# Patient Record
Sex: MALE | Race: BLACK OR AFRICAN AMERICAN | Employment: UNEMPLOYED | URBAN - METROPOLITAN AREA
[De-identification: names, ages, dates, MRNs, and addresses within clinical notes are randomized per-mention and may not be internally consistent; named-entity substitution may affect disease eponyms.]

---

## 2019-09-13 ENCOUNTER — HOSPITAL ENCOUNTER (OUTPATIENT)
Age: 59
Setting detail: SPECIMEN
Discharge: HOME | End: 2019-09-13
Payer: COMMERCIAL

## 2019-09-13 ENCOUNTER — OFFICE VISIT (OUTPATIENT)
Dept: INTERNAL MEDICINE CLINIC | Age: 59
End: 2019-09-13
Payer: COMMERCIAL

## 2019-09-13 VITALS
SYSTOLIC BLOOD PRESSURE: 120 MMHG | HEART RATE: 89 BPM | RESPIRATION RATE: 24 BRPM | HEIGHT: 70 IN | OXYGEN SATURATION: 99 % | DIASTOLIC BLOOD PRESSURE: 70 MMHG

## 2019-09-13 DIAGNOSIS — F41.9 ANXIETY AND DEPRESSION: ICD-10-CM

## 2019-09-13 DIAGNOSIS — I10 ESSENTIAL HYPERTENSION: ICD-10-CM

## 2019-09-13 DIAGNOSIS — G81.91 RIGHT HEMIPLEGIA (HCC): ICD-10-CM

## 2019-09-13 DIAGNOSIS — Z76.89 ENCOUNTER TO ESTABLISH CARE WITH NEW DOCTOR: Primary | ICD-10-CM

## 2019-09-13 DIAGNOSIS — E78.2 MIXED HYPERLIPIDEMIA: ICD-10-CM

## 2019-09-13 DIAGNOSIS — F32.A ANXIETY AND DEPRESSION: ICD-10-CM

## 2019-09-13 DIAGNOSIS — I63.00 CEREBROVASCULAR ACCIDENT (CVA) DUE TO THROMBOSIS OF PRECEREBRAL ARTERY (HCC): ICD-10-CM

## 2019-09-13 PROBLEM — I63.9 CVA (CEREBRAL VASCULAR ACCIDENT) (HCC): Status: ACTIVE | Noted: 2019-09-13

## 2019-09-13 PROCEDURE — 99204 OFFICE O/P NEW MOD 45 MIN: CPT | Performed by: FAMILY MEDICINE

## 2019-09-13 RX ORDER — LISINOPRIL 10 MG/1
TABLET ORAL
COMMUNITY
Start: 2019-09-06 | End: 2019-10-07 | Stop reason: SDUPTHER

## 2019-09-13 RX ORDER — FLUOXETINE HYDROCHLORIDE 40 MG/1
CAPSULE ORAL
COMMUNITY
Start: 2019-09-06 | End: 2019-10-07 | Stop reason: SDUPTHER

## 2019-09-13 RX ORDER — INSULIN DETEMIR 100 [IU]/ML
16 INJECTION, SOLUTION SUBCUTANEOUS NIGHTLY
Qty: 5 PEN | Status: SHIPPED | COMMUNITY
Start: 2019-09-13 | End: 2019-12-05 | Stop reason: SDUPTHER

## 2019-09-13 RX ORDER — HYDRALAZINE HYDROCHLORIDE 25 MG/1
TABLET, FILM COATED ORAL
COMMUNITY
Start: 2019-09-06 | End: 2019-10-07 | Stop reason: SDUPTHER

## 2019-09-13 RX ORDER — ATORVASTATIN CALCIUM 40 MG/1
TABLET, FILM COATED ORAL
COMMUNITY
Start: 2019-09-06 | End: 2019-09-13 | Stop reason: DRUGHIGH

## 2019-09-13 RX ORDER — POTASSIUM CHLORIDE 1500 MG/1
TABLET, FILM COATED, EXTENDED RELEASE ORAL
COMMUNITY
Start: 2019-09-06 | End: 2019-10-07 | Stop reason: SDUPTHER

## 2019-09-13 RX ORDER — INSULIN LISPRO 100 [IU]/ML
INJECTION, SOLUTION INTRAVENOUS; SUBCUTANEOUS
COMMUNITY
Start: 2019-09-07 | End: 2020-04-30 | Stop reason: SDUPTHER

## 2019-09-13 RX ORDER — PEN NEEDLE, DIABETIC 31 G X1/4"
NEEDLE, DISPOSABLE MISCELLANEOUS
COMMUNITY
Start: 2019-09-07 | End: 2019-12-05 | Stop reason: SDUPTHER

## 2019-09-13 RX ORDER — INSULIN DETEMIR 100 [IU]/ML
INJECTION, SOLUTION SUBCUTANEOUS
COMMUNITY
Start: 2019-09-06 | End: 2019-09-13 | Stop reason: DRUGHIGH

## 2019-09-13 RX ORDER — AMLODIPINE BESYLATE 10 MG/1
TABLET ORAL
COMMUNITY
Start: 2019-09-06 | End: 2019-10-07 | Stop reason: SDUPTHER

## 2019-09-13 RX ORDER — ATORVASTATIN CALCIUM 40 MG/1
80 TABLET, FILM COATED ORAL
Qty: 30 TABLET | Status: SHIPPED | COMMUNITY
Start: 2019-09-13 | End: 2019-09-23 | Stop reason: SDUPTHER

## 2019-09-13 RX ORDER — CLOPIDOGREL BISULFATE 75 MG/1
TABLET ORAL
COMMUNITY
Start: 2019-09-06 | End: 2019-10-07 | Stop reason: SDUPTHER

## 2019-09-13 ASSESSMENT — ENCOUNTER SYMPTOMS
RESPIRATORY NEGATIVE: 1
BACK PAIN: 1
EYES NEGATIVE: 1
ALLERGIC/IMMUNOLOGIC NEGATIVE: 1
GASTROINTESTINAL NEGATIVE: 1

## 2019-09-13 ASSESSMENT — PATIENT HEALTH QUESTIONNAIRE - PHQ9
SUM OF ALL RESPONSES TO PHQ QUESTIONS 1-9: 0
SUM OF ALL RESPONSES TO PHQ9 QUESTIONS 1 & 2: 0
SUM OF ALL RESPONSES TO PHQ QUESTIONS 1-9: 0
2. FEELING DOWN, DEPRESSED OR HOPELESS: 0
1. LITTLE INTEREST OR PLEASURE IN DOING THINGS: 0

## 2019-09-13 NOTE — PROGRESS NOTES
on diabetic foot care  He is advised to update his annual dilated eye exam  Anxiety and depression on SSRI that he is tolerating well. He denies suicidality  He denies tobacco, excessive alcohol or illicit drug use  I have ordered A1c on the way out  Available labs reviewed, discussed with patient, questions answered. Med list reviewed advised to continue with adjustment as recommended earlier  He is encouraged to call for any concern  This note is created with a voice recognition program and while intend to generate a document that accurately reflects the content of the visit, no guarantee can be provided that every mistake has been identified and corrected by editing.           Constantino Beckham MD

## 2019-09-14 LAB
ESTIMATED AVERAGE GLUCOSE: 206 MG/DL
HBA1C MFR BLD: 8.8 % (ref 4–6)

## 2019-09-16 ENCOUNTER — TELEPHONE (OUTPATIENT)
Dept: INTERNAL MEDICINE CLINIC | Age: 59
End: 2019-09-16

## 2019-09-23 RX ORDER — ATORVASTATIN CALCIUM 80 MG/1
80 TABLET, FILM COATED ORAL DAILY
Qty: 90 TABLET | Refills: 0 | Status: SHIPPED | OUTPATIENT
Start: 2019-09-23 | End: 2019-12-23

## 2019-10-07 RX ORDER — POTASSIUM CHLORIDE 1500 MG/1
20 TABLET, FILM COATED, EXTENDED RELEASE ORAL DAILY
Qty: 30 TABLET | Refills: 1 | Status: SHIPPED | OUTPATIENT
Start: 2019-10-07 | End: 2019-10-15 | Stop reason: SDUPTHER

## 2019-10-07 RX ORDER — AMLODIPINE BESYLATE 10 MG/1
10 TABLET ORAL DAILY
Qty: 30 TABLET | Refills: 1 | Status: SHIPPED | OUTPATIENT
Start: 2019-10-07 | End: 2019-12-05 | Stop reason: SDUPTHER

## 2019-10-07 RX ORDER — CLOPIDOGREL BISULFATE 75 MG/1
75 TABLET ORAL DAILY
Qty: 30 TABLET | Refills: 1 | Status: SHIPPED | OUTPATIENT
Start: 2019-10-07 | End: 2019-12-05 | Stop reason: ALTCHOICE

## 2019-10-07 RX ORDER — GLUCOSAMINE HCL/CHONDROITIN SU 500-400 MG
CAPSULE ORAL
Qty: 200 STRIP | Refills: 3 | Status: SHIPPED | OUTPATIENT
Start: 2019-10-07 | End: 2020-02-24 | Stop reason: SDUPTHER

## 2019-10-07 RX ORDER — LISINOPRIL 10 MG/1
10 TABLET ORAL DAILY
Qty: 30 TABLET | Refills: 1 | Status: SHIPPED | OUTPATIENT
Start: 2019-10-07 | End: 2019-12-05 | Stop reason: SDUPTHER

## 2019-10-07 RX ORDER — HYDRALAZINE HYDROCHLORIDE 25 MG/1
25 TABLET, FILM COATED ORAL DAILY
Qty: 30 TABLET | Refills: 1 | Status: SHIPPED | OUTPATIENT
Start: 2019-10-07 | End: 2019-12-05 | Stop reason: SDUPTHER

## 2019-10-07 RX ORDER — FLUOXETINE HYDROCHLORIDE 40 MG/1
40 CAPSULE ORAL DAILY
Qty: 30 CAPSULE | Refills: 1 | Status: SHIPPED | OUTPATIENT
Start: 2019-10-07 | End: 2019-12-05 | Stop reason: SDUPTHER

## 2019-10-07 RX ORDER — LANCETS 30 GAUGE
1 EACH MISCELLANEOUS 2 TIMES DAILY
Qty: 200 EACH | Refills: 3 | Status: SHIPPED | OUTPATIENT
Start: 2019-10-07 | End: 2020-02-24 | Stop reason: SDUPTHER

## 2019-10-10 ENCOUNTER — TELEPHONE (OUTPATIENT)
Dept: ENDOCRINOLOGY | Age: 59
End: 2019-10-10

## 2019-10-15 ENCOUNTER — OFFICE VISIT (OUTPATIENT)
Dept: INTERNAL MEDICINE CLINIC | Age: 59
End: 2019-10-15
Payer: COMMERCIAL

## 2019-10-15 VITALS
OXYGEN SATURATION: 99 % | WEIGHT: 162.2 LBS | SYSTOLIC BLOOD PRESSURE: 124 MMHG | RESPIRATION RATE: 24 BRPM | HEIGHT: 70 IN | HEART RATE: 87 BPM | BODY MASS INDEX: 23.22 KG/M2 | DIASTOLIC BLOOD PRESSURE: 70 MMHG

## 2019-10-15 DIAGNOSIS — Z76.89 ENCOUNTER TO ESTABLISH CARE WITH NEW DOCTOR: ICD-10-CM

## 2019-10-15 DIAGNOSIS — I10 ESSENTIAL HYPERTENSION: ICD-10-CM

## 2019-10-15 DIAGNOSIS — F32.A ANXIETY AND DEPRESSION: ICD-10-CM

## 2019-10-15 DIAGNOSIS — Z12.11 COLON CANCER SCREENING: ICD-10-CM

## 2019-10-15 DIAGNOSIS — Z28.21 PNEUMOCOCCAL VACCINATION DECLINED: ICD-10-CM

## 2019-10-15 DIAGNOSIS — I63.00 CEREBROVASCULAR ACCIDENT (CVA) DUE TO THROMBOSIS OF PRECEREBRAL ARTERY (HCC): ICD-10-CM

## 2019-10-15 DIAGNOSIS — F41.9 ANXIETY AND DEPRESSION: ICD-10-CM

## 2019-10-15 DIAGNOSIS — Z28.21 TETANUS, DIPHTHERIA, AND ACELLULAR PERTUSSIS (TDAP) VACCINATION DECLINED: ICD-10-CM

## 2019-10-15 DIAGNOSIS — E78.2 MIXED HYPERLIPIDEMIA: ICD-10-CM

## 2019-10-15 DIAGNOSIS — Z28.21 INFLUENZA VACCINATION DECLINED: ICD-10-CM

## 2019-10-15 DIAGNOSIS — G81.91 RIGHT HEMIPLEGIA (HCC): ICD-10-CM

## 2019-10-15 PROCEDURE — 99214 OFFICE O/P EST MOD 30 MIN: CPT | Performed by: FAMILY MEDICINE

## 2019-10-15 RX ORDER — POTASSIUM CHLORIDE 1500 MG/1
20 TABLET, FILM COATED, EXTENDED RELEASE ORAL ONCE
Qty: 90 TABLET | Refills: 2 | Status: SHIPPED | OUTPATIENT
Start: 2019-10-15 | End: 2019-12-05 | Stop reason: SDUPTHER

## 2019-10-15 ASSESSMENT — ENCOUNTER SYMPTOMS
ALLERGIC/IMMUNOLOGIC NEGATIVE: 1
GASTROINTESTINAL NEGATIVE: 1
BACK PAIN: 1
RESPIRATORY NEGATIVE: 1
EYES NEGATIVE: 1

## 2019-11-04 ENCOUNTER — TELEPHONE (OUTPATIENT)
Dept: INTERNAL MEDICINE CLINIC | Age: 59
End: 2019-11-04

## 2019-12-05 ENCOUNTER — OFFICE VISIT (OUTPATIENT)
Dept: NEUROLOGY | Age: 59
End: 2019-12-05
Payer: COMMERCIAL

## 2019-12-05 VITALS
HEIGHT: 71 IN | WEIGHT: 160.6 LBS | DIASTOLIC BLOOD PRESSURE: 76 MMHG | HEART RATE: 98 BPM | SYSTOLIC BLOOD PRESSURE: 118 MMHG | BODY MASS INDEX: 22.48 KG/M2

## 2019-12-05 DIAGNOSIS — G81.91 RIGHT HEMIPLEGIA (HCC): ICD-10-CM

## 2019-12-05 DIAGNOSIS — Z86.73 HISTORY OF RECENT STROKE: Primary | ICD-10-CM

## 2019-12-05 DIAGNOSIS — I10 HYPERTENSION, UNSPECIFIED TYPE: ICD-10-CM

## 2019-12-05 PROCEDURE — 99204 OFFICE O/P NEW MOD 45 MIN: CPT | Performed by: PSYCHIATRY & NEUROLOGY

## 2019-12-05 RX ORDER — HYDRALAZINE HYDROCHLORIDE 25 MG/1
25 TABLET, FILM COATED ORAL DAILY
Qty: 30 TABLET | Refills: 1 | Status: SHIPPED | OUTPATIENT
Start: 2019-12-05 | End: 2020-02-04

## 2019-12-05 RX ORDER — AMLODIPINE BESYLATE 10 MG/1
10 TABLET ORAL DAILY
Qty: 30 TABLET | Refills: 1 | Status: SHIPPED | OUTPATIENT
Start: 2019-12-05 | End: 2020-02-04

## 2019-12-05 RX ORDER — LISINOPRIL 10 MG/1
10 TABLET ORAL DAILY
Qty: 30 TABLET | Refills: 1 | Status: SHIPPED | OUTPATIENT
Start: 2019-12-05 | End: 2020-02-04

## 2019-12-05 RX ORDER — INSULIN DETEMIR 100 [IU]/ML
16 INJECTION, SOLUTION SUBCUTANEOUS NIGHTLY
Qty: 5 PEN | Refills: 3 | Status: SHIPPED | OUTPATIENT
Start: 2019-12-05 | End: 2020-02-24 | Stop reason: SDUPTHER

## 2019-12-05 RX ORDER — POTASSIUM CHLORIDE 1500 MG/1
20 TABLET, FILM COATED, EXTENDED RELEASE ORAL ONCE
Qty: 90 TABLET | Refills: 2 | Status: SHIPPED | OUTPATIENT
Start: 2019-12-05 | End: 2020-11-30

## 2019-12-05 RX ORDER — FLUOXETINE HYDROCHLORIDE 40 MG/1
40 CAPSULE ORAL DAILY
Qty: 30 CAPSULE | Refills: 1 | Status: SHIPPED | OUTPATIENT
Start: 2019-12-05 | End: 2020-02-04

## 2019-12-05 RX ORDER — PEN NEEDLE, DIABETIC 31 G X1/4"
1 NEEDLE, DISPOSABLE MISCELLANEOUS DAILY
Qty: 100 EACH | Refills: 3 | Status: SHIPPED | OUTPATIENT
Start: 2019-12-05 | End: 2020-02-24 | Stop reason: SDUPTHER

## 2019-12-10 RX ORDER — CLOPIDOGREL BISULFATE 75 MG/1
TABLET ORAL
Qty: 90 TABLET | Refills: 1 | Status: SHIPPED | OUTPATIENT
Start: 2019-12-10 | End: 2020-06-16 | Stop reason: ALTCHOICE

## 2019-12-23 RX ORDER — ATORVASTATIN CALCIUM 80 MG/1
TABLET, FILM COATED ORAL
Qty: 90 TABLET | Refills: 0 | Status: SHIPPED | OUTPATIENT
Start: 2019-12-23 | End: 2020-03-10

## 2020-01-15 ENCOUNTER — HOSPITAL ENCOUNTER (OUTPATIENT)
Dept: PHYSICAL THERAPY | Facility: CLINIC | Age: 60
Setting detail: THERAPIES SERIES
Discharge: HOME | End: 2020-01-15
Payer: MEDICAID

## 2020-01-15 PROCEDURE — 97162 PT EVAL MOD COMPLEX 30 MIN: CPT

## 2020-01-15 NOTE — CONSULTS
having stairs     Sleep: [] OK    [x] Disturbed  Hand Dominance  [x] Right  [] Left    Home environment:  -  Lives with parents in 1-story house  -  1 HYUN with  No railings; no stairs inside  - Tub-shower with shower chair; denies difficulty getting in/out of tub      Prior home: 2-story home, 3 HYUN, no railings; 4 steps from there to get into kitchen. Bedroom/bathroom on second floor, 15 steps to get to second floor, railing on left. Tub shower, no equipment.       Durable Medical Equipment:  Current DME available: SPC, hemiwalker, shower chair      PMHx: [] Unremarkable [] Diabetes [] HTN  [] Pacemaker   [] MI/Heart Problems [] Cancer [] Arthritis [] Other:              [x] Refer to full medical chart  In EPIC     Recent Tests:     Medications: [x] Refer to full medical record [] None [] Other:  Allergies:      [x] Refer to full medical record [] None [] Other:    Employment:  [] Normal Duty  [] Light Duty  [] Disability  [] Other  Job Description:    Pain:  [] Yes  [x] No Location: N/A Pain Rating: (0-10 scale) 0/10  Pain altered Tx:  [x] No  [] Yes  Action:      Objective:    ROM  °A/P STRENGTH POSTURE No deficit Deficit Not Tested Comments    Left Right Left Right Kyphosis [x] [] []    Shoulder Flex  120*  2+ Scoliosis [x] [] []    Abd  90*  2+ Forward Head [] [x] []    Elbow Flex  WNL  2+ Rounded Shldrs [] [x] []    Ext  WNL  2+ Slumped Sitting [] [x] []    Wrist Flex  20   Skin Integrity [x] [] []    Ext  30          Hand      NEUROLOGICAL       Hip Flex    4+ Reflexes [] [] [x]    Ext    4+ Sensation [x] [] []    Abd    4+ Bladder/Bowel [x] [] []    Knee Flex    5- Coordination [] [] []    Ext    4+ Tone [] [x] [] Increased tone in L biceps, wrist and finger flexors   Ankle DF    4+ Clonus [x] [] []    PF    4 Tremors [x] [] []      FUNCTION INDEP MIN ASSIST MOD ASSIST MAX ASSIST NOT TESTED   Bed Mobility        -rolling [x] [] [] [] []   -sit to supine [x] [] [] [] []   -supine to sit [x] [] [] [] [] Transfers        -sit to stand [x] [] [] [] []   -sliding board [] [] [] [] []   -pivot [] [] [] [] []   Balance        -sitting static [x] [] [] [] []   -dynamic [x] [] [] [] []   -standing static [] [x] [] [] []   -dynamic [] [] [x] [] []   Ambulation [x] [] [] [] []   Distance/Device: 48' using [employer] Innov[employer]walker in L hand   Analysis: Slow gait speed, hyperextension of R knee in stance phase, limited heel strike and pushoff on RLE     W/C Mobility [] [] [] [] []   Groom/Dress [] [] [] [] []     Special Testing: [x] Mccurdy Test:   [] Tinetti Test:      [] 10M Walk test: Will complete in upcoming sessions. [] Other:      Comments:    Assessment: Joyce Pelayo is a 65yo male who presents today with impairments consistent with left-sided CVA, demonstrating more RUE involvement than RLE. Currently with significant functional impairments including any involvement of RUE for all ADLs, gait deficits, and R-hemibody weakness. Pt will benefit from skilled physical therapy to address R shoulder mobility/weakness, improve efficiency of gait and proper mechanics, and improve functional mobility to reduce dependence on AD, reduce balance impairment, as well as progress to prior level of function as able. Problems:    [] ? Pain:     [x] ? ROM:    [x] ? Strength:    [x] ? Function:   [x] ? Balance  [x] ? Coordination  [x] Postural Deviations  [x] Gait Deviations  [] Tone  [] Other:      STG: (to be met in 10 treatments)  1. ? ROM: Improve R shoulder PROM to at least 150deg flex, 120 abd to improve mobility for reaching overhead. 2. ? Strength:   a. At least 3/5 global strength in RUE to improve lifting grasping  b. 5-/5 R hip ext/abd strength for improved squatting/stair climbing ability  3. ? Balance: Able to bend down to ground to  object with only 1UE assist with no balance impairment. 4. Function:  a. Improve gait speed to at least .6m/s to progress safety with home/community ambulation.   b. Mai Beverage to begin to integrate RUE into UE dressing with minor difficulty  5. Independent with Home Exercise Programs  6. Demonstrate Knowledge of fall prevention    LTG: (to be met in 24 treatments)  1. ROM:   a. Improve R shoulder PROM to at least 170deg flex/abd to normalize joint mobility in R shoulder post CVA  b. Improve R shoulder AROM to at least 90 deg flex/abd to progress ability to reach  2. Balance:  a. Pt will be able to complete 360 turn without AD with no balance impairment. b. Pt will be able to complete dynamic reaching/stooping tasks while standing in narrow MED  3. Function:    a. Improve gait speed to at least .8m/s to improve safety with community ambulation. b. Able to climb 4 stairs with unilateral UE assist and minimal balance impairment. 4. Strength:   a. At least 3+/5 global strength in RUE to improve lifting/grasping  b. At least 5/5 R hip ext/abd strength for improved stability with prolonged walking and stair climbing. Patient goals: [place] my right arm again like normal\"    Rehab Potential:  [] Good  [x] Fair  [] Poor   Suggested Professional Referral:  [x] No  [] Yes:  Barriers to Goal Achievement[de-identified]  [] No  [x] Yes: 5 months post stroke, uncertain of window of opportunity for maximal improvement  Domestic Concerns:  [x] No  [] Yes:    Pt. Education:  [x] Plans/Goals, Risks/Benefits discussed  [] Home exercise program  Method of Education: [x] Verbal  [] Demo  [] Written  Comprehension of Education:  [x] Verbalizes understanding. [] Demonstrates understanding. [] Needs Review. [] Demonstrates/verbalizes understanding of HEP/Ed previously given.     Treatment Plan:  [x] Therapeutic Exercise   30767  [] Iontophoresis: 4 mg/mL Dexamethasone Sodium Phosphate  mAmin  14779   [x] Therapeutic Activity  98279 [] Vasopneumatic cold with compression  23305    [x] Gait Training   58010 [] Ultrasound   70317   [x] Neuromuscular Re-education  38103 [] Electrical Stimulation Unattended  98766   [x] Manual Therapy  13400 [x] Electrical Stimulation Attended  [id number]   [x] Instruction in HEP  [] Dry Needling   [] Aquatic Therapy   [id number] [] Cold/hotpack    [] Massage   [id number]      [] Lumbar/Cervical Traction  [id number]     []  Medication allergies reviewed for use of    Dexamethasone Sodium Phosphate 4mg/ml     with iontophoresis treatments. Pt is not allergic. Frequency: 2-3 x/weeks for 24 visits    Todays Treatment:  Modalities:   Exercises:  Exercise Reps/ Time Weight/ Level Comments                                 Other: Held exercises this date for thorough evaluation of all impairments d/t CVA    Specific Instructions for next treatment:  Nu-step warm up for arms/legs - strap hand to device  RUE: R shoulder PROM, table slides, wall slides; cane AAROM if able to grasp  Standing balance: complete COATS, heel taps to stair, step ups  Gait train: use hemiwalker or least restrictive AD and focus on increasing speed, improve heel strike and swing phase on RLE    Evaluation Complexity:  History (Personal factors, comorbidities) [] 0 [x] 1-2 [] 3+   Exam (limitations, restrictions) [] 1-2 [] 3 [x] 4+   Clinical presentation (progression) [] Stable [x] Evolving  [] Unstable   Decision Making [] Low [x] Moderate [] High    [] Low Complexity [] Moderate Complexity [] High Complexity       Treatment Charges: Mins Units   [x] Evaluation       []  Low       [x]  Moderate       []  High 55 1   []  Modalities     []  Ther Exercise     []  Manual Therapy     []  Ther Activities     []  Aquatics     []  Vasocompression     []  Other       TOTAL TREATMENT TIME: 55 min total time, 0 timed treatment minutes    Time in: 11:02 am      Time out: 12:00 pm    Electronically signed by: Lucrecia Jung PT        Physician Signature:________________________________Date:__________________  By signing above or cosigning this note, I have reviewed this plan of care and certify a need for medically necessary rehabilitation services. *PLEASE SIGN ABOVE AND FAX BACK ALL PAGES*

## 2020-01-16 ENCOUNTER — OFFICE VISIT (OUTPATIENT)
Dept: INTERNAL MEDICINE CLINIC | Age: 60
End: 2020-01-16
Payer: MEDICAID

## 2020-01-16 VITALS
HEIGHT: 71 IN | TEMPERATURE: 97 F | DIASTOLIC BLOOD PRESSURE: 80 MMHG | WEIGHT: 164.2 LBS | HEART RATE: 93 BPM | BODY MASS INDEX: 22.99 KG/M2 | OXYGEN SATURATION: 99 % | SYSTOLIC BLOOD PRESSURE: 130 MMHG

## 2020-01-16 PROBLEM — Z76.89 ENCOUNTER TO ESTABLISH CARE WITH NEW DOCTOR: Status: RESOLVED | Noted: 2019-09-13 | Resolved: 2020-01-16

## 2020-01-16 PROCEDURE — G8420 CALC BMI NORM PARAMETERS: HCPCS | Performed by: FAMILY MEDICINE

## 2020-01-16 PROCEDURE — 1036F TOBACCO NON-USER: CPT | Performed by: FAMILY MEDICINE

## 2020-01-16 PROCEDURE — 99214 OFFICE O/P EST MOD 30 MIN: CPT | Performed by: FAMILY MEDICINE

## 2020-01-16 PROCEDURE — G8484 FLU IMMUNIZE NO ADMIN: HCPCS | Performed by: FAMILY MEDICINE

## 2020-01-16 PROCEDURE — 3046F HEMOGLOBIN A1C LEVEL >9.0%: CPT | Performed by: FAMILY MEDICINE

## 2020-01-16 PROCEDURE — 2022F DILAT RTA XM EVC RTNOPTHY: CPT | Performed by: FAMILY MEDICINE

## 2020-01-16 PROCEDURE — 3017F COLORECTAL CA SCREEN DOC REV: CPT | Performed by: FAMILY MEDICINE

## 2020-01-16 PROCEDURE — G8427 DOCREV CUR MEDS BY ELIG CLIN: HCPCS | Performed by: FAMILY MEDICINE

## 2020-01-16 ASSESSMENT — ENCOUNTER SYMPTOMS
EYES NEGATIVE: 1
RESPIRATORY NEGATIVE: 1
ALLERGIC/IMMUNOLOGIC NEGATIVE: 1
GASTROINTESTINAL NEGATIVE: 1

## 2020-01-16 NOTE — PROGRESS NOTES
Subjective:      Patient ID: Yohana Gatica is a 61 y.o. male. Diabetes   He presents for his follow-up diabetic visit. He has type 1 diabetes mellitus. His disease course has been stable. There are no hypoglycemic associated symptoms. Associated symptoms include weakness. There are no hypoglycemic complications. Symptoms are stable. Diabetic complications include a CVA. Risk factors for coronary artery disease include diabetes mellitus, dyslipidemia, male sex and sedentary lifestyle. Current diabetic treatment includes insulin injections. He is compliant with treatment all of the time. His weight is stable. He is following a generally healthy diet. Meal planning includes ADA exchanges, avoidance of concentrated sweets, calorie counting and carbohydrate counting. He has had a previous visit with a dietitian. He participates in exercise every other day. Home blood sugar record trend: He did not bring Accu-Chek log. An ACE inhibitor/angiotensin II receptor blocker is being taken. Review of Systems   Constitutional: Negative. HENT: Negative. Eyes: Negative. Respiratory: Negative. Cardiovascular: Negative. Gastrointestinal: Negative. Endocrine: Negative. Musculoskeletal: Negative. Skin: Negative. Allergic/Immunologic: Negative. Neurological: Positive for weakness. Hematological: Negative. Psychiatric/Behavioral: Positive for dysphoric mood. Past family and social history unremarkable. Diagnosis Orders   1. Diabetes mellitus, insulin dependent (IDDM), controlled (HCC)  Microalbumin, Ur    Potassium    CBC    Comprehensive Metabolic Panel    Hemoglobin A1C    Lipid Panel    Microalbumin, Ur    Urinalysis with Microscopic    TSH without Reflex    Psa screening   2. Cerebrovascular accident (CVA) due to thrombosis of precerebral artery ([address]     3. Tetanus, diphtheria, and acellular pertussis (Tdap) vaccination declined     4.  Right hemiplegia (HCC)  Microalbumin, Ur Potassium    CBC    Comprehensive Metabolic Panel    Hemoglobin A1C    Lipid Panel    Microalbumin, Ur    Urinalysis with Microscopic    TSH without Reflex    Psa screening   5. Pneumococcal vaccination declined     6. Mixed hyperlipidemia  Microalbumin, Ur    Potassium    CBC    Comprehensive Metabolic Panel    Hemoglobin A1C    Lipid Panel    Microalbumin, Ur    Urinalysis with Microscopic    TSH without Reflex    Psa screening   7. Influenza vaccination declined     8. Essential hypertension     9. Screening for hyperlipidemia  Lipid, Fasting   10. Anxiety and depression           Objective:   Physical Exam  Vitals signs and nursing note reviewed. Constitutional:       Appearance: He is well-developed. HENT:      Head: Normocephalic and atraumatic. Right Ear: External ear normal.      Left Ear: External ear normal.      Nose: Nose normal.   Eyes:      Conjunctiva/sclera: Conjunctivae normal.      Pupils: Pupils are equal, round, and reactive to light. Neck:      Musculoskeletal: Normal range of motion and neck supple. Cardiovascular:      Rate and Rhythm: Normal rate and regular rhythm. Heart sounds: Normal heart sounds. Pulmonary:      Effort: Pulmonary effort is normal.      Breath sounds: Normal breath sounds. Abdominal:      General: Bowel sounds are normal.      Palpations: Abdomen is soft. Musculoskeletal: Normal range of motion. Comments: Musculoskeletal pain. Status post CVA   Skin:     General: Skin is warm and dry. Neurological:      Mental Status: He is alert and oriented to person, place, and time. Deep Tendon Reflexes: Reflexes are normal and symmetric. Comments: CVA with residual right hemiplegia. He is established with neurology with ongoing rehab   Psychiatric:      Comments: Anxiety/depression on SSRI. He denies suicidality         Assessment:       Diagnosis Orders   1.  Diabetes mellitus, insulin dependent (IDDM), controlled ([facility] [address]  Microalbumin, Ur Potassium    CBC    Comprehensive Metabolic Panel    Hemoglobin A1C    Lipid Panel    Microalbumin, Ur    Urinalysis with Microscopic    TSH without Reflex    Psa screening   2. Cerebrovascular accident (CVA) due to thrombosis of precerebral artery ([address]     3. Tetanus, diphtheria, and acellular pertussis (Tdap) vaccination declined     4. Right hemiplegia (HCC)  Microalbumin, Ur    Potassium    CBC    Comprehensive Metabolic Panel    Hemoglobin A1C    Lipid Panel    Microalbumin, Ur    Urinalysis with Microscopic    TSH without Reflex    Psa screening   5. Pneumococcal vaccination declined     6. Mixed hyperlipidemia  Microalbumin, Ur    Potassium    CBC    Comprehensive Metabolic Panel    Hemoglobin A1C    Lipid Panel    Microalbumin, Ur    Urinalysis with Microscopic    TSH without Reflex    Psa screening   7. Influenza vaccination declined     8. Essential hypertension     9. Screening for hyperlipidemia  Lipid, Fasting   10. Anxiety and depression             Plan:      15-year-old right-hand-dominant -American male is presented, came along with his spouse for routine follow-up. He denies any distress, afebrile hemodynamically stable  Insulin-dependent diabetes mellitus with A1c of 8.8. He is on Levemir and sliding scale NovoLog. He did not bring his Accu-Chek log. Emphasized importance of bringing in Accu-Chek log in order to adjust his insulin. We will recheck his labs on the way out. Adhere to ADA 1800 diet, daily moderate exercise as tolerated  Hypertension well-controlled with random blood pressure is equal less than 130/80. Consume less than 2 g of salt a day  Hyperlipidemia on statin that he is tolerating well  Monofilament testing is unremarkable. He is counseled on diabetic foot care  Recent history of CVA with residual right hemiplegia. He ambulates with a walker.   He was recently seen by his neurologist with ongoing rehab program.  Fall precaution is advised  Anxiety and depression on Prozac with positive response. He denies tobacco, excessive alcohol or illicit drug use  Once again he declined influenza/pneumococcal vaccine, Tdap  Med list reviewed advised to continue with compliance  Further recommendations to follow labs  Observe and call for any concern  This note is created with a voice recognition program and while intend to generate a document that accurately reflects the content of the visit, no guarantee can be provided that every mistake has been identified and corrected by editing.           Valeri Britt MD

## 2020-01-22 ENCOUNTER — HOSPITAL ENCOUNTER (OUTPATIENT)
Dept: PHYSICAL THERAPY | Facility: CLINIC | Age: 60
Setting detail: THERAPIES SERIES
Discharge: HOME | End: 2020-01-22
Payer: MEDICAID

## 2020-01-22 PROCEDURE — 97110 THERAPEUTIC EXERCISES: CPT

## 2020-01-22 NOTE — FLOWSHEET NOTE
strengthening   Sit to stands w/ LLE elevated on step 10x 2\" under LLE Cues to maximize weightshift to RLE during descent   Other: Billed for some skilled minutes for Nu-step in adjusting settings to appropriate ROM for shoulder, wrapping R hand to secure  - billed with therex    Specific Instructions for next treatment: TUG test, tricep pull downs, bicep curls on tband; COATS. Consider e-stim to R wrist extensors while pushing table      Treatment Charges: Mins Units   []  Modalities     [x]  Ther Exercise 57 4   []  Manual Therapy     []  Ther Activities     []  Aquatics     []  Vasocompression     []  Other     Total Treatment time 57 4       Assessment: [x] Progressing toward goals. Shoulder flexors/abd fatigued after strengthening program this date - assist required for all exercises to maintain  and neutral wrist position. Significant difficulty achieving tricep extension, d/t weakness and bicep tone - will continue to address tricep weakness in upcoming visits. Able to bias RLE strengthening though different positions in sit<>stands with good tolerance, and good \"-\" balance noted during these tasks. [] No change. [x] Other: Pt states he has brace to maintain neutral wrist - will bring it in next visit. STG: (to be met in 10 treatments)  1. ? ROM: Improve R shoulder PROM to at least 150deg flex, 120 abd to improve mobility for reaching overhead. 2. ? Strength:   a. At least 3/5 global strength in RUE to improve lifting grasping  b. 5-/5 R hip ext/abd strength for improved squatting/stair climbing ability  3. ? Balance: Able to bend down to ground to  object with only 1UE assist with no balance impairment. 4. Function:  a. Improve gait speed to at least .6m/s to progress safety with home/community ambulation. b. Ernesta Brittle to begin to integrate RUE into UE dressing with minor difficulty  5. Independent with Home Exercise Programs  6.  Demonstrate Knowledge of fall prevention     LTG: (to be met in 24 treatments)  1. ROM:   a. Improve R shoulder PROM to at least 170deg flex/abd to normalize joint mobility in R shoulder post CVA  b. Improve R shoulder AROM to at least 90 deg flex/abd to progress ability to reach  2. Balance:  a. Pt will be able to complete 360 turn without AD with no balance impairment. b. Pt will be able to complete dynamic reaching/stooping tasks while standing in narrow MED  3. Function:    a. Improve gait speed to at least .8m/s to improve safety with community ambulation. b. Able to climb 4 stairs with unilateral UE assist and minimal balance impairment. 4. Strength:   a. At least 3+/5 global strength in RUE to improve lifting/grasping  b. At least 5/5 R hip ext/abd strength for improved stability with prolonged walking and stair climbing.         Patient goals: [place] my right arm again like normal\"    Pt. Education:  [x] Yes  [] No  [x] Reviewed Prior HEP/Ed  Method of Education: [x] Verbal  [x] Demo  [x] Written  Comprehension of Education:  [x] Verbalizes understanding. [x] Demonstrates understanding. [] Needs review. [] Demonstrates/verbalizes HEP/Ed previously given. Current HEP: supine bicep curl 2#, chest press with cane, sit>stands with LLE forward     Plan: [x] Continue per plan of care.    [] Other:      Time In:12:03 pm            Time Out: 1:05 pm    Electronically signed by:  Manolo Hanks, PT

## 2020-01-24 ENCOUNTER — HOSPITAL ENCOUNTER (OUTPATIENT)
Dept: PHYSICAL THERAPY | Facility: CLINIC | Age: 60
Setting detail: THERAPIES SERIES
Discharge: HOME | End: 2020-01-24
Payer: MEDICAID

## 2020-01-24 PROCEDURE — 97110 THERAPEUTIC EXERCISES: CPT

## 2020-01-24 NOTE — FLOWSHEET NOTE
[] [address]  [address]  [phone]  F: [phone] [x] [address] [facility] [address]   [address]  P: [phone]  F: [phone] [] [address]  [address] [facility]  P: [phone]  F: [phone] [] [address]  [facility]   [address]   [place]: [phone]  F: [phone]      Physical Therapy Daily Treatment Note    Date:  2020  Patient Name:  Nancy Cosme    :  1960  MRN: [id number]  Physician: Dr. Hawthorne Galen: Manoj Darby (24 vs)  Medical Diagnosis: History of recent stroke  Rehab Codes: M25.611, M25.621, M25.641, R53.1, R29.3, R26.9, R27.9  Next 's appt. : 3/10/20  Visit# / total visits: 3/24  Cancels/No Shows: 0    Subjective:    Pain:  [] Yes  [x] No Location:  N/A Pain Rating: (0-10 scale) \"stiffness\"/10  Pain altered Tx:  [x] No  [] Yes  Action:  Comments: Pt continues to report no pain and just stiffness in R shoulder.      Objective:  Modalities:   Precautions:  Exercises:  Exercise Reps/ Time Weight/ Level Comments   Nu-step 8 min L3 NOT Scifit  - R arm ACE-wrapped to handle  - R arm at place 12  - seat at 9         Supine      R shoulder PROM 20x ea  Flex, abd (add ER/IR next visit)   Wand chest press 10x 1# CGA-David therapist assist at R [address] shld flexion 10x 1# \" \"   Tricep ext 10x AROM Arm in ~30deg flexion, held by therapist   Bicep curl 10x 2# Therapist holding wrist at neutral during curl   Sidelying abd  RUE Therapist assist throughout; ~90% assist         Seated      Table slides - fwd 2x10  Assist to grasp towel and extend elbow; complete on orange slider               Standing      Squats 2x10  Increase reps next session  - LLE slightly forward to bias RLE strengthening   Sit to stands w/

## 2020-01-29 ENCOUNTER — HOSPITAL ENCOUNTER (OUTPATIENT)
Dept: PHYSICAL THERAPY | Facility: CLINIC | Age: 60
Setting detail: THERAPIES SERIES
Discharge: HOME | End: 2020-01-29
Payer: MEDICAID

## 2020-01-29 PROCEDURE — 97110 THERAPEUTIC EXERCISES: CPT

## 2020-01-29 NOTE — FLOWSHEET NOTE
[] [address]  [address]  [phone]  F: [phone] [x] [address]  [facility] 36   [address]  P: [phone]  F: [phone] [] [place]  [address]  P: [phone]  F: [phone] [] [address]  [facility]   [address]   [place]: [phone]  F: [phone]      Physical Therapy Daily Treatment Note    Date:  2020  Patient Name:  Wale Lovelace    :  1960  MRN: [id number]  Physician: Dr. Miguel Ángel Ellis: Brisa Purdy (24 vs)  Medical Diagnosis: History of recent stroke  Rehab Codes: M25.611, M25.621, M25.641, R53.1, R29.3, R26.9, R27.9  Next 's appt. : 3/10/20  Visit# / total visits:   Cancels/No Shows: 0    Subjective:    Pain:  [] Yes  [x] No Location:  N/A Pain Rating: (0-10 scale) \"stiffness\"/10  Pain altered Tx:  [x] No  [] Yes  Action:  Comments: Pt reports he got 2# weights for home; attempted 3# but too much.     Objective:  TU.04 s  10 MWT: 29.52s      Modalities:   Precautions:  Exercises: Bolded completed on 2020:  Exercise Reps/ Time Weight/ Level Comments   Nu-step 8 min L4 NOT Scifit  - R arm ACE-wrapped to handle  - R arm at place 12  - seat at 9         Pre-gait      Forward step w/ no hyperext 20x Weight in RLE Mod TC/VCs to avoid hyperextension but maximize quad activation   Lateral weight shift 2x15  \" \"         Total gym   ADD NEXT         Supine      R shoulder PROM 20x ea  Flex, abd (add ER/IR next visit)   Wand chest press 10x 1# CGA-David therapist assist at [address] shld flexion 10x 1# \" \"   Tricep ext 10x AROM Arm in ~30deg flexion, held by therapist   Bicep curl 10x 2# Therapist holding wrist at neutral during curl   Sidelying abd  RUE Therapist assist throughout; ~90% assist         Seated      Table slides - fwd 2x10  Assist to grasp towel and extend elbow; complete on orange slider   Thumb opposition 10x ea RUE To second and third digit    Wrist ext/flex stretch 10x3\" ea           Standing      UE weightbearing on table x  Attempted but too much pain in R wrist d/t lack of ROM   Squats 2x10  Increase reps next session  - LLE slightly forward to bias RLE strengthening   Sit to stands w/ LLE elevated on step 10x 2\" under LLE Cues to maximize weightshift to RLE during descent               Tband      Single-arm row 15x  peach Min A to maintain appropriate elbow flex   Tricep pull down 15x peach Cues to keep shoulders upright               Other: Time spent in skilled assessement of gait speed and TUG assessment prior to beginning session - billed with therex    Specific Instructions for next treatment: Wrist flex/ext stretching prior to weightbearing; finger flexor stretching and consider stim for assistance with wrist/finger ext. Treatment Charges: Mins Units   []  Modalities     [x]  Ther Exercise 55 4   []  Manual Therapy     []  Ther Activities     []  Aquatics     []  Vasocompression     []  Other     Total Treatment time 55 4       Assessment: [x] Progressing toward goals. Initiated gait training with focus on avoiding hyperextension at R knee, requires min-mod tactile cues initially; spent most time on this during session. Pt able to maintain slightly bent knee with increased incidence of hyperextension with worsening fatigue. Unable to carryover to gait as of yet, will continue to progress this within sessions. Very minimal thumb/finger extension but some finger flexion - able to complete beginnings of thumb opposition with assist to keep 2nd-3rd digits extended. [] No change. [x] Other: Using wrist brace throughout treatment to maintain neutral position except when attempt weightbearing on UEs. .    STG: (to be met in 10 treatments)  1. ? ROM: Improve R shoulder PROM to at least 150deg flex, 120 abd to improve mobility for reaching overhead. 2. ? Strength:   a. At least 3/5 global strength in RUE to improve lifting grasping  b. 5-/5 R hip ext/abd strength for improved squatting/stair climbing ability  3. ? Balance: Able to bend down to ground to  object with only 1UE assist with no balance impairment. 4. Function:  a. Improve gait speed to at least .6m/s to progress safety with home/community ambulation. b. Alli Amy to begin to integrate RUE into UE dressing with minor difficulty  5. Independent with Home Exercise Programs  6. Demonstrate Knowledge of fall prevention     LTG: (to be met in 24 treatments)  1. ROM:   a. Improve R shoulder PROM to at least 170deg flex/abd to normalize joint mobility in R shoulder post CVA  b. Improve R shoulder AROM to at least 90 deg flex/abd to progress ability to reach  2. Balance:  a. Pt will be able to complete 360 turn without AD with no balance impairment. b. Pt will be able to complete dynamic reaching/stooping tasks while standing in narrow MED  3. Function:    a. Improve gait speed to at least .8m/s to improve safety with community ambulation. b. Able to climb 4 stairs with unilateral UE assist and minimal balance impairment. 4. Strength:   a. At least 3+/5 global strength in RUE to improve lifting/grasping  b. At least 5/5 R hip ext/abd strength for improved stability with prolonged walking and stair climbing.         Patient goals: Ghanshyam Orellana my right arm again like normal\"    Pt. Education:  [x] Yes  [] No  [x] Reviewed Prior HEP/Ed  Method of Education: [x] Verbal  [x] Demo  [x] Written  Comprehension of Education:  [x] Verbalizes understanding. [x] Demonstrates understanding. [] Needs review. [] Demonstrates/verbalizes HEP/Ed previously given. Current HEP: supine bicep curl 2#, chest press with cane, sit>stands with LLE forward     Plan: [x] Continue per plan of care.    [] Other:      Time In: 12:00 pm

## 2020-01-31 ENCOUNTER — TELEPHONE (OUTPATIENT)
Dept: INTERNAL MEDICINE CLINIC | Age: 60
End: 2020-01-31

## 2020-01-31 ENCOUNTER — HOSPITAL ENCOUNTER (OUTPATIENT)
Dept: PHYSICAL THERAPY | Facility: CLINIC | Age: 60
Setting detail: THERAPIES SERIES
Discharge: HOME | End: 2020-01-31
Payer: MEDICAID

## 2020-01-31 PROCEDURE — 97110 THERAPEUTIC EXERCISES: CPT

## 2020-01-31 NOTE — FLOWSHEET NOTE
Continue per plan of care.    [] Other:      Time In: 11:00 am            Time Out: 12:06 pm    Electronically signed by:  Eveline Mayfield PT

## 2020-02-03 NOTE — TELEPHONE ENCOUNTER
----- Message from John Soto MD sent at 2/3/2020 10:45 AM EST -----  Regarding: DME order for Leonardo Ingram or Nonda Everette,     Can you or Nonda Pick put an DME order? Anders smith knee cage brace, then I will approve. I could not find appropriate DME that allows me to write \"Swedish Knee Cage brace\" thanks. Dr. King De Los Santos      ----- Message -----  From: Carleen Dupree PT  Sent: 1/31/2020   5:00 PM EST  To: John Soto MD    Hi Dr. King De Los Santos,    I'm the physical therapist working with Vanessa Oglesby post left CVA - he has significant hyperextension in his right knee with gait. We're working on quad strengthening to control this, but in the meantime during rehab, I think he'd benefit from a Swedish Knee Cage brace to prevent hyperextension throughout gait. Let me know what you think, and if you're able to put in a DME order for this that Bessie Melendez can use to see if insurance will assist in covering the cost, that would be most appreciated!!    Thank you so much, please don't hesitate with any questions or concerns.     Brooklyn Sheppard, PT, DPT

## 2020-02-04 NOTE — TELEPHONE ENCOUNTER
Last visit: 1/16/2020  Last Med refill: 12/5/19  Does patient have enough medication for 72 hours: NA    Next Visit Date:  Future Appointments   Date Time Provider [place] [place]   2/5/2020 12:00 PM Enrique Pulling, PT [facility]   2/7/2020 11:00 AM Enrique Pulling, PT [facility]   2/10/2020 11:00 AM Enrique Pulling, PT [facility]   2/14/2020  3:00 PM Enrique Pulling, PT [facility]   2/19/2020 12:00 PM Enrique Pulling, PT [facility]   2/21/2020  1:00 PM Enrique Pulling, PT [facility]   2/26/2020 12:00 PM Enrique Pulling, PT [facility]   2/28/2020  1:00 PM Enrique Pulling, PT [facility]   3/10/2020  9:00 AM Yuriy Lainez MD [id number]   4/16/2020 11:15 AM Migdalia Soto MD [facility] [address] Maintenance   Topic Date Due    Potassium monitoring  1960    Creatinine monitoring  1960    Lipid screen  05/22/1970    Diabetic microalbuminuria test  05/22/1978    Hepatitis B vaccine (1 of 3 - Risk 3-dose series) 05/22/1979    Colon cancer screen colonoscopy  05/22/2010    Flu vaccine (1) 03/13/2020 (Originally 9/1/2019)    Shingles Vaccine (1 of 2) 09/13/2020 (Originally 5/22/2010)    HIV screen  09/13/2020 (Originally 5/22/1975)    Hepatitis C screen  09/24/2020 (Originally 1960)    Pneumococcal 0-64 years Vaccine (1 of 1 - PPSV23) 01/14/2021 (Originally 5/22/1966)    Diabetic foot exam  01/16/2021 (Originally 5/22/1970)    DTaP/Tdap/Td vaccine (1 - Tdap) 01/16/2021 (Originally 5/22/1971)    Diabetic retinal exam  01/25/2021 (Originally 5/22/1970)    A1C test (Diabetic or Prediabetic)  09/13/2020       Hemoglobin A1C (%)   Date Value   09/13/2019 8.8 (H)             ( goal A1C is < 7)   No results found for: LABMICR  No results found for: LDLCHOLESTEROL, LDLCALC    (goal LDL is <100)   No results found for: AST, ALT, BUN  BP Readings from Last 3 Encounters:   01/16/20 130/80   12/05/19 118/76   10/15/19 124/70          (goal 120/80)    All Future Testing planned in [facility]  Lab Frequency Next Occurrence   Cologuard (For External Results Only) Once 10/15/2019   OT eval and treat Once 12/12/2019   Microalbumin, Ur Once 02/15/2020   Lipid, Fasting Once 02/15/2020   Potassium Once 01/16/2020   CBC Once 01/16/2020   Comprehensive Metabolic Panel Once 76/83/7697   Hemoglobin A1C Once 01/16/2020   Lipid Panel Once 01/16/2020   Microalbumin, Ur Once 01/16/2020   Urinalysis with Microscopic Once 01/16/2020   TSH without Reflex Once 01/16/2020   Psa screening Once 01/16/2020               Patient Active Problem List:     Diabetes mellitus, insulin dependent (IDDM), controlled (HCC)     Essential hypertension     Mixed hyperlipidemia     Right hemiplegia (HCC)     CVA (cerebral vascular accident) ([facility] [address]     Anxiety and depression     Influenza vaccination declined     Pneumococcal vaccination declined     Tetanus, diphtheria, and acellular pertussis (Tdap) vaccination declined

## 2020-02-05 ENCOUNTER — HOSPITAL ENCOUNTER (OUTPATIENT)
Dept: PHYSICAL THERAPY | Facility: CLINIC | Age: 60
Setting detail: THERAPIES SERIES
Discharge: HOME | End: 2020-02-05
Payer: MEDICARE

## 2020-02-05 PROCEDURE — 97110 THERAPEUTIC EXERCISES: CPT

## 2020-02-05 PROCEDURE — 97116 GAIT TRAINING THERAPY: CPT

## 2020-02-05 RX ORDER — AMLODIPINE BESYLATE 10 MG/1
TABLET ORAL
Qty: 90 TABLET | Refills: 1 | Status: SHIPPED | OUTPATIENT
Start: 2020-02-05 | End: 2020-07-28

## 2020-02-05 RX ORDER — LISINOPRIL 10 MG/1
TABLET ORAL
Qty: 90 TABLET | Refills: 1 | Status: SHIPPED | OUTPATIENT
Start: 2020-02-05 | End: 2020-07-28

## 2020-02-05 RX ORDER — FLUOXETINE HYDROCHLORIDE 40 MG/1
CAPSULE ORAL
Qty: 90 CAPSULE | Refills: 1 | Status: SHIPPED | OUTPATIENT
Start: 2020-02-05 | End: 2020-06-16

## 2020-02-05 RX ORDER — HYDRALAZINE HYDROCHLORIDE 25 MG/1
TABLET, FILM COATED ORAL
Qty: 90 TABLET | Refills: 1 | Status: SHIPPED | OUTPATIENT
Start: 2020-02-05 | End: 2020-07-28

## 2020-02-05 NOTE — FLOWSHEET NOTE
PT.    STG: (to be met in 10 treatments)  1. ? ROM: Improve R shoulder PROM to at least 150deg flex, 120 abd to improve mobility for reaching overhead. 2. ? Strength:   a. At least 3/5 global strength in RUE to improve lifting grasping  b. 5-/5 R hip ext/abd strength for improved squatting/stair climbing ability  3. ? Balance: Able to bend down to ground to  object with only 1UE assist with no balance impairment. 4. Function:  a. Improve gait speed to at least .6m/s to progress safety with home/community ambulation. b. Padmaja Betters to begin to integrate RUE into UE dressing with minor difficulty  5. Independent with Home Exercise Programs  6. Demonstrate Knowledge of fall prevention     LTG: (to be met in 24 treatments)  1. ROM:   a. Improve R shoulder PROM to at least 170deg flex/abd to normalize joint mobility in R shoulder post CVA  b. Improve R shoulder AROM to at least 90 deg flex/abd to progress ability to reach  2. Balance:  a. Pt will be able to complete 360 turn without AD with no balance impairment. b. Pt will be able to complete dynamic reaching/stooping tasks while standing in narrow MED  3. Function:    a. Improve gait speed to at least .8m/s to improve safety with community ambulation. b. Able to climb 4 stairs with unilateral UE assist and minimal balance impairment. 4. Strength:   a. At least 3+/5 global strength in RUE to improve lifting/grasping  b. At least 5/5 R hip ext/abd strength for improved stability with prolonged walking and stair climbing.         Patient goals: Kamilah Spencer my right arm again like normal\"    Pt. Education:  [x] Yes  [] No  [x] Reviewed Prior HEP/Ed  Method of Education: [x] Verbal  [x] Demo  [x] Written  Comprehension of Education:  [x] Verbalizes understanding. [x] Demonstrates understanding. [] Needs review. [] Demonstrates/verbalizes HEP/Ed previously given.     Current HEP: supine bicep curl 2#, chest press with cane, sit>stands with LLE forward     Plan: [x] Continue

## 2020-02-05 NOTE — TELEPHONE ENCOUNTER
Sister called back. States pt is interested in seeing ortho. Referral placed, they will call pt to schedule. Pt was advised to call office if he has not heard from specialist within 7-10 days.

## 2020-02-07 ENCOUNTER — HOSPITAL ENCOUNTER (OUTPATIENT)
Dept: PHYSICAL THERAPY | Facility: CLINIC | Age: 60
Setting detail: THERAPIES SERIES
Discharge: HOME | End: 2020-02-07
Payer: MEDICARE

## 2020-02-10 ENCOUNTER — HOSPITAL ENCOUNTER (OUTPATIENT)
Dept: PHYSICAL THERAPY | Facility: CLINIC | Age: 60
Setting detail: THERAPIES SERIES
Discharge: HOME | End: 2020-02-10
Payer: MEDICARE

## 2020-02-10 PROCEDURE — 97116 GAIT TRAINING THERAPY: CPT

## 2020-02-10 NOTE — FLOWSHEET NOTE
[] [address] [facility]  [address]  [phone]  F: [phone] [x] [address]  [facility] 36   [address]  P: [phone]  F: [phone] [] [address]  [address] [facility]  P: [phone]  F: [phone] [] [address]  [facility]   [address]   [place]: [phone]  F: [phone]      Physical Therapy Daily Treatment Note    Date:  2/10/2020  Patient Name:  Vanessa Oglesby    :  1960  MRN: [id number]  Physician: Dr. Indy Pizano: Kee Sadler (24 vs)   Medical Diagnosis: History of recent stroke  Rehab Codes: M25.611, M25.621, M25.641, R53.1, R29.3, R26.9, R27.9  Next 's appt. : 3/10/20  Visit# / total visits:   Cancels/No Shows: 0    Subjective:    Pain:  [] Yes  [x] No Location:  N/A Pain Rating: (0-10 scale) \"stiffness\"/10  Pain altered Tx:  [x] No  [] Yes  Action:  Comments:  Pt states he is getting his knee brace today after session.     Objective:   On :  TU.04 s  10 MWT: 29.52s      Modalities:   Precautions:  Exercises: Bolded completed on 2/10/2020:  Exercise Reps/ Time Weight/ Level Comments   Nu-step 8 min L4 NOT Scifit  - R arm ACE-wrapped to handle  - seat at 9         Pre-gait      Lateral weight shift x20  Mod TC/VCs to avoid hyperextension but maximize quad activation   Anterior weight shift 3x20    \" \"   Forward heel strike/back toes 15x  \" \"   Forward step w/ no hyperext 20x  \" \"   Forward step w/ weight shift 2x15  \" \"   Forward weight shift to SLS 10x3\"  \" \"   SLS holds 10x3\"  BUE assist; min tactile cues to prevent hyperextension   Standing ham curl 10x                 Supine      R shoulder PROM 20x ea  Flex, abd (add ER/IR next visit)   RUE chest press 2x10  CGA-David therapist assist at [address] Sidely[place] ER 2x15     Wand shld flexion 10x 1# \" \"   Sidelying abd   Therapist assist throughout; ~90% assist         Seated      Seated foot tap to step 2x15 RLE 4\" step   Hip abd 2x15 purple    Table slides - fwd 2x10  Assist to grasp towel and extend elbow; complete on orange slider   Thumb opposition 10x ea  To second and third digit    Finger flexor stretch 3x30\" ea     Pronation/supination 2x10 AROM Clasping therapist hand for simultaneous finger flexor stretch   Bicep curl with pick 2x10  Key-pinch  on pick while completing elbow flexion to simulate guitar needs               Standing      UE weightbearing on table x  Attempted but too much pain in R wrist d/t lack of ROM   Squats 2x10  Increase reps next session  - LLE slightly forward to bias RLE strengthening   Sit to stands w/ LLE elevated on step 10x 2\" under LLE Cues to maximize weightshift to RLE during descent               Other: Some exercises removed from log as pt is completing independently at home  - slow progression through exercises d/t therapist assist needed for most reps, and for therapeutic rest break d/t fatigue    Specific Instructions for next treatment:       Treatment Charges: Mins Units   []  Modalities     [x]  Ther Exercise 6 --   []  Manual Therapy     []  Ther Activities     []  Aquatics     []  Vasocompression     [x]  Other: Gait 48 4   Total Treatment time 54 4       Assessment: [x] Progressing toward goals. Still requires initial mod tactile cues to prevent hyperextension vs buckling in R knee with weightshifts; again improves with repetition and tactile cuing vs verbal cuing, begins to achieve full weightshift onto RLE without cuing by end sets. Step forward/backward with RLE improves with min VCs to  foot and bend knee so as to avoid dragging limb. Fatigue still evident in R quad with this, however able to push through and still focus on neuromuscular control with single-limb stance holds at end of session. understanding. [] Needs review. [] Demonstrates/verbalizes HEP/Ed previously given. Current HEP: supine bicep curl 2#, chest press with cane, sit>stands with LLE forwar; seated hip abd w/ purple band, lateral weight shifts onto RLE    Plan: [x] Continue per plan of care.    [] Other:       Time In: 11:00 am           Time Out: 12:02 pm    Electronically signed by:  Enrique Tenorio PT

## 2020-02-14 ENCOUNTER — HOSPITAL ENCOUNTER (OUTPATIENT)
Dept: PHYSICAL THERAPY | Facility: CLINIC | Age: 60
Setting detail: THERAPIES SERIES
Discharge: HOME | End: 2020-02-14
Payer: MEDICARE

## 2020-02-14 PROCEDURE — 97116 GAIT TRAINING THERAPY: CPT

## 2020-02-14 PROCEDURE — 97110 THERAPEUTIC EXERCISES: CPT

## 2020-02-14 NOTE — FLOWSHEET NOTE
2x10  CGA-David therapist assist at [address] shld flexion 10x 1# \" \"   Sidelying abd   Therapist assist throughout; ~90% assist         Seated      Seated foot tap to step 2x15 RLE 4\" step   Hip abd 2x15 purple    Table slides - fwd 2x10  Assist to grasp towel and extend elbow; complete on orange slider   Thumb opposition 10x ea  To second and third digit    Finger flexor stretch 3x30\" ea     Pronation/supination 2x10 AROM Clasping therapist hand for simultaneous finger flexor stretch   Bicep curl with pick 2x10  Key-pinch  on pick while completing elbow flexion to simulate guitar needs               Standing      UE weightbearing on table x  Attempted but too much pain in R wrist d/t lack of ROM   Squats 2x10  Increase reps next session  - LLE slightly forward to bias RLE strengthening   Min squats w/ LLE elevated on step 2x15 2\" under LLE Cues to maximize weightshift to RLE during descent   Midline standing balance 2x30\"  Knee brace donned   Standing & throwing 2 min  Maintaining midline balance; knee brace donned   Other:   - slow progression through exercises d/t therapist assist needed for most reps, and for therapeutic rest break d/t fatigue    Specific Instructions for next treatment:       Treatment Charges: Mins Units   []  Modalities     [x]  Ther Exercise 16 1   []  Manual Therapy     []  Ther Activities     []  Aquatics     []  Vasocompression     [x]  Other: Gait 42 3   Total Treatment time 58 4       Assessment: [x] Progressing toward goals. Still with consistent knee hyperextension in stance; this is improved with donning of swedish knee cage brace - tends to still have all weight shifted toward right and leaning on hemicane while ambulating. Continued to work on weightshifts onto RLE and controlling knee  Position as primary focus of treatment; needs mod tactile cues intially but able to fade cuing.  Tolerance to prolonged standing consecutively still limited but improving. [] No change. [x] Other: Transitioning to OT/PT at [facility]. V's starting next week. STG: (to be met in 10 treatments)  1. ? ROM: Improve R shoulder PROM to at least 150deg flex, 120 abd to improve mobility for reaching overhead. 2. ? Strength:   a. At least 3/5 global strength in RUE to improve lifting grasping  b. 5-/5 R hip ext/abd strength for improved squatting/stair climbing ability  3. ? Balance: Able to bend down to ground to  object with only 1UE assist with no balance impairment. 4. Function:  a. Improve gait speed to at least .6m/s to progress safety with home/community ambulation. b. Jinger Doing to begin to integrate RUE into UE dressing with minor difficulty  5. Independent with Home Exercise Programs  6. Demonstrate Knowledge of fall prevention     LTG: (to be met in 24 treatments)  1. ROM:   a. Improve R shoulder PROM to at least 170deg flex/abd to normalize joint mobility in R shoulder post CVA  b. Improve R shoulder AROM to at least 90 deg flex/abd to progress ability to reach  2. Balance:  a. Pt will be able to complete 360 turn without AD with no balance impairment. b. Pt will be able to complete dynamic reaching/stooping tasks while standing in narrow MED  3. Function:    a. Improve gait speed to at least .8m/s to improve safety with community ambulation. b. Able to climb 4 stairs with unilateral UE assist and minimal balance impairment. 4. Strength:   a. At least 3+/5 global strength in RUE to improve lifting/grasping  b. At least 5/5 R hip ext/abd strength for improved stability with prolonged walking and stair climbing.         Patient goals: Mortimer Stain my right arm again like normal\"    Pt. Education:  [x] Yes  [] No  [x] Reviewed Prior HEP/Ed  Method of Education: [x] Verbal  [x] Demo  [x] Written  Comprehension of Education:  [x] Verbalizes understanding. [x] Demonstrates understanding. [] Needs review. [] Demonstrates/verbalizes HEP/Ed previously given.     Current HEP:

## 2020-02-18 ENCOUNTER — HOSPITAL ENCOUNTER (OUTPATIENT)
Dept: OCCUPATIONAL THERAPY | Age: 60
Setting detail: THERAPIES SERIES
Discharge: HOME | End: 2020-02-18
Payer: MEDICARE

## 2020-02-18 ENCOUNTER — HOSPITAL ENCOUNTER (OUTPATIENT)
Dept: PHYSICAL THERAPY | Age: 60
Setting detail: THERAPIES SERIES
Discharge: HOME | End: 2020-02-18
Payer: MEDICARE

## 2020-02-18 PROCEDURE — 97110 THERAPEUTIC EXERCISES: CPT

## 2020-02-18 PROCEDURE — 97112 NEUROMUSCULAR REEDUCATION: CPT

## 2020-02-18 PROCEDURE — 97166 OT EVAL MOD COMPLEX 45 MIN: CPT

## 2020-02-18 PROCEDURE — 97116 GAIT TRAINING THERAPY: CPT

## 2020-02-18 NOTE — FLOWSHEET NOTE
[] [address]  [address]  [phone]  F: [phone] [x] [address] [facility] [address]   [address]  P: [phone]  F: [phone] [] [address]  [address] [facility]  P: [phone]  F: [phone] [] [address]  [facility]   [place]: [phone]  F: [phone]      Physical Therapy Daily Treatment Note    Date:  2020  Patient Name:  Kyler Garnett    :  1960  MRN: [id number]  Physician: Dr. Kanika Nelson: Nenita Wilcox (24 vs)   Medical Diagnosis: History of recent stroke  Rehab Codes: M25.611, M25.621, M25.641, R53.1, R29.3, R26.9, R27.9  Next 's appt. : 3/10/20  Visit# / total visits:   Cancels/No Shows: 0    Subjective:    Pain:  [] Yes  [x] No Location:  N/A Pain Rating: (0-10 scale) \"stiffness\"/10  Pain altered Tx:  [x] No  [] Yes  Action:  Comments:  Pt arrives after evaluation with OT - has now transferred all care to [address] [place] [address]. V's location to receive consecutive PT/OT treatments.     Objective:   On :  TU.04 s  10 MWT: 29.52s      Modalities:   Precautions:  Exercises: Bolded completed on 2020:  Exercise Reps/ Time Weight/ Level Comments   Nu-step 8 min L4 NOT Scifit  - R arm ACE-wrapped to handle  - seat at 9         Pre-gait      Lateral weight shift 3x20  Mod TC/VCs to avoid hyperextension but maximize quad activation   Anterior weight shift 3x20    \" \"   Forward step w/ weight shift 2x15  \" \"   Forward weight shift to SLS 10x3\"  \" \"   Standing ham curl 15x  Therapist assist to prevent hip flexion during this exercise         Seated      Seated foot tap to step 2x15 RLE 4\" step   Hip abd 2x20 purple    Table slides - fwd 2x10  Assist to grasp towel and extend elbow; complete on orange prolonged walking and stair climbing.         Patient goals: \"use my right arm again like normal\"    Pt. Education:  [x] Yes  [] No  [x] Reviewed Prior HEP/Ed  Method of Education: [x] Verbal  [x] Demo  [x] Written  Comprehension of Education:  [x] Verbalizes understanding. [x] Demonstrates understanding. [] Needs review. [] Demonstrates/verbalizes HEP/Ed previously given. Current HEP: supine bicep curl 2#, chest press with cane, sit>stands with LLE forwar; seated hip abd w/ purple band, lateral weight shifts onto RLE    Plan: [x] Continue per plan of care.    [] Other:       Time In: 2:08 pm           Time Out: 3:03 pm    Electronically signed by:  Enrique Espinal PT

## 2020-02-18 NOTE — CONSULTS
[x] [address] [facility] floor       [address], [place]         Phone: [phone]       Fax: [phone] [] 6123 [facility] at [facility] [address] [place] [address] , [address] [place] [address]  Phone: [phone]  Fax: [phone]       Occupational Therapy Hand & Upper Extremity  Initial Evaluation      Date: 2020      Patient: Yohana Gatica  : 1960  MRN: [id number]    Physician: Howie Cedeño MD  Insurance: [place] [employer]  Medical Diagnosis: history of recent stroke Z86.73   Rehab Codes: stiffness in shoulder M25.61,, lack of coordination R27.8,, fine motor skills loss R29.818,, muscle weakness generalized M62.81,, lack of coordination unspecified R27.0, or lack of coordination other R27.8,  Onset Date: 2019    Next Dr. Jennifer Peguero: Spring 2020, unable to state     Past Medical History:   Diabetes and HTN      Comorbidities:   [] Obesity [] Dialysis  [] Other:   [] Asthma/COPD [] Dementia [] Other:   [] Stroke [] Sleep apnea [] Other:   [] Vascular disease [] Rheumatic disease [] Other:     Medications:   Refer to Medical chart in Epic    Allergies: Other dairy products       Mechanism of Injury: CVA Surgery Date: NA    Precautions:  Fall Risk            Involved Extremity:        Right  Dominant: Right    Home Environment:  .bbbackground: Pt lives in [facility] [address] [facility]  . 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      ADL/IADL Previous level of function Current level of function Who currently assists the patient with task   Bathing  [x] Independent  [] Assist [x] Independent  [] Assist With use of AE   Dress/grooming [x] Independent  [] Assist [x] Independent  [] Assist With adapted techniques    Transfer/mobility [x] Independent  [] Assist [x] Independent  [] Assist Uses walker in public, cane use within home    Feeding [x] Independent  [] Assist [x] Independent  [] Assist    Toileting [x] Independent  [] Assist [x] Independent  [] Assist    Driving [x] Independent  [] Assist [] Independent  [x] Assist Friends/family    Housekeeping [x] Independent  [] Assist [] Independent  [x] Assist Friends/family    Grocery shop/meal prep [x] Independent  [] Assist [] Independent  [x] Assist Friends/family      Gait Prior level of function Current level of function    [x] Independent  [] Assist [] Independent  [x] Assist   Device: [x] Independent [] Independent    [] Straight Cane [] Quad cane [x] Straight Cane [] Quad cane    [] Standard walker [] Rolling walker   [] 4 wheeled walker [] Standard walker [] Rolling walker   [] 4 wheeled walker  Other: uday-walker     [] Wheelchair [x] Wheelchair       Work Status: Off due to injury/Condition  Orthosis:    NA    Subjective:  Chief Complaint:  Pain: Intensity:   0/10 - stiffness in R shoulder      Pain Type: stiffness     Pain Altered Tx: no  Action Taken:none      Objective:  Tests/Measurements: Upper Extremity Functional Index  Current Functional Level:  21/80 functionally impaired as measured with the Upper Extremity Functional Index Survey. 0-80 scale, with 80 = no Deficits  (The UEFI model does not provide any specific cut off points that could classify the upper limb disability degree, however, a minimal detectable change of 9 points is provided.   This means that for improvement or deterioration to be considered, between two subsequent evaluations, the scores must differ by at least 9 points.)    STRENGTH      RIGHT LEFT    8 87   Lateral pinch 5 9   2 point pinch 3 11   3 jaw pinch 0 15     The affected extremity is 99% weaker than the unaffected extremity. (affected score/unaffected score, take the total and subtract from 100)      COORDINATION  - Fine Motor      Right in seconds Percentile Left in seconds Percentile   9 Hole Peg Test Unable to complete  29.43            Sensibility: Normal  Edema: Min    - L hand  Color: Normal    Skin: Intact    Problems: ROM, Strength, Function, Coordination and Falls: History or Risk of     Assessment:  Patient would benefit from skilled occupational therapy services in order to increase function, strength, ROM, and coordination with RUE to participate in daily tasks in a safe manner. Short Term Goals: (  9    Treatments)  1. Decrease Pain: to 1/10 with PROM of RUE  2. Increase AROM (degrees)  a. Pt will demo active movement with all digits   b. Demo R shoulder active flexion to 90 degrees for independence with reaching tasks  3. Increase strength (pounds)  a. R  to 11 pounds for independence with all functional tasks  b. R lateral pinch to 7 pounds for return to playing guitar  c. R 2 point pinch to 5 pounds for return to playing guitar  d. R 3 point pinch to 3 pounds for return to playing guitar   4. Increase function:UE Functional Index Score to 30 to promote increased function  5. Demo knowledge of fall prevention in 3 session  6. Patient to be independent with home exercise program as demonstrated by performance with correct form without cues. Long Term Goals: (  18  Treatments)  1. Pt will demo full, active digit extension with all digits of R hand to participate in functional tasks  2. Pt will demo increased function AEB a score of 30 or more on the UEFI  3. Pt will improved fine motor coordination skills AEB being able to complete 9 hole peg test with R hand  4. Pt will demo decreased flexor tone to allow for participation in functional activities  5.  Pt will report completing ADL tasks with 50% use of RUE     Patient Goals: play [employer] Sheet:  Exercise Reps/Time Weight/Level Comments   Digit ROM 10  Issued for HEP                                     Evaluation Complexity:  History (Personal factors, comorbidities) []  0 [x]  1-2 []  3+   Exam (limitations, restrictions) []  1-2 [x]  3 []  4+   Decision Making []  Low [x]  Moderate []  High   ? []  Low Complexity [x]  Moderate Complexity []  High Complexity     Total Treatment Time:  25 minutes     Time In: 1320    Time Out: 1400       Electronically signed by SANDI Rodriguez on 2/18/2020 at 1:25 PM        Physician Signature: _________________________ Date: _______________  By signing above or cosigning this note, I have reviewed this plan of care and certify a need for medically necessary rehabilitation services.      *PLEASE SIGN ABOVE AND FAX BACK ALL PAGES*

## 2020-02-19 ENCOUNTER — APPOINTMENT (OUTPATIENT)
Dept: PHYSICAL THERAPY | Facility: CLINIC | Age: 60
End: 2020-02-19
Payer: MEDICARE

## 2020-02-20 ENCOUNTER — HOSPITAL ENCOUNTER (OUTPATIENT)
Dept: PHYSICAL THERAPY | Age: 60
Setting detail: THERAPIES SERIES
Discharge: HOME | End: 2020-02-20
Payer: MEDICARE

## 2020-02-20 ENCOUNTER — HOSPITAL ENCOUNTER (OUTPATIENT)
Dept: OCCUPATIONAL THERAPY | Age: 60
Setting detail: THERAPIES SERIES
Discharge: HOME | End: 2020-02-20
Payer: MEDICARE

## 2020-02-20 PROCEDURE — 97116 GAIT TRAINING THERAPY: CPT

## 2020-02-20 PROCEDURE — 97140 MANUAL THERAPY 1/> REGIONS: CPT

## 2020-02-20 PROCEDURE — 97110 THERAPEUTIC EXERCISES: CPT

## 2020-02-20 PROCEDURE — 97032 APPL MODALITY 1+ESTIM EA 15: CPT

## 2020-02-20 NOTE — FLOWSHEET NOTE
[x] [address] [facility] floor       [address], [place]         Phone: [phone]       Fax: [phone] [] 6135 [facility] at [address] [facility] , [address] [facility]  Phone: [phone]  Fax: [phone]     Occupational Therapy Daily Treatment Note    Date:  2020  Patient Name:  Yohana Gatica    :  1960  MRN: [id number]  Patient: Yohana Gatica                      : 1960                      MRN: [id number]                         Physician: Howie Cedeño MD  Insurance: [place] [employer]  Medical Diagnosis: history of recent stroke Z86.73   Rehab Codes: stiffness in shoulder M25.61,, lack of coordination R27.8,, fine motor skills loss R29.818,, muscle weakness generalized M62.81,, lack of coordination unspecified R27.0, or lack of coordination other R27.8,  Onset Date: 2019                          Next Dr. Jennifer Peguero: Spring 2020, unable to state     Visit# / total visits:  Cancels/No Shows: 0/0      Subjective:    Pain:  Yes Location: R hand   Pain Rating: (0-10 scale) 3/10  Pain altered Tx:  No  Action:  Comments:    Objective:  Modalities:   Exercises:    EXERCISE    REPS/     TIME  WEIGHT/    LEVEL COMMENTS   Move sponges from R to L 21  Completed this date   Smart Mirror 10 mins  Completed this date   Flexbar  Wrist pronation  Wrist supination  Wrist ulnar dev  Wrist radial dev  Shoulder abduction 10  compelted this date [place]                             Other: Pt presents with moderate tone in R shoulder, R elbow, R wrist extension, and R digits. Discussed with pt potential of botox injections or use of oral baclofen and to discuss with Neuro doctor to assist with tone. Pt stretched this date with fair response and mod pain. Pt demo compensatory strategies with trunk for UE motion. Pt educated on Graded Motor Imagery and advised to purchase an inexpensive mirror to practice and retrain the brain at home.

## 2020-02-20 NOTE — FLOWSHEET NOTE
slides - fwd 2x10  Assist to grasp towel and extend elbow; complete on orange slider   Thumb opposition 10x ea  To second and third digit    Finger flexor stretch 3x30\" ea     Pronation/supination 2x10 AROM Clasping therapist hand for simultaneous finger flexor stretch   Bicep curl with pick 2x10  Key-pinch  on pick while completing elbow flexion to simulate guitar needs               Standing      UE weightbearing on table x  Attempted but too much pain in R wrist d/t lack of ROM   Squats 2x10  Increase reps next session  - LLE slightly forward to bias RLE strengthening   Min squats w/ LLE elevated on step 2x15 2\" under LLE Cues to maximize weightshift to RLE during descent   Midline standing balance 2x30\"  Knee brace donned   Standing & throwing 2 min  Maintaining midline balance; knee brace donned         Gait train 200 ft x1  100 ft x1  - Mod assist to prevent hyperextension/excessive buckling at R knee throughout stance phase  - Cues to fully weightshift onto RLE         Other:   - slow progression through exercises d/t therapist assist needed for most reps, and for therapeutic rest break d/t fatigue    Specific Instructions for next treatment: ankle DF ROM stretching, foot tap to stair for hip flexors, resisted PF - give more detailed HEP for RLE exercises at next session       Treatment Charges: Mins Units   [x]  Modalities: manual e-stim 45 1   []  Ther Exercise     []  Manual Therapy     []  Ther Activities     []  Aquatics     []  Vasocompression     [x]  Other: Gait 57 4   []  Other: Neuro     Total Treatment time 57 5   Neuro e-stim donned throughout pre-gait train; doffed for full gait-cycle gait train    Assessment: [x] Progressing toward goals. Initiated neuromuscular e-stim (NMES) to pt's right quad for all pre-gait exercises and step ups to facilitate improved quad contraction. Does improve with this, less hyperextension noted throughout all exercises and better neuromuscular control.  Still

## 2020-02-21 ENCOUNTER — APPOINTMENT (OUTPATIENT)
Dept: PHYSICAL THERAPY | Facility: CLINIC | Age: 60
End: 2020-02-21
Payer: MEDICARE

## 2020-02-24 RX ORDER — PEN NEEDLE, DIABETIC 31 G X1/4"
1 NEEDLE, DISPOSABLE MISCELLANEOUS DAILY
Qty: 100 EACH | Refills: 3 | Status: SHIPPED | OUTPATIENT
Start: 2020-02-24

## 2020-02-24 RX ORDER — LANCETS 30 GAUGE
1 EACH MISCELLANEOUS 2 TIMES DAILY
Qty: 200 EACH | Refills: 3 | Status: SHIPPED | OUTPATIENT
Start: 2020-02-24 | End: 2020-07-02

## 2020-02-24 RX ORDER — INSULIN LISPRO 100 [IU]/ML
INJECTION, SOLUTION INTRAVENOUS; SUBCUTANEOUS
OUTPATIENT
Start: 2020-02-24

## 2020-02-24 RX ORDER — GLUCOSAMINE HCL/CHONDROITIN SU 500-400 MG
CAPSULE ORAL
Qty: 200 STRIP | Refills: 3 | Status: SHIPPED | OUTPATIENT
Start: 2020-02-24 | End: 2020-05-05 | Stop reason: SDUPTHER

## 2020-02-24 RX ORDER — INSULIN DETEMIR 100 [IU]/ML
16 INJECTION, SOLUTION SUBCUTANEOUS NIGHTLY
Qty: 5 PEN | Refills: 3 | Status: SHIPPED | OUTPATIENT
Start: 2020-02-24 | End: 2020-12-10

## 2020-02-25 ENCOUNTER — HOSPITAL ENCOUNTER (OUTPATIENT)
Dept: OCCUPATIONAL THERAPY | Age: 60
Setting detail: THERAPIES SERIES
Discharge: HOME | End: 2020-02-25
Payer: MEDICARE

## 2020-02-25 ENCOUNTER — HOSPITAL ENCOUNTER (OUTPATIENT)
Dept: PHYSICAL THERAPY | Age: 60
Setting detail: THERAPIES SERIES
Discharge: HOME | End: 2020-02-25
Payer: MEDICARE

## 2020-02-25 PROCEDURE — 97032 APPL MODALITY 1+ESTIM EA 15: CPT

## 2020-02-25 PROCEDURE — 97140 MANUAL THERAPY 1/> REGIONS: CPT

## 2020-02-25 PROCEDURE — 97110 THERAPEUTIC EXERCISES: CPT

## 2020-02-25 PROCEDURE — 97116 GAIT TRAINING THERAPY: CPT

## 2020-02-25 NOTE — FLOWSHEET NOTE
[] [address]  [address]  [phone]  F: [phone] [x] [address]  [facility]   [address]  P: [phone]  F: [phone] [] [place]  [address] [facility]  P: [phone]  F: [phone] [] [address]  [facility]   [address]   [place]: [phone]  F: [phone]      Physical Therapy Daily Treatment Note    Date:  2020  Patient Name:  Abram Vasquez    :  1960  MRN: [id number]  Physician: Dr. Eugenio Candelaria: Sarahi Beyer (24 vs)   Medical Diagnosis: History of recent stroke  Rehab Codes: M25.611, M25.621, M25.641, R53.1, R29.3, R26.9, R27.9  Next 's appt. : 3/10/20  Visit# / total visits:   Cancels/No Shows: 0    Subjective:    Pain:  [] Yes  [x] No Location:  N/A Pain Rating: (0-10 scale) \"stiffness\"/10  Pain altered Tx:  [x] No  [] Yes  Action:  Comments:  Pt reports things are starting to come along.     Objective:   On :  TU.04 s  10 MWT: 29.52s       Modalities: Neuromuscular e-stim to R quad - donned throughout pre-gait activities  Precautions:  Exercises: Bolded completed on 2020:  Exercise Reps/ Time Weight/ Level Comments   Nu-step 8 min L6 - R arm ACE-wrapped to handle  - seat at 9         Pre-gait   All with NMES   Lateral weight shift 3x20  Mod TC/VCs to avoid hyperextension but maximize quad activation   Anterior weight shift 2x20    \" \"   Forward step w/ weight shift 2x20  \" \"   Forward weight shift to SLS 20x5\"  \" \"   Standing ham curl 15x  Therapist assist to prevent hip flexion during this exercise         Seated      Seated foot tap to step 2x15 RLE 4\" step   Hip abd 2x20 purple    Table slides - fwd 2x10  Assist to grasp towel and extend elbow; complete on orange slider 4 stairs with unilateral UE assist and minimal balance impairment. 4. Strength:   a. At least 5/5 R hip ext/abd strength for improved stability with prolonged walking and stair climbing.   b. At least 5-/5 R dorsiflexors and plantarflexors for improved gait mechanics        Patient goals: Rosario Luis my right arm again like normal\"    Pt. Education:  [x] Yes  [] No  [x] Reviewed Prior HEP/Ed  Method of Education: [x] Verbal  [x] Demo  [x] Written  Comprehension of Education:  [x] Verbalizes understanding. [x] Demonstrates understanding. [] Needs review. [] Demonstrates/verbalizes HEP/Ed previously given. Current HEP: supine bicep curl 2#, chest press with cane, sit>stands with LLE forward; seated hip abd w/ purple band, lateral weight shifts onto RLE    Plan: [x] Continue per plan of care.    [] Other:       Time In: 2:04 pm           Time Out: 3:05 pm    Electronically signed by:  Manolo Hanks, PT

## 2020-02-25 NOTE — FLOWSHEET NOTE
[x] [address] [facility] floor       [address], [place]         Phone: [phone]       Fax: [phone] [] 6135 [facility] at [address] [facility] , [address] [facility]  Phone: [phone]  Fax: [phone]     Occupational Therapy Daily Treatment Note    Date:  2020  Patient Name:  Kyler Garnett    :  1960  MRN: [id number]  Patient: Kyler Devoid                      : 1960                      MRN: [id number]                         Physician: Keyshawn Bland MD  Insurance: [place] [employer]  Medical Diagnosis: history of recent stroke Z86.73   Rehab Codes: stiffness in shoulder M25.61,, lack of coordination R27.8,, fine motor skills loss R29.818,, muscle weakness generalized M62.81,, lack of coordination unspecified R27.0, or lack of coordination other R27.8,  Onset Date: 2019                          Next Dr. Rivera Eliseo: Spring 2020, unable to state     Visit# / total visits: 3/ 18 Cancels/No Shows: 0/0      Subjective:    Pain:  Yes Location: R hand   Pain Rating: (0-10 scale) 3/10  Pain altered Tx:  No  Action:  Comments:    Objective:  Modalities:   Exercises:    EXERCISE    REPS/     TIME  WEIGHT/    LEVEL COMMENTS   Move sponges from R to L 21  Completed this date   Smart Mirror 10 mins  Completed this date   Flexbar  Wrist pronation  Wrist supination  Wrist ulnar dev  Wrist radial dev  Shoulder abduction 10  compelted this date [facility]   Mirror with UE flexion exercises 15 mns  completed                       Other: Pt presents with moderate tone in R shoulder, R elbow, R wrist extension, and R digits. Pt stretched this date with fair response and mod pain. Pt demo compensatory strategies with trunk for UE motion, used mirror to show pt how to avoid these compensatory techniques.    Graded motor imagery is composed of three steps: laterality training, imagined hand movements, and mirror visual feedback (MVF) therapy. The ability to differentiate between right and left depends on an intact body schema, or how the brain interprets the bodys shape. When the body schema is believed to be accurate, patients are asked to imagine pain-free movement. Around 25 percent of the neurons in the brain are called mirror neurons, and are activated when watching someone else move or think of performing an action. The [place] is that there is such a high degree of overlap in the brain regions involved in imagined movement and actual movement, that imagined movement can lead to pain-free actual movement. The final step is called mirror visual feedback therapy. MVF requires a mirror therapy device, like the SMART-Mirro. A mirror therapy device allows the patient to place their injured hand inside the device and out of view, while the unaffected hand is placed outside the device, in front of the mirror. The patient is then instructed to look in the mirror at the mirrored, unaffected hand and watch the mirrored hand move. This creates the illusion that the injured hand is moving without any pain. When done properly, graded motor imagery and MVF are thought to provide such strong positive sensory feedback, that the brain is convinced that not all movement needs to be painful, and is proven to decrease pain and disability. Specific Instructions for next treatment: continue with current plan       Treatment Charges: Mins Units Time In/Out   []  Modalities        [x]  Ther Exercise 40 3    [x]  Manual Therapy 15 1    []  Ther Activities      []        []        []        Total Treatment time 55 min           Assessment: Progressing Towards Goals    Short Term Goals: (  9    Treatments)  1. Decrease Pain: to 1/10 with PROM of RUE  2. Increase AROM (degrees)  a. Pt will demo active movement with all digits   b. Demo R shoulder active flexion to 90 degrees for independence with reaching tasks  3.  Increase strength (pounds)  a. R  to 11 pounds for independence with all functional tasks  b. R lateral pinch to 7 pounds for return to playing guitar  c. R 2 point pinch to 5 pounds for return to playing guitar  d. R 3 point pinch to 3 pounds for return to playing guitar   4. Increase function:UE Functional Index Score to 30 to promote increased function  5. Demo knowledge of fall prevention in 3 session  6. Patient to be independent with home exercise program as demonstrated by performance with correct form without cues.     Long Term Goals: (  18  Treatments)  1. Pt will demo full, active digit extension with all digits of R hand to participate in functional tasks  2. Pt will demo increased function AEB a score of 30 or more on the UEFI  3. Pt will improved fine motor coordination skills AEB being able to complete 9 hole peg test with R hand  4. Pt will demo decreased flexor tone to allow for participation in functional activities  5. Pt will report completing ADL tasks       Pt.  Education:  Yes and Reviewed Prior HEP/Ed  Method of Education: Verbal, Written and Demo  Comprehension of Education: Needs Review      Plan:  Continue with current plan       Time In/Out: 5661-7251       Electronically signed by:  Nithin Huerta OTR/JANNET

## 2020-02-26 ENCOUNTER — TELEPHONE (OUTPATIENT)
Dept: NEUROLOGY | Age: 60
End: 2020-02-26

## 2020-02-26 ENCOUNTER — APPOINTMENT (OUTPATIENT)
Dept: PHYSICAL THERAPY | Facility: CLINIC | Age: 60
End: 2020-02-26
Payer: MEDICARE

## 2020-02-27 ENCOUNTER — HOSPITAL ENCOUNTER (OUTPATIENT)
Dept: PHYSICAL THERAPY | Age: 60
Setting detail: THERAPIES SERIES
Discharge: HOME | End: 2020-02-27
Payer: MEDICARE

## 2020-02-27 ENCOUNTER — HOSPITAL ENCOUNTER (OUTPATIENT)
Dept: OCCUPATIONAL THERAPY | Age: 60
Setting detail: THERAPIES SERIES
Discharge: HOME | End: 2020-02-27
Payer: MEDICARE

## 2020-02-27 PROCEDURE — 97110 THERAPEUTIC EXERCISES: CPT

## 2020-02-27 PROCEDURE — 97116 GAIT TRAINING THERAPY: CPT

## 2020-02-27 PROCEDURE — 97032 APPL MODALITY 1+ESTIM EA 15: CPT

## 2020-02-27 PROCEDURE — 97140 MANUAL THERAPY 1/> REGIONS: CPT

## 2020-02-27 NOTE — FLOWSHEET NOTE
least 5-/5 R dorsiflexors and plantarflexors for improved gait mechanics        Patient goals: Phill Bone my right arm again like normal\"    Pt. Education:  [x] Yes  [] No  [x] Reviewed Prior HEP/Ed  Method of Education: [x] Verbal  [x] Demo  [x] Written  Comprehension of Education:  [x] Verbalizes understanding. [x] Demonstrates understanding. [] Needs review. [] Demonstrates/verbalizes HEP/Ed previously given. Current HEP: supine bicep curl 2#, chest press with cane, sit>stands with LLE forward; seated hip abd w/ purple band, lateral weight shifts onto RLE    Plan: [x] Continue per plan of care.    [] Other:       Time In: 2:02 pm           Time Out: 3:00 pm    Electronically signed by:  Christine Pyle PTA

## 2020-02-27 NOTE — FLOWSHEET NOTE
strategies with trunk while completing seated UE tasks. Unable to complete ulnar deviation with flexbar due to fatigue. Occasional [place] needed for other flexbar exercises, pt was able to complete some flexbar exercises independently with fair control. Graded motor imagery is composed of three steps: laterality training, imagined hand movements, and mirror visual feedback (MVF) therapy. The ability to differentiate between right and left depends on an intact body schema, or how the brain interprets the bodys shape. When the body schema is believed to be accurate, patients are asked to imagine pain-free movement. Around 25 percent of the neurons in the brain are called mirror neurons, and are activated when watching someone else move or think of performing an action. The [place] is that there is such a high degree of overlap in the brain regions involved in imagined movement and actual movement, that imagined movement can lead to pain-free actual movement. The final step is called mirror visual feedback therapy. MVF requires a mirror therapy device, like the SMART[employer] Lounge[employer]. A mirror therapy device allows the patient to place their injured hand inside the device and out of view, while the unaffected hand is placed outside the device, in front of the mirror. The patient is then instructed to look in the mirror at the mirrored, unaffected hand and watch the mirrored hand move. This creates the illusion that the injured hand is moving without any pain. When done properly, graded motor imagery and MVF are thought to provide such strong positive sensory feedback, that the brain is convinced that not all movement needs to be painful, and is proven to decrease pain and disability.     Specific Instructions for next treatment: continue with current plan       Treatment Charges: Mins Units Time In/Out   []  Modalities        [x]  Ther Exercise 45 3    [x]  Manual Therapy 15 1    []  Ther Activities      []        []

## 2020-02-28 ENCOUNTER — APPOINTMENT (OUTPATIENT)
Dept: PHYSICAL THERAPY | Facility: CLINIC | Age: 60
End: 2020-02-28
Payer: MEDICARE

## 2020-03-03 ENCOUNTER — HOSPITAL ENCOUNTER (OUTPATIENT)
Dept: PHYSICAL THERAPY | Age: 60
Setting detail: THERAPIES SERIES
Discharge: HOME | End: 2020-03-03
Payer: MEDICARE

## 2020-03-03 ENCOUNTER — HOSPITAL ENCOUNTER (OUTPATIENT)
Dept: OCCUPATIONAL THERAPY | Age: 60
Setting detail: THERAPIES SERIES
Discharge: HOME | End: 2020-03-03
Payer: MEDICARE

## 2020-03-03 PROCEDURE — 97110 THERAPEUTIC EXERCISES: CPT

## 2020-03-03 PROCEDURE — 97032 APPL MODALITY 1+ESTIM EA 15: CPT

## 2020-03-03 PROCEDURE — 97116 GAIT TRAINING THERAPY: CPT

## 2020-03-03 PROCEDURE — 97140 MANUAL THERAPY 1/> REGIONS: CPT

## 2020-03-03 RX ORDER — INSULIN LISPRO 100 [IU]/ML
INJECTION, SOLUTION INTRAVENOUS; SUBCUTANEOUS
Status: CANCELLED | OUTPATIENT
Start: 2020-03-03

## 2020-03-03 NOTE — FLOWSHEET NOTE
extend elbow; complete on orange slider   Thumb opposition 10x ea  To second and third digit    Finger flexor stretch 3x30\" ea     Pronation/supination 2x10 AROM Clasping therapist hand for simultaneous finger flexor stretch   Bicep curl with pick 2x10  Key-pinch  on pick while completing elbow flexion to simulate guitar needs               Standing      UE weightbearing on table x  Attempted but too much pain in R wrist d/t lack of ROM   Squats 2x10  Increase reps next session  - LLE slightly forward to bias RLE strengthening   Min squats w/ LLE elevated on step 2x15 2\" under LLE Cues to maximize weightshift to RLE during descent   Midline standing balance 2x30\"  Knee brace donned   Standing & throwing 2 min  Maintaining midline balance; knee brace donned   Step ups 15x 2\" Mod assist to prevent          Gait train 200 ft x1  100 ft x1  - Mod assist to prevent hyperextension/excessive buckling at R knee throughout stance phase  - Cues to fully weightshift onto RLE         Other:   - Slower progression through exercises d/t therapist assist needed for best quad activation/knee position for weightshift reps, and for therapeutic rest break in between sets d/t fatigue    Specific Instructions for next treatment: ankle DF ROM stretching, foot tap to stair for hip flexors, resisted PF - give more detailed HEP for RLE exercises at next session       Treatment Charges: Mins Units   [x]  Modalities: manual e-stim 30 1   [x]  Ther Exercise 9 1   []  Manual Therapy     []  Ther Activities     []  Aquatics     []  Vasocompression     [x]  Other: Gait 40 2   []  Other: Neuro     Total Treatment time 49 mins 4   Neuro e-stim donned throughout pre-gait train; doffed for full gait-cycle gait train    Assessment: [x] Progressing toward goals. Still requires neuro e-stim to increase quad activation for all pre-gat, but is doing better with achieving proper knee position more quickly with less therapist cuing.  Struggles with R knee stance control during left swing, resulting in very quick left stance time. Focused on increasing weightshift and exaggerated left swing time to improve quad control. Pt to work on this for HEP. Still gets very fatigued very quickly, requiring rest breaks after nearly each set of quad strengthening exercise. [] No change. [x] Other: KRISTOPHER KELLY JR[facility] order found in [facility] from Dr. Bibiana Cortes after session - will provide pt with order at next session. STG: (to be met in 10 treatments) - Assessed 2/20/20  1. ? ROM: Improve R shoulder PROM to at least 150deg flex, 120 abd to improve mobility for reaching overhead. - DISCONTINUE, UE being treated by OT at this time  2. ? Strength:   a. At least 3/5 global strength in RUE to improve lifting grasping - DISCONTINUE, UE being treated by OT at this time  b. 5-/5 R hip ext/abd strength for improved squatting/stair climbing ability - NOT MET  3. ? Balance: Able to bend down to ground to  object with only 1UE assist with no balance impairment. - Making progress, still SBA  4. Function:   a. Improve gait speed to at least .6m/s to progress safety with home/community ambulation. - Making progress, still focusing more on gait quality vs speed d/t excessive knee hyperextension  b. Able to begin to integrate RUE into UE dressing with minor difficulty - Making progress, discontinue this goal d/t OT treating RUE at this time  5. Independent with Home Exercise Programs - MET  6. Demonstrate Knowledge of fall prevention - MET     LTG: (to be met in 24 treatments): Changed from original to reflect OT addressing RUE needs:  1. ROM:   a. Improve R hip flexor AROM to at least 100deg in standing for improved ease of stair climbing. 2. Balance:  a. Pt will be able to complete 360 turn without AD with no balance impairment. b. Pt will be able to complete dynamic reaching/stooping tasks while standing in narrow MED  3. Function:    a.  Improve gait speed to at least .8m/s to improve safety with community ambulation. b. Able to climb 4 stairs with unilateral UE assist and minimal balance impairment. 4. Strength:   a. At least 5/5 R hip ext/abd strength for improved stability with prolonged walking and stair climbing.   b. At least 5-/5 R dorsiflexors and plantarflexors for improved gait mechanics        Patient goals: Chemo Gala my right arm again like normal\"    Pt. Education:  [x] Yes  [] No  [x] Reviewed Prior HEP/Ed  Method of Education: [x] Verbal  [x] Demo  [x] Written  Comprehension of Education:  [x] Verbalizes understanding. [x] Demonstrates understanding. [] Needs review. [] Demonstrates/verbalizes HEP/Ed previously given. Current HEP: supine bicep curl 2#, chest press with cane, sit>stands with LLE forward; seated hip abd w/ purple band, lateral weight shifts onto RLE    Plan: [x] Continue per plan of care.    [] Other:       Time In: 2:05 pm           Time Out: 3:00 pm    Electronically signed by:  Peace Jorgensen PT

## 2020-03-05 ENCOUNTER — HOSPITAL ENCOUNTER (OUTPATIENT)
Dept: PHYSICAL THERAPY | Age: 60
Setting detail: THERAPIES SERIES
Discharge: HOME | End: 2020-03-05
Payer: MEDICARE

## 2020-03-05 ENCOUNTER — HOSPITAL ENCOUNTER (OUTPATIENT)
Dept: OCCUPATIONAL THERAPY | Age: 60
Setting detail: THERAPIES SERIES
Discharge: HOME | End: 2020-03-05
Payer: MEDICARE

## 2020-03-05 PROCEDURE — 97110 THERAPEUTIC EXERCISES: CPT

## 2020-03-05 PROCEDURE — 97140 MANUAL THERAPY 1/> REGIONS: CPT

## 2020-03-05 NOTE — FLOWSHEET NOTE
3x20\"  Long sit   Active DF 15x3\"     Resisted PF 2x15 lime difficult               Supine      SLR 2x10  Minimal height, cues for dec quad lag   SAQ 2x15     Bridges 2x20     Bridge w/ march x  Attempted, too difficult for R glutes         Standing      UE weightbearing on table x  Attempted but too much pain in R wrist d/t lack of ROM   Squats 2x15  Increase reps next session  - LLE slightly forward to bias RLE strengthening   Hamstring curl 2x10     Min squats w/ LLE elevated on step 2x15 2\" under LLE Cues to maximize weightshift to RLE during descent   Midline standing balance 2x30\"  Knee brace donned   Standing & throwing 2 min  Maintaining midline balance; knee brace donned   Step ups 15x 2\" Mod assist to prevent          Gait train 200 ft x1  100 ft x1  - Mod assist to prevent hyperextension/excessive buckling at R knee throughout stance phase  - Cues to fully weightshift onto RLE         Other:   - Slower progression through exercises d/t therapist assist needed for best quad activation/knee position for weightshift reps, and for therapeutic rest break in between sets d/t fatigue    Specific Instructions for next treatment: standing hip 3-way, progressive tolerance to more and more standing therex with gait/stsair training as well      Treatment Charges: Mins Units   []  Modalities: manual e-stim     [x]  Ther Exercise 53 4   []  Manual Therapy     []  Ther Activities     []  Aquatics     []  Vasocompression     [x]  Other: Gait     []  Other: Neuro     Total Treatment time 53 mins 4       Assessment: [x] Progressing toward goals. Focused on completing strengthening exercises to update HEP this date - still remains very weak throughout all RLE musculature but provided appropriate resistance/reps to create good challenge for all exercises, mixture of CKC standing/seated isolated OKC exercises. Pt still significantly fatigued with all exercises and requires intermittent rest breaks, rigoberto.  When completing

## 2020-03-05 NOTE — FLOWSHEET NOTE
increased awareness to trunk compensation. Pt demo better success with trunk control while completing all exercises. Attempted to complete weight-bearing while standing at table side but unable to tolerate due to wrist pain. Min assist needed to hook finger onto rings to complete rainbow arc. Pt demo minimal movement with digits while completing smart mirror task. Graded motor imagery is composed of three steps: laterality training, imagined hand movements, and mirror visual feedback (MVF) therapy. The ability to differentiate between right and left depends on an intact body schema, or how the brain interprets the bodys shape. When the body schema is believed to be accurate, patients are asked to imagine pain-free movement. Around 25 percent of the neurons in the brain are called mirror neurons, and are activated when watching someone else move or think of performing an action. The Gaylia Eliazar is that there is such a high degree of overlap in the brain regions involved in imagined movement and actual movement, that imagined movement can lead to pain-free actual movement. The final step is called mirror visual feedback therapy. MVF requires a mirror therapy device, like the SMART-Mirro. A mirror therapy device allows the patient to place their injured hand inside the device and out of view, while the unaffected hand is placed outside the device, in front of the mirror. The patient is then instructed to look in the mirror at the mirrored, unaffected hand and watch the mirrored hand move. This creates the illusion that the injured hand is moving without any pain. When done properly, graded motor imagery and MVF are thought to provide such strong positive sensory feedback, that the brain is convinced that not all movement needs to be painful, and is proven to decrease pain and disability.     Specific Instructions for next treatment: continue with current plan       Treatment Charges: Mins Units Time In/Out []  Modalities        [x]  Ther Exercise 44 3    [x]  Manual Therapy 10 1    []  Ther Activities      []        []        []        Total Treatment time 54 min           Assessment: Progressing Towards Goals    Short Term Goals: (  9    Treatments)  1. Decrease Pain: to 1/10 with PROM of RUE  2. Increase AROM (degrees)  a. Pt will demo active movement with all digits   b. Demo R shoulder active flexion to 90 degrees for independence with reaching tasks  3. Increase strength (pounds)  a. R  to 11 pounds for independence with all functional tasks  b. R lateral pinch to 7 pounds for return to playing guitar  c. R 2 point pinch to 5 pounds for return to playing guitar  d. R 3 point pinch to 3 pounds for return to playing guitar   4. Increase function:UE Functional Index Score to 30 to promote increased function  5. Demo knowledge of fall prevention in 3 session  6. Patient to be independent with home exercise program as demonstrated by performance with correct form without cues.     Long Term Goals: (  18  Treatments)  1. Pt will demo full, active digit extension with all digits of R hand to participate in functional tasks  2. Pt will demo increased function AEB a score of 30 or more on the UEFI  3. Pt will improved fine motor coordination skills AEB being able to complete 9 hole peg test with R hand  4. Pt will demo decreased flexor tone to allow for participation in functional activities  5. Pt will report completing ADL tasks       Pt.  Education:  Yes and Reviewed Prior HEP/Ed  Method of Education: Verbal, Written and Demo  Comprehension of Education: Needs Review      Plan:  Continue with current plan       Time In/Out: 2944-1992       Electronically signed by:  MODESTO Link/JANNET

## 2020-03-09 ENCOUNTER — HOSPITAL ENCOUNTER (OUTPATIENT)
Age: 60
Setting detail: SPECIMEN
Discharge: HOME | End: 2020-03-09
Payer: MEDICARE

## 2020-03-09 LAB
ALBUMIN SERPL-MCNC: 3.9 G/DL (ref 3.5–5.2)
ALBUMIN/GLOBULIN RATIO: 1.1 (ref 1–2.5)
ALP BLD-CCNC: 70 U/L (ref 40–129)
ALT SERPL-CCNC: 39 U/L (ref 5–41)
ANION GAP SERPL CALCULATED.3IONS-SCNC: 14 MMOL/L (ref 9–17)
AST SERPL-CCNC: 25 U/L
BILIRUB SERPL-MCNC: 0.35 MG/DL (ref 0.3–1.2)
BUN BLDV-MCNC: 18 MG/DL (ref 6–20)
BUN/CREAT BLD: NORMAL (ref 9–20)
CALCIUM SERPL-MCNC: 9.6 MG/DL (ref 8.6–10.4)
CHLORIDE BLD-SCNC: 106 MMOL/L (ref 98–107)
CHOLESTEROL/HDL RATIO: 2.7
CHOLESTEROL: 88 MG/DL
CO2: 22 MMOL/L (ref 20–31)
CREAT SERPL-MCNC: 0.93 MG/DL (ref 0.7–1.2)
ESTIMATED AVERAGE GLUCOSE: 148 MG/DL
GFR AFRICAN AMERICAN: >60 ML/MIN
GFR NON-AFRICAN AMERICAN: >60 ML/MIN
GFR SERPL CREATININE-BSD FRML MDRD: NORMAL ML/MIN/{1.73_M2}
GFR SERPL CREATININE-BSD FRML MDRD: NORMAL ML/MIN/{1.73_M2}
GLUCOSE BLD-MCNC: 95 MG/DL (ref 70–99)
HBA1C MFR BLD: 6.8 % (ref 4–6)
HCT VFR BLD CALC: 40.1 % (ref 40.7–50.3)
HDLC SERPL-MCNC: 33 MG/DL
HEMOGLOBIN: 13.4 G/DL (ref 13–17)
LDL CHOLESTEROL: 42 MG/DL (ref 0–130)
MCH RBC QN AUTO: 30.8 PG (ref 25.2–33.5)
MCHC RBC AUTO-ENTMCNC: 33.4 G/DL (ref 28.4–34.8)
MCV RBC AUTO: 92.2 FL (ref 82.6–102.9)
NRBC AUTOMATED: 0.2 PER 100 WBC
PDW BLD-RTO: 13.2 % (ref 11.8–14.4)
PLATELET # BLD: 211 K/UL (ref 138–453)
PMV BLD AUTO: 11.7 FL (ref 8.1–13.5)
POTASSIUM SERPL-SCNC: 4.6 MMOL/L (ref 3.7–5.3)
PROSTATE SPECIFIC ANTIGEN: 1.03 UG/L
RBC # BLD: 4.35 M/UL (ref 4.21–5.77)
SODIUM BLD-SCNC: 142 MMOL/L (ref 135–144)
TOTAL PROTEIN: 7.6 G/DL (ref 6.4–8.3)
TRIGL SERPL-MCNC: 67 MG/DL
TSH SERPL DL<=0.05 MIU/L-ACNC: 1.89 MIU/L (ref 0.3–5)
VLDLC SERPL CALC-MCNC: ABNORMAL MG/DL (ref 1–30)
WBC # BLD: 17.7 K/UL (ref 3.5–11.3)

## 2020-03-10 ENCOUNTER — HOSPITAL ENCOUNTER (OUTPATIENT)
Age: 60
Setting detail: SPECIMEN
Discharge: HOME | End: 2020-03-10
Payer: MEDICARE

## 2020-03-10 ENCOUNTER — HOSPITAL ENCOUNTER (OUTPATIENT)
Dept: OCCUPATIONAL THERAPY | Age: 60
Setting detail: THERAPIES SERIES
Discharge: HOME | End: 2020-03-10
Payer: MEDICARE

## 2020-03-10 ENCOUNTER — HOSPITAL ENCOUNTER (OUTPATIENT)
Dept: PHYSICAL THERAPY | Age: 60
Setting detail: THERAPIES SERIES
Discharge: HOME | End: 2020-03-10
Payer: MEDICARE

## 2020-03-10 ENCOUNTER — OFFICE VISIT (OUTPATIENT)
Dept: NEUROLOGY | Age: 60
End: 2020-03-10
Payer: MEDICARE

## 2020-03-10 VITALS
BODY MASS INDEX: 22.9 KG/M2 | DIASTOLIC BLOOD PRESSURE: 76 MMHG | HEART RATE: 96 BPM | WEIGHT: 160 LBS | HEIGHT: 70 IN | SYSTOLIC BLOOD PRESSURE: 126 MMHG

## 2020-03-10 LAB
-: NORMAL
AMORPHOUS: NORMAL
BACTERIA: NORMAL
BILIRUBIN URINE: NEGATIVE
CASTS UA: NORMAL /LPF (ref 0–8)
COLOR: YELLOW
COMMENT UA: ABNORMAL
CREATININE URINE: 108.3 MG/DL (ref 39–259)
CRYSTALS, UA: NORMAL /HPF
EPITHELIAL CELLS UA: NORMAL /HPF (ref 0–5)
GLUCOSE URINE: NEGATIVE
KETONES, URINE: NEGATIVE
LEUKOCYTE ESTERASE, URINE: NEGATIVE
MICROALBUMIN/CREAT 24H UR: 133 MG/L
MICROALBUMIN/CREAT UR-RTO: 123 MCG/MG CREAT
MUCUS: NORMAL
NITRITE, URINE: NEGATIVE
OTHER OBSERVATIONS UA: NORMAL
PH UA: 5 (ref 5–8)
PROTEIN UA: ABNORMAL
RBC UA: NORMAL /HPF (ref 0–4)
RENAL EPITHELIAL, UA: NORMAL /HPF
SPECIFIC GRAVITY UA: 1.02 (ref 1–1.03)
TRICHOMONAS: NORMAL
TURBIDITY: CLEAR
URINE HGB: NEGATIVE
UROBILINOGEN, URINE: NORMAL
WBC UA: NORMAL /HPF (ref 0–5)
YEAST: NORMAL

## 2020-03-10 PROCEDURE — G8484 FLU IMMUNIZE NO ADMIN: HCPCS | Performed by: PSYCHIATRY & NEUROLOGY

## 2020-03-10 PROCEDURE — 3017F COLORECTAL CA SCREEN DOC REV: CPT | Performed by: PSYCHIATRY & NEUROLOGY

## 2020-03-10 PROCEDURE — 97110 THERAPEUTIC EXERCISES: CPT

## 2020-03-10 PROCEDURE — 1036F TOBACCO NON-USER: CPT | Performed by: PSYCHIATRY & NEUROLOGY

## 2020-03-10 PROCEDURE — G8420 CALC BMI NORM PARAMETERS: HCPCS | Performed by: PSYCHIATRY & NEUROLOGY

## 2020-03-10 PROCEDURE — 99214 OFFICE O/P EST MOD 30 MIN: CPT | Performed by: PSYCHIATRY & NEUROLOGY

## 2020-03-10 PROCEDURE — 97112 NEUROMUSCULAR REEDUCATION: CPT

## 2020-03-10 PROCEDURE — G8427 DOCREV CUR MEDS BY ELIG CLIN: HCPCS | Performed by: PSYCHIATRY & NEUROLOGY

## 2020-03-10 RX ORDER — ASPIRIN 81 MG/1
81 TABLET, CHEWABLE ORAL DAILY
COMMUNITY

## 2020-03-10 RX ORDER — ATORVASTATIN CALCIUM 20 MG/1
20 TABLET, FILM COATED ORAL DAILY
Qty: 30 TABLET | Refills: 3 | Status: SHIPPED | OUTPATIENT
Start: 2020-03-10 | End: 2020-06-16 | Stop reason: DRUGHIGH

## 2020-03-10 NOTE — FLOWSHEET NOTE
Seated foot tap to step 2x15 RLE 4\" step   LAQ 2x10 2#    Hip abd 2x20 black    Gastroc stretch w/ belt 3x20\"  Long sit   Active DF 15x3\"     Resisted PF 2x15 lime difficult               Leg press 2x15 20#          [id number]      SLR 2x10  Minimal height, cues for dec quad lag   SAQ 2x15     Bridges 2x20     Bridge w/ march x  Attempted, too difficult for R glutes   DKTC crunch 20x           Standing      UE weightbearing on table x  Attempted but too much pain in R wrist d/t lack of ROM   Squats 2x15  Increase reps next session  - LLE slightly forward to bias RLE strengthening   Hamstring curl 2x10     Min squats w/ LLE elevated on step 2x15 2\" under LLE Cues to maximize weightshift to RLE during descent   Midline standing balance 2x30\"  Knee brace donned   Standing & throwing 2 min  Maintaining midline balance; knee brace donned   Step u\        \0s 15x 2\" Mod assist to prevent          Gait train 200 ft x1  100 ft x1  - Mod assist to prevent hyperextension/excessive buckling at R knee throughout stance phase  - Cues to fully weightshift onto RLE         Other:   - Slower progression through exercises d/t therapist assist needed for best quad activation/knee position for weightshift reps, and for therapeutic rest break in between sets d/t fatigue    Specific Instructions for next treatment: standing hip 3-way, progressive tolerance to more and more standing therex with gait/stsair training as well      Treatment Charges: Mins Units   []  Modalities: manual e-stim     [x]  Ther Exercise 55 4   []  Manual Therapy     []  Ther Activities     []  Aquatics     []  Vasocompression     [x]  Other: Gait     []  Other: Neuro     Total Treatment time 55 mins 4       Assessment: [x] Progressing toward goals. Continued to focus on RLE strengthening at quad/glutes/hamstrings with therex this date, d/t improving carryover of gait mechanics to begin session - improved knee control in R terminal stance.  Initiated leg press for

## 2020-03-10 NOTE — PROGRESS NOTES
[facility]            [facility], [address]; [facility], [address]    [address], [address], [place], [address] [facility]            Dept: [phone]          Dept Fax: [phone]             MD Carlos Do MD Ahmed B. Cherrie Overcast, MD Cherisse Pleasure, MD Maryland Braun, MD Coye Favor, CNP               NEUROLOGY FOLLOW UP NOTE                                          PATIENT NAME: Bandar Barrera   PATIENT MRN: [id number]  FOLLOW UP TODAY: 3/10/2020     Dear Dr. Hayden Springer MD,     I had the pleasure of seeing your patient Bandar Barrera, who comes for follow up. CHIEF COMPLAINT: Follow-up and Cerebrovascular Accident     INITIAL & INTERVAL HISTORY:     Bandar Barrera is a 61year old RH AAM, last seen on 12/5/2019, pt returns for follow up regarding his stroke. Pt came with wife to clinic today. Since last visit, he has been doing well, no new strokes. He has been going through PT, OT, he has benefited from that, feels weakness on right side improved, denies spasm. BP has been well controlled, good sleep/mood/bowel movement and urination. No depression. Currently on ASA 81mg, just had blood work yesterday, LDL was 42; A1c 6.8. WBC 17.7; TSH normal.     Initial clinic visit on 12/5/2020     Stroke   [facility]  Left capsular ischemic infarct in setting of uncontrolled HTN, DM (A1c 11.6) (he had not taken medications for 5 years). He presented with new onset acute right sided weakness (UE>LE), his NIHSS was 6 in the hospital    He was discharged with 81mg ASA and plavix 75mg, also on lipitor 40mg daily, insulin to rehab (4 weeks in rehab).    Sister recalls pt did not felt right, he called EMS, his right arm weak but still can move, he was brought to ED around 7:30PM, in ED his NIHSS was  Hemoglobin 03/09/2020 13.4  13.0 - 17.0 g/dL Final    Hematocrit 03/09/2020 40.1* 40.7 - 50.3 % Final    MCV 03/09/2020 92.2  82.6 - 102.9 fL Final    MCH 03/09/2020 30.8  25.2 - 33.5 pg Final    MCHC 03/09/2020 33.4  28.4 - 34.8 g/dL Final    RDW 03/09/2020 13.2  11.8 - 14.4 % Final    Platelets 74/11/0458 211  138 - 453 k/uL Final    MPV 03/09/2020 11.7  8.1 - 13.5 fL Final    NRBC Automated 03/09/2020 0.2* 0.0 per 100 WBC Final    except those listed in the interval history. Diagnosis Date    Cerebral artery occlusion with cerebral infarction ([facility]     Diabetes mellitus ([facility]     High cholesterol     Hypertension         ALLERGIES:   Allergies   Allergen Reactions    Dairycare [Lactase-Lactobacillus]        MEDICATIONS:   Current Outpatient Medications   Medication Sig Dispense Refill    aspirin 81 MG chewable tablet Take 81 mg by mouth daily      LEVEMIR FLEXTOUCH 100 UNIT/ML injection pen Inject 16 Units into the skin nightly 5 pen 3    Insulin Pen Needle (PEN NEEDLES) 31G X 6 MM MISC Inject 1 each into the skin daily 100 each 3    blood glucose monitor strips Test 2 times a day & as needed for symptoms of irregular blood glucose. 200 strip 3    Lancets MISC 1 each by Does not apply route 2 times daily 200 each 3    lisinopril (PRINIVIL;ZESTRIL) 10 MG tablet TAKE ONE TABLET BY MOUTH DAILY 90 tablet 1    amLODIPine (NORVASC) 10 MG tablet TAKE ONE TABLET BY MOUTH DAILY 90 tablet 1    FLUoxetine (PROZAC) 40 MG capsule TAKE ONE CAPSULE BY MOUTH DAILY 90 capsule 1    hydrALAZINE (APRESOLINE) 25 MG tablet TAKE ONE TABLET BY MOUTH DAILY 90 tablet 1    Elastic Bandages & Supports (KNEE BRACE) [facility] Knee Cage Brace.  1 each 0    atorvastatin (LIPITOR) 80 MG tablet TAKE ONE TABLET BY MOUTH DAILY 90 tablet 0    clopidogrel (PLAVIX) 75 MG tablet TAKE ONE TABLET BY MOUTH DAILY 90 tablet 1    potassium chloride (KLOR-CON M) 20 MEQ TBCR extended release tablet Take 1 tablet by mouth

## 2020-03-10 NOTE — FLOWSHEET NOTE
[x] [address] [facility] floor       [address], [place]         Phone: [phone]       Fax: [phone] [] [address] [facility] at [address] [facility] , [address]  Phone: [phone]  Fax: [phone]     Occupational Therapy Daily Treatment Note    Date:  3/10/2020  Patient Name:  Samia Torre    :  1960  MRN: [id number]  Patient: Samia Torre                      : 1960                      MRN: [id number]                         Physician: Lexy Carlos MD  Insurance: [employer]  Medical Diagnosis: history of recent stroke Z86.73   Rehab Codes: stiffness in shoulder M25.61,, lack of coordination R27.8,, fine motor skills loss R29.818,, muscle weakness generalized M62.81,, lack of coordination unspecified R27.0, or lack of coordination other R27.8,  Onset Date: 2019                          Next Dr. [place]: 3-    Visit# / total visits:  Cancels/No Shows: 0/0      Subjective:    Pain:  Yes Location: R hand   Pain Rating: (0-10 scale) 3/10  Pain altered Tx:  No  Action:   Comments:    Objective:  Modalities:   Exercises:     EXERCISE    REPS/     TIME  WEIGHT/    LEVEL COMMENTS   Move sponges from R to L 25  Not Completed this date   Smart Mirror 10 mins  Not Completed this date   Flexbar  Wrist pronation  Wrist supination  Wrist ulnar dev  Wrist radial dev  Shoulder abduction 10  Not Completed this date, partially [place]   Mirror with UE flexion exercises 15 mns  Not Completed   Shoulder AROM 15 reps each   Completed - Protraction/retraction, elevation/depression    [place] 1 series   Completed    Shoulder flexion, chest press while supine on mat table  15 reps each  Added & completed    Weight-bearing on table side 15 reps x2 sets  Added & completed - held position for 5-10 seconds at a time     Other: Pt presents with moderate tone in R shoulder, R elbow, R wrist extension, and R digits. Pt stretched this date with fair response and mod pain. Pt demo better success with trunk control while completing all exercises. Completed weight-bearing standing at mat table this date, minimal support needed at R elbow to maintain a strong position. Pt with min-mod pain at wrist with weight-bearing. Task modified to be completed with closed fist, which eliminated wrist pain. Min assist needed to hook finger onto rings to complete rainbow arc. Min assist needed for support while completing supine exercises. Graded motor imagery is composed of three steps: laterality training, imagined hand movements, and mirror visual feedback (MVF) therapy. The ability to differentiate between right and left depends on an intact body schema, or how the brain interprets the bodys shape. When the body schema is believed to be accurate, patients are asked to imagine pain-free movement. Around 25 percent of the neurons in the brain are called mirror neurons, and are activated when watching someone else move or think of performing an action. The Gaylia Eliazar is that there is such a high degree of overlap in the brain regions involved in imagined movement and actual movement, that imagined movement can lead to pain-free actual movement. The final step is called mirror visual feedback therapy. MVF requires a mirror therapy device, like the SMART-Phreesiao. A mirror therapy device allows the patient to place their injured hand inside the device and out of view, while the unaffected hand is placed outside the device, in front of the mirror. The patient is then instructed to look in the mirror at the mirrored, unaffected hand and watch the mirrored hand move. This creates the illusion that the injured hand is moving without any pain.  When done properly, graded motor imagery and MVF are thought to provide such strong positive sensory feedback, that the brain is convinced that not all movement needs to be painful, and is proven to

## 2020-03-12 ENCOUNTER — HOSPITAL ENCOUNTER (OUTPATIENT)
Dept: OCCUPATIONAL THERAPY | Age: 60
Setting detail: THERAPIES SERIES
Discharge: HOME | End: 2020-03-12
Payer: MEDICARE

## 2020-03-12 ENCOUNTER — HOSPITAL ENCOUNTER (OUTPATIENT)
Dept: PHYSICAL THERAPY | Age: 60
Setting detail: THERAPIES SERIES
Discharge: HOME | End: 2020-03-12
Payer: MEDICARE

## 2020-03-12 PROCEDURE — 97110 THERAPEUTIC EXERCISES: CPT

## 2020-03-12 NOTE — FLOWSHEET NOTE
[x] [address] [facility] floor       [address], [place]         Phone: [phone]       Fax: [phone] [] [address] [facility] at [address] [facility] , [address]  Phone: [phone]  Fax: [phone]     Occupational Therapy Daily Treatment Note    Date:  3/12/2020  Patient Name:  Karyna Mcgregor    :  1960  MRN: [id number]  Patient: Karyna Mcgregor                      : 1960                      MRN: [id number]                         Physician: Dimitry Kaplan MD  Insurance: [employer]  Medical Diagnosis: history of recent stroke Z86.73   Rehab Codes: stiffness in shoulder M25.61,, lack of coordination R27.8,, fine motor skills loss R29.818,, muscle weakness generalized M62.81,, lack of coordination unspecified R27.0, or lack of coordination other R27.8,  Onset Date: 2019                          Next Dr. Larry Frederick: 3-    Visit# / total visits:  Cancels/No Shows: 0/0      Subjective:    Pain:  Yes Location: R hand   Pain Rating: (0-10 scale) 3/10  Pain altered Tx:  No  Action:   Comments:    Objective:  Modalities:   Exercises:     EXERCISE    REPS/     TIME  WEIGHT/    LEVEL COMMENTS   Move sponges from R to L 25  Completed this date   Smart Mirror 10 mins  Completed this date   Flexbar  Wrist pronation  Wrist supination  Wrist ulnar dev  Wrist radial dev  Shoulder abduction 10  Completed this date, partially [place]   Mirror with UE flexion exercises 15 mns  Not Completed   Shoulder AROM 15 reps each   Completed - Protraction/retraction, elevation/depression    [place] 1 series   Not Completed    Shoulder flexion, chest press while supine on mat table  15 reps each  Not completed    Weight-bearing on table side 15 reps x2 sets  Not completed - held position for 5-10 seconds at a time   Rolling pin 20 reps   Completed     Other: Pt presents with moderate tone in R shoulder, R elbow, R wrist current plan       Treatment Charges: Mins Units Time In/Out   []  Modalities        [x]  Ther Exercise 55 4    []  Manual Therapy      []  Ther Activities      []  Neuro re-ed      []        []        Total Treatment time 55 min           Assessment: Progressing Towards Goals    Short Term Goals: (  9    Treatments)  1. Decrease Pain: to 1/10 with PROM of RUE  2. Increase AROM (degrees)  a. Pt will demo active movement with all digits   b. Demo R shoulder active flexion to 90 degrees for independence with reaching tasks  3. Increase strength (pounds)  a. R  to 11 pounds for independence with all functional tasks  b. R lateral pinch to 7 pounds for return to playing guitar  c. R 2 point pinch to 5 pounds for return to playing guitar  d. R 3 point pinch to 3 pounds for return to playing guitar   4. Increase function:UE Functional Index Score to 30 to promote increased function  5. Demo knowledge of fall prevention in 3 session  6. Patient to be independent with home exercise program as demonstrated by performance with correct form without cues.     Long Term Goals: (  18  Treatments)  1. Pt will demo full, active digit extension with all digits of R hand to participate in functional tasks  2. Pt will demo increased function AEB a score of 30 or more on the UEFI  3. Pt will improved fine motor coordination skills AEB being able to complete 9 hole peg test with R hand  4. Pt will demo decreased flexor tone to allow for participation in functional activities  5. Pt will report completing ADL tasks       Pt.  Education:  Yes and Reviewed Prior HEP/Ed  Method of Education: Verbal, Written and Demo  Comprehension of Education: Needs Review      Plan:  Continue with current plan       Time In/Out: 3506-7294       Electronically signed by:  Melissa Santamaria OTR/JANNET

## 2020-03-12 NOTE — FLOWSHEET NOTE
[] [address]  [address]  [phone]  F: [phone] [x] [address]  [facility]   [address]  P: [phone]  F: [phone] [] [place]  [address] [facility]  P: [phone]  F: [phone] [] [address]  [facility]   [address]   [place]: [phone]  F: [phone]      Physical Therapy Daily Treatment Note    Date:  3/12/2020  Patient Name:  Diego Sam    :  1960  MRN: [id number]  Physician: Dr. Amanda Christensen: Lissett Griffiths (24 vs)   Medical Diagnosis: History of recent stroke  Rehab Codes: M25.611, M25.621, M25.641, R53.1, R29.3, R26.9, R27.9  Next 's appt. : 3/10/20  Visit# / total visits:   Cancels/No Shows: 0    Subjective:    Pain:  [] Yes  [x] No Location:  N/A Pain Rating: (0-10 scale) 0/10  Pain altered Tx:  [x] No  [] Yes  Action:  Comments: Patient arrives using SBQC this date - slow gait but good knee control noted.     Objective:   On :  TU.04 s  10 MWT: 29.52s       Modalities: HELD to observe for pt ability without NMES: Neuromuscular e-stim to R quad - donned throughout pre-gait activities  Precautions:  Exercises: Bolded completed on 3/12/2020:  Exercise Reps/ Time Weight/ Level Comments   Nu-step 6 min L8 - R arm ACE-wrapped to handle  - seat at 11         Pre-gait      Lateral weight shift 2x20  Mod TC/VCs to avoid hyperextension but maximize quad activation   Anterior weight shift 2x20    \" \"   Forward step w/ weight shift 2x20  \" \"   Lateral weight shift to SLS 2x10, 3\"  \" \"   Exaggerated left swing phase 20x5\"                 Seated      Seated foot tap to step 2x15 RLE 4\" step   LAQ 2x10 2#    Hip abd 2x20 black    Gastroc stretch w/ belt 3x20\"  Long sit   Active DF 15x3\" is using SBQC for ambulation, though trunk lean is absent now. Pt encouraged throughout session to maintain slightly bent knee to prevent hyperextension throughout all standing strengthening exercises. Requires mod assist to prevent this hyperextension throughout all exercises, especially with step up/downs. Still very poor eccentric control of L quad. Attempting to focus on quad strength throughout session but fatigues rapidly, other exercises performed for strengthening (ex. Hip ext/abd) to mitigate this soreness. Pt requests to finish early d/t excessive fatigue in RLE - to perform supine HEP at home. [] No change. [x] Other: Will continue to benefit from skilled PT to address RLE quad/hamstring/glute weakness and balance impairments to promote improved gait mechanics, increase functional mobility, and progress to prior level of function. STG: (to be met in 10 treatments) - Assessed 2/20/20  1. ? ROM: Improve R shoulder PROM to at least 150deg flex, 120 abd to improve mobility for reaching overhead. - DISCONTINUE, UE being treated by OT at this time  2. ? Strength:   a. At least 3/5 global strength in RUE to improve lifting grasping - DISCONTINUE, UE being treated by OT at this time  b. 5-/5 R hip ext/abd strength for improved squatting/stair climbing ability - NOT MET  3. ? Balance: Able to bend down to ground to  object with only 1UE assist with no balance impairment. - Making progress, still SBA  4. Function:   a. Improve gait speed to at least .6m/s to progress safety with home/community ambulation. - Making progress, still focusing more on gait quality vs speed d/t excessive knee hyperextension  b. Able to begin to integrate RUE into UE dressing with minor difficulty - Making progress, discontinue this goal d/t OT treating RUE at this time  5. Independent with Home Exercise Programs - MET  6.  Demonstrate Knowledge of fall prevention - MET     LTG: (to be met in 24 treatments): Changed from

## 2020-03-17 ENCOUNTER — HOSPITAL ENCOUNTER (OUTPATIENT)
Dept: OCCUPATIONAL THERAPY | Age: 60
Setting detail: THERAPIES SERIES
Discharge: HOME | End: 2020-03-17
Payer: MEDICARE

## 2020-03-17 ENCOUNTER — HOSPITAL ENCOUNTER (OUTPATIENT)
Dept: PHYSICAL THERAPY | Age: 60
Setting detail: THERAPIES SERIES
Discharge: HOME | End: 2020-03-17
Payer: MEDICARE

## 2020-03-17 PROCEDURE — 97116 GAIT TRAINING THERAPY: CPT

## 2020-03-17 PROCEDURE — 97110 THERAPEUTIC EXERCISES: CPT

## 2020-03-17 NOTE — FLOWSHEET NOTE
digits. Pt stretched this date with fair response and mod pain. Pt demo better success with trunk control while completing all exercises. Rolling pin task added to promote symmetrical BUE movement, demo fair completion. Continues to demo minimal active movement of digits. Graded motor imagery is composed of three steps: laterality training, imagined hand movements, and mirror visual feedback (MVF) therapy. The ability to differentiate between right and left depends on an intact body schema, or how the brain interprets the bodys shape. When the body schema is believed to be accurate, patients are asked to imagine pain-free movement. Around 25 percent of the neurons in the brain are called mirror neurons, and are activated when watching someone else move or think of performing an action. The [employer]vet[employer] Counter is that there is such a high degree of overlap in the brain regions involved in imagined movement and actual movement, that imagined movement can lead to pain-free actual movement. The final step is called mirror visual feedback therapy. MVF requires a mirror therapy device, like the SMART-Mirro. A mirror therapy device allows the patient to place their injured hand inside the device and out of view, while the unaffected hand is placed outside the device, in front of the mirror. The patient is then instructed to look in the mirror at the mirrored, unaffected hand and watch the mirrored hand move. This creates the illusion that the injured hand is moving without any pain. When done properly, graded motor imagery and MVF are thought to provide such strong positive sensory feedback, that the brain is convinced that not all movement needs to be painful, and is proven to decrease pain and disability.     Specific Instructions for next treatment: continue with current plan       Treatment Charges: Mins Units Time In/Out   []  Modalities        [x]  Ther Exercise 55 4    []  Manual Therapy      []  Ther Activities []  Neuro re-ed      []        []        Total Treatment time 55 min           Assessment: Progressing Towards Goals    Short Term Goals: (  9    Treatments)  1. Decrease Pain: to 1/10 with PROM of RUE  2. Increase AROM (degrees)  a. Pt will demo active movement with all digits   b. Demo R shoulder active flexion to 90 degrees for independence with reaching tasks  3. Increase strength (pounds)  a. R  to 11 pounds for independence with all functional tasks  b. R lateral pinch to 7 pounds for return to playing guitar  c. R 2 point pinch to 5 pounds for return to playing guitar  d. R 3 point pinch to 3 pounds for return to playing guitar   4. Increase function:UE Functional Index Score to 30 to promote increased function  5. Demo knowledge of fall prevention in 3 session  6. Patient to be independent with home exercise program as demonstrated by performance with correct form without cues.     Long Term Goals: (  18  Treatments)  1. Pt will demo full, active digit extension with all digits of R hand to participate in functional tasks  2. Pt will demo increased function AEB a score of 30 or more on the UEFI  3. Pt will improved fine motor coordination skills AEB being able to complete 9 hole peg test with R hand  4. Pt will demo decreased flexor tone to allow for participation in functional activities  5. Pt will report completing ADL tasks       Pt.  Education:  Yes and Reviewed Prior HEP/Ed  Method of Education: Verbal, Written and Demo  Comprehension of Education: Needs Review      Plan:  Continue with current plan       Time In/Out: 8759-6362       Electronically signed by:  MODESTO Olivera/JANNET

## 2020-03-17 NOTE — FLOWSHEET NOTE
Gastroc stretch w/ belt 3x20\"  Long sit   Active DF 15x3\"     Resisted PF 2x15 lime difficult               Leg press 3x10 30# Double leg               Supine      SLR 2x10  Minimal height, cues for dec quad lag   SAQ 2x15     Bridges 2x20     Bridge w/ march x  Attempted, too difficult for R glutes   DKTC crunch 20x           Standing      UE weightbearing on table x  Attempted but too much pain in R wrist d/t lack of ROM   Squats 2x15  Increase reps next session  - LLE slightly forward to bias RLE strengthening   Hamstring curl 2x15 ea     Hip ext/abd 15x ea RLE AROM   Heel raises 2x15  Minimal height   Mini squats w/ LLE elevated on step 2x15 2\" under LLE Cues to maximize weightshift to RLE during descent     Midline standing balance 2x30\"     Standing & throwing 2 min  Maintaining midline balance; knee brace donned   Step up 2x15 2\" Mod assist to prevent knee hyperextension   Heel tap to box 10x 2\" ADD NEXT         Gait train 200 ft x1  100 ft x1  - Mod assist to prevent hyperextension/excessive buckling at R knee throughout stance phase  - Cues to fully weightshift onto RLE   Lateral stepping   ADD NEXT   Hurdles  1 in ADD NEXT   Other:   - Slower progression through exercises d/t therapist assist needed for best quad activation/knee position for weightshift reps, and for therapeutic rest break in between sets d/t fatigue    Specific Instructions for next treatment: standing hip 3-way, progressive tolerance to more and more standing therex with gait/stair training as well       Treatment Charges: Mins Units   []  Modalities: manual e-stim     [x]  Ther Exercise 20 2   []  Manual Therapy     []  Ther Activities     []  Aquatics     []  Vasocompression     [x]  Other: Gait 35 2   []  Other: Neuro     Total Treatment time  55 4       Assessment: [x] Progressing toward goals. More fatigued today requiring longer therapeutic rest to continue with exercises, unable to progress therex this date d/t this.  Less quad

## 2020-03-19 ENCOUNTER — HOSPITAL ENCOUNTER (OUTPATIENT)
Dept: OCCUPATIONAL THERAPY | Age: 60
Setting detail: THERAPIES SERIES
Discharge: HOME | End: 2020-03-19
Payer: MEDICARE

## 2020-03-19 ENCOUNTER — HOSPITAL ENCOUNTER (OUTPATIENT)
Dept: PHYSICAL THERAPY | Age: 60
Setting detail: THERAPIES SERIES
Discharge: HOME | End: 2020-03-19
Payer: MEDICARE

## 2020-03-19 NOTE — FLOWSHEET NOTE
[x] [facility]) [facility] &  Therapy  [address]    [phone]  F: [phone]   [] [address] [facility]   [address]  P: [phone]  F: [phone]  [] [place]  [address] [facility]  P: [phone]  F: [phone]  [] [address]  [facility]   [address]   [place]: [phone]  F: [phone]   [] [address]  [place]: [phone]   F: [phone]     Physical Therapy Cancel/No Show note    Date: 3/19/2020  Patient: Matt Flowers  : 1960  MRN: [id number]    Cancels/No Shows to date: 1 cx / 0 ns    For today's appointment patient:    [x]  Cancelled    [] Rescheduled appointment    [] No-show     Reason given by patient:    [x]  Patient ill    []  Conflicting appointment    [] No transportation      [] Conflict with work    [] No reason given    [] Weather related    [x] Other:      Comments:  Doesn't feel well, confirmed next appt.       [x] Next appointment was confirmed    Electronically signed by: Alexia Lockwood PT

## 2020-03-24 ENCOUNTER — APPOINTMENT (OUTPATIENT)
Dept: OCCUPATIONAL THERAPY | Age: 60
End: 2020-03-24
Payer: MEDICARE

## 2020-03-24 ENCOUNTER — HOSPITAL ENCOUNTER (OUTPATIENT)
Dept: PHYSICAL THERAPY | Age: 60
Setting detail: THERAPIES SERIES
Discharge: HOME | End: 2020-03-24
Payer: MEDICARE

## 2020-03-26 ENCOUNTER — APPOINTMENT (OUTPATIENT)
Dept: PHYSICAL THERAPY | Age: 60
End: 2020-03-26
Payer: MEDICARE

## 2020-03-26 ENCOUNTER — APPOINTMENT (OUTPATIENT)
Dept: OCCUPATIONAL THERAPY | Age: 60
End: 2020-03-26
Payer: MEDICARE

## 2020-04-30 RX ORDER — INSULIN LISPRO 100 [IU]/ML
5-16 INJECTION, SOLUTION INTRAVENOUS; SUBCUTANEOUS
Qty: 3 PEN | Refills: 1 | Status: SHIPPED | OUTPATIENT
Start: 2020-04-30 | End: 2020-07-02 | Stop reason: SDUPTHER

## 2020-04-30 NOTE — TELEPHONE ENCOUNTER
Listed as historic med 9/7/19  Take TID according to sliding scale. Last seen 1/16/2020    Next Visit Date:  Future Appointments   Date Time Provider Bk [place]   6/16/2020 10:20 AM Tamir Guajardo MD Neuro Spec [address] [employer]   7/1/2020  9:00 AM Juan Sauceda MD [facility] [address] Maintenance   Topic Date Due    Hepatitis B vaccine (1 of 3 - Risk 3-dose series) 05/22/1979    Colon cancer screen colonoscopy  05/22/2010    Shingles Vaccine (1 of 2) 09/13/2020 (Originally 5/22/2010)    HIV screen  09/13/2020 (Originally 5/22/1975)    Hepatitis C screen  09/24/2020 (Originally 1960)    Pneumococcal 0-64 years Vaccine (1 of 1 - PPSV23) 01/14/2021 (Originally 5/22/1966)    Diabetic foot exam  01/16/2021 (Originally 5/22/1970)    DTaP/Tdap/Td vaccine (1 - Tdap) 01/16/2021 (Originally 5/22/1979)    Diabetic retinal exam  01/25/2021 (Originally 5/22/1970)    Flu vaccine (Season Ended) 09/01/2020    A1C test (Diabetic or Prediabetic)  03/09/2021    Lipid screen  03/09/2021    Potassium monitoring  03/09/2021    Creatinine monitoring  03/09/2021    Diabetic microalbuminuria test  03/10/2021    Hepatitis A vaccine  Aged Out    Hib vaccine  Aged Out    Meningococcal (ACWY) vaccine  Aged Out       Hemoglobin A1C (%)   Date Value   03/09/2020 6.8 (H)   09/13/2019 8.8 (H)             ( goal A1C is < 7)   Microalb/Crt.  Ratio (mcg/mg creat)   Date Value   03/10/2020 123 (H)     LDL Cholesterol (mg/dL)   Date Value   03/09/2020 42       (goal LDL is <100)   AST (U/L)   Date Value   03/09/2020 25     ALT (U/L)   Date Value   03/09/2020 39     BUN (mg/dL)   Date Value   03/09/2020 18     BP Readings from Last 3 Encounters:   03/10/20 126/76   01/16/20 130/80   12/05/19 118/76          (goal 120/80)    All Future Testing planned in [facility]  Lab Frequency Next Occurrence   Cologuard (For External Results Only) Once 10/15/2019   OT eval and treat Once 12/12/2019               Patient Active Problem

## 2020-05-05 ENCOUNTER — TELEPHONE (OUTPATIENT)
Dept: FAMILY MEDICINE CLINIC | Age: 60
End: 2020-05-05

## 2020-05-05 RX ORDER — GLUCOSAMINE HCL/CHONDROITIN SU 500-400 MG
CAPSULE ORAL
Qty: 200 STRIP | Refills: 3 | Status: SHIPPED | OUTPATIENT
Start: 2020-05-05 | End: 2021-04-05 | Stop reason: SDUPTHER

## 2020-05-05 NOTE — TELEPHONE ENCOUNTER
Received fax stating freestyle not covered    Called [facility]- One touch is preferred    LMVM of pt new order sent to Mikey Aburto

## 2020-05-27 ENCOUNTER — HOSPITAL ENCOUNTER (OUTPATIENT)
Dept: PHYSICAL THERAPY | Age: 60
Setting detail: THERAPIES SERIES
Discharge: HOME | End: 2020-05-27
Payer: MEDICARE

## 2020-05-27 PROCEDURE — 97110 THERAPEUTIC EXERCISES: CPT

## 2020-05-27 PROCEDURE — 97116 GAIT TRAINING THERAPY: CPT

## 2020-05-27 NOTE — FLOWSHEET NOTE
[] [address]  [address]  [phone]  F: [phone] [x] [address]  [facility] 36   [address]  P: [phone]  F: [phone] [] [address]  [address] [facility]  P: [phone]  F: [phone] [] [address]  [facility]   [place]: [phone]  F: [phone]      Physical Therapy Daily Treatment Note    Date:  2020  Patient Name:  Dora Bustamante    :  1960  MRN: [id number]  Physician: Dr. Dahlia Frances: Ana (24 vs)   Medical Diagnosis: History of recent stroke  Rehab Codes: M25.611, M25.621, M25.641, R53.1, R29.3, R26.9, R27.9  Next 's appt. : 3/10/20  Visit# / total visits:   Cancels/No Shows: 0    Subjective:    Pain:  [] Yes  [x] No Location:  N/A Pain Rating: (0-10 scale) 0/10  Pain altered Tx:  [x] No  [] Yes  Action:  Comments: Patient arrives after ~2 mo break due to COVID-19 clinic closure - states he's been working on home exercises and ambulating, however feels that his knee control is worse over this break from therapy. Notes he feels it's not snapping back but his knee control is worse.     Objective:   On :  TU.04 s  10 MWT: 29.52s     Reassessment on :  - RLE strength: 4/5 plantarflexors, 4+/5 knee extensors in available range (still lacking TKE when completing LAQ), 5-/5 hamstrings, 4/5 hip ext/abd  - Gait: hyperextension at R knee throughout stance phase, lack of push off, still very slow myles with 4-point gait pattern using SBQC in LUE, slight left trunk lean/excessive weightshift left throughout gait        Modalities:   Precautions:  Exercises: Bolded completed on 2020:  Exercise Reps/ Time Weight/ Level Comments   Nu-step 6 min L8 - R arm ACE-wrapped

## 2020-06-03 ENCOUNTER — HOSPITAL ENCOUNTER (OUTPATIENT)
Dept: OCCUPATIONAL THERAPY | Age: 60
Setting detail: THERAPIES SERIES
Discharge: HOME | End: 2020-06-03
Payer: MEDICARE

## 2020-06-03 ENCOUNTER — HOSPITAL ENCOUNTER (OUTPATIENT)
Dept: PHYSICAL THERAPY | Age: 60
Setting detail: THERAPIES SERIES
Discharge: HOME | End: 2020-06-03
Payer: MEDICARE

## 2020-06-03 NOTE — FLOWSHEET NOTE
[x] [facility]) [facility] [place] &  Therapy  [address]    [phone]  F: [phone]   [] [address] [facility] 36   [address]  P: [phone]  F: [phone]  [] [place]  [address] [place] [address]  P: [phone]  F: [phone]  [] [address]  [facility]   [address]   [place]: [phone]  F: [phone]   [] [address]  [place]: [phone]   F: [phone]     Physical Therapy Cancel/No Show note    Date: 6/3/2020  Patient: Kem Sanford  : 1960  MRN: [id number]    Cancels/No Shows to date: 2 cx / 0 ns    For today's appointment patient:    [x]  Cancelled    [] Rescheduled appointment    [] No-show     Reason given by patient:    [x]  Patient ill    []  Conflicting appointment    [] No transportation      [] Conflict with work    [] No reason given    [] Weather related    [x] Other:      Comments:        [x] Next appointment was confirmed    Electronically signed by: Yudi Gann PT

## 2020-06-05 ENCOUNTER — HOSPITAL ENCOUNTER (OUTPATIENT)
Dept: PHYSICAL THERAPY | Age: 60
Setting detail: THERAPIES SERIES
Discharge: HOME | End: 2020-06-05
Payer: MEDICARE

## 2020-06-05 ENCOUNTER — HOSPITAL ENCOUNTER (OUTPATIENT)
Dept: OCCUPATIONAL THERAPY | Age: 60
Setting detail: THERAPIES SERIES
Discharge: HOME | End: 2020-06-05
Payer: MEDICARE

## 2020-06-05 PROCEDURE — 97110 THERAPEUTIC EXERCISES: CPT

## 2020-06-05 NOTE — FLOWSHEET NOTE
Instructions for next treatment: continue with current plan       Treatment Charges: Mins Units Time In/Out   []  Modalities        [x]  Ther Exercise 55 4    []  Manual Therapy      []  Ther Activities      []  Neuro re-ed      []        []        Total Treatment time 55 min           Assessment: Progressing Towards Goals    Short Term Goals: (  9    Treatments)  1. Decrease Pain: to 1/10 with PROM of RUE  2. Increase AROM (degrees)  a. Pt will demo active movement with all digits   b. Demo R shoulder active flexion to 90 degrees for independence with reaching tasks  3. Increase strength (pounds)  a. R  to 11 pounds for independence with all functional tasks  b. R lateral pinch to 7 pounds for return to playing guitar  c. R 2 point pinch to 5 pounds for return to playing guitar  d. R 3 point pinch to 3 pounds for return to playing guitar   4. Increase function:UE Functional Index Score to 30 to promote increased function  5. Demo knowledge of fall prevention in 3 session  6. Patient to be independent with home exercise program as demonstrated by performance with correct form without cues.     Long Term Goals: (  18  Treatments)  1. Pt will demo full, active digit extension with all digits of R hand to participate in functional tasks  2. Pt will demo increased function AEB a score of 30 or more on the UEFI  3. Pt will improved fine motor coordination skills AEB being able to complete 9 hole peg test with R hand  4. Pt will demo decreased flexor tone to allow for participation in functional activities  5. Pt will report completing ADL tasks       Pt.  Education:  Yes and Reviewed Prior HEP/Ed  Method of Education: Verbal, Written and Demo  Comprehension of Education: Needs Review      Plan:  Continue with current plan       Time In/Out: 5572-7872       Electronically signed by:  MODESTO Espinosa/LR/L

## 2020-06-05 NOTE — FLOWSHEET NOTE
this exercise   Anterior weight shift 2x20    \" \"   Forward step w/ weight shift 2x20  \" \"   Lateral weight shift to SLS 2x10, 3\"  \" \"   Exaggerated left swing phase 20x5\"                 Seated      Seated foot tap to step 2x15 RLE 4\" step   LAQ 2x10  Held weight; assistance to achieve TKE   Hip abd 2x20 black    Gastroc stretch w/ belt 3x20\"  Long sit   Active DF 15x3\"     Resisted PF 2x10 ea purple gastroc and soleus variations               Leg press 2x15 30# Double leg               Supine      Resisted quad set 2x10, 3\"     SLR 2x10  Minimal height, cues for dec quad lag   SAQ 2x15     Bridges 2x20     Bridge w/ march x  Attempted, too difficult for R glutes   DKTC crunch 20x           Standing      UE weightbearing on table x  Attempted but too much pain in R wrist d/t lack of ROM   Squats 2x15  Increase reps next session  - LLE slightly forward to bias RLE strengthening   Hamstring curl 2x15 ea     Hip ext/abd 15x ea RLE AROM   Heel raises 2x15  Minimal height   Mini squats w/ LLE elevated on step 2x15 2\" under LLE Cues to maximize weightshift to RLE during descent     Midline standing balance 2x30\"     Standing & throwing 2 min  Maintaining midline balance; knee brace donned   Step up 2x15 2\" Mod assist to prevent knee hyperextension   Heel tap to box 10x 2\" ADD NEXT         Gait train 100 ft x2  - Mod assist to prevent hyperextension/excessive buckling at R knee throughout stance phase  - Cues to fully weightshift onto RLE   Lateral stepping      Hurdles  1 in    Other: Very slow progression through exercises d/t overexertion resulting in some nausea, \"spots in eyes\", etc. From still recovering from flu - therapeutic rest provided for appropriate recovery to baseline after moments of exertion, billed with therex    Specific Instructions for next treatment: standing hip 3-way, progressive tolerance to more and more standing therex with gait/stair training as well       Treatment Charges: Mins Units   []

## 2020-06-10 ENCOUNTER — HOSPITAL ENCOUNTER (OUTPATIENT)
Dept: OCCUPATIONAL THERAPY | Age: 60
Setting detail: THERAPIES SERIES
Discharge: HOME | End: 2020-06-10
Payer: MEDICARE

## 2020-06-10 ENCOUNTER — HOSPITAL ENCOUNTER (OUTPATIENT)
Dept: PHYSICAL THERAPY | Age: 60
Setting detail: THERAPIES SERIES
Discharge: HOME | End: 2020-06-10
Payer: MEDICARE

## 2020-06-10 PROCEDURE — 97110 THERAPEUTIC EXERCISES: CPT

## 2020-06-10 NOTE — FLOWSHEET NOTE
extension, and R digits. Pt stretched this date with fair response and mod pain. Pt demo better success with trunk control while completing all exercises. Pt reports has gotten weak due to COVID 19 pandemic. Continues to demo minimal active movement of digits. Graded motor imagery is composed of three steps: laterality training, imagined hand movements, and mirror visual feedback (MVF) therapy. The ability to differentiate between right and left depends on an intact body schema, or how the brain interprets the bodys shape. When the body schema is believed to be accurate, patients are asked to imagine pain-free movement. Around 25 percent of the neurons in the brain are called mirror neurons, and are activated when watching someone else move or think of performing an action. The Horace Duval is that there is such a high degree of overlap in the brain regions involved in imagined movement and actual movement, that imagined movement can lead to pain-free actual movement. The final step is called mirror visual feedback therapy. MVF requires a mirror therapy device, like the [employer]. A mirror therapy device allows the patient to place their injured hand inside the device and out of view, while the unaffected hand is placed outside the device, in front of the mirror. The patient is then instructed to look in the mirror at the mirrored, unaffected hand and watch the mirrored hand move. This creates the illusion that the injured hand is moving without any pain. When done properly, graded motor imagery and MVF are thought to provide such strong positive sensory feedback, that the brain is convinced that not all movement needs to be painful, and is proven to decrease pain and disability.     Specific Instructions for next treatment: continue with current plan       Treatment Charges: Mins Units Time In/Out   []  Modalities        [x]  Ther Exercise 55 4    []  Manual Therapy      []  Ther Activities      []  Neuro

## 2020-06-12 ENCOUNTER — HOSPITAL ENCOUNTER (OUTPATIENT)
Dept: OCCUPATIONAL THERAPY | Age: 60
Setting detail: THERAPIES SERIES
Discharge: HOME | End: 2020-06-12
Payer: MEDICARE

## 2020-06-12 ENCOUNTER — HOSPITAL ENCOUNTER (OUTPATIENT)
Dept: PHYSICAL THERAPY | Age: 60
Setting detail: THERAPIES SERIES
Discharge: HOME | End: 2020-06-12
Payer: MEDICARE

## 2020-06-12 PROCEDURE — 97110 THERAPEUTIC EXERCISES: CPT

## 2020-06-12 NOTE — FLOWSHEET NOTE
[x] [address] [facility] floor       [address], [place]         Phone: [phone]       Fax: [phone] [] [address] [facility] at [address] [facility] , [address] [facility]  Phone: [phone]  Fax: [phone]     Occupational Therapy Daily Treatment Note    Date:  2020  Patient Name:  Sebastian Moctezuma    :  1960  MRN: [id number]  Patient: Sebastian Moctezuma                      : 1960                      MRN: [id number]                         Physician: Boone Plunkett MD  Insurance: [employer]  Medical Diagnosis: history of recent stroke Z86.73   Rehab Codes: stiffness in shoulder M25.61,, lack of coordination R27.8,, fine motor skills loss R29.818,, muscle weakness generalized M62.81,, lack of coordination unspecified R27.0, or lack of coordination other R27.8,  Onset Date: 2019                          Next Dr. Effie Newton: 3-    Visit# / total visits:  Cancels/No Shows: 0/0      Subjective:    Pain:  Yes Location: R hand   Pain Rating: (0-10 scale) 0/10  Pain altered Tx:  No  Action:   Comments:    Objective:  Modalities:   Exercises:     EXERCISE    REPS/     TIME  WEIGHT/    LEVEL COMMENTS   Move sponges from R to L 25  Completed this date   Smart Mirror 10 mins  Completed this date   Flexbar  Wrist pronation  Wrist supination  Wrist ulnar dev  Wrist radial dev  Shoulder abduction 10  Completed this date, partially [place]   Mirror with UE flexion exercises 15 mns  Not Completed   Shoulder AROM 15 reps each   Completed - Protraction/retraction, elevation/depression    [place] Arc 1 series   Completed    Shoulder flexion, chest press while supine on mat table  15 reps each  Not completed    Weight-bearing on table side 15 reps x2 sets  Not completed - held position for 5-10 seconds at a time   Rolling pin 20 reps   Not Completed     Other: Pt presents with decreased tone in R shoulder, R elbow, R wrist extension, and R digits. Pt stretched this date with fair response and mod pain. Pt demo better success with trunk control while completing all exercises. Pt reports has gotten weak due to COVID 19 pandemic. Continues to demo minimal active movement of digits. Pt tolerated shoulder stretch to 110 degrees flexion and minimal pain noted. Pt advised to use cane while supine on floor to promote B UE flexion exercises. Graded motor imagery is composed of three steps: laterality training, imagined hand movements, and mirror visual feedback (MVF) therapy. The ability to differentiate between right and left depends on an intact body schema, or how the brain interprets the bodys shape. When the body schema is believed to be accurate, patients are asked to imagine pain-free movement. Around 25 percent of the neurons in the brain are called mirror neurons, and are activated when watching someone else move or think of performing an action. The [place] is that there is such a high degree of overlap in the brain regions involved in imagined movement and actual movement, that imagined movement can lead to pain-free actual movement. The final step is called mirror visual feedback therapy. MVF requires a mirror therapy device, like the [employer]. A mirror therapy device allows the patient to place their injured hand inside the device and out of view, while the unaffected hand is placed outside the device, in front of the mirror. The patient is then instructed to look in the mirror at the mirrored, unaffected hand and watch the mirrored hand move. This creates the illusion that the injured hand is moving without any pain. When done properly, graded motor imagery and MVF are thought to provide such strong positive sensory feedback, that the brain is convinced that not all movement needs to be painful, and is proven to decrease pain and disability.     Specific Instructions for next treatment: continue with current plan       Treatment Charges: Mins Units Time In/Out   []  Modalities        [x]  Ther Exercise 55 4    []  Manual Therapy      []  Ther Activities      []  Neuro re-ed      []        []        Total Treatment time 55 min           Assessment: Progressing Towards Goals    Short Term Goals: (  9    Treatments)  1. Decrease Pain: to 1/10 with PROM of RUE  2. Increase AROM (degrees)  a. Pt will demo active movement with all digits   b. Demo R shoulder active flexion to 90 degrees for independence with reaching tasks  3. Increase strength (pounds)  a. R  to 11 pounds for independence with all functional tasks  b. R lateral pinch to 7 pounds for return to playing guitar  c. R 2 point pinch to 5 pounds for return to playing guitar  d. R 3 point pinch to 3 pounds for return to playing guitar   4. Increase function:UE Functional Index Score to 30 to promote increased function  5. Demo knowledge of fall prevention in 3 session  6. Patient to be independent with home exercise program as demonstrated by performance with correct form without cues.     Long Term Goals: (  18  Treatments)  1. Pt will demo full, active digit extension with all digits of R hand to participate in functional tasks  2. Pt will demo increased function AEB a score of 30 or more on the UEFI  3. Pt will improved fine motor coordination skills AEB being able to complete 9 hole peg test with R hand  4. Pt will demo decreased flexor tone to allow for participation in functional activities  5. Pt will report completing ADL tasks       Pt.  Education:  Yes and Reviewed Prior HEP/Ed  Method of Education: Verbal, Written and Demo  Comprehension of Education: Needs Review      Plan:  Continue with current plan       Time In/Out: 2869-3251       Electronically signed by:  MODESTO Chase/LR/L

## 2020-06-12 NOTE — FLOWSHEET NOTE
shift 2x20    \" \"   Forward step w/ weight shift 2x20  \" \"   Lateral weight shift to SLS 2x10, 3\"  \" \"   Exaggerated left swing phase 20x5\"                 Seated      Heel/toe raise 20x  Unable to achieve heel raise on RLE   Seated foot tap to step 2x15 RLE 4\" step   LAQ 2x10  Held weight; assistance to achieve TKE   Hip abd 2x20 black    Gastroc stretch w/ belt 3x20\"  Long sit   Active DF 15x3\"     Resisted PF 2x10 ea purple gastroc and soleus variations               Leg press - DL 2x10 40# Double leg   Leg press - SL 15x 30# Single leg         Supine      Resisted quad set 2x10, 3\"     SLR 2x10  Minimal height, cues for dec quad lag   SAQ 2x15     Bridges 2x20     Bridge w/ march x  Attempted, too difficult for R glutes   DKTC crunch 20x           Standing      UE weightbearing on table x  Attempted but too much pain in R wrist d/t lack of ROM   Sit to stands 10x  Increase reps next session  - LLE slightly forward to bias RLE strengthening   Hamstring curl 2x15 ea     Hip ext/abd 2x15 ea RLE AROM   Step lateral/back 2x15 ea LLE RLE CKC, mod assist to block at knee   Heel raises 2x15  Minimal height   Mini squats w/ LLE elevated on step 2x15 2\" under LLE Cues to maximize weightshift to RLE during descent     Midline standing balance 2x30\"     Standing & throwing 2 min  Maintaining midline balance; knee brace donned   Step up 2x15 4\" Mod assist to prevent knee hyperextension   Step down 2x15 2\"    Heel tap to tall box  8\" ADD NEXT, unilateral completion         Gait train 100 ft x2  - Mod assist to prevent hyperextension/excessive buckling at R knee throughout stance phase  - Cues to fully weightshift onto RLE   Lateral stepping      Hurdles  1 in    Other: Still with slow progression through exercises d/t rapid fatigue; rest break after each set of step up/downs    Specific Instructions for next treatment: standing hip 3-way, progressive tolerance to more and more standing therex with gait/stair training as well Treatment Charges: Mins Units   []  Modalities: manual e-stim     [x]  Ther Exercise 53 4   []  Manual Therapy     []  Ther Activities     []  Aquatics     []  Vasocompression     [x]  Other: Gait     []  Other: Neuro     Total Treatment time  53 4       Assessment: [x] Progressing toward goals. Progressed stepping with LLE to focus on RLE knee strength and control in standing - requires mod-max assist to block for hyperextension/buckling, but does slowly improve ability to weightshift and control knee with repetition. Still very fatiguing, frequent extended sitting breaks needed. [] No change. [x] Other: Orthotist requires MD script for AFO -will request this and hopefully will be able to have filled at pt's next visit to neurologist next Tuesday. STG: (to be met in 10 treatments) - Assessed 2/20/20  1. ? ROM: Improve R shoulder PROM to at least 150deg flex, 120 abd to improve mobility for reaching overhead. - DISCONTINUE, UE being treated by OT at this time  2. ? Strength:   a. At least 3/5 global strength in RUE to improve lifting grasping - DISCONTINUE, UE being treated by OT at this time  b. 5-/5 R hip ext/abd strength for improved squatting/stair climbing ability - NOT MET  3. ? Balance: Able to bend down to ground to  object with only 1UE assist with no balance impairment. - Making progress, still SBA  4. Function:   a. Improve gait speed to at least .6m/s to progress safety with home/community ambulation. - Making progress, still focusing more on gait quality vs speed d/t excessive knee hyperextension  b. Able to begin to integrate RUE into UE dressing with minor difficulty - Making progress, discontinue this goal d/t OT treating RUE at this time  5. Independent with Home Exercise Programs - MET  6. Demonstrate Knowledge of fall prevention - MET     LTG: (to be met in 24 treatments):   1. ROM:   a.  Improve R hip flexor AROM to at least 100deg in standing for improved ease of stair

## 2020-06-16 ENCOUNTER — OFFICE VISIT (OUTPATIENT)
Dept: NEUROLOGY | Age: 60
End: 2020-06-16
Payer: MEDICARE

## 2020-06-16 VITALS
BODY MASS INDEX: 24.48 KG/M2 | RESPIRATION RATE: 20 BRPM | SYSTOLIC BLOOD PRESSURE: 134 MMHG | WEIGHT: 171 LBS | HEART RATE: 93 BPM | TEMPERATURE: 98.4 F | DIASTOLIC BLOOD PRESSURE: 81 MMHG | HEIGHT: 70 IN

## 2020-06-16 PROCEDURE — G8420 CALC BMI NORM PARAMETERS: HCPCS | Performed by: PSYCHIATRY & NEUROLOGY

## 2020-06-16 PROCEDURE — 1036F TOBACCO NON-USER: CPT | Performed by: PSYCHIATRY & NEUROLOGY

## 2020-06-16 PROCEDURE — 99214 OFFICE O/P EST MOD 30 MIN: CPT | Performed by: PSYCHIATRY & NEUROLOGY

## 2020-06-16 PROCEDURE — G8427 DOCREV CUR MEDS BY ELIG CLIN: HCPCS | Performed by: PSYCHIATRY & NEUROLOGY

## 2020-06-16 PROCEDURE — 3017F COLORECTAL CA SCREEN DOC REV: CPT | Performed by: PSYCHIATRY & NEUROLOGY

## 2020-06-16 RX ORDER — ATORVASTATIN CALCIUM 10 MG/1
20 TABLET, FILM COATED ORAL DAILY
Qty: 30 TABLET | Refills: 5 | Status: SHIPPED | OUTPATIENT
Start: 2020-06-16 | End: 2020-07-29 | Stop reason: SDUPTHER

## 2020-06-16 RX ORDER — FLUOXETINE HYDROCHLORIDE 20 MG/1
20 CAPSULE ORAL DAILY
Qty: 30 CAPSULE | Refills: 5 | Status: ON HOLD
Start: 2020-06-16 | End: 2020-09-10 | Stop reason: HOSPADM

## 2020-06-16 NOTE — PROGRESS NOTES
FLEXTOUCH 100 UNIT/ML injection pen Inject 16 Units into the skin nightly 5 pen 3    Insulin Pen Needle (PEN NEEDLES) 31G X 6 MM MISC Inject 1 each into the skin daily 100 each 3    Lancets MISC 1 each by Does not apply route 2 times daily 200 each 3    lisinopril (PRINIVIL;ZESTRIL) 10 MG tablet TAKE ONE TABLET BY MOUTH DAILY 90 tablet 1    amLODIPine (NORVASC) 10 MG tablet TAKE ONE TABLET BY MOUTH DAILY 90 tablet 1    FLUoxetine (PROZAC) 40 MG capsule TAKE ONE CAPSULE BY MOUTH DAILY 90 capsule 1    hydrALAZINE (APRESOLINE) 25 MG tablet TAKE ONE TABLET BY MOUTH DAILY 90 tablet 1    Elastic Bandages & Supports (KNEE BRACE) [facility] Knee Cage Brace. 1 each 0    clopidogrel (PLAVIX) 75 MG tablet TAKE ONE TABLET BY MOUTH DAILY 90 tablet 1    potassium chloride (KLOR-CON M) 20 MEQ TBCR extended release tablet Take 1 tablet by mouth once for 1 dose 90 tablet 2     No current facility-administered medications for this visit.         LABS & TESTS:      Lab Results   Component Value Date    WBC 17.7 (H) 03/09/2020    HGB 13.4 03/09/2020    HCT 40.1 (L) 03/09/2020    MCV 92.2 03/09/2020     03/09/2020       REVIEW OF SYSTEMS:      CONSTITUTIONAL Weight change: present, Appetite change: absent, Fatigue: absent    HEENT Ears: normal, Visual disturbance: absent    RESPIRATORY Shortness of breath: absent, Cough: absent    CARDIOVASCULAR Chest pain: absent, Leg swelling :absent    GI Constipation: absent, Diarrhea: absent, Swallowing change: absent     Urinary frequency: absent, Urinary urgency: absent, Urinary incontinence: absent    MUSCULOSKELETAL Neck pain: absent, Back pain: absent, Stiffness: absent, Muscle pain: absent, Joint pain: absent Restless legs: absent    DERMATOLOGIC Hair loss: absent, Skin changes: absent    NEUROLOGIC Memory loss: absent, Confusion: absent, Seizures: absent Trouble walking or imbalance: present, Dizziness: absent, Weakness: absent, Numbness: absent Tremor: absent, Spasm: decrease lipitor to 10mg daily from current 20mg daily  - decrease prozac to 20mg daily, may take off in 6 months  - continue ASA 81mg daily  - fall precaution       RTC in 6 months      Gurjit Penny MD, MS

## 2020-06-17 ENCOUNTER — HOSPITAL ENCOUNTER (OUTPATIENT)
Dept: PHYSICAL THERAPY | Age: 60
Setting detail: THERAPIES SERIES
Discharge: HOME | End: 2020-06-17
Payer: MEDICARE

## 2020-06-17 ENCOUNTER — HOSPITAL ENCOUNTER (OUTPATIENT)
Dept: OCCUPATIONAL THERAPY | Age: 60
Setting detail: THERAPIES SERIES
Discharge: HOME | End: 2020-06-17
Payer: MEDICARE

## 2020-06-17 PROCEDURE — 97110 THERAPEUTIC EXERCISES: CPT

## 2020-06-19 ENCOUNTER — HOSPITAL ENCOUNTER (OUTPATIENT)
Dept: PHYSICAL THERAPY | Age: 60
Setting detail: THERAPIES SERIES
Discharge: HOME | End: 2020-06-19
Payer: MEDICARE

## 2020-06-19 PROCEDURE — 97110 THERAPEUTIC EXERCISES: CPT

## 2020-06-19 NOTE — FLOWSHEET NOTE
shift 2x20    \" \"   Forward step w/ weight shift 2x20  \" \"   Lateral weight shift to SLS 2x10, 3\"  \" \"   Exaggerated left swing phase 20x5\"                 Seated      Heel/toe raise 20x  Unable to achieve heel raise on RLE   Seated foot tap to step 2x15 RLE 4\" step   LAQ 2x10  Held weight; assistance to achieve TKE   Hip abd 2x20 black    Gastroc stretch w/ belt 3x20\"  Long sit   Active DF 15x3\"     Resisted PF 2x10 ea purple gastroc and soleus variations               Leg press - DL 2x20 40# Double leg   Leg press - SL 15x 30# Single leg         Supine      Resisted quad set 2x10, 3\"     SLR 2x10  Minimal height, cues for dec quad lag   SAQ 2x15     Bridges 2x20     Bridge w/ march x  Attempted, too difficult for R glutes   DKTC crunch 20x           Standing      UE weightbearing on table x  Attempted but too much pain in R wrist d/t lack of ROM   Sit to stands 2x15  Increase reps next session  - LLE slightly forward to bias RLE strengthening   Mini squat w/ weight forward 10x 3#    Deadlift 2x15 3#    Hamstring curl 2x15 ea     Hip ext/abd 2x15 ea  Bilat; max blocking at knee on RLE with RLE CKC   Forward lunge 15x ea     Step lateral/back 2x15 ea LLE RLE CKC, mod assist to block at knee   Heel raises 2x15  Minimal height   Mini squats w/ LLE elevated on step 2x15 2\" under LLE Cues to maximize weightshift to RLE during descent     Midline standing balance 2x30\"     Standing & throwing 2 min  Maintaining midline balance; knee brace donned   Step up 2x15 4\" Mod assist to prevent knee hyperextension   Step down 2x15 2\"    Heel tap to tall box  8\" ADD NEXT, unilateral completion         Gait train 100 ft x2  - Cues for 3 point gait with cane; fair to poor carryover, will continue to address   Lateral stepping      Hurdles  1 in    Other: Still with slow progression through exercises d/t rapid fatigue; rest break after each set of standing therex    Specific Instructions for next treatment: standing hip 3-way,

## 2020-06-24 ENCOUNTER — HOSPITAL ENCOUNTER (OUTPATIENT)
Dept: PHYSICAL THERAPY | Age: 60
Setting detail: THERAPIES SERIES
Discharge: HOME | End: 2020-06-24
Payer: MEDICARE

## 2020-06-24 PROCEDURE — 97110 THERAPEUTIC EXERCISES: CPT

## 2020-06-24 NOTE — PROGRESS NOTES
[] [facility]) - [facility] &  Therapy  [address]  [phone]  F: [phone] [] [address] [facility]   [address]  P: [phone]  F: [phone] [] [place]  [address] [facility]  P: [phone]  F: [phone] [] [address]  [facility]   [address]   [place]: [phone]  F: [phone]      Physical Therapy Progress Note    Date: 2020      Patient: Brian Basurto  : 1960  MRN: [id number]    Physician: Dr. Pan Johnston: Omero Reyna (24 vs)   Medical Diagnosis: History of recent stroke  Rehab Codes: M25.611, M25.621, M25.641, R53.1, R29.3, R26.9, R27.9  Next 's appt. : 3/10/20  Visit# / total visits:                   Cancels/No Shows: 0     Date range of services: 1/15/20 to 20      Subjective:  Pain:  []? Yes  [x]? No   Location:  N/A Pain Rating: (0-10 scale) 0/10  Pain altered Tx:  [x]? No  []? Yes  Action:  Comments: Patient reports he used to have to go down stairs sideways, but now he's able to go down forward using RLE first, then left. Was just casted for AFO yesterday at orthotist appt - will obtain in 2-3 wks and get fitted.     Objective:  On :  TU.04 s  (>12s indicates fall risk)  10 MWT: 29.52s (.34 m/s gait speed) - 1.0m/s is safe community ambulator     On :  TU.14s  10 MWT: 26.5s (.38 m/s gait speed)     Reassessment on :  - RLE strength: 4/5 plantarflexors, 4+/5 knee extensors in available range (still lacking TKE when completing LAQ), 5-/5 hamstrings, 4/5 hip ext/abd  - Gait: hyperextension at R knee throughout stance phase, lack of push off, still very slow myles with 4-point gait pattern using SBQC in LUE, slight left trunk lean/excessive weightshift left throughout muscles  [id number]       Patient Status:     [] Continue per initial plan of care. [x] Additional visits necessary. [] Other:     Requested Frequency/Duration: 2 times per week for 20 treatments. Electronically signed by Lambert Stuart PT on 6/24/2020 at 4:18 PM      If you have any questions or concerns, please don't hesitate to call. Thank you for your referral.    Physician Signature:________________________________Date:__________________  By signing above or cosigning this note, I have reviewed this plan of care and certify a need for medically necessary rehabilitation services.      *PLEASE SIGN ABOVE AND FAX BACK ALL PAGES*

## 2020-06-26 ENCOUNTER — HOSPITAL ENCOUNTER (OUTPATIENT)
Dept: PHYSICAL THERAPY | Age: 60
Setting detail: THERAPIES SERIES
Discharge: HOME | End: 2020-06-26
Payer: MEDICARE

## 2020-06-26 PROCEDURE — 97110 THERAPEUTIC EXERCISES: CPT

## 2020-06-26 NOTE — FLOWSHEET NOTE
prevent knee hyperextension   Step down 2x15 2\"    Heel tap to tall box  8\" ADD NEXT, unilateral completion         Gait train 50 ft  - Cues for 3 point gait with cane; fair to poor carryover, will continue to address   Lateral stepping      Hurdles  1 in    Other: Still with slow progression through exercises d/t rapid fatigue; rest break after each set of standing therex    Specific Instructions for next treatment: standing hip 3-way, progressive tolerance to more and more standing therex with gait/stair training as well       Treatment Charges: Mins Units   []  Modalities: manual e-stim     [x]  Ther Exercise 52 4   []  Manual Therapy     []  Ther Activities     []  Aquatics     []  Vasocompression     [x]  Other: Gait 3 --   []  Other: Neuro     Total Treatment time  55 4       Assessment: [x] Progressing toward goals. Progressing in quad strength/control with leg press as evidenced by increasing sets/reps at higher weights - up to 50# bilateral leg press. [] No change. [x] Other: Still requires max assist for knee control whenever in single limb stance on RLE to prevent buckling, and min-mod assist for full weightshift onto RLE during standing tasks. Weakness still evident in hamstrings as limited ability to complete active knee flexion when stepping back from fwd lunge with RLE - will continue to benefit from skilled PT to address impaired RLE neuromuscular knee control and strength to improve gait and progress to prior level of function. STG: (to be met in 10 treatments) - Assessed 2/20/20  1. ? ROM: Improve R shoulder PROM to at least 150deg flex, 120 abd to improve mobility for reaching overhead. - DISCONTINUE, UE being treated by OT at this time  2. ? Strength:   a.  At least 3/5 global strength in RUE to improve lifting grasping - DISCONTINUE, UE being treated by OT at this time  b. 5-/5 R hip ext/abd strength for improved squatting/stair climbing ability - NOT MET  3. ? Balance: Able to bend down to ground to  object with only 1UE assist with no balance impairment. - Making progress, still SBA  4. Function:   a. Improve gait speed to at least .6m/s to progress safety with home/community ambulation. - Making progress, still focusing more on gait quality vs speed d/t excessive knee hyperextension  b. Able to begin to integrate RUE into UE dressing with minor difficulty - Making progress, discontinue this goal d/t OT treating RUE at this time  5. Independent with Home Exercise Programs - MET  6. Demonstrate Knowledge of fall prevention - MET     LTG: (to be met in 24 treatments): - Assessed on 6/24/20:  1. ROM:   a. Improve R hip flexor AROM to at least 100deg in standing for improved ease of stair climbing. - Making progress, ~80deg  2. Balance:  a. Pt will be able to complete 360 turn without AD with no balance impairment. - Making progress, no AD and goes slowly and hyperextends R knee; CGA required  b. Pt will be able to complete dynamic reaching/stooping tasks while standing in narrow MED - MET  3. Function:    a. Improve gait speed to at least .8m/s to improve safety with community ambulation. - NOT MET, .38m/s on 6/24  b. Able to climb 4 stairs with unilateral UE assist and minimal balance impairment. - NOT MET  4. Strength:   a. At least 5/5 R hip ext/abd strength for improved stability with prolonged walking and stair climbing. - Making progress, 5-/5 ea in sitting  b. At least 5-/5 R dorsiflexors and plantarflexors for improved gait mechanics - MET for DF, still weak PFs (4-/5)          Patient goals: \"use my right arm again like normal\" - Making progress with OT, now able to extend fingers occasionally    Pt. Education:  [x] Yes  [] No  [x] Reviewed Prior HEP/Ed  Method of Education: [x] Verbal  [x] Demo  [x] Written  Comprehension of Education:  [x] Verbalizes understanding. [x] Demonstrates understanding. [] Needs review.    [] Demonstrates/verbalizes HEP/Ed previously given.    Current HEP: seated laq's and hip abd (black), mini squats with LLE forward for RLE bias, gastroc stretch, active DF, resisted PF (lime), SAQs, bridges, standing ham curl    Plan: [x] Continue per plan of care.    [] Other:       Time In: 3:01 pm           Time Out: 4:03 pm    Electronically signed by:  Mckinley Hartman PT

## 2020-07-01 ENCOUNTER — OFFICE VISIT (OUTPATIENT)
Dept: INTERNAL MEDICINE CLINIC | Age: 60
End: 2020-07-01
Payer: MEDICARE

## 2020-07-01 ENCOUNTER — HOSPITAL ENCOUNTER (OUTPATIENT)
Dept: OCCUPATIONAL THERAPY | Age: 60
Setting detail: THERAPIES SERIES
Discharge: HOME | End: 2020-07-01
Payer: MEDICARE

## 2020-07-01 ENCOUNTER — HOSPITAL ENCOUNTER (OUTPATIENT)
Dept: PHYSICAL THERAPY | Age: 60
Setting detail: THERAPIES SERIES
Discharge: HOME | End: 2020-07-01
Payer: MEDICARE

## 2020-07-01 VITALS
RESPIRATION RATE: 20 BRPM | TEMPERATURE: 97.9 F | HEIGHT: 70 IN | HEART RATE: 77 BPM | WEIGHT: 173.2 LBS | DIASTOLIC BLOOD PRESSURE: 80 MMHG | BODY MASS INDEX: 24.79 KG/M2 | SYSTOLIC BLOOD PRESSURE: 122 MMHG | OXYGEN SATURATION: 99 %

## 2020-07-01 PROCEDURE — 2022F DILAT RTA XM EVC RTNOPTHY: CPT | Performed by: FAMILY MEDICINE

## 2020-07-01 PROCEDURE — 97110 THERAPEUTIC EXERCISES: CPT

## 2020-07-01 PROCEDURE — G8427 DOCREV CUR MEDS BY ELIG CLIN: HCPCS | Performed by: FAMILY MEDICINE

## 2020-07-01 PROCEDURE — 99214 OFFICE O/P EST MOD 30 MIN: CPT | Performed by: FAMILY MEDICINE

## 2020-07-01 PROCEDURE — 1036F TOBACCO NON-USER: CPT | Performed by: FAMILY MEDICINE

## 2020-07-01 PROCEDURE — 3017F COLORECTAL CA SCREEN DOC REV: CPT | Performed by: FAMILY MEDICINE

## 2020-07-01 PROCEDURE — 3044F HG A1C LEVEL LT 7.0%: CPT | Performed by: FAMILY MEDICINE

## 2020-07-01 PROCEDURE — G8420 CALC BMI NORM PARAMETERS: HCPCS | Performed by: FAMILY MEDICINE

## 2020-07-01 RX ORDER — ATORVASTATIN CALCIUM 20 MG/1
TABLET, FILM COATED ORAL
COMMUNITY
Start: 2020-06-30 | End: 2020-07-01 | Stop reason: ALTCHOICE

## 2020-07-01 ASSESSMENT — PATIENT HEALTH QUESTIONNAIRE - PHQ9
SUM OF ALL RESPONSES TO PHQ QUESTIONS 1-9: 0
1. LITTLE INTEREST OR PLEASURE IN DOING THINGS: 0
2. FEELING DOWN, DEPRESSED OR HOPELESS: 0
SUM OF ALL RESPONSES TO PHQ QUESTIONS 1-9: 0
SUM OF ALL RESPONSES TO PHQ9 QUESTIONS 1 & 2: 0

## 2020-07-01 ASSESSMENT — ENCOUNTER SYMPTOMS
RESPIRATORY NEGATIVE: 1
ALLERGIC/IMMUNOLOGIC NEGATIVE: 1
EYES NEGATIVE: 1
GASTROINTESTINAL NEGATIVE: 1

## 2020-07-01 NOTE — TELEPHONE ENCOUNTER
Last visit: 1/16/2020  Last Med refill: 6/16/2020  Does patient have enough medication for 72 hours: NA    Next Visit Date:  Future Appointments   Date Time Provider Bk Sindhu   7/1/2020  2:00 PM Deidre Bauerschmidt, OTR/L [facility]   7/1/2020  3:00 PM Sihra Binning, PT [facility]   7/8/2020  2:00 PM Deidre Bauerschmidt, OTR/L [facility]   7/8/2020  3:00 PM Shira Binning, PT [facility]   7/10/2020  2:00 PM Deidre Bauerschmidt, OTR/L [facility]   7/10/2020  3:00 PM Shira Binning, PT [facility]   7/15/2020  3:00 PM Shira Binning, PT [facility]   7/17/2020  3:00 PM Shira Binning, PT [facility]   7/22/2020  3:00 PM Shira Binning, PT [facility]   7/24/2020  3:00 PM Shira Binning, PT [facility]   11/2/2020  9:00 AM Rachel Bedoya MD [facility]   12/17/2020 10:20 AM Lucina Morris MD Neuro [facility] [address] Maintenance   Topic Date Due    Colon cancer screen colonoscopy  05/22/2010    Shingles Vaccine (1 of 2) 09/13/2020 (Originally 5/22/2010)    HIV screen  09/13/2020 (Originally 5/22/1975)    Hepatitis C screen  09/24/2020 (Originally 1960)    Pneumococcal 0-64 years Vaccine (1 of 1 - PPSV23) 01/14/2021 (Originally 5/22/1966)    Diabetic foot exam  01/16/2021 (Originally 5/22/1970)    DTaP/Tdap/Td vaccine (1 - Tdap) 01/16/2021 (Originally 5/22/1979)    Diabetic retinal exam  01/25/2021 (Originally 5/22/1970)    Flu vaccine (1) 09/01/2020    A1C test (Diabetic or Prediabetic)  03/09/2021    Lipid screen  03/09/2021    Potassium monitoring  03/09/2021    Creatinine monitoring  03/09/2021    Diabetic microalbuminuria test  03/10/2021    Hepatitis A vaccine  Aged Out    Hib vaccine  Aged Out    Meningococcal (ACWY) vaccine  Aged Out       Hemoglobin A1C (%)   Date Value   03/09/2020 6.8 (H)   09/13/2019 8.8 (H)             ( goal A1C is < 7)   Microalb/Crt. Ratio (mcg/mg creat)   Date Value   03/10/2020 123 (H)     LDL Cholesterol (mg/dL)   Date Value   03/09/2020 42       (goal LDL is <100)   AST (U/L)   Date Value   03/09/2020 25     ALT (U/L)   Date Value   03/09/2020 39     BUN (mg/dL)   Date Value   03/09/2020 18     BP Readings from Last 3 Encounters:   07/01/20 122/80   06/16/20 134/81   03/10/20 126/76          (goal 120/80)    All Future Testing planned in [facility]  Lab Frequency Next Occurrence   Cologuard (For External Results Only) Once 10/15/2019   OT eval and treat Once 12/12/2019   PT orthosis to lower extremity Once 07/31/2020   Cologuard (For External Results Only) Once 07/01/2020               Patient Active Problem List:     Diabetes mellitus, insulin dependent (IDDM), controlled (HCC)     Essential hypertension     Mixed hyperlipidemia     Right hemiplegia (HCC)     CVA (cerebral vascular accident) ([facility] [address]     Anxiety and depression     Influenza vaccination declined     Pneumococcal vaccination declined     Tetanus, diphtheria, and acellular pertussis (Tdap) vaccination declined

## 2020-07-01 NOTE — PROGRESS NOTES
Subjective:      Patient ID: Tiffany Dueñas is a 61 y.o. male. Diabetes   He presents for his follow-up diabetic visit. He has type 1 diabetes mellitus. His disease course has been improving. There are no hypoglycemic associated symptoms. There are no diabetic associated symptoms. There are no hypoglycemic complications. Symptoms are stable. Diabetic complications include a CVA. Risk factors for coronary artery disease include diabetes mellitus, dyslipidemia, male sex and sedentary lifestyle. Current diabetic treatment includes insulin injections and diet. He is compliant with treatment all of the time. His weight is stable. He is following a generally healthy diet. Meal planning includes ADA exchanges, avoidance of concentrated sweets, calorie counting and carbohydrate counting. He has had a previous visit with a dietitian. He participates in exercise three times a week. His home blood glucose trend is decreasing steadily. An ACE inhibitor/angiotensin II receptor blocker is being taken. Review of Systems   Constitutional: Negative. HENT: Negative. Eyes: Negative. Respiratory: Negative. Cardiovascular: Negative. Gastrointestinal: Negative. Endocrine: Negative. Musculoskeletal: Positive for arthralgias. Skin: Negative. Allergic/Immunologic: Negative. Neurological: Negative. Hematological: Negative. Psychiatric/Behavioral: Positive for dysphoric mood. Past family and social history unremarkable. Diagnosis Orders   1. Diabetes mellitus, insulin dependent (IDDM), controlled ([address]     2. Essential hypertension     3. Mixed hyperlipidemia     4. Right hemiplegia ([address]     5. Cerebrovascular accident (CVA) due to thrombosis of precerebral artery ([address]     6. Anxiety and depression     7. Influenza vaccination declined     8. Pneumococcal vaccination declined     9. Tetanus, diphtheria, and acellular pertussis (Tdap) vaccination declined     10.  Colon cancer screening  Cologuard (For External Results Only)         Objective:   Physical Exam  Vitals signs and nursing note reviewed. Constitutional:       Appearance: He is well-developed. HENT:      Head: Normocephalic and atraumatic. Right Ear: External ear normal.      Left Ear: External ear normal.      Nose: Nose normal.   Eyes:      Conjunctiva/sclera: Conjunctivae normal.      Pupils: Pupils are equal, round, and reactive to light. Neck:      Musculoskeletal: Normal range of motion and neck supple. Cardiovascular:      Rate and Rhythm: Normal rate and regular rhythm. Heart sounds: Normal heart sounds. Comments: Hypertension, hyperlipidemia  Pulmonary:      Effort: Pulmonary effort is normal.      Breath sounds: Normal breath sounds. Abdominal:      General: Bowel sounds are normal.      Palpations: Abdomen is soft. Musculoskeletal: Normal range of motion. Skin:     General: Skin is warm and dry. Neurological:      Mental Status: He is alert and oriented to person, place, and time. Deep Tendon Reflexes: Reflexes are normal and symmetric. Comments: CVA with residual left hemiplegia   Psychiatric:      Comments: Anxiety/depression on fluoxetine with positive response         Assessment:       Diagnosis Orders   1. Diabetes mellitus, insulin dependent (IDDM), controlled ([address]     2. Essential hypertension     3. Mixed hyperlipidemia     4. Right hemiplegia ([address]     5. Cerebrovascular accident (CVA) due to thrombosis of precerebral artery ([address]     6. Anxiety and depression     7. Influenza vaccination declined     8. Pneumococcal vaccination declined     9. Tetanus, diphtheria, and acellular pertussis (Tdap) vaccination declined     10. Colon cancer screening  Cologuard (For External Results Only)           Plan:      70-year-old right-hand-dominant  male is brought in by caregiver for routine follow-up. He is afebrile hemodynamically stable  History of CVA with residual left hemiplegia.   He ambulates with a walker. He was recently seen by his neurologist who decreased her statin and SSRI  Insulin-dependent diabetes mellitus. A1c improved from 8.8-6.8. He denies hypoglycemia. Accu-Chek log shows significant improvement without any episode of hypoglycemia. He is advised to continue current regimen, adhere to ADA 1800 diet, daily moderate exercise and lifestyle change  Hypertension well-controlled with random blood pressure equal less than 130/80. He is tolerating ACE inhibitor well. Consume less than 2 g of salt a day  Hyperlipidemia on statin that he is tolerating well  History of CVA with left hemiplegia. Risk factor stratification is advised  Once again, he declined influenza/pneumococcal vaccine and Tdap. He is advised to reconsider  Anxiety and depression on SSRI. Fluoxetine has been decreased from 40 mg to 20 mg p.o. daily by his neurologist  He denies tobacco, excessive alcohol or illicit drug use  Med list and available labs reviewed, discussed with patient, questions answered  He is advised to update his annual dilated eye exam  He is counseled on diabetic foot care  He is encouraged to call for any concern  This note is created with a voice recognition program and while intend to generate a document that accurately reflects the content of the visit, no guarantee can be provided that every mistake has been identified and corrected by editing.           Bella Lopez MD

## 2020-07-01 NOTE — FLOWSHEET NOTE
[x] [address] [facility] floor       [address], [place]         Phone: [phone]       Fax: [phone] [] [address] [facility] at [address] [facility] , [address]  Phone: [phone]  Fax: [phone]     Occupational Therapy Daily Treatment Note    Date:  2020  Patient Name:  Tiffany Dueñas    :  1960  MRN: [id number]  Patient: Tiffany Dueñas                      : 1960                      MRN: [id number]                         Physician: Brody Ramirez MD  Insurance: [place] [employer]  Medical Diagnosis: history of recent stroke Z86.73   Rehab Codes: stiffness in shoulder M25.61,, lack of coordination R27.8,, fine motor skills loss R29.818,, muscle weakness generalized M62.81,, lack of coordination unspecified R27.0, or lack of coordination other R27.8,  Onset Date: 2019                          Next Dr. Ailin Garcia: 3-    Visit# / total visits:  Cancels/No Shows: 0/0      Subjective:    Pain:  Yes Location: R hand   Pain Rating: (0-10 scale) 0/10  Pain altered Tx:  No  Action:   Comments:    Objective:  Modalities:   Exercises:     EXERCISE    REPS/     TIME  WEIGHT/    LEVEL COMMENTS   Move sponges from R to L 25  Completed    Smart Mirror 10 mins  Not Completed this date   Flexbar  Wrist pronation  Wrist supination  Wrist ulnar dev  Wrist radial dev  Shoulder abduction 10  Not Completed this date, partially [place]   Mirror with UE flexion exercises 15 mns  Not Completed   Shoulder AROM 15 reps each   Completed - Protraction/retraction, elevation/depression    [place] Arc 1 series   Completed    Shoulder flexion, chest press while supine on mat table  15 reps each  Not completed    Weight-bearing on table side 15 reps x2 sets  Not completed - held position for 5-10 seconds at a time   Rolling pin 20 reps   Not Completed   Dystrophile (forearm/elbow) 10  Completed    Table alphabet with washrag Completed      Other: Pt reports has gotten weak due to COVID 19 pandemic. Continues to demo minimal active movement of digits. Pt tolerated shoulder stretch to 140 degrees flexion and minimal pain noted. Moderate trunk compensation while completing table alphabet. Graded motor imagery is composed of three steps: laterality training, imagined hand movements, and mirror visual feedback (MVF) therapy. The ability to differentiate between right and left depends on an intact body schema, or how the brain interprets the bodys shape. When the body schema is believed to be accurate, patients are asked to imagine pain-free movement. Around 25 percent of the neurons in the brain are called mirror neurons, and are activated when watching someone else move or think of performing an action. The [place] is that there is such a high degree of overlap in the brain regions involved in imagined movement and actual movement, that imagined movement can lead to pain-free actual movement. The final step is called mirror visual feedback therapy. MVF requires a mirror therapy device, like the [employer]. A mirror therapy device allows the patient to place their injured hand inside the device and out of view, while the unaffected hand is placed outside the device, in front of the mirror. The patient is then instructed to look in the mirror at the mirrored, unaffected hand and watch the mirrored hand move. This creates the illusion that the injured hand is moving without any pain. When done properly, graded motor imagery and MVF are thought to provide such strong positive sensory feedback, that the brain is convinced that not all movement needs to be painful, and is proven to decrease pain and disability.     Specific Instructions for next treatment: continue with current plan       Treatment Charges: Mins Units Time In/Out   []  Modalities        [x]  Ther Exercise 55 4    []  Manual Therapy      []  Ther Activities      []  Neuro re-ed      []        []        Total Treatment time 55 min           Assessment: Progressing Towards Goals    Short Term Goals: (  9    Treatments)  1. Decrease Pain: to 1/10 with PROM of RUE  2. Increase AROM (degrees)  a. Pt will demo active movement with all digits   b. Demo R shoulder active flexion to 90 degrees for independence with reaching tasks  3. Increase strength (pounds)  a. R  to 11 pounds for independence with all functional tasks  b. R lateral pinch to 7 pounds for return to playing guitar  c. R 2 point pinch to 5 pounds for return to playing guitar  d. R 3 point pinch to 3 pounds for return to playing guitar   4. Increase function:UE Functional Index Score to 30 to promote increased function  5. Demo knowledge of fall prevention in 3 session  6. Patient to be independent with home exercise program as demonstrated by performance with correct form without cues.     Long Term Goals: (  18  Treatments)  1. Pt will demo full, active digit extension with all digits of R hand to participate in functional tasks  2. Pt will demo increased function AEB a score of 30 or more on the UEFI  3. Pt will improved fine motor coordination skills AEB being able to complete 9 hole peg test with R hand  4. Pt will demo decreased flexor tone to allow for participation in functional activities  5. Pt will report completing ADL tasks       Pt.  Education:  Yes and Reviewed Prior HEP/Ed  Method of Education: Verbal, Written and Demo  Comprehension of Education: Needs Review      Plan:  Continue with current plan       Time In/Out: 2484-8604       Electronically signed by:  MODESTO Chang/JANNET

## 2020-07-01 NOTE — FLOWSHEET NOTE
[x] [facility]) [facility] &  Therapy  [address]  [phone]  F: [phone] [] [address]  [facility]   [address]  P: [phone]  F: [phone] [] [place]  [address] [facility]  P: [phone]  F: [phone] [] [address]  [place]   [address]   [place]: [phone]  F: [phone]      Physical Therapy Daily Treatment Note    Date:  2020  Patient Name:  Katja Bernal    :  1960  MRN: [id number]  Physician: Dr. Maddie Quinteros: Veronica Mujica (24 vs)   Medical Diagnosis: History of recent stroke  Rehab Codes: M25.611, M25.621, M25.641, R53.1, R29.3, R26.9, R27.9  Next 's appt. : 3/10/20  Visit# / total visits:   Cancels/No Shows: 0    Subjective:     Pain:  [] Yes  [x] No Location:  N/A Pain Rating: (0-10 scale) 0/10  Pain altered Tx:  [x] No  [] Yes  Action:  Comments: Patient reports he used to have to go down stairs sideways, but now he's able to go down forward using RLE first, then left.      Objective:   On :  TU.04 s  10 MWT: 29.52s     On :  TU.14s  10 MWT: 26.5S    Reassessment on :  - RLE strength: 4/5 plantarflexors, 4+/5 knee extensors in available range (still lacking TKE when completing LAQ), 5-/5 hamstrings, 4/5 hip ext/abd  - Gait: hyperextension at R knee throughout stance phase, lack of push off, still very slow myles with 4-point gait pattern using SBQC in LUE, slight left trunk lean/excessive weightshift left throughout gait    Assessment on :  - RLE strength: 4/5 plantarflexors, 4+/5 KE in available range, 4+/5 hip ext/abd, 5-/5 hamstrings  - Gait: Still with intermittent hyperextension when first ambulating, but able to correct; moderate buckling of RLE in stance phase, progressing to 3-point gait pattern and more upright trunk        Modalities:   Precautions:  Exercises: Bolded completed on 7/1/2020:  Exercise Reps/ Time Weight/ Level Comments   Nu-step 6 min L8 - R hand/wrist secured to Nu-Step  - seat at 11         Pre-gait      Lateral weight shift 2x20  Mod TC/VCs to avoid hyperextension but maximize quad activation  - most time spent in this exercise   Anterior weight shift 2x20    \" \"   Forward step w/ weight shift 2x20  \" \"   Lateral weight shift to SLS 2x10, 3\"  \" \"   Exaggerated left swing phase 20x5\"                 Seated      Bilateral ER 3x20 lime RUE secured by chair arm   Anterior pelvic tilt 20x     Heel/toe raise 20x  Unable to achieve heel raise on RLE   Seated foot tap to step 2x15 RLE 4\" step   LAQ 2x10  Held weight; assistance to achieve TKE   Hip abd 2x20 black    Gastroc stretch w/ belt 3x20\"  Long sit   Active DF 15x3\"     Resisted PF 2x12 blueberry gastroc                Leg press - DL 2x10 50# Double leg   Leg press - SL 10x  30# Single leg         Supine      Resisted quad set 2x10, 3\"     SLR 2x10  Minimal height, cues for dec quad lag   SAQ 2x15     Bridges 2x20     Bridge w/ march x  Attempted, too difficult for R glutes   DKTC crunch 20x           Standing      UE weightbearing on table x  Attempted but too much pain in R wrist d/t lack of ROM   Sit to stands 2x15  Increase reps next session  - LLE slightly forward to bias RLE strengthening   Mini squat w/ weight forward 2x15 4#    Deadlift 2x15 4#    Hamstring curl 2x15 ea     Hip ext/abd 2x15 ea  Bilat; max blocking at knee on RLE with RLE CKC   Forward/lateral lunge 15x ea     Step lateral/back 2x15 ea LLE RLE CKC, mod assist to block at knee   Heel raises 2x15  Minimal height   Mini squats w/ LLE elevated on step 2x15 2\" under LLE Cues to maximize weightshift to RLE during descent     Midline standing balance 2x30\"     Standing & throwing 2 min  Maintaining midline balance; knee brace donned least 150deg flex, 120 abd to improve mobility for reaching overhead. - DISCONTINUE, UE being treated by OT at this time  2. ? Strength:   a. At least 3/5 global strength in RUE to improve lifting grasping - DISCONTINUE, UE being treated by OT at this time  b. 5-/5 R hip ext/abd strength for improved squatting/stair climbing ability - NOT MET  3. ? Balance: Able to bend down to ground to  object with only 1UE assist with no balance impairment. - Making progress, still SBA  4. Function:   a. Improve gait speed to at least .6m/s to progress safety with home/community ambulation. - Making progress, still focusing more on gait quality vs speed d/t excessive knee hyperextension  b. Able to begin to integrate RUE into UE dressing with minor difficulty - Making progress, discontinue this goal d/t OT treating RUE at this time  5. Independent with Home Exercise Programs - MET  6. Demonstrate Knowledge of fall prevention - MET     LTG: (to be met in 24 treatments): - Assessed on 6/24/20:  1. ROM:   a. Improve R hip flexor AROM to at least 100deg in standing for improved ease of stair climbing. - Making progress, ~80deg  2. Balance:  a. Pt will be able to complete 360 turn without AD with no balance impairment. - Making progress, no AD and goes slowly and hyperextends R knee; CGA required  b. Pt will be able to complete dynamic reaching/stooping tasks while standing in narrow MED - MET  3. Function:    a. Improve gait speed to at least .8m/s to improve safety with community ambulation. - NOT MET, .38m/s on 6/24  b. Able to climb 4 stairs with unilateral UE assist and minimal balance impairment. - NOT MET  4. Strength:   a. At least 5/5 R hip ext/abd strength for improved stability with prolonged walking and stair climbing. - Making progress, 5-/5 ea in sitting  b.  At least 5-/5 R dorsiflexors and plantarflexors for improved gait mechanics - MET for DF, still weak PFs (4-/5)          Patient goals: Joshua Minnie my right arm again like normal\" - Making progress with OT, now able to extend fingers occasionally    Pt. Education:  [x] Yes  [] No  [x] Reviewed Prior HEP/Ed  Method of Education: [x] Verbal  [x] Demo  [x] Written  Comprehension of Education:  [x] Verbalizes understanding. [x] Demonstrates understanding. [] Needs review. [] Demonstrates/verbalizes HEP/Ed previously given. Current HEP: seated laq's and hip abd (black), mini squats with LLE forward for RLE bias, gastroc stretch, active DF, resisted PF (lime), SAQs, bridges, standing ham curl    Plan: [x] Continue per plan of care.      [] Other:       Time In: 3:03 pm           Time Out: 4:04 pm    Electronically signed by:  Karyna Garcia PT

## 2020-07-02 RX ORDER — LANCETS 30 GAUGE
1 EACH MISCELLANEOUS 2 TIMES DAILY
Qty: 100 EACH | Refills: 3 | Status: SHIPPED | OUTPATIENT
Start: 2020-07-02 | End: 2020-11-02

## 2020-07-02 RX ORDER — INSULIN LISPRO 100 [IU]/ML
INJECTION, SOLUTION INTRAVENOUS; SUBCUTANEOUS
Qty: 3 PEN | Refills: 1 | Status: SHIPPED | OUTPATIENT
Start: 2020-07-02 | End: 2022-04-01 | Stop reason: SDUPTHER

## 2020-07-02 RX ORDER — FLUOXETINE HYDROCHLORIDE 40 MG/1
CAPSULE ORAL
Qty: 30 CAPSULE | Refills: 0 | Status: SHIPPED | OUTPATIENT
Start: 2020-07-02 | End: 2020-07-28

## 2020-07-08 ENCOUNTER — HOSPITAL ENCOUNTER (OUTPATIENT)
Dept: PHYSICAL THERAPY | Age: 60
Setting detail: THERAPIES SERIES
Discharge: HOME | End: 2020-07-08
Payer: MEDICARE

## 2020-07-08 ENCOUNTER — HOSPITAL ENCOUNTER (OUTPATIENT)
Dept: OCCUPATIONAL THERAPY | Age: 60
Setting detail: THERAPIES SERIES
Discharge: HOME | End: 2020-07-08
Payer: MEDICARE

## 2020-07-08 PROCEDURE — 97110 THERAPEUTIC EXERCISES: CPT

## 2020-07-08 NOTE — FLOWSHEET NOTE
[x] [address] [facility] floor       [address], [place]         Phone: [phone]       Fax: [phone] [] [address] [facility] at [address] [facility] , [address] [place] [address]  Phone: [phone]  Fax: [phone]     Occupational Therapy Daily Treatment Note    Date:  2020  Patient Name:  Ines Leroy    :  1960  MRN: [id number]  Patient: Ines Leroy                      : 1960                      MRN: [id number]                         Physician: Priscila Kim MD  Insurance: [place] [employer]  Medical Diagnosis: history of recent stroke Z86.73   Rehab Codes: stiffness in shoulder M25.61,, lack of coordination R27.8,, fine motor skills loss R29.818,, muscle weakness generalized M62.81,, lack of coordination unspecified R27.0, or lack of coordination other R27.8,  Onset Date: 2019                          Next Dr. Castellanos Grand: 3-    Visit# / total visits: 15/ 18 Cancels/No Shows: 0/0      Subjective:    Pain:  Yes Location: R hand   Pain Rating: (0-10 scale) 0/10  Pain altered Tx:  No  Action:   Comments:    Objective:  Modalities:   Exercises:     EXERCISE    REPS/     TIME  WEIGHT/    LEVEL COMMENTS   Move sponges from R to L 25  Not Completed    Smart Mirror 10 mins  Not Completed this date   Flexbar  Wrist pronation  Wrist supination  Wrist ulnar dev  Wrist radial dev  Shoulder abduction 10  Not Completed this date, partially [place]   Mirror with UE flexion exercises 15 mns  Not Completed   Shoulder AROM 15 reps each   Completed - Protraction/retraction, elevation/depression    [place] Arc 2 series   Completed    Shoulder flexion, chest press while supine on mat table  15 reps each  Not completed    Weight-bearing on table side 15 reps x2 sets  Not completed - held position for 5-10 seconds at a time   Rolling pin 20 reps   Not Completed   Dystrophile (forearm/elbow) 10  Not Completed    Table alphabet with washrag Manual Therapy      []  Ther Activities      []  Neuro re-ed      []        []        Total Treatment time 55 min           Assessment: Progressing Towards Goals    Short Term Goals: (  9    Treatments)  1. Decrease Pain: to 1/10 with PROM of RUE  2. Increase AROM (degrees)  a. Pt will demo active movement with all digits   b. Demo R shoulder active flexion to 90 degrees for independence with reaching tasks  3. Increase strength (pounds)  a. R  to 11 pounds for independence with all functional tasks  b. R lateral pinch to 7 pounds for return to playing guitar  c. R 2 point pinch to 5 pounds for return to playing guitar  d. R 3 point pinch to 3 pounds for return to playing guitar   4. Increase function:UE Functional Index Score to 30 to promote increased function  5. Demo knowledge of fall prevention in 3 session  6. Patient to be independent with home exercise program as demonstrated by performance with correct form without cues.     Long Term Goals: (  18  Treatments)  1. Pt will demo full, active digit extension with all digits of R hand to participate in functional tasks  2. Pt will demo increased function AEB a score of 30 or more on the UEFI  3. Pt will improved fine motor coordination skills AEB being able to complete 9 hole peg test with R hand  4. Pt will demo decreased flexor tone to allow for participation in functional activities  5. Pt will report completing ADL tasks       Pt.  Education:  Yes and Reviewed Prior HEP/Ed  Method of Education: Verbal, Written and Demo  Comprehension of Education: Needs Review      Plan:  Continue with current plan       Time In/Out: 9551-5914       Electronically signed by:  MODESTO Quach/JANNET

## 2020-07-08 NOTE — FLOWSHEET NOTE
[x] [facility]) [facility] &  Therapy  [address]  [phone]  F: [phone] [] [address]  [facility]   [address]  P: [phone]  F: [phone] [] [place]  [address] [facility]  P: [phone]  F: [phone] [] [address]  [facility]   [address]   [place]: [phone]  F: [phone]      Physical Therapy Daily Treatment Note    Date:  2020  Patient Name:  Kizzy Solorzano    :  1960  MRN: [id number]  Physician: Dr. Marielos Hastings: Aj Peterson (24 vs)   Medical Diagnosis: History of recent stroke  Rehab Codes: M25.611, M25.621, M25.641, R53.1, R29.3, R26.9, R27.9  Next 's appt. : 3/10/20  Visit# / total visits: 26/44  Cancels/No Shows: 0    Subjective:     Pain:  [] Yes  [x] No Location:  N/A Pain Rating: (0-10 scale) 0/10  Pain altered Tx:  [x] No  [] Yes  Action:  Comments: Patient reports nothing new/different this date as far as symptoms; has not obtained AFO yet. Is going to call.      Modalities:   Precautions:  Exercises: Bolded completed on 2020:  Exercise Reps/ Time Weight/ Level Comments   Nu-step 6 min L8 - R hand/wrist secured to Nu-Step  - seat at 11         Pre-gait      Lateral weight shift 2x20  Mod TC/VCs to avoid hyperextension but maximize quad activation  - most time spent in this exercise   Anterior weight shift 2x20    \" \"   Forward step w/ weight shift 2x20  \" \"   Lateral weight shift to SLS 2x10, 3\"  \" \"   Exaggerated left swing phase 20x5\"                 Seated      Bilateral ER 3x20 lime RUE secured by chair arm   Anterior pelvic tilt 20x     Heel/toe raise 20x  Unable to achieve heel raise on RLE   Seated foot tap to step 2x15 RLE 4\" step   LAQ 2x10  Held weight; assistance to achieve TKE   Hip abd 2x20 black    Gastroc stretch w/ belt 3x20\"  Long sit   Active DF 15x3\"     Resisted PF 2x12 blueberry gastroc                Leg press - DL 10x 60# Double leg   Leg press - SL 10x  30# Single leg         Supine      Resisted quad set 2x10, 3\"     SLR 2x10  Minimal height, cues for dec quad lag   SAQ 2x15     Bridges 2x20     Bridge w/ march x  Attempted, too difficult for R glutes   DKTC crunch 20x           Standing      Single limb strength day      Sit to stands 2x15  - LLE slightly forward to bias RLE   Hip ext/abd 2x15 ea RLE OKC    Hamstring curl 2x15  \" \"    Madonna Gouge onto RLE  2x15 \" \" TKE in stance is focus   Forward/lateral lunge      Forward/lateral step back      Step ups 2x15 4\"    Step downs 2x15 2\"    Heel tap to box            Functional Lift Day      Mini squat w/ weight forward 2x15 4#    Deadlift 2x15 4#    Heel raises 2x15  Minimal height                     Gait train 50 ft  - Cues for 3 point gait with cane; fair to poor carryover, will continue to address   Lateral stepping      Hurdles  1 in    Other:  Slower progressions d/t longer therapeutic rest breaks, ~2 min (trying to implement seated exercise in between as pt is able)    Specific Instructions for next treatment: standing hip 3-way, progressive tolerance to more and more standing therex with gait/stair training as well       Treatment Charges: Mins Units   []  Modalities: manual e-stim     [x]  Ther Exercise 50 3   []  Manual Therapy     []  Ther Activities     []  Aquatics     []  Vasocompression     [x]  Other: Gait     []  Other: Neuro     Total Treatment time  50 3       Assessment: [x] Progressing toward goals. Improving hamstring strength noted this date evident in more pure knee flexion only vs hip flexion for standing hamstring curl, and able to carry this through for nearly entire set.  Worked on more terminal knee ext strength in standing, pt does best with tactile cue of therapist's knee behind pt's knee to \"push your knee back into me\". Encouraged     [] No change. [x] Other: Unable to stand from typical chair height without unilat UE assist, even with feet evenly placed. Still with limited standing endurance to ~5 min max consecutively, and poor knee control in standing requiring therapist assist frequently with transfers to prevent knee hyperextension. Cues to increase RLE weightshifting in standing, as he still leans to left. .    STG: (to be met in 10 treatments) - Assessed 2/20/20  1. ? ROM: Improve R shoulder PROM to at least 150deg flex, 120 abd to improve mobility for reaching overhead. - DISCONTINUE, UE being treated by OT at this time  2. ? Strength:   a. At least 3/5 global strength in RUE to improve lifting grasping - DISCONTINUE, UE being treated by OT at this time  b. 5-/5 R hip ext/abd strength for improved squatting/stair climbing ability - NOT MET  3. ? Balance: Able to bend down to ground to  object with only 1UE assist with no balance impairment. - Making progress, still SBA  4. Function:   a. Improve gait speed to at least .6m/s to progress safety with home/community ambulation. - Making progress, still focusing more on gait quality vs speed d/t excessive knee hyperextension  b. Able to begin to integrate RUE into UE dressing with minor difficulty - Making progress, discontinue this goal d/t OT treating RUE at this time  5. Independent with Home Exercise Programs - MET  6. Demonstrate Knowledge of fall prevention - MET     LTG: (to be met in 24 treatments): - Assessed on 6/24/20:  1. ROM:   a. Improve R hip flexor AROM to at least 100deg in standing for improved ease of stair climbing. - Making progress, ~80deg  2. Balance:  a. Pt will be able to complete 360 turn without AD with no balance impairment. - Making progress, no AD and goes slowly and hyperextends R knee; CGA required  b. Pt will be able to complete dynamic reaching/stooping tasks while standing in narrow MED - MET  3. Function:    a.  Improve gait speed to at least .8m/s to improve safety with community ambulation. - NOT MET, .38m/s on 6/24  b. Able to climb 4 stairs with unilateral UE assist and minimal balance impairment. - NOT MET  4. Strength:   a. At least 5/5 R hip ext/abd strength for improved stability with prolonged walking and stair climbing. - Making progress, 5-/5 ea in sitting  b. At least 5-/5 R dorsiflexors and plantarflexors for improved gait mechanics - MET for DF, still weak PFs (4-/5)          Patient goals: \"use my right arm again like normal\" - Making progress with OT, now able to extend fingers occasionally    Pt. Education:  [x] Yes  [] No  [x] Reviewed Prior HEP/Ed  Method of Education: [x] Verbal  [x] Demo  [x] Written  Comprehension of Education:  [x] Verbalizes understanding. [x] Demonstrates understanding. [] Needs review. [] Demonstrates/verbalizes HEP/Ed previously given. Current HEP: seated laq's and hip abd (black), mini squats with LLE forward for RLE bias, gastroc stretch, active DF, resisted PF (lime), SAQs, bridges, standing ham curl    Plan: [x] Continue per plan of care.      [] Other:       Time In: 3:03 pm           Time Out: 4:00 pm    Electronically signed by:  China Chavez, PT

## 2020-07-10 ENCOUNTER — HOSPITAL ENCOUNTER (OUTPATIENT)
Dept: OCCUPATIONAL THERAPY | Age: 60
Setting detail: THERAPIES SERIES
Discharge: HOME | End: 2020-07-10
Payer: MEDICARE

## 2020-07-10 ENCOUNTER — HOSPITAL ENCOUNTER (OUTPATIENT)
Dept: PHYSICAL THERAPY | Age: 60
Setting detail: THERAPIES SERIES
Discharge: HOME | End: 2020-07-10
Payer: MEDICARE

## 2020-07-10 PROCEDURE — 97110 THERAPEUTIC EXERCISES: CPT

## 2020-07-10 NOTE — FLOWSHEET NOTE
[x] [facility]) [facility] &  Therapy  [address]  [phone]  F: [phone] [] [address]  [facility]   [address]  P: [phone]  F: [phone] [] [address]  [address] [facility]  P: [phone]  F: [phone] [] [address]  [facility]   [address]   [place]: [phone]  F: [phone]      Physical Therapy Daily Treatment Note    Date:  7/10/2020  Patient Name:  Marquise Atkins    :  1960  MRN: [id number]  Physician: Dr. Chela Hilliard: Latasha Huerta (24 vs)   Medical Diagnosis: History of recent stroke  Rehab Codes: M25.611, M25.621, M25.641, R53.1, R29.3, R26.9, R27.9  Next 's appt. : 3/10/20  Visit# / total visits:   Cancels/No Shows: 0    Subjective:     Pain:  [] Yes  [x] No Location:  N/A Pain Rating: (0-10 scale) 0/10  Pain altered Tx:  [x] No  [] Yes  Action:  Comments: Patient has an appt scheduled for AFO fit and train on Monday - will bring Wednesday.     Modalities:   Precautions:  Exercises: Bolded completed on 7/10/2020:  Exercise Reps/ Time Weight/ Level Comments   Nu-step 6 min L8 Seat at 11         Pre-gait      Lateral weight shift 2x20  Mod TC/VCs to avoid hyperextension but maximize quad activation  - most time spent in this exercise   Anterior weight shift 2x20    \" \"   Forward step w/ weight shift 2x20  \" \"   Lateral weight shift to SLS 2x10, 3\"  \" \"   Exaggerated left swing phase 20x5\"                 Seated      Bilateral ER 3x20 lime RUE secured by chair arm   Anterior pelvic tilt 20x     Heel/toe raise 20x  Unable to achieve heel raise on RLE   Seated foot tap to step 2x15 RLE 4\" step   LAQ 2x10  Held weight; assistance to achieve TKE   Hip abd 2x20 black    Gastroc stretch w/ belt 3x20\"  Long sit   Active DF

## 2020-07-10 NOTE — FLOWSHEET NOTE
[x] [address] [facility] floor       [address], [place]         Phone: [phone]       Fax: [phone] [] [address] [facility] at [address] [facility] , [address] [place] [address]  Phone: [phone]  Fax: [phone]     Occupational Therapy Daily Treatment Note    Date:  7/10/2020  Patient Name:  Migel Mitchell    :  1960  MRN: [id number]  Patient: Migel Mitchell                      : 1960                      MRN: [id number]                         Physician: Horace Ramsey MD  Insurance: [employer]  Medical Diagnosis: history of recent stroke Z86.73   Rehab Codes: stiffness in shoulder M25.61,, lack of coordination R27.8,, fine motor skills loss R29.818,, muscle weakness generalized M62.81,, lack of coordination unspecified R27.0, or lack of coordination other R27.8,  Onset Date: 2019                          Next Dr. Aydee Crawley: 3-    Visit# / total visits:  Cancels/No Shows: 0/0      Subjective:    Pain:  Yes Location: R hand   Pain Rating: (0-10 scale) 0/10  Pain altered Tx:  No  Action:   Comments:    Objective:  Modalities:   Exercises:     EXERCISE    REPS/     TIME  WEIGHT/    LEVEL COMMENTS   Move sponges from R to L 25  Not Completed    Smart Mirror 10 mins  Not Completed this date   Flexbar  Wrist pronation  Wrist supination  Wrist ulnar dev  Wrist radial dev  Shoulder abduction 10  Not Completed this date, partially [place]   Mirror with UE flexion exercises 15 mns  Not Completed   Shoulder AROM 15 reps each   Completed - Protraction/retraction, elevation/depression    [place] Arc 2 series   Completed    Shoulder flexion, chest press while supine on mat table  15 reps each  Not completed    Weight-bearing on table side 15 reps x2 sets  Not completed - held position for 5-10 seconds at a time   Rolling pin 20 reps   Not Completed   Dystrophile (forearm/elbow) 10  Not Completed    Table alphabet with washrag Charges: Mins Units Time In/Out   []  Modalities        [x]  Ther Exercise 55 4    []  Manual Therapy      []  Ther Activities      []  Neuro re-ed      []        []        Total Treatment time 55 min           Assessment: Progressing Towards Goals    Short Term Goals: (  9    Treatments)  1. Decrease Pain: to 1/10 with PROM of RUE  2. Increase AROM (degrees)  a. Pt will demo active movement with all digits   b. Demo R shoulder active flexion to 90 degrees for independence with reaching tasks  3. Increase strength (pounds)  a. R  to 11 pounds for independence with all functional tasks  b. R lateral pinch to 7 pounds for return to playing guitar  c. R 2 point pinch to 5 pounds for return to playing guitar  d. R 3 point pinch to 3 pounds for return to playing guitar   4. Increase function:UE Functional Index Score to 30 to promote increased function  5. Demo knowledge of fall prevention in 3 session  6. Patient to be independent with home exercise program as demonstrated by performance with correct form without cues.     Long Term Goals: (  18  Treatments)  1. Pt will demo full, active digit extension with all digits of R hand to participate in functional tasks  2. Pt will demo increased function AEB a score of 30 or more on the UEFI  3. Pt will improved fine motor coordination skills AEB being able to complete 9 hole peg test with R hand  4. Pt will demo decreased flexor tone to allow for participation in functional activities  5. Pt will report completing ADL tasks       Pt.  Education:  Yes and Reviewed Prior HEP/Ed  Method of Education: Verbal, Written and Demo  Comprehension of Education: Needs Review      Plan:  Continue with current plan       Time In/Out: 8428-5287       Electronically signed by:  Chucho Felder OTR/JANNET

## 2020-07-13 ENCOUNTER — APPOINTMENT (OUTPATIENT)
Dept: OCCUPATIONAL THERAPY | Age: 60
End: 2020-07-13
Payer: MEDICARE

## 2020-07-15 ENCOUNTER — HOSPITAL ENCOUNTER (OUTPATIENT)
Dept: OCCUPATIONAL THERAPY | Age: 60
Setting detail: THERAPIES SERIES
Discharge: HOME | End: 2020-07-15
Payer: MEDICARE

## 2020-07-15 ENCOUNTER — HOSPITAL ENCOUNTER (OUTPATIENT)
Dept: PHYSICAL THERAPY | Age: 60
Setting detail: THERAPIES SERIES
Discharge: HOME | End: 2020-07-15
Payer: MEDICARE

## 2020-07-15 PROCEDURE — 97110 THERAPEUTIC EXERCISES: CPT

## 2020-07-15 PROCEDURE — 97116 GAIT TRAINING THERAPY: CPT

## 2020-07-15 NOTE — FLOWSHEET NOTE
[x] [address] [facility] floor       [address] [facility]         Phone: [phone]       Fax: [phone] [] [address] [facility] at [address] [facility] , [address]  Phone: [phone]  Fax: [phone]     Occupational Therapy Daily Treatment Note    Date:  7/15/2020  Patient Name:  Micheline Morris    :  1960  MRN: [id number]  Patient: Micheline Morris                      : 1960                      MRN: [id number]                         Physician: Michelle Regalado MD  Insurance: [employer]  Medical Diagnosis: history of recent stroke Z86.73   Rehab Codes: stiffness in shoulder M25.61,, lack of coordination R27.8,, fine motor skills loss R29.818,, muscle weakness generalized M62.81,, lack of coordination unspecified R27.0, or lack of coordination other R27.8,  Onset Date: 2019                          Next Dr. Dmitri Archer: 3-    Visit# / total visits: /18 Cancels/No Shows: 0/0      Subjective:    Pain:  Yes Location: R hand   Pain Rating: (0-10 scale) 0/10  Pain altered Tx:  No  Action:   Comments:    Objective:  Modalities:   Exercises:     EXERCISE    REPS/     TIME  WEIGHT/    LEVEL COMMENTS   Move sponges from R to L 25  Not Completed    Smart Mirror 10 mins  Not Completed this date   Flexbar  Wrist pronation  Wrist supination  Wrist ulnar dev  Wrist radial dev  Shoulder abduction 10  compelted   Mirror with UE flexion exercises 15 mns  Not Completed   Shoulder AROM 15 reps each   Completed - Protraction/retraction, elevation/depression    [place] Arc 2 series   Completed    Shoulder flexion, chest press while supine on mat table  15 reps each  Not completed    Weight-bearing on table side 15 reps x2 sets   completed - held position for 5-10 seconds at a time   Rolling pin 20 reps   Not Completed   Dystrophile (forearm/elbow) 10  Completed    Table alphabet with washrag   Not Completed    [place]one stacking 10 Not compelted   Wash table with foam 2 mins  completed   theraband  Flexion  Extension'  Internal rotation  External rotation  Elbow flexion  Elbow extension 10  Added this date with success     Other: Continues to demo minimal active movement of digits. Pt tolerated shoulder stretch to 140 degrees flexion and minimal pain noted. Moderate trunk compensation while completing rainbow arch and with new exercise of theraband (see above). Graded motor imagery is composed of three steps: laterality training, imagined hand movements, and mirror visual feedback (MVF) therapy. The ability to differentiate between right and left depends on an intact body schema, or how the brain interprets the bodys shape. When the body schema is believed to be accurate, patients are asked to imagine pain-free movement. Around 25 percent of the neurons in the brain are called mirror neurons, and are activated when watching someone else move or think of performing an action. The Cindy Jump is that there is such a high degree of overlap in the brain regions involved in imagined movement and actual movement, that imagined movement can lead to pain-free actual movement. The final step is called mirror visual feedback therapy. MVF requires a mirror therapy device, like the SMART-Mirro. A mirror therapy device allows the patient to place their injured hand inside the device and out of view, while the unaffected hand is placed outside the device, in front of the mirror. The patient is then instructed to look in the mirror at the mirrored, unaffected hand and watch the mirrored hand move. This creates the illusion that the injured hand is moving without any pain. When done properly, graded motor imagery and MVF are thought to provide such strong positive sensory feedback, that the brain is convinced that not all movement needs to be painful, and is proven to decrease pain and disability.     Specific Instructions for next treatment: continue with current plan       Treatment Charges: Mins Units Time In/Out   []  Modalities        [x]  Ther Exercise 55 4    []  Manual Therapy      []  Ther Activities      []  Neuro re-ed      []        []        Total Treatment time 55 min           Assessment: Progressing Towards Goals    Short Term Goals: (  9    Treatments)  1. Decrease Pain: to 1/10 with PROM of RUE  2. Increase AROM (degrees)  a. Pt will demo active movement with all digits   b. Demo R shoulder active flexion to 90 degrees for independence with reaching tasks  3. Increase strength (pounds)  a. R  to 11 pounds for independence with all functional tasks  b. R lateral pinch to 7 pounds for return to playing guitar  c. R 2 point pinch to 5 pounds for return to playing guitar  d. R 3 point pinch to 3 pounds for return to playing guitar   4. Increase function:UE Functional Index Score to 30 to promote increased function  5. Demo knowledge of fall prevention in 3 session  6. Patient to be independent with home exercise program as demonstrated by performance with correct form without cues.     Long Term Goals: (  18  Treatments)  1. Pt will demo full, active digit extension with all digits of R hand to participate in functional tasks  2. Pt will demo increased function AEB a score of 30 or more on the UEFI  3. Pt will improved fine motor coordination skills AEB being able to complete 9 hole peg test with R hand  4. Pt will demo decreased flexor tone to allow for participation in functional activities  5. Pt will report completing ADL tasks       Pt.  Education:  Yes and Reviewed Prior HEP/Ed  Method of Education: Verbal, Written and Demo  Comprehension of Education: Needs Review      Plan:  Continue with current plan       Time In/Out: 8546-9733       Electronically signed by:  Juve Greer OTR/JANNET

## 2020-07-15 NOTE — FLOWSHEET NOTE
[x] [facility]) [facility] &  Therapy  [address]  [phone]  F: [phone] [] [address]  [facility]   [address]  P: [phone]  F: [phone] [] [place]  [address] [facility]  P: [phone]  F: [phone] [] [address]  [facility]   [address]   [place]: [phone]  F: [phone]      Physical Therapy Daily Treatment Note    Date:  7/15/2020  Patient Name:  Rosario Bentley    :  1960  MRN: [id number]  Physician: Dr. Maharaj Favorite: Paulette Saucedo (24 vs)   Medical Diagnosis: History of recent stroke  Rehab Codes: M25.611, M25.621, M25.641, R53.1, R29.3, R26.9, R27.9  Next 's appt. : 3/10/20  Visit# / total visits: 28/44  Cancels/No Shows: 0    Subjective:     Pain:  [] Yes  [x] No Location:  N/A Pain Rating: (0-10 scale) 0/10  Pain altered Tx:  [x] No  [] Yes  Action:  Comments: Patient arrives with AFO donned - very pleased with progress thus far, not hyperextending knee back at all d/t brace, so is able to slightly increase speed. Stability in knee is significantly improved, still with some buckling.       Modalities:   Precautions: Needs assist at R hand to get grasp on/off grab bars during standing therex  Exercises: Bolded completed on 7/15/2020:  Exercise Reps/ Time Weight/ Level Comments   Nu-step 6 min L8 Seat at 11         Gait train 150 ft x1  400 ft x1  ~ 400 ft: From Nu-step to outer hallway, down to far cabinet and back to grab bars in therapy gym   Lateral stepping   Can add in upcoming sessions               Seated   Intermittent after standing sets to take advantage of seated rest   Bilateral ER 3x20 lime RUE secured by chair arm     Seated foot tap to step 2x15 RLE 4\" step   LAQ 2x10  Held weight; assistance to achieve met in 10 treatments) - Assessed 2/20/20  1. ? ROM: Improve R shoulder PROM to at least 150deg flex, 120 abd to improve mobility for reaching overhead. - DISCONTINUE, UE being treated by OT at this time  2. ? Strength:   a. At least 3/5 global strength in RUE to improve lifting grasping - DISCONTINUE, UE being treated by OT at this time  b. 5-/5 R hip ext/abd strength for improved squatting/stair climbing ability - NOT MET  3. ? Balance: Able to bend down to ground to  object with only 1UE assist with no balance impairment. - Making progress, still SBA  4. Function:   a. Improve gait speed to at least .6m/s to progress safety with home/community ambulation. - Making progress, still focusing more on gait quality vs speed d/t excessive knee hyperextension  b. Able to begin to integrate RUE into UE dressing with minor difficulty - Making progress, discontinue this goal d/t OT treating RUE at this time  5. Independent with Home Exercise Programs - MET  6. Demonstrate Knowledge of fall prevention - MET     LTG: (to be met in 24 treatments): - Assessed on 6/24/20:  1. ROM:   a. Improve R hip flexor AROM to at least 100deg in standing for improved ease of stair climbing. - Making progress, ~80deg  2. Balance:  a. Pt will be able to complete 360 turn without AD with no balance impairment. - Making progress, no AD and goes slowly and hyperextends R knee; CGA required  b. Pt will be able to complete dynamic reaching/stooping tasks while standing in narrow MED - MET  3. Function:    a. Improve gait speed to at least .8m/s to improve safety with community ambulation. - NOT MET, .38m/s on 6/24  b. Able to climb 4 stairs with unilateral UE assist and minimal balance impairment. - NOT MET  4. Strength:   a. At least 5/5 R hip ext/abd strength for improved stability with prolonged walking and stair climbing. - Making progress, 5-/5 ea in sitting  b.  At least 5-/5 R dorsiflexors and plantarflexors for improved gait mechanics - MET for DF, still weak PFs (4-/5)          Patient goals: \"use my right arm again like normal\" - Making progress with OT, now able to extend fingers occasionally    Pt. Education:  [x] Yes  [] No  [x] Reviewed Prior HEP/Ed  Method of Education: [x] Verbal  [x] Demo  [x] Written  Comprehension of Education:  [x] Verbalizes understanding. [x] Demonstrates understanding. [] Needs review. [] Demonstrates/verbalizes HEP/Ed previously given. Current HEP: seated laq's and hip abd (black), mini squats with LLE forward for RLE bias, gastroc stretch, active DF, resisted PF (lime), SAQs, bridges, standing ham curl    Plan: [x] Continue per plan of care.      [] Other:       Time In: 3:00 pm           Time Out: 4:02 pm    Electronically signed by:  Jennifer Gaucher, PT

## 2020-07-17 ENCOUNTER — HOSPITAL ENCOUNTER (OUTPATIENT)
Dept: OCCUPATIONAL THERAPY | Age: 60
Setting detail: THERAPIES SERIES
Discharge: HOME | End: 2020-07-17
Payer: MEDICARE

## 2020-07-17 ENCOUNTER — HOSPITAL ENCOUNTER (OUTPATIENT)
Dept: PHYSICAL THERAPY | Age: 60
Setting detail: THERAPIES SERIES
Discharge: HOME | End: 2020-07-17
Payer: MEDICARE

## 2020-07-17 PROCEDURE — 97110 THERAPEUTIC EXERCISES: CPT

## 2020-07-17 PROCEDURE — 97140 MANUAL THERAPY 1/> REGIONS: CPT

## 2020-07-17 NOTE — FLOWSHEET NOTE
[x] [address] [facility] floor       [address], [place]         Phone: [phone]       Fax: [phone] [] [address] [facility] at [address] [facility] , [address] [place] [address]  Phone: [phone]  Fax: [phone]     Occupational Therapy Daily Treatment Note    Date:  2020  Patient Name:  Rosmery Perez    :  1960  MRN: [id number]  Patient: Rosmery Perez                      : 1960                      MRN: [id number]                         Physician: Ron Randall MD  Insurance: [place] [employer]  Medical Diagnosis: history of recent stroke Z86.73   Rehab Codes: stiffness in shoulder M25.61,, lack of coordination R27.8,, fine motor skills loss R29.818,, muscle weakness generalized M62.81,, lack of coordination unspecified R27.0, or lack of coordination other R27.8,  Onset Date: 2019                          Next Dr. Kasi Saucedo: 3-    Visit# / total visits:  Cancels/No Shows: 0/0      Subjective:    Pain:  Yes Location: R hand   Pain Rating: (0-10 scale) 0/10  Pain altered Tx:  No  Action:   Comments:    Objective:  Modalities:   Exercises:     EXERCISE    REPS/     TIME  WEIGHT/    LEVEL COMMENTS   Move sponges from R to L 25  Not Completed    Smart Mirror 10 mins  Not Completed this date   Flexbar  Wrist pronation  Wrist supination  Wrist ulnar dev  Wrist radial dev  Shoulder abduction 10  Not Completed this date, partially [place]   Mirror with UE flexion exercises 15 mns  Not Completed   Shoulder AROM 15 reps each   Completed - Protraction/retraction, elevation/depression    [place] Arc 2 series   Completed    Shoulder flexion, chest press while supine on mat table  15 reps each  Not completed    Weight-bearing on table side 15 reps x2 sets  Not completed - held position for 5-10 seconds at a time   Rolling pin 20 reps   Not Completed   Dystrophile (forearm/elbow) 10  Not Completed    Table alphabet with washrag Not Completed    Cone stacking 10  Not compelted   Wash table with foam 2 mins  completed     Other: Pt reports has gotten weak due to COVID 19 pandemic. Continues to demo minimal active movement of digits. Pt tolerated shoulder stretch to 140 degrees flexion and minimal pain noted. Moderate trunk compensation while completing rainbow arch and washing table with foam for shoulder, elbow and wrist ROM. Pt has recently demonstrated significant gains in R UE movement and could continue to benefit from additional OP OT visits. If in agreement for 12 additional visits please cosign this note. Specific Instructions for next treatment: continue with current plan       Treatment Charges: Mins Units Time In/Out   []  Modalities        [x]  Ther Exercise 55 4    []  Manual Therapy      []  Ther Activities      []  Neuro re-ed      []        []        Total Treatment time 55 min           Assessment: Progressing Towards Goals    Short Term Goals: (  9    Treatments)  1. Decrease Pain: to 1/10 with PROM of RUE  2. Increase AROM (degrees)  a. Pt will demo active movement with all digits   b. Demo R shoulder active flexion to 90 degrees for independence with reaching tasks  3. Increase strength (pounds)  a. R  to 11 pounds for independence with all functional tasks  b. R lateral pinch to 7 pounds for return to playing guitar  c. R 2 point pinch to 5 pounds for return to playing guitar  d. R 3 point pinch to 3 pounds for return to playing guitar   4. Increase function:UE Functional Index Score to 30 to promote increased function  5. Demo knowledge of fall prevention in 3 session  6. Patient to be independent with home exercise program as demonstrated by performance with correct form without cues.     Long Term Goals: (  18  Treatments)  1. Pt will demo full, active digit extension with all digits of R hand to participate in functional tasks  2. Pt will demo increased function AEB a score of 30 or more on the UEFI  3.  Pt will improved fine motor coordination skills AEB being able to complete 9 hole peg test with R hand  4. Pt will demo decreased flexor tone to allow for participation in functional activities  5. Pt will report completing ADL tasks       Pt.  Education:  Yes and Reviewed Prior HEP/Ed  Method of Education: Verbal, Written and Demo  Comprehension of Education: Needs Review      Plan:  Continue with current plan       Time In/Out: [id number]       Electronically signed by:  MODESTO Salas/JANNET

## 2020-07-17 NOTE — FLOWSHEET NOTE
[x] [facility]) [facility] &  Therapy  [address]  [phone]  F: [phone] [] [address]  [facility]   [address]  P: [phone]  F: [phone] [] [place]  [address] [facility]  P: [phone]  F: [phone] [] [address]  [facility]   [address]   [place]: [phone]  F: [phone]      Physical Therapy Daily Treatment Note    Date:  2020  Patient Name:  Edna Contreras    :  1960  MRN: [id number]  Physician: Dr. [facility]: Min Escalante (24 vs)   Medical Diagnosis: History of recent stroke  Rehab Codes: M25.611, M25.621, M25.641, R53.1, R29.3, R26.9, R27.9  Next 's appt. : 3/10/20  Visit# / total visits:   Cancels/No Shows: 0    Subjective:     Pain:  [] Yes  [x] No Location:  N/A Pain Rating: (0-10 scale) 0/10  Pain altered Tx:  [x] No  [] Yes  Action:  Comments:  Patient without pain this date. States he is having some indigestion - drank some [employer] while starting PT today. Is happy with is brace- no redness noted. Does not wear shoes or brace in his house- just when he leaves his house. States he thinks he ate something wrong.     Modalities:   Precautions: Needs assist at R hand to get grasp on/off grab bars during standing therex  Exercises: Bolded completed on 2020:  Exercise Reps/ Time Weight/ Level Comments   Nu-step SCIFIT on 20 6 min L8 Seat at 11         Gait train 150 ft x1  400 ft x1  ~ 400 ft: From Nu-step to outer hallway, down to far cabinet and back to grab bars in therapy gym   Lateral stepping   Can add in upcoming sessions               Seated   Intermittent after standing sets to take advantage of seated rest   Bilateral ER 3x20 lime RUE secured by chair arm     Seated foot tap to step 2x15 RLE 4\" step   LAQ 2x10  Held weight; assistance to achieve TKE               Standing      Single limb strength day   WEDNESDAYS   Sit to stands 2x15  - LLE slightly forward to bias RLE   Hip ext/abd 15x ea Bilat    Hamstring curl 15x ea Bilat    Anterior weightshift onto RLE 2x15  TKE in stance is the focus   Step ups 2x15 4\"    Step downs 2x15 2\"    Toe tap to box                  Functional/Lift Day   FRIDAYS   Sit to stands 2x15     Mini squat w/ weight forward 1x15 4#    Stiff leg deadlift 2x15 4#    Sumo deadlift  10x  Holding dinadisc like pizza, tapping it all the way down to a cone sitting on top of 4\" step   Heel raises 2x15  Minimal height   Step ups 2x10  6\" RLE in stance         Leg press - DL 2x10 60#    Leg press - SL 2x10 30#    Other:  Blood pressure 149/79 midway through exercises on 7/17/20. Completed exercises that have reps bolded this date. Specific Instructions for next treatment: standing hip 3-way, progressive tolerance to more and more standing therex with gait/stair training as well       Treatment Charges: Mins Units   []  Modalities: manual e-stim     [x]  Ther Exercise 35 2   []  Manual Therapy     []  Ther Activities     []  Aquatics     []  Vasocompression     []  Other: Gait     []  Other: Neuro     Total Treatment time 35        Assessment: [] Progressing toward goals. [x] No change. 7/17/20- Patient with stomach issues this date. Patient felt it was indigestion. Multiple rest breaks where patient was rubbing his stomach/upper stomach area or his forehead. Patient completed exercises as documented above. Blood pressure taken midway through exercises. During gait, patient has occasional instances that his right leg extends backwards into hyperextension, but stated it is less frequent now than previously (without the brace).      [x] Other: Continue to focus on quad strengthening in standing, working on functional lifting next session, but add in anterior weightshift into RLE with terminal knee extension focus so as to improve R knee buckling in mid/terminal stance, slowing down gait. STG: (to be met in 10 treatments) - Assessed 2/20/20  1. ? ROM: Improve R shoulder PROM to at least 150deg flex, 120 abd to improve mobility for reaching overhead. - DISCONTINUE, UE being treated by OT at this time  2. ? Strength:   a. At least 3/5 global strength in RUE to improve lifting grasping - DISCONTINUE, UE being treated by OT at this time  b. 5-/5 R hip ext/abd strength for improved squatting/stair climbing ability - NOT MET  3. ? Balance: Able to bend down to ground to  object with only 1UE assist with no balance impairment. - Making progress, still SBA  4. Function:   a. Improve gait speed to at least .6m/s to progress safety with home/community ambulation. - Making progress, still focusing more on gait quality vs speed d/t excessive knee hyperextension  b. Able to begin to integrate RUE into UE dressing with minor difficulty - Making progress, discontinue this goal d/t OT treating RUE at this time  5. Independent with Home Exercise Programs - MET  6. Demonstrate Knowledge of fall prevention - MET     LTG: (to be met in 24 treatments): - Assessed on 6/24/20:  1. ROM:   a. Improve R hip flexor AROM to at least 100deg in standing for improved ease of stair climbing. - Making progress, ~80deg  2. Balance:  a. Pt will be able to complete 360 turn without AD with no balance impairment. - Making progress, no AD and goes slowly and hyperextends R knee; CGA required  b. Pt will be able to complete dynamic reaching/stooping tasks while standing in narrow MED - MET  3. Function:    a. Improve gait speed to at least .8m/s to improve safety with community ambulation. - NOT MET, .38m/s on 6/24  b. Able to climb 4 stairs with unilateral UE assist and minimal balance impairment. - NOT MET  4. Strength:   a.  At least 5/5 R hip ext/abd strength for improved stability with prolonged

## 2020-07-20 ENCOUNTER — APPOINTMENT (OUTPATIENT)
Dept: OCCUPATIONAL THERAPY | Age: 60
End: 2020-07-20
Payer: MEDICARE

## 2020-07-22 ENCOUNTER — HOSPITAL ENCOUNTER (OUTPATIENT)
Dept: PHYSICAL THERAPY | Age: 60
Setting detail: THERAPIES SERIES
Discharge: HOME | End: 2020-07-22
Payer: MEDICARE

## 2020-07-22 ENCOUNTER — HOSPITAL ENCOUNTER (OUTPATIENT)
Dept: OCCUPATIONAL THERAPY | Age: 60
Setting detail: THERAPIES SERIES
Discharge: HOME | End: 2020-07-22
Payer: MEDICARE

## 2020-07-22 PROCEDURE — 97110 THERAPEUTIC EXERCISES: CPT

## 2020-07-22 NOTE — FLOWSHEET NOTE
[x] [address] [facility] floor       [address], [place]         Phone: [phone]       Fax: [phone] [] [address] [facility] at [address] [facility] , [address]  Phone: [phone]  Fax: [phone]     Occupational Therapy Daily Treatment Note    Date:  2020  Patient Name:  Tiffany Dueñas    :  1960  MRN: [id number]  Patient: Tiffany Dueñas                      : 1960                      MRN: [id number]                         Physician: Brody Ramirez MD  Insurance: [place] [employer]  Medical Diagnosis: history of recent stroke Z86.73   Rehab Codes: stiffness in shoulder M25.61,, lack of coordination R27.8,, fine motor skills loss R29.818,, muscle weakness generalized M62.81,, lack of coordination unspecified R27.0, or lack of coordination other R27.8,  Onset Date: 2019                          Next Dr. Ailin Garcia: 3-    Visit# / total visits:  Cancels/No Shows: 0/0      Subjective:    Pain:  Yes Location: R hand   Pain Rating: (0-10 scale) 0/10  Pain altered Tx:  No  Action:   Comments:    Objective:  Modalities:   Exercises:     EXERCISE    REPS/     TIME  WEIGHT/    LEVEL COMMENTS   Move sponges from R to L 25  Not Completed    Smart Mirror 10 mins  Not Completed this date   Flexbar  Wrist pronation  Wrist supination  Wrist ulnar dev  Wrist radial dev  Shoulder abduction 10  Not Completed this date, partially [place]   Mirror with UE flexion exercises 15 mns  Not Completed   Shoulder AROM 15 reps each   Not Completed - Protraction/retraction, elevation/depression    [place] Arc 2 series   Not Completed    Shoulder flexion, chest press while supine on mat table  15 reps each  Not completed    Weight-bearing on table side 15 reps x2 sets  completed   Rolling pin 20 reps   Not Completed   Dystrophile (forearm/elbow) 10 x2 with arm and 10x2 with wrist  Completed    Table alphabet with washrag   Not Completed    Cone stacking 10  Not compelted   Wash table with foam 2 mins  completed     Other: Pt reports has gotten weak due to COVID 19 pandemic. Continues to demo minimal active movement of digits. Pt tolerated shoulder stretch to 140 degrees flexion and minimal pain noted. Moderate trunk compensation while completing theraband exercises. Specific Instructions for next treatment: continue with current plan       Treatment Charges: Mins Units Time In/Out   []  Modalities        [x]  Ther Exercise 55 4 [id number]   []  Manual Therapy      []  Ther Activities      []  Neuro re-ed      []        []        Total Treatment time 55 min           Assessment: Progressing Towards Goals    Short Term Goals: (  9    Treatments)  1. Decrease Pain: to 1/10 with PROM of RUE  2. Increase AROM (degrees)  a. Pt will demo active movement with all digits   b. Demo R shoulder active flexion to 90 degrees for independence with reaching tasks  3. Increase strength (pounds)  a. R  to 11 pounds for independence with all functional tasks  b. R lateral pinch to 7 pounds for return to playing guitar  c. R 2 point pinch to 5 pounds for return to playing guitar  d. R 3 point pinch to 3 pounds for return to playing guitar   4. Increase function:UE Functional Index Score to 30 to promote increased function  5. Demo knowledge of fall prevention in 3 session  6. Patient to be independent with home exercise program as demonstrated by performance with correct form without cues.     Long Term Goals: (  18  Treatments)  1. Pt will demo full, active digit extension with all digits of R hand to participate in functional tasks  2. Pt will demo increased function AEB a score of 30 or more on the UEFI  3. Pt will improved fine motor coordination skills AEB being able to complete 9 hole peg test with R hand  4. Pt will demo decreased flexor tone to allow for participation in functional activities  5. Pt will report completing ADL tasks       Pt. Education:  Yes and Reviewed Prior HEP/Ed  Method of Education: Verbal, Written and Demo  Comprehension of Education: Needs Review      Plan:  Continue with current plan       Time In/Out: 5609-5921       Electronically signed by:  MODESTO Waite/JANNET

## 2020-07-22 NOTE — FLOWSHEET NOTE
at this time  2. ? Strength:   a. At least 3/5 global strength in RUE to improve lifting grasping - DISCONTINUE, UE being treated by OT at this time  b. 5-/5 R hip ext/abd strength for improved squatting/stair climbing ability - NOT MET  3. ? Balance: Able to bend down to ground to  object with only 1UE assist with no balance impairment. - Making progress, still SBA  4. Function:   a. Improve gait speed to at least .6m/s to progress safety with home/community ambulation. - Making progress, still focusing more on gait quality vs speed d/t excessive knee hyperextension  b. Able to begin to integrate RUE into UE dressing with minor difficulty - Making progress, discontinue this goal d/t OT treating RUE at this time  5. Independent with Home Exercise Programs - MET  6. Demonstrate Knowledge of fall prevention - MET     LTG: (to be met in 24 treatments): - Assessed on 6/24/20:  1. ROM:   a. Improve R hip flexor AROM to at least 100deg in standing for improved ease of stair climbing. - Making progress, ~80deg  2. Balance:  a. Pt will be able to complete 360 turn without AD with no balance impairment. - Making progress, no AD and goes slowly and hyperextends R knee; CGA required  b. Pt will be able to complete dynamic reaching/stooping tasks while standing in narrow MED - MET  3. Function:    a. Improve gait speed to at least .8m/s to improve safety with community ambulation. - NOT MET, .38m/s on 6/24  b. Able to climb 4 stairs with unilateral UE assist and minimal balance impairment. - NOT MET  4. Strength:   a. At least 5/5 R hip ext/abd strength for improved stability with prolonged walking and stair climbing. - Making progress, 5-/5 ea in sitting  b. At least 5-/5 R dorsiflexors and plantarflexors for improved gait mechanics - MET for DF, still weak PFs (4-/5)          Patient goals: \"use my right arm again like normal\" - Making progress with OT, now able to extend fingers occasionally    Pt.  Education:  [x] Yes [] No  [] Reviewed Prior HEP/Ed  Method of Education: [x] Verbal  [x] Demo  [] Written  Comprehension of Education:  [x] Verbalizes understanding. [x] Demonstrates understanding. [] Needs review. [] Demonstrates/verbalizes HEP/Ed previously given. Current HEP: seated laq's and hip abd (black), mini squats with LLE forward for RLE bias, gastroc stretch, active DF, resisted PF (lime), SAQs, bridges, standing ham curl    Plan: [x] Continue per plan of care.      [] Other:       Time In: 03:01 pm        Time Out: 03:52 pm    Electronically signed by:  Severino Tirado PTA

## 2020-07-24 ENCOUNTER — HOSPITAL ENCOUNTER (OUTPATIENT)
Dept: PHYSICAL THERAPY | Age: 60
Setting detail: THERAPIES SERIES
Discharge: HOME | End: 2020-07-24
Payer: MEDICARE

## 2020-07-24 ENCOUNTER — HOSPITAL ENCOUNTER (OUTPATIENT)
Dept: OCCUPATIONAL THERAPY | Age: 60
Setting detail: THERAPIES SERIES
Discharge: HOME | End: 2020-07-24
Payer: MEDICARE

## 2020-07-24 PROCEDURE — 97110 THERAPEUTIC EXERCISES: CPT

## 2020-07-24 NOTE — FLOWSHEET NOTE
Completed    Cone stacking 10  Not compelted   Wash table with foam 2 mins  completed     Other: Educated pt on importance of positioning due to limited extension with R wrist. Pt reports will be diligent on stretching. .Continues to demo minimal active movement of digits. Pt tolerated shoulder stretch to 140 degrees flexion and minimal pain noted. Specific Instructions for next treatment: continue with current plan       Treatment Charges: Mins Units Time In/Out   []  Modalities        [x]  Ther Exercise 55 4 [id number]   []  Manual Therapy      []  Ther Activities      []  Neuro re-ed      []        []        Total Treatment time 55 min           Assessment: Progressing Towards Goals    Short Term Goals: (  9    Treatments)  1. Decrease Pain: to 1/10 with PROM of RUE  2. Increase AROM (degrees)  a. Pt will demo active movement with all digits   b. Demo R shoulder active flexion to 90 degrees for independence with reaching tasks  3. Increase strength (pounds)  a. R  to 11 pounds for independence with all functional tasks  b. R lateral pinch to 7 pounds for return to playing guitar  c. R 2 point pinch to 5 pounds for return to playing guitar  d. R 3 point pinch to 3 pounds for return to playing guitar   4. Increase function:UE Functional Index Score to 30 to promote increased function  5. Demo knowledge of fall prevention in 3 session  6. Patient to be independent with home exercise program as demonstrated by performance with correct form without cues.     Long Term Goals: (  18  Treatments)  1. Pt will demo full, active digit extension with all digits of R hand to participate in functional tasks  2. Pt will demo increased function AEB a score of 30 or more on the UEFI  3. Pt will improved fine motor coordination skills AEB being able to complete 9 hole peg test with R hand  4. Pt will demo decreased flexor tone to allow for participation in functional activities  5.  Pt will report completing ADL tasks Pt. Education:  Yes and Reviewed Prior HEP/Ed  Method of Education: Verbal, Written and Demo  Comprehension of Education: Needs Review      Plan:  Continue with current plan       Time In/Out: 4395-2691       Electronically signed by:  MODESTO Smith/JANNET

## 2020-07-24 NOTE — FLOWSHEET NOTE
[x] [facility]) [facility]  [facility]rapy  [address]  [phone]  F: [phone] [] [address]  [facility] [place] [facility]   [address]  P: [phone]  F: [phone] [] [place]  [address] [facility]  P: [phone]  F: [phone] [] [address]  [facility]   [address]   [place]: [phone]  F: [phone]      Physical Therapy Daily Treatment Note    Date:  2020  Patient Name:  Rosario Bentley    :  1960  MRN: [id number]  Physician: Dr. Maharaj Favorite: [facility] (24 vs)   Medical Diagnosis: History of recent stroke  Rehab Codes: M25.611, M25.621, M25.641, R53.1, R29.3, R26.9, R27.9  Next 's appt. : 3/10/20  Visit# / total visits:   Cancels/No Shows: 0    Subjective:     Pain:  [] Yes  [x] No Location:  N/A Pain Rating: (0-10 scale) 0/10  Pain altered Tx:  [x] No  [] Yes  Action:  Comments:  Pt cont to arrive with 0 pain.      Modalities:   Precautions: Needs assist at R hand to get grasp on/off grab bars during standing therex  Exercises: Bolded completed on 2020:  Exercise Reps/ Time Weight/ Level Comments   Nu-step SCIFIT on 20 6 min L8 Seat at 11         Gait train 150 ft x1    Amb around clinic   Lateral stepping 15 ft    15 ft  Added 20; Leading with RLE   then leading w/ LLE               Seated   Intermittent after standing sets to take advantage of seated rest   Bilateral ER 3x20 lime RUE secured by chair arm     Seated foot tap to step 2x15 RLE 4\" step   LAQ 2x10  Held weight; assistance to achieve TKE               Standing      Single limb strength day   WEDNESDAYS   Sit to stands 2x15  - LLE slightly forward to bias RLE   Hip ext/abd 15x ea Bilat    Hamstring curl 15x ea Bilat    Anterior weightshift onto RLE 2x15  TKE in stance is the focus   Step ups 2x15 4\"    Step downs 2x15 2\"    Toe tap to box 15x 4\" RLE only; Added 7/22/20               Functional/Lift Day   FRIDAYS   Sit to stands 2x15     Mini squat w/ weight forward 1x15 4#    Stiff leg deadlift 2x15 4# 1 set completed 7/24/20   Sumo deadlift  10x  Holding dinadisc like pizza, tapping it all the way down to a cone sitting on top of 4\" step   Heel raises 2x15  Minimal height   Step ups 2x10  6\" RLE in stance   3 way hip  10x  RLE only; Added 7/24/20   Leg press - DL x20 60#    Leg press - SL 2x10 30# HELD- time restaint    Completed exercises that have reps bolded this date. Specific Instructions for next treatment: standing hip 3-way, progressive tolerance to more and more standing therex with gait/stair training as well       Treatment Charges: Mins Units   []  Modalities: manual e-stim     [x]  Ther Exercise 45 3   []  Manual Therapy     []  Ther Activities     []  Aquatics     []  Vasocompression     [x]  Other: Gait 6 -   []  Other: Neuro     Total Treatment time 51 3       Assessment: [x] Progressing toward goals. Initiated session on nu step and cont with standing exercises with focus on functional lifting to increase BLE strength. Pt amb with cane around clinic, cues to emphasize heel strike. Added lateral stepping this date. Cues to flex LLE with side stepping to avoid dragging LLE with side stepping, pt with good carryover. Pt able to grasp hand rail with R hand with increased ease. Multiple cues throughout session to maintain an upright posture with therapeutic exercises. Frequent rest breaks required d/t fatigue. Added 3 way hip to further increase LE strength and WB tolerance. Pt with good stephen to progressions this date. No change.       [x] Other: Continue to focus on quad strengthening in standing, working on functional lifting next session, but add in anterior weightshift into RLE with terminal knee extension focus so as to improve R knee buckling in mid/terminal stance, slowing down gait. STG: (to be met in 10 treatments) - Assessed 2/20/20  1. ? ROM: Improve R shoulder PROM to at least 150deg flex, 120 abd to improve mobility for reaching overhead. - DISCONTINUE, UE being treated by OT at this time  2. ? Strength:   a. At least 3/5 global strength in RUE to improve lifting grasping - DISCONTINUE, UE being treated by OT at this time  b. 5-/5 R hip ext/abd strength for improved squatting/stair climbing ability - NOT MET  3. ? Balance: Able to bend down to ground to  object with only 1UE assist with no balance impairment. - Making progress, still SBA  4. Function:   a. Improve gait speed to at least .6m/s to progress safety with home/community ambulation. - Making progress, still focusing more on gait quality vs speed d/t excessive knee hyperextension  b. Able to begin to integrate RUE into UE dressing with minor difficulty - Making progress, discontinue this goal d/t OT treating RUE at this time  5. Independent with Home Exercise Programs - MET  6. Demonstrate Knowledge of fall prevention - MET     LTG: (to be met in 24 treatments): - Assessed on 6/24/20:  1. ROM:   a. Improve R hip flexor AROM to at least 100deg in standing for improved ease of stair climbing. - Making progress, ~80deg  2. Balance:  a. Pt will be able to complete 360 turn without AD with no balance impairment. - Making progress, no AD and goes slowly and hyperextends R knee; CGA required  b. Pt will be able to complete dynamic reaching/stooping tasks while standing in narrow MED - MET  3. Function:    a. Improve gait speed to at least .8m/s to improve safety with community ambulation. - NOT MET, .38m/s on 6/24  b. Able to climb 4 stairs with unilateral UE assist and minimal balance impairment. - NOT MET  4. Strength:   a. At least 5/5 R hip ext/abd strength for improved stability with prolonged walking and stair climbing. - Making progress, 5-/5 ea in sitting  b.  At least 5-/5 R dorsiflexors and plantarflexors for improved gait mechanics - MET for DF, still weak PFs (4-/5)          Patient goals: \"use my right arm again like normal\" - Making progress with OT, now able to extend fingers occasionally    Pt. Education:  [x] Yes  [] No  [] Reviewed Prior HEP/Ed  Method of Education: [x] Verbal  [x] Demo  [] Written  Comprehension of Education:  [x] Verbalizes understanding. [x] Demonstrates understanding. [] Needs review. [] Demonstrates/verbalizes HEP/Ed previously given. Current HEP: seated laq's and hip abd (black), mini squats with LLE forward for RLE bias, gastroc stretch, active DF, resisted PF (lime), SAQs, bridges, standing ham curl    Plan: [x] Continue per plan of care.      [] Other:       Time In: 03:00 pm        Time Out:  04:00 pm    Electronically signed by:  Irena Parisi PTA

## 2020-07-28 RX ORDER — HYDRALAZINE HYDROCHLORIDE 25 MG/1
TABLET, FILM COATED ORAL
Qty: 90 TABLET | Refills: 1 | Status: SHIPPED | OUTPATIENT
Start: 2020-07-28 | End: 2021-01-18

## 2020-07-28 RX ORDER — AMLODIPINE BESYLATE 10 MG/1
TABLET ORAL
Qty: 90 TABLET | Refills: 1 | Status: SHIPPED | OUTPATIENT
Start: 2020-07-28 | End: 2021-01-18

## 2020-07-28 RX ORDER — FLUOXETINE HYDROCHLORIDE 40 MG/1
CAPSULE ORAL
Qty: 90 CAPSULE | Refills: 1 | Status: SHIPPED | OUTPATIENT
Start: 2020-07-28 | End: 2020-12-17 | Stop reason: ALTCHOICE

## 2020-07-28 RX ORDER — LISINOPRIL 10 MG/1
TABLET ORAL
Qty: 90 TABLET | Refills: 1 | Status: ON HOLD
Start: 2020-07-28 | End: 2020-09-10 | Stop reason: HOSPADM

## 2020-07-28 NOTE — TELEPHONE ENCOUNTER
Last visit: 7/1/2020  Last Med refill: 7/2/2020  Does patient have enough medication for 72 hours: Yes    Next Visit Date:  Future Appointments   Date Time Provider Bk [place]   7/29/2020  2:00 PM Darius Flores 8   7/29/2020  3:00 PM Junella Levo, PT [facility]   7/31/2020  2:00 PM Deidre Bauerschmidt, OTR/L [facility]   7/31/2020  3:00 PM Junella Levo, PT [facility]   11/2/2020  9:00 AM Destinee Saldivar MD [facility]   12/17/2020 10:20 AM Meche Ray MD [facility] [address] Maintenance   Topic Date Due    Colon cancer screen colonoscopy  05/22/2010    Shingles Vaccine (1 of 2) 09/13/2020 (Originally 5/22/2010)    HIV screen  09/13/2020 (Originally 5/22/1975)    Hepatitis C screen  09/24/2020 (Originally 1960)    Pneumococcal 0-64 years Vaccine (1 of 1 - PPSV23) 01/14/2021 (Originally 5/22/1966)    Diabetic foot exam  01/16/2021 (Originally 5/22/1970)    DTaP/Tdap/Td vaccine (1 - Tdap) 01/16/2021 (Originally 5/22/1979)    Diabetic retinal exam  01/25/2021 (Originally 5/22/1970)    Flu vaccine (1) 09/01/2020    A1C test (Diabetic or Prediabetic)  03/09/2021    Lipid screen  03/09/2021    Potassium monitoring  03/09/2021    Creatinine monitoring  03/09/2021    Diabetic microalbuminuria test  03/10/2021    Hepatitis A vaccine  Aged Out    Hib vaccine  Aged Out    Meningococcal (ACWY) vaccine  Aged Out       Hemoglobin A1C (%)   Date Value   03/09/2020 6.8 (H)   09/13/2019 8.8 (H)             ( goal A1C is < 7)   Microalb/Crt.  Ratio (mcg/mg creat)   Date Value   03/10/2020 123 (H)     LDL Cholesterol (mg/dL)   Date Value   03/09/2020 42       (goal LDL is <100)   AST (U/L)   Date Value   03/09/2020 25     ALT (U/L)   Date Value   03/09/2020 39     BUN (mg/dL)   Date Value   03/09/2020 18     BP Readings from Last 3 Encounters:   07/01/20 122/80   06/16/20 134/81   03/10/20 126/76          (goal 120/80)    All Future Testing planned in [facility]  Lab Frequency Next Occurrence   Cologuard (For External Results Only) Once 10/15/2019   OT eval and treat Once 12/12/2019   PT orthosis to lower extremity Once 07/31/2020   Cologuard (For External Results Only) Once 07/01/2020               Patient Active Problem List:     Diabetes mellitus, insulin dependent (IDDM), controlled (HCC)     Essential hypertension     Mixed hyperlipidemia     Right hemiplegia (HCC)     CVA (cerebral vascular accident) ([facility] [address]     Anxiety and depression     Influenza vaccination declined     Pneumococcal vaccination declined     Tetanus, diphtheria, and acellular pertussis (Tdap) vaccination declined

## 2020-07-28 NOTE — TELEPHONE ENCOUNTER
Last filled 2/5/2020 #90 with 1 RF  Last seen 7/1/2020    Next Visit Date:  Future Appointments   Date Time Provider Bk [place]   7/29/2020  2:00 PM Darius Rodgers 8   7/29/2020  3:00 PM Fili Dupree, PT [facility]   7/31/2020  2:00 PM Deidre Bauerschmidt, OTR/L [facility]   7/31/2020  3:00 PM Fili Dupree, PT [facility]   11/2/2020  9:00 AM Ksenia Agrawal MD [facility]   12/17/2020 10:20 AM Frank Flowers MD Neuro Spec [address] Maintenance   Topic Date Due    Colon cancer screen colonoscopy  05/22/2010    Shingles Vaccine (1 of 2) 09/13/2020 (Originally 5/22/2010)    HIV screen  09/13/2020 (Originally 5/22/1975)    Hepatitis C screen  09/24/2020 (Originally 1960)    Pneumococcal 0-64 years Vaccine (1 of 1 - PPSV23) 01/14/2021 (Originally 5/22/1966)    Diabetic foot exam  01/16/2021 (Originally 5/22/1970)    DTaP/Tdap/Td vaccine (1 - Tdap) 01/16/2021 (Originally 5/22/1979)    Diabetic retinal exam  01/25/2021 (Originally 5/22/1970)    Flu vaccine (1) 09/01/2020    A1C test (Diabetic or Prediabetic)  03/09/2021    Lipid screen  03/09/2021    Potassium monitoring  03/09/2021    Creatinine monitoring  03/09/2021    Diabetic microalbuminuria test  03/10/2021    Hepatitis A vaccine  Aged Out    Hib vaccine  Aged Out    Meningococcal (ACWY) vaccine  Aged Out       Hemoglobin A1C (%)   Date Value   03/09/2020 6.8 (H)   09/13/2019 8.8 (H)             ( goal A1C is < 7)   Microalb/Crt.  Ratio (mcg/mg creat)   Date Value   03/10/2020 123 (H)     LDL Cholesterol (mg/dL)   Date Value   03/09/2020 42       (goal LDL is <100)   AST (U/L)   Date Value   03/09/2020 25     ALT (U/L)   Date Value   03/09/2020 39     BUN (mg/dL)   Date Value   03/09/2020 18     BP Readings from Last 3 Encounters:   07/01/20 122/80   06/16/20 134/81   03/10/20 126/76          (goal 120/80)    All Future Testing planned in [facility]  Lab Frequency

## 2020-07-29 ENCOUNTER — HOSPITAL ENCOUNTER (OUTPATIENT)
Dept: PHYSICAL THERAPY | Age: 60
Setting detail: THERAPIES SERIES
Discharge: HOME | End: 2020-07-29
Payer: MEDICARE

## 2020-07-29 ENCOUNTER — HOSPITAL ENCOUNTER (OUTPATIENT)
Dept: OCCUPATIONAL THERAPY | Age: 60
Setting detail: THERAPIES SERIES
Discharge: HOME | End: 2020-07-29
Payer: MEDICARE

## 2020-07-29 PROCEDURE — 97116 GAIT TRAINING THERAPY: CPT

## 2020-07-29 PROCEDURE — 97110 THERAPEUTIC EXERCISES: CPT

## 2020-07-29 RX ORDER — ATORVASTATIN CALCIUM 10 MG/1
10 TABLET, FILM COATED ORAL DAILY
Qty: 30 TABLET | Refills: 5 | Status: SHIPPED | OUTPATIENT
Start: 2020-07-29 | End: 2021-03-19

## 2020-07-29 NOTE — FLOWSHEET NOTE
[x] [address] [facility] floor       [address] [place], [place]         Phone: [phone]       Fax: [phone] [] [address] [facility] at [address] [facility] , [address] [place] [address]  Phone: [phone]  Fax: [phone]     Occupational Therapy Daily Treatment Note    Date:  2020  Patient Name:  Rodriguez Carlson    :  1960  MRN: [id number]  Patient: Rodriguez Carlson                      : 1960                      MRN: [id number]                         Physician: Wil Andre MD  Insurance: [employer]  Medical Diagnosis: history of recent stroke Z86.73   Rehab Codes: stiffness in shoulder M25.61,, lack of coordination R27.8,, fine motor skills loss R29.818,, muscle weakness generalized M62.81,, lack of coordination unspecified R27.0, or lack of coordination other R27.8,  Onset Date: 2019                          Next Dr. Shine : 3-    Visit# / total visits:  Cancels/No Shows: 0/0      Subjective:    Pain:  Yes Location: R hand   Pain Rating: (0-10 scale) 0/10  Pain altered Tx:  No  Action:   Comments:    Objective:  Modalities:   Exercises:     EXERCISE    REPS/     TIME  WEIGHT/    LEVEL COMMENTS   Move sponges from R to L 25  Not Completed    Smart Mirror 10 mins  Not Completed this date   Flexbar  Wrist pronation  Wrist supination  Wrist ulnar dev  Wrist radial dev  Shoulder abduction 10  Not Completed this date, partially Crow   Mirror with UE flexion exercises 15 mns  Not Completed   Shoulder AROM 15 reps each   Not Completed - Protraction/retraction, elevation/depression    Hinkley Arc 2 series   Not Completed    Shoulder flexion, chest press, circles while supine on mat table  15 reps each 1lb bar Completed    Weight-bearing on table side 15 reps x2 sets  completed   Rolling pin 20 reps   Not Completed   Dystrophile (forearm/elbow) 10 x2 with arm and 10x2 with wrist  Completed    Table alphabet with washrag Completed    Cone stacking 10  Completed   Wash table with foam 2 mins  Not completed     Other: Educated pt on importance of positioning due to limited extension with R wrist.  Continues to demo minimal active movement of digits. Pt tolerated shoulder stretch to ~160 degrees flexion and minimal pain noted. [place] needed when completing dystrophile to keep hand in place. Good trunk control while completing seated exercises. Specific Instructions for next treatment: continue with current plan       Treatment Charges: Mins Units Time In/Out   []  Modalities        [x]  Ther Exercise 55 4 [id number]   []  Manual Therapy      []  Ther Activities      []  Neuro re-ed      []        []        Total Treatment time 55 min           Assessment: Progressing Towards Goals    Short Term Goals: (  9    Treatments)  1. Decrease Pain: to 1/10 with PROM of RUE  2. Increase AROM (degrees)  a. Pt will demo active movement with all digits   b. Demo R shoulder active flexion to 90 degrees for independence with reaching tasks  3. Increase strength (pounds)  a. R  to 11 pounds for independence with all functional tasks  b. R lateral pinch to 7 pounds for return to playing guitar  c. R 2 point pinch to 5 pounds for return to playing guitar  d. R 3 point pinch to 3 pounds for return to playing guitar   4. Increase function:UE Functional Index Score to 30 to promote increased function  5. Demo knowledge of fall prevention in 3 session  6. Patient to be independent with home exercise program as demonstrated by performance with correct form without cues.     Long Term Goals: (  18  Treatments)  1. Pt will demo full, active digit extension with all digits of R hand to participate in functional tasks  2. Pt will demo increased function AEB a score of 30 or more on the UEFI  3. Pt will improved fine motor coordination skills AEB being able to complete 9 hole peg test with R hand  4.  Pt will demo decreased flexor tone to allow for

## 2020-07-29 NOTE — FLOWSHEET NOTE
Bilat    Hamstring curl 2x15 ea Bilat    Anterior weightshift onto RLE 2x15  TKE in stance is the focus   Standing holds with equal weightshift, no UE assist 2x1'  In between therex for standing rest break practice   Step ups 2x15 6\"    Step downs 2x15 2\"    Step overs/back  4\"    Toe tap to box 15x 4\"                Functional/Lift Day   FRIDAYS   Sit to stands 2x15     Mini squat w/ weight forward 1x15 4#    Stiff leg deadlift 2x15 4# 1 set completed 7/24/20   Sumo deadlift  10x  Holding dinadisc like pizza, tapping it all the way down to a cone sitting on top of 4\" step   Heel raises 2x15  Minimal height   Step ups 2x10  6\" RLE in stance   3 way hip  10x  RLE only; Added 7/24/20                     Leg press - DL x20 60#    Leg press - SL 2x10 30# HELD- time restaint       Specific Instructions for next treatment: standing hip 3-way, progressive tolerance to more and more standing therex with gait/stair training as well       Treatment Charges: Mins Units   []  Modalities: manual e-stim     [x]  Ther Exercise 46 3   []  Manual Therapy     []  Ther Activities     []  Aquatics     []  Vasocompression     [x]  Other: Gait 8 1   []  Other: Neuro     Total Treatment time 54 4       Assessment: [x] Progressing toward goals. Focused on taking concurrent step of RLE when moving cane; after mod VCs and min tactile via tapping quad of RLE when desiring step. Pt able to improve this with reptition; buckling and lack of neutral rotation sometimes still slows swing phase down, but overall much improved. Will look to improving carryover in next sessions. Tolerance to standing continues to improve - standing rest breaks taken with focus on maintaining equal weight distribution - does require mod tactile cues to complete correctly.      [x] Other: Continue to focus on quad strengthening in standing, working on functional lifting next session, but add in anterior weightshift into RLE with terminal knee extension focus so as to sitting  b. At least 5-/5 R dorsiflexors and plantarflexors for improved gait mechanics - MET for DF, still weak PFs (4-/5)          Patient goals: \"use my right arm again like normal\" - Making progress with OT, now able to extend fingers occasionally    Pt. Education:  [x] Yes  [] No  [] Reviewed Prior HEP/Ed  Method of Education: [x] Verbal  [x] Demo  [] Written  Comprehension of Education:  [x] Verbalizes understanding. [x] Demonstrates understanding. [] Needs review. [] Demonstrates/verbalizes HEP/Ed previously given. Current HEP: seated laq's and hip abd (black), mini squats with LLE forward for RLE bias, gastroc stretch, active DF, resisted PF (lime), SAQs, bridges, standing ham curl    Plan: [x] Continue per plan of care.      [] Other:       Time In: 03:00 pm        Time Out:  04:02 pm    Electronically signed by:  Dona Chacko, PT

## 2020-07-31 ENCOUNTER — HOSPITAL ENCOUNTER (OUTPATIENT)
Dept: OCCUPATIONAL THERAPY | Age: 60
Setting detail: THERAPIES SERIES
Discharge: HOME | End: 2020-07-31
Payer: MEDICARE

## 2020-07-31 ENCOUNTER — HOSPITAL ENCOUNTER (OUTPATIENT)
Dept: PHYSICAL THERAPY | Age: 60
Setting detail: THERAPIES SERIES
Discharge: HOME | End: 2020-07-31
Payer: MEDICARE

## 2020-07-31 PROCEDURE — 97110 THERAPEUTIC EXERCISES: CPT

## 2020-07-31 NOTE — FLOWSHEET NOTE
[x] [facility]) [facility]  [address]  [phone]  F: [phone] [] [address]  [facility]   [address]  P: [phone]  F: [phone] [] [address]  [address] [facility]  P: [phone]  F: [phone] [] [address]  [facility]   [address]   [place]: [phone]  F: [phone]      Physical Therapy Daily Treatment Note    Date:  2020  Patient Name:  Abelardo Sanders    :  1960  MRN: [id number]  Physician: Dr. Karen Richards: Orma Dial (24 vs)   Medical Diagnosis: History of recent stroke  Rehab Codes: M25.611, M25.621, M25.641, R53.1, R29.3, R26.9, R27.9  Next 's appt. : 3/10/20  Visit# / total visits: 33/44  Cancels/No Shows: 0    Subjective:     Pain:  [] Yes  [x] No Location:  N/A Pain Rating: (0-10 scale) 0/10  Pain altered Tx:  [x] No  [] Yes  Action:  Comments:  Pt reports he's doing well with his walking; might try to go to [employer] for the first time since his stroke last August.     Modalities:   Precautions:   Exercises: Bolded completed on 2020:  Exercise Reps/ Time Weight/ Level Comments   Nu-step  6 min L5 Seat at 11         Gait train 400 ft x1    Amb around clinic, focus on 3 point gait vs 4 point   Lateral stepping 15 ft    15 ft  Added 20; Leading with RLE   then leading w/ LLE               Seated   Intermittent after standing sets to take advantage of seated rest   Bilateral ER 3x20 lime RUE secured by chair arm     Seated twist crunch 2x10 8# Assist to secure hold with R hand for set up   Seated foot tap to step 2x15 RLE 4\" step   LAQ 2x10  Held weight; assistance to achieve TKE               Standing      Single limb strength day   WEDNESDAYS   Sit to stands 2x15  - LLE slightly forward to bias RLE   Hip ext/abd 2x15 ea Bilat    Hamstring curl 2x15 ea Bilat    Anterior weightshift onto RLE 2x15  TKE in stance is the focus   Standing holds with equal weightshift, no UE assist 2x1'  In between therex for standing rest break practice   Step ups 2x15 6\"    Step downs 2x15 2\"    Step overs/back 10x 4\"    Toe tap to box 15x 4\"                Functional/Lift Day   FRIDAYS   Sit to stands 2x15     Mini squat w/ weight forward 2x15 8#    Stiff leg deadlift 1x15 4# 1 set completed 7/24/20   Sumo deadlift  2x10  Holding dinadisc like pizza, tapping it all the way down to a cone sitting on top of 4\" step   Heel raises 2x15  Minimal height   Step ups 15x 6\" RLE in stance                     Leg press - DL x10 70#    Leg press - SL 2x10 30# HELD- time restaint       Specific Instructions for next treatment: standing hip 3-way, progressive tolerance to more and more standing therex with gait/stair training as well       Treatment Charges: Mins Units   []  Modalities: manual e-stim     [x]  Ther Exercise 48 4   []  Manual Therapy     []  Ther Activities     []  Aquatics     []  Vasocompression     [x]  Other: Gait 5 --   []  Other: Neuro     Total Treatment time 53 4       Assessment: [x] Progressing toward goals. Best gait speed to date; less fatigue noted with same distance of walk as well. Good sequencing of cane with RLE for stepping without any cuing necessary from therapist this date, a significant improvement. Pt making good strides with functional endurance to exercise as well as good tolerance to increased weights with lifting  (both squat and deadlift) this date. Quad control still lacking with step ups, especially eccentrically. [x] Other: Continue to focus on quad strengthening in standing, working on functional lifting next session, but add in anterior weightshift into RLE with terminal knee extension focus so as to improve R knee buckling in mid/terminal stance, slowing down gait.     STG: (to be met in 10 treatments) - Assessed 2/20/20  1. ? ROM: Improve R shoulder PROM to at least 150deg flex, 120 abd to improve mobility for reaching overhead. - DISCONTINUE, UE being treated by OT at this time  2. ? Strength:   a. At least 3/5 global strength in RUE to improve lifting grasping - DISCONTINUE, UE being treated by OT at this time  b. 5-/5 R hip ext/abd strength for improved squatting/stair climbing ability - NOT MET  3. ? Balance: Able to bend down to ground to  object with only 1UE assist with no balance impairment. - Making progress, still SBA  4. Function:   a. Improve gait speed to at least .6m/s to progress safety with home/community ambulation. - Making progress, still focusing more on gait quality vs speed d/t excessive knee hyperextension  b. Able to begin to integrate RUE into UE dressing with minor difficulty - Making progress, discontinue this goal d/t OT treating RUE at this time  5. Independent with Home Exercise Programs - MET  6. Demonstrate Knowledge of fall prevention - MET     LTG: (to be met in 24 treatments): - Assessed on 6/24/20:  1. ROM:   a. Improve R hip flexor AROM to at least 100deg in standing for improved ease of stair climbing. - Making progress, ~80deg  2. Balance:  a. Pt will be able to complete 360 turn without AD with no balance impairment. - Making progress, no AD and goes slowly and hyperextends R knee; CGA required  b. Pt will be able to complete dynamic reaching/stooping tasks while standing in narrow MED - MET  3. Function:    a. Improve gait speed to at least .8m/s to improve safety with community ambulation. - NOT MET, .38m/s on 6/24  b. Able to climb 4 stairs with unilateral UE assist and minimal balance impairment. - NOT MET  4. Strength:   a. At least 5/5 R hip ext/abd strength for improved stability with prolonged walking and stair climbing. - Making progress, 5-/5 ea in sitting  b.  At least 5-/5 R dorsiflexors and plantarflexors for improved gait mechanics - MET for DF, still weak PFs (4-/5)          Patient goals: \"use my right arm again like normal\" - Making progress with OT, now able to extend fingers occasionally    Pt. Education:  [x] Yes  [] No  [] Reviewed Prior HEP/Ed  Method of Education: [x] Verbal  [x] Demo  [] Written  Comprehension of Education:  [x] Verbalizes understanding. [x] Demonstrates understanding. [] Needs review. [] Demonstrates/verbalizes HEP/Ed previously given. Current HEP: seated laq's and hip abd (black), mini squats with LLE forward for RLE bias, gastroc stretch, active DF, resisted PF (lime), SAQs, bridges, standing ham curl    Plan: [x] Continue per plan of care.      [] Other:       Time In: 03:00 pm        Time Out:  04:00 pm    Electronically signed by:  Lon Robison, PT

## 2020-07-31 NOTE — FLOWSHEET NOTE
NOT Completed    Cone stacking 10  NOT Completed   Wash table with foam 2 mins  Not completed     Other: Educated pt on importance of positioning due to limited extension with R wrist.  Continues to demo minimal active movement of digits. Pt tolerated shoulder stretch to ~160 degrees flexion and minimal pain noted. [place] needed when completing dystrophile to keep hand in place. Good trunk control while completing seated exercises. Specific Instructions for next treatment: continue with current plan       Treatment Charges: Mins Units Time In/Out   []  Modalities        [x]  Ther Exercise 45 3 [id number]   []  Manual Therapy      []  Ther Activities      []  Neuro re-ed      []        []        Total Treatment time 45 min           Assessment: Progressing Towards Goals    Short Term Goals: (  9    Treatments)  1. Decrease Pain: to 1/10 with PROM of RUE  2. Increase AROM (degrees)  a. Pt will demo active movement with all digits   b. Demo R shoulder active flexion to 90 degrees for independence with reaching tasks  3. Increase strength (pounds)  a. R  to 11 pounds for independence with all functional tasks  b. R lateral pinch to 7 pounds for return to playing guitar  c. R 2 point pinch to 5 pounds for return to playing guitar  d. R 3 point pinch to 3 pounds for return to playing guitar   4. Increase function:UE Functional Index Score to 30 to promote increased function  5. Demo knowledge of fall prevention in 3 session  6. Patient to be independent with home exercise program as demonstrated by performance with correct form without cues.     Long Term Goals: (  18  Treatments)  1. Pt will demo full, active digit extension with all digits of R hand to participate in functional tasks  2. Pt will demo increased function AEB a score of 30 or more on the UEFI  3. Pt will improved fine motor coordination skills AEB being able to complete 9 hole peg test with R hand  4.  Pt will demo decreased flexor tone to allow for participation in functional activities  5. Pt will report completing ADL tasks       Pt.  Education:  Yes and Reviewed Prior HEP/Ed  Method of Education: Verbal, Written and Demo  Comprehension of Education: Needs Review      Plan:  Continue with current plan       Time In/Out: 1621-2344       Electronically signed by:  MODESTO Cerda/LR/L

## 2020-08-05 ENCOUNTER — APPOINTMENT (OUTPATIENT)
Dept: PHYSICAL THERAPY | Age: 60
End: 2020-08-05
Payer: MEDICARE

## 2020-08-12 ENCOUNTER — APPOINTMENT (OUTPATIENT)
Dept: PHYSICAL THERAPY | Age: 60
End: 2020-08-12
Payer: MEDICARE

## 2020-08-14 ENCOUNTER — APPOINTMENT (OUTPATIENT)
Dept: PHYSICAL THERAPY | Age: 60
End: 2020-08-14
Payer: MEDICARE

## 2020-08-19 ENCOUNTER — HOSPITAL ENCOUNTER (OUTPATIENT)
Dept: PHYSICAL THERAPY | Age: 60
Setting detail: THERAPIES SERIES
Discharge: HOME | End: 2020-08-19
Payer: MEDICARE

## 2020-08-19 PROCEDURE — 97110 THERAPEUTIC EXERCISES: CPT

## 2020-08-19 NOTE — FLOWSHEET NOTE
[x] [facility]) [facility]  [facility]  [address]  [phone]  F: [phone] [] [address]  [place] [address]   [address]  P: [phone]  F: [phone] [] [place]  [address] [facility] [address]  P: [phone]  F: [phone] [] [address]  [facility]   [address]   [place]: [phone]  F: [phone]      Physical Therapy Daily Treatment Note    Date:  2020  Patient Name:  Zafar Fitzgerald    :  1960  MRN: [id number]  Physician: Dr. Mich Pop: [facility] (24 vs)   Medical Diagnosis: History of recent stroke  Rehab Codes: M25.611, M25.621, M25.641, R53.1, R29.3, R26.9, R27.9  Next 's appt. : 3/10/20  Visit# / total visits: 57/14 (only 8 additional vs)  Cancels/No Shows: 0    Subjective:     Pain:  [] Yes  [x] No Location:  N/A Pain Rating: (0-10 scale) 0/10  Pain altered Tx:  [x] No  [] Yes  Action:  Comments:  Pt was approved for 12 more visits (4 already used) - 8 more visits remaining including today.     Modalities:   Precautions:   Exercises: Bolded completed on 2020:  Exercise Reps/ Time Weight/ Level Comments   Nu-step  6 min L5 Seat at 11         Gait train 40 ft x1    Focus on 3 point gait vs 4 point   Lateral stepping 15 ft    15 ft  Added 20; Leading with RLE   then leading w/ LLE               Seated   Intermittent after standing sets to take advantage of seated rest   Bilateral ER 3x20 lime RUE secured by chair arm     Seated twist crunch 2x10 8# Assist to secure hold with R hand for set up   Seated foot tap to step 2x15 RLE 4\" step   LAQ 2x10  Held weight; assistance to achieve TKE               Standing      Single limb strength day   WEDNESDAYS   Sit to stands 2x15  - LLE slightly forward to bias RLE   Hip ext/abd 2x15 ea Bilat    Hamstring curl 2x15 ea Bilat    Anterior weightshift onto RLE 2x15  TKE in stance is the focus   SLS on RLE 2x10, 3\"     Standing holds with equal weightshift, no UE assist 2x1'  In between therex for standing rest break practice   Step ups 15x 8\"    Step downs 2x15 2\"    Step overs/back 10x 2\"    Toe tap to box 15x 4\"                Functional/Lift Day   FRIDAYS   Sit to stands 2x15     Mini squat w/ weight forward 2x15 8#    Stiff leg deadlift 1x15 4# 1 set completed 7/24/20   Sumo deadlift  2x10  Holding dinadisc like pizza, tapping it all the way down to a cone sitting on top of 4\" step   Heel raises 2x15  Minimal height   Step ups 15x 6\" RLE in stance                     Leg press - DL 20x 70#    Leg press - SL 2x10 30# HELD- time restaint       Specific Instructions for next treatment: standing hip 3-way, progressive tolerance to more and more standing therex with gait/stair training as well       Treatment Charges: Mins Units   []  Modalities: manual e-stim     [x]  Ther Exercise 52 4   []  Manual Therapy     []  Ther Activities     []  Aquatics     []  Vasocompression     [x]  Other: Gait 2 --   []  Other: Neuro     Total Treatment time 54 4       Assessment: [x] Progressing toward goals. Arrives with good gait speed and cane sequencing. Quad control in terminal stance with weightshifting and SLS holds require mod tactile/external cuing by therapist to achieve best quad contraction and increase knee ext AROM in CKC. Improving ability to take longer LLE step through with step over exercise on 2\" step, indicating improving RLE quad eccentric control; min tactile cues to achieve full weightshift onto RLE by end of reps d/t fatigue. [x] Other: Continue to focus on quad strengthening in standing, working on functional lifting next session, but add in anterior weightshift into RLE with terminal knee extension focus so as to improve R knee buckling in mid/terminal stance, slowing down gait.     STG: (to be met in 10 treatments) - Assessed 2/20/20  1. ? ROM: Improve R shoulder PROM to at least 150deg flex, 120 abd to improve mobility for reaching overhead. - DISCONTINUE, UE being treated by OT at this time  2. ? Strength:   a. At least 3/5 global strength in RUE to improve lifting grasping - DISCONTINUE, UE being treated by OT at this time  b. 5-/5 R hip ext/abd strength for improved squatting/stair climbing ability - NOT MET  3. ? Balance: Able to bend down to ground to  object with only 1UE assist with no balance impairment. - Making progress, still SBA  4. Function:   a. Improve gait speed to at least .6m/s to progress safety with home/community ambulation. - Making progress, still focusing more on gait quality vs speed d/t excessive knee hyperextension  b. Able to begin to integrate RUE into UE dressing with minor difficulty - Making progress, discontinue this goal d/t OT treating RUE at this time  5. Independent with Home Exercise Programs - MET  6. Demonstrate Knowledge of fall prevention - MET     LTG: (to be met in 24 treatments): - Assessed on 6/24/20:  1. ROM:   a. Improve R hip flexor AROM to at least 100deg in standing for improved ease of stair climbing. - Making progress, ~80deg  2. Balance:  a. Pt will be able to complete 360 turn without AD with no balance impairment. - Making progress, no AD and goes slowly and hyperextends R knee; CGA required  b. Pt will be able to complete dynamic reaching/stooping tasks while standing in narrow MED - MET  3. Function:    a. Improve gait speed to at least .8m/s to improve safety with community ambulation. - NOT MET, .38m/s on 6/24  b. Able to climb 4 stairs with unilateral UE assist and minimal balance impairment. - NOT MET  4. Strength:   a. At least 5/5 R hip ext/abd strength for improved stability with prolonged walking and stair climbing. - Making progress, 5-/5 ea in sitting  b.  At least 5-/5 R dorsiflexors and plantarflexors for improved gait mechanics - MET for DF, still weak PFs (4-/5)          Patient goals: \"use my right arm again like normal\" - Making progress with OT, now able to extend fingers occasionally    Pt. Education:  [x] Yes  [] No  [] Reviewed Prior HEP/Ed  Method of Education: [x] Verbal  [x] Demo  [] Written  Comprehension of Education:  [x] Verbalizes understanding. [x] Demonstrates understanding. [] Needs review. [] Demonstrates/verbalizes HEP/Ed previously given. Current HEP: seated laq's and hip abd (black), mini squats with LLE forward for RLE bias, gastroc stretch, active DF, resisted PF (lime), SAQs, bridges, standing ham curl    Plan: [x] Continue per plan of care.      [] Other:       Time In: 3:05 pm        Time Out:  04:05 pm    Electronically signed by:  Christine Gee, PT

## 2020-08-21 ENCOUNTER — HOSPITAL ENCOUNTER (OUTPATIENT)
Dept: PHYSICAL THERAPY | Age: 60
Setting detail: THERAPIES SERIES
Discharge: HOME | End: 2020-08-21
Payer: MEDICARE

## 2020-08-21 PROCEDURE — 97110 THERAPEUTIC EXERCISES: CPT

## 2020-08-21 NOTE — FLOWSHEET NOTE
[x] [facility] &  Therapy  [address]  [phone]  F: [phone] [] [address]  [facility]   [address]  P: [phone]  F: [phone] [] [place]  [address] [facility]  P: [phone]  F: [phone] [] [address]  [facility]   [address]   [place]: [phone]  F: [phone]      Physical Therapy Daily Treatment Note    Date:  2020  Patient Name:  Katja Bernal    :  1960  MRN: [id number]  Physician: Dr. Maddie Quinteros: Veronica Mujica (24 vs)   Medical Diagnosis: History of recent stroke  Rehab Codes: M25.611, M25.621, M25.641, R53.1, R29.3, R26.9, R27.9  Next 's appt. : 3/10/20  Visit# / total visits:  (only 8 additional vs)  Cancels/No Shows: 0    Subjective:     Pain:  [] Yes  [x] No Location:  N/A Pain Rating: (0-10 scale) 0/10  Pain altered Tx:  [x] No  [] Yes  Action:  Comments:      Modalities:   Precautions:   Exercises: Bolded completed on 2020:  Exercise Reps/ Time Weight/ Level Comments   Nu-step  6 min L5 Seat at 11         Gait train 250'x1    Focus on 3 point gait vs 4 point, terminal knee ext   Lateral stepping 15 ft    15 ft  Added 20; Leading with RLE   then leading w/ LLE               Seated   Intermittent after standing sets to take advantage of seated rest   Bilateral ER 3x20 lime RUE secured by chair arm     Seated twist crunch 2x10 8# Assist to secure hold with R hand for set up   Seated foot tap to step 2x15 RLE 4\" step   LAQ 2x10  Held weight; assistance to achieve TKE               Standing      Single limb strength day      Sit to stands 2x15  - LLE slightly forward to bias RLE   Hip ext/abd 2x15 ea Bilat    Hamstring curl 2x15 ea Bilat    Anterior weightshift onto RLE 2x15  TKE in stance is the focus   SLS on RLE 2x10, 3\"     Standing holds with equal weightshift, no UE assist 2x1'  In between therex for standing rest break practice   Step ups 15x 8\"    Step downs 2x15 2\"    Step overs/back 10x 2\"    Toe tap to box 15x 4\"                Functional/Lift Day   FRIDAYS   Sit to stands 2x15     Mini squat w/ weight forward 2x15 8#    Stiff leg deadlift 2x15 8#    Sumo deadlift  10x  Holding dinadisc like pizza, tapping it all the way down to a cone sitting on top of 4\" step   Heel raises 2x15  Minimal height   Step ups 2x15 8\" RLE in stance                     Leg press - DL 30x 70# Progress to 80#, 10 reps next visit   Leg press - SL 2x10 30# HELD- time restaint   Comments: Slow progression through exercises d/t fatigue with lifting this date    Specific Instructions for next treatment: standing hip 3-way, progressive tolerance to more and more standing therex with gait/stair training as well       Treatment Charges: Mins Units   []  Modalities: manual e-stim     [x]  Ther Exercise 49 4   []  Manual Therapy     []  Ther Activities     []  Aquatics     []  Vasocompression     [x]  Other: Gait 6 --   []  Other: Neuro     Total Treatment time 55 4       Assessment: [x] Progressing toward goals. Quad strength progressing but still very limited at end range; difficulty with stair descent/eccentric control, but this is further complicated by R AFO donned making DF very limited. Went over external rotation positioning to allow for slightly improved mechanics, however still with instability. Left low back limiting deep squatting tolerance today - does need min tactile cuing to promote full weightshift into RLE and make weight symmetrical vs. excessive in LLE.      [x] Other: Continue to focus on quad strengthening in standing, working on functional lifting next session, but add in anterior weightshift into RLE with terminal knee extension focus so as to improve R knee buckling in

## 2020-08-26 ENCOUNTER — HOSPITAL ENCOUNTER (OUTPATIENT)
Dept: OCCUPATIONAL THERAPY | Age: 60
Setting detail: THERAPIES SERIES
Discharge: HOME | End: 2020-08-26
Payer: MEDICARE

## 2020-08-26 ENCOUNTER — HOSPITAL ENCOUNTER (OUTPATIENT)
Dept: PHYSICAL THERAPY | Age: 60
Setting detail: THERAPIES SERIES
Discharge: HOME | End: 2020-08-26
Payer: MEDICARE

## 2020-08-26 PROCEDURE — 97110 THERAPEUTIC EXERCISES: CPT

## 2020-08-26 NOTE — FLOWSHEET NOTE
[x] [address] [facility] floor       [address], [place]         Phone: [phone]       Fax: [phone] [] [address] [facility] at [address] [facility] , [address]  Phone: [phone]  Fax: [phone]     Occupational Therapy Daily Treatment Note    Date:  2020  Patient Name:  Josy Huffman YOB: 1960  MRN: [id number]  Patient: Josy Huffman YOB: 1960                      MRN: [id number]                         Physician: Noah Flowers MD  Insurance: [employer]  Medical Diagnosis: history of recent stroke Z86.73   Rehab Codes: stiffness in shoulder M25.61,, lack of coordination R27.8,, fine motor skills loss R29.818,, muscle weakness generalized M62.81,, lack of coordination unspecified R27.0, or lack of coordination other R27.8,  Onset Date: 2019                          Next Dr. Chetna Rivera: 3-    Visit# / total visits:  Cancels/No Shows: 0/0      Subjective:    Pain:  Yes Location: R hand   Pain Rating: (0-10 scale) 0/10  Pain altered Tx:  No  Action:   Comments:    Objective:  Modalities:   Exercises:     EXERCISE    REPS/     TIME  WEIGHT/    LEVEL COMMENTS   Move sponges from R to L 25  Not Completed    Smart Mirror 10 mins  Not Completed this date   Flexbar  Wrist pronation  Wrist supination  Wrist ulnar dev  Wrist radial dev  Shoulder abduction 10   Completed this date, partially Hopi   Mirror with UE flexion exercises 15 reps  Completed   Shoulder AROM 15 reps each   Not Completed - Protraction/retraction, elevation/depression    [place] Arc 2 series   Completed    Shoulder flexion, chest press, circles while supine on mat table  15 reps each 1lb bar Not Completed    Weight-bearing on table side 15 reps x2 sets  completed   Rolling pin 20 reps   Not Completed   Dystrophile (forearm/elbow) 10 x2 with arm and 10x2 with wrist  Completed    Table alphabet with washrag   NOT Completed    Cone stacking 10  NOT Completed   Wash table with foam 2 mins  Not completed     Other: Educated pt on importance of positioning due to limited extension with R wrist.  Continues to demo minimal active movement of digits. Pt tolerated shoulder stretch to ~160 degrees flexion and minimal pain noted. [place] needed when completing dystrophile to keep hand in place. Good trunk control while completing seated exercises. Pt able to internally rotate R shoulder to put hands behind back with no pain noted. Specific Instructions for next treatment: continue with current plan       Treatment Charges: Mins Units Time In/Out   []  Modalities        [x]  Ther Exercise 55 4 [id number]   []  Manual Therapy      []  Ther Activities      []  Neuro re-ed      []        []        Total Treatment time 55 min           Assessment: Progressing Towards Goals    Short Term Goals: (  9    Treatments)  1. Decrease Pain: to 1/10 with PROM of RUE  2. Increase AROM (degrees)  a. Pt will demo active movement with all digits   b. Demo R shoulder active flexion to 90 degrees for independence with reaching tasks  3. Increase strength (pounds)  a. R  to 11 pounds for independence with all functional tasks  b. R lateral pinch to 7 pounds for return to playing guitar  c. R 2 point pinch to 5 pounds for return to playing guitar  d. R 3 point pinch to 3 pounds for return to playing guitar   4. Increase function:UE Functional Index Score to 30 to promote increased function  5. Demo knowledge of fall prevention in 3 session  6. Patient to be independent with home exercise program as demonstrated by performance with correct form without cues.     Long Term Goals: (  18  Treatments)  1. Pt will demo full, active digit extension with all digits of R hand to participate in functional tasks  2. Pt will demo increased function AEB a score of 30 or more on the UEFI  3.  Pt will improved fine motor coordination skills AEB being able to complete 9 hole peg test with R hand  4. Pt will demo decreased flexor tone to allow for participation in functional activities  5. Pt will report completing ADL tasks       Pt.  Education:  Yes and Reviewed Prior HEP/Ed  Method of Education: Verbal, Written and Demo  Comprehension of Education: Needs Review      Plan:  Continue with current plan       Time In/Out: 5018-7665       Electronically signed by:  Lanny Nissen, OTR/JANNET

## 2020-08-26 NOTE — FLOWSHEET NOTE
[x] [facility]) [facility]  [address]  [phone]  F: [phone] [] [address]  [facility]   [address]  P: [phone]  F: [phone] [] [place]  [address]  P: [phone]  F: [phone] [] [address]  [facility]   [address]   [place]: [phone]  F: [phone]      Physical Therapy Daily Treatment Note    Date:  2020  Patient Name:  Migel Mitchell    :  1960  MRN: [id number]  Physician: Dr. Douglas Copelander: Marlen Hassan (24 vs)   Medical Diagnosis: History of recent stroke  Rehab Codes: M25.611, M25.621, M25.641, R53.1, R29.3, R26.9, R27.9  Next 's appt. : 3/10/20  Visit# / total visits: 36/41 (only 8 additional vs)  Cancels/No Shows: 0    Subjective:     Pain:  [] Yes  [x] No Location:  N/A Pain Rating: (0-10 scale) 0/10  Pain altered Tx:  [x] No  [] Yes  Action:  Comments:  Pt arrives noting stiffness in RUE. Pt mentions he is tired today.      Modalities:   Precautions:   Exercises: Bolded completed on 2020:  Exercise Reps/ Time Weight/ Level Comments   Nu-step  6 min L5 Seat at 11         Gait train 250'x1    Focus on 3 point gait vs 4 point, terminal knee ext   Lateral stepping 15 ft    15 ft  Added 20; Leading with RLE   then leading w/ LLE               Seated   Intermittent after standing sets to take advantage of seated rest   Bilateral ER 3x20 lime RUE secured by chair arm     Seated twist crunch 2x10 8# Assist to secure hold with R hand for set up   Seated foot tap to step 2x15 RLE 4\" step   LAQ 2x10  Held weight; assistance to achieve TKE               Standing      Single limb strength day   WEDNESDAYS   Sit to stands 2x15  - LLE slightly forward to bias RLE   3 way hip 2x15 ea Bilat Addition of hip flex 8/25   Hamstring curl 2x15 ea Bilat    Anterior weightshift onto RLE 2x15  TKE in stance is the focus   SLS on RLE 2x10, 3\"     Standing holds with equal weightshift, no UE assist 2x1'  In between therex for standing rest break practice   Step ups 15x 8\"    Step downs 2x15 4\" Increased ht 8/26   Step overs/back 10x 2\"    Toe tap to box 15x 4\"                Functional/Lift Day   FRIDAYS   Sit to stands 2x15     Mini squat w/ weight forward 2x15 8#    Stiff leg deadlift 2x15 8#    Sumo deadlift  10x  Holding dinadisc like pizza, tapping it all the way down to a cone sitting on top of 4\" step   Heel raises 2x15  Minimal height   Step ups 2x15 8\" RLE in stance                     Leg press - DL 30x 70# Progress to 80#, 10 reps next visit   Leg press - SL 2x10 30# HELD- time restaint   Other:    Specific Instructions for next treatment: standing hip 3-way, progressive tolerance to more and more standing therex with gait/stair training as well       Treatment Charges: Mins Units   []  Modalities: manual e-stim     [x]  Ther Exercise 45 3   []  Manual Therapy     []  Ther Activities     []  Aquatics     []  Vasocompression     [x]  Other: Gait 5 --   []  Other: Neuro     Total Treatment time 50 3       Assessment: [x] Progressing toward goals. Initiated session on nu step for warm up followed by gait training. Cues with gait to increase TKE in midstance, pt with fair carryover. R AFO cont to limit motion at ankle complex. Pt denies completing 3 way hip with LLE d/t RLE weakness, pt hesitant to attempt this date. Addition of standing hip flexion to complete 3 way hip with RLE, pt able to complete with slightly flexed knee. Increased ht to 4\" step for step downs to further challenge pt, able to complete with mild difficulty. Verbal and tactile cueing to ensure TKE with wt shifting and SLS exercises. Frequent seated rest breaks d/t fatigue this date.      [x] Other: Continue to focus on quad strengthening in standing, working on functional lifting next session, but add in anterior weightshift into RLE with terminal knee extension focus so as to improve R knee buckling in mid/terminal stance, slowing down gait. STG: (to be met in 10 treatments) - Assessed 2/20/20  1. ? ROM: Improve R shoulder PROM to at least 150deg flex, 120 abd to improve mobility for reaching overhead. - DISCONTINUE, UE being treated by OT at this time  2. ? Strength:   a. At least 3/5 global strength in RUE to improve lifting grasping - DISCONTINUE, UE being treated by OT at this time  b. 5-/5 R hip ext/abd strength for improved squatting/stair climbing ability - NOT MET  3. ? Balance: Able to bend down to ground to  object with only 1UE assist with no balance impairment. - Making progress, still SBA  4. Function:   a. Improve gait speed to at least .6m/s to progress safety with home/community ambulation. - Making progress, still focusing more on gait quality vs speed d/t excessive knee hyperextension  b. Able to begin to integrate RUE into UE dressing with minor difficulty - Making progress, discontinue this goal d/t OT treating RUE at this time  5. Independent with Home Exercise Programs - MET  6. Demonstrate Knowledge of fall prevention - MET     LTG: (to be met in 24 treatments): - Assessed on 6/24/20:  1. ROM:   a. Improve R hip flexor AROM to at least 100deg in standing for improved ease of stair climbing. - Making progress, ~80deg  2. Balance:  a. Pt will be able to complete 360 turn without AD with no balance impairment. - Making progress, no AD and goes slowly and hyperextends R knee; CGA required  b. Pt will be able to complete dynamic reaching/stooping tasks while standing in narrow MED - MET  3. Function:    a. Improve gait speed to at least .8m/s to improve safety with community ambulation. - NOT MET, .38m/s on 6/24  b. Able to climb 4 stairs with unilateral UE assist and minimal balance impairment. - NOT MET  4.  Strength:

## 2020-08-28 ENCOUNTER — HOSPITAL ENCOUNTER (OUTPATIENT)
Dept: PHYSICAL THERAPY | Age: 60
Setting detail: THERAPIES SERIES
Discharge: HOME | End: 2020-08-28
Payer: MEDICARE

## 2020-08-28 ENCOUNTER — HOSPITAL ENCOUNTER (OUTPATIENT)
Dept: OCCUPATIONAL THERAPY | Age: 60
Setting detail: THERAPIES SERIES
Discharge: HOME | End: 2020-08-28
Payer: MEDICARE

## 2020-08-28 PROCEDURE — 97110 THERAPEUTIC EXERCISES: CPT

## 2020-08-28 NOTE — FLOWSHEET NOTE
[x] [facility] [address] [facility]  [address]    [phone]  F: [phone]   [] [address]  [facility] 36   [address]  P: [phone]  F: [phone]  [] [place]  [address] [facility]  P: [phone]  F: [phone]  [] [address]  [facility]   [address]   [place]: [phone]  F: [phone]   [] [place] [address] [facility] [address]  [place]: [phone]   F: [phone]     Physical Therapy Cancel/No Show note    Date: 2020  Patient: Whit Dailey  : 1960  MRN: [id number]    Cancels/No Shows to date:     For today's appointment patient:    [x]  Cancelled    [] Rescheduled appointment    [] No-show     Reason given by patient:    []  Patient ill    []  Conflicting appointment    [] No transportation      [] Conflict with work    [] No reason given    [] Weather related    [] COVID-19    [x] Other:      Comments:  Pt mentions having a horrible day and feels 'off' today. Pt is frustrated and chose to go home following OT today.        [x] Next appointment was confirmed    Electronically signed by: Severino Tirado PTA

## 2020-08-28 NOTE — FLOWSHEET NOTE
[x] [address] [facility] floor       [address], [place]         Phone: [phone]       Fax: [phone] [] [address] [facility] [address] [facility] , [address] [facility]  Phone: [phone]  Fax: [phone]     Occupational Therapy Daily Treatment Note    Date:  2020  Patient Name:  Liliana Galvez    :  1960  MRN: [id number]  Patient: Liliana Galvez                      : 1960                      MRN: [id number]                         Physician: Charli Jarrett MD  Insurance: [employer]  Medical Diagnosis: history of recent stroke Z86.73   Rehab Codes: stiffness in shoulder M25.61,, lack of coordination R27.8,, fine motor skills loss R29.818,, muscle weakness generalized M62.81,, lack of coordination unspecified R27.0, or lack of coordination other R27.8,  Onset Date: 2019                          Next Dr. Brooke Gonsalves: 3-    Visit# / total visits:  Cancels/No Shows: 0/0      Subjective:    Pain:  Yes Location: R hand   Pain Rating: (0-10 scale) 0/10  Pain altered Tx:  No  Action:   Comments:    Objective:  Modalities:   Exercises:     EXERCISE    REPS/     TIME  WEIGHT/    LEVEL COMMENTS   Move sponges from R to L 25  Not Completed    Smart Mirror 10 mins  Not Completed this date   Flexbar  Wrist pronation  Wrist supination  Wrist ulnar dev  Wrist radial dev  Shoulder abduction 10   not Completed this date, partially [place]   Mirror with UE flexion exercises 15 reps  Not Completed   Shoulder AROM 15 reps each   Not Completed - Protraction/retraction, elevation/depression    Mica Arc 2 series   Completed    Shoulder flexion, chest press, circles while supine on mat table  15 reps each 1lb bar Not Completed    Weight-bearing on table side 15 reps x2 sets  completed   Rolling pin 20 reps   Not Completed   Dystrophile (forearm/elbow) 10 x2 with arm and 10x2 with wrist  Completed    Table alphabet with washrag   NOT Completed    Cone stacking 10  NOT Completed   Wash table with foam 2 mins  Not completed     Other: Educated pt on importance of positioning due to limited extension with R wrist.  Continues to demo minimal active movement of digits. Pt tolerated shoulder stretch to ~160 degrees flexion and minimal pain noted. [place] needed when completing dystrophile to keep hand in place. Good trunk control while completing seated exercises. Pt able to internally rotate R shoulder to put hands behind back with no pain noted. Pt reports having a really rough bad day and choosing to go home for PT this date. Specific Instructions for next treatment: continue with current plan       Treatment Charges: Mins Units Time In/Out   []  Modalities        [x]  Ther Exercise 55 4 [id number]   []  Manual Therapy      []  Ther Activities      []  Neuro re-ed      []        []        Total Treatment time 55 min           Assessment: Progressing Towards Goals    Short Term Goals: (  9    Treatments)  1. Decrease Pain: to 1/10 with PROM of RUE  2. Increase AROM (degrees)  a. Pt will demo active movement with all digits   b. Demo R shoulder active flexion to 90 degrees for independence with reaching tasks  3. Increase strength (pounds)  a. R  to 11 pounds for independence with all functional tasks  b. R lateral pinch to 7 pounds for return to playing guitar  c. R 2 point pinch to 5 pounds for return to playing guitar  d. R 3 point pinch to 3 pounds for return to playing guitar   4. Increase function:UE Functional Index Score to 30 to promote increased function  5. Demo knowledge of fall prevention in 3 session  6. Patient to be independent with home exercise program as demonstrated by performance with correct form without cues.     Long Term Goals: (  18  Treatments)  1. Pt will demo full, active digit extension with all digits of R hand to participate in functional tasks  2.  Pt will demo increased function AEB a score of 30 or

## 2020-09-02 ENCOUNTER — HOSPITAL ENCOUNTER (OUTPATIENT)
Dept: OCCUPATIONAL THERAPY | Age: 60
Setting detail: THERAPIES SERIES
Discharge: HOME | End: 2020-09-02
Payer: MEDICARE

## 2020-09-02 ENCOUNTER — HOSPITAL ENCOUNTER (OUTPATIENT)
Dept: PHYSICAL THERAPY | Age: 60
Setting detail: THERAPIES SERIES
Discharge: HOME | End: 2020-09-02
Payer: MEDICARE

## 2020-09-02 NOTE — SIGNIFICANT EVENT
[x] [address]  [address]    [phone]  F: [phone]   [] [address]  [facility] 36   [address]  P: [phone]  F: [phone]  [] [place]  [address] [facility]  P: [phone]  F: [phone]  [] [address]  [facility]   [address]   [place]: [phone]  F: [phone]   [] [address]  [place]: [phone]   F: [phone]     Occupational Therapy Cancel/No Show note    Date: 2020  Patient: Mumtaz Underwood  : 1960  MRN: [id number]    Cancels/No Shows to date: 3 cx /0 ns    For today's appointment patient:    [x]  Cancelled    [] Rescheduled appointment    [] No-show     Reason given by patient:    []  Patient ill    []  Conflicting appointment    [] No transportation      [] Conflict with work    [x] No reason given    [] Weather related    [] COVID-19    [] Other:      Comments:      [x] Next appointment was confirmed    Electronically signed by: MODESTO Hernandez/JANNET

## 2020-09-05 ENCOUNTER — APPOINTMENT (OUTPATIENT)
Dept: GENERAL RADIOLOGY | Age: 60
End: 2020-09-05
Payer: MEDICARE

## 2020-09-05 ENCOUNTER — HOSPITAL ENCOUNTER (EMERGENCY)
Age: 60
Discharge: ANOTHER HOSPITAL | End: 2020-09-06
Attending: EMERGENCY MEDICINE
Payer: MEDICARE

## 2020-09-05 LAB
ALBUMIN SERPL-MCNC: 3.7 G/DL (ref 3.5–5.2)
ALBUMIN/GLOBULIN RATIO: ABNORMAL (ref 1–2.5)
ALP BLD-CCNC: 59 U/L (ref 40–129)
ALT SERPL-CCNC: 27 U/L (ref 5–41)
ANION GAP SERPL CALCULATED.3IONS-SCNC: 11 MMOL/L (ref 9–17)
AST SERPL-CCNC: 45 U/L
BILIRUB SERPL-MCNC: 0.39 MG/DL (ref 0.3–1.2)
BILIRUBIN DIRECT: 0.1 MG/DL
BILIRUBIN, INDIRECT: 0.29 MG/DL (ref 0–1)
BUN BLDV-MCNC: 24 MG/DL (ref 8–23)
BUN/CREAT BLD: 15 (ref 9–20)
CALCIUM SERPL-MCNC: 8.8 MG/DL (ref 8.6–10.4)
CHLORIDE BLD-SCNC: 101 MMOL/L (ref 98–107)
CO2: 26 MMOL/L (ref 20–31)
CREAT SERPL-MCNC: 1.57 MG/DL (ref 0.7–1.2)
DIRECT EXAM: NORMAL
GFR AFRICAN AMERICAN: 55 ML/MIN
GFR NON-AFRICAN AMERICAN: 45 ML/MIN
GFR SERPL CREATININE-BSD FRML MDRD: ABNORMAL ML/MIN/{1.73_M2}
GFR SERPL CREATININE-BSD FRML MDRD: ABNORMAL ML/MIN/{1.73_M2}
GLOBULIN: ABNORMAL G/DL (ref 1.5–3.8)
GLUCOSE BLD-MCNC: 122 MG/DL (ref 70–99)
INR BLD: 1
LACTATE DEHYDROGENASE: 387 U/L (ref 135–225)
LACTIC ACID, SEPSIS WHOLE BLOOD: NORMAL MMOL/L (ref 0.5–1.9)
LACTIC ACID, SEPSIS: 1.4 MMOL/L (ref 0.5–1.9)
Lab: NORMAL
POTASSIUM SERPL-SCNC: 4.2 MMOL/L (ref 3.7–5.3)
PROTHROMBIN TIME: 12.8 SEC (ref 11.5–14.2)
SARS-COV-2, PCR: ABNORMAL
SARS-COV-2, RAPID: DETECTED
SARS-COV-2: ABNORMAL
SODIUM BLD-SCNC: 138 MMOL/L (ref 135–144)
SOURCE: ABNORMAL
SPECIMEN DESCRIPTION: NORMAL
TOTAL PROTEIN: 7 G/DL (ref 6.4–8.3)

## 2020-09-05 PROCEDURE — 80076 HEPATIC FUNCTION PANEL: CPT

## 2020-09-05 PROCEDURE — 87040 BLOOD CULTURE FOR BACTERIA: CPT

## 2020-09-05 PROCEDURE — 6360000002 HC RX W HCPCS: Performed by: EMERGENCY MEDICINE

## 2020-09-05 PROCEDURE — 6370000000 HC RX 637 (ALT 250 FOR IP): Performed by: EMERGENCY MEDICINE

## 2020-09-05 PROCEDURE — 87804 INFLUENZA ASSAY W/OPTIC: CPT

## 2020-09-05 PROCEDURE — 99285 EMERGENCY DEPT VISIT HI MDM: CPT

## 2020-09-05 PROCEDURE — 96375 TX/PRO/DX INJ NEW DRUG ADDON: CPT

## 2020-09-05 PROCEDURE — 83605 ASSAY OF LACTIC ACID: CPT

## 2020-09-05 PROCEDURE — 80048 BASIC METABOLIC PNL TOTAL CA: CPT

## 2020-09-05 PROCEDURE — 2580000003 HC RX 258: Performed by: EMERGENCY MEDICINE

## 2020-09-05 PROCEDURE — 86140 C-REACTIVE PROTEIN: CPT

## 2020-09-05 PROCEDURE — 87086 URINE CULTURE/COLONY COUNT: CPT

## 2020-09-05 PROCEDURE — 85025 COMPLETE CBC W/AUTO DIFF WBC: CPT

## 2020-09-05 PROCEDURE — U0002 COVID-19 LAB TEST NON-CDC: HCPCS

## 2020-09-05 PROCEDURE — 82728 ASSAY OF FERRITIN: CPT

## 2020-09-05 PROCEDURE — 83615 LACTATE (LD) (LDH) ENZYME: CPT

## 2020-09-05 PROCEDURE — 85610 PROTHROMBIN TIME: CPT

## 2020-09-05 PROCEDURE — 71045 X-RAY EXAM CHEST 1 VIEW: CPT

## 2020-09-05 RX ORDER — ONDANSETRON 2 MG/ML
4 INJECTION INTRAMUSCULAR; INTRAVENOUS ONCE
Status: COMPLETED | OUTPATIENT
Start: 2020-09-05 | End: 2020-09-05

## 2020-09-05 RX ORDER — METHYLPREDNISOLONE SODIUM SUCCINATE 125 MG/2ML
125 INJECTION, POWDER, LYOPHILIZED, FOR SOLUTION INTRAMUSCULAR; INTRAVENOUS ONCE
Status: COMPLETED | OUTPATIENT
Start: 2020-09-05 | End: 2020-09-05

## 2020-09-05 RX ORDER — 0.9 % SODIUM CHLORIDE 0.9 %
30 INTRAVENOUS SOLUTION INTRAVENOUS ONCE
Status: COMPLETED | OUTPATIENT
Start: 2020-09-05 | End: 2020-09-06

## 2020-09-05 RX ORDER — ACETAMINOPHEN 500 MG
1000 TABLET ORAL ONCE
Status: COMPLETED | OUTPATIENT
Start: 2020-09-05 | End: 2020-09-05

## 2020-09-05 RX ADMIN — METHYLPREDNISOLONE SODIUM SUCCINATE 125 MG: 125 INJECTION, POWDER, FOR SOLUTION INTRAMUSCULAR; INTRAVENOUS at 22:41

## 2020-09-05 RX ADMIN — ACETAMINOPHEN 1000 MG: 500 TABLET ORAL at 22:42

## 2020-09-05 RX ADMIN — ONDANSETRON 4 MG: 2 INJECTION INTRAMUSCULAR; INTRAVENOUS at 22:41

## 2020-09-05 RX ADMIN — SODIUM CHLORIDE 2355 ML: 9 INJECTION, SOLUTION INTRAVENOUS at 22:41

## 2020-09-05 ASSESSMENT — PAIN SCALES - GENERAL: PAINLEVEL_OUTOF10: 0

## 2020-09-06 ENCOUNTER — HOSPITAL ENCOUNTER (INPATIENT)
Age: 60
LOS: 4 days | Discharge: HOME | DRG: 720 | End: 2020-09-10
Attending: INTERNAL MEDICINE | Admitting: INTERNAL MEDICINE
Payer: MEDICARE

## 2020-09-06 VITALS
HEIGHT: 71 IN | WEIGHT: 173 LBS | OXYGEN SATURATION: 91 % | SYSTOLIC BLOOD PRESSURE: 124 MMHG | BODY MASS INDEX: 24.22 KG/M2 | RESPIRATION RATE: 18 BRPM | DIASTOLIC BLOOD PRESSURE: 80 MMHG | TEMPERATURE: 99.6 F | HEART RATE: 87 BPM

## 2020-09-06 PROBLEM — J12.82 PNEUMONIA DUE TO COVID-19 VIRUS: Status: ACTIVE | Noted: 2020-09-06

## 2020-09-06 PROBLEM — U07.1 PNEUMONIA DUE TO COVID-19 VIRUS: Status: ACTIVE | Noted: 2020-09-06

## 2020-09-06 LAB
ABO/RH: NORMAL
ABSOLUTE EOS #: 0 K/UL (ref 0–0.4)
ABSOLUTE EOS #: 0.18 K/UL (ref 0–0.4)
ABSOLUTE IMMATURE GRANULOCYTE: 0 K/UL (ref 0–0.3)
ABSOLUTE IMMATURE GRANULOCYTE: 0 K/UL (ref 0–0.3)
ABSOLUTE LYMPH #: 2.17 K/UL (ref 1–4.8)
ABSOLUTE LYMPH #: 9.7 K/UL (ref 1–4.8)
ABSOLUTE MONO #: 0.19 K/UL (ref 0.1–0.8)
ABSOLUTE MONO #: 1.09 K/UL (ref 0.2–0.8)
ALBUMIN SERPL-MCNC: 3.4 G/DL (ref 3.5–5.2)
ALBUMIN/GLOBULIN RATIO: 1 (ref 1–2.5)
ALP BLD-CCNC: 57 U/L (ref 40–129)
ALT SERPL-CCNC: 29 U/L (ref 5–41)
ANION GAP SERPL CALCULATED.3IONS-SCNC: 13 MMOL/L (ref 9–17)
ANTIBODY SCREEN: NEGATIVE
ARM BAND NUMBER: NORMAL
AST SERPL-CCNC: 42 U/L
ATYPICAL LYMPHOCYTE ABSOLUTE COUNT: 5.43 K/UL
ATYPICAL LYMPHOCYTES: 30 %
BASOPHILS # BLD: 0 %
BASOPHILS # BLD: 0 % (ref 0–2)
BASOPHILS ABSOLUTE: 0 K/UL (ref 0–0.2)
BASOPHILS ABSOLUTE: 0 K/UL (ref 0–0.2)
BILIRUB SERPL-MCNC: 0.26 MG/DL (ref 0.3–1.2)
BLOOD BANK SPECIMEN: NORMAL
BUN BLDV-MCNC: 22 MG/DL (ref 8–23)
BUN/CREAT BLD: ABNORMAL (ref 9–20)
C-REACTIVE PROTEIN: 48.3 MG/L (ref 0–5)
CALCIUM SERPL-MCNC: 8.5 MG/DL (ref 8.6–10.4)
CHLORIDE BLD-SCNC: 103 MMOL/L (ref 98–107)
CO2: 22 MMOL/L (ref 20–31)
CREAT SERPL-MCNC: 1.14 MG/DL (ref 0.7–1.2)
D-DIMER QUANTITATIVE: 0.74 MG/L FEU
DIFFERENTIAL TYPE: ABNORMAL
DIFFERENTIAL TYPE: ABNORMAL
EOSINOPHILS RELATIVE PERCENT: 0 % (ref 1–4)
EOSINOPHILS RELATIVE PERCENT: 1 % (ref 1–4)
EXPIRATION DATE: NORMAL
FERRITIN: 1929 UG/L (ref 30–400)
FIBRINOGEN: 556 MG/DL (ref 140–420)
GFR AFRICAN AMERICAN: >60 ML/MIN
GFR NON-AFRICAN AMERICAN: >60 ML/MIN
GFR SERPL CREATININE-BSD FRML MDRD: ABNORMAL ML/MIN/{1.73_M2}
GFR SERPL CREATININE-BSD FRML MDRD: ABNORMAL ML/MIN/{1.73_M2}
GLUCOSE BLD-MCNC: 200 MG/DL (ref 75–110)
GLUCOSE BLD-MCNC: 227 MG/DL (ref 75–110)
GLUCOSE BLD-MCNC: 230 MG/DL (ref 75–110)
GLUCOSE BLD-MCNC: 258 MG/DL (ref 75–110)
GLUCOSE BLD-MCNC: 262 MG/DL (ref 75–110)
GLUCOSE BLD-MCNC: 289 MG/DL (ref 70–99)
HCT VFR BLD CALC: 34.9 % (ref 40.7–50.3)
HCT VFR BLD CALC: 36 % (ref 40.7–50.3)
HEMOGLOBIN: 11.9 G/DL (ref 13–17)
HEMOGLOBIN: 12.1 G/DL (ref 13–17)
IMMATURE GRANULOCYTES: 0 %
IMMATURE GRANULOCYTES: 0 %
INR BLD: 1
LACTIC ACID, SEPSIS WHOLE BLOOD: NORMAL MMOL/L (ref 0.5–1.9)
LACTIC ACID, SEPSIS: 0.6 MMOL/L (ref 0.5–1.9)
LYMPHOCYTES # BLD: 12 % (ref 24–44)
LYMPHOCYTES # BLD: 50 % (ref 24–44)
MCH RBC QN AUTO: 29.8 PG (ref 25.2–33.5)
MCH RBC QN AUTO: 30.7 PG (ref 25.2–33.5)
MCHC RBC AUTO-ENTMCNC: 33.1 G/DL (ref 28.4–34.8)
MCHC RBC AUTO-ENTMCNC: 34.7 G/DL (ref 28.4–34.8)
MCV RBC AUTO: 88.6 FL (ref 82.6–102.9)
MCV RBC AUTO: 90 FL (ref 82.6–102.9)
MONOCYTES # BLD: 1 % (ref 1–7)
MONOCYTES # BLD: 6 % (ref 1–7)
MORPHOLOGY: ABNORMAL
MORPHOLOGY: NORMAL
NRBC AUTOMATED: 0 PER 100 WBC
NRBC AUTOMATED: 0 PER 100 WBC
PARTIAL THROMBOPLASTIN TIME: 28.8 SEC (ref 20.5–30.5)
PDW BLD-RTO: 12.5 % (ref 11.8–14.4)
PDW BLD-RTO: 12.5 % (ref 11.8–14.4)
PLATELET # BLD: 187 K/UL (ref 138–453)
PLATELET # BLD: 205 K/UL (ref 138–453)
PLATELET ESTIMATE: ABNORMAL
PLATELET ESTIMATE: ABNORMAL
PMV BLD AUTO: 10.3 FL (ref 8.1–13.5)
PMV BLD AUTO: 9.9 FL (ref 8.1–13.5)
POTASSIUM SERPL-SCNC: 4.5 MMOL/L (ref 3.7–5.3)
PROTHROMBIN TIME: 10.1 SEC (ref 9–12)
RBC # BLD: 3.94 M/UL (ref 4.21–5.77)
RBC # BLD: 4 M/UL (ref 4.21–5.77)
RBC # BLD: ABNORMAL 10*6/UL
RBC # BLD: ABNORMAL 10*6/UL
SEG NEUTROPHILS: 49 % (ref 36–66)
SEG NEUTROPHILS: 51 % (ref 36–66)
SEGMENTED NEUTROPHILS ABSOLUTE COUNT: 9.23 K/UL (ref 1.8–7.7)
SEGMENTED NEUTROPHILS ABSOLUTE COUNT: 9.51 K/UL (ref 1.8–7.7)
SODIUM BLD-SCNC: 138 MMOL/L (ref 135–144)
TOTAL PROTEIN: 6.8 G/DL (ref 6.4–8.3)
WBC # BLD: 18.1 K/UL (ref 3.5–11.3)
WBC # BLD: 19.4 K/UL (ref 3.5–11.3)
WBC # BLD: ABNORMAL 10*3/UL
WBC # BLD: ABNORMAL 10*3/UL

## 2020-09-06 PROCEDURE — 99223 1ST HOSP IP/OBS HIGH 75: CPT | Performed by: INTERNAL MEDICINE

## 2020-09-06 PROCEDURE — 87070 CULTURE OTHR SPECIMN AEROBIC: CPT

## 2020-09-06 PROCEDURE — 80053 COMPREHEN METABOLIC PANEL: CPT

## 2020-09-06 PROCEDURE — 2580000003 HC RX 258: Performed by: EMERGENCY MEDICINE

## 2020-09-06 PROCEDURE — 2580000003 HC RX 258: Performed by: NURSE PRACTITIONER

## 2020-09-06 PROCEDURE — 85384 FIBRINOGEN ACTIVITY: CPT

## 2020-09-06 PROCEDURE — 96367 TX/PROPH/DG ADDL SEQ IV INF: CPT

## 2020-09-06 PROCEDURE — 94660 CPAP INITIATION&MGMT: CPT

## 2020-09-06 PROCEDURE — 82947 ASSAY GLUCOSE BLOOD QUANT: CPT

## 2020-09-06 PROCEDURE — 6360000002 HC RX W HCPCS: Performed by: EMERGENCY MEDICINE

## 2020-09-06 PROCEDURE — 6370000000 HC RX 637 (ALT 250 FOR IP): Performed by: NURSE PRACTITIONER

## 2020-09-06 PROCEDURE — 36415 COLL VENOUS BLD VENIPUNCTURE: CPT

## 2020-09-06 PROCEDURE — 85730 THROMBOPLASTIN TIME PARTIAL: CPT

## 2020-09-06 PROCEDURE — 2580000003 HC RX 258: Performed by: INTERNAL MEDICINE

## 2020-09-06 PROCEDURE — 83605 ASSAY OF LACTIC ACID: CPT

## 2020-09-06 PROCEDURE — 86850 RBC ANTIBODY SCREEN: CPT

## 2020-09-06 PROCEDURE — 6370000000 HC RX 637 (ALT 250 FOR IP): Performed by: INTERNAL MEDICINE

## 2020-09-06 PROCEDURE — 6360000002 HC RX W HCPCS: Performed by: NURSE PRACTITIONER

## 2020-09-06 PROCEDURE — 2500000003 HC RX 250 WO HCPCS: Performed by: INTERNAL MEDICINE

## 2020-09-06 PROCEDURE — 86900 BLOOD TYPING SEROLOGIC ABO: CPT

## 2020-09-06 PROCEDURE — 85379 FIBRIN DEGRADATION QUANT: CPT

## 2020-09-06 PROCEDURE — 87205 SMEAR GRAM STAIN: CPT

## 2020-09-06 PROCEDURE — 99254 IP/OBS CNSLTJ NEW/EST MOD 60: CPT | Performed by: INTERNAL MEDICINE

## 2020-09-06 PROCEDURE — 85025 COMPLETE CBC W/AUTO DIFF WBC: CPT

## 2020-09-06 PROCEDURE — 85610 PROTHROMBIN TIME: CPT

## 2020-09-06 PROCEDURE — 2060000000 HC ICU INTERMEDIATE R&B

## 2020-09-06 PROCEDURE — 2700000000 HC OXYGEN THERAPY PER DAY

## 2020-09-06 PROCEDURE — 86901 BLOOD TYPING SEROLOGIC RH(D): CPT

## 2020-09-06 PROCEDURE — 96365 THER/PROPH/DIAG IV INF INIT: CPT

## 2020-09-06 RX ORDER — AMLODIPINE BESYLATE 10 MG/1
10 TABLET ORAL DAILY
Status: DISCONTINUED | OUTPATIENT
Start: 2020-09-06 | End: 2020-09-10 | Stop reason: HOSPADM

## 2020-09-06 RX ORDER — FLUOXETINE HYDROCHLORIDE 20 MG/1
20 CAPSULE ORAL DAILY
Status: DISCONTINUED | OUTPATIENT
Start: 2020-09-06 | End: 2020-09-06

## 2020-09-06 RX ORDER — HYDRALAZINE HYDROCHLORIDE 25 MG/1
25 TABLET, FILM COATED ORAL DAILY
Status: DISCONTINUED | OUTPATIENT
Start: 2020-09-06 | End: 2020-09-10 | Stop reason: HOSPADM

## 2020-09-06 RX ORDER — NICOTINE POLACRILEX 4 MG
15 LOZENGE BUCCAL PRN
Status: DISCONTINUED | OUTPATIENT
Start: 2020-09-06 | End: 2020-09-10 | Stop reason: HOSPADM

## 2020-09-06 RX ORDER — PROMETHAZINE HYDROCHLORIDE 25 MG/1
12.5 TABLET ORAL EVERY 6 HOURS PRN
Status: DISCONTINUED | OUTPATIENT
Start: 2020-09-06 | End: 2020-09-10 | Stop reason: HOSPADM

## 2020-09-06 RX ORDER — ACETAMINOPHEN 650 MG/1
650 SUPPOSITORY RECTAL EVERY 6 HOURS PRN
Status: DISCONTINUED | OUTPATIENT
Start: 2020-09-06 | End: 2020-09-10 | Stop reason: HOSPADM

## 2020-09-06 RX ORDER — SODIUM CHLORIDE 0.9 % (FLUSH) 0.9 %
10 SYRINGE (ML) INJECTION PRN
Status: DISCONTINUED | OUTPATIENT
Start: 2020-09-06 | End: 2020-09-10 | Stop reason: HOSPADM

## 2020-09-06 RX ORDER — SODIUM CHLORIDE 0.9 % (FLUSH) 0.9 %
10 SYRINGE (ML) INJECTION EVERY 12 HOURS SCHEDULED
Status: DISCONTINUED | OUTPATIENT
Start: 2020-09-06 | End: 2020-09-10 | Stop reason: HOSPADM

## 2020-09-06 RX ORDER — NICOTINE 21 MG/24HR
1 PATCH, TRANSDERMAL 24 HOURS TRANSDERMAL DAILY PRN
Status: DISCONTINUED | OUTPATIENT
Start: 2020-09-06 | End: 2020-09-10 | Stop reason: HOSPADM

## 2020-09-06 RX ORDER — INSULIN GLARGINE 100 [IU]/ML
16 INJECTION, SOLUTION SUBCUTANEOUS NIGHTLY
Status: DISCONTINUED | OUTPATIENT
Start: 2020-09-06 | End: 2020-09-10

## 2020-09-06 RX ORDER — DEXTROSE MONOHYDRATE 50 MG/ML
100 INJECTION, SOLUTION INTRAVENOUS PRN
Status: DISCONTINUED | OUTPATIENT
Start: 2020-09-06 | End: 2020-09-10 | Stop reason: HOSPADM

## 2020-09-06 RX ORDER — 0.9 % SODIUM CHLORIDE 0.9 %
30 INTRAVENOUS SOLUTION INTRAVENOUS PRN
Status: DISCONTINUED | OUTPATIENT
Start: 2020-09-06 | End: 2020-09-10 | Stop reason: HOSPADM

## 2020-09-06 RX ORDER — DEXTROSE MONOHYDRATE 25 G/50ML
12.5 INJECTION, SOLUTION INTRAVENOUS PRN
Status: DISCONTINUED | OUTPATIENT
Start: 2020-09-06 | End: 2020-09-10 | Stop reason: HOSPADM

## 2020-09-06 RX ORDER — ACETAMINOPHEN 325 MG/1
650 TABLET ORAL EVERY 6 HOURS PRN
Status: DISCONTINUED | OUTPATIENT
Start: 2020-09-06 | End: 2020-09-09

## 2020-09-06 RX ORDER — ACETAMINOPHEN 650 MG/1
650 SUPPOSITORY RECTAL EVERY 6 HOURS PRN
Status: DISCONTINUED | OUTPATIENT
Start: 2020-09-06 | End: 2020-09-09

## 2020-09-06 RX ORDER — FLUOXETINE HYDROCHLORIDE 20 MG/1
40 CAPSULE ORAL DAILY
Status: DISCONTINUED | OUTPATIENT
Start: 2020-09-06 | End: 2020-09-10 | Stop reason: HOSPADM

## 2020-09-06 RX ORDER — LISINOPRIL 10 MG/1
10 TABLET ORAL DAILY
Status: DISCONTINUED | OUTPATIENT
Start: 2020-09-06 | End: 2020-09-09

## 2020-09-06 RX ORDER — ACETAMINOPHEN 325 MG/1
650 TABLET ORAL EVERY 6 HOURS PRN
Status: DISCONTINUED | OUTPATIENT
Start: 2020-09-06 | End: 2020-09-10 | Stop reason: HOSPADM

## 2020-09-06 RX ORDER — ATORVASTATIN CALCIUM 10 MG/1
10 TABLET, FILM COATED ORAL DAILY
Status: DISCONTINUED | OUTPATIENT
Start: 2020-09-06 | End: 2020-09-10 | Stop reason: HOSPADM

## 2020-09-06 RX ORDER — ONDANSETRON 2 MG/ML
4 INJECTION INTRAMUSCULAR; INTRAVENOUS EVERY 6 HOURS PRN
Status: DISCONTINUED | OUTPATIENT
Start: 2020-09-06 | End: 2020-09-10 | Stop reason: HOSPADM

## 2020-09-06 RX ORDER — ASPIRIN 81 MG/1
81 TABLET, CHEWABLE ORAL DAILY
Status: DISCONTINUED | OUTPATIENT
Start: 2020-09-06 | End: 2020-09-10 | Stop reason: HOSPADM

## 2020-09-06 RX ADMIN — CEFTRIAXONE SODIUM 1 G: 1 INJECTION, POWDER, FOR SOLUTION INTRAMUSCULAR; INTRAVENOUS at 00:42

## 2020-09-06 RX ADMIN — SODIUM CHLORIDE, PRESERVATIVE FREE 10 ML: 5 INJECTION INTRAVENOUS at 21:44

## 2020-09-06 RX ADMIN — INSULIN LISPRO 3 UNITS: 100 INJECTION, SOLUTION INTRAVENOUS; SUBCUTANEOUS at 17:28

## 2020-09-06 RX ADMIN — INSULIN LISPRO 2 UNITS: 100 INJECTION, SOLUTION INTRAVENOUS; SUBCUTANEOUS at 08:31

## 2020-09-06 RX ADMIN — AMLODIPINE BESYLATE 10 MG: 10 TABLET ORAL at 12:36

## 2020-09-06 RX ADMIN — FLUOXETINE HYDROCHLORIDE 40 MG: 20 CAPSULE ORAL at 12:35

## 2020-09-06 RX ADMIN — INSULIN LISPRO 1 UNITS: 100 INJECTION, SOLUTION INTRAVENOUS; SUBCUTANEOUS at 21:00

## 2020-09-06 RX ADMIN — ENOXAPARIN SODIUM 30 MG: 30 INJECTION SUBCUTANEOUS at 21:44

## 2020-09-06 RX ADMIN — ATORVASTATIN CALCIUM 10 MG: 10 TABLET, FILM COATED ORAL at 12:36

## 2020-09-06 RX ADMIN — ASPIRIN 81 MG: 81 TABLET, CHEWABLE ORAL at 12:36

## 2020-09-06 RX ADMIN — INSULIN LISPRO 3 UNITS: 100 INJECTION, SOLUTION INTRAVENOUS; SUBCUTANEOUS at 12:46

## 2020-09-06 RX ADMIN — AZITHROMYCIN MONOHYDRATE 500 MG: 500 INJECTION, POWDER, LYOPHILIZED, FOR SOLUTION INTRAVENOUS at 01:16

## 2020-09-06 RX ADMIN — SODIUM CHLORIDE, PRESERVATIVE FREE 10 ML: 5 INJECTION INTRAVENOUS at 08:26

## 2020-09-06 RX ADMIN — Medication 10 ML: at 12:37

## 2020-09-06 RX ADMIN — INSULIN GLARGINE 16 UNITS: 100 INJECTION, SOLUTION SUBCUTANEOUS at 21:00

## 2020-09-06 RX ADMIN — HYDRALAZINE HYDROCHLORIDE 25 MG: 25 TABLET ORAL at 12:36

## 2020-09-06 RX ADMIN — ENOXAPARIN SODIUM 30 MG: 30 INJECTION SUBCUTANEOUS at 08:25

## 2020-09-06 RX ADMIN — REMDESIVIR 200 MG: 100 INJECTION, POWDER, LYOPHILIZED, FOR SOLUTION INTRAVENOUS at 12:37

## 2020-09-06 ASSESSMENT — PAIN SCALES - GENERAL: PAINLEVEL_OUTOF10: 0

## 2020-09-06 NOTE — PLAN OF CARE
Problem: Airway Clearance - Ineffective  Goal: Achieve or maintain patent airway  Outcome: Ongoing     Problem: Gas Exchange - Impaired  Goal: Absence of hypoxia  Outcome: Ongoing  Goal: Promote optimal lung function  Outcome: Ongoing     Problem: Breathing Pattern - Ineffective  Goal: Ability to achieve and maintain a regular respiratory rate  Outcome: Ongoing     Problem:  Body Temperature -  Risk of, Imbalanced  Goal: Ability to maintain a body temperature within defined limits  Outcome: Ongoing  Goal: Will regain or maintain usual level of consciousness  Outcome: Ongoing  Goal: Complications related to the disease process, condition or treatment will be avoided or minimized  Outcome: Ongoing     Problem: Isolation Precautions - Risk of Spread of Infection  Goal: Prevent transmission of infection  Outcome: Ongoing     Problem: Nutrition Deficits  Goal: Optimize nutrtional status  Outcome: Ongoing     Problem: Risk for Fluid Volume Deficit  Goal: Maintain normal heart rhythm  Outcome: Ongoing  Goal: Maintain absence of muscle cramping  Outcome: Ongoing  Goal: Maintain normal serum potassium, sodium, calcium, phosphorus, and pH  Outcome: Ongoing     Problem: Loneliness or Risk for Loneliness  Goal: Demonstrate positive use of time alone when socialization is not possible  Outcome: Ongoing     Problem: Fatigue  Goal: Verbalize increase energy and improved vitality  Outcome: Ongoing     Problem: Patient Education: Go to Patient Education Activity  Goal: Patient/Family Education  Outcome: Ongoing     Problem: Skin Integrity:  Goal: Will show no infection signs and symptoms  Description: Will show no infection signs and symptoms  Outcome: Ongoing  Goal: Absence of new skin breakdown  Description: Absence of new skin breakdown  Outcome: Ongoing

## 2020-09-06 NOTE — ED PROVIDER NOTES
[address] [place] [address] ED  eMERGENCY dEPARTMENT eNCOUnter      Pt Name: Belle Escobedo  MRN: [id number]  [place] 1960  Date of evaluation: 9/5/2020  Provider: Mariela Holland MD    CHIEF COMPLAINT       Chief Complaint   Patient presents with    Cough     exposed to covid by sister - pt reports cough progressively getting worse    Shortness of Breath     90-91 % on RA in triage. HISTORY OF PRESENT ILLNESS  (Location/Symptom, Timing/Onset, Context/Setting, Quality, Duration, Modifying Factors, Severity.)   Belle Escobedo is a 61 y.o. male who presents to the emergency department for evaluation of fever and cough. Patient is diabetic, has had recent stroke with right-sided deficit. He resides in a home with his 2 parents and 2 siblings and the children of 1 of the siblings. Within this family setting 1 sister is positive for and symptomatic with COVID. Patient is now here with his second sister who are both symptomatic with fever cough progressive weakness. Marquez [place] also has diarrhea symptoms. He has persistent cough and very thick secretions. He is noted to be hypoxic on arrival      Nursing Notes were reviewed. ALLERGIES     D[employer] [lactase-lactobacillus]    CURRENT MEDICATIONS       Previous Medications    AMLODIPINE (NORVASC) 10 MG TABLET    TAKE ONE TABLET BY MOUTH DAILY    ASPIRIN 81 MG CHEWABLE TABLET    Take 81 mg by mouth daily    ATORVASTATIN (LIPITOR) 10 MG TABLET    Take 1 tablet by mouth daily    BLOOD GLUCOSE MONITOR KIT AND SUPPLIES    Test 3  times a day & as needed for symptoms of irregular blood glucose. ONE TOUCH METER    BLOOD GLUCOSE MONITOR STRIPS    Test 3 times a day & as needed for symptoms of irregular blood glucose. ELASTIC BANDAGES & SUPPORTS (KNEE BRACE) [facility] Knee Cage Brace.     FLUOXETINE (PROZAC) 20 MG CAPSULE    Take 1 capsule by mouth daily    FLUOXETINE (PROZAC) 40 MG CAPSULE    TAKE ONE CAPSULE BY MOUTH DAILY    HYDRALAZINE (APRESOLINE) 25 MG TABLET TAKE ONE TABLET BY MOUTH DAILY    INSULIN LISPRO, 1 UNIT DIAL, (HUMALOG KWIKPEN) 100 UNIT/ML SOPN    According to sliding scale. Max dose per day 48    INSULIN PEN NEEDLE (PEN NEEDLES) 31G X 6 MM MISC    Inject 1 each into the skin daily    INSULIN PEN NEEDLE 31G X 8 MM MISC    1 each by Does not apply route daily    LANCETS MISC    1 each by Does not apply route 2 times daily    LEVEMIR FLEXTOUCH 100 UNIT/ML INJECTION PEN    Inject 16 Units into the skin nightly    LISINOPRIL (PRINIVIL;ZESTRIL) 10 MG TABLET    TAKE ONE TABLET BY MOUTH DAILY    POTASSIUM CHLORIDE (KLOR-CON M) 20 MEQ TBCR EXTENDED RELEASE TABLET    Take 1 tablet by mouth once for 1 dose       PAST MEDICAL HISTORY         Diagnosis Date    Cerebral artery occlusion with cerebral infarction ([address]     Diabetes mellitus ([address]     High cholesterol     Hypertension        SURGICAL HISTORY           Procedure Laterality Date    KNEE SURGERY  1983         FAMILY HISTORY           Problem Relation Age of Onset    Hypertension Mother     Hypertension Father     Hypertension Sister     Hypertension Maternal Grandmother     Cancer Maternal Grandmother     Hypertension Maternal Grandfather     Migraines Paternal Grandmother     Hypertension Paternal Grandfather      Family Status   Relation Name Status    Mother  (Not Specified)    Father  (Not Specified)    Sister  (Not Specified)    MGM  (Not Specified)    MGF  (Not Specified)    PGM  (Not Specified)    PGF  (Not Specified)        SOCIAL HISTORY      reports that he has never smoked. He has never used smokeless tobacco. He reports previous alcohol use. He reports that he does not use drugs. REVIEW OF SYSTEMS    (2-9 systems for level 4, 10 or more for level 5)     Review of Systems   Unable to perform ROS: Acuity of condition       Except as noted above the remainder of the review of systems was reviewed and negative.      PHYSICAL EXAM    (up to 7 for level 4, 8 or more for level 5) Vitals:    09/05/20 2208   BP: (!) 153/80   Pulse: 102   Resp: 18   Temp: 101.1 °F (38.4 °C)   TempSrc: Oral   SpO2: 91%   Weight: 173 lb (78.5 kg)   Height: 5' 11\" (1.803 m)       Physical exam reflects an ill-appearing male. Due to his CVA he does not speak he does however respond appropriately to questions. He is febrile. Sats 89 to 90% on room air. He is hypoxic. He has persistent cough with persistent secretions he spitting up into a basin because of the drainage. Oral pharyngeal exam is without lesion. Heart regular rate and rhythm normal S1-S2 no murmurs rubs gallops. Lungs display diffuse rales. He is very diminished in the periphery. Abdomen is soft throughout no focal pain. He has chronic sequela from a stroke affecting the right arm right leg. Otherwise extremities without acute abnormality. Visualized integument without rash or lesion. He has no acute neurovascular deficits      DIAGNOSTIC RESULTS       RADIOLOGY:   Non-plain film images such as CT, Ultrasound and MRI are read by the radiologist. Plain radiographic images are visualized and preliminarily interpreted by the emergency physician with the below findings:        Interpretation per the Radiologist below, if available at the time of this note:    XR CHEST PORTABLE   Final Result   Subtle ground-glass opacities primarily lung bases, likely atelectasis   although infectious process is possible in the proper clinical setting.                LABS:  Labs Reviewed   BASIC METABOLIC PANEL - Abnormal; Notable for the following components:       Result Value    Glucose 122 (*)     BUN 24 (*)     CREATININE 1.57 (*)     GFR Non- 45 (*)     GFR  55 (*)     All other components within normal limits   CBC WITH AUTO DIFFERENTIAL - Abnormal; Notable for the following components:    WBC 18.1 (*)     RBC 4.00 (*)     Hemoglobin 11.9 (*)     Hematocrit 36.0 (*)     All other components within normal limits LACTATE DEHYDROGENASE - Abnormal; Notable for the following components:     (*)     All other components within normal limits   HEPATIC FUNCTION PANEL - Abnormal; Notable for the following components:    AST 45 (*)     All other components within normal limits   COVID-19 - Abnormal; Notable for the following components:    SARS-CoV-2, Rapid DETECTED (*)     All other components within normal limits   RAPID INFLUENZA A/B ANTIGENS   CULTURE, BLOOD 1   CULTURE, BLOOD 1   CULTURE, URINE   PROTIME-INR   LACTATE, SEPSIS   C-REACTIVE PROTEIN   FERRITIN   LACTATE, SEPSIS   URINALYSIS       All other labs were within normal range or not returned as of this dictation. EMERGENCY DEPARTMENT COURSE and DIFFERENTIAL DIAGNOSIS/MDM:   Vitals:    Vitals:    09/05/20 2208   BP: (!) 153/80   Pulse: 102   Resp: 18   Temp: 101.1 °F (38.4 °C)   TempSrc: Oral   SpO2: 91%   Weight: 173 lb (78.5 kg)   Height: 5' 11\" (1.803 m)     Patient is evaluated. He will require admission. Per protocol testing of borderline patients that require admission is recommended COVID is requested. Additional laboratory studies as well as chest x-ray requested he is treated with IV fluids steroids and symptomatic medications. Patient is found to be COVID positive. He has evidence of pneumonia on chest x-ray. Blood cultures had been obtained pending reevaluation he is covered with Rocephin and azithromycin he did receive IV steroids. He is transferred to the [facility] unit for further care    CONSULTS:  [address] [place] [address] Rd:  None    FINAL IMPRESSION      1. Acute respiratory disease due to COVID-19 virus    2. History of cerebrovascular accident (CVA) with residual deficit          DISPOSITION/PLAN   DISPOSITION Decision To Transfer 09/05/2020 11:46:33 PM      PATIENT REFERRED TO:   No follow-up provider specified.     DISCHARGE MEDICATIONS:     New Prescriptions    No medications on file           (Please note that

## 2020-09-06 NOTE — CONSULTS
[facility] - Initial Consult Note COVID 19 Patient  Today's Date and Time: 9/6/2020, 10:54 AM    Impression :   · COVID 19 Suspect  · COVID 19 Confirmed Infection  · COVID-19 tests:  · 9/5/2020 positive  · High inflammatory markers  Recommendations:   · Monitor off antibiotics  · Clinical Research will approach patient to explore if he qualifies for any of the COVID 19 treatment protocols. · We will start Remdesivir  · Will discuss convalescent plasma. Patient would benefit from this treatment      Medical Decision Making/Summary/Discussion:9/6/2020     · Patient admitted with suspected COVID 19 infection  · Covid test confirmed positive. Infection Control Recommendations   · Universal Precautions  · Airborne isolation  · Droplet Isolation    Antimicrobial Stewardship Recommendations     · Discontinuation of therapy  Coordination of Outpatient Care:   · Estimated Length of IV antimicrobials:TBD  · Patient will need Midline Catheter Insertion: TBD  · Patient will need PICC line Insertion: No  · Patient will need: Home IV , [place],  SNF,  LTAC:TBD  · Patient will need outpatient wound care:No    Chief complaint/reason for consultation:   · Concern for COVID infection      History of Present Illness:   Chai Calvillo is a 61y.o.-year-old  male who was initially admitted on 9/6/2020. Patient seen at the request of Dr. Go Ferguson:    Patient presented through ER with complaints of fever, progressive weakness and cough. He was concerned about been told with positive because 1 of his family members, who reside in his home  tested positive. The patient in the emergency room had a positive test on 9/5/2020. He was also found to be hypoxic. His chest x-ray on 9/5/2020 showed subtle basilar changes suggestive of atelectasis or atypical pneumonia.     He gives a pre-existing history of diabetes mellitus type 2, prior stroke with residual right-sided deficit. Patient admitted because of concerns with COVID 19.    CURRENT EVALUATION : 9/6/2020    Afebrile to low-grade fever  VS stable    Patient exhibiting respiratory distress. Yes  Respiratory secretions: No    Patient receiving supplemental oxygen. 3 L nasal oxygen  02 sat 93%  RR 20    QTc:           NEWS Score: 0-4 Low risk group; 5-6: Medium risk group; 7 or above: High risk group  Parameters 3 2 1 0 1 2 3   Age    < 65   ? 65   RR ? 8  9-11 12-20  21-24 ? 25   O2 Sats ? 91 92-93 94-95 ? 96      Suppl O2  Yes  No      SBP ? 90  101-110 111-219   ? 220   HR ? 40  41-50 51-90  111-130 ? 131   Consciousness    Alert   Drowsiness, lethargy, or confusion   Temperature ? 35.0 C (95.0 F)  35.1-36.0 C 95.1-96.9 F 36.1-38.0 C 97.0-100.4 F 38.1-39.0 C 100.5-102.3 F ? 39.1 C ? 102.4 F      NEWS Score:   9/6/2020: 5 moderate risk    Overall Daily Picture:      Worsening    Presence of secondary bacterial Infection:    No   Additional antibiotics: No    Labs, X rays reviewed: 9/6/2020    BUN: 24  Cr: 1.57    WBC: 18.1  Hb: 11.9  Plat: 187    Absolute Neutrophils: 9.23  Absolute Lymphocytes: 2.17  Neutrophil/Lymphocyte Ratio: 3.4 low risk    CRP: 48.3  Ferritin: 1929  LDH: 387    Pro Calcitonin:      Cultures:  Urine:  ·   Blood:  · 9/5/2020 x 2 no growth  Sputum :  ·   Wound:     Rapid influenza 9/5/2020 negative    CXR:   · 9/5/2020 subtle groundglass opacities at the lung bases  CAT:      Discussed with patient, RN, CC, IM. I have personally reviewed the past medical history, past surgical history, medications, social history, and family history, and I have updated the database accordingly.   Past Medical History:     Past Medical History:   Diagnosis Date    [facility]erebral artery occlusion with cerebral infarction ([facility] [address]     Diabetes mellitus ([facility] [address] [facility]     High cholesterol     Hypertension        Past Surgical  History:     Past Surgical History:   Procedure Laterality Date   9 [facility] Medications:      enoxaparin  30 mg Subcutaneous BID    sodium chloride flush  10 mL Intravenous 2 times per day    [START ON 9/7/2020] azithromycin  500 mg Intravenous Q24H    And    [START ON 9/7/2020] cefTRIAXone (ROCEPHIN) IV  1 g Intravenous Q24H    insulin lispro  0-6 Units Subcutaneous TID WC    insulin lispro  0-3 Units Subcutaneous Nightly    amLODIPine  10 mg Oral Daily    aspirin  81 mg Oral Daily    atorvastatin  10 mg Oral Daily    FLUoxetine  40 mg Oral Daily    [Held by provider] lisinopril  10 mg Oral Daily    hydrALAZINE  25 mg Oral Daily    insulin glargine  16 Units Subcutaneous Nightly       Social History:     Social History     Socioeconomic History    Marital status: Single     Spouse name: Not on file    Number of children: Not on file    Years of education: Not on file    Highest education level: Not on file   Occupational History    Not on file   Social Needs    Financial resource strain: Not on file    Food insecurity     Worry: Not on file     Inability: Not on file   [employer] needs     Medical: Not on file     Non-medical: Not on file   Tobacco Use    Smoking status: Never Smoker    Smokeless tobacco: Never Used   Substance and Sexual Activity    Alcohol use: Not Currently    Drug use: Never    Sexual activity: Not on file   Lifestyle    Physical activity     Days per week: Not on file     Minutes per session: Not on file    Stress: Not on file   Relationships    Social connections     Talks on phone: Not on file     Gets together: Not on file     Attends Religion service: Not on file     Active member of club or organization: Not on file     Attends meetings of clubs or organizations: Not on file     Relationship status: Not on file    Intimate partner violence     Fear of current or ex partner: Not on file     Emotionally abused: Not on file     Physically abused: Not on file     Forced sexual activity: Not on file   Other Topics Concern    Not on file   Social History Narrative    Not on file       Family History:     Family History   Problem Relation Age of Onset    Hypertension Mother     Hypertension Father     Hypertension Sister     Hypertension Maternal Grandmother     Cancer Maternal Grandmother     Hypertension Maternal Grandfather     Migraines Paternal Grandmother     Hypertension Paternal Grandfather         Allergies:   Dairycare [lactase-lactobacillus]     Review of Systems:     Unable to provide. Sedated on ventilator. 9/6/2020      Constitutional: No fevers or chills. No systemic complaints  Head: No headaches  Eyes: No double vision or blurry vision. No conjunctival inflammation. ENT: No sore throat or runny nose. . No hearing loss, tinnitus or vertigo. Cardiovascular: No chest pain or palpitations. No shortness of breath. No SLATER  Lung: No shortness of breath or cough. No sputum production  Abdomen: No nausea, vomiting, diarrhea, or abdominal pain. Edel Messing No cramps. Genitourinary: No increased urinary frequency, or dysuria. No hematuria. No suprapubic or CVA pain  Musculoskeletal: No muscle aches or pains. No joint effusions, swelling or deformities  Hematologic: No bleeding or bruising. Neurologic: No headache, weakness, numbness, or tingling. Integument: No rash, no ulcers. Psychiatric: No depression. Endocrine: No polyuria, no polydipsia, no polyphagia. Physical Examination :     Patient Vitals for the past 8 hrs:   BP Temp Temp src Pulse Resp SpO2   09/06/20 0822 (!) 142/86 98 °F (36.7 °C) Oral 81 20 --   09/06/20 0604 -- -- -- -- 16 --   09/06/20 0430 (!) 146/79 98.5 °F (36.9 °C) Axillary 80 21 93 %     General Appearance: Awake, alert, and in no apparent distress  Head:  Normocephalic, no trauma  Eyes: Pupils equal, round, reactive to light and accommodation; extraocular movements intact; sclera anicteric; conjunctivae pink. No embolic phenomena. ENT: Oropharynx clear, without erythema, exudate, or thrush.  No tenderness of sinuses. Mouth/throat: mucosa pink and moist. No lesions. Dentition in good repair. Neck:Supple, without lymphadenopathy. Thyroid normal, No bruits. Pulmonary/Chest: Clear to auscultation, without wheezes, rales, or rhonchi. No dullness to percussion. Cardiovascular: Regular rate and rhythm without murmurs, rubs, or gallops. Abdomen: Soft, non tender. Bowel sounds normal. No organomegaly  All four Extremities: No cyanosis, clubbing, edema, or effusions. Neurologic: No gross sensory or motor deficits. Skin: Warm and dry with good turgor. No signs of peripheral arterial or venous insufficiency. No ulcerations. No open wounds. Medical Decision Making -Laboratory:   I have independently reviewed/ordered the following labs:    CBC with Differential:   Recent Labs     09/05/20  2240   WBC 18.1*   HGB 11.9*   HCT 36.0*      LYMPHOPCT 12*   MONOPCT 6     BMP:   Recent Labs     09/05/20  2240      K 4.2      CO2 26   BUN 24*   CREATININE 1.57*     Hepatic Function Panel:   Recent Labs     09/05/20  2240   PROT 7.0   LABALBU 3.7   BILIDIR 0.10   IBILI 0.29   BILITOT 0.39   ALKPHOS 59   ALT 27   AST 45*     No results for input(s): RPR in the last 72 hours. No results for input(s): HIV in the last 72 hours. No results for input(s): BC in the last 72 hours.   Lab Results   Component Value Date    MUCUS NOT REPORTED 03/10/2020    RBC 4.00 09/05/2020    TRICHOMONAS NOT REPORTED 03/10/2020    WBC 18.1 09/05/2020    YEAST NOT REPORTED 03/10/2020    TURBIDITY CLEAR 03/10/2020     Lab Results   Component Value Date    CREATININE 1.57 09/05/2020    GLUCOSE 122 09/05/2020       Medical Decision Making-Imaging:     EXAMINATION:    ONE XRAY VIEW OF THE CHEST         9/5/2020 10:56 pm         COMPARISON:    None.         HISTORY:    ORDERING SYSTEM PROVIDED HISTORY: SOB    TECHNOLOGIST PROVIDED HISTORY:    SOB    Reason for Exam: cough    Acuity: Acute    Type of Exam: Initial         FINDINGS: Single portable frontal view of the chest is submitted for review.  The    cardiac silhouette is normal in size.  Bibasilar dependent atelectasis.  No    definite focal airspace consolidation, sizeable pleural effusion, or    pneumothorax. Fitz [place] is midline.  Visualized osseous structures and soft    tissues are grossly intact.              Impression    Subtle ground-glass opacities primarily lung bases, likely atelectasis    although infectious process is possible in the proper clinical setting. Medical Decision [id number]:       Medical Decision Making-Other:     Note:  · Labs, medications, radiologic studies were reviewed with personal review of films  · Large amounts of data were reviewed  · Discussed with nursing Staff, Discharge planner  · Infection Control and Prevention measures reviewed  · All prior entries were reviewed  · Administer medications as ordered  · Prognosis: Guarded  · Discharge planning reviewed  · Follow up as outpatient. Thank you for allowing us to participate in the care of this patient. Please call with questions.     Kirsty Gibson MD  Pager: [phone] - Office: [phone]

## 2020-09-06 NOTE — PROGRESS NOTES
NONINVASIVE VENTILATION    PROVIDE OPTIMAL VENTILATION/ACCEPTABLE SPO2   IMPLEMENT NONINVASIVE VENTILATION PROTOCOL   MAINTAIN ACCEPTABLE SPO2   ASSESS SKIN INTEGRITY/BREAKDOWN SCORE   PATIENT EDUCATION AS NEEDED   BIPAP AS NEEDED        Patient placed on BIPAP d/t frequent desats into low 70's SpO2. Patient tolerating NIV at this time. Patient also stated that someone once told him he had ZAHRAA but does not own a home CPAP machine.  Dr. Nyla Houston aware

## 2020-09-06 NOTE — H&P
[facility]  Office: [address], DO, Geovanny Nova, DO, Monica Garrison, DO, Cheryle Reese, DO, Emanuel Maravilla MD, Paola Barker MD, Kim Nino MD, Jacek Alejo MD, Gayle Solano MD, Kimberly Cosme MD, Doc Bustos MD, Gerardo Gunter MD, Erin Bolaños MD, Jabier Waters DO, Heriberto Navarrete MD, Rik Conde MD, Cristiano Bonilla, DO, Beverley Mar MD,  Nara Storm, DO, Den Reddy MD, Daria Piedra MD, Jama Jacobs, [facility], [facility], CNP, Xavier Bright, CNP, Jayson Blizzard, CNS, Simeon Darnell, [facility], Toy Vigil, CNP, Mary Candelario, CNP, Nanette Miller, CNP, Denisse Finch, CNP, Erick Beatty PA-C, Alfonso Farrell, [facility], Petros Maloney, CNP, John Fallon, CNP, Veda Collins, CNP, Jannette Ko, CNP, Marc Benavides, [facility]    HISTORY AND PHYSICAL EXAMINATION            Date:   9/6/2020  Patient name:  Bo Mnoroe  Date of admission:  9/6/2020  4:17 AM  MRN:   [id number]  Account:  [de-identified]  YOB: 1960  PCP:    Rachel Bedoya MD  Room:   [address] [place] [address]  Code Status:    Full Code    Chief Complaint:     No chief complaint on file. Cough     History Obtained From:     patient    History of Present Illness: This is a 59-year-old male with a history of CVA, diabetes, hyperlipidemia and hypertension who came in for evaluation of fever and cough. Patient initially presented to [facility]'s emergency department where he was diagnosed with COVID-19. Patient recently had a stroke with right-sided deficit. He lives in home with his parents and 2 siblings and children of 1 of the siblings. His sister had tested positive and was symptomatic for COVID-19. Patient reports that his other sister and himself have been experiencing symptoms as well. Patient complains of persistent cough. Initially he was noted to be hypoxic with oxygen saturation of 90 to 91% on room air.   Patient was transferred to [facility] for other treatment. This morning patient reports that he is feeling well and denies any complaints except for persistent cough. Patient denies any headache, dizziness, acute changes with vision or hearing, nausea, vomiting, chest pain, abdominal pain, trouble with his urination. He does report having diarrheal episodes. Past Medical History:     Past Medical History:   Diagnosis Date    Cerebral artery occlusion with cerebral infarction ([facility]     Diabetes mellitus ([facility]     High cholesterol     Hypertension         Past Surgical History:     Past Surgical History:   Procedure Laterality Date    KNEE SURGERY  1983        Medications Prior to Admission:     Prior to Admission medications    Medication Sig Start Date End Date Taking? Authorizing Provider   atorvastatin (LIPITOR) 10 MG tablet Take 1 tablet by mouth daily 7/29/20   Aileen Phan MD   lisinopril (PRINIVIL;ZESTRIL) 10 MG tablet TAKE ONE TABLET BY MOUTH DAILY 7/28/20   Vena Area, MD   hydrALAZINE (APRESOLINE) 25 MG tablet TAKE ONE TABLET BY MOUTH DAILY 7/28/20   Vena Area, MD   amLODIPine (NORVASC) 10 MG tablet TAKE ONE TABLET BY MOUTH DAILY 7/28/20   Vena Area, MD   FLUoxetine (PROZAC) 40 MG capsule TAKE ONE CAPSULE BY MOUTH DAILY 7/28/20   Jacqueline Luna, MD   insulin lispro, 1 Unit Dial, (HUMALOG KWIKPEN) 100 UNIT/ML SOPN According to sliding scale. Max dose per day 48 7/2/20   Jacqueline Luna MD   Insulin Pen Needle 31G X 8 MM MISC 1 each by Does not apply route daily 7/2/20   Jacqueline Luna MD   Lancets MISC 1 each by Does not apply route 2 times daily 7/2/20   Jacqueline Luna MD   FLUoxetine (PROZAC) 20 MG capsule Take 1 capsule by mouth daily 6/16/20   Aileen Phan MD   blood glucose monitor strips Test 3 times a day & as needed for symptoms of irregular blood glucose.  5/5/20   Clifton Haider MD   blood glucose monitor kit and supplies Test 3  times a day & as needed for symptoms of irregular blood glucose. ONE TOUCH METER 5/5/20   Jonathon May MD   aspirin 81 MG chewable tablet Take 81 mg by mouth daily    Historical Provider, MD LOWE FLEXTOUCH 100 UNIT/ML injection pen Inject 16 Units into the skin nightly 2/24/20   Jonathon May MD   Insulin Pen Needle (PEN NEEDLES) 31G X 6 MM MISC Inject 1 each into the skin daily 2/24/20   Jonathon May MD   Elastic Bandages & Supports (KNEE BRACE) MIS Malay Knee Cage Brace. 2/3/20   Meche Ray MD   potassium chloride (KLOR-CON M) 20 MEQ TBCR extended release tablet Take 1 tablet by mouth once for 1 dose 12/5/19 3/10/20  Destinee Saldivar MD        Allergies:     Dairycare [lactase-lactobacillus]    Social History:     Tobacco:    reports that he has never smoked. He has never used smokeless tobacco.  Alcohol:      reports previous alcohol use. Drug Use:  reports no history of drug use. Family History:     Family History   Problem Relation Age of Onset    Hypertension Mother     Hypertension Father     Hypertension Sister     Hypertension Maternal Grandmother     Cancer Maternal Grandmother     Hypertension Maternal Grandfather     Migraines Paternal Grandmother     Hypertension Paternal Grandfather        Review of Systems:     Positive and Negative as described in HPI.     CONSTITUTIONAL:  negative for fevers, chills, sweats, fatigue, weight loss  HEENT:  negative for vision, hearing changes, runny nose, throat pain  RESPIRATORY:  negative for shortness of breath, cough, congestion, wheezing  CARDIOVASCULAR:  negative for chest pain, palpitations  GASTROINTESTINAL:  negative for nausea, vomiting, diarrhea, constipation, change in bowel habits, abdominal pain   GENITOURINARY:  negative for difficulty of urination, burning with urination, frequency   INTEGUMENT:  negative for rash, skin lesions, easy bruising   HEMATOLOGIC/LYMPHATIC:  negative for swelling/edema   ALLERGIC/IMMUNOLOGIC:  negative for urticaria , itching  ENDOCRINE:  negative increase in drinking, increase in urination, hot or cold intolerance  MUSCULOSKELETAL:  negative joint pains, muscle aches, swelling of joints  NEUROLOGICAL:  negative for headaches, dizziness, lightheadedness, numbness, pain, tingling extremities  BEHAVIOR/PSYCH:  negative for depression, anxiety    Physical Exam:   BP (!) 142/86   Pulse 81   Temp 98 °F (36.7 °C) (Oral)   Resp 20   SpO2 93%   Temp (24hrs), Av.3 °F (37.4 °C), Min:98 °F (36.7 °C), Max:101.1 °F (38.4 °C)    Recent Labs     20  0528 20  0822   POCGLU 200* 230*     No intake or output data in the 24 hours ending 20 1016    General Appearance: alert, well appearing, and in no acute distress  Mental status: oriented to person, place, and time  Head: normocephalic, atraumatic  Eye: no icterus, redness, pupils equal and reactive, extraocular eye movements intact, conjunctiva clear  Ear: normal external ear, no discharge, hearing intact  Nose: no drainage noted  Mouth: mucous membranes moist  Neck: supple, no carotid bruits, thyroid not palpable  Lungs: Coarse lung sounds noted bilaterally  Cardiovascular: normal rate, regular rhythm, no murmur, gallop, rub  Abdomen: Soft, nontender, nondistended, normal bowel sounds, no hepatomegaly or splenomegaly  Neurologic: There are no new focal motor or sensory deficits, normal muscle tone and bulk, no abnormal sensation, normal speech, cranial nerves II through XII grossly intact  Skin: No gross lesions, rashes, bruising or bleeding on exposed skin area  Extremities: peripheral pulses palpable, no pedal edema or calf pain with palpation  Psych: normal affect    Investigations:      Laboratory Testing:  Recent Results (from the past 24 hour(s))   Basic Metabolic Panel    Collection Time: 20 10:40 PM   Result Value Ref Range    Glucose 122 (H) 70 - 99 mg/dL    BUN 24 (H) 8 - 23 mg/dL    CREATININE 1.57 (H) 0.70 - 1.20 mg/dL    Bun/Cre Ratio 15 9 - 20 Calcium 8.8 8.6 - 10.4 mg/dL    Sodium 138 135 - 144 mmol/L    Potassium 4.2 3.7 - 5.3 mmol/L    Chloride 101 98 - 107 mmol/L    CO2 26 20 - 31 mmol/L    Anion Gap 11 9 - 17 mmol/L    GFR Non-African American 45 (L) >60 mL/min    GFR  55 (L) >60 mL/min    GFR Comment          GFR Staging NOT REPORTED    CBC Auto Differential    Collection Time: 09/05/20 10:40 PM   Result Value Ref Range    WBC 18.1 (H) 3.5 - 11.3 k/uL    RBC 4.00 (L) 4.21 - 5.77 m/uL    Hemoglobin 11.9 (L) 13.0 - 17.0 g/dL    Hematocrit 36.0 (L) 40.7 - 50.3 %    MCV 90.0 82.6 - 102.9 fL    MCH 29.8 25.2 - 33.5 pg    MCHC 33.1 28.4 - 34.8 g/dL    RDW 12.5 11.8 - 14.4 %    Platelets 659 299 - 678 k/uL    MPV 9.9 8.1 - 13.5 fL    NRBC Automated 0.0 0.0 per 100 WBC    Differential Type NOT REPORTED     WBC Morphology NOT REPORTED     RBC Morphology NOT REPORTED     Platelet Estimate NOT REPORTED     Seg Neutrophils 51 36 - 66 %    Lymphocytes 12 (L) 24 - 44 %    Atypical Lymphocytes 30 %    Monocytes 6 1 - 7 %    Eosinophils % 1 1 - 4 %    Basophils 0 %    Immature Granulocytes 0 0 %    Segs Absolute 9.23 (H) 1.8 - 7.7 k/uL    Absolute Lymph # 2.17 1.0 - 4.8 k/uL    Atypical Lymphocytes Absolute 5.43 k/uL    Absolute Mono # 1.09 (H) 0.2 - 0.8 k/uL    Absolute Eos # 0.18 0.0 - 0.4 k/uL    Basophils Absolute 0.00 0.0 - 0.2 k/uL    Absolute Immature Granulocyte 0.00 0.00 - 0.30 k/uL    Morphology INCREASED BANDS PRESENT    LACTATE DEHYDROGENASE    Collection Time: 09/05/20 10:40 PM   Result Value Ref Range     (H) 135 - 225 U/L   FERRITIN    Collection Time: 09/05/20 10:40 PM   Result Value Ref Range    Ferritin 1,929 (H) 30 - 400 ug/L   Protime-INR    Collection Time: 09/05/20 10:40 PM   Result Value Ref Range    Protime 12.8 11.5 - 14.2 sec    INR 1.0    Hepatic Function Panel    Collection Time: 09/05/20 10:40 PM   Result Value Ref Range    Alb 3.7 3.5 - 5.2 g/dL    Alkaline Phosphatase 59 40 - 129 U/L    ALT 27 5 - 41 U/L    AST 45 (H) <40 U/L    Total Bilirubin 0.39 0.3 - 1.2 mg/dL    Bilirubin, Direct 0.10 <0.31 mg/dL    Bilirubin, Indirect 0.29 0.00 - 1.00 mg/dL    Total Protein 7.0 6.4 - 8.3 g/dL    Globulin NOT REPORTED 1.5 - 3.8 g/dL    Albumin/Globulin Ratio NOT REPORTED 1.0 - 2.5   Lactate, Sepsis    Collection Time: 09/05/20 10:40 PM   Result Value Ref Range    Lactic Acid, Sepsis 1.4 0.5 - 1.9 mmol/L    Lactic Acid, Sepsis, Whole Blood NOT REPORTED 0.5 - 1.9 mmol/L   Flu A/B Ag Detection    Collection Time: 09/05/20 11:06 PM    Specimen: Nasopharyngeal Swab   Result Value Ref Range    Specimen Description . NASOPHARYNGEAL SWAB     Special Requests NOT REPORTED     Direct Exam       NEGATIVE for Influenza A + B antigens. PCR testing to confirm this result is available upon request.  Specimen will be saved in the laboratory for 7 days. Please call [phone] if PCR testing is indicated. COVID-19    Collection Time: 09/05/20 11:08 PM    Specimen: Other   Result Value Ref Range    SARS-CoV-2          SARS-CoV-2, Rapid DETECTED (A) Not Detected    Source . NASOPHARYNGEAL SWAB     SARS-CoV-2, PCR         Lactate, Sepsis    Collection Time: 09/06/20 12:40 AM   Result Value Ref Range    Lactic Acid, Sepsis 0.6 0.5 - 1.9 mmol/L    Lactic Acid, Sepsis, Whole Blood NOT REPORTED 0.5 - 1.9 mmol/L   POC Glucose Fingerstick    Collection Time: 09/06/20  5:28 AM   Result Value Ref Range    POC Glucose 200 (H) 75 - 110 mg/dL   POC Glucose Fingerstick    Collection Time: 09/06/20  8:22 AM   Result Value Ref Range    POC Glucose 230 (H) 75 - 110 mg/dL       Imaging/Diagnostics:  Xr Chest Portable    Result Date: 9/5/2020  Subtle ground-glass opacities primarily lung bases, likely atelectasis although infectious process is possible in the proper clinical setting.        Assessment :      Hospital Problems           Last Modified POA    Pneumonia due to COVID-19 virus 9/6/2020 Yes          Plan:     Patient status inpatient in the Progressive Unit/Step down    COVID-19 pneumonia  -Continue Rocephin and azithromycin  -ID consulted  -Oxygen as needed. Currently patient saturating 98% on room air    Hypertension  -Continue home amlodipine, hydralazine.  -We will hold lisinopril in the setting of elevated creatinine    Hyperlipidemia  -Continue atorvastatin    Depression  -Continue Prozac    Diabetes  -We will resume 16 units of Levemir along with sliding scale insulin. Acute kidney injury  -Creatinine noted to be elevated from his baseline. Will encourage oral intake    Consultations:   IP CONSULT TO INFECTIOUS DISEASES     Patient is admitted as inpatient status because of co-morbidities listed above, severity of signs and symptoms as outlined, requirement for current medical therapies and most importantly because of direct risk to patient if care not provided in a hospital setting. Expected length of stay > 48 hours.     Desiree Alfaro MD  9/6/2020  10:16 AM    Copy sent to Dr. Quynh Arteaga MD

## 2020-09-06 NOTE — PLAN OF CARE
Problem: Falls - Risk of:  Goal: Will remain free from falls  Description: Will remain free from falls  Outcome: Met This Shift  Goal: Absence of physical injury  Description: Absence of physical injury  Outcome: Met This Shift   Patient remained free of falls during shift. 2/4 side rails up. Bed in lowest position. Bed breaks locked. Call light, bed side table, and urinal within reach. Patient calls out appropriately. Continue to monitor.

## 2020-09-06 NOTE — CARE COORDINATION
Case Management Initial Discharge Plan  shereen Ashton called patient to verify information   [facility]    Information verified: address, contacts, phone number, , insurance Yes    Emergency Contact/Next of Kin name & number: sister ganesh     PCP: Jacqueline Luna MD  Date of last visit: 2 months    Insurance Provider: [employer]     Discharge Planning    Living Arrangements:    lives with his parents, sister    Home has 1 stories  1 stairs to climb to get into front door    Patient able to perform ADL's:Independent    Current Services (outpatient & in home) none  DME equipment: cane  Pharmacy: [facility]    Receiving oral anticoagulation therapy? No          Potential Assistance Needed:   o2      Prior SNF/Rehab Placement and Facility: yes [facility]wo rehab last yr       Evaluation: no    Expected Discharge date:   9/10    Patient expects to be discharged to:   home  Follow Up Appointment: Best Day/ Time:      Transportation provider: family   Transportation arrangements needed for discharge: No    Readmission Risk              Risk of Unplanned Readmission:        12             Does patient have a readmission risk score greater than 14?: No  If yes, follow-up appointment must be made within 7 days of discharge.      Goals of Care: return to adl without shortness of breath       Discharge Plan: return home with family           Electronically signed by Rona Craig RN on 20 at 5:57 PM EDT

## 2020-09-07 PROBLEM — U07.1 COVID-19: Status: ACTIVE | Noted: 2020-09-07

## 2020-09-07 LAB
ABSOLUTE EOS #: 0 K/UL (ref 0–0.4)
ABSOLUTE IMMATURE GRANULOCYTE: 0 K/UL (ref 0–0.3)
ABSOLUTE LYMPH #: 12.08 K/UL (ref 1–4.8)
ABSOLUTE MONO #: 0.64 K/UL (ref 0.1–0.8)
ALBUMIN SERPL-MCNC: 2.9 G/DL (ref 3.5–5.2)
ALBUMIN/GLOBULIN RATIO: 0.9 (ref 1–2.5)
ALP BLD-CCNC: 51 U/L (ref 40–129)
ALT SERPL-CCNC: 26 U/L (ref 5–41)
ANION GAP SERPL CALCULATED.3IONS-SCNC: 11 MMOL/L (ref 9–17)
AST SERPL-CCNC: 41 U/L
BASOPHILS # BLD: 0 % (ref 0–2)
BASOPHILS ABSOLUTE: 0 K/UL (ref 0–0.2)
BILIRUB SERPL-MCNC: 0.32 MG/DL (ref 0.3–1.2)
BUN BLDV-MCNC: 23 MG/DL (ref 8–23)
BUN/CREAT BLD: ABNORMAL (ref 9–20)
CALCIUM SERPL-MCNC: 8.4 MG/DL (ref 8.6–10.4)
CHLORIDE BLD-SCNC: 106 MMOL/L (ref 98–107)
CO2: 20 MMOL/L (ref 20–31)
CREAT SERPL-MCNC: 0.97 MG/DL (ref 0.7–1.2)
DIFFERENTIAL TYPE: ABNORMAL
DIRECT EXAM: NORMAL
EOSINOPHILS RELATIVE PERCENT: 0 % (ref 1–4)
GFR AFRICAN AMERICAN: >60 ML/MIN
GFR NON-AFRICAN AMERICAN: >60 ML/MIN
GFR SERPL CREATININE-BSD FRML MDRD: ABNORMAL ML/MIN/{1.73_M2}
GFR SERPL CREATININE-BSD FRML MDRD: ABNORMAL ML/MIN/{1.73_M2}
GLUCOSE BLD-MCNC: 123 MG/DL (ref 75–110)
GLUCOSE BLD-MCNC: 127 MG/DL (ref 75–110)
GLUCOSE BLD-MCNC: 146 MG/DL (ref 70–99)
GLUCOSE BLD-MCNC: 197 MG/DL (ref 75–110)
GLUCOSE BLD-MCNC: 240 MG/DL (ref 75–110)
HCT VFR BLD CALC: 35.6 % (ref 40.7–50.3)
HEMOGLOBIN: 11.7 G/DL (ref 13–17)
IMMATURE GRANULOCYTES: 0 %
LYMPHOCYTES # BLD: 57 % (ref 24–44)
Lab: NORMAL
MCH RBC QN AUTO: 30.4 PG (ref 25.2–33.5)
MCHC RBC AUTO-ENTMCNC: 32.9 G/DL (ref 28.4–34.8)
MCV RBC AUTO: 92.5 FL (ref 82.6–102.9)
MONOCYTES # BLD: 3 % (ref 1–7)
MORPHOLOGY: ABNORMAL
NRBC AUTOMATED: 0.1 PER 100 WBC
PDW BLD-RTO: 12.9 % (ref 11.8–14.4)
PLATELET # BLD: 301 K/UL (ref 138–453)
PLATELET ESTIMATE: ABNORMAL
PMV BLD AUTO: 10.5 FL (ref 8.1–13.5)
POTASSIUM SERPL-SCNC: 3.8 MMOL/L (ref 3.7–5.3)
RBC # BLD: 3.85 M/UL (ref 4.21–5.77)
RBC # BLD: ABNORMAL 10*6/UL
SEG NEUTROPHILS: 40 % (ref 36–66)
SEGMENTED NEUTROPHILS ABSOLUTE COUNT: 8.48 K/UL (ref 1.8–7.7)
SODIUM BLD-SCNC: 137 MMOL/L (ref 135–144)
SPECIMEN DESCRIPTION: NORMAL
TOTAL PROTEIN: 6.1 G/DL (ref 6.4–8.3)
TROPONIN INTERP: NORMAL
TROPONIN T: NORMAL NG/ML
TROPONIN, HIGH SENSITIVITY: 20 NG/L (ref 0–22)
WBC # BLD: 21.2 K/UL (ref 3.5–11.3)
WBC # BLD: ABNORMAL 10*3/UL

## 2020-09-07 PROCEDURE — 93005 ELECTROCARDIOGRAM TRACING: CPT | Performed by: INTERNAL MEDICINE

## 2020-09-07 PROCEDURE — 6370000000 HC RX 637 (ALT 250 FOR IP): Performed by: NURSE PRACTITIONER

## 2020-09-07 PROCEDURE — 6360000002 HC RX W HCPCS: Performed by: INTERNAL MEDICINE

## 2020-09-07 PROCEDURE — 2580000003 HC RX 258: Performed by: NURSE PRACTITIONER

## 2020-09-07 PROCEDURE — 6370000000 HC RX 637 (ALT 250 FOR IP): Performed by: INTERNAL MEDICINE

## 2020-09-07 PROCEDURE — 80053 COMPREHEN METABOLIC PANEL: CPT

## 2020-09-07 PROCEDURE — 85025 COMPLETE CBC W/AUTO DIFF WBC: CPT

## 2020-09-07 PROCEDURE — 97535 SELF CARE MNGMENT TRAINING: CPT

## 2020-09-07 PROCEDURE — 97166 OT EVAL MOD COMPLEX 45 MIN: CPT

## 2020-09-07 PROCEDURE — 36415 COLL VENOUS BLD VENIPUNCTURE: CPT

## 2020-09-07 PROCEDURE — 97162 PT EVAL MOD COMPLEX 30 MIN: CPT

## 2020-09-07 PROCEDURE — 2060000000 HC ICU INTERMEDIATE R&B

## 2020-09-07 PROCEDURE — 6360000002 HC RX W HCPCS: Performed by: NURSE PRACTITIONER

## 2020-09-07 PROCEDURE — 97530 THERAPEUTIC ACTIVITIES: CPT

## 2020-09-07 PROCEDURE — 2580000003 HC RX 258: Performed by: INTERNAL MEDICINE

## 2020-09-07 PROCEDURE — 84484 ASSAY OF TROPONIN QUANT: CPT

## 2020-09-07 PROCEDURE — 2500000003 HC RX 250 WO HCPCS: Performed by: INTERNAL MEDICINE

## 2020-09-07 PROCEDURE — 99232 SBSQ HOSP IP/OBS MODERATE 35: CPT | Performed by: INTERNAL MEDICINE

## 2020-09-07 PROCEDURE — 82947 ASSAY GLUCOSE BLOOD QUANT: CPT

## 2020-09-07 PROCEDURE — 99233 SBSQ HOSP IP/OBS HIGH 50: CPT | Performed by: INTERNAL MEDICINE

## 2020-09-07 RX ORDER — GUAIFENESIN 100 MG/5ML
200 SOLUTION ORAL EVERY 4 HOURS PRN
Status: DISCONTINUED | OUTPATIENT
Start: 2020-09-07 | End: 2020-09-10 | Stop reason: HOSPADM

## 2020-09-07 RX ORDER — UREA 10 %
3 LOTION (ML) TOPICAL ONCE
Status: DISCONTINUED | OUTPATIENT
Start: 2020-09-07 | End: 2020-09-10 | Stop reason: HOSPADM

## 2020-09-07 RX ORDER — DEXAMETHASONE SODIUM PHOSPHATE 10 MG/ML
6 INJECTION INTRAMUSCULAR; INTRAVENOUS EVERY 6 HOURS
Status: DISCONTINUED | OUTPATIENT
Start: 2020-09-07 | End: 2020-09-08

## 2020-09-07 RX ADMIN — DEXAMETHASONE SODIUM PHOSPHATE 6 MG: 10 INJECTION, SOLUTION INTRAMUSCULAR; INTRAVENOUS at 17:36

## 2020-09-07 RX ADMIN — ONDANSETRON 4 MG: 2 INJECTION INTRAMUSCULAR; INTRAVENOUS at 13:05

## 2020-09-07 RX ADMIN — FLUOXETINE HYDROCHLORIDE 40 MG: 20 CAPSULE ORAL at 09:41

## 2020-09-07 RX ADMIN — HYDRALAZINE HYDROCHLORIDE 25 MG: 25 TABLET ORAL at 09:41

## 2020-09-07 RX ADMIN — DEXAMETHASONE SODIUM PHOSPHATE 6 MG: 10 INJECTION, SOLUTION INTRAMUSCULAR; INTRAVENOUS at 22:31

## 2020-09-07 RX ADMIN — ENOXAPARIN SODIUM 30 MG: 30 INJECTION SUBCUTANEOUS at 09:40

## 2020-09-07 RX ADMIN — ATORVASTATIN CALCIUM 10 MG: 10 TABLET, FILM COATED ORAL at 09:41

## 2020-09-07 RX ADMIN — REMDESIVIR 100 MG: 100 INJECTION, POWDER, LYOPHILIZED, FOR SOLUTION INTRAVENOUS at 12:49

## 2020-09-07 RX ADMIN — GUAIFENESIN 200 MG: 100 SOLUTION ORAL at 22:26

## 2020-09-07 RX ADMIN — GUAIFENESIN 200 MG: 100 SOLUTION ORAL at 06:26

## 2020-09-07 RX ADMIN — INSULIN LISPRO 1 UNITS: 100 INJECTION, SOLUTION INTRAVENOUS; SUBCUTANEOUS at 22:50

## 2020-09-07 RX ADMIN — INSULIN GLARGINE 16 UNITS: 100 INJECTION, SOLUTION SUBCUTANEOUS at 22:26

## 2020-09-07 RX ADMIN — ASPIRIN 81 MG: 81 TABLET, CHEWABLE ORAL at 09:41

## 2020-09-07 RX ADMIN — SODIUM CHLORIDE, PRESERVATIVE FREE 10 ML: 5 INJECTION INTRAVENOUS at 22:31

## 2020-09-07 RX ADMIN — SODIUM CHLORIDE, PRESERVATIVE FREE 10 ML: 5 INJECTION INTRAVENOUS at 09:10

## 2020-09-07 RX ADMIN — DEXAMETHASONE SODIUM PHOSPHATE 6 MG: 10 INJECTION, SOLUTION INTRAMUSCULAR; INTRAVENOUS at 12:50

## 2020-09-07 RX ADMIN — AMLODIPINE BESYLATE 10 MG: 10 TABLET ORAL at 09:41

## 2020-09-07 ASSESSMENT — PAIN SCALES - GENERAL: PAINLEVEL_OUTOF10: 0

## 2020-09-07 NOTE — PLAN OF CARE
Problem: Airway Clearance - Ineffective  Goal: Achieve or maintain patent airway  9/7/2020 0943 by Jessica Ordaz RN  Outcome: Ongoing  9/7/2020 0334 by Blu Escobar RN  Outcome: Ongoing     Problem: Gas Exchange - Impaired  Goal: Absence of hypoxia  9/7/2020 0943 by Jessica Ordaz RN  Outcome: Ongoing  9/7/2020 0334 by Blu Escobar RN  Outcome: Ongoing  Goal: Promote optimal lung function  9/7/2020 0943 by Jessica Ordaz RN  Outcome: Ongoing  9/7/2020 0334 by Blu Escobar RN  Outcome: Ongoing     Problem: Breathing Pattern - Ineffective  Goal: Ability to achieve and maintain a regular respiratory rate  9/7/2020 0943 by Jessica Ordaz RN  Outcome: Ongoing  9/7/2020 0334 by Blu Escobar RN  Outcome: Ongoing     Problem:  Body Temperature -  Risk of, Imbalanced  Goal: Ability to maintain a body temperature within defined limits  9/7/2020 0943 by Jessica Ordaz RN  Outcome: Ongoing  9/7/2020 0334 by Blu Escobar RN  Outcome: Ongoing  Goal: Will regain or maintain usual level of consciousness  9/7/2020 0943 by Jessica Ordaz RN  Outcome: Ongoing  9/7/2020 0334 by Blu Escobar RN  Outcome: Ongoing  Goal: Complications related to the disease process, condition or treatment will be avoided or minimized  9/7/2020 0943 by Jessica Ordaz RN  Outcome: Ongoing  9/7/2020 0334 by Blu Escobar RN  Outcome: Ongoing     Problem: Isolation Precautions - Risk of Spread of Infection  Goal: Prevent transmission of infection  9/7/2020 0943 by Jessica Ordaz RN  Outcome: Ongoing  9/7/2020 0334 by Blu Escobar RN  Outcome: Ongoing     Problem: Nutrition Deficits  Goal: Optimize nutrtional status  9/7/2020 0943 by Jessica Ordaz RN  Outcome: Ongoing  9/7/2020 0334 by Blu Escobar RN  Outcome: Ongoing     Problem: Risk for Fluid Volume Deficit  Goal: Maintain normal heart rhythm  9/7/2020 0943 by Jessica Ordaz RN  Outcome: Ongoing  9/7/2020 0334 by Blu Escobar RN  Outcome: Ongoing  Goal: Maintain

## 2020-09-07 NOTE — PLAN OF CARE
Problem: Falls - Risk of:  Goal: Will remain free from falls  Description: Will remain free from falls  9/7/2020 1751 by Domo Newton RN  Outcome: Met This Shift  9/7/2020 0943 by Domo Newton RN  Outcome: Ongoing  Goal: Absence of physical injury  Description: Absence of physical injury  9/7/2020 1751 by Domo Newton RN  Outcome: Met This Shift  9/7/2020 0943 by Domo Newton RN  Outcome: Ongoing       Pt remained free of falls this shift. Bed remains in lowest position, with 2/4 side rails up and all wheels locked. Non skid socks on. Bedside table and call light within reach. Pt calls out appropriately. Will continue to monitor.     Electronically signed by Domo Newton RN on 9/7/2020 at 5:52 PM

## 2020-09-07 NOTE — PROGRESS NOTES
[facility]  Office: [address], DO, Tayla Shinambar, DO, Sara Archibald, DO, Maribell Oliveros Blood, DO, Evelia Vargas MD, Arielle Rachel MD, Anastasiia Thomas MD, Ana Carrero MD, Govind Krause MD, Marta Uribe MD, Cassie Jacinto MD, Ashley Cabezas MD, Erin Shukla MD, Cristobal Perez, DO, Hina June MD, Mark Luo MD, Montana Guzmán, DO, Severa Campi, MD,  Unknown Heron, DO, Sandip Gilliland MD, Jake Nye MD, Madeline Curling, [facility], [facility], CNP, Daniele Castro, CNP, Lily Rudolph, CNS, Darryl Pérez, CNP, Sonny Jaramillo, CNP, Adam Murray, CNP, Yusef Humphrey, CNP, Pedro Luis Jones, CNP, Santiago Feliciano PA-C, Ba Wynn, [facility], Jackelyn Fuentes, CNP, Kash Velasquez, CNP, Maria De Jesus Azul, CNP, Nixon Palomo, CNP, Luis Ramirez, [facility]   [address] [facility]    Progress Note    9/7/2020    11:25 AM    Name:   Kizzy Solorzano  MRN:     [id number]     Rodney Rm:      [de-identified]   Room:   3027/3027-02   Day:  1  Admit Date:  9/6/2020  4:17 AM    PCP:   Samia Guerrero MD  Code Status:  Full Code    Subjective:     C/C: No chief complaint on file. Cough  Interval History Status: improved. Patient was seen and evaluated visits morning. He reports that he is feeling better right now. Patient denies any new complaints at this time. Brief History: This is a 70-year-old male with a history of CVA, diabetes, hyperlipidemia and hypertension who came in for evaluation of fever and cough. Patient initially presented to [facility]'s emergency department where he was diagnosed with COVID-19. Patient recently had a stroke with right-sided deficit. He lives in home with his parents and 2 siblings and children of 1 of the siblings. His sister had tested positive and was symptomatic for COVID-19. Patient reports that his other sister and himself have been experiencing symptoms as well. Patient complains of persistent cough. Initially he was noted to be hypoxic with oxygen saturation of 90 to 91% on room air. Patient was transferred to [facility] for other treatment. Review of Systems:     Constitutional:  negative for chills, fevers, sweats  Respiratory:  negative for cough, dyspnea on exertion, shortness of breath, wheezing  Cardiovascular:  negative for chest pain, chest pressure/discomfort, lower extremity edema, palpitations  Gastrointestinal:  negative for abdominal pain, constipation, diarrhea, nausea, vomiting  Neurological:  negative for dizziness, headache    Medications: Allergies: Allergies   Allergen Reactions    Dairycare [Lactase-Lactobacillus]        Current Meds:   Scheduled Meds:    melatonin  3 mg Oral Once    dexamethasone  6 mg Intravenous Q6H    enoxaparin  30 mg Subcutaneous BID    sodium chloride flush  10 mL Intravenous 2 times per day    insulin lispro  0-6 Units Subcutaneous TID WC    insulin lispro  0-3 Units Subcutaneous Nightly    amLODIPine  10 mg Oral Daily    aspirin  81 mg Oral Daily    atorvastatin  10 mg Oral Daily    FLUoxetine  40 mg Oral Daily    [Held by provider] lisinopril  10 mg Oral Daily    hydrALAZINE  25 mg Oral Daily    insulin glargine  16 Units Subcutaneous Nightly    remdesivir IVPB  100 mg Intravenous Q24H     Continuous Infusions:    dextrose       PRN Meds: guaiFENesin, acetaminophen **OR** acetaminophen, acetaminophen **OR** acetaminophen, magnesium hydroxide, nicotine, promethazine **OR** ondansetron, sodium chloride flush, dextrose, dextrose, glucagon (rDNA), glucose, sodium chloride    Data:     Past Medical History:   has a past medical history of Cerebral artery occlusion with cerebral infarction ([facility] [address] Diabetes mellitus ([facility] [address] High cholesterol, and Hypertension. Social History:   reports that he has never smoked. He has never used smokeless tobacco. He reports previous alcohol use. He reports that he does not use drugs.      Family History: Family History   Problem Relation Age of Onset    Hypertension Mother     Hypertension Father     Hypertension Sister     Hypertension Maternal Grandmother     Cancer Maternal Grandmother     Hypertension Maternal Grandfather     Migraines Paternal Grandmother     Hypertension Paternal Grandfather        Vitals:  /79   Pulse 79   Temp 98.8 °F (37.1 °C) (Oral)   Resp 20   Wt 171 lb 8.3 oz (77.8 kg)   SpO2 95%   BMI 23.92 kg/m²   Temp (24hrs), Av.4 °F (36.9 °C), Min:97.8 °F (36.6 °C), Max:98.8 °F (37.1 °C)    Recent Labs     20  0938   POCGLU 262* 258* 227* 127*       I/O (24Hr):     Intake/Output Summary (Last 24 hours) at 2020 1125  Last data filed at 2020 0800  Gross per 24 hour   Intake 840 ml   Output --   Net 840 ml       Labs:  Hematology:  Recent Labs     20   WBC 18.1* 19.4*   RBC 4.00* 3.94*   HGB 11.9* 12.1*   HCT 36.0* 34.9*   MCV 90.0 88.6   MCH 29.8 30.7   MCHC 33.1 34.7   RDW 12.5 12.5    205   MPV 9.9 10.3   CRP 48.3*  --    INR 1.0 1.0   DDIMER  --  0.74     Chemistry:  Recent Labs     20  1452    138   K 4.2 4.5    103   CO2 26 22   GLUCOSE 122* 289*   BUN 24* 22   CREATININE 1.57* 1.14   ANIONGAP 11 13   LABGLOM 45* >60   GFRAA 55* >60   CALCIUM 8.8 8.5*     Recent Labs     20  0528 20  0822 20  1225 20  1452 20  1727 20  0938   PROT 7.0  --   --   --  6.8  --   --   --    LABALBU 3.7  --   --   --  3.4*  --   --   --    AST 45*  --   --   --  42*  --   --   --    ALT 27  --   --   --  29  --   --   --    *  --   --   --   --   --   --   --    ALKPHOS 59  --   --   --  57  --   --   --    BILITOT 0.39  --   --   --  0.26*  --   --   --    BILIDIR 0.10  --   --   --   --   --   --   --    POCGLU  --  200* 230* 262*  --  258* 227* 127*     ABG:No results found for: POCPH, PHART, PH, POCPCO2, GVZ6CRB, PCO2, POCPO2, PO2ART, PO2, POCHCO3, IUV2CFO, HCO3, NBEA, PBEA, BEART, BE, THGBART, THB, ADC9SLC, OMHZ0EDA, K7FULBRB, O2SAT, FIO2  Lab Results   Component Value Date/Time    SPECIAL NOT REPORTED 09/06/2020 07:56 AM    SPECIAL NOT REPORTED 09/06/2020 07:56 AM     Lab Results   Component Value Date/Time    CULTURE PENDING 09/06/2020 07:56 AM       Radiology:  Xr Chest Portable    Result Date: 9/5/2020  Subtle ground-glass opacities primarily lung bases, likely atelectasis although infectious process is possible in the proper clinical setting. Physical Examination:        General appearance:  alert, cooperative and no distress  Mental Status:  oriented to person, place and time and normal affect  Lungs:  clear to auscultation bilaterally, normal effort  Heart:  regular rate and rhythm, no murmur  Abdomen:  soft, nontender, nondistended, normal bowel sounds, no masses, hepatomegaly, splenomegaly  Extremities:  no edema, redness, tenderness in the calves  Skin:  no gross lesions, rashes, induration    Assessment:        Hospital Problems           Last Modified POA    Pneumonia due to COVID-19 virus 9/6/2020 Yes    COVID-19 9/7/2020 Yes          Plan:        COVID-19 pneumonia  -ID consulted  -Remdisivir and Decadron started     Hypertension  -Continue home amlodipine, hydralazine.  -We will hold lisinopril in the setting of elevated creatinine     Hyperlipidemia  -Continue atorvastatin     Depression  -Continue Prozac     Diabetes  -We will resume 16 units of Levemir along with sliding scale insulin.     Acute kidney injury  -Creatinine noted to be elevated from his baseline.   Will encourage oral intake    Kwame Bennett MD  9/7/2020  11:25 AM

## 2020-09-07 NOTE — PLAN OF CARE
Problem: Airway Clearance - Ineffective  Goal: Achieve or maintain patent airway  Outcome: Ongoing     Problem: Gas Exchange - Impaired  Goal: Absence of hypoxia  Outcome: Ongoing  Goal: Promote optimal lung function  Outcome: Ongoing     Problem: Breathing Pattern - Ineffective  Goal: Ability to achieve and maintain a regular respiratory rate  Outcome: Ongoing     Problem:  Body Temperature -  Risk of, Imbalanced  Goal: Ability to maintain a body temperature within defined limits  Outcome: Ongoing  Goal: Will regain or maintain usual level of consciousness  Outcome: Ongoing  Goal: Complications related to the disease process, condition or treatment will be avoided or minimized  Outcome: Ongoing     Problem: Isolation Precautions - Risk of Spread of Infection  Goal: Prevent transmission of infection  Outcome: Ongoing     Problem: Nutrition Deficits  Goal: Optimize nutrtional status  Outcome: Ongoing     Problem: Risk for Fluid Volume Deficit  Goal: Maintain normal heart rhythm  Outcome: Ongoing  Goal: Maintain absence of muscle cramping  Outcome: Ongoing  Goal: Maintain normal serum potassium, sodium, calcium, phosphorus, and pH  Outcome: Ongoing     Problem: Loneliness or Risk for Loneliness  Goal: Demonstrate positive use of time alone when socialization is not possible  Outcome: Ongoing     Problem: Fatigue  Goal: Verbalize increase energy and improved vitality  Outcome: Ongoing     Problem: Patient Education: Go to Patient Education Activity  Goal: Patient/Family Education  Outcome: Ongoing     Problem: Skin Integrity:  Goal: Will show no infection signs and symptoms  Description: Will show no infection signs and symptoms  Outcome: Ongoing  Goal: Absence of new skin breakdown  Description: Absence of new skin breakdown  Outcome: Ongoing     Problem: Falls - Risk of:  Goal: Will remain free from falls  Description: Will remain free from falls  9/7/2020 0334 by Genesis Campbell RN  Outcome: Ongoing  9/6/2020 1844 by

## 2020-09-07 NOTE — PLAN OF CARE
PATIENT REFUSES TO WEAR BIPAP     [x] Risks and benefits explained to patient   [x] Patient refuses to wear Bipap stating oh renzok no  [x] Patient verbalizes understanding of information presented.       Patient has multiple desats throughout night

## 2020-09-07 NOTE — PROGRESS NOTES
Occupational Therapy   Occupational Therapy Initial Assessment  Date: 2020   Patient Name: Whit Dailey  MRN: [id number]     : 1960    Date of Service: 2020    Discharge Recommendations:  Patient would benefit from continued therapy after discharge       Assessment   Performance deficits / Impairments: Decreased functional mobility ; Decreased ADL status; Decreased ROM; Decreased strength;Decreased endurance;Decreased balance;Decreased high-level IADLs;Decreased coordination  Assessment: Pt agreeable to OT eval this date with some encouragement. Pt completed bed mobility with SBA. Pt demo RUE contracture from previous CVA. Pt states has been receiving outpatient OT to address ROM and strengthening. Pt completed functional transfers with CGA and functional mobility with quad cane and Min A d/t unsteadiness and R side deficits. Pt demo mild endurance deficits, requesting to return to bed after short functional mobility. Pt to benefit from continued therapy to address deficits in ADL function and functional mobility. Prognosis: Good  Decision Making: Medium Complexity  Patient Education: Pt educated on OT role, OT POC, safety awareness, AAROM, and importance of continued therapy. Pt demo good understanding  REQUIRES OT FOLLOW UP: Yes  Activity Tolerance  Activity Tolerance: Patient Tolerated treatment well  Safety Devices  Safety Devices in place: Yes  Type of devices: Nurse notified; Left in bed;Gait belt;Call light within reach  Restraints  Initially in place: No           Patient Diagnosis(es): There were no encounter diagnoses. has a past medical history of Cerebral artery occlusion with cerebral infarction ([facility] [address] Diabetes mellitus ([facility] [address] High cholesterol, and Hypertension. has a past surgical history that includes knee surgery ().            Restrictions  Restrictions/Precautions  Restrictions/Precautions: Up as Tolerated, Fall Risk, Isolation(COVID + / Droplet +)  Required Braces or Orthoses?: No  Position Activity Restriction  Other position/activity restrictions: hx of CVA with R side deficits    Subjective   General  Patient assessed for rehabilitation services?: Yes  Family / Caregiver Present: No  General Comment  Comments: RN ok'd pt for OT/PT anne this date. Pt agreeable to session and cooperative throughout  Patient Currently in Pain: Denies  Vital Signs  Patient Currently in Pain: Denies  Oxygen Therapy  O2 Device: Nasal cannula  O2 Flow Rate (L/min): 2 L/min     Social/Functional History  Social/Functional History  Lives With: Family  Type of Home: House  Home Layout: One level  Home Access: Stairs to enter without rails  Entrance Stairs - Number of Steps: 1  Bathroom Shower/Tub: Tub/Shower unit  Bathroom Equipment: Shower chair, Grab bars in shower  Bathroom Accessibility: Accessible  Home Equipment: Quad cane(also has hemiwalker, now just uses quad cane)  ADL Assistance: Independent  Homemaking Assistance: Needs assistance(family does)  Homemaking Responsibilities: No(family does)  Ambulation Assistance: Independent(quad cane)  Transfer Assistance: Independent  Active : No  Patient's  Info: sister  Occupation: On disability  Type of occupation: psych rehab supervision  Leisure & Hobbies: guitarist       Objective   Vision: Impaired  Vision Exceptions: Wears glasses for reading  Hearing: Within functional limits         Balance  Sitting Balance: Supervision  Standing Balance: Contact guard assistance  Functional Mobility  Functional - Mobility Device: Cane(quad cane)  Activity: Other  Assist Level: Minimal assistance  Functional Mobility Comments: Pt completed functional mobility within hospital room with quad cane and Min A d/t unsteadiness.  Pt demo min difficulty progressing RLE with some ataxia noted     ADL  Feeding: Independent;Setup  Grooming: Independent;Setup  UE Bathing: Minimal assistance;Setup  LE Bathing: Minimal assistance;Setup  UE Dressing: Minimal assistance;Setup  LE Dressing: Minimal assistance;Setup  Toileting: Minimal assistance;Setup  Tone RUE  RUE Tone: Not tested  Tone LUE  LUE Tone: Normotonic  Coordination  Movements Are Fluid And Coordinated: No  Coordination and Movement description: Right UE  Quality of Movement Other  Comment: RUE contracted from past CVA     Bed mobility  Supine to Sit: Stand by assistance  Sit to Supine: Stand by assistance  Scooting: Stand by assistance  Transfers  Sit to stand: Contact guard assistance  Stand to sit: Contact guard assistance    Cognition  Overall Cognitive Status: WFL       Sensation  Overall Sensation Status: WFL(pt denies numbness/tingling)        LUE AROM (degrees)  LUE AROM : WFL  Left Hand AROM (degrees)  Left Hand AROM: WFL  RUE AROM (degrees)  RUE General AROM: AAROM to full ROM  Right Hand PROM (degrees)  Right Hand PROM: Exceptions  Right Hand AROM (degrees)  Right Hand AROM: Exceptions  Right Hand General AROM: pt hand contracted in fist; able to perform AAROM almost to full extension of all digits  LUE Strength  Gross LUE Strength: WFL  L Hand General: 4+/5  RUE Strength  RUE Strength Comment: NT                   Plan   Plan  Times per week: 3-4 x/wk  Current Treatment Recommendations: Strengthening, ROM, Balance Training, Functional Mobility Training, Endurance Training, Safety Education & Training, Patient/Caregiver Education & Training, Equipment Evaluation, Education, & procurement, Self-Care / ADL    AM-PAC Score        -[facility] Inpatient Daily Activity Raw Score: 20 (09/07/20 1446)  AM-PAC Inpatient ADL T-Scale Score : 42.03 (09/07/20 1446)  ADL Inpatient CMS 0-100% Score: 38.32 (09/07/20 1446)  ADL Inpatient CMS G-Code Modifier : CJ (09/07/20 1446)    Goals  Short term goals  Time Frame for Short term goals: By discharge, pt will:  Short term goal 1: Demo Mod I with use of LRD for functional transfers and functional mobility  Short term goal 2: Demo Mod I with setup for UB ADLs and grooming tasks  Short term goal 3: Demo Mod I with setup and AE PRN for LB ADLs and toileting tasks  Short term goal 4: Demo 10+ minutes dynamic standing task to increase safety and independence with ADLs/IADLs  Short term goal 5: Demo AAROM to RUE to increase functional use for participation in ADLs/IADLs       Therapy Time   Individual Concurrent Group Co-treatment   Time In 1031         Time Out 1053         Minutes 22         Timed Code Treatment Minutes: 9 Minutes       Will Husbands, OTR/L

## 2020-09-07 NOTE — PROGRESS NOTES
Physical Therapy    Facility/Department: [facility]  Initial Assessment    NAME: Katja Bernal  : 1960  MRN: [id number]    Date of Service: 2020    Discharge Recommendations:    Further therapy recommended at discharge. Continued OP PT       Assessment   Body structures, Functions, Activity limitations: Decreased functional mobility ; Decreased strength;Decreased balance;Decreased endurance  Assessment: Pt grossly CGA for mobility, amb 12' SBQC CGA. Pt would benefit from continued acute PT. Prognosis: Good  Decision Making: Medium Complexity  PT Education: Plan of Care;PT Role;General Safety  REQUIRES PT FOLLOW UP: Yes  Activity Tolerance  Activity Tolerance: Patient Tolerated treatment well       Patient Diagnosis(es): There were no encounter diagnoses. has a past medical history of Cerebral artery occlusion with cerebral infarction ([facility] [address] Diabetes mellitus ([facility] [address] High cholesterol, and Hypertension. has a past surgical history that includes knee surgery (). Restrictions  Restrictions/Precautions  Restrictions/Precautions: Up as Tolerated, Fall Risk, Isolation(COVID + / Droplet +)  Required Braces or Orthoses?: No  Position Activity Restriction  Other position/activity restrictions: hx of CVA with R side deficits  Vision/Hearing  Vision: Impaired  Vision Exceptions: Wears glasses for reading  Hearing: Within functional limits     Subjective  General  Chart Reviewed: Yes  Patient assessed for rehabilitation services?: Yes  Response To Previous Treatment: Not applicable  Family / Caregiver Present: No  Follows Commands: Within Functional Limits  General Comment  Comments: OT co-eval  Subjective  Subjective: RN and pt agreeable to PT.  Pt alert in bed upon arrival.  Pain Screening  Patient Currently in Pain: Denies  Vital Signs  Patient Currently in Pain: Denies  Pre Treatment Pain Screening  Intervention List: Patient able to continue with treatment    Orientation  Orientation  Overall Orientation Status: Within Functional Limits  Social/Functional History  Social/Functional History  Lives With: Family  Type of Home: House  Home Layout: One level  Home Access: Stairs to enter without rails  Entrance Stairs - Number of Steps: 1  Bathroom Shower/Tub: Tub/Shower unit  Bathroom Equipment: Shower chair, Grab bars in shower  Bathroom Accessibility: Accessible  Home Equipment: Quad cane(also has hemiwalker, now just uses quad cane)  ADL Assistance: Independent  Homemaking Assistance: Needs assistance(family does)  Homemaking Responsibilities: No(family does)  Ambulation Assistance: Independent(quad cane)  Transfer Assistance: Independent  Active : No  Patient's  Info: sister  Occupation: On disability  Type of occupation: psych rehab supervision  Leisure & Hobbies: guitarist  Cognition        Objective          AROM RLE (degrees)  RLE AROM: WFL  AROM LLE (degrees)  LLE AROM : WFL  AROM RUE (degrees)  RUE AROM : WFL  AROM LUE (degrees)  LUE AROM : WFL  Strength RLE  Comment: unable to slow knee flexion when placed in full LAQ  Strength LLE  Strength LLE: WFL  Strength RUE  Comment: see OT, limited antigravity  Strength LUE  Strength LUE: WFL     Sensation  Overall Sensation Status: WFL(Pt denies any numbness or tingling)  Bed mobility  Supine to Sit: Stand by assistance  Sit to Supine: Stand by assistance  Scooting: Stand by assistance  Transfers  Sit to Stand: Contact guard assistance  Stand to sit: Contact guard assistance  Ambulation  Ambulation?: Yes  Ambulation 1  Surface: level tile  Device: Small Osvaldo Angora  Assistance: Contact guard assistance  Quality of Gait: small steps, largely R leading gait with R hip extension to control knee extension in stance. Distance: 15'  Comments: Pt reprots AFO RLe baseline.      Balance  Sitting - Static: Good  Sitting - Dynamic: Good;-  Standing - Static: Fair;+  Standing - Dynamic: Fair  Comments: SBQC used while assessing standing balance        Plan Plan  Times per week: 3-5x/wk  Current Treatment Recommendations: Strengthening, Transfer Training, Functional Mobility Training, Gait Training, Endurance Training, Balance Training, Stair training, Home Exercise Program, Safety Education & Training, Patient/Caregiver Education & Training, Equipment Evaluation, Education, & procurement  Safety Devices  Type of devices: Call light within reach, Nurse notified, Gait belt, Patient at risk for falls, Left in bed, All fall risk precautions in place  Restraints  Initially in place: No    AM-PAC Score  AM-PAC Inpatient Mobility Raw Score : 20 (09/07/20 1529)  AM-PAC Inpatient T-Scale Score : 47.67 (09/07/20 1529)  Mobility Inpatient CMS 0-100% Score: 35.83 (09/07/20 1529)  Mobility Inpatient CMS G-Code Modifier : Armin Wren (09/07/20 1529)          Goals  Short term goals  Time Frame for Short term goals: 14 visits  Short term goal 1: Pt will bem Caterina bed mobility  Short term goal 2: Pt will be Alayna transfers  Short term goal 3: Pt will be Alayna amb 100' SBQC  Short term goal 4: Pt will navigate 2 steps Alayna       Therapy Time   Individual Concurrent Group Co-treatment   Time In 1033         Time Out 1053         Minutes 20         Timed Code Treatment Minutes: 8 Minutes       Mark Nolen PT

## 2020-09-08 LAB
ABSOLUTE EOS #: 0 K/UL (ref 0–0.4)
ABSOLUTE IMMATURE GRANULOCYTE: 0.21 K/UL (ref 0–0.3)
ABSOLUTE LYMPH #: 10.7 K/UL (ref 1–4.8)
ABSOLUTE MONO #: 0.43 K/UL (ref 0.1–0.8)
ALBUMIN SERPL-MCNC: 3.1 G/DL (ref 3.5–5.2)
ALBUMIN/GLOBULIN RATIO: 1 (ref 1–2.5)
ALP BLD-CCNC: 53 U/L (ref 40–129)
ALT SERPL-CCNC: 28 U/L (ref 5–41)
ANION GAP SERPL CALCULATED.3IONS-SCNC: 11 MMOL/L (ref 9–17)
AST SERPL-CCNC: 37 U/L
BASOPHILS # BLD: 0 % (ref 0–2)
BASOPHILS ABSOLUTE: 0 K/UL (ref 0–0.2)
BILIRUB SERPL-MCNC: 0.34 MG/DL (ref 0.3–1.2)
BUN BLDV-MCNC: 27 MG/DL (ref 8–23)
BUN/CREAT BLD: ABNORMAL (ref 9–20)
CALCIUM SERPL-MCNC: 8.3 MG/DL (ref 8.6–10.4)
CHLORIDE BLD-SCNC: 106 MMOL/L (ref 98–107)
CO2: 22 MMOL/L (ref 20–31)
CREAT SERPL-MCNC: 0.98 MG/DL (ref 0.7–1.2)
CULTURE: ABNORMAL
DIFFERENTIAL TYPE: ABNORMAL
DIRECT EXAM: ABNORMAL
EOSINOPHILS RELATIVE PERCENT: 0 % (ref 1–4)
GFR AFRICAN AMERICAN: >60 ML/MIN
GFR NON-AFRICAN AMERICAN: >60 ML/MIN
GFR SERPL CREATININE-BSD FRML MDRD: ABNORMAL ML/MIN/{1.73_M2}
GFR SERPL CREATININE-BSD FRML MDRD: ABNORMAL ML/MIN/{1.73_M2}
GLUCOSE BLD-MCNC: 164 MG/DL (ref 75–110)
GLUCOSE BLD-MCNC: 191 MG/DL (ref 75–110)
GLUCOSE BLD-MCNC: 207 MG/DL (ref 70–99)
GLUCOSE BLD-MCNC: 211 MG/DL (ref 75–110)
GLUCOSE BLD-MCNC: 260 MG/DL (ref 75–110)
HCT VFR BLD CALC: 36 % (ref 40.7–50.3)
HEMOGLOBIN: 11.8 G/DL (ref 13–17)
IMMATURE GRANULOCYTES: 1 %
LYMPHOCYTES # BLD: 50 % (ref 24–44)
Lab: ABNORMAL
MCH RBC QN AUTO: 29.2 PG (ref 25.2–33.5)
MCHC RBC AUTO-ENTMCNC: 32.8 G/DL (ref 28.4–34.8)
MCV RBC AUTO: 89.1 FL (ref 82.6–102.9)
MONOCYTES # BLD: 2 % (ref 1–7)
MORPHOLOGY: ABNORMAL
NRBC AUTOMATED: 0 PER 100 WBC
PDW BLD-RTO: 12.7 % (ref 11.8–14.4)
PLATELET # BLD: 265 K/UL (ref 138–453)
PLATELET ESTIMATE: ABNORMAL
PMV BLD AUTO: 10.2 FL (ref 8.1–13.5)
POTASSIUM SERPL-SCNC: 4.3 MMOL/L (ref 3.7–5.3)
RBC # BLD: 4.04 M/UL (ref 4.21–5.77)
RBC # BLD: ABNORMAL 10*6/UL
SEG NEUTROPHILS: 47 % (ref 36–66)
SEGMENTED NEUTROPHILS ABSOLUTE COUNT: 10.06 K/UL (ref 1.8–7.7)
SODIUM BLD-SCNC: 139 MMOL/L (ref 135–144)
SPECIMEN DESCRIPTION: ABNORMAL
TOTAL PROTEIN: 6.3 G/DL (ref 6.4–8.3)
WBC # BLD: 21.4 K/UL (ref 3.5–11.3)
WBC # BLD: ABNORMAL 10*3/UL

## 2020-09-08 PROCEDURE — 2580000003 HC RX 258: Performed by: INTERNAL MEDICINE

## 2020-09-08 PROCEDURE — 2500000003 HC RX 250 WO HCPCS: Performed by: INTERNAL MEDICINE

## 2020-09-08 PROCEDURE — 99233 SBSQ HOSP IP/OBS HIGH 50: CPT | Performed by: INTERNAL MEDICINE

## 2020-09-08 PROCEDURE — 6370000000 HC RX 637 (ALT 250 FOR IP): Performed by: NURSE PRACTITIONER

## 2020-09-08 PROCEDURE — 6370000000 HC RX 637 (ALT 250 FOR IP): Performed by: INTERNAL MEDICINE

## 2020-09-08 PROCEDURE — 85025 COMPLETE CBC W/AUTO DIFF WBC: CPT

## 2020-09-08 PROCEDURE — 82947 ASSAY GLUCOSE BLOOD QUANT: CPT

## 2020-09-08 PROCEDURE — 36415 COLL VENOUS BLD VENIPUNCTURE: CPT

## 2020-09-08 PROCEDURE — 2580000003 HC RX 258: Performed by: NURSE PRACTITIONER

## 2020-09-08 PROCEDURE — 6360000002 HC RX W HCPCS: Performed by: NURSE PRACTITIONER

## 2020-09-08 PROCEDURE — 99232 SBSQ HOSP IP/OBS MODERATE 35: CPT | Performed by: INTERNAL MEDICINE

## 2020-09-08 PROCEDURE — 6360000002 HC RX W HCPCS: Performed by: INTERNAL MEDICINE

## 2020-09-08 PROCEDURE — 80053 COMPREHEN METABOLIC PANEL: CPT

## 2020-09-08 PROCEDURE — 2060000000 HC ICU INTERMEDIATE R&B

## 2020-09-08 RX ORDER — DEXAMETHASONE SODIUM PHOSPHATE 10 MG/ML
6 INJECTION INTRAMUSCULAR; INTRAVENOUS
Status: DISCONTINUED | OUTPATIENT
Start: 2020-09-09 | End: 2020-09-10 | Stop reason: HOSPADM

## 2020-09-08 RX ORDER — DEXAMETHASONE SODIUM PHOSPHATE 10 MG/ML
6 INJECTION INTRAMUSCULAR; INTRAVENOUS
Status: DISCONTINUED | OUTPATIENT
Start: 2020-09-09 | End: 2020-09-08

## 2020-09-08 RX ADMIN — FLUOXETINE HYDROCHLORIDE 40 MG: 20 CAPSULE ORAL at 08:46

## 2020-09-08 RX ADMIN — INSULIN LISPRO 1 UNITS: 100 INJECTION, SOLUTION INTRAVENOUS; SUBCUTANEOUS at 21:00

## 2020-09-08 RX ADMIN — SODIUM CHLORIDE, PRESERVATIVE FREE 10 ML: 5 INJECTION INTRAVENOUS at 21:11

## 2020-09-08 RX ADMIN — GUAIFENESIN 200 MG: 100 SOLUTION ORAL at 09:30

## 2020-09-08 RX ADMIN — INSULIN LISPRO 2 UNITS: 100 INJECTION, SOLUTION INTRAVENOUS; SUBCUTANEOUS at 08:00

## 2020-09-08 RX ADMIN — AMLODIPINE BESYLATE 10 MG: 10 TABLET ORAL at 08:46

## 2020-09-08 RX ADMIN — HYDRALAZINE HYDROCHLORIDE 25 MG: 25 TABLET ORAL at 08:49

## 2020-09-08 RX ADMIN — ASPIRIN 81 MG: 81 TABLET, CHEWABLE ORAL at 08:46

## 2020-09-08 RX ADMIN — SODIUM CHLORIDE, PRESERVATIVE FREE 10 ML: 5 INJECTION INTRAVENOUS at 08:31

## 2020-09-08 RX ADMIN — REMDESIVIR 100 MG: 100 INJECTION, POWDER, LYOPHILIZED, FOR SOLUTION INTRAVENOUS at 12:45

## 2020-09-08 RX ADMIN — ONDANSETRON 4 MG: 2 INJECTION INTRAMUSCULAR; INTRAVENOUS at 12:45

## 2020-09-08 RX ADMIN — ATORVASTATIN CALCIUM 10 MG: 10 TABLET, FILM COATED ORAL at 08:46

## 2020-09-08 RX ADMIN — INSULIN LISPRO 3 UNITS: 100 INJECTION, SOLUTION INTRAVENOUS; SUBCUTANEOUS at 17:00

## 2020-09-08 RX ADMIN — GUAIFENESIN 200 MG: 100 SOLUTION ORAL at 05:18

## 2020-09-08 RX ADMIN — INSULIN LISPRO 1 UNITS: 100 INJECTION, SOLUTION INTRAVENOUS; SUBCUTANEOUS at 12:00

## 2020-09-08 RX ADMIN — DEXAMETHASONE SODIUM PHOSPHATE 6 MG: 10 INJECTION, SOLUTION INTRAMUSCULAR; INTRAVENOUS at 05:18

## 2020-09-08 RX ADMIN — GUAIFENESIN 200 MG: 100 SOLUTION ORAL at 21:10

## 2020-09-08 RX ADMIN — INSULIN GLARGINE 16 UNITS: 100 INJECTION, SOLUTION SUBCUTANEOUS at 21:00

## 2020-09-08 ASSESSMENT — PAIN SCALES - GENERAL: PAINLEVEL_OUTOF10: 0

## 2020-09-08 NOTE — PLAN OF CARE
Problem: Falls - Risk of:  Goal: Will remain free from falls  Description: Will remain free from falls  9/8/2020 1811 by Marla Monzon RN  Outcome: Met This Shift  9/8/2020 0946 by Marla Monzon RN  Outcome: Ongoing  9/8/2020 0550 by Bernard Russell RN  Outcome: Ongoing  Goal: Absence of physical injury  Description: Absence of physical injury  9/8/2020 1811 by Marla Monzon RN  Outcome: Met This Shift  9/8/2020 0946 by Marla Monzon RN  Outcome: Ongoing  9/8/2020 0550 by Bernard Russell RN  Outcome: Ongoing       Pt remained free of falls this shift. Bed remains in lowest position, with 2/4 side rails up and all wheels locked. Non skid socks on. Bedside table and call light within reach. Pt calls out appropriately. Will continue to monitor.     Electronically signed by Marla Monzon RN on 9/8/2020 at 6:12 PM

## 2020-09-08 NOTE — PLAN OF CARE
Problem: Airway Clearance - Ineffective  Goal: Achieve or maintain patent airway  Outcome: Ongoing     Problem: Gas Exchange - Impaired  Goal: Absence of hypoxia  Outcome: Ongoing  Goal: Promote optimal lung function  Outcome: Ongoing     Problem: Breathing Pattern - Ineffective  Goal: Ability to achieve and maintain a regular respiratory rate  Outcome: Ongoing     Problem:  Body Temperature -  Risk of, Imbalanced  Goal: Ability to maintain a body temperature within defined limits  Outcome: Ongoing  Goal: Will regain or maintain usual level of consciousness  Outcome: Ongoing  Goal: Complications related to the disease process, condition or treatment will be avoided or minimized  Outcome: Ongoing     Problem: Isolation Precautions - Risk of Spread of Infection  Goal: Prevent transmission of infection  Outcome: Ongoing     Problem: Nutrition Deficits  Goal: Optimize nutrtional status  Outcome: Ongoing     Problem: Risk for Fluid Volume Deficit  Goal: Maintain normal heart rhythm  Outcome: Ongoing  Goal: Maintain absence of muscle cramping  Outcome: Ongoing  Goal: Maintain normal serum potassium, sodium, calcium, phosphorus, and pH  Outcome: Ongoing     Problem: Loneliness or Risk for Loneliness  Goal: Demonstrate positive use of time alone when socialization is not possible  Outcome: Ongoing     Problem: Fatigue  Goal: Verbalize increase energy and improved vitality  Outcome: Ongoing     Problem: Patient Education: Go to Patient Education Activity  Goal: Patient/Family Education  Outcome: Ongoing     Problem: Skin Integrity:  Goal: Will show no infection signs and symptoms  Description: Will show no infection signs and symptoms  Outcome: Ongoing  Goal: Absence of new skin breakdown  Description: Absence of new skin breakdown  Outcome: Ongoing     Problem: Falls - Risk of:  Goal: Will remain free from falls  Description: Will remain free from falls  9/8/2020 0550 by Gene Santiago RN  Outcome: Ongoing  9/7/2020 1751 by Brooke Pierson RN  Outcome: Met This Shift  Goal: Absence of physical injury  Description: Absence of physical injury  9/8/2020 0550 by Noa Baca RN  Outcome: Ongoing  9/7/2020 1751 by Brooke Pierson RN  Outcome: Met This Shift

## 2020-09-08 NOTE — PROGRESS NOTES
[facility] -Progress Note  COVID 19 Patient  Today's Date and Time: 9/8/2020, 11:32 AM    Impression :   · COVID 19 Suspect  · COVID 19 Confirmed Infection  · COVID-19 tests:  · 9/5/2020 positive  · High inflammatory markers  Recommendations:   · Monitor off antibiotics  · Clinical Research will approach patient to explore if he qualifies for any of the COVID 19 treatment protocols. · Started Remdesivir 9/6/2020  · Patient would benefit from convalescent plasma. Plasma has been requested 9/6/2020  · Decadron 6 mg IV every 24 hours started 9/7/2020      Medical Decision Making/Summary/Discussion:9/8/2020     · Patient admitted with suspected COVID 19 infection  · Covid test confirmed positive. · Patient has high inflammatory markers  · He has been restarted on Remdesivir  · Decadron initiated 9/7/2020  Infection Control Recommendations   · Universal Precautions  · Airborne isolation  · Droplet Isolation    Antimicrobial Stewardship Recommendations     · Discontinuation of therapy  Coordination of Outpatient Care:   · Estimated Length of IV antimicrobials:TBD  · Patient will need Midline Catheter Insertion: TBD  · Patient will need PICC line Insertion: No  · Patient will need: Home IV , [place],  SNF,  LTAC:TBD  · Patient will need outpatient wound care:No    Chief complaint/reason for consultation:   · Concern for COVID infection      History of Present Illness:   Linda Cardenas is a 61y.o.-year-old  male who was initially admitted on 9/6/2020. Patient seen at the request of Dr. Ajay Horowitz:    Patient presented through ER with complaints of fever, progressive weakness and cough. He was concerned about been told with positive because 1 of his family members, who reside in his home  tested positive. The patient in the emergency room had a positive test on 9/5/2020. He was also found to be hypoxic.     His chest x-ray on 9/5/2020 showed subtle basilar changes suggestive of atelectasis or atypical pneumonia. He gives a pre-existing history of diabetes mellitus type 2, prior stroke with residual right-sided deficit. Patient admitted because of concerns with COVID 19.    CURRENT EVALUATION : 9/8/2020    Patient evaluated and examined in the ICU. Afebrile  VS stable    Patient exhibiting respiratory distress. Yes  Respiratory secretions: No    Patient receiving supplemental oxygen. 2 -->3 L nasal oxygen  02 sat 93-->95%  RR 20      NEWS Score: 0-4 Low risk group; 5-6: Medium risk group; 7 or above: High risk group  Parameters 3 2 1 0 1 2 3   Age    < 65   ? 65   RR ? 8  9-11 12-20  21-24 ? 25   O2 Sats ? 91 92-93 94-95 ? 96      Suppl O2  Yes  No      SBP ? 90  101-110 111-219   ? 220   HR ? 40  41-50 51-90  111-130 ? 131   Consciousness    Alert   Drowsiness, lethargy, or confusion   Temperature ? 35.0 C (95.0 F)  35.1-36.0 C 95.1-96.9 F 36.1-38.0 C 97.0-100.4 F 38.1-39.0 C 100.5-102.3 F ? 39.1 C ? 102.4 F      NEWS Score:   9/6/2020: 5 moderate risk    Overall Daily Picture:      Stable   He has been started on remdesivir   On Decadron because of high inflammatory markers   Plasma has been requested 9/6/2020. Awaiting availability    Presence of secondary bacterial Infection:    No   Additional antibiotics: No    Labs, X rays reviewed: 9/8/2020    BUN: 24-->22-->27  Cr: 1.57-->1.14-->0.98    WBC: 18.1-->19.4-->21.4  (on decadron)  Hb: 11.9-->12.1-->11.8  Plat: 187-->205-->265    Absolute Neutrophils: 9.23  Absolute Lymphocytes: 2.17  Neutrophil/Lymphocyte Ratio: 3.4 low risk    CRP: 48.3  Ferritin: 1929  LDH: 387    Pro Calcitonin:      Cultures:  Urine:  ·   Blood:  · 9/5/2020 x 2 no growth  Sputum :  ·   Wound:     Rapid influenza 9/5/2020 negative    CXR:   · 9/5/2020 subtle groundglass opacities at the lung bases  CAT:      Discussed with patient, RN, CC, IM.      9/5/2020:     I have personally reviewed the past medical history, past surgical history, medications, social history, and family history, and I have updated the database accordingly.   Past Medical History:     Past Medical History:   Diagnosis Date    [facility]     [facility] mellitus [facility]     High cholesterol     Hypertension        Past Surgical  History:     Past Surgical History:   Procedure Laterality Date    KNEE SURGERY  1983       Medications:      melatonin  3 mg Oral Once    dexamethasone  6 mg Intravenous Q6H    enoxaparin  30 mg Subcutaneous BID    sodium chloride flush  10 mL Intravenous 2 times per day    insulin lispro  0-6 Units Subcutaneous TID WC    insulin lispro  0-3 Units Subcutaneous Nightly    amLODIPine  10 mg Oral Daily    aspirin  81 mg Oral Daily    atorvastatin  10 mg Oral Daily    FLUoxetine  40 mg Oral Daily    [Held by provider] lisinopril  10 mg Oral Daily    hydrALAZINE  25 mg Oral Daily    insulin glargine  16 Units Subcutaneous Nightly    remdesivir IVPB  100 mg Intravenous Q24H       Social History:     Social History     Socioeconomic History    Marital status: Single     Spouse name: Not on file    Number of children: Not on file    Years of education: Not on file    Highest education level: Not on file   Occupational History    Not on file   Social Needs    Financial resource strain: Not on file    Food insecurity     Worry: Not on file     Inability: Not on file   [employer] needs     Medical: Not on file     Non-medical: Not on file   Tobacco Use    Smoking status: Never Smoker    Smokeless tobacco: Never Used   Substance and Sexual Activity    Alcohol use: Not Currently    Drug use: Never    Sexual activity: Not on file   Lifestyle    Physical activity     Days per week: Not on file     Minutes per session: Not on file    Stress: Not on file   Relationships    Social connections     Talks on phone: Not on file     Gets together: Not on file Attends Restorationism service: Not on file     Active member of club or organization: Not on file     Attends meetings of clubs or organizations: Not on file     Relationship status: Not on file    Intimate partner violence     Fear of current or ex partner: Not on file     Emotionally abused: Not on file     Physically abused: Not on file     Forced sexual activity: Not on file   Other Topics Concern    Not on file   Social History Narrative    Not on file       Family History:     Family History   Problem Relation Age of Onset    Hypertension Mother     Hypertension Father     Hypertension Sister     Hypertension Maternal Grandmother     Cancer Maternal Grandmother     Hypertension Maternal Grandfather     Migraines Paternal Grandmother     Hypertension Paternal Grandfather         Allergies:   Dairycare [lactase-lactobacillus]     Review of Systems:     Unable to provide. Sedated on ventilator. 9/8/2020      Constitutional: No fevers or chills. No systemic complaints  Head: No headaches  Eyes: No double vision or blurry vision. No conjunctival inflammation. ENT: No sore throat or runny nose. . No hearing loss, tinnitus or vertigo. Cardiovascular: No chest pain or palpitations. No shortness of breath. No SLATER  Lung: No shortness of breath or cough. No sputum production  Abdomen: No nausea, vomiting, diarrhea, or abdominal pain. Tonye Cogan No cramps. Genitourinary: No increased urinary frequency, or dysuria. No hematuria. No suprapubic or CVA pain  Musculoskeletal: No muscle aches or pains. No joint effusions, swelling or deformities  Hematologic: No bleeding or bruising. Neurologic: No headache, weakness, numbness, or tingling. Integument: No rash, no ulcers. Psychiatric: No depression. Endocrine: No polyuria, no polydipsia, no polyphagia.     Physical Examination :     Patient Vitals for the past 8 hrs:   BP Temp Temp src Pulse Resp SpO2   09/08/20 0845 132/78 98.1 °F (36.7 °C) Oral 87 21 95 %     General Appearance: Awake, alert, and in no apparent distress  Head:  Normocephalic, no trauma  Eyes: Pupils equal, round, reactive to light and accommodation; extraocular movements intact; sclera anicteric; conjunctivae pink. No embolic phenomena. ENT: Oropharynx clear, without erythema, exudate, or thrush. No tenderness of sinuses. Mouth/throat: mucosa pink and moist. No lesions. Dentition in good repair. Neck:Supple, without lymphadenopathy. Thyroid normal, No bruits. Pulmonary/Chest: Clear to auscultation, without wheezes, rales, or rhonchi. No dullness to percussion. Cardiovascular: Regular rate and rhythm without murmurs, rubs, or gallops. Abdomen: Soft, non tender. Bowel sounds normal. No organomegaly  All four Extremities: No cyanosis, clubbing, edema, or effusions. Neurologic: No gross sensory or motor deficits. Skin: Warm and dry with good turgor. No signs of peripheral arterial or venous insufficiency. No ulcerations. No open wounds. Medical Decision Making -Laboratory:   I have independently reviewed/ordered the following labs:    CBC with Differential:   Recent Labs     09/07/20  1118 09/08/20  0427   WBC 21.2* 21.4*   HGB 11.7* 11.8*   HCT 35.6* 36.0*    265   LYMPHOPCT 57* 50*   MONOPCT 3 2     BMP:   Recent Labs     09/07/20  1118 09/08/20  0427    139   K 3.8 4.3    106   CO2 20 22   BUN 23 27*   CREATININE 0.97 0.98     Hepatic Function Panel:   Recent Labs     09/05/20  2240  09/07/20 1118 09/08/20  0427   PROT 7.0   < > 6.1* 6.3*   LABALBU 3.7   < > 2.9* 3.1*   BILIDIR 0.10  --   --   --    IBILI 0.29  --   --   --    BILITOT 0.39   < > 0.32 0.34   ALKPHOS 59   < > 51 53   ALT 27   < > 26 28   AST 45*   < > 41* 37    < > = values in this interval not displayed. No results for input(s): RPR in the last 72 hours. No results for input(s): HIV in the last 72 hours. No results for input(s): BC in the last 72 hours.   Lab Results   Component Value Date    MUCUS NOT REPORTED

## 2020-09-08 NOTE — PROGRESS NOTES
[facility]  Office: [address], DO, Laz Ayala, DO, Brigido Ellis, DO, Arabella Javed Blood, DO, Ovidio Xie MD, Mal Mccoy MD, Richie Fink MD, Austin Cade MD, Yan Henao MD, Ricky Vee MD, Norma Farley MD, Barney Rojas MD, Erin La MD, Eliane Lund, DO, Sree Braga MD, Yazmin Lopez MD, Pablo Hyde DO, Glendell Primrose, MD,  Martha Lake DO, Bre Lopez MD, An Stinson MD, Manravi Bustos [facility], [facility], CNP, Naldo Vargas, CNP, Wilver Graham, CNS, Erwin Schaeffer, CNP, Sammy Sierra, CNP, Qian Howell, CNP, Robin Lockwood, CNP, Lindy Hall, CNP, Armani Lopez PA-C, Gloria French, [facility], Martínez Collazo, CNP, Shirley Sun, CNP, Luis Eduardo Kingston, CNP, Brian Boswell, CNP, Hector Valderrama, [facility]   [address] [facility]    Progress Note    9/8/2020    6:20 PM    Name:   Josy Huffman  MRN:     [id number]     Acct:      [de-identified]   Room:   [address] [place] [address] Day:  2  Admit Date:  9/6/2020  4:17 AM    PCP:   Ksenia Agrawal MD  Code Status:  Full Code    Subjective:     C/C: No chief complaint on file. Cough  Interval History Status: improved. No complaints today, patient states he is feeling better, requiring higher levels of oxygen flow from nasal cannula. Tolerating diet currently    Brief History: This is a 59-year-old male with a history of CVA, diabetes, hyperlipidemia and hypertension who came in for evaluation of fever and cough. Patient initially presented to [facility]'s emergency department where he was diagnosed with COVID-19. Patient recently had a stroke with right-sided deficit. He lives in home with his parents and 2 siblings and children of 1 of the siblings. His sister had tested positive and was symptomatic for COVID-19. Patient reports that his other sister and himself have been experiencing symptoms as well. Patient complains of persistent cough. Initially he was noted to be hypoxic with oxygen saturation of 90 to 91% on room air. Patient was transferred to [facility] for other treatment. Review of Systems:     Constitutional:  negative for chills, fevers, sweats  Respiratory:  negative for cough, dyspnea on exertion, shortness of breath, wheezing  Cardiovascular:  negative for chest pain, chest pressure/discomfort, lower extremity edema, palpitations  Gastrointestinal:  negative for abdominal pain, constipation, diarrhea, nausea, vomiting  Neurological:  negative for dizziness, headache    Medications: Allergies: Allergies   Allergen Reactions    Dairycare [Lactase-Lactobacillus]        Current Meds:   Scheduled Meds:    [START ON 9/9/2020] dexamethasone  6 mg Intravenous Daily    melatonin  3 mg Oral Once    enoxaparin  30 mg Subcutaneous BID    sodium chloride flush  10 mL Intravenous 2 times per day    insulin lispro  0-6 Units Subcutaneous TID WC    insulin lispro  0-3 Units Subcutaneous Nightly    amLODIPine  10 mg Oral Daily    aspirin  81 mg Oral Daily    atorvastatin  10 mg Oral Daily    FLUoxetine  40 mg Oral Daily    [Held by provider] lisinopril  10 mg Oral Daily    hydrALAZINE  25 mg Oral Daily    insulin glargine  16 Units Subcutaneous Nightly    remdesivir IVPB  100 mg Intravenous Q24H     Continuous Infusions:    dextrose       PRN Meds: guaiFENesin, acetaminophen **OR** acetaminophen, acetaminophen **OR** acetaminophen, magnesium hydroxide, nicotine, promethazine **OR** ondansetron, sodium chloride flush, dextrose, dextrose, glucagon (rDNA), glucose, sodium chloride    Data:     Past Medical History:   has a past medical history of Cerebral artery occlusion with cerebral infarction ([facility] Diabetes mellitus ([facility] High cholesterol, and Hypertension. Social History:   reports that he has never smoked. He has never used smokeless tobacco. He reports previous alcohol use. He reports that he does not use drugs. --  2.9*  --   --   --  3.1*  --   --   --    AST 45*  --  42*  --  41*  --   --   --  37  --   --   --    ALT 27  --  29  --  26  --   --   --  28  --   --   --    *  --   --   --   --   --   --   --   --   --   --   --    ALKPHOS 59  --  57  --  51  --   --   --  53  --   --   --    BILITOT 0.39  --  0.26*  --  0.32  --   --   --  0.34  --   --   --    BILIDIR 0.10  --   --   --   --   --   --   --   --   --   --   --    POCGLU  --    < >  --    < >  --  123* 197* 240*  --  211* 191* 260*    < > = values in this interval not displayed. ABG:No results found for: POCPH, PHART, PH, POCPCO2, JYY7SMU, PCO2, POCPO2, PO2ART, PO2, POCHCO3, NAU8VUK, HCO3, NBEA, PBEA, BEART, BE, THGBART, THB, TJG9KDR, DMZY3SQF, F4FLGQKQ, O2SAT, FIO2  Lab Results   Component Value Date/Time    SPECIAL NOT REPORTED 09/06/2020 07:56 AM    SPECIAL NOT REPORTED 09/06/2020 07:56 AM     Lab Results   Component Value Date/Time    CULTURE NORMAL RESPIRATORY LENA MODERATE GROWTH 09/06/2020 07:56 AM       [facility]    Result Date: 9/5/2020  Subtle ground-glass opacities primarily lung bases, likely atelectasis although infectious process is possible in the proper clinical setting.        Physical Examination:        General appearance:  alert, cooperative and no distress  Mental Status:  oriented to person, place and time and normal affect  Lungs:  clear to auscultation bilaterally, normal effort  Heart:  regular rate and rhythm, no murmur  Abdomen:  soft, nontender, nondistended, normal bowel sounds, no masses, hepatomegaly, splenomegaly  Extremities:  no edema, redness, tenderness in the calves  Skin:  no gross lesions, rashes, induration    Assessment:        Hospital Problems           Last Modified POA    Pneumonia due to COVID-19 virus 9/6/2020 Yes    COVID-19 9/7/2020 Yes          Plan:        COVID-19 pneumonia  -ID consulted  -Remdisivir and Decadron started on 9/6/2020, Decadron started on 9/7/2020.  -Continue

## 2020-09-08 NOTE — PLAN OF CARE
PATIENT REFUSES TO WEAR BIPAP     [x] Risks and benefits explained to patient   [x] Patient refuses to wear Bipap   [x] Patient verbalizes understanding of information presented.

## 2020-09-08 NOTE — PROGRESS NOTES
Physician Progress Note      Ovi Rodriguez  CSN #:                  [id number]  :                       1960  ADMIT DATE:       2020 4:17 AM  DISCH DATE:  Linda Gudino  PROVIDER #:        Yvetta Erps MEJIA          QUERY TEXT:    Patient admitted with pneumonia due to [address] 23. Noted to have elevated wbc,   also had T101, CRP 48.3, and  at [facility] prior to transfer. Pt   received Tylenol, IV Zithromax, and IV Rocephin prior to transfer. Afebrile on   arrival to [facility] w/ HR 80s-90s, RR 16-21. If possible, please document   in progress notes and discharge summary if you are evaluating and/or treating   any of the following: The medical record reflects the following:  Risk Factors: COVID pneumonia  Clinical Indicators: [facility]   2230 T101, HR 100s, WBC 18.1, CRP 48.3, LD   387, Lactic 1.4  Arrival [facility]  0430  T36.9, HR 80s-90s   WBC 19.4  Treatment: IV Remdesivir, IV Decadron    Thank you, Shawn Young RN, CDS.  Please call with any questions [phone]   M-F 6a-2:30p  Options provided:  -- Sepsis due to COVID-19 present on arrival  -- COVID-19 without sepsis  -- Other - I will add my own diagnosis  -- Disagree - Not applicable / Not valid  -- Disagree - Clinically unable to determine / Unknown  -- Refer to Clinical Documentation Reviewer    PROVIDER RESPONSE TEXT:    This patient has sepsis due to COVID-19 present on arrival.    Query created by: Elissa Franklin on 2020 12:59 PM      Electronically signed by:  Erik Patrick 2020 1:04 PM

## 2020-09-08 NOTE — SIGNIFICANT EVENT
[x] [address]  [address]    [phone]  F: [phone]   [] [address] [facility] [address]   [address]  P: [phone]  F: [phone]  [] [place]  [address] [facility]  P: [phone]  F: [phone]  [] [address]  [facility]   [address]   [place]: [phone]  F: [phone]   [] [place] [address] [facility] [address]  [place]: [phone]   F: [phone]     Occupational Therapy Cancel/No Show note    Date: 2020  Patient: Rohini Nicholson  : 1960  MRN: [id number]    Cancels/No Shows to date: 4 cx /1 ns    For today's appointment patient:    []  Cancelled    [] Rescheduled appointment    [] No-show     Reason given by patient:    []  Patient ill    []  Conflicting appointment    [] No transportation      [] Conflict with work    [] No reason given    [] Weather related    [x] COVID-19 in hospital currently  [] Other:      Comments:      [] Next appointment was confirmed    Electronically signed by: MODESTO Ledesma/JANNET

## 2020-09-08 NOTE — FLOWSHEET NOTE
[x] [facility]) [facility]  [facility]  [address]    [phone]  F: [phone]   [] [address] [facility] [address]   [address]  P: [phone]  F: [phone]  [] [place]  [address] [facility]  P: [phone]  F: [phone]  [] [address]  [facility]   [address]   [place]: [phone]  F: [phone]   [] [address]  [place]: [phone]   F: [phone]     Physical Therapy Cancel/No Show note    Date: 2020  Patient: Edna Contreras  : 1960  MRN: [id number]    Cancels/No Shows to date:     For today's appointment patient:    []  Cancelled    [] Rescheduled appointment    [] No-show     Reason given by patient:    []  Patient ill    []  Conflicting appointment    [] No transportation      [] Conflict with work    [] No reason given    [] Weather related    [x] COVID-19    [] Other:      Comments:  Not counted against patient; currently admitted with COVID. Will remove patient from schedule for all future appts.       [] Next appointment was confirmed    Electronically signed by: Jadyn Malone PT

## 2020-09-08 NOTE — CARE COORDINATION
TRANSITIONAL CARE PLANNING/ 2 Rehab Alex Day: 2    Reason for Admission: COVID-19 [U07.1]     Treatment Plan of Care: full code, o2 3.5l         Readmission Risk            Risk of Unplanned Readmission:     17            Patient goals/Treatment Preferences/Transitional Plan: return home with family, pt note reviewed recommending further therapy at dc. Called patient current with op therapy but will not be able to go back till 10 days after diagnosis.  Offered snf/hc declined

## 2020-09-08 NOTE — PROGRESS NOTES
Occupational Therapy Not Seen Note    DATE: 2020  Name: Rosario Bentley  : 1960  MRN: [id number]    Patient not available for Occupational Therapy due to: Attempted this afternoon but pt having stomach pain, RN aware.     Next Scheduled Treatment: 2020    Electronically signed by BRIANNA Saunders on 2020 at 3:25 PM

## 2020-09-09 ENCOUNTER — HOSPITAL ENCOUNTER (OUTPATIENT)
Dept: PHYSICAL THERAPY | Age: 60
Setting detail: THERAPIES SERIES
Discharge: HOME | End: 2020-09-09
Payer: MEDICARE

## 2020-09-09 ENCOUNTER — HOSPITAL ENCOUNTER (OUTPATIENT)
Dept: OCCUPATIONAL THERAPY | Age: 60
Setting detail: THERAPIES SERIES
Discharge: HOME | End: 2020-09-09
Payer: MEDICARE

## 2020-09-09 PROBLEM — R53.1 RIGHT SIDED WEAKNESS: Status: ACTIVE | Noted: 2020-09-09

## 2020-09-09 LAB
ABSOLUTE EOS #: 0 K/UL (ref 0–0.4)
ABSOLUTE IMMATURE GRANULOCYTE: 0 K/UL (ref 0–0.3)
ABSOLUTE LYMPH #: 13 K/UL (ref 1–4.8)
ABSOLUTE MONO #: 1.04 K/UL (ref 0.1–0.8)
ALBUMIN SERPL-MCNC: 3.1 G/DL (ref 3.5–5.2)
ALBUMIN/GLOBULIN RATIO: 1.1 (ref 1–2.5)
ALP BLD-CCNC: 50 U/L (ref 40–129)
ALT SERPL-CCNC: 29 U/L (ref 5–41)
ANION GAP SERPL CALCULATED.3IONS-SCNC: 11 MMOL/L (ref 9–17)
AST SERPL-CCNC: 31 U/L
BASOPHILS # BLD: 0 % (ref 0–2)
BASOPHILS ABSOLUTE: 0 K/UL (ref 0–0.2)
BILIRUB SERPL-MCNC: 0.39 MG/DL (ref 0.3–1.2)
BUN BLDV-MCNC: 29 MG/DL (ref 8–23)
BUN/CREAT BLD: ABNORMAL (ref 9–20)
CALCIUM SERPL-MCNC: 8.4 MG/DL (ref 8.6–10.4)
CHLORIDE BLD-SCNC: 107 MMOL/L (ref 98–107)
CO2: 24 MMOL/L (ref 20–31)
CREAT SERPL-MCNC: 0.88 MG/DL (ref 0.7–1.2)
DIFFERENTIAL TYPE: ABNORMAL
EKG ATRIAL RATE: 91 BPM
EKG P AXIS: 96 DEGREES
EKG P-R INTERVAL: 144 MS
EKG Q-T INTERVAL: 368 MS
EKG QRS DURATION: 62 MS
EKG QTC CALCULATION (BAZETT): 452 MS
EKG R AXIS: 11 DEGREES
EKG T AXIS: 48 DEGREES
EKG VENTRICULAR RATE: 91 BPM
EOSINOPHILS RELATIVE PERCENT: 0 % (ref 1–4)
GFR AFRICAN AMERICAN: >60 ML/MIN
GFR NON-AFRICAN AMERICAN: >60 ML/MIN
GFR SERPL CREATININE-BSD FRML MDRD: ABNORMAL ML/MIN/{1.73_M2}
GFR SERPL CREATININE-BSD FRML MDRD: ABNORMAL ML/MIN/{1.73_M2}
GLUCOSE BLD-MCNC: 141 MG/DL (ref 75–110)
GLUCOSE BLD-MCNC: 169 MG/DL (ref 70–99)
GLUCOSE BLD-MCNC: 184 MG/DL (ref 75–110)
GLUCOSE BLD-MCNC: 240 MG/DL (ref 75–110)
GLUCOSE BLD-MCNC: 242 MG/DL (ref 75–110)
HCT VFR BLD CALC: 34.4 % (ref 40.7–50.3)
HEMOGLOBIN: 11 G/DL (ref 13–17)
IMMATURE GRANULOCYTES: 0 %
LYMPHOCYTES # BLD: 50 % (ref 24–44)
MCH RBC QN AUTO: 29.3 PG (ref 25.2–33.5)
MCHC RBC AUTO-ENTMCNC: 32 G/DL (ref 28.4–34.8)
MCV RBC AUTO: 91.7 FL (ref 82.6–102.9)
MONOCYTES # BLD: 4 % (ref 1–7)
MORPHOLOGY: NORMAL
NRBC AUTOMATED: 0 PER 100 WBC
PDW BLD-RTO: 12.8 % (ref 11.8–14.4)
PLATELET # BLD: 254 K/UL (ref 138–453)
PLATELET ESTIMATE: ABNORMAL
PMV BLD AUTO: 10.3 FL (ref 8.1–13.5)
POTASSIUM SERPL-SCNC: 4.3 MMOL/L (ref 3.7–5.3)
RBC # BLD: 3.75 M/UL (ref 4.21–5.77)
RBC # BLD: ABNORMAL 10*6/UL
SEG NEUTROPHILS: 46 % (ref 36–66)
SEGMENTED NEUTROPHILS ABSOLUTE COUNT: 11.96 K/UL (ref 1.8–7.7)
SODIUM BLD-SCNC: 142 MMOL/L (ref 135–144)
TOTAL PROTEIN: 5.8 G/DL (ref 6.4–8.3)
WBC # BLD: 26 K/UL (ref 3.5–11.3)
WBC # BLD: ABNORMAL 10*3/UL

## 2020-09-09 PROCEDURE — 2500000003 HC RX 250 WO HCPCS: Performed by: INTERNAL MEDICINE

## 2020-09-09 PROCEDURE — 99233 SBSQ HOSP IP/OBS HIGH 50: CPT | Performed by: INTERNAL MEDICINE

## 2020-09-09 PROCEDURE — 82947 ASSAY GLUCOSE BLOOD QUANT: CPT

## 2020-09-09 PROCEDURE — 99232 SBSQ HOSP IP/OBS MODERATE 35: CPT | Performed by: NURSE PRACTITIONER

## 2020-09-09 PROCEDURE — 80053 COMPREHEN METABOLIC PANEL: CPT

## 2020-09-09 PROCEDURE — 6370000000 HC RX 637 (ALT 250 FOR IP): Performed by: INTERNAL MEDICINE

## 2020-09-09 PROCEDURE — 2060000000 HC ICU INTERMEDIATE R&B

## 2020-09-09 PROCEDURE — 36415 COLL VENOUS BLD VENIPUNCTURE: CPT

## 2020-09-09 PROCEDURE — 85025 COMPLETE CBC W/AUTO DIFF WBC: CPT

## 2020-09-09 PROCEDURE — 2580000003 HC RX 258: Performed by: INTERNAL MEDICINE

## 2020-09-09 PROCEDURE — 2580000003 HC RX 258: Performed by: NURSE PRACTITIONER

## 2020-09-09 PROCEDURE — 6360000002 HC RX W HCPCS: Performed by: INTERNAL MEDICINE

## 2020-09-09 PROCEDURE — 6370000000 HC RX 637 (ALT 250 FOR IP): Performed by: NURSE PRACTITIONER

## 2020-09-09 PROCEDURE — 93010 ELECTROCARDIOGRAM REPORT: CPT | Performed by: INTERNAL MEDICINE

## 2020-09-09 RX ORDER — FAMOTIDINE 20 MG/1
20 TABLET, FILM COATED ORAL DAILY
Status: DISCONTINUED | OUTPATIENT
Start: 2020-09-09 | End: 2020-09-10 | Stop reason: HOSPADM

## 2020-09-09 RX ADMIN — SODIUM CHLORIDE, PRESERVATIVE FREE 10 ML: 5 INJECTION INTRAVENOUS at 20:25

## 2020-09-09 RX ADMIN — DEXAMETHASONE SODIUM PHOSPHATE 6 MG: 10 INJECTION, SOLUTION INTRAMUSCULAR; INTRAVENOUS at 09:04

## 2020-09-09 RX ADMIN — INSULIN LISPRO 1 UNITS: 100 INJECTION, SOLUTION INTRAVENOUS; SUBCUTANEOUS at 08:57

## 2020-09-09 RX ADMIN — SODIUM CHLORIDE, PRESERVATIVE FREE 10 ML: 5 INJECTION INTRAVENOUS at 09:05

## 2020-09-09 RX ADMIN — INSULIN GLARGINE 16 UNITS: 100 INJECTION, SOLUTION SUBCUTANEOUS at 20:45

## 2020-09-09 RX ADMIN — INSULIN LISPRO 1 UNITS: 100 INJECTION, SOLUTION INTRAVENOUS; SUBCUTANEOUS at 12:47

## 2020-09-09 RX ADMIN — HYDRALAZINE HYDROCHLORIDE 25 MG: 25 TABLET ORAL at 09:04

## 2020-09-09 RX ADMIN — FLUOXETINE HYDROCHLORIDE 40 MG: 20 CAPSULE ORAL at 09:03

## 2020-09-09 RX ADMIN — AMLODIPINE BESYLATE 10 MG: 10 TABLET ORAL at 09:03

## 2020-09-09 RX ADMIN — INSULIN LISPRO 2 UNITS: 100 INJECTION, SOLUTION INTRAVENOUS; SUBCUTANEOUS at 17:03

## 2020-09-09 RX ADMIN — GUAIFENESIN 200 MG: 100 SOLUTION ORAL at 09:03

## 2020-09-09 RX ADMIN — INSULIN LISPRO 1 UNITS: 100 INJECTION, SOLUTION INTRAVENOUS; SUBCUTANEOUS at 20:45

## 2020-09-09 RX ADMIN — GUAIFENESIN 200 MG: 100 SOLUTION ORAL at 20:25

## 2020-09-09 RX ADMIN — FAMOTIDINE 20 MG: 20 TABLET, FILM COATED ORAL at 12:36

## 2020-09-09 RX ADMIN — REMDESIVIR 100 MG: 100 INJECTION, POWDER, LYOPHILIZED, FOR SOLUTION INTRAVENOUS at 12:35

## 2020-09-09 RX ADMIN — ATORVASTATIN CALCIUM 10 MG: 10 TABLET, FILM COATED ORAL at 09:03

## 2020-09-09 RX ADMIN — ASPIRIN 81 MG: 81 TABLET, CHEWABLE ORAL at 09:03

## 2020-09-09 RX ADMIN — LISINOPRIL 10 MG: 10 TABLET ORAL at 09:04

## 2020-09-09 ASSESSMENT — ENCOUNTER SYMPTOMS
SHORTNESS OF BREATH: 1
CONSTIPATION: 0
ABDOMINAL DISTENTION: 0
NAUSEA: 0
VOMITING: 0
COUGH: 0
SINUS PRESSURE: 0
DIARRHEA: 0
SINUS PAIN: 0
SORE THROAT: 0
ABDOMINAL PAIN: 0

## 2020-09-09 NOTE — PROGRESS NOTES
[facility]  Office: [address], DO, Zakia Fajardo, DO, Angie Wetzel, DO, Cristinamayelin Reese, DO, Miguel Bush MD, Shelley Ibarra MD, Shashank Belle MD, Rochelle Spence MD, Jimbo Rodriguez MD, Romi Valencia MD, Alcides Red MD, Thomas Guerrero MD, Erin Zhang MD, Kasie Augustin DO, Jaja Cardenas MD, Ryann Willams MD, Sebastian Nunez DO, James Lopez MD,  Brent Bermudez DO, Conner Boogie MD, Steffanie Huerta MD, Em Johnston, [facility], [facility], CNP, Cleveland Sanchez, CNP, Maverick Hurley, CNS, Tip Herzog, CNP, Romeo Goodwin, CNP, Matthew Cohen, CNP, Naila Hager, CNP, Robert Noland, CNP, Maged Torre PA-C, Aileen Rocha, [facility], Harriet Sloan, CNP, Eliza Batista, CNP, Katina Diaz, CNP, Eric Cat, CNP, Eleuterio Epstein, [facility]   [address] [facility]    Progress Note    9/9/2020    9:09 AM    Name:   Linda Cardenas  MRN:     [id number]     [place]:      [de-identified]   Room:   [address] [place] [address] Day:  3  Admit Date:  9/6/2020  4:17 AM    PCP:   Ling Ruelas MD  Code Status:  Full Code    Subjective:     C/C: shortness of breath with cough  Interval History Status: improved. Patient seen and evaluated in room sitting in bed in no acute distress. Vital signs are stable. No acute events overnight. He is tolerating low flow O2 well. Right-sided weakness from previous CVA persists. No other patient or nursing concerns    Brief History:     Per records:     This is a 80-year-old male with a history of CVA, diabetes, hyperlipidemia and hypertension who came in for evaluation of fever and cough.  Patient initially presented to [facility]'s emergency department where he was diagnosed with COVID-19. Zafar Service recently had a stroke with right-sided deficit.  He lives in home with his parents and 2 siblings and children of 1 of the siblings.  His sister had tested positive and was symptomatic for COVID-19.  Patient reports that promethazine **OR** ondansetron, sodium chloride flush, dextrose, dextrose, glucagon (rDNA), glucose, sodium chloride    Data:     Past Medical History:   has a past medical history of Cerebral artery occlusion with cerebral infarction ([facility] Diabetes mellitus ([facility] High cholesterol, and Hypertension. Social History:   reports that he has never smoked. He has never used smokeless tobacco. He reports previous alcohol use. He reports that he does not use drugs. Family History:   Family History   Problem Relation Age of Onset    Hypertension Mother     Hypertension Father     Hypertension Sister     Hypertension Maternal Grandmother     Cancer Maternal Grandmother     Hypertension Maternal Grandfather     Migraines Paternal Grandmother     Hypertension Paternal Grandfather        Vitals:  /77   Pulse 86   Temp 98.7 °F (37.1 °C) (Oral)   Resp 15   Wt 164 lb 10.9 oz (74.7 kg)   SpO2 98%   BMI 22.97 kg/m²   Temp (24hrs), Av.7 °F (37.1 °C), Min:98.7 °F (37.1 °C), Max:98.7 °F (37.1 °C)    Recent Labs     20  1246 20  1733 20  2115 20  0850   POCGLU 191* 260* 164* 141*       I/O (24Hr):     Intake/Output Summary (Last 24 hours) at 2020 0909  Last data filed at 2020 0259  Gross per 24 hour   Intake 850.07 ml   Output --   Net 850.07 ml       Labs:  Hematology:  Recent Labs     20  1452 20  1118 20  0427 20  0503   WBC 19.4* 21.2* 21.4* 26.0*   RBC 3.94* 3.85* 4.04* 3.75*   HGB 12.1* 11.7* 11.8* 11.0*   HCT 34.9* 35.6* 36.0* 34.4*   MCV 88.6 92.5 89.1 91.7   MCH 30.7 30.4 29.2 29.3   MCHC 34.7 32.9 32.8 32.0   RDW 12.5 12.9 12.7 12.8    301 265 254   MPV 10.3 10.5 10.2 10.3   INR 1.0  --   --   --    DDIMER 0.74  --   --   --      Chemistry:  Recent Labs     20  1118 20  1651 20  0427 20  0503     --  139 142   K 3.8  --  4.3 4.3     --  106 107   CO2 20  --  22 24   GLUCOSE 146*  --  207* 169* BUN 23  --  27* 29*   CREATININE 0.97  --  0.98 0.88   ANIONGAP 11  --  11 11   LABGLOM >60  --  >60 >60   GFRAA >60  --  >60 >60   CALCIUM 8.4*  --  8.3* 8.4*   TROPHS  --  20  --   --      Recent Labs     09/07/20  1118  09/07/20  2228 09/08/20  0427 09/08/20  0843 09/08/20  1246 09/08/20  1733 09/08/20  2115 09/09/20  0503 09/09/20  0850   PROT 6.1*  --   --  6.3*  --   --   --   --  5.8*  --    LABALBU 2.9*  --   --  3.1*  --   --   --   --  3.1*  --    AST 41*  --   --  37  --   --   --   --  31  --    ALT 26  --   --  28  --   --   --   --  29  --    ALKPHOS 51  --   --  53  --   --   --   --  50  --    BILITOT 0.32  --   --  0.34  --   --   --   --  0.39  --    POCGLU  --    < > 240*  --  211* 191* 260* 164*  --  141*    < > = values in this interval not displayed. ABG:No results found for: POCPH, PHART, PH, POCPCO2, GTS7ZHB, PCO2, POCPO2, PO2ART, PO2, POCHCO3, XDV3VOB, HCO3, NBEA, PBEA, BEART, BE, THGBART, THB, FJS2GQZ, FNZK8BWH, E7OJBRGD, O2SAT, FIO2  Lab Results   Component Value Date/Time    SPECIAL NOT REPORTED 09/06/2020 07:56 AM    SPECIAL NOT REPORTED 09/06/2020 07:56 AM     Lab Results   Component Value Date/Time    CULTURE NORMAL RESPIRATORY LENA MODERATE GROWTH 09/06/2020 07:56 AM       Radiology:  Samia Tatum Chest Portable    Result Date: 9/5/2020  Subtle ground-glass opacities primarily lung bases, likely atelectasis although infectious process is possible in the proper clinical setting. Physical Examination:        Physical Exam  Vitals signs and nursing note reviewed. Constitutional:       Appearance: Normal appearance. HENT:      Head: Normocephalic and atraumatic. Mouth/Throat:      Mouth: Mucous membranes are moist.   Eyes:      Extraocular Movements: Extraocular movements intact. Pupils: Pupils are equal, round, and reactive to light. Neck:      Musculoskeletal: Normal range of motion and neck supple.    Cardiovascular:      Rate and Rhythm: Normal rate and regular rhythm. Pulses: Normal pulses. Heart sounds: Normal heart sounds. No murmur. Pulmonary:      Effort: Pulmonary effort is normal.      Breath sounds: Normal breath sounds. Abdominal:      General: Bowel sounds are normal. There is no distension. Palpations: Abdomen is soft. Tenderness: There is no abdominal tenderness. There is no guarding. Musculoskeletal: Normal range of motion. Right lower leg: No edema. Left lower leg: No edema. Lymphadenopathy:      Cervical: No cervical adenopathy. Skin:     General: Skin is warm and dry. Capillary Refill: Capillary refill takes less than 2 seconds. Neurological:      Mental Status: He is alert and oriented to person, place, and time. Mental status is at baseline. Comments: Right-sided weakness from previous CVA affecting fine motor greater than gross motor. Sensation is intact but diminished   Psychiatric:      Comments: Flat affect         Assessment:        Hospital Problems           Last Modified POA    Diabetes mellitus, insulin dependent (IDDM), controlled ([facility] 9/9/2020 Yes    Essential hypertension 9/9/2020 Yes    Mixed hyperlipidemia 9/9/2020 Yes    Right hemiplegia ([facility] 9/9/2020 Yes    CVA (cerebral vascular accident) ([facility] 9/9/2020 Yes    Pneumonia due to COVID-19 virus 9/6/2020 Yes    COVID-19 9/7/2020 Yes          Plan:        1. PNA with COVID-19 viral infection: Continue isolation precautions, infectious disease following. Remdesevir started 9/6-9-10, plasma requested 9/6 per ID  steroids continue started on 9/7. LFO2 as needed. Sputum culture grew gram-negative rods. 2. Essential hypertension: Currently stable. Continue home medications as ordered patient stating lisinopril gives him a cough. Will discontinue and monitor closely. 3. Dyslipidemia: Continue statin therapy  4. History of CVA with residual right-sided weakness: PT, OT  5. Depression: Continue Prozac at home dosing  6.  Diabetes mellitus type 2: Blood sugars fluctuating with coadministration of steroid therapy. Continue Lantus, low intensity SSI. Monitor patient for hyper/hypoglycemic events  7. GI/DVT prophylaxis: Lovenox, start Pepcid  8.  Debility: PT, OT    TOY Hinton - NP  9/9/2020  9:09 AM

## 2020-09-09 NOTE — PROGRESS NOTES
Occupational Therapy    Occupational Therapy Not Seen Note    DATE: 2020  Name: Migel Mitchell  : 1960  MRN: [id number]    Patient not available for Occupational Therapy due to:    Patient Declined: \"pt did not want to do any therapy today, I want to sleep\"    Next Scheduled Treatment: 9/10/20    Electronically signed by BRIANNA Herron on 2020 at 3:17 PM

## 2020-09-09 NOTE — RESEARCH
Clinical Research Services      Patient Name: Cesar Kim  MRN: [id number]  YOB: 1960  Date of evaluation: 9/9/2020  Time of evaluation: 09:40  Reason for evaluation:  Screening      Requested by Dr. Alicia Choi to evaluate for research protocols.  Evaluated 9/8/20 and 9/9/20   Ineligible for Jessica (nitric oxide) vs Placebo due BiPAP intermittently   Ineligible for [facility] Otilimab vs placebo due to not on high flow   Ineligible for [employer] study due to Steroid > 1 mg/kg/day of methylprednisosone equivalent    VU Navarro RN    Clinical Research Nurse     Audrey Galeana MD

## 2020-09-09 NOTE — PLAN OF CARE
Maite Matias, RN  Outcome: Met This Shift  Goal: Absence of physical injury  Description: Absence of physical injury  9/9/2020 0014 by Mary Conde RN  Outcome: Ongoing  9/8/2020 1811 by Maite Matias, RN  Outcome: Met This Shift

## 2020-09-09 NOTE — PROGRESS NOTES
Writer messaged NP on call to notify of A best practice advisory that came up on patients chart  for stating a high risk for sepsis at 5.5. Will continue to monitor.

## 2020-09-09 NOTE — PROGRESS NOTES
Writer messaged NP on call of patients blood pressures being 143/79 and then 147/75, patient stated is not in any pain but he does have a pretty frequent strong cough, writer gave him cough medicine to hopefully help. Patient does get hydralazine every morning.  Will continue to monitor

## 2020-09-09 NOTE — PROGRESS NOTES
DATE: 2020    NAME: Josy Huffman  MRN: [id number]   : 1960    Patient not seen this date for Physical Therapy due to:  [] Blood transfusion in progress  [] Hemodialysis  [x]  Patient Declined     Pt refused to participate in PT today, reporting that he is tired and his legs work fine. Pt not willing to do supine Ex's or transfer to bedside chair. Pt reported he does not want to work with OT today either   [] Spine Precautions   [] Strict Bedrest  [] Surgery/ Procedure  [] Testing      [] Other        [] PT being discontinued at this time. Patient independent. No further needs. [] PT being discontinued at this time as the patient has been transferred to palliative care. No further needs.     Jacob Degroot, PTA

## 2020-09-10 VITALS
OXYGEN SATURATION: 97 % | RESPIRATION RATE: 19 BRPM | HEART RATE: 69 BPM | SYSTOLIC BLOOD PRESSURE: 124 MMHG | WEIGHT: 165.57 LBS | DIASTOLIC BLOOD PRESSURE: 76 MMHG | TEMPERATURE: 98.1 F | BODY MASS INDEX: 23.09 KG/M2

## 2020-09-10 PROBLEM — U07.1 PNEUMONIA DUE TO COVID-19 VIRUS: Status: RESOLVED | Noted: 2020-09-06 | Resolved: 2020-09-10

## 2020-09-10 PROBLEM — J12.82 PNEUMONIA DUE TO COVID-19 VIRUS: Status: RESOLVED | Noted: 2020-09-06 | Resolved: 2020-09-10

## 2020-09-10 LAB
-: NORMAL
ABSOLUTE EOS #: 0 K/UL (ref 0–0.44)
ABSOLUTE IMMATURE GRANULOCYTE: 0.26 K/UL (ref 0–0.3)
ABSOLUTE LYMPH #: 14.41 K/UL (ref 1.1–3.7)
ABSOLUTE MONO #: 1.57 K/UL (ref 0.1–1.2)
ALBUMIN SERPL-MCNC: 2.8 G/DL (ref 3.5–5.2)
ALBUMIN/GLOBULIN RATIO: 1 (ref 1–2.5)
ALP BLD-CCNC: 48 U/L (ref 40–129)
ALT SERPL-CCNC: 41 U/L (ref 5–41)
AMORPHOUS: NORMAL
ANION GAP SERPL CALCULATED.3IONS-SCNC: 12 MMOL/L (ref 9–17)
AST SERPL-CCNC: 37 U/L
BACTERIA: NORMAL
BASOPHILS # BLD: 0 % (ref 0–2)
BASOPHILS ABSOLUTE: 0 K/UL (ref 0–0.2)
BILIRUB SERPL-MCNC: 0.46 MG/DL (ref 0.3–1.2)
BILIRUBIN URINE: NEGATIVE
BUN BLDV-MCNC: 31 MG/DL (ref 8–23)
BUN/CREAT BLD: ABNORMAL (ref 9–20)
CALCIUM SERPL-MCNC: 8.4 MG/DL (ref 8.6–10.4)
CASTS UA: NORMAL /LPF (ref 0–8)
CHLORIDE BLD-SCNC: 109 MMOL/L (ref 98–107)
CO2: 23 MMOL/L (ref 20–31)
COLOR: YELLOW
COMMENT UA: ABNORMAL
CREAT SERPL-MCNC: 0.84 MG/DL (ref 0.7–1.2)
CRYSTALS, UA: NORMAL /HPF
DIFFERENTIAL TYPE: ABNORMAL
EOSINOPHILS RELATIVE PERCENT: 0 % (ref 1–4)
EPITHELIAL CELLS UA: NORMAL /HPF (ref 0–5)
GFR AFRICAN AMERICAN: >60 ML/MIN
GFR NON-AFRICAN AMERICAN: >60 ML/MIN
GFR SERPL CREATININE-BSD FRML MDRD: ABNORMAL ML/MIN/{1.73_M2}
GFR SERPL CREATININE-BSD FRML MDRD: ABNORMAL ML/MIN/{1.73_M2}
GLUCOSE BLD-MCNC: 125 MG/DL (ref 75–110)
GLUCOSE BLD-MCNC: 150 MG/DL (ref 70–99)
GLUCOSE URINE: ABNORMAL
HCT VFR BLD CALC: 34.9 % (ref 40.7–50.3)
HEMOGLOBIN: 11 G/DL (ref 13–17)
IMMATURE GRANULOCYTES: 1 %
KETONES, URINE: NEGATIVE
LEUKOCYTE ESTERASE, URINE: NEGATIVE
LYMPHOCYTES # BLD: 55 % (ref 24–43)
MCH RBC QN AUTO: 29.3 PG (ref 25.2–33.5)
MCHC RBC AUTO-ENTMCNC: 31.5 G/DL (ref 28.4–34.8)
MCV RBC AUTO: 92.8 FL (ref 82.6–102.9)
MONOCYTES # BLD: 6 % (ref 3–12)
MORPHOLOGY: ABNORMAL
MUCUS: NORMAL
NITRITE, URINE: NEGATIVE
NRBC AUTOMATED: 0 PER 100 WBC
OTHER OBSERVATIONS UA: NORMAL
PDW BLD-RTO: 12.7 % (ref 11.8–14.4)
PH UA: 5 (ref 5–8)
PLATELET # BLD: 287 K/UL (ref 138–453)
PLATELET ESTIMATE: ABNORMAL
PMV BLD AUTO: 9.9 FL (ref 8.1–13.5)
POTASSIUM SERPL-SCNC: 4.1 MMOL/L (ref 3.7–5.3)
PROTEIN UA: ABNORMAL
RBC # BLD: 3.76 M/UL (ref 4.21–5.77)
RBC # BLD: ABNORMAL 10*6/UL
RBC UA: NORMAL /HPF (ref 0–4)
RENAL EPITHELIAL, UA: NORMAL /HPF
SEG NEUTROPHILS: 38 % (ref 36–65)
SEGMENTED NEUTROPHILS ABSOLUTE COUNT: 9.96 K/UL (ref 1.5–8.1)
SODIUM BLD-SCNC: 144 MMOL/L (ref 135–144)
SPECIFIC GRAVITY UA: 1.02 (ref 1–1.03)
TOTAL PROTEIN: 5.6 G/DL (ref 6.4–8.3)
TRICHOMONAS: NORMAL
TURBIDITY: CLEAR
URINE HGB: NEGATIVE
UROBILINOGEN, URINE: NORMAL
WBC # BLD: 26.2 K/UL (ref 3.5–11.3)
WBC # BLD: ABNORMAL 10*3/UL
WBC UA: NORMAL /HPF (ref 0–5)
YEAST: NORMAL

## 2020-09-10 PROCEDURE — 6370000000 HC RX 637 (ALT 250 FOR IP): Performed by: INTERNAL MEDICINE

## 2020-09-10 PROCEDURE — 97116 GAIT TRAINING THERAPY: CPT

## 2020-09-10 PROCEDURE — 82947 ASSAY GLUCOSE BLOOD QUANT: CPT

## 2020-09-10 PROCEDURE — 80053 COMPREHEN METABOLIC PANEL: CPT

## 2020-09-10 PROCEDURE — 6360000002 HC RX W HCPCS: Performed by: INTERNAL MEDICINE

## 2020-09-10 PROCEDURE — 99239 HOSP IP/OBS DSCHRG MGMT >30: CPT | Performed by: INTERNAL MEDICINE

## 2020-09-10 PROCEDURE — 81001 URINALYSIS AUTO W/SCOPE: CPT

## 2020-09-10 PROCEDURE — 2580000003 HC RX 258: Performed by: INTERNAL MEDICINE

## 2020-09-10 PROCEDURE — 2500000003 HC RX 250 WO HCPCS: Performed by: INTERNAL MEDICINE

## 2020-09-10 PROCEDURE — 6370000000 HC RX 637 (ALT 250 FOR IP): Performed by: NURSE PRACTITIONER

## 2020-09-10 PROCEDURE — 36415 COLL VENOUS BLD VENIPUNCTURE: CPT

## 2020-09-10 PROCEDURE — APPSS180 APP SPLIT SHARED TIME > 60 MINUTES: Performed by: NURSE PRACTITIONER

## 2020-09-10 PROCEDURE — 85025 COMPLETE CBC W/AUTO DIFF WBC: CPT

## 2020-09-10 PROCEDURE — 2580000003 HC RX 258: Performed by: NURSE PRACTITIONER

## 2020-09-10 RX ORDER — ALBUTEROL SULFATE 90 UG/1
2 AEROSOL, METERED RESPIRATORY (INHALATION) EVERY 4 HOURS PRN
Qty: 3 INHALER | Refills: 0 | Status: SHIPPED | OUTPATIENT
Start: 2020-09-10 | End: 2021-11-23

## 2020-09-10 RX ORDER — GUAIFENESIN 100 MG/5ML
200 SOLUTION ORAL EVERY 4 HOURS PRN
Qty: 1200 ML | Refills: 0 | Status: SHIPPED | OUTPATIENT
Start: 2020-09-10 | End: 2021-04-05 | Stop reason: ALTCHOICE

## 2020-09-10 RX ORDER — DEXAMETHASONE 6 MG/1
6 TABLET ORAL
Qty: 2 TABLET | Refills: 0 | Status: SHIPPED | OUTPATIENT
Start: 2020-09-11 | End: 2020-09-13

## 2020-09-10 RX ORDER — INSULIN GLARGINE 100 [IU]/ML
20 INJECTION, SOLUTION SUBCUTANEOUS NIGHTLY
Status: DISCONTINUED | OUTPATIENT
Start: 2020-09-10 | End: 2020-09-10 | Stop reason: HOSPADM

## 2020-09-10 RX ADMIN — ATORVASTATIN CALCIUM 10 MG: 10 TABLET, FILM COATED ORAL at 08:06

## 2020-09-10 RX ADMIN — AMLODIPINE BESYLATE 10 MG: 10 TABLET ORAL at 08:06

## 2020-09-10 RX ADMIN — GUAIFENESIN 200 MG: 100 SOLUTION ORAL at 05:56

## 2020-09-10 RX ADMIN — FAMOTIDINE 20 MG: 20 TABLET, FILM COATED ORAL at 08:06

## 2020-09-10 RX ADMIN — REMDESIVIR 100 MG: 100 INJECTION, POWDER, LYOPHILIZED, FOR SOLUTION INTRAVENOUS at 11:52

## 2020-09-10 RX ADMIN — DEXAMETHASONE SODIUM PHOSPHATE 6 MG: 10 INJECTION, SOLUTION INTRAMUSCULAR; INTRAVENOUS at 08:07

## 2020-09-10 RX ADMIN — FLUOXETINE HYDROCHLORIDE 40 MG: 20 CAPSULE ORAL at 08:06

## 2020-09-10 RX ADMIN — ASPIRIN 81 MG: 81 TABLET, CHEWABLE ORAL at 08:06

## 2020-09-10 RX ADMIN — HYDRALAZINE HYDROCHLORIDE 25 MG: 25 TABLET ORAL at 08:06

## 2020-09-10 RX ADMIN — SODIUM CHLORIDE, PRESERVATIVE FREE 10 ML: 5 INJECTION INTRAVENOUS at 08:06

## 2020-09-10 ASSESSMENT — ENCOUNTER SYMPTOMS
SINUS PAIN: 0
SINUS PRESSURE: 0
ABDOMINAL PAIN: 0
SORE THROAT: 0
DIARRHEA: 0
SHORTNESS OF BREATH: 0
NAUSEA: 0
COUGH: 0
VOMITING: 0
ABDOMINAL DISTENTION: 0
CONSTIPATION: 0

## 2020-09-10 NOTE — PROGRESS NOTES
CLINICAL PHARMACY NOTE: MEDS TO [address] [facility] Patient?: No  Total # of Prescriptions Filled: 3   The following medications were delivered to the patient:  · Robitussin   · Albuterol inhaler  · Dexamethasone   Total # of Interventions Completed: 0  Time Spent (min): 5    Additional Documentation: meds delivered to patient 9/10

## 2020-09-10 NOTE — PROGRESS NOTES
Home Oxygen Evaluation    Home Oxygen Evaluation completed. Patient is on 2 liters per minute via nasal cannula . Resting SpO2 = 98%  Resting SpO2 on room air = 94%    SpO2 on room air with exercise = 97%      Nocturnal Oximetry with patient on room air is recommended is SpO2 is between 89% and 95% (requires additional order).     Justice Stephens  11:37 AM

## 2020-09-10 NOTE — PROGRESS NOTES
[facility] -Progress Note  COVID 19 Patient  Today's Date and Time: 9/9/2020, 10:11 PM    Impression :   · COVID 19 Suspect  · COVID 19 Confirmed Infection  · COVID-19 tests:  · 9/5/2020 positive  · High inflammatory markers  Recommendations:   · Monitor off antibiotics  · Clinical Research will approach patient to explore if he qualifies for any of the COVID 19 treatment protocols. · Started Remdesivir 9/6/2020. Stop date 9-11-20  · Patient would benefit from convalescent plasma. Plasma has been requested 9/6/2020  · Decadron 6 mg IV every 24 hours started 9/7/2020. Collette Ruts Stop date 9-17-20      Medical Decision Making/Summary/Discussion:9/9/2020     · Patient admitted with suspected COVID 19 infection  · Covid test confirmed positive. · Patient has high inflammatory markers  · He has been restarted on Remdesivir  · Decadron initiated 9/7/2020. Stop date 9-17-20  Infection Control Recommendations   · Universal Precautions  · Airborne isolation  · Droplet Isolation    Antimicrobial Stewardship Recommendations     · Discontinuation of therapy  Coordination of Outpatient Care:   · Estimated Length of IV antimicrobials:TBD  · Patient will need Midline Catheter Insertion: TBD  · Patient will need PICC line Insertion: No  · Patient will need: Home IV , [place],  SNF,  LTAC:TBD  · Patient will need outpatient wound care:No    Chief complaint/reason for consultation:   · Concern for COVID infection      History of Present Illness:   Whit Dailey is a 61y.o.-year-old  male who was initially admitted on 9/6/2020. Patient seen at the request of Dr. Sara Bianchi:    Patient presented through ER with complaints of fever, progressive weakness and cough. He was concerned about been told with positive because 1 of his family members, who reside in his home  tested positive. The patient in the emergency room had a positive test on 9/5/2020.   He was also found to be hypoxic. His chest x-ray on 9/5/2020 showed subtle basilar changes suggestive of atelectasis or atypical pneumonia. He gives a pre-existing history of diabetes mellitus type 2, prior stroke with residual right-sided deficit. Patient admitted because of concerns with COVID 19.    CURRENT EVALUATION : 9/9/2020    Patient evaluated and examined in the ICU. Afebrile  VS stable    Patient exhibiting respiratory distress. Yes  Respiratory secretions: No    Patient receiving supplemental oxygen. 2 -->3 L nasal oxygen  02 sat 93-->95-->98%  RR 20-->23      NEWS Score: 0-4 Low risk group; 5-6: Medium risk group; 7 or above: High risk group  Parameters 3 2 1 0 1 2 3   Age    < 65   ? 65   RR ? 8  9-11 12-20  21-24 ? 25   O2 Sats ? 91 92-93 94-95 ? 96      Suppl O2  Yes  No      SBP ? 90  101-110 111-219   ? 220   HR ? 40  41-50 51-90  111-130 ? 131   Consciousness    Alert   Drowsiness, lethargy, or confusion   Temperature ? 35.0 C (95.0 F)  35.1-36.0 C 95.1-96.9 F 36.1-38.0 C 97.0-100.4 F 38.1-39.0 C 100.5-102.3 F ? 39.1 C ? 102.4 F      NEWS Score:   9/6/2020: 5 moderate risk    Overall Daily Picture:      Stable   He has been started on remdesivir   On Decadron because of high inflammatory markers   Plasma has been requested 9/6/2020. Awaiting availability   Patient has responded well to treatment.     Presence of secondary bacterial Infection:    No   Additional antibiotics: No    Labs, X rays reviewed: 9/9/2020    BUN: 24-->22-->27  Cr: 1.57-->1.14-->0.98    WBC: 18.1-->19.4-->21.4  (on decadron)  Hb: 11.9-->12.1-->11.8  Plat: 187-->205-->265    Absolute Neutrophils: 9.23  Absolute Lymphocytes: 2.17  Neutrophil/Lymphocyte Ratio: 3.4 low risk    CRP: 48.3  Ferritin: 1929  LDH: 387    Pro Calcitonin:      Cultures:  Urine:  ·   Blood:  · 9/5/2020 x 2 no growth  Sputum :  ·   Wound:     Rapid influenza 9/5/2020 negative    CXR:   · 9/5/2020 subtle groundglass opacities at the lung bases  CAT:      Discussed with patient, RN, CC, IM.      9/5/2020: I have personally reviewed the past medical history, past surgical history, medications, social history, and family history, and I have updated the database accordingly.   Past Medical History:     Past Medical History:   Diagnosis Date    Cerebral artery occlusion with [facility] infarction ([facility]     Diabetes mellitus ([facility]     High cholesterol     Hypertension        Past Surgical  History:     Past Surgical History:   Procedure Laterality Date    KNEE SURGERY  1983       Medications:      famotidine  20 mg Oral Daily    dexamethasone  6 mg Intravenous Daily    melatonin  3 mg Oral Once    enoxaparin  30 mg Subcutaneous BID    sodium chloride flush  10 mL Intravenous 2 times per day    insulin lispro  0-6 Units Subcutaneous TID WC    insulin lispro  0-3 Units Subcutaneous Nightly    amLODIPine  10 mg Oral Daily    aspirin  81 mg Oral Daily    atorvastatin  10 mg Oral Daily    FLUoxetine  40 mg Oral Daily    hydrALAZINE  25 mg Oral Daily    insulin glargine  16 Units Subcutaneous Nightly    remdesivir IVPB  100 mg Intravenous Q24H       Social History:     Social History     Socioeconomic History    Marital status: Single     Spouse name: Not on file    Number of children: Not on file    Years of education: Not on file    Highest education level: Not on file   Occupational History    Not on file   Social Needs    Financial resource strain: Not on file    Food insecurity     Worry: Not on file     Inability: Not on file   [email or web address] needs     Medical: Not on file     Non-medical: Not on file   Tobacco Use    Smoking status: Never Smoker    Smokeless tobacco: Never Used   Substance and Sexual Activity    Alcohol use: Not Currently    Drug use: Never    Sexual activity: Not on file   Lifestyle    Physical activity     Days per week: Not on file     Minutes per session: Not on file    Stress: Not on file Relationships    Social connections     Talks on phone: Not on file     Gets together: Not on file     Attends Latter day service: Not on file     Active member of club or organization: Not on file     Attends meetings of clubs or organizations: Not on file     Relationship status: Not on file    Intimate partner violence     Fear of current or ex partner: Not on file     Emotionally abused: Not on file     Physically abused: Not on file     Forced sexual activity: Not on file   Other Topics Concern    Not on file   Social History Narrative    Not on file       Family History:     Family History   Problem Relation Age of Onset    Hypertension Mother     Hypertension Father     Hypertension Sister     Hypertension Maternal Grandmother     Cancer Maternal Grandmother     Hypertension Maternal Grandfather     Migraines Paternal Grandmother     Hypertension Paternal Grandfather         Allergies:   Dairycare [lactase-lactobacillus]     Review of Systems:     Unable to provide. Sedated on ventilator. 9/9/2020      Constitutional: No fevers or chills. No systemic complaints  Head: No headaches  Eyes: No double vision or blurry vision. No conjunctival inflammation. ENT: No sore throat or runny nose. . No hearing loss, tinnitus or vertigo. Cardiovascular: No chest pain or palpitations. No shortness of breath. No SLATER  Lung: No shortness of breath or cough. No sputum production  Abdomen: No nausea, vomiting, diarrhea, or abdominal pain. Brandi Sleet No cramps. Genitourinary: No increased urinary frequency, or dysuria. No hematuria. No suprapubic or CVA pain  Musculoskeletal: No muscle aches or pains. No joint effusions, swelling or deformities  Hematologic: No bleeding or bruising. Neurologic: No headache, weakness, numbness, or tingling. Integument: No rash, no ulcers. Psychiatric: No depression. Endocrine: No polyuria, no polydipsia, no polyphagia.     Physical Examination :     Patient Vitals for the past 8 hrs:   BP Temp Temp src Pulse Resp SpO2 Weight   09/09/20 2022 137/78 98.7 °F (37.1 °C) Oral 90 18 97 % 165 lb 9.1 oz (75.1 kg)     General Appearance: Awake, alert, and in no apparent distress  Head:  Normocephalic, no trauma  Eyes: Pupils equal, round, reactive to light and accommodation; extraocular movements intact; sclera anicteric; conjunctivae pink. No embolic phenomena. ENT: Oropharynx clear, without erythema, exudate, or thrush. No tenderness of sinuses. Mouth/throat: mucosa pink and moist. No lesions. Dentition in good repair. Neck:Supple, without lymphadenopathy. Thyroid normal, No bruits. Pulmonary/Chest: Clear to auscultation, without wheezes, rales, or rhonchi. No dullness to percussion. Cardiovascular: Regular rate and rhythm without murmurs, rubs, or gallops. Abdomen: Soft, non tender. Bowel sounds normal. No organomegaly  All four Extremities: No cyanosis, clubbing, edema, or effusions. Neurologic: No gross sensory or motor deficits. Skin: Warm and dry with good turgor. No signs of peripheral arterial or venous insufficiency. No ulcerations. No open wounds. Medical Decision Making -Laboratory:   I have independently reviewed/ordered the following labs:    CBC with Differential:   Recent Labs     09/08/20 0427 09/09/20  0503   WBC 21.4* 26.0*   HGB 11.8* 11.0*   HCT 36.0* 34.4*    254   LYMPHOPCT 50* 50*   MONOPCT 2 4     BMP:   Recent Labs     09/08/20 0427 09/09/20  0503    142   K 4.3 4.3    107   CO2 22 24   BUN 27* 29*   CREATININE 0.98 0.88     Hepatic Function Panel:   Recent Labs     09/08/20 0427 09/09/20  0503   PROT 6.3* 5.8*   LABALBU 3.1* 3.1*   BILITOT 0.34 0.39   ALKPHOS 53 50   ALT 28 29   AST 37 31     No results for input(s): RPR in the last 72 hours. No results for input(s): HIV in the last 72 hours. No results for input(s): BC in the last 72 hours.   Lab Results   Component Value Date    MUCUS NOT REPORTED 03/10/2020    RBC 3.75 09/09/2020    TRICHOMONAS

## 2020-09-10 NOTE — PROGRESS NOTES
ondansetron, sodium chloride flush, dextrose, dextrose, glucagon (rDNA), glucose, sodium chloride    Data:     Past Medical History:   has a past medical history of Cerebral artery occlusion with cerebral infarction ([facility] [address] [facility] High cholesterol, and Hypertension. Social History:   reports that he has never smoked. He has never used smokeless tobacco. He reports previous alcohol use. He reports that he does not use drugs. Family History:   Family History   Problem Relation Age of Onset    Hypertension Mother     Hypertension Father     Hypertension Sister     Hypertension Maternal Grandmother     Cancer Maternal Grandmother     Hypertension Maternal Grandfather     Migraines Paternal Grandmother     Hypertension Paternal Grandfather        Vitals:  /69   Pulse 69   Temp 98.1 °F (36.7 °C) (Axillary)   Resp 19   Wt 165 lb 9.1 oz (75.1 kg)   SpO2 97%   BMI 23.09 kg/m²   Temp (24hrs), Av.4 °F (36.9 °C), Min:98.1 °F (36.7 °C), Max:98.7 °F (37.1 °C)    Recent Labs     20  0850 20  1234 20  1702 20  2028   POCGLU 141* 184* 242* 240*       I/O (24Hr):   No intake or output data in the 24 hours ending 09/10/20 0715    Labs:  Hematology:  Recent Labs     20  1118 20  0427 20  0503   WBC 21.2* 21.4* 26.0*   RBC 3.85* 4.04* 3.75*   HGB 11.7* 11.8* 11.0*   HCT 35.6* 36.0* 34.4*   MCV 92.5 89.1 91.7   MCH 30.4 29.2 29.3   MCHC 32.9 32.8 32.0   RDW 12.9 12.7 12.8    265 254   MPV 10.5 10.2 10.3     Chemistry:  Recent Labs     20  1118 20  1651 20  0427 20  0503     --  139 142   K 3.8  --  4.3 4.3     --  106 107   CO2 20  --  22 24   GLUCOSE 146*  --  207* 169*   BUN 23  --  27* 29*   CREATININE 0.97  --  0.98 0.88   ANIONGAP 11  --  11 11   LABGLOM >60  --  >60 >60   GFRAA >60  --  >60 >60   CALCIUM 8.4*  --  8.3* 8.4*   TROPHS  --  20  --   --      Recent Labs     20  1118  20  0425 09/08/20  1733 09/08/20  2115 09/09/20  0503 09/09/20  0850 09/09/20  1234 09/09/20  1702 09/09/20 2028   PROT 6.1*  --  6.3*  --   --   --  5.8*  --   --   --   --    LABALBU 2.9*  --  3.1*  --   --   --  3.1*  --   --   --   --    AST 41*  --  37  --   --   --  31  --   --   --   --    ALT 26  --  28  --   --   --  29  --   --   --   --    ALKPHOS 51  --  53  --   --   --  50  --   --   --   --    BILITOT 0.32  --  0.34  --   --   --  0.39  --   --   --   --    POCGLU  --    < >  --    < > 260* 164*  --  141* 184* 242* 240*    < > = values in this interval not displayed. ABG:No results found for: POCPH, PHART, PH, POCPCO2, GPF6VLN, PCO2, POCPO2, PO2ART, PO2, POCHCO3, UCX1JHZ, HCO3, NBEA, PBEA, BEART, BE, THGBART, THB, OSK7DLL, UFHF3TDO, R4ATSVHB, O2SAT, FIO2  Lab Results   Component Value Date/Time    SPECIAL NOT REPORTED 09/06/2020 07:56 AM    SPECIAL NOT REPORTED 09/06/2020 07:56 AM     Lab Results   Component Value Date/Time    CULTURE NORMAL RESPIRATORY LENA MODERATE GROWTH 09/06/2020 07:56 AM       Radiology:  [facility] Chest Portable    Result Date: 9/5/2020  Subtle ground-glass opacities primarily lung bases, likely atelectasis although infectious process is possible in the proper clinical setting. Physical Examination:        Physical Exam  Vitals signs and nursing note reviewed. Constitutional:       Appearance: Normal appearance. HENT:      Head: Normocephalic and atraumatic. Mouth/Throat:      Mouth: Mucous membranes are moist.   Eyes:      Extraocular Movements: Extraocular movements intact. Pupils: Pupils are equal, round, and reactive to light. Neck:      Musculoskeletal: Normal range of motion and neck supple. Cardiovascular:      Rate and Rhythm: Normal rate and regular rhythm. Pulses: Normal pulses. Heart sounds: Normal heart sounds. No murmur. Pulmonary:      Effort: Pulmonary effort is normal.      Breath sounds: Normal breath sounds.    Abdominal:      General: Bowel sounds are normal. There is no distension. Palpations: Abdomen is soft. Tenderness: There is no abdominal tenderness. There is no guarding. Musculoskeletal: Normal range of motion. Right lower leg: No edema. Left lower leg: No edema. Lymphadenopathy:      Cervical: No cervical adenopathy. Skin:     General: Skin is warm and dry. Capillary Refill: Capillary refill takes less than 2 seconds. Neurological:      Mental Status: He is alert and oriented to person, place, and time. Mental status is at baseline. Comments: Right-sided weakness from previous CVA affecting fine motor greater than gross motor. Sensation is intact but diminished   Psychiatric:      Comments: Flat affect         Assessment:        Hospital Problems           Last Modified POA    * (Principal) Pneumonia due to COVID-19 virus 9/9/2020 Yes    Diabetes mellitus, insulin dependent (IDDM), controlled ([address] 9/9/2020 Yes    Essential hypertension 9/9/2020 Yes    Mixed hyperlipidemia 9/9/2020 Yes    Right hemiplegia ([facility] [address] 9/9/2020 Yes    CVA (cerebral vascular accident) ([facility] [address] 9/9/2020 Yes    COVID-19 9/7/2020 Yes          Plan:        1. PNA with COVID-19 viral infection: Continue isolation precautions, infectious disease following. Remdesevir started 9/6-9-10, plasma requested 9/6 per ID, steroids continue started on 9/7. LFO2 as needed. 2. Essential hypertension: Currently stable. Lisinopril discontinued as patient states that it is making him cough  3. Dyslipidemia: Continue statin therapy  4. History of CVA with residual right-sided weakness: PT, OT  5. Depression: Continue Prozac at home dosing  6. Diabetes mellitus type 2: Blood sugars fluctuating with coadministration of steroid therapy. Increase Lantus, low intensity SSI. Monitor patient for hyper/hypoglycemic events  7. GI/DVT prophylaxis: Lovenox, start Pepcid  8.  Debility: PT, OT- family refusing homecare and skilled nursing despite recommendations     TOY Flores NP  9/10/2020  7:15 AM

## 2020-09-10 NOTE — DISCHARGE INSTR - COC
Continuity of Care Form    Patient Name: Ayla Blank   :  1960  MRN:  [id number]    Admit date:  2020  Discharge date:  ***    Code Status Order: Full Code   Advance Directives:      Admitting Physician:  Slade Reyes MD  PCP: Jacqueline Luna MD    Discharging Nurse: [facility] Unit/Room#: 3027/3027-02  Discharging Unit Phone Number: ***    Emergency Contact:   Extended Emergency Contact Information  Primary Emergency Contact: Bruce Gonzalez [address] Phone: [phone]  Relation: Brother/Sister    Past Surgical History:  Past Surgical History:   Procedure Laterality Date    [address]       Immunization History: There is no immunization history on file for this patient. Active Problems:  Patient Active Problem List   Diagnosis Code    Diabetes mellitus, insulin dependent (IDDM), controlled ([facility] [address] E11.9, Z79.4    Essential hypertension I10    Mixed hyperlipidemia E78.2    Right hemiplegia (HCC) G81.91    CVA (cerebral vascular accident) ([facility] [address] I63.9    Anxiety and depression F41.9, F32.9    Influenza vaccination declined Z28.21    Pneumococcal vaccination declined Z28.21    Tetanus, diphtheria, and acellular pertussis (Tdap) vaccination declined Z28.21    Pneumonia due to COVID-19 virus U07.1, J12.89    COVID-19 U07.1    Right sided weakness R53.1       Isolation/Infection:   Isolation            Droplet Plus          Patient Infection Status       Infection Onset Added Last Indicated Last Indicated By Review Planned Expiration Resolved Resolved By    COVID-19 20 COVID-19 20      Resolved    COVID-19 Rule Out 20 COVID-19 (Ordered)   20 Rule-Out Test Resulted            Nurse Assessment:  Last Vital Signs: /76   Pulse 69   Temp 98.1 °F (36.7 °C) (Axillary)   Resp 19   Wt 165 lb 9.1 oz (75.1 kg)   SpO2 97%   BMI 23.09 kg/m²     Last documented pain score (0-10 scale): Pain Level: 0  Last Weight:    Wt Readings from Last 1 Encounters:   20 165 lb 9.1 oz (75.1 kg)     Mental Status:  {IP PT MENTAL STATUS::::0}    IV Access:  [employer] IV ACCESS:786679763:::0}    Nursing Mobility/ADLs:  Walking   {CHP DME ADLs:811948663:::0}  Transfer  {CHP DME ADLs:436579773:::0}  Bathing  {CHP DME ADLs:635732532:::0}  Dressing  {CHP DME ADLs:458585479:::0}  Toileting  {CHP DME ADLs:502000707:::0}  Feeding  {CHP DME ADLs:897488482:::0}  Med Admin  {CHP DME ADLs:844849887:::0}  Med Delivery   {MH ADWOA MED Delivery:130963969:::0}    Wound Care Documentation and Therapy:        Elimination:  Continence: Bowel: {YES / CL:58576}  Bladder: {YES / LX:41279}  Urinary Catheter: {Urinary Catheter:336612070:::0}   Colostomy/Ileostomy/Ileal Conduit: {YES / NP:15035}       Date of Last BM: ***    Intake/Output Summary (Last 24 hours) at 9/10/2020 1007  Last data filed at 9/10/2020 0555  Gross per 24 hour   Intake 310 ml   Output 400 ml   Net -90 ml     I/O last 3 completed shifts: In: 310 [P.O.:300;  I.V.:10]  Out: 400 [Urine:400]    Safety Concerns:     [employer] Safety Concerns:192428500:::0}    Impairments/Disabilities:      [employer] Impairments/Disabilities:014775589:::0}    Nutrition Therapy:  Current Nutrition Therapy:   [employer] Diet List:404267136:::0}    Routes of Feeding: {CHP DME Other Feedings:885705600:::0}  Liquids: {Slp liquid thickness:81725}  Daily Fluid Restriction: {CHP DME Yes amt example:953467130:::0}  Last Modified Barium Swallow with Video (Video Swallowing Test): {Done Not Done UUQD:428052744:::7}    Treatments at the Time of Hospital Discharge:   Respiratory Treatments: ***  Oxygen Therapy:  {Therapy; copd oxygen:58446:::0}  Ventilator:    {[facility] Vent List:539680049:::0}    Rehab Therapies: {THERAPEUTIC INTERVENTION:9958050443}  Weight Bearing Status/Restrictions: {[facility] Weight Bearin:::0}  Other Medical Equipment (for information only, NOT a DME order):  {EQUIPMENT:537368763}  Other Treatments: ***    Patient's personal belongings (please select all that are sent with patient):  {CHP DME Belongings:874759712:::0}    RN SIGNATURE:  {Esignature:474770859:::0}    CASE MANAGEMENT/SOCIAL WORK SECTION    Inpatient Status Date: ***    Readmission Risk Assessment Score:  Readmission Risk              Risk of Unplanned Readmission:        18           Discharging to Facility/ Agency   Name:   Address:  Phone:  Fax:    Dialysis Facility (if applicable)   Name:  Address:  Dialysis Schedule:  Phone:  Fax:    / signature: {Esignature:287752975:::0}    PHYSICIAN SECTION    Prognosis: {Prognosis:8908223819:::0}    Condition at Discharge: [address] [place] [address] Patient Condition:845889814:::0}    Rehab Potential (if transferring to Rehab): {Prognosis:1019944829:::0}    Recommended Labs or Other Treatments After Discharge: ***    Physician Certification: I certify the above information and transfer of Micheline Morris  is necessary for the continuing treatment of the diagnosis listed and that he requires {Admit to Appropriate Level of Care:38960:::0} for {GREATER/LESS:362681131} 30 days.      Update Admission H&P: {CHP DME Changes in HandP:040874477:::0}    PHYSICIAN SIGNATURE:  {Esignature:763105508:::0}

## 2020-09-10 NOTE — PROGRESS NOTES
Physical Therapy  Facility/Department: [facility] 3  Daily Treatment Note  NAME: Pietro Rivera  : 1960  MRN: [id number]    Date of Service: 9/10/2020    Discharge Recommendations:  Continue to assess pending progress   PT Equipment Recommendations  Equipment Needed: No    Assessment   Body structures, Functions, Activity limitations: Decreased functional mobility ; Decreased strength;Decreased balance;Decreased endurance  Assessment: Pt grossly SBA for mobility, amb 75' SBQC. Pt would benefit from continued acute PT. Prognosis: Good  Decision Making: Medium Complexity  PT Education: Plan of Care;PT Role;General Safety  REQUIRES PT FOLLOW UP: Yes  Activity Tolerance  Activity Tolerance: Patient Tolerated treatment well     Patient Diagnosis(es): There were no encounter diagnoses. has a past medical history of Cerebral artery occlusion with cerebral infarction ([facility] [address] Diabetes mellitus ([facility] [address] High cholesterol, and Hypertension. has a past surgical history that includes knee surgery (). Restrictions  Restrictions/Precautions  Restrictions/Precautions: Up as Tolerated, Fall Risk, Isolation(COVID + / Droplet +)  Required Braces or Orthoses?: No  Position Activity Restriction  Other position/activity restrictions: hx of CVA with R side deficits  Subjective   General  Response To Previous Treatment: Patient with no complaints from previous session.   Family / Caregiver Present: No  Subjective  Subjective: RN and pt agreed to PT, pt resting upon arrival and denies pain  Pain Screening  Patient Currently in Pain: Denies  Vital Signs  Patient Currently in Pain: Denies       Orientation  Orientation  Overall Orientation Status: Within Functional Limits       Objective   Bed mobility  Rolling to Left: Stand by assistance  Supine to Sit: Stand by assistance  Sit to Supine: (Left up in chair)  Scooting: Stand by assistance  Transfers  Sit to Stand: Stand by assistance  Stand to sit: Stand by assistance  Ambulation  Ambulation?: Yes  Ambulation 1  Surface: level tile  Device: Small Base Quad Cane(SBQC in room)  Assistance: Stand by assistance  Quality of Gait: slow massiel, R foot drop but able to clear toes w/o AFO  Gait Deviations: Slow Massiel;Decreased step length;Decreased step height  Distance: ~75ft  Comments: Pt reports AFO RLE baseline; Candie Gold  Pt ambulated on room air, O2 sats dropped to 89% but denied SOB, 2L O2 reapplied   Stairs/Curb  Stairs?: No     Balance  Sitting - Static: Good  Sitting - Dynamic: Good  Standing - Static: Good;-  Standing - Dynamic: Fair;+  Exercises  Hip Flexion: Standing marches w/SBQC x 5, steady w/o LOB   Comments: Pt refused additional exercises and put the blanket over his head                   Goals  Short term goals  Time Frame for Short term goals: 14 visits  Short term goal 1: Pt will bem Caterina bed mobility  Short term goal 2: Pt will be Alayna transfers  Short term goal 3: Pt will be Alayna amb 100' SBQC  Short term goal 4: Pt will navigate 2 steps Alayna    Plan    Plan  Times per week: 3-5x/wk  Current Treatment Recommendations: Strengthening, Transfer Training, Functional Mobility Training, Gait Training, Endurance Training, Balance Training, Stair training, Home Exercise Program, Safety Education & Training, Patient/Caregiver Education & Training, Equipment Evaluation, Education, & procurement  Safety Devices  Type of devices: Call light within reach, Nurse notified, Gait belt, Patient at risk for falls, All fall risk precautions in place, Left in chair  Restraints  Initially in place: No     Therapy Time   Individual Concurrent Group Co-treatment   Time In 0912         Time Out 0931         Minutes 19         Timed Code Treatment Minutes: [address] [facility], [facility]

## 2020-09-10 NOTE — PLAN OF CARE
Problem: Airway Clearance - Ineffective  Goal: Achieve or maintain patent airway  Outcome: Ongoing     Problem: Gas Exchange - Impaired  Goal: Absence of hypoxia  Outcome: Ongoing  Goal: Promote optimal lung function  Outcome: Ongoing     Problem: Breathing Pattern - Ineffective  Goal: Ability to achieve and maintain a regular respiratory rate  Outcome: Ongoing     Problem:  Body Temperature -  Risk of, Imbalanced  Goal: Ability to maintain a body temperature within defined limits  Outcome: Ongoing  Goal: Will regain or maintain usual level of consciousness  Outcome: Ongoing  Goal: Complications related to the disease process, condition or treatment will be avoided or minimized  Outcome: Ongoing     Problem: Isolation Precautions - Risk of Spread of Infection  Goal: Prevent transmission of infection  Outcome: Ongoing     Problem: Nutrition Deficits  Goal: Optimize nutrtional status  Outcome: Ongoing     Problem: Risk for Fluid Volume Deficit  Goal: Maintain normal heart rhythm  Outcome: Ongoing  Goal: Maintain absence of muscle cramping  Outcome: Ongoing  Goal: Maintain normal serum potassium, sodium, calcium, phosphorus, and pH  Outcome: Ongoing     Problem: Loneliness or Risk for Loneliness  Goal: Demonstrate positive use of time alone when socialization is not possible  Outcome: Ongoing     Problem: Fatigue  Goal: Verbalize increase energy and improved vitality  Outcome: Ongoing     Problem: Patient Education: Go to Patient Education Activity  Goal: Patient/Family Education  Outcome: Ongoing     Problem: Skin Integrity:  Goal: Will show no infection signs and symptoms  Description: Will show no infection signs and symptoms  Outcome: Ongoing  Goal: Absence of new skin breakdown  Description: Absence of new skin breakdown  Outcome: Ongoing     Problem: Falls - Risk of:  Goal: Will remain free from falls  Description: Will remain free from falls  Outcome: Ongoing  Goal: Absence of physical injury  Description:

## 2020-09-10 NOTE — DISCHARGE INSTR - DIET

## 2020-09-10 NOTE — CARE COORDINATION
Care Transition  Per home oxygen eval he does not qualify for home oxygen.      Called patient's room and asked if he has any transition needs,no answser

## 2020-09-10 NOTE — DISCHARGE SUMMARY
[facility]  Office: [address], DO, James Diana, DO, iTm Glynn, DO, Staci Reese, DO, Nicholas Richards MD, Shelley Carter MD, Tonya Lomeli MD, Gal Hyman MD, Vane Ortiz MD, Ramo Thompson MD, Nelson Maldonado MD, Luis Armando Penn MD, Erin Rosales Asa, MD, Micheline Hamm, DO, Alexus Cox MD, Isatu Lowe MD, Shaunna Okeefe DO, Richard Mclaughlin MD,  Shelby Triplett, DO, Radha Jaimes MD, Sukhjinder Palacios MD, Kayla Pang, [facility], [facility], CNP, Stacey Sales, CNP, Samanta Sheppard, CNS, Fabienne Mercado, CNP, Randell Coreas, CNP, Lalit Faust, CNP, Mikel Dorman, CNP, Dianne Olivier, CNP, Alva Armando PA-C, Ambrosio Cota, [facility], Bonita Evans, CNP, Virginie Stevens, CNP, Claire Lazo, CNP, Jake Bermudez, CNP, Rocío Strong, [facility]    Discharge Summary     Patient ID: Belle Escobedo  :  1960   MRN: [id number]     ACCOUNT:  [de-identified]   Patient's PCP: Irvin Sam MD  Admit Date: 2020   Discharge Date: 9/10/2020     Length of Stay: 4  Code Status:  Full Code  Admitting Physician: Sukhjinder Palacios MD  Discharge Physician: TOY Velasco NP     Active Discharge Diagnoses:     Hospital Problem Lists:  Principal Problem (Resolved):    Pneumonia due to COVID-19 virus  Active Problems:    Diabetes mellitus, insulin dependent (IDDM), controlled ([facility] [address]    Essential hypertension    Mixed hyperlipidemia    Right hemiplegia ([facility] [address]    CVA (cerebral vascular accident) ([facility] [address]    COVID-19      Admission Condition:  good     Discharged Condition: good    Hospital Stay:     Hospital Course:  Belle Escobedo is a 61 y.o. male who was admitted for the management of   Pneumonia due to COVID-19 virus , presented to ER with shortness of breath    Per records:     This is a 80-year-old male with a history of CVA, diabetes, hyperlipidemia and hypertension who came in for evaluation of fever and cough.  Patient initially presented to [facility] emergency department where he was diagnosed with COVID-19. Bess Varner recently had a stroke with right-sided deficit.  He lives in home with his parents and 2 siblings and children of 1 of the siblings.  His sister had tested positive and was symptomatic for COVID-19.  Patient reports that his other sister and himself have been experiencing symptoms as well.  Patient complains of persistent cough.  Initially he was noted to be hypoxic with oxygen saturation of 90 to 91% on room air.  Patient was transferred to [facility] for further management and treatment     Significant therapeutic interventions:     1. PNA with COVID-19 viral infection: Continue isolation precautions, infectious disease following. Remdesevir started 9/6-9-10, plasma requested 9/6 per ID, steroids continue started on 9/7. LFO2 as needed. 2. Essential hypertension: Currently stable. Lisinopril discontinued as patient states that it is making him cough  3. Dyslipidemia: Continue statin therapy  4. History of CVA with residual right-sided weakness: PT, OT  5. Depression: Continue Prozac at home dosing  6. Diabetes mellitus type 2: Blood sugars fluctuating with coadministration of steroid therapy. Increase Lantus, low intensity SSI. Monitor patient for hyper/hypoglycemic events  7. GI/DVT prophylaxis: Lovenox, start Pepcid  8.  Debility: PT, OT- family refusing homecare and skilled nursing despite recommendations     Significant Diagnostic Studies:   Labs / Micro:  CBC:   Lab Results   Component Value Date    WBC 26.2 09/10/2020    RBC 3.76 09/10/2020    HGB 11.0 09/10/2020    HCT 34.9 09/10/2020    MCV 92.8 09/10/2020    MCH 29.3 09/10/2020    MCHC 31.5 09/10/2020    RDW 12.7 09/10/2020     09/10/2020     BMP:    Lab Results   Component Value Date    GLUCOSE 150 09/10/2020     09/10/2020    K 4.1 09/10/2020     09/10/2020    CO2 23 09/10/2020    ANIONGAP 12 09/10/2020    BUN 31 09/10/2020 CREATININE 0.84 09/10/2020    BUNCRER NOT REPORTED 09/10/2020    CALCIUM 8.4 09/10/2020    LABGLOM >60 09/10/2020    GFRAA >60 09/10/2020    GFR      09/10/2020    GFR NOT REPORTED 09/10/2020     HFP:    Lab Results   Component Value Date    PROT 5.6 09/10/2020     CMP:    Lab Results   Component Value Date    GLUCOSE 150 09/10/2020     09/10/2020    K 4.1 09/10/2020     09/10/2020    CO2 23 09/10/2020    BUN 31 09/10/2020    CREATININE 0.84 09/10/2020    ANIONGAP 12 09/10/2020    ALKPHOS 48 09/10/2020    ALT 41 09/10/2020    AST 37 09/10/2020    BILITOT 0.46 09/10/2020    LABALBU 2.8 09/10/2020    ALBUMIN 1.0 09/10/2020    LABGLOM >60 09/10/2020    GFRAA >60 09/10/2020    GFR      09/10/2020    GFR NOT REPORTED 09/10/2020    PROT 5.6 09/10/2020    CALCIUM 8.4 09/10/2020     PT/INR:    Lab Results   Component Value Date    PROTIME 10.1 09/06/2020    INR 1.0 09/06/2020     PTT:   Lab Results   Component Value Date    APTT 28.8 09/06/2020     FLP:    Lab Results   Component Value Date    CHOL 88 03/09/2020    TRIG 67 03/09/2020    HDL 33 03/09/2020     U/A:    Lab Results   Component Value Date    COLORU YELLOW 09/10/2020    TURBIDITY CLEAR 09/10/2020    SPECGRAV 1.020 09/10/2020    HGBUR NEGATIVE 09/10/2020    PHUR 5.0 09/10/2020    PROTEINU 2+ 09/10/2020    GLUCOSEU TRACE 09/10/2020    KETUA NEGATIVE 09/10/2020    BILIRUBINUR NEGATIVE 09/10/2020    UROBILINOGEN Normal 09/10/2020    NITRU NEGATIVE 09/10/2020    LEUKOCYTESUR NEGATIVE 09/10/2020     TSH:    Lab Results   Component Value Date    TSH 1.89 03/09/2020        Radiology:  Xr Chest Portable    Result Date: 9/5/2020  Subtle ground-glass opacities primarily lung bases, likely atelectasis although infectious process is possible in the proper clinical setting.        Consultations:    Consults:     Final Specialist Recommendations/Findings:   IP CONSULT TO INFECTIOUS DISEASES      The patient was seen and examined on day of discharge and this discharge summary is in conjunction with any daily progress note from day of discharge. Discharge plan:     Disposition: Home; recommended home care and/or SNF, family refusing    Physician Follow Up:     Seamus Belcher MD  [address] [address] [facility]  [phone]    In 1 week  for follow up after hospitalization       Requiring Further Evaluation/Follow Up POST HOSPITALIZATION/Incidental Findings:     Diet: diabetic diet    Activity: As tolerated    Instructions to Patient: see attached     Discharge Medications:      Medication List      START taking these medications    albuterol sulfate  (90 Base) MCG/ACT inhaler  Commonly known as:  Ventolin HFA  Inhale 2 puffs into the lungs every 4 hours as needed for Wheezing     dexamethasone 6 MG tablet  Commonly known as:  Decadron  Take 1 tablet by mouth daily (with breakfast) for 2 days  Start taking on:  September 11, 2020     guaiFENesin 100 MG/5ML Soln oral solution  Commonly known as:  ROBITUSSIN  Take 10 mLs by mouth every 4 hours as needed for Cough        CHANGE how you take these medications    FLUoxetine 40 MG capsule  Commonly known as:  PROZAC  TAKE ONE CAPSULE BY MOUTH DAILY  What changed:  Another medication with the same name was removed. Continue taking this medication, and follow the directions you see here. CONTINUE taking these medications    amLODIPine 10 MG tablet  Commonly known as:  NORVASC  TAKE ONE TABLET BY MOUTH DAILY     aspirin 81 MG chewable tablet     atorvastatin 10 MG tablet  Commonly known as:  LIPITOR  Take 1 tablet by mouth daily     blood glucose monitor kit and supplies  Test 3  times a day & as needed for symptoms of irregular blood glucose.  ONE TOUCH METER     blood glucose test strips  Test 3 times a day & as needed for symptoms of irregular blood glucose.     hydrALAZINE 25 MG tablet  Commonly known as:  APRESOLINE  TAKE ONE TABLET BY MOUTH DAILY     insulin lispro (1 Unit Dial) 100 UNIT/ML

## 2020-09-11 ENCOUNTER — CARE COORDINATION (OUTPATIENT)
Dept: CASE MANAGEMENT | Age: 60
End: 2020-09-11

## 2020-09-11 ENCOUNTER — HOSPITAL ENCOUNTER (OUTPATIENT)
Dept: PHYSICAL THERAPY | Age: 60
Setting detail: THERAPIES SERIES
Discharge: HOME | End: 2020-09-11
Payer: MEDICARE

## 2020-09-11 LAB — GLUCOSE BLD-MCNC: 171 MG/DL (ref 75–110)

## 2020-09-12 ENCOUNTER — CARE COORDINATION (OUTPATIENT)
Dept: CASE MANAGEMENT | Age: 60
End: 2020-09-12

## 2020-09-12 LAB
CULTURE: NORMAL
CULTURE: NORMAL
Lab: NORMAL
Lab: NORMAL
SPECIMEN DESCRIPTION: NORMAL
SPECIMEN DESCRIPTION: NORMAL

## 2020-09-12 NOTE — CARE COORDINATION
[facility] Transitions Initial Follow Up Call    Call within 2 business days of discharge: Yes    Patient: Remedios Choi Patient : 1960   MRN: [id number]  Reason for Admission: COVID 19 +  Discharge Date: 9/10/20 RARS: Readmission Risk Score: 18      Last Discharge [address] [address]       Complaint Diagnosis Description Type Department Provider    20   Admission (Discharged) [facility] [address] Ximena Maurice DO           Attempted initial 24 hour transitional call to patient. Left VM to return call directly to [phone]. 2nd attempt.  Episode closed    Facility: Carmen Montero RN

## 2020-09-29 PROBLEM — R53.1 RIGHT SIDED WEAKNESS: Status: RESOLVED | Noted: 2020-09-09 | Resolved: 2020-09-29

## 2020-10-15 ENCOUNTER — TELEPHONE (OUTPATIENT)
Dept: INTERNAL MEDICINE CLINIC | Age: 60
End: 2020-10-15

## 2020-10-19 RX ORDER — PEN NEEDLE, DIABETIC 31 GX5/16"
NEEDLE, DISPOSABLE MISCELLANEOUS
Qty: 100 EACH | Refills: 0 | Status: SHIPPED | OUTPATIENT
Start: 2020-10-19 | End: 2021-02-08

## 2020-10-19 NOTE — TELEPHONE ENCOUNTER
Nurys Perdomo is calling to request a refill on the following medication(s):    Medication Request:    Last filled 7/2/2020 #100 with 3 RF    Requested Prescriptions     Pending Prescriptions Disp Refills    B-D ULTRAFINE III SHORT PEN 31G X 8 MM MISC [Pharmacy Med Name: BD UF SHORT PEN NEEDLE [id number]] 100 each 0     Sig: USE ONE - DAILY       Last Visit Date (If Applicable):  2/55/7119    Next Visit Date:    11/2/2020

## 2020-11-02 RX ORDER — LANCETS 33 GAUGE
EACH MISCELLANEOUS
Qty: 100 EACH | Refills: 3 | Status: SHIPPED | OUTPATIENT
Start: 2020-11-02 | End: 2021-07-14 | Stop reason: SDUPTHER

## 2020-11-02 NOTE — TELEPHONE ENCOUNTER
Kathrine Olvera is calling to request a refill on the following medication(s):    Medication Request:  Requested Prescriptions     Pending Prescriptions Disp Refills    Lancets ([address]) [address] [facility] [Pharmacy Med Name: Brayden Maurer PLUS 15I LANCT]  2     Sig: USE TO TEST TWO TIMES A DAY       Last Visit Date (If Applicable):  5/26/4257    Next Visit Date:    11/2/2020

## 2020-11-30 RX ORDER — POTASSIUM CHLORIDE 1500 MG/1
TABLET, FILM COATED, EXTENDED RELEASE ORAL
Qty: 90 TABLET | Refills: 0 | Status: SHIPPED | OUTPATIENT
Start: 2020-11-30 | End: 2021-03-17

## 2020-11-30 NOTE — TELEPHONE ENCOUNTER
Shelley Vigil is calling to request a refill on the following medication(s):    Medication Request:  Requested Prescriptions     Pending Prescriptions Disp Refills    potassium chloride (KLOR-CON M) 20 MEQ TBCR extended release tablet [Pharmacy Med Name: POTASSIUM CHLORIDE ER 20 MEQ TABLET] 30 tablet 1     Sig: TAKE ONE TABLET BY MOUTH DAILY     Last filled 12/5/19 90 day 2 refill  Order pending with note for ishaan      Last Visit Date (If Applicable):  8/18/5166    Next Visit Date:    Visit date not found

## 2020-12-10 RX ORDER — INSULIN DETEMIR 100 [IU]/ML
INJECTION, SOLUTION SUBCUTANEOUS
Qty: 4 PEN | Refills: 0 | Status: SHIPPED | OUTPATIENT
Start: 2020-12-10 | End: 2021-03-04

## 2020-12-10 NOTE — TELEPHONE ENCOUNTER
Kayla Sierra is calling to request a refill on the following medication(s):    Medication Request:    Last filled 2/24/2020 #5 with 3 RF    Requested Prescriptions     Pending Prescriptions Disp Refills    LEVEMIR FLEXTOUCH 100 UNIT/ML injection pen [Pharmacy Med Name: Jose Hall 100 UNIT/ML] 5 pen 2     Sig: INJECT 16 UNITS INTO THE SKIN ONCE NIGHTLY       Last Visit Date (If Applicable):  2/63/0678    Next Visit Date:    Visit date not found

## 2020-12-17 ENCOUNTER — OFFICE VISIT (OUTPATIENT)
Dept: NEUROLOGY | Age: 60
End: 2020-12-17
Payer: MEDICARE

## 2020-12-17 VITALS
DIASTOLIC BLOOD PRESSURE: 82 MMHG | SYSTOLIC BLOOD PRESSURE: 137 MMHG | HEIGHT: 71 IN | HEART RATE: 75 BPM | WEIGHT: 166 LBS | TEMPERATURE: 97.1 F | BODY MASS INDEX: 23.24 KG/M2

## 2020-12-17 PROCEDURE — G8428 CUR MEDS NOT DOCUMENT: HCPCS | Performed by: PSYCHIATRY & NEUROLOGY

## 2020-12-17 PROCEDURE — 1036F TOBACCO NON-USER: CPT | Performed by: PSYCHIATRY & NEUROLOGY

## 2020-12-17 PROCEDURE — 3017F COLORECTAL CA SCREEN DOC REV: CPT | Performed by: PSYCHIATRY & NEUROLOGY

## 2020-12-17 PROCEDURE — 99214 OFFICE O/P EST MOD 30 MIN: CPT | Performed by: PSYCHIATRY & NEUROLOGY

## 2020-12-17 PROCEDURE — G8420 CALC BMI NORM PARAMETERS: HCPCS | Performed by: PSYCHIATRY & NEUROLOGY

## 2020-12-17 PROCEDURE — G8484 FLU IMMUNIZE NO ADMIN: HCPCS | Performed by: PSYCHIATRY & NEUROLOGY

## 2020-12-17 NOTE — PATIENT INSTRUCTIONS
1. Continue good blood pressure, sugar control. 2. Continue aspirin and liptor for now, adjust lipitor after checking lipid panel in Jan with your primary doctor  3. Continue exercise at home, fall precaution, resume physical therapy when able. 4. May try benadryl 12.5mg, also form a good sleep habit. 5. Can discontinue prozac after one week of 20mg every other day.      Return in 1 year    García Johnson MD, [address]

## 2020-12-17 NOTE — PROGRESS NOTES
[facility]            [facility], [address]; [facility], [address]  [address], [address], [facility], [address]    [address] [facility], [address] [place], [address] [facility]            Dept: [phone]          Dept Fax: [phone]             MD Luiz Ortega MD Ahmed B. Reinhold Hailey, MD Alphonza Hammer, MD Letty Ape, MD Kirstie Chad, [facility] UP NOTE                                          PATIENT NAME: Shweta Dent   PATIENT MRN: [id number]  FOLLOW UP TODAY: 12/17/2020     Dear Dr. Hussein Lowery MD,     I had the pleasure of seeing your patient Shweta Dent, who comes for follow up. CHIEF COMPLAINT: Follow-up     INITIAL & INTERVAL HISTORY:     Sadi Tang a 61year old RH AAM, last seen on 6/16/2020, pt returns for follow up regarding his stroke.     Pt came with sister (older) and best friend to clinic today. He got covid infection in Sept, he had PNA, hospitalized for 4 days. All 5 people in his family members including parents, sister, all infected by sister who works in [employer]. Fortunately all recovered. BP, BG at home all good. He had physical therapy, but due to Covid-19, last few sessions he did not go. His last physical therapy was in Aug. He walks with a quadrad cane, no falls. Right arm more weak than right leg. Bowel movement and urination all fine. Sleep sometime not the best, difficulty in maintaining sleep, no problem with falling asleep. He goes to bed around 11PM, before that, he watches TV. Mood is good. He takes ASA 81mg, also on lipitor. He has been on prozac 20mg daily since stroke. His stroke was in 8/2019.      Initial clinic visit on 12/5/2020    Stroke   [facility]  Left capsular ischemic infarct in setting of uncontrolled HTN, DM (A1c 11.6) (he had not taken medications for 5 years). Marco Neal presented with new onset acute right sided weakness (UE>LE), his NIHSS was 6 in the hospital    He was discharged with 81mg ASA and plavix 75mg, also on lipitor 40mg daily, insulin to rehab (4 weeks in rehab). Sister recalls pt did not felt right, he called EMS, his right arm weak but still can move, he was brought to ED around 7:30PM, in ED his NIHSS was 0, he was admitted, then around 1:30AM next day, his right arm and leg more weak, NIHSS was 6. He was not given tpa for reason unclear.    Now he lives with sister, he is single, no smoking/drinking/drugs. 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 No family history of stroke  Denies depression/anxiety  Other medical issues: speech/bowel movement/urination all fine. Sleep ok. Trouble swallowing.      NEUROLOGICAL WORKUP:  8/5/2019  CTH: Hypodense abnormality in the right? basal ganglia   CTA Head and Neck : Unremarkable      8/6/2019  Brain MRI : Periventricular white matter disease of a chronic nature with a focal area of acute ischemia in the left posterior limb of the internal capsule.      2 D echo  EF 60-65%     OTHER WORKUP:  -> 42   A1c 11.6-> 8.8 (9/13/2019)-> 6.8    PMH/PSH/SH/FMH: Remain unchanged since last visit except those listed in the interval history.        Diagnosis Date    Cerebral [facility] occlusion with [facility] ([facility] [address]     Diabetes mellitus ([facility] [address]     High cholesterol     Hypertension         ALLERGIES:   Allergies   Allergen Reactions    Dairycare [Lactase-Lactobacillus]        MEDICATIONS:   Current Outpatient Medications   Medication Sig Dispense Refill    LEVEMIR FLEXTOUCH 100 UNIT/ML injection pen INJECT 16 UNITS INTO THE SKIN ONCE NIGHTLY 4 pen 0    potassium chloride (KLOR-CON M) 20 MEQ TBCR extended release tablet TAKE ONE TABLET BY MOUTH DAILY 90 tablet 0    Lancets (ONETOUCH DELICA PLUS [id number]) MISC USE TO TEST TWO TIMES A  each 3    B-D ULTRAFINE III SHORT PEN 31G X 8 MM MISC USE ONE - DAILY 100 each 0    guaiFENesin (ROBITUSSIN) 100 MG/5ML SOLN oral solution Take 10 mLs by mouth every 4 hours as needed for Cough 1200 mL 0    albuterol sulfate HFA (VENTOLIN HFA) 108 (90 Base) MCG/ACT inhaler Inhale 2 puffs into the lungs every 4 hours as needed for Wheezing 3 Inhaler 0    atorvastatin (LIPITOR) 10 MG tablet Take 1 tablet by mouth daily 30 tablet 5    hydrALAZINE (APRESOLINE) 25 MG tablet TAKE ONE TABLET BY MOUTH DAILY 90 tablet 1    amLODIPine (NORVASC) 10 MG tablet TAKE ONE TABLET BY MOUTH DAILY 90 tablet 1    FLUoxetine (PROZAC) 40 MG capsule TAKE ONE CAPSULE BY MOUTH DAILY (Patient taking differently: Take 20 mg by mouth daily ) 90 capsule 1    insulin lispro, 1 Unit Dial, (HUMALOG KWIKPEN) 100 UNIT/ML SOPN According to sliding scale. Max dose per day 48 3 pen 1    blood glucose monitor strips Test 3 times a day & as needed for symptoms of irregular blood glucose. 200 strip 3    blood glucose monitor kit and supplies Test 3  times a day & as needed for symptoms of irregular blood glucose. ONE TOUCH METER 1 kit 0    aspirin 81 MG chewable tablet Take 81 mg by mouth daily      Insulin Pen Needle (PEN NEEDLES) 31G X 6 MM MISC Inject 1 each into the skin daily 100 each 3     No current facility-administered medications for this visit. LABS & TESTS:      Lab Results   Component Value Date    WBC 26.2 (H) 09/10/2020    HGB 11.0 (L) 09/10/2020    HCT 34.9 (L) 09/10/2020    MCV 92.8 09/10/2020     09/10/2020       REVIEW OF SYSTEMS:       All items selected indicate a positive finding. Those items not selected are negative.   Constitutional [] Weight loss/gain   [] Fatigue  [] Fever/Chills   HEENT [] Hearing Loss  [] Visual Disturbance  [] Tinnitus  [] Eye pain   Respiratory [] Shortness of Breath  [] Cough  [] Snoring   Cardiovascular [] Chest Pain  [] Palpitations  [] Lightheaded   GI [] Constipation  [] Diarrhea  [] Swallowing change  [] Nausea/vomiting    [] Urinary Frequency  [] Urinary Urgency   Musculoskeletal [] Neck pain  [] Back pain  [] Muscle pain  [] Restless legs   Dermatologic [] Skin changes   Neurologic [] Memory loss/confusion  [] Seizures  [x] Trouble walking or imbalance  [] Dizziness  [x] Sleep disturbance  [x] Weakness  [] Numbness  [] Tremors  [] Speech Difficulty  [] Headaches  [] Light Sensitivity  [] Sound Sensitivity   Endocrinology []Excessive thirst  []Excessive hunger   Psychiatric [] Anxiety/Depression  [] Hallucination   Allergy/immunology []Hives/environmental allergies   Hematologic/lymph [] Abnormal bleeding  [] Abnormal bruising     VITALS  /82   Pulse 75   Temp 97.1 °F (36.2 °C) (Temporal)   Ht 5' 11\" (1.803 m)   Wt 166 lb (75.3 kg)   BMI 23.15 kg/m²       PHYSICAL EXAMINATION:       General Appearance:  Alert, cooperative, no signs of distress, appears stated age   Skin:  No rash or lesions   HEENT:  Normocephalic, conjunctiva/corneas clear; eyelids intact   Ears:  Normal external ear and canals   Nose: Nares normal, mucosa normal, no drainage    Throat: Lips and tongue normal; teeth normal;  gums normal   Neck: Supple, intact flexion, extension and rotation;   trachea midline;  no adenopathy;   thyroid: not enlarged;   no carotid pulse abnormality   Lungs:   Respirations unlabored   Heart: Regular rate and rhythm   Abdominal: BS present, soft, NT, ND   Extremities: No cyanosis, no edema   Psych Normal affect        NEUROLOGICAL EXAMINATION:     Mental status    Awake, alert and oriented; no aphasia, no dysarthria      Cranial nerves    II - visual fields intact to confrontation                                                III, IV, VI - PERRLA, EOMI, no pupillary defect; no SARAY, no nystagmus, no ptosis   V - normal facial sensation                                                               VII -

## 2020-12-30 RX ORDER — FLUOXETINE HYDROCHLORIDE 20 MG/1
CAPSULE ORAL
Qty: 17 CAPSULE | Refills: 4 | OUTPATIENT
Start: 2020-12-30

## 2020-12-30 RX ORDER — FLUOXETINE HYDROCHLORIDE 40 MG/1
CAPSULE ORAL
Qty: 9 CAPSULE | Refills: 0 | OUTPATIENT
Start: 2020-12-30

## 2020-12-30 RX ORDER — ATORVASTATIN CALCIUM 10 MG/1
TABLET, FILM COATED ORAL
Qty: 9 TABLET | Refills: 4 | OUTPATIENT
Start: 2020-12-30

## 2020-12-30 NOTE — TELEPHONE ENCOUNTER
Nan Shukla is calling to request a refill on the following medication(s):    Medication Request:  Requested Prescriptions     Pending Prescriptions Disp Refills    FLUoxetine (PROZAC) 40 MG capsule [Pharmacy Med Name: FLUoxetine HCL 40 MG CAPSULE] 9 capsule 0     Sig: TAKE ONE CAPSULE BY MOUTH DAILY     Last filled 7/28/20, per chart pt not on    Last Visit Date (If Applicable):  7/71/0049    Next Visit Date:    1/5/2021

## 2021-01-05 ENCOUNTER — OFFICE VISIT (OUTPATIENT)
Dept: INTERNAL MEDICINE CLINIC | Age: 61
End: 2021-01-05
Payer: MEDICARE

## 2021-01-05 VITALS
OXYGEN SATURATION: 98 % | SYSTOLIC BLOOD PRESSURE: 120 MMHG | WEIGHT: 177 LBS | HEIGHT: 71 IN | BODY MASS INDEX: 24.78 KG/M2 | RESPIRATION RATE: 14 BRPM | DIASTOLIC BLOOD PRESSURE: 78 MMHG | TEMPERATURE: 97 F | HEART RATE: 96 BPM

## 2021-01-05 DIAGNOSIS — N18.31 TYPE 1 DIABETES MELLITUS WITH STAGE 3A CHRONIC KIDNEY DISEASE (HCC): ICD-10-CM

## 2021-01-05 DIAGNOSIS — I63.00 CEREBROVASCULAR ACCIDENT (CVA) DUE TO THROMBOSIS OF PRECEREBRAL ARTERY (HCC): ICD-10-CM

## 2021-01-05 DIAGNOSIS — Z28.21 INFLUENZA VACCINATION DECLINED: ICD-10-CM

## 2021-01-05 DIAGNOSIS — Z28.21 PNEUMOCOCCAL VACCINATION DECLINED: ICD-10-CM

## 2021-01-05 DIAGNOSIS — Z28.21 TETANUS, DIPHTHERIA, AND ACELLULAR PERTUSSIS (TDAP) VACCINATION DECLINED: ICD-10-CM

## 2021-01-05 DIAGNOSIS — I10 ESSENTIAL HYPERTENSION: Primary | ICD-10-CM

## 2021-01-05 DIAGNOSIS — E10.22 TYPE 1 DIABETES MELLITUS WITH STAGE 3A CHRONIC KIDNEY DISEASE (HCC): ICD-10-CM

## 2021-01-05 DIAGNOSIS — G81.91 RIGHT HEMIPLEGIA (HCC): ICD-10-CM

## 2021-01-05 DIAGNOSIS — E78.2 MIXED HYPERLIPIDEMIA: ICD-10-CM

## 2021-01-05 DIAGNOSIS — Z86.16 HISTORY OF COVID-19: ICD-10-CM

## 2021-01-05 DIAGNOSIS — F41.9 ANXIETY AND DEPRESSION: ICD-10-CM

## 2021-01-05 DIAGNOSIS — F32.A ANXIETY AND DEPRESSION: ICD-10-CM

## 2021-01-05 PROBLEM — U07.1 COVID-19: Status: RESOLVED | Noted: 2020-09-07 | Resolved: 2021-01-05

## 2021-01-05 PROCEDURE — G8420 CALC BMI NORM PARAMETERS: HCPCS | Performed by: FAMILY MEDICINE

## 2021-01-05 PROCEDURE — 99214 OFFICE O/P EST MOD 30 MIN: CPT | Performed by: FAMILY MEDICINE

## 2021-01-05 PROCEDURE — 2022F DILAT RTA XM EVC RTNOPTHY: CPT | Performed by: FAMILY MEDICINE

## 2021-01-05 PROCEDURE — G8484 FLU IMMUNIZE NO ADMIN: HCPCS | Performed by: FAMILY MEDICINE

## 2021-01-05 PROCEDURE — 3017F COLORECTAL CA SCREEN DOC REV: CPT | Performed by: FAMILY MEDICINE

## 2021-01-05 PROCEDURE — 3046F HEMOGLOBIN A1C LEVEL >9.0%: CPT | Performed by: FAMILY MEDICINE

## 2021-01-05 PROCEDURE — 1036F TOBACCO NON-USER: CPT | Performed by: FAMILY MEDICINE

## 2021-01-05 PROCEDURE — G8427 DOCREV CUR MEDS BY ELIG CLIN: HCPCS | Performed by: FAMILY MEDICINE

## 2021-01-05 ASSESSMENT — ENCOUNTER SYMPTOMS
EYES NEGATIVE: 1
GASTROINTESTINAL NEGATIVE: 1
ALLERGIC/IMMUNOLOGIC NEGATIVE: 1
RESPIRATORY NEGATIVE: 1
BACK PAIN: 1

## 2021-01-05 NOTE — PROGRESS NOTES
Subjective:      Patient ID: Jayde King is a 61 y.o. male. Diabetes  He presents for his follow-up diabetic visit. He has type 1 diabetes mellitus. His disease course has been improving. Hypoglycemia symptoms include nervousness/anxiousness. There are no diabetic associated symptoms. There are no hypoglycemic complications. Symptoms are stable. Diabetic complications include a CVA and nephropathy. Risk factors for coronary artery disease include diabetes mellitus, dyslipidemia, male sex and sedentary lifestyle. Current diabetic treatment includes insulin injections. He is compliant with treatment all of the time. His weight is stable. He is following a generally healthy diet. Meal planning includes ADA exchanges, avoidance of concentrated sweets, calorie counting and carbohydrate counting. He has had a previous visit with a dietitian. He participates in exercise three times a week. His home blood glucose trend is decreasing steadily. Review of Systems   Constitutional: Negative. HENT: Negative. Eyes: Negative. Respiratory: Negative. Cardiovascular: Negative. Gastrointestinal: Negative. Endocrine: Negative. Musculoskeletal: Positive for arthralgias and back pain. Skin: Negative. Allergic/Immunologic: Negative. Neurological: Negative. Hematological: Negative. Psychiatric/Behavioral: The patient is nervous/anxious. Past family and social history unremarkable. Diagnosis Orders   1. Essential hypertension     2. Mixed hyperlipidemia     3. Right hemiplegia ([address]     4. Cerebrovascular accident (CVA) due to thrombosis of precerebral artery ([address]     5. Anxiety and depression     6. Influenza vaccination declined     7. Pneumococcal vaccination declined     8. Tetanus, diphtheria, and acellular pertussis (Tdap) vaccination declined     9. History of COVID-19     10.  Type 1 diabetes mellitus with stage 3a chronic kidney disease (HCC)  Hemoglobin A1C    Lipid Panel Objective:   Physical Exam  Vitals signs and nursing note reviewed. Constitutional:       Appearance: He is well-developed. HENT:      Head: Normocephalic and atraumatic. Right Ear: External ear normal.      Left Ear: External ear normal.      Nose: Nose normal.   Eyes:      Conjunctiva/sclera: Conjunctivae normal.      Pupils: Pupils are equal, round, and reactive to light. Neck:      Musculoskeletal: Normal range of motion and neck supple. Cardiovascular:      Rate and Rhythm: Normal rate and regular rhythm. Heart sounds: Normal heart sounds. Comments: Insulin-dependent diabetes mellitus  Hypertension  Hyperlipidemia  Pulmonary:      Effort: Pulmonary effort is normal.      Breath sounds: Normal breath sounds. Abdominal:      General: Bowel sounds are normal.      Palpations: Abdomen is soft. Genitourinary:     Comments: Diabetic nephropathy  Musculoskeletal: Normal range of motion. Skin:     General: Skin is warm and dry. Neurological:      Mental Status: He is alert and oriented to person, place, and time. Deep Tendon Reflexes: Reflexes are normal and symmetric. Comments: History of CVA with residual right hemiplegia   Psychiatric:      Comments: Anxiety/depression         Assessment:       Diagnosis Orders   1. Essential hypertension     2. Mixed hyperlipidemia     3. Right hemiplegia ([address]     4. Cerebrovascular accident (CVA) due to thrombosis of precerebral artery ([address]     5. Anxiety and depression     6. Influenza vaccination declined     7. Pneumococcal vaccination declined     8. Tetanus, diphtheria, and acellular pertussis (Tdap) vaccination declined     9. History of COVID-19     10. Type 1 diabetes mellitus with stage 3a chronic kidney disease (HCC)  Hemoglobin A1C    Lipid Panel           Plan:      61-year-old -American male came along with his wife for routine follow-up.   He is afebrile hemodynamically stable, clinical examination is stable at baseline  Insulin-dependent diabetes mellitus. Accu-Chek log shows progressive improvement with random blood sugar less than 130. He denies hypoglycemia. A1c 7.8 as of 10/2020. We will recheck his A1c  Hypertension well-controlled with random blood pressure equal less than 130/80. Optimize diabetes, consume less than 2 g of salt a day  Hyperlipidemia on statin that he is tolerating well  Monofilament testing is unremarkable. He is counseled on diabetic foot care  History of diabetic nephropathy. However creatinine is 0.84 as of 9/2020. History of anxiety/depression. Patient states that he is doing very well and does not need to be on any medication. He is comfortable with the services provided by his wife  He denies tobacco, excessive alcohol or illicit drug use  Recent past history of COVID-19-resolved uneventfully  Med list reviewed advised to continue  Further recommendations to follow labs  Follow-up in 3 months  This note is created with a voice recognition program and while intend to generate a document that accurately reflects the content of the visit, no guarantee can be provided that every mistake has been identified and corrected by editing.           Mayra Zapata MD

## 2021-01-06 ENCOUNTER — HOSPITAL ENCOUNTER (OUTPATIENT)
Age: 61
Setting detail: SPECIMEN
Discharge: HOME | End: 2021-01-06
Payer: MEDICARE

## 2021-01-06 DIAGNOSIS — N18.31 TYPE 1 DIABETES MELLITUS WITH STAGE 3A CHRONIC KIDNEY DISEASE (HCC): ICD-10-CM

## 2021-01-06 DIAGNOSIS — E10.22 TYPE 1 DIABETES MELLITUS WITH STAGE 3A CHRONIC KIDNEY DISEASE (HCC): ICD-10-CM

## 2021-01-06 LAB
CHOLESTEROL/HDL RATIO: 4.9
CHOLESTEROL: 167 MG/DL
ESTIMATED AVERAGE GLUCOSE: 140 MG/DL
HBA1C MFR BLD: 6.5 % (ref 4–6)
HDLC SERPL-MCNC: 34 MG/DL
LDL CHOLESTEROL: 111 MG/DL (ref 0–130)
TRIGL SERPL-MCNC: 108 MG/DL
VLDLC SERPL CALC-MCNC: ABNORMAL MG/DL (ref 1–30)

## 2021-01-18 RX ORDER — AMLODIPINE BESYLATE 10 MG/1
TABLET ORAL
Qty: 90 TABLET | Refills: 0 | Status: SHIPPED | OUTPATIENT
Start: 2021-01-18 | End: 2021-03-17

## 2021-01-18 RX ORDER — LISINOPRIL 10 MG/1
TABLET ORAL
Qty: 90 TABLET | Refills: 0 | OUTPATIENT
Start: 2021-01-18

## 2021-01-18 RX ORDER — HYDRALAZINE HYDROCHLORIDE 25 MG/1
TABLET, FILM COATED ORAL
Qty: 90 TABLET | Refills: 0 | Status: SHIPPED | OUTPATIENT
Start: 2021-01-18 | End: 2021-03-17

## 2021-01-18 NOTE — TELEPHONE ENCOUNTER
Alisha Rey is calling to request a refill on the following medication(s):    Medication Request:  Requested Prescriptions     Pending Prescriptions Disp Refills    lisinopril (PRINIVIL;ZESTRIL) 10 MG tablet [Pharmacy Med Name: LISINOPRIL 10 MG TABLET] 90 tablet 0     Sig: TAKE ONE TABLET BY MOUTH DAILY    amLODIPine (NORVASC) 10 MG tablet [Pharmacy Med Name: amLODIPine BESYLATE 10 MG TAB] 90 tablet 0     Sig: TAKE ONE TABLET BY MOUTH DAILY    hydrALAZINE (APRESOLINE) 25 MG tablet [Pharmacy Med Name: hydrALAZINE 25 MG TABLET] 90 tablet 0     Sig: TAKE ONE TABLET BY MOUTH DAILY     Last filled lisinopril 7/28/20, told 9/10/20 that wasn't taking anymore, denied and asked for patient to call  Last filled amlodipine and hydralazine 7/28/20 90 day 1 refill orders pending 90 day 1 refill        Last Visit Date (If Applicable):  5/4/4766    Next Visit Date:    4/5/2021

## 2021-01-27 RX ORDER — LISINOPRIL 10 MG/1
TABLET ORAL
Qty: 90 TABLET | Refills: 0 | OUTPATIENT
Start: 2021-01-27

## 2021-01-27 NOTE — TELEPHONE ENCOUNTER
Ayala Chamber is calling to request a refill on the following medication(s):    Medication Request:  Requested Prescriptions     Pending Prescriptions Disp Refills    lisinopril (PRINIVIL;ZESTRIL) 10 MG tablet [Pharmacy Med Name: LISINOPRIL 10 MG TABLET] 90 tablet 0     Sig: TAKE ONE TABLET BY MOUTH DAILY       Last Visit Date (If Applicable):  0/5/8205    Next Visit Date:    4/5/2021

## 2021-02-01 RX ORDER — LISINOPRIL 10 MG/1
TABLET ORAL
Qty: 90 TABLET | Refills: 0 | OUTPATIENT
Start: 2021-02-01

## 2021-02-01 NOTE — TELEPHONE ENCOUNTER
Sara Verdugo is calling to request a refill on the following medication(s):    Medication Request:  Requested Prescriptions     Pending Prescriptions Disp Refills    lisinopril (PRINIVIL;ZESTRIL) 10 MG tablet [Pharmacy Med Name: LISINOPRIL 10 MG TABLET] 90 tablet 0     Sig: TAKE ONE TABLET BY MOUTH DAILY     Our records show pt not on medication    Last Visit Date (If Applicable):  3/2/8384    Next Visit Date:    4/5/2021

## 2021-02-08 RX ORDER — PEN NEEDLE, DIABETIC 31 GX5/16"
NEEDLE, DISPOSABLE MISCELLANEOUS
Qty: 100 EACH | Refills: 1 | Status: SHIPPED | OUTPATIENT
Start: 2021-02-08 | End: 2021-06-07

## 2021-02-08 NOTE — TELEPHONE ENCOUNTER
Livia Tolentino is calling to request a refill on the following medication(s):    Medication Request:  Requested Prescriptions     Pending Prescriptions Disp Refills    B-D ULTRAFINE III SHORT PEN 31G X 8 MM MISC [Pharmacy Med Name: BD UF SHORT PEN NEEDLE 8EYB85G] 100 each 0     Sig: USE ONE NEEDLE DAILY     Last filled 10/19/20  Order pending    Last Visit Date (If Applicable):  2/8/6048    Next Visit Date:    4/5/2021

## 2021-03-04 RX ORDER — LANCETS 33 GAUGE
EACH MISCELLANEOUS
Refills: 2 | OUTPATIENT
Start: 2021-03-04

## 2021-03-04 RX ORDER — INSULIN DETEMIR 100 [IU]/ML
INJECTION, SOLUTION SUBCUTANEOUS
Qty: 5 PEN | Refills: 3 | Status: SHIPPED | OUTPATIENT
Start: 2021-03-04 | End: 2021-10-29 | Stop reason: SDUPTHER

## 2021-03-04 NOTE — TELEPHONE ENCOUNTER
Veto Mendoza is calling to request a refill on the following medication(s):    Medication Request:  Requested Prescriptions     Pending Prescriptions Disp Refills    LEVEMIR FLEXTOUCH 100 UNIT/ML injection pen [Pharmacy Med Name: Kirk Chariton 100 UNIT/ML] 5 pen 0     Sig: INJECT UNDER THE SKIN 16 UNITS ONCE NIGHTLY    Lancets ([address], [address]) [address] [facility] [Pharmacy Med Name: Sancho Mustafals PLUS [address]A LANCT]  2     Sig: USE TO TEST TWO TIMES A DAY       Last Visit Date (If Applicable):  2/4/1098    Next Visit Date:    4/5/2021

## 2021-03-15 ENCOUNTER — TELEPHONE (OUTPATIENT)
Dept: NEUROLOGY | Age: 61
End: 2021-03-15

## 2021-03-15 DIAGNOSIS — Z86.73 HISTORY OF STROKE: Primary | ICD-10-CM

## 2021-03-15 NOTE — TELEPHONE ENCOUNTER
Patient is requesting a new order for PT /OT to go to [facility] physical therapy . Please advise. Thank you.

## 2021-03-17 DIAGNOSIS — G81.91 RIGHT HEMIPLEGIA (HCC): ICD-10-CM

## 2021-03-17 RX ORDER — LISINOPRIL 10 MG/1
TABLET ORAL
Qty: 90 TABLET | Refills: 0 | OUTPATIENT
Start: 2021-03-17

## 2021-03-17 NOTE — TELEPHONE ENCOUNTER
Agnes Matamoros is calling to request a refill on the following medication(s):    Medication Request:  Requested Prescriptions     Pending Prescriptions Disp Refills    lisinopril (PRINIVIL;ZESTRIL) 10 MG tablet [Pharmacy Med Name: LISINOPRIL 10 MG TABLET] 90 tablet 0     Sig: TAKE ONE TABLET BY MOUTH DAILY    hydrALAZINE (APRESOLINE) 25 MG tablet [Pharmacy Med Name: hydrALAZINE 25 MG TABLET] 72 tablet 0     Sig: TAKE ONE TABLET BY MOUTH DAILY    potassium chloride (KLOR-CON M) 20 MEQ TBCR extended release tablet [Pharmacy Med Name: POTASSIUM CHLORIDE ER 20 MEQ TABLET] 30 tablet 0     Sig: TAKE ONE TABLET BY MOUTH DAILY    amLODIPine (NORVASC) 10 MG tablet [Pharmacy Med Name: amLODIPine BESYLATE 10 MG TAB] 72 tablet 0     Sig: TAKE ONE TABLET BY MOUTH DAILY     Last filled lisinopril 7/28/20, told on 9/10/20 was not taking anymore, med denied  Other meds pending    Last Visit Date (If Applicable):  4/0/0429    Next Visit Date:    4/5/2021

## 2021-03-18 RX ORDER — HYDRALAZINE HYDROCHLORIDE 25 MG/1
TABLET, FILM COATED ORAL
Qty: 90 TABLET | Refills: 1 | Status: SHIPPED | OUTPATIENT
Start: 2021-03-18 | End: 2021-09-08

## 2021-03-18 RX ORDER — POTASSIUM CHLORIDE 1500 MG/1
TABLET, FILM COATED, EXTENDED RELEASE ORAL
Qty: 90 TABLET | Refills: 1 | Status: SHIPPED | OUTPATIENT
Start: 2021-03-18 | End: 2021-09-29

## 2021-03-18 RX ORDER — AMLODIPINE BESYLATE 10 MG/1
TABLET ORAL
Qty: 90 TABLET | Refills: 1 | Status: SHIPPED | OUTPATIENT
Start: 2021-03-18 | End: 2021-09-08

## 2021-03-18 RX ORDER — LISINOPRIL 10 MG/1
TABLET ORAL
Qty: 90 TABLET | Refills: 0 | OUTPATIENT
Start: 2021-03-18

## 2021-03-18 NOTE — TELEPHONE ENCOUNTER
Irvin Mcguire is calling to request a refill on the following medication(s):    Medication Request:  Requested Prescriptions     Pending Prescriptions Disp Refills    lisinopril (PRINIVIL;ZESTRIL) 10 MG tablet [Pharmacy Med Name: LISINOPRIL 10 MG TABLET] 90 tablet 0     Sig: TAKE ONE TABLET BY MOUTH DAILY     Last filled 7/28/20, chart marked 9/10/20 that pt not on this medication    Last Visit Date (If Applicable):  2/6/2944    Next Visit Date:    4/5/2021

## 2021-03-24 ENCOUNTER — HOSPITAL ENCOUNTER (OUTPATIENT)
Dept: PHYSICAL THERAPY | Age: 61
Setting detail: THERAPIES SERIES
Discharge: HOME | End: 2021-03-24
Payer: MEDICARE

## 2021-03-24 ENCOUNTER — HOSPITAL ENCOUNTER (OUTPATIENT)
Dept: OCCUPATIONAL THERAPY | Age: 61
Setting detail: THERAPIES SERIES
Discharge: HOME | End: 2021-03-24
Payer: MEDICARE

## 2021-03-24 PROCEDURE — 97110 THERAPEUTIC EXERCISES: CPT

## 2021-03-24 PROCEDURE — 97165 OT EVAL LOW COMPLEX 30 MIN: CPT

## 2021-03-24 PROCEDURE — 97140 MANUAL THERAPY 1/> REGIONS: CPT

## 2021-03-24 PROCEDURE — 97161 PT EVAL LOW COMPLEX 20 MIN: CPT

## 2021-03-24 NOTE — FLOWSHEET NOTE
COATS BALANCE SCALE 14-Item Long Form Original Version    Patient Name:  Cesar Soares  Date:  3/24/2021    1. SITTING TO STANDING  INSTRUCTIONS: Please stand up. Try not to use your hands for support. (4) able to stand without using hands and stabilize independently  (3) able to stand independently using hands  (2) able to stand using hands after several tries  (1) needs minimal aid to stand or to stabilize  (0) needs moderate or maximal assist to stand  Score: 3    2. STANDING UNSUPPORTED  INSTRUCTIONS: Please stand for two minutes without holding. (4) able to stand safely 2 minutes  (3) able to stand 2 minutes with supervision  (2) able to stand 30 seconds unsupported  (1) needs several tries to stand 30 seconds unsupported  (0) unable to stand 30 seconds unassisted If a subject is able to stand 2  minutes unsupported, score full points for sitting unsupported. Proceed to  item #4. Score: 4    3. SITTING WITH BACK UNSUPPORTED BUT FEET SUPPORTED  ON FLOOR OR ON A STOOL  INSTRUCTIONS: Please sit with arms folded for 2 minutes. (4) able to sit safely and securely 2 minutes  (3) able to sit 2 minutes under supervision  (2) able to sit 30 seconds  (1) able to sit 10 seconds  (0) unable to sit without support 10 seconds  Score: 4     4. STANDING TO SITTING  INSTRUCTIONS: Please sit down. (4) sits safely with minimal use of hands  (3) controls descent by using hands  (2) uses back of legs against chair to control descent  (1) sits independently but has uncontrolled descent  (0) needs assistance to sit  Score: 4    5. TRANSFERS  INSTRUCTIONS: Arrange chairs(s) for a pivot transfer. Ask subject to  transfer one way toward a seat with armrests and one way toward a seat  without armrests. You may use two chairs (one with and one without  armrests) or a bed and a chair.   (4) able to transfer safely with minor use of hands  (3) able to transfer safely definite need of hands  (2) able to transfer with verbal cueing and/or supervision  (1) needs one person to assist  (0) needs two people to assist or supervise to be safe  Score: 3    6. STANDING UNSUPPORTED WITH EYES CLOSED  INSTRUCTIONS: Please close your eyes and stand still for 10 seconds. (4) able to stand 10 seconds safely  (3) able to stand 10 seconds with supervision  (2) able to stand 3 seconds  (1) unable to keep eyes closed 3 seconds but stays steady  (0) needs help to keep from falling  Score: 3    7. STANDING UNSUPPORTED WITH FEET TOGETHER  INSTRUCTIONS: Place your feet together and stand without holding. (4) able to place feet together independently and stand 1 minute safely  (3) able to place feet together independently and stand for 1 minute with  supervision  (2) able to place feet together independently but unable to hold for 30 seconds  (1) needs help to attain position but able to stand 15 seconds feet together  (0) needs help to attain position and unable to hold for 15 seconds  Score: 3    8. REACHING FORWARD WITH OUTSTRETCHED ARM WHILE  STANDING  INSTRUCTIONS: Lift arm to 90 degrees. Stretch out your fingers and reach  forward as far as you can. (Examiner places a ruler at end of fingertips when  arm is at 90 degrees. Fingers should not touch the ruler while reaching  forward. The recorded measure is the distance forward that the finger reaches  while the subject is in the most forward lean position. When possible, ask  subject to use both arms when reaching to avoid rotation of the trunk.). (4) can reach forward confidently >25 cm (10 inches)  (3) can reach forward >12 cm safely (5 inches)  (2) can reach forward >5 cm safely (2 inches)  (1) reaches forward but needs supervision  (0) loses balance while trying/requires external support  Score: 4    9.  OBJECT FROM FLOOR FROM A STANDING POSITION  INSTRUCTIONS:  shoe/slipper which is placed in front of your feet.   (4) able to  slipper safely and easily  (3) able to  slipper but needs supervision  (2) unable to  but reaches 2-5cm (1-2 inches) from slipper and keeps  balance independently  (1) unable to  and needs supervision while trying  (0) unable to try/needs assist to keep from losing balance or falling  Score: 3    10. TURNING TO LOOK BEHIND OVER LEFT AND RIGHT  SHOULDERS WHILE STANDING  INSTRUCTIONS: Turn to look directly behind you over toward left shoulder. Repeat to the right. Examiner may pick an object to look at directly behind the  subject to encourage a better twist turn. (4) looks behind from both sides and weight shifts well  (3) looks behind one side only other side shows less weight shift  (2) turns sideways only but maintains balance  (1) needs supervision when turning  (0) needs assist to keep from losing balance or falling  Score: 2    11. TURN 360 DEGREES  INSTRUCTIONS: Turn completely around in a full [place]. Pause. Then turn a  full [place] in the other direction. (4) able to turn 360 degrees safely in 4 seconds or less  (3) able to turn 360 degrees safely one side only in 4 seconds or less  (2) able to turn 360 degrees safely but slowly  (1) needs close supervision or verbal cueing  (0) needs assistance while turning  Score: 1    12. PLACING ALTERNATE FOOT ON STEP OR STOOL WHILE  STANDING UNSUPPORTED  INSTRUCTIONS: Place each foot alternately on the step/stool. Continue until  each foot has touched the step/stool four times. (4) able to stand independently and safely and complete 8 steps in 20 seconds  (3) able to stand independently and complete 8 steps >20 seconds  (2) able to complete 4 steps without aid with supervision  (1) able to complete >2 steps needs minimal assist  (0) needs assistance to keep from falling/unable to try  Score: 0    13. STANDING UNSUPPORTED ONE FOOT IN FRONT  INSTRUCTIONS: (DEMONSTRATE TO SUBJECT) Place one foot directly in  front of the other.  If you feel that you cannot place your foot directly in front,  try to step far enough ahead that the heel of your forward foot is ahead of the  toes of the other foot. (To score 3 points, the length of the step should exceed  the length of the other foot and the width of the stance should approximate the  subject's normal stride width). (4) able to place foot tandem independently and hold 30 seconds  (3) able to place foot ahead of other independently and hold 30 seconds  (2) able to take small step independently and hold 30 seconds  (1) needs help to step but can hold 15 seconds  (0) loses balance while stepping or standing  Score: 1    14. STANDING ON ONE LEG  INSTRUCTIONS: Stand on one leg as long as you can without holding. (4) able to lift leg independently and hold >10 seconds  (3) able to lift leg independently and hold 5-10 seconds  (2) able to lift leg independently and hold = or >3 seconds  (1) tries to lift leg unable to hold 3 seconds but remains standing  independently  (0) unable to try or needs assist to prevent fall  Score: Total Score:  35/56  Max score 56,a person scoring below 45 is considered to be at risk for falling.     Completed by: Baylee Villalba

## 2021-03-24 NOTE — CONSULTS
[x] [facility]) - [facility] &  Therapy  [address]    [phone]  F: [phone] [] [address]  [facility]   [address]  P: [phone]  F: [phone] [] [address]  [address] [facility]  P: [phone]  F: [phone] [] [address]  [facility]   [address]   P: [phone]  F: [phone] [] [facility]  [address] [facility]  [place]: [phone]   F: [phone]        Physical Therapy Neurological Evaluation    Date:  3/24/2021  Patient: Naomi Pérez  : 1960  MRN: [id number]  Physician: Dr. Carlitos Michel: [employer] ([id number])  Medical Diagnosis:  History of stroke  Rehab Codes: R53.1, R29.3, R27.9, R26.9, M25.60  Next 's appt.: 21  Date of symptom onset: 19    Subjective:   CC: Pt arrives for physical therapy evaluation of impairments secondary to chronic CVA (left capsular ischemic CVA) on 19 resulting in RLE/RUE weakness. Pt was initially going through PT from 1/15/20 - 20, then had COVID and experienced significant fatigue, SOB to the point that he was hospitalized for 4 days, no ventilator. Pt reports he feels COVID \"put him way backwards\" - does note that he still has mood swings and mild fatigue that they state are long-term effects of COVID. Denies any SOB with ambulation. Still using SBQC for all ambulation, both in community and in home except for bedroom to bathroom and states he uses walls for assistance if needed. Denies any falls.        PLOF before CVA: independent with all ADL/IADLs, no gait or balance deviations     Summary of current functional impairments: gait deviations limiting efficient gait, reduced standing/ambulation tolerance and times, impaired static/dynamic standing balance, has to sit in shower, requires UE assist for sit>stand, difficulty with car transfers and typical transfers without UE assist      PMHx: [] Unremarkable [x] Diabetes [x] HTN  [] Pacemaker   [] MI/Heart Problems [] Cancer [] Arthritis [] Other:              [x] Refer to full medical chart  In EPIC     Comorbidities:   [] Obesity [] Dialysis  [] Other:   [] Asthma/COPD [] Dementia [] Other:   [x] Stroke [] Sleep apnea [] Other:   [] Vascular disease [] Rheumatic disease [] Other:     Tests: [] X-Ray: [] MRI:  [] Other:    Medications: [x] Refer to full medical record [] None [] Other:  Allergies:       [x] Refer to full medical record [] None [] Other:    Function:  Hand Dominance  [] Right  [] Left  Patient lives with: parents   In what type of home [x]  One story   [] Two story   [] Split level   Number of stairs to enter 1   With handrail on the N/A   Bathroom has a []  Tub only  [x] Tub/shower combo   [] Walk in shower    []  Grab bars   Washing machine is on [x]  Main level   [] Second level   [] Basement   Employer N/A   Job Status    [x] Disability     Work activities/duties Play guitar       ADL/IADL Previous level of function Current level of function Who currently assists the patient with task   Bathing  [x] Independent  [] Assist [x] Independent  [] Assist Sits in shower   Dress/grooming [x] Independent  [] Assist [x] Independent  [] Assist    Transfer/mobility [x] Independent  [] Assist [x] Independent  [] Assist    Feeding [x] Independent  [] Assist [x] Independent  [] Assist    Toileting [x] Independent  [] Assist [x] Independent  [] Assist    Driving [x] Independent  [] Assist [] Independent  [x] Assist Friend, family drives   Housekeeping [x] Independent  [] Assist [x] Independent  [] Assist    Grocery shop/meal prep [x] Independent  [] Assist [] Independent  [x] Assist      Gait Prior level of function Current level of function    [x] Independent  [] Assist [x] Independent  [] Assist   Device: [x] Independent [] Independent    [] Straight Cane [] Quad cane [] Straight Cane [x] Quad cane    [] Standard walker [] Rolling walker   [] 4 wheeled walker [] Standard walker [] Rolling walker   [] 4 wheeled walker    [] Wheelchair [] Wheelchair   Gait Analysis:      Pain:  [] Yes  [x] No Location: N/A Pain Rating: (0-10 scale) 0/10  Pain altered Tx:  [] Yes  [x] No  Action:    Symptoms:  [] Improving [] Worsening [x] Same  Better: N/A  Worse: N/A  Sleep: [x] OK    [] Disturbed         Previous physical therapy?    []No       [x]Yes, date of most recent, facility: Ended in Sept. 2020 [facility]      Durable Medical Equipment:  Current DME available: SBQC      Objective:    ROM  °A/P STRENGTH POSTURE No deficit Deficit Not Tested Comments    Left Right Left Right Kyphosis [x] [] []    Shoulder Flex     Scoliosis [x] [] []    Abd     Forward Head [] [x] []    Elbow Flex     Rounded Shldrs [] [x] []    Ext     Slumped Sitting [] [x] []    Wrist Flex     Skin Integrity [x] [] []    Ext            Hand      NEUROLOGICAL       Hip Flex   5- 4+ Reflexes [] [] [x]    Ext   5- 4- Sensation [x] [] []    Abd   5- 4- Bladder/Bowel [x] [] []    Knee Flex   5 4 Coordination [x] [] []    Ext   5 2- Tone [] [x] [] Decreased tone RLE   Ankle DF   5 2- Clonus [] [x] [] R PF 1 beat   PF   5 2- Tremors [x] [] []      FUNCTION INDEP MIN ASSIST MOD ASSIST MAX ASSIST NOT TESTED   Bed Mobility        -rolling [x] [] [] [] []   -sit to supine [x] [] [] [] []   -supine to sit [x] [] [] [] []   Transfers        -sit to stand [x] [] [] [] []   -sliding board [] [] [] [] [x]   -pivot [] [] [] [] [x]   Balance        -sitting static [x] [] [] [] []   -dynamic [x] [] [] [] []   -standing static [] [x] [] [] []   -dynamic [] [] [x] [] []   Ambulation [x] [] [] [] []   Distance/Device: 60 ft, SBQC in LUE   Analysis: Excessive ER of RLE with swing and in initial contact,      W/C Mobility [] [] [] [] [x] throughout, and at .4m/s  5. Patient to be independent with home exercise program as demonstrated by performance with correct form without cues. 6. Demonstrate Knowledge of fall prevention    LTG: (to be met in 20 treatments)  7. ? Balance:   a. Pt will be able to complete 360 deg turn with near equal weightbearing, albeit slow  b. Pt will score at least 41/56 on Mccurdy to indicate significant change in static/dynamic balance abilities since initial evaluation. 8. ? Strength:  a. 2+/5 R knee ext strength to increase R knee control in SLS  b. 4+/5 R hip ext/abd, 5-/5 R hamstrings to improve swing and stance phase with gait  9. ? Function:  a. Pt will be able to stand from 20 in chair 10x without UE assist and equal weightbearing with no more than minimal cuing from therapist.  b. Pt will be able to ascend/descend stairs using reciprocal ascent/descent with unilat UE assist and fair speed, fair R knee eccentric control  c. Pt will be able to ambulate 300 ft with improving L trunk lean, no more than 25% incidence of R knee hyperextension throughout, and at .5m/s         Patient goals: \"strengthen what we can, improve walking\"    Rehab Potential:  [] Good  [x] Fair  [] Poor   Suggested Professional Referral:  [x] No  [] Yes:  Barriers to Goal Achievement[de-identified]  [] No  [x] Yes: chronic CVA deficits limiting strength gains  Domestic Concerns:  [x] No  [] Yes:    Pt. Education:  [x] Plans/Goals, Risks/Benefits discussed  [] Home exercise program  Method of Education: [x] Verbal  [] Demo  [] Written  Comprehension of Education:  [x] Verbalizes understanding. [] Demonstrates understanding. [] Needs Review. [] Demonstrates/verbalizes understanding of HEP/Ed previously given.     Treatment Plan:  [x] Therapeutic Exercise   78170  [] Iontophoresis: 4 mg/mL Dexamethasone Sodium Phosphate  mAmin  15218   [x] Therapeutic Activity  64321 [] Vasopneumatic cold with compression  86979    [x] Gait Training   85379 [] Ultrasound   [id number]   [x] Neuromuscular Re-education  [id number] [] Electrical Stimulation Unattended  44151   [x] Manual Therapy  95742 [] Electrical Stimulation Attended  [id number]   [x] Instruction in HEP  [] Dry Needling   [] Aquatic Therapy   77644 [x] Cold/hotpack    [] Massage   16725      [] Lumbar/Cervical Traction  [id number]     []  Medication allergies reviewed for use of    Dexamethasone Sodium Phosphate 4mg/ml     with iontophoresis treatments. Pt is not allergic. Frequency: 2 x/weeks for 20 visits    Todays Treatment:  Modalities:   Exercises:  Exercise Reps/ Time Weight/ Level Comments   Leg press 3x10 60#                            Other:     Specific Instructions for next treatment:  - R quad NMES strengthening, SLS, gait training  - Step up/down strengthening/knee control  - SLS on RLE  - reaching to right with increased RLE weightbearing, turning 180deg with equal weightbearing using UE assist    Evaluation Complexity:  History (Personal factors, comorbidities) [] 0 [x] 1-2 [] 3+   Exam (limitations, restrictions) [] 1-2 [] 3 [x] 4+   Clinical presentation (progression) [x] Stable [] Evolving  [] Unstable   Decision Making [x] Low [] Moderate [] High    [x] Low Complexity [] Moderate Complexity [] High Complexity       Treatment Charges: Mins Units   [x] Evaluation       [x]  Low       []  Moderate       []  High 45 1   []  Modalities     [x]  Ther Exercise 10 1   []  Manual Therapy     []  Ther Activities     []  Aquatics     []  Vasocompression     []  Other       TOTAL TREATMENT TIME: 55 min    Time in: 11:00 am      Time out: 12:00 pm    Electronically signed by: Maximino Heredia PT         Physician Signature:________________________________Date:__________________  By signing above or cosigning this note, I have reviewed this plan of care and certify a need for medically necessary rehabilitation services.      *PLEASE SIGN ABOVE AND FAX BACK ALL PAGES*

## 2021-03-24 NOTE — CONSULTS
[x] [facility]       Occupational Therapy 1st floor       [address] [place], [place]         Phone: [phone]       Fax: [phone] [] [facility]  Occupational Therapy  [address] [place] [address]  Phone: [phone]  Fax: [phone]       Occupational Therapy Hand & Upper Extremity  Initial Evaluation    Date: 3/24/2021      Patient: Carmela Camargo  : 1960  MRN: [id number]  Referring Provider:  Zane Bartlett  Insurance: [place] [employer]   visits left  Medical Diagnosis: History of stroke Z86.73   Rehab Codes: stiffness in shoulder M25.61,, stiffness in elbow M25.62,, stiffness in hand M25.64,, stiffness in wrist M25.63,, lack of coordination R27.8,, fine motor skills loss R29.818, or muscle weakness generalized M62.81,  Onset Date:  3-   Next Dr. Georgine Gitelman: 21    Subjective:   CC: I cannot get a  with the hand and cannot move my fingers  HPI: (onset date): 2019    Mechanism of Injury: stroke  Surgery Date: NA Precautions: None    Involved Extremity: Right   Dominant Extremity: Right    Past Medical History: Other stroke          Comorbidities: Stroke    Medications: Refer to Medical chart in [facility] Allergies:  Refer to Medical chart in [facility]    Pain: Intensity:   0/10 Location: no pain at rest     Pain Type: Intermittant  Pain Altered Tx: no  Action Taken:none            Home Environment:    Pt lives in a  and House Mothers With 1 HYUN  The washing machine is on and Family does    Vocation NA   Job Status []Normal duty []Light duty [] Off due to condition []Retired  []Not employed []Disability  []Other:  []  Return to work:    Work activities/duties      Avocational Activities NA    [] [address]     [] Grandparenting     [] Care giver [] OTHER    [] NA       ADL/IADL Previous level of function Current level of function Who assists or modifications made or needed   Bathing  [x] Independent    [] Assist  [x] Independent [] Assist     Dress/grooming [x] Independent    [] Assist  [x] Independent    [] Assist     Transfer/mobility [x] Independent    [] Assist  [x] Independent    [] Assist     Feeding [x] Independent    [] Assist  [x] Independent    [] Assist     Toileting [x] Independent    [] Assist  [x] Independent    [] Assist     Driving [x] Independent    [] Assist  [] Independent    [x] Assist  friend   Housekeeping [x] Independent    [] Assist  [] Independent    [x] Assist  sister   Grocery shop/meal prep [x] Independent    [] Assist  [] Independent    [x] Assist  sister     Device: [employer]   Orthosis: Currently has \"I bought it on [employer]"    Objective: Tests/Measurements: Upper Extremity Functional Index  Current Functional Level:  29 functionally impaired as measured with the Upper Extremity Functional Index Survey. 0-80 scale, with 80 = no Deficits  (The UEFI model does not provide any specific cut off points that could classify the upper limb disability degree, however, a minimal detectable change of 9 points is provided. This means that for improvement or deterioration to be considered, between two subsequent evaluations, the scores must differ by at least 9 points.)    Sensibility: Normal Edema: None      Skin Color: Normal Skin/Scar condition Not tested    Problems: Pain, ROM, Strength, Function and Coordination     STRENGTH (Measured in pounds)  3-24-21     pounds RIGHT LEFT    16 90   Lateral pinch 8 15   2 point pinch 10 12   3 jaw pinch na na     The affected extremity is 83 % weaker than the unaffected extremity. (affected score/unaffected score, take the total and subtract from 100)        Right ROM   Elbow Extension/flex -40/115   Shoulder  Adduction/abd 45/28     Wrist 3-24-21     degrees Right ROM       Extension\flex -45/-45       Radial Deviation/ulnar 10/0         Assessment:  Patient would benefit from skilled occupational therapy services in order to:  Improve  Rehab Potential, Motor control/Tone in UE, ROM, Strength and Coordination deficit in order to ensure good follow through and decrease pain in UE for safe completion of ADLs    Short Term Goals: ( 6  Treatments)  1. Increase AROM (degrees) (extension/flexion)  a. R shoulder to 60/40  b. R elbow -20/130  c. R wrist -30/50  d. R radial/ulnar dev 15/10  2. Increase strength (pounds);  a. R  strength to 22 pounds  b. R lateral pinch to 10 pounds  c. R 2 point pinch to 12 pounds  3. Increase function:UE Functional Index Score 40 or more points to promote increased functional abilities  4. Patient to be independent with home exercise program as demonstrated by performance with correct form without cues. Long Term Goals: (  12 Treatments)  1. Move digits in R hand independently with trace movement for promotion of future use for guitar  2. Demo R hand extension to half of [facility] for increased I with  on R side  3. Increase R UE Shoulder ROM to lift arm up for I with adl tasks at home. 50/45 R shoulder  4. Patient Goals: fingers to move, open my hand, lift my arm up, and throw a punch    Treatment Potential: Good Suggested Professional Referral: No  Domestic Concerns: No   Barriers to Goal Achievement: No    Comments/Assessment: Pt presents back to therapy after a lapse in time due to having contracted COVID 19 with lingering symptoms. Pt continues to demo decreased R UE controlled movement and increased tone of the elbow and MCP/PIP/DIPs. Pt wishes to be able to move fingers Era, open R hand, lift R shoulder and throw a punch if needed. Pt may benefit from Estim to promote R UE movement and or isolation of ECRB for wrist extension.      Home Program Initiated: Written, Verbal and Demo Comprehension of Education: Needs Review       Plans, Goals, Risks, Benefits, Discussed with and Patient    Treatment Plan:  Therapeutic Ex 48777, Neuro Muscular Adrian 96623, Manual 22295, Cold/Hot Pack, Ultrasound [id number] and Electrical Stim Attended [id number]    Treatment

## 2021-03-31 RX ORDER — LISINOPRIL 10 MG/1
TABLET ORAL
Qty: 90 TABLET | Refills: 0 | Status: SHIPPED | OUTPATIENT
Start: 2021-03-31 | End: 2021-04-05 | Stop reason: ALTCHOICE

## 2021-03-31 NOTE — TELEPHONE ENCOUNTER
Toy Garber is calling to request a refill on the following medication(s):    Medication Request:  Requested Prescriptions     Pending Prescriptions Disp Refills    lisinopril (PRINIVIL;ZESTRIL) 10 MG tablet [Pharmacy Med Name: LISINOPRIL 10 MG TABLET] 90 tablet 0     Sig: TAKE ONE TABLET BY MOUTH DAILY     Last filled 7/28/20 90 day 1 refill    Last Visit Date (If Applicable):  9/5/3666    Next Visit Date:    4/5/2021

## 2021-04-01 DIAGNOSIS — G81.91 RIGHT HEMIPLEGIA (HCC): ICD-10-CM

## 2021-04-01 RX ORDER — LISINOPRIL 10 MG/1
TABLET ORAL
Qty: 90 TABLET | Refills: 0 | OUTPATIENT
Start: 2021-04-01

## 2021-04-01 RX ORDER — POTASSIUM CHLORIDE 1500 MG/1
TABLET, FILM COATED, EXTENDED RELEASE ORAL
Qty: 30 TABLET | Refills: 0 | OUTPATIENT
Start: 2021-04-01

## 2021-04-01 NOTE — TELEPHONE ENCOUNTER
Marjorie Webber is calling to request a refill on the following medication(s):    Medication Request:  Requested Prescriptions     Pending Prescriptions Disp Refills    lisinopril (PRINIVIL;ZESTRIL) 10 MG tablet [Pharmacy Med Name: LISINOPRIL 10 MG TABLET] 90 tablet 0     Sig: TAKE ONE TABLET BY MOUTH DAILY    potassium chloride (KLOR-CON M) 20 MEQ TBCR extended release tablet [Pharmacy Med Name: POTASSIUM CHLORIDE ER 20 MEQ TABLET] 30 tablet 0     Sig: TAKE ONE TABLET BY MOUTH DAILY     Last filled 3/31/21 90 day no refill  Not due    Last Visit Date (If Applicable):  1/5/2121    Next Visit Date:    4/5/2021

## 2021-04-03 DIAGNOSIS — G81.91 RIGHT HEMIPLEGIA (HCC): ICD-10-CM

## 2021-04-05 ENCOUNTER — OFFICE VISIT (OUTPATIENT)
Dept: INTERNAL MEDICINE CLINIC | Age: 61
End: 2021-04-05
Payer: MEDICARE

## 2021-04-05 ENCOUNTER — HOSPITAL ENCOUNTER (OUTPATIENT)
Dept: OCCUPATIONAL THERAPY | Age: 61
Setting detail: THERAPIES SERIES
Discharge: HOME | End: 2021-04-05
Payer: MEDICARE

## 2021-04-05 ENCOUNTER — HOSPITAL ENCOUNTER (OUTPATIENT)
Dept: PHYSICAL THERAPY | Age: 61
Setting detail: THERAPIES SERIES
Discharge: HOME | End: 2021-04-05
Payer: MEDICARE

## 2021-04-05 VITALS
RESPIRATION RATE: 16 BRPM | WEIGHT: 184 LBS | OXYGEN SATURATION: 99 % | BODY MASS INDEX: 25.76 KG/M2 | HEIGHT: 71 IN | DIASTOLIC BLOOD PRESSURE: 80 MMHG | HEART RATE: 92 BPM | SYSTOLIC BLOOD PRESSURE: 128 MMHG | TEMPERATURE: 97.9 F

## 2021-04-05 DIAGNOSIS — F32.A ANXIETY AND DEPRESSION: ICD-10-CM

## 2021-04-05 DIAGNOSIS — I10 ESSENTIAL HYPERTENSION: ICD-10-CM

## 2021-04-05 DIAGNOSIS — Z86.16 HISTORY OF COVID-19: ICD-10-CM

## 2021-04-05 DIAGNOSIS — G81.91 RIGHT HEMIPLEGIA (HCC): ICD-10-CM

## 2021-04-05 DIAGNOSIS — F41.9 ANXIETY AND DEPRESSION: ICD-10-CM

## 2021-04-05 DIAGNOSIS — I63.00 CEREBROVASCULAR ACCIDENT (CVA) DUE TO THROMBOSIS OF PRECEREBRAL ARTERY (HCC): ICD-10-CM

## 2021-04-05 DIAGNOSIS — Z28.21 PNEUMOCOCCAL VACCINATION DECLINED: ICD-10-CM

## 2021-04-05 DIAGNOSIS — Z28.21 INFLUENZA VACCINATION DECLINED: ICD-10-CM

## 2021-04-05 DIAGNOSIS — E10.22 TYPE 1 DIABETES MELLITUS WITH STAGE 3A CHRONIC KIDNEY DISEASE (HCC): Primary | ICD-10-CM

## 2021-04-05 DIAGNOSIS — Z28.21 TETANUS, DIPHTHERIA, AND ACELLULAR PERTUSSIS (TDAP) VACCINATION DECLINED: ICD-10-CM

## 2021-04-05 DIAGNOSIS — N18.31 TYPE 1 DIABETES MELLITUS WITH STAGE 3A CHRONIC KIDNEY DISEASE (HCC): Primary | ICD-10-CM

## 2021-04-05 DIAGNOSIS — E78.2 MIXED HYPERLIPIDEMIA: ICD-10-CM

## 2021-04-05 PROCEDURE — 3017F COLORECTAL CA SCREEN DOC REV: CPT | Performed by: FAMILY MEDICINE

## 2021-04-05 PROCEDURE — G8427 DOCREV CUR MEDS BY ELIG CLIN: HCPCS | Performed by: FAMILY MEDICINE

## 2021-04-05 PROCEDURE — G8419 CALC BMI OUT NRM PARAM NOF/U: HCPCS | Performed by: FAMILY MEDICINE

## 2021-04-05 PROCEDURE — 2022F DILAT RTA XM EVC RTNOPTHY: CPT | Performed by: FAMILY MEDICINE

## 2021-04-05 PROCEDURE — 3044F HG A1C LEVEL LT 7.0%: CPT | Performed by: FAMILY MEDICINE

## 2021-04-05 PROCEDURE — 97140 MANUAL THERAPY 1/> REGIONS: CPT

## 2021-04-05 PROCEDURE — 97110 THERAPEUTIC EXERCISES: CPT

## 2021-04-05 PROCEDURE — 99214 OFFICE O/P EST MOD 30 MIN: CPT | Performed by: FAMILY MEDICINE

## 2021-04-05 PROCEDURE — 1036F TOBACCO NON-USER: CPT | Performed by: FAMILY MEDICINE

## 2021-04-05 RX ORDER — GLUCOSAMINE HCL/CHONDROITIN SU 500-400 MG
CAPSULE ORAL
Qty: 200 STRIP | Refills: 3 | Status: SHIPPED | OUTPATIENT
Start: 2021-04-05 | End: 2021-08-02 | Stop reason: SDUPTHER

## 2021-04-05 RX ORDER — POTASSIUM CHLORIDE 1500 MG/1
TABLET, FILM COATED, EXTENDED RELEASE ORAL
Qty: 30 TABLET | Refills: 0 | OUTPATIENT
Start: 2021-04-05

## 2021-04-05 RX ORDER — LISINOPRIL 10 MG/1
TABLET ORAL
Qty: 90 TABLET | Refills: 0 | OUTPATIENT
Start: 2021-04-05

## 2021-04-05 ASSESSMENT — ENCOUNTER SYMPTOMS
BACK PAIN: 1
ALLERGIC/IMMUNOLOGIC NEGATIVE: 1
RESPIRATORY NEGATIVE: 1
EYES NEGATIVE: 1
GASTROINTESTINAL NEGATIVE: 1

## 2021-04-05 NOTE — PROGRESS NOTES
Subjective:      Patient ID: Layo rOtiz is a 61 y.o. male. Diabetes  He presents for his follow-up diabetic visit. He has type 1 diabetes mellitus. His disease course has been fluctuating. Hypoglycemia symptoms include nervousness/anxiousness. There are no diabetic associated symptoms. There are no hypoglycemic complications. Symptoms are stable. Diabetic complications include a CVA. Risk factors for coronary artery disease include diabetes mellitus, dyslipidemia, male sex, sedentary lifestyle and hypertension. Current diabetic treatment includes insulin injections. He is compliant with treatment all of the time. His weight is stable. He is following a generally healthy diet. Meal planning includes ADA exchanges, avoidance of concentrated sweets, calorie counting and carbohydrate counting. He has had a previous visit with a dietitian. He participates in exercise three times a week. His home blood glucose trend is fluctuating minimally. Review of Systems   Constitutional: Negative. HENT: Negative. Eyes: Negative. Respiratory: Negative. Cardiovascular: Negative. Gastrointestinal: Negative. Endocrine: Negative. Musculoskeletal: Positive for arthralgias, back pain, gait problem and myalgias. Skin: Negative. Allergic/Immunologic: Negative. Hematological: Negative. Psychiatric/Behavioral: The patient is nervous/anxious. Past family and social history unremarkable. Diagnosis Orders   1. Type 1 diabetes mellitus with stage 3a chronic kidney disease (HCC)  POCT Fecal Immunochemical Test (FIT)   2. Essential hypertension     3. Mixed hyperlipidemia     4. Right hemiplegia ([address]     5. Cerebrovascular accident (CVA) due to thrombosis of precerebral artery ([address]     6. Anxiety and depression     7. Influenza vaccination declined     8. Pneumococcal vaccination declined     9. Tetanus, diphtheria, and acellular pertussis (Tdap) vaccination declined     10.  History of COVID-19 Objective:   Physical Exam  Vitals signs and nursing note reviewed. Constitutional:       Appearance: He is well-developed. HENT:      Head: Normocephalic and atraumatic. Right Ear: External ear normal.      Left Ear: External ear normal.      Nose: Nose normal.   Eyes:      Conjunctiva/sclera: Conjunctivae normal.      Pupils: Pupils are equal, round, and reactive to light. Neck:      Musculoskeletal: Normal range of motion and neck supple. Cardiovascular:      Rate and Rhythm: Normal rate and regular rhythm. Heart sounds: Normal heart sounds. Comments: Hypertension  Hyperlipidemia  Insulin-dependent diabetes mellitus  Pulmonary:      Effort: Pulmonary effort is normal.      Breath sounds: Normal breath sounds. Abdominal:      General: Bowel sounds are normal.      Palpations: Abdomen is soft. Musculoskeletal: Normal range of motion. Comments: Degenerative polyarthralgia   Skin:     General: Skin is warm and dry. Neurological:      Mental Status: He is alert and oriented to person, place, and time. Deep Tendon Reflexes: Reflexes are normal and symmetric. Comments: CVA with right hemiplegia   Psychiatric:      Comments: Anxiety/depression         Assessment:       Diagnosis Orders   1. Type 1 diabetes mellitus with stage 3a chronic kidney disease (HCC)  POCT Fecal Immunochemical Test (FIT)   2. Essential hypertension     3. Mixed hyperlipidemia     4. Right hemiplegia ([address]     5. Cerebrovascular accident (CVA) due to thrombosis of precerebral artery ([address]     6. Anxiety and depression     7. Influenza vaccination declined     8. Pneumococcal vaccination declined     9. Tetanus, diphtheria, and acellular pertussis (Tdap) vaccination declined     10. History of COVID-19             Plan:      27-year-old he does not voice any new distress, afebrile medically stable  History of CVA with right hemiplegia. He is established with neurology.   He is afebrile hemodynamically stable. He ambulates with a quad cane with accessibility to walker. Continue activity as tolerated with fall precaution  Hyperlipidemia on statin that he is tolerating well  Hypertension well-controlled on Norvasc and hydralazine that he is tolerating well. Consume less than 2 g of salt a day  History of diabetic nephropathy-resolved. Creatinine is 0.84 as of 9/2020  History of anxiety depression. He denies suicidality. He is comfortable with the care provided by his wife  He is scheduled for COVID-19 vaccination  He denies ongoing history of tobacco, excessive alcohol or illicit drug use  Med list and available labs reviewed, discussed with patient, questions answered  Recent past history of COVID-19 that resolved uneventfully  Med list reviewed advised to continue  He is encouraged to call for any concern  This note is created with a voice recognition program and while intend to generate a document that accurately reflects the content of the visit, no guarantee can be provided that every mistake has been identified and corrected by editing.           Carlton Sharma MD

## 2021-04-05 NOTE — TELEPHONE ENCOUNTER
Marissa Bates is calling to request a refill on the following medication(s):    Medication Request:  Requested Prescriptions     Pending Prescriptions Disp Refills    potassium chloride (KLOR-CON M) 20 MEQ TBCR extended release tablet [Pharmacy Med Name: POTASSIUM CHLORIDE ER 20 MEQ TABLET] 30 tablet 0     Sig: TAKE ONE TABLET BY MOUTH DAILY    lisinopril (PRINIVIL;ZESTRIL) 10 MG tablet [Pharmacy Med Name: LISINOPRIL 10 MG TABLET] 90 tablet 0     Sig: TAKE ONE TABLET BY MOUTH DAILY     Last filled 3/18/21 90 day 1 refill  Not due    Last Visit Date (If Applicable):  0/5/5613    Next Visit Date:    4/5/2021

## 2021-04-05 NOTE — FLOWSHEET NOTE
maintain neutral foot position         Other: Time spent in assisting pt donning AFO on RLE into shoe - 4 min, billed with therex      Treatment Charges: Mins Units   []  Modalities     [x]  Ther Exercise 54 4   []  Manual Therapy     []  Ther Activities     []  Aquatics     []  Vasocompression     []  Other     Total Treatment time 54 4       Assessment: [x] Progressing toward goals. Improving hip flexor/hamstring strength as well as motor control noted for foot placement for step up/downs as compared to last PT sessions in September 2020. No AAROM knee flexion needed this date, as compared to last PT POC as well, which is improvement. Still with significantly poor weightshifting ability to the right - nearly always offsets weight to the left unless cued, appears to be d/t lack of knee strength/stability in SLS standing on RLE. Worked this in session, and focused on knee strength/control in weightbearing/functional positions for RLE throughout session. CGA-min assist with all exercises for maintaining proper knee control both concentrically and eccentrically. [] No change. [x] Other: 3-4 min seated rest breaks needed after every set of strengthening; significant fatigue continues. [x] Patient would continue to benefit from skilled physical therapy services in order to: progress RLE strength, address gait deviations, improve standing static and dynamic balance impairment, improve safety and independence with functional mobility both at home and in community. STG: (to be met in 10 treatments)  1. ? ROM: Pt will demo no excessive ER of RLE in standing at rest or sitting at rest to indicate reduced tightness in external rotators and normalize posture  2. ? Balance:   a. Pt will be able to reach outside MED towards R both up and down without balance deficit  b.  Pt will be able to maintain RLE SLS for 30s with min UE assist and good neuromuscular control noted  3. ? Strength:  a. 2/5 R knee ext strength to given.     Plan: [x] Continue current frequency toward long and short term goals.     [x] Specific Instructions for subsequent treatments: progress RLE weightshifting tasks and R knee strength      Time In: 3:00 pm            Time Out: 4:00 pm    Electronically signed by:  Nic Szymanski PT

## 2021-04-05 NOTE — FLOWSHEET NOTE
[x] [facility]       Occupational Therapy            1st floor       [address], [place]         Phone: [phone]       Fax: [phone] [] [facility]  [address], [place]  Phone: [phone]  Fax: [phone]      Occupational Therapy Daily Treatment Note    Date:  2021  Patient Name:  Minor Oar    :  1960  MRN: [id number]  Visit# / total visits: ; Progress note for Medicare patient due at visit 8    Patient: Minor Oar                      : 1960                      MRN: [id number]  Referring Provider:  Bright Wilhelm Ra  Insurance: [place] [employer]   visits left  Medical Diagnosis: History of stroke Z86.73             Rehab Codes: stiffness in shoulder M25.61,, stiffness in elbow M25.62,, stiffness in hand M25.64,, stiffness in wrist M25.63,, lack of coordination R27.8,, fine motor skills loss R29.818, or muscle weakness generalized M62.81,  Onset Date:    3-          Next Dr. Makeda Scott: 21  Cancels/No Shows: 0/0    Subjective:    Pain:  [] Yes  [x] No Location: shoulder with range Pain Rating: (0-10 scale) 0/10 at rest  Pain altered Tx:  [] No  [] Yes  Action:  Pt Comments:\"I am going to try anything\"    Objective:  Modalities:  Precautions:  Exercises:  EXERCISE    REPS/     TIME  WEIGHT/    LEVEL COMMENTS   PROM  Shoulder internal/external  Flex/ext  Elbow flex/ext  Wrist flex/ext  Digit extension 25 mins     Wooden pegs with R hand (place in)                                    Other: Plan to add in ESTIM to promote glenohumeral movement and extensor tendon movement/strengthening. Treatment Charges: Mins Units   []  Modalities:      []  Ultrasound     [x]  Ther Exercise 23 2   [x]  Manual Therapy 25 2   []  Ther Activities     []  Orthotic fit/train     []  Orthotic recheck     []  Other     Total Treatment time 48 4       Assessment: [x] Progressing toward goals.     [] No change. [] Other     [x] Patient would benefit from skilled occupational therapy services in order to: Improve  Rehab Potential, Motor control/Tone in UE, ROM, Strength and Coordination deficit in order to ensure good follow through and decrease pain in UE for safe completion of ADLs     Short Term Goals: ( 6  Treatments)  1. Increase AROM (degrees) (extension/flexion)  a. R shoulder to 60/40  b. R elbow -20/130  c. R wrist -30/50  d. R radial/ulnar dev 15/10  2. Increase strength (pounds);  a. R  strength to 22 pounds  b. R lateral pinch to 10 pounds  c. R 2 point pinch to 12 pounds  3. Increase function:UE Functional Index Score 40 or more points to promote increased functional abilities  4. Patient to be independent with home exercise program as demonstrated by performance with correct form without cues.     Long Term Goals: (  12 Treatments)  1. Move digits in R hand independently with trace movement for promotion of future use for guitar  2. Demo R hand extension to half of [facility] for increased I with  on R side  3. Increase R UE Shoulder ROM to lift arm up for I with adl tasks at home. 50/45 R shoulder    Pt. Education:  [x] Yes  [] No  [] Reviewed Prior HEP/Ed  Method of Education: [x] Verbal  [] Demo  [] Written  Re:  Comprehension of Education:  [x] Verbalizes understanding. [] Demonstrates understanding. [] Needs review. [x] Demonstrates/verbalizes HEP/Ed previously given. Plan: [] Continue current frequency toward short and long term goals.   [x] Specific Instructions for subsequent treatments: Plan to add in ESTIM to promote strength   [] Other:    Time In: 8710-3382      Electronically signed by:  MODESTO Bartholomew/JANNET

## 2021-04-07 ENCOUNTER — HOSPITAL ENCOUNTER (OUTPATIENT)
Dept: PHYSICAL THERAPY | Age: 61
Setting detail: THERAPIES SERIES
Discharge: HOME | End: 2021-04-07
Payer: MEDICARE

## 2021-04-07 ENCOUNTER — HOSPITAL ENCOUNTER (OUTPATIENT)
Dept: OCCUPATIONAL THERAPY | Age: 61
Setting detail: THERAPIES SERIES
Discharge: HOME | End: 2021-04-07
Payer: MEDICARE

## 2021-04-07 PROCEDURE — 97140 MANUAL THERAPY 1/> REGIONS: CPT

## 2021-04-07 PROCEDURE — 97110 THERAPEUTIC EXERCISES: CPT

## 2021-04-07 PROCEDURE — 97032 APPL MODALITY 1+ESTIM EA 15: CPT

## 2021-04-07 NOTE — FLOWSHEET NOTE
[x] [facility]       Occupational Therapy            1st floor       [address], [place]         Phone: [phone]       Fax: [phone] [] [facility]  [address], [place]  Phone: [phone]  Fax: [phone]      Occupational Therapy Daily Treatment Note    Date:  2021  Patient Name:  Vandana Schaffer    :  1960  MRN: [id number]  Visit# / total visits: 3/16; Progress note for Medicare patient due at visit 8    Patient: Vandana Schaffer                      : 1960                      MRN: [id number]  Referring Provider:  Leeroy Sosa  Insurance: [place] [employer]   visits left  Medical Diagnosis: History of stroke Z86.73             Rehab Codes: stiffness in shoulder M25.61,, stiffness in elbow M25.62,, stiffness in hand M25.64,, stiffness in wrist M25.63,, lack of coordination R27.8,, fine motor skills loss R29.818, or muscle weakness generalized M62.81,  Onset Date:    3-          Next Dr. Cornell Distance: 21  Cancels/No Shows: 0/0     Subjective:    Pain:  [] Yes  [x] No Location: shoulder with range Pain Rating: (0-10 scale) 0/10 at rest  Pain altered Tx:  [x] No  [] Yes  Action:  Pt Comments: Pt with no new complaints upon arrival. States wearing wrist brace during the night and R hand will be more loose in the morning, and throughout the day. Objective:  Modalities:  Modality Flow Sheet:  START STOP Tx Modality   2021  Electrical Stim: [place] cycle  1.  Shoulder subluxation:  Time: 15 minutes   Cycle: 10/20  Patch location: anterior and posterior deltoid, middle deltoid and supraspinatus   Up to ~64Hz intensity         Ultrasound: ___ W/cm2 x ___ mins  Duty factor: __100%  __50%  __33% __20%  Head size:    ___ cm  MHz: __1mHz  __3mHz  Location:     Hot Pack:     Cold Pack:           Precautions: NA  Exercises:  EXERCISE    REPS/     TIME  WEIGHT/    LEVEL COMMENTS   PROM  Shoulder function:UE Functional Index Score 40 or more points to promote increased functional abilities  4. Patient to be independent with home exercise program as demonstrated by performance with correct form without cues.     Long Term Goals: (  12 Treatments)  1. Move digits in R hand independently with trace movement for promotion of future use for guitar  2. Demo R hand extension to half of [facility] for increased I with  on R side  3. Increase R UE Shoulder ROM to lift arm up for I with adl tasks at home. 50/45 R shoulder    Pt. Education:  [x] Yes  [] No  [] Reviewed Prior HEP/Ed  Method of Education: [x] Verbal  [] Demo  [] Written  Re: e-stim treatment   Comprehension of Education:  [x] Verbalizes understanding. [] Demonstrates understanding. [] Needs review. [] Demonstrates/verbalizes HEP/Ed previously given. Plan: [x] Continue current frequency toward short and long term goals.   [x] Specific Instructions for subsequent treatments: estim for wrist, digit, and thumb extension    [] Other:    Time In: 4498-4797      Electronically signed by:  MODESTO High/JANNET

## 2021-04-07 NOTE — FLOWSHEET NOTE
[x] [facility]) [facility]  [facility]  [address]  [phone]  F: [phone] [] [address]  [facility]   [address]  P: [phone]  F: [phone] [] [address] &  Therapy  [address]  P: [phone]  F: [phone] [] [address]  P: [phone]  F: [phone] [] [address]  [facility]   [address]   [place]: [phone]  F: [phone]      Physical Therapy Daily Treatment Note    Date:  2021  Patient Name:  Rigoberto Guo    :  1960  MRN: [id number]  Physician: Dr. Medina Guess: [place] [employer] (24GE)  Medical Diagnosis:   History of stroke  Rehab Codes: R53.1, R29.3, R27.9, R26.9, M25.60  Next 's appt.: 21  Date of symptom onset: 19  Visit# / total visits: 3/20    Cancels/No Shows: 0    Subjective:    Pain:  [] Yes  [x] No Location:  N/A Pain Rating: (0-10 scale) 0/10  Pain altered Tx:  [x] No  [] Yes  Action:  Comments: Pt reports he got a knee sleeve brace, copper, for LLE as it's been continuing to work more, slightly achy.     Objective:  Modalities:   Precautions:  Exercises:  Exercise Reps/ Time Weight/ Level Comments   Nu-step 6 min L6 BUE used         Step ups 2x15  4\" BUE assist   Lateral weight shifts w/ TKE 2x15, 3\" blueberry    Hamstring curl 2x12 RLE Blocking hip flexor compensation   Hip ext/abd      Fwd heel tap x15 2\", // bars BUE assist               Box squat, ball reach out at top of stand 2x15 24\" Fwd tap, ball to wall  - second set with 2\" block under LLE         Leg press x20  x15 60#                Gait 50 ft x1  Cues to maintain neutral foot position         Other:         Treatment Charges: Mins Units   []  Modalities [x]  Ther Exercise 51 4   []  Manual Therapy     []  Ther Activities     []  Aquatics     []  Vasocompression     [x]  Other: gait 4 --   Total Treatment time 55 4       Assessment: [x] Progressing toward goals. Continues to demo improving quad activation in both step ups concentrically and with lateral TKE weightshifts, able to progress to resistance with TKE - min VCs to \"keep knee over ankle\" for avoiding hyperextension with quad activation. Improving ability for pt to predict quad activation with all exercises as well - activation felt prior to weightshifts for exercises. Able to increase sit to stand challenge by putting 2\" step under L foot, biasing RLE. Pt with significant fatigue after this exercise, min A to increase RLE weightbearing during reps. [] No change. [x] Other: 3-4 min seated rest breaks needed after every set of strengthening; significant fatigue continues. [x] Patient would continue to benefit from skilled physical therapy services in order to: progress RLE strength, address gait deviations, improve standing static and dynamic balance impairment, improve safety and independence with functional mobility both at home and in community. STG: (to be met in 10 treatments)  1. ? ROM: Pt will demo no excessive ER of RLE in standing at rest or sitting at rest to indicate reduced tightness in external rotators and normalize posture  2. ? Balance:   a. Pt will be able to reach outside MED towards R both up and down without balance deficit  b. Pt will be able to maintain RLE SLS for 30s with min UE assist and good neuromuscular control noted  3. ? Strength:  a. 2/5 R knee ext strength to increase R knee control in SLS  b. 4/5 R hip ext/abd, 4+/5 R hamstrings to improve swing and stance phase with gait  4. ? Function:  a.  Pt will be able to stand from 24 in chair 10x without UE assist and equal weightbearing with no more than minimal cuing from therapist.  b. Pt will be able to ascend/descend stairs using reciprocal ascent/descent with BUE assist and fair speed, fair R knee eccentric control  c. Pt will be able to ambulate 150 ft with improving L trunk lean, no more than 50% incidence of R knee hyperextension throughout, and at .4m/s  5. Patient to be independent with home exercise program as demonstrated by performance with correct form without cues. 6. Demonstrate Knowledge of fall prevention     LTG: (to be met in 20 treatments)  7. ? Balance:   a. Pt will be able to complete 360 deg turn with near equal weightbearing, albeit slow  b. Pt will score at least 41/56 on Mccurdy to indicate significant change in static/dynamic balance abilities since initial evaluation. 8. ? Strength:  a. 2+/5 R knee ext strength to increase R knee control in SLS  b. 4+/5 R hip ext/abd, 5-/5 R hamstrings to improve swing and stance phase with gait  9. ? Function:  a. Pt will be able to stand from 20 in chair 10x without UE assist and equal weightbearing with no more than minimal cuing from therapist.  b. Pt will be able to ascend/descend stairs using reciprocal ascent/descent with unilat UE assist and fair speed, fair R knee eccentric control  c. Pt will be able to ambulate 300 ft with improving L trunk lean, no more than 25% incidence of R knee hyperextension throughout, and at .5m/s           Patient goals: \"strengthen what we can, improve walking\"    Pt. Education:  [x] Yes  [] No  [x] Reviewed Prior HEP/Ed  Method of Education: [x] Verbal  [] Demo  [] Written  Comprehension of Education:  [x] Verbalizes understanding. [x] Demonstrates understanding. [] Needs review. [] Demonstrates/verbalizes HEP/Ed previously given. Current HEP: resisted TKEs w/ blueberry band, squats, standing ham curls and marches     Plan: [x] Continue current frequency toward long and short term goals.     [x] Specific Instructions for subsequent treatments: progress RLE weightshifting tasks and R knee strength      Time In: 3:03 pm Time Out: 4:04 pm    Electronically signed by:  Nic Szymanski PT

## 2021-04-12 ENCOUNTER — HOSPITAL ENCOUNTER (OUTPATIENT)
Dept: PHYSICAL THERAPY | Age: 61
Setting detail: THERAPIES SERIES
Discharge: HOME | End: 2021-04-12
Payer: MEDICARE

## 2021-04-12 ENCOUNTER — HOSPITAL ENCOUNTER (OUTPATIENT)
Dept: OCCUPATIONAL THERAPY | Age: 61
Setting detail: THERAPIES SERIES
Discharge: HOME | End: 2021-04-12
Payer: MEDICARE

## 2021-04-12 PROCEDURE — 97035 APP MDLTY 1+ULTRASOUND EA 15: CPT

## 2021-04-12 PROCEDURE — 97110 THERAPEUTIC EXERCISES: CPT

## 2021-04-12 PROCEDURE — 97140 MANUAL THERAPY 1/> REGIONS: CPT

## 2021-04-12 NOTE — FLOWSHEET NOTE
under LLE         Leg press  60#                Gait   Cues to maintain neutral foot position         Other:         Treatment Charges: Mins Units   []  Modalities     [x]  Ther Exercise 50 3   []  Manual Therapy     []  Ther Activities     []  Aquatics     []  Vasocompression     []  Other: gait     Total Treatment time 50 3       Assessment: [x] Progressing toward goals. Less time for treatment d/t pt late this date. Pt with less assist needed for sit<>stands on 24\" box, even with RLE biased. Continues to demo improving strength in weightbearing TKE, though does rapidly fatigue. Progressed to SLS holds with BUE assist, much greater difficulty but does get fair quad activation in mid range (not full extension), and poor single limb neuromuscular control here. [] No change. [x] Other: 2-3 min seated rest breaks needed after every set of strengthening; moderate fatigue continues. [x] Patient would continue to benefit from skilled physical therapy services in order to: progress RLE strength, address gait deviations, improve standing static and dynamic balance impairment, improve safety and independence with functional mobility both at home and in community. STG: (to be met in 10 treatments)  1. ? ROM: Pt will demo no excessive ER of RLE in standing at rest or sitting at rest to indicate reduced tightness in external rotators and normalize posture  2. ? Balance:   a. Pt will be able to reach outside MED towards R both up and down without balance deficit  b. Pt will be able to maintain RLE SLS for 30s with min UE assist and good neuromuscular control noted  3. ? Strength:  a. 2/5 R knee ext strength to increase R knee control in SLS  b. 4/5 R hip ext/abd, 4+/5 R hamstrings to improve swing and stance phase with gait  4. ? Function:  a.  Pt will be able to stand from 24 in chair 10x without UE assist and equal weightbearing with no more than minimal cuing from therapist.  b. Pt will be able to ascend/descend stairs using reciprocal ascent/descent with BUE assist and fair speed, fair R knee eccentric control  c. Pt will be able to ambulate 150 ft with improving L trunk lean, no more than 50% incidence of R knee hyperextension throughout, and at .4m/s  5. Patient to be independent with home exercise program as demonstrated by performance with correct form without cues. 6. Demonstrate Knowledge of fall prevention     LTG: (to be met in 20 treatments)  7. ? Balance:   a. Pt will be able to complete 360 deg turn with near equal weightbearing, albeit slow  b. Pt will score at least 41/56 on Mccurdy to indicate significant change in static/dynamic balance abilities since initial evaluation. 8. ? Strength:  a. 2+/5 R knee ext strength to increase R knee control in SLS  b. 4+/5 R hip ext/abd, 5-/5 R hamstrings to improve swing and stance phase with gait  9. ? Function:  a. Pt will be able to stand from 20 in chair 10x without UE assist and equal weightbearing with no more than minimal cuing from therapist.  b. Pt will be able to ascend/descend stairs using reciprocal ascent/descent with unilat UE assist and fair speed, fair R knee eccentric control  c. Pt will be able to ambulate 300 ft with improving L trunk lean, no more than 25% incidence of R knee hyperextension throughout, and at .5m/s           Patient goals: \"strengthen what we can, improve walking\"    Pt. Education:  [x] Yes  [] No  [x] Reviewed Prior HEP/Ed  Method of Education: [x] Verbal  [] Demo  [] Written  Comprehension of Education:  [x] Verbalizes understanding. [x] Demonstrates understanding. [] Needs review. [] Demonstrates/verbalizes HEP/Ed previously given. Current HEP: resisted TKEs w/ blueberry band, squats, standing ham curls and marches     Plan: [x] Continue current frequency toward long and short term goals.     [x] Specific Instructions for subsequent treatments: progress RLE weightshifting tasks and R knee strength      Time In: 10:10 am

## 2021-04-12 NOTE — FLOWSHEET NOTE
[x] [facility]       Occupational Therapy            1st floor       [address], [place]         Phone: [phone]       Fax: [phone] [] [facility]  [address] [place] [address]  Phone: [phone]  Fax: [phone]      Occupational Therapy Daily Treatment Note    Date:  2021  Patient Name:  Charanjit Astudillo YOB: 1960  MRN: [id number]  Visit# / total visits: ; Progress note for Medicare patient due at visit 8    Patient: Charanjit Astudillo YOB: 1960                      MRN: [id number]  Referring Provider:  Yanet Ashford  Insurance: [place] [employer]   visits left  Medical Diagnosis: History of stroke Z86.73             Rehab Codes: stiffness in shoulder M25.61,, stiffness in elbow M25.62,, stiffness in hand M25.64,, stiffness in wrist M25.63,, lack of coordination R27.8,, fine motor skills loss R29.818, or muscle weakness generalized M62.81,  Onset Date:    3-          Next Dr. Melly Asif: 21  Cancels/No Shows: 0/0    Subjective:    Pain:  [] Yes  [x] No Location: shoulder with range Pain Rating: (0-10 scale) 0/10 at rest  Pain altered Tx:  [x] No  [] Yes  Action:  Pt Comments:  \"I got the Covid 19 shot and it knocked me out this weekend. \"   Objective:  Modalities:  Modality Flow Sheet:  START STOP Tx Modality   2021  Electrical Stim: [place] cycle  1.  Shoulder subluxation:  Time: 15 minutes   Cycle: 10/20  Patch location: anterior and posterior deltoid, middle deltoid and supraspinatus   Up to ~64Hz intensity         Ultrasound: ___ W/cm2 x ___ mins  Duty factor: __100%  __50%  __33% __20%  Head size:    ___ cm  MHz: __1mHz  __3mHz  Location:     Hot Pack:     Cold Pack:     Precautions: NA  Exercises:  EXERCISE    REPS/     TIME  WEIGHT/    LEVEL COMMENTS   PROM  Shoulder internal/external  Flex/ext  Elbow flex/ext  Wrist flex/ext  Digit extension 30 mins  Completed    Wooden pegs with R hand (place in)   completed                                    Other: Completed E Stim with \"no pain\" this date. See parameters above. Pt demo high frustration this date with wooden pegs with R hand. Treatment Charges: Mins Units   [x]  Modalities:   E-stim   15   1   []  Ultrasound     [x]  Ther Exercise 15 1   [x]  Manual Therapy 30 2   []  Ther Activities     []  Orthotic fit/train     []  Orthotic recheck     []  Other     Total Treatment time 60 4       Assessment: [x] Progressing toward goals. Pt with active tenodesis response, no noted ability to actively extend or flex digits. Demo trace movements when attempting digit extension with wrist in flexed position. Added electrical stimulation for reduction of R shoulder subluxation and to promote glenohumeral movement patterns. [] No change. [] Other     [x] Patient would benefit from skilled occupational therapy services in order to: Improve  Rehab Potential, Motor control/Tone in UE, ROM, Strength and Coordination deficit in order to ensure good follow through and decrease pain in UE for safe completion of ADLs     Short Term Goals: ( 6  Treatments)  1. Increase AROM (degrees) (extension/flexion)  a. R shoulder to 60/40  b. R elbow -20/130  c. R wrist -30/50  d. R radial/ulnar dev 15/10  2. Increase strength (pounds);  a. R  strength to 22 pounds  b. R lateral pinch to 10 pounds  c. R 2 point pinch to 12 pounds  3. Increase function:UE Functional Index Score 40 or more points to promote increased functional abilities  4. Patient to be independent with home exercise program as demonstrated by performance with correct form without cues.     Long Term Goals: (  12 Treatments)  1. Move digits in R hand independently with trace movement for promotion of future use for guitar  2. Demo R hand extension to half of [facility] for increased I with  on R side  3. Increase R UE Shoulder ROM to lift arm up for I with adl tasks at home.  50/45 R shoulder    Pt. Education:  [x] Yes  [] No  [] Reviewed Prior HEP/Ed  Method of Education: [x] Verbal  [] Demo  [] Written  Re: e-stim treatment   Comprehension of Education:  [x] Verbalizes understanding. [] Demonstrates understanding. [] Needs review. [] Demonstrates/verbalizes HEP/Ed previously given. Plan: [x] Continue current frequency toward short and long term goals.   [x] Specific Instructions for subsequent treatments: estim for wrist, digit, and thumb extension    [] Other:    Time In: 8272-7378     Electronically signed by:  MODESTO Salas/JANNET

## 2021-04-12 NOTE — FLOWSHEET NOTE
[facility] Fall Risk Assessment    Patient Name:  Martha Palm  : 1960        Risk Factor Scale  Score   History of Falls [x] Yes  [] No 25  0 25   Secondary Diagnosis [x] Yes  [] No 15  0 15   Ambulatory Aid [] Furniture  [x] Crutches/cane/walker  [] None/bedrest/wheelchair/nurse 30  15  0 15   IV/Heparin Lock [] Yes  [x] No 20  0 0   Gait/Transferring [x] Impaired  [] Weak  [] Normal/bedrest/immobile 20  10  0 20   Mental Status [] Forgets limitations  [x] Oriented to own ability 15  0 0      Total: 75     Based on the Assessment score: check the appropriate box.     []  No intervention needed   Low =   Score of 0-24    []  Use standard prevention interventions Moderate =  Score of 24-44   [] Give patient handout and discuss fall prevention strategies   [] Establish goal of education for patient/family RE: fall prevention strategies    [x]  Use high risk prevention interventions High = Score of 45 and higher   [x] Give patient handout and discuss fall prevention strategies   [x] Establish goal of education for patient/family Re: fall prevention strategies   [x] Discuss lifeline / other resources    Electronically signed by:   Regena Duverney, PT  Date: 2021

## 2021-04-16 ENCOUNTER — HOSPITAL ENCOUNTER (OUTPATIENT)
Dept: OCCUPATIONAL THERAPY | Age: 61
Setting detail: THERAPIES SERIES
Discharge: HOME | End: 2021-04-16
Payer: MEDICARE

## 2021-04-16 ENCOUNTER — HOSPITAL ENCOUNTER (OUTPATIENT)
Dept: PHYSICAL THERAPY | Age: 61
Setting detail: THERAPIES SERIES
Discharge: HOME | End: 2021-04-16
Payer: MEDICARE

## 2021-04-16 PROCEDURE — 97110 THERAPEUTIC EXERCISES: CPT

## 2021-04-16 PROCEDURE — 97140 MANUAL THERAPY 1/> REGIONS: CPT

## 2021-04-16 PROCEDURE — 97035 APP MDLTY 1+ULTRASOUND EA 15: CPT

## 2021-04-16 NOTE — FLOWSHEET NOTE
[x] Brayan Salvatore       Occupational Therapy            1st floor       [address], [place]         Phone: [phone]       Fax: [phone] [] [facility]  [address] [place] [address]  Phone: [phone]  Fax: [phone]      Occupational Therapy Daily Treatment Note    Date:  2021  Patient Name:  Gladys Hodgkin    :  1960  MRN: [id number]  Visit# / total visits: ; Progress note for Medicare patient due at visit 8    Patient: Gladys Hodgkin                      : 1960                      MRN: [id number]  Referring Provider:  Julia Overton  Insurance: [place] [employer]   visits left  Medical Diagnosis: History of stroke Z86.73             Rehab Codes: stiffness in shoulder M25.61,, stiffness in elbow M25.62,, stiffness in hand M25.64,, stiffness in wrist M25.63,, lack of coordination R27.8,, fine motor skills loss R29.818, or muscle weakness generalized M62.81,  Onset Date:    3-          Next Dr. April Romero: 21  Cancels/No Shows: 0/0    Subjective:    Pain:  [] Yes  [x] No Location: shoulder with range Pain Rating: (0-10 scale) 0/10 at rest  Pain altered Tx:  [x] No  [] Yes  Action:  Pt Comments:  \"I woke up stiff today. \"   Objective:  Modalities:  Modality Flow Sheet:  START STOP Tx Modality   2021  Electrical Stim: [place] cycle  1.  Shoulder subluxation:  Time: 15 minutes   Cycle: 10/20  Patch location: anterior and posterior deltoid, middle deltoid and supraspinatus   Up to ~64Hz intensity         Ultrasound: ___ W/cm2 x ___ mins  Duty factor: __100%  __50%  __33% __20%  Head size:    ___ cm  MHz: __1mHz  __3mHz  Location:     Hot Pack:     Cold Pack:     Precautions: NA  Exercises:  EXERCISE    REPS/     TIME  WEIGHT/    LEVEL COMMENTS   PROM  Shoulder internal/external  Flex/ext  Elbow flex/ext  Wrist flex/ext  Digit extension 30 mins  Completed    Wooden pegs with R hand (place in)   Not completed [] Reviewed Prior HEP/Ed  Method of Education: [x] Verbal  [] Demo  [] Written  Re: e-stim treatment   Comprehension of Education:  [x] Verbalizes understanding. [] Demonstrates understanding. [] Needs review. [] Demonstrates/verbalizes HEP/Ed previously given. Plan: [x] Continue current frequency toward short and long term goals.   [x] Specific Instructions for subsequent treatments: estim for wrist, digit, and thumb extension    [] Other:    Time In: 6670-2335     Electronically signed by:  MODESTO Bartholomew/JANNET

## 2021-04-16 NOTE — FLOWSHEET NOTE
[x] [facility]) - [facility]  [address]  [phone]  F: [phone] [] [address]  [facility]   [address]  P: [phone]  F: [phone] [] [address] &  Therapy  [address]  P: [phone]  F: [phone] [] [address]  P: [phone]  F: [phone] [] [address]  [facility]   [address]   [place]: [phone]  F: [phone]      Physical Therapy Daily Treatment Note    Date:  2021  Patient Name:  Jasmeet Rios    :  1960  MRN: [id number]  Physician: Dr. Thelma Spencer: [place] [employer] (90)  Medical Diagnosis:   History of stroke  Rehab Codes: R53.1, R29.3, R27.9, R26.9, M25.60  Next 's appt.: 21  Date of symptom onset: 19  Visit# / total visits:     Cancels/No Shows: 0    Subjective:     Pain:  [] Yes  [x] No Location:  N/A Pain Rating: (0-10 scale) 0/10  Pain altered Tx:  [x] No  [] Yes  Action:  Comments: Pt reports he was walking at [address] with cane, trying to make sure RLE doesn't hyperextend - \"did okay\", hard to control once it starts happening and trying to fix it, per pt.     Objective:  Modalities:   Precautions:  Exercises:  Exercise Reps/ Time Weight/ Level Comments   Nu-step 6 min L7 RUE  device used         Step ups 2x15  4\" BUE assist   Lateral weight shifts w/ TKE 2x15, 3\" blueberry HELD d/t time constraints on    SLS holds w/ TKE 10x  BUE assist   Hamstring curl  RLE Blocking hip flexor compensation   Hip ext/abd      Fwd heel tap  2\", // bars BUE assist               Box squat, ball reach out at top of stand 15x  2x10 24\"  22\" Block under L foot for 24\" set  No block for 22\" set (box at 20, foam pad) Bilateral ER w/ scap retract 2x15 purple In between rest breaks   Squat tap w/ ball to step            Leg press  60#                Gait   Cues to maintain neutral foot position         Other:         Treatment Charges: Mins Units   []  Modalities     [x]  Ther Exercise 58 4   []  Manual Therapy     []  Ther Activities     []  Aquatics     []  Vasocompression     []  Other: gait     Total Treatment time 58 4       Assessment: [x] Progressing toward goals. Able to reduce seat height this date without UE assist needed for sit>stand, does need unilat UE assist for controlling stand>sit at that height. CGA throughout to encourage max RLE weightshifting. Added in eccentric control squatting at lower depths to address this, too fatigued to complete at end of session and states \"feel like I'm gonna fall down\". Held this progression and allowed adequate rest breaks, which continue to be most replenishing at 3-4 min vs 2 min break. [] No change. [x] Other: 3-4 min seated rest breaks needed after every set of strengthening; significant fatigue continues. [x] Patient would continue to benefit from skilled physical therapy services in order to: progress RLE strength, address gait deviations, improve standing static and dynamic balance impairment, improve safety and independence with functional mobility both at home and in community. STG: (to be met in 10 treatments)  1. ? ROM: Pt will demo no excessive ER of RLE in standing at rest or sitting at rest to indicate reduced tightness in external rotators and normalize posture  2. ? Balance:   a. Pt will be able to reach outside MED towards R both up and down without balance deficit  b. Pt will be able to maintain RLE SLS for 30s with min UE assist and good neuromuscular control noted  3. ? Strength:  a. 2/5 R knee ext strength to increase R knee control in SLS  b. 4/5 R hip ext/abd, 4+/5 R hamstrings to improve swing and stance phase with gait  4. ? Function:  a.  Pt will be able to stand from 24 in chair 10x without UE assist and equal weightbearing with no more than minimal cuing from therapist.  b. Pt will be able to ascend/descend stairs using reciprocal ascent/descent with BUE assist and fair speed, fair R knee eccentric control  c. Pt will be able to ambulate 150 ft with improving L trunk lean, no more than 50% incidence of R knee hyperextension throughout, and at .4m/s  5. Patient to be independent with home exercise program as demonstrated by performance with correct form without cues. 6. Demonstrate Knowledge of fall prevention     LTG: (to be met in 20 treatments)  7. ? Balance:   a. Pt will be able to complete 360 deg turn with near equal weightbearing, albeit slow  b. Pt will score at least 41/56 on Mccurdy to indicate significant change in static/dynamic balance abilities since initial evaluation. 8. ? Strength:  a. 2+/5 R knee ext strength to increase R knee control in SLS  b. 4+/5 R hip ext/abd, 5-/5 R hamstrings to improve swing and stance phase with gait  9. ? Function:  a. Pt will be able to stand from 20 in chair 10x without UE assist and equal weightbearing with no more than minimal cuing from therapist.  b. Pt will be able to ascend/descend stairs using reciprocal ascent/descent with unilat UE assist and fair speed, fair R knee eccentric control  c. Pt will be able to ambulate 300 ft with improving L trunk lean, no more than 25% incidence of R knee hyperextension throughout, and at .5m/s           Patient goals: \"strengthen what we can, improve walking\"    Pt. Education:  [x] Yes  [] No  [x] Reviewed Prior HEP/Ed  Method of Education: [x] Verbal  [] Demo  [] Written  Comprehension of Education:  [x] Verbalizes understanding. [x] Demonstrates understanding. [] Needs review. [] Demonstrates/verbalizes HEP/Ed previously given.     Current HEP: resisted TKEs w/ blueberry band, squats, standing ham curls and marches     Plan: [x] Continue current frequency toward long and short term goals.     [x] Specific Instructions for subsequent treatments: progress RLE weightshifting tasks and R knee strength      Time In: 10:02 am          Time Out: 11:00 am    Electronically signed by:  Beverly Mathis PT

## 2021-04-19 ENCOUNTER — HOSPITAL ENCOUNTER (OUTPATIENT)
Dept: PHYSICAL THERAPY | Age: 61
Setting detail: THERAPIES SERIES
Discharge: HOME | End: 2021-04-19
Payer: MEDICARE

## 2021-04-19 ENCOUNTER — HOSPITAL ENCOUNTER (OUTPATIENT)
Dept: OCCUPATIONAL THERAPY | Age: 61
Setting detail: THERAPIES SERIES
Discharge: HOME | End: 2021-04-19
Payer: MEDICARE

## 2021-04-19 PROCEDURE — 97140 MANUAL THERAPY 1/> REGIONS: CPT

## 2021-04-19 PROCEDURE — 97110 THERAPEUTIC EXERCISES: CPT

## 2021-04-19 PROCEDURE — 97032 APPL MODALITY 1+ESTIM EA 15: CPT

## 2021-04-19 NOTE — FLOWSHEET NOTE
[x] [facility]) [facility] [place]  [facility]  [address]  [phone]  F: [phone] [] [address]  [address]   [address]  P: [phone]  F: [phone] [] [address] &  Therapy  [address]  P: [phone]  F: [phone] [] [address]  P: [phone]  F: [phone] [] [address]  [facility]   [address]   [place]: [phone]  F: [phone]      Physical Therapy Daily Treatment Note    Date:  2021  Patient Name:  Vandana Schaffer    :  1960  MRN: [id number]  Physician: Dr. Yinka Underwood: [place] [employer]age ([id number])  Medical Diagnosis:   History of stroke  Rehab Codes: R53.1, R29.3, R27.9, R26.9, M25.60  Next 's appt.: 21  Date of symptom onset: 19  Visit# / total visits:     Cancels/No Shows: 0    Subjective:     Pain:  [] Yes  [x] No Location:  N/A Pain Rating: (0-10 scale) 0/10  Pain altered Tx:  [x] No  [] Yes  Action:  Comments: Pt arrives 7 min late today. States he didn't go to [employer] this weekend d/t not wanting to make a scene for arriving back with deficits.      Objective:  Modalities:   Precautions:  Exercises:  Exercise Reps/ Time Weight/ Level Comments   Nu-step 6 min L7 RUE  device used         Step ups  4\" BUE assist   Lateral weight shifts w/ TKE  blueberry HELD d/t time constraints on    SLS holds w/ TKE 2x15, 3\"  BUE assist   SLS eccentric stand>sit 12x     Hamstring curl  RLE Blocking hip flexor compensation   Hip ext/abd      Fwd heel tap  2\", // bars BUE assist               Box squat, ball reach out at top of stand 15x  2x10 22\"  22\" w/ block 20\" box, blue foam pad on top   Bilateral ER w/ scap retract 3x15 purple In between in SLS  b. 4/5 R hip ext/abd, 4+/5 R hamstrings to improve swing and stance phase with gait  4. ? Function:  a. Pt will be able to stand from 24 in chair 10x without UE assist and equal weightbearing with no more than minimal cuing from therapist.  b. Pt will be able to ascend/descend stairs using reciprocal ascent/descent with BUE assist and fair speed, fair R knee eccentric control  c. Pt will be able to ambulate 150 ft with improving L trunk lean, no more than 50% incidence of R knee hyperextension throughout, and at .4m/s  5. Patient to be independent with home exercise program as demonstrated by performance with correct form without cues. 6. Demonstrate Knowledge of fall prevention     LTG: (to be met in 20 treatments)  7. ? Balance:   a. Pt will be able to complete 360 deg turn with near equal weightbearing, albeit slow  b. Pt will score at least 41/56 on Mccurdy to indicate significant change in static/dynamic balance abilities since initial evaluation. 8. ? Strength:  a. 2+/5 R knee ext strength to increase R knee control in SLS  b. 4+/5 R hip ext/abd, 5-/5 R hamstrings to improve swing and stance phase with gait  9. ? Function:  a. Pt will be able to stand from 20 in chair 10x without UE assist and equal weightbearing with no more than minimal cuing from therapist.  b. Pt will be able to ascend/descend stairs using reciprocal ascent/descent with unilat UE assist and fair speed, fair R knee eccentric control  c. Pt will be able to ambulate 300 ft with improving L trunk lean, no more than 25% incidence of R knee hyperextension throughout, and at .5m/s           Patient goals: \"strengthen what we can, improve walking\"    Pt. Education:  [x] Yes  [] No  [x] Reviewed Prior HEP/Ed  Method of Education: [x] Verbal  [] Demo  [] Written  Comprehension of Education:  [x] Verbalizes understanding. [x] Demonstrates understanding. [] Needs review. [] Demonstrates/verbalizes HEP/Ed previously given.     Current HEP: resisted TKEs w/ blueberry band, squats, standing ham curls and marches     Plan: [x] Continue current frequency toward long and short term goals.     [x] Specific Instructions for subsequent treatments: progress RLE weightshifting tasks and R knee strength      Time In: 10:07 am          Time Out: 11:00 am    Electronically signed by:  Lorna Gilmore PT

## 2021-04-19 NOTE — FLOWSHEET NOTE
[x] [facility]       Occupational Therapy            1st floor       [address], [place]         Phone: [phone]       Fax: [phone] [] [facility]  [address] [place] [address]  Phone: [phone]  Fax: [phone]      Occupational Therapy Daily Treatment Note    Date:  2021  Patient Name:  Ly Alfaro    :  1960  MRN: [id number]  Visit# / total visits: ; Progress note for Medicare patient due at visit 8    Patient: Ly Alfaro                      : 1960                      MRN: [id number]  Referring Provider:  Fawad Robison  Insurance: [place] [employer]   visits left  Medical Diagnosis: History of stroke Z86.73             Rehab Codes: stiffness in shoulder M25.61,, stiffness in elbow M25.62,, stiffness in hand M25.64,, stiffness in wrist M25.63,, lack of coordination R27.8,, fine motor skills loss R29.818, or muscle weakness generalized M62.81,  Onset Date:    3-          Next Dr. Frederick Province: 21  Cancels/No Shows: 0/0  Visit     Subjective:    Pain:  [] Yes  [x] No Location: shoulder with range Pain Rating: (0-10 scale) 0/10 at rest  Pain altered Tx:  [x] No  [] Yes  Action:  Pt Comments:  \"I am ready to be all back together\"   Objective:  Modalities:  Modality Flow Sheet:  START STOP Tx Modality   2021  Electrical Stim: [place] cycle  1.  Shoulder subluxation:  Time: 15 minutes   Cycle: 10/20  Patch location: anterior and posterior deltoid, middle deltoid and supraspinatus   Up to ~64Hz intensity         Ultrasound: ___ W/cm2 x ___ mins  Duty factor: __100%  __50%  __33% __20%  Head size:    ___ cm  MHz: __1mHz  __3mHz  Location:     Hot Pack:     Cold Pack:     Precautions: NA  Exercises:  EXERCISE    REPS/     TIME  WEIGHT/    LEVEL COMMENTS   PROM  Shoulder internal/external  Flex/ext  Elbow flex/ext  Wrist flex/ext  Digit extension 30 mins  Completed    Wooden pegs with R hand (place in)   Not completed    Cones stacking 5  Added this date   Flexbar   strength  Wrist Flexion/Ext  Wrist pronation  Wrist supination  Wrist ulnar deviation  Wrist radial dev  Shoulder Adb  Shoulder Add 10 yellow                        Other: Completed E Stim with \"no pain\" this date. See parameters above. Good use of flexbar as pt reports having one at home, however different color. Pt also reports having bought a shoulder support for the R sublux shoulder. Plans to bring to next visit. Treatment Charges: Mins Units   [x]  Modalities:   E-stim   20   1   []  Ultrasound     [x]  Ther Exercise 10 1   [x]  Manual Therapy 25 2   []  Ther Activities     []  Orthotic fit/train     []  Orthotic recheck     []  Other     Total Treatment time 55 4       Assessment: [x] Progressing toward goals. Pt with active tenodesis response, no noted ability to actively extend or flex digits. Demo trace movements when attempting digit extension with wrist in flexed position. Added electrical stimulation for reduction of R shoulder subluxation and to promote glenohumeral movement patterns. [] No change. [] Other     [x] Patient would benefit from skilled occupational therapy services in order to: Improve  Rehab Potential, Motor control/Tone in UE, ROM, Strength and Coordination deficit in order to ensure good follow through and decrease pain in UE for safe completion of ADLs     Short Term Goals: ( 6  Treatments)  1. Increase AROM (degrees) (extension/flexion)  a. R shoulder to 60/40  b. R elbow -20/130  c. R wrist -30/50  d. R radial/ulnar dev 15/10  2. Increase strength (pounds);  a. R  strength to 22 pounds  b. R lateral pinch to 10 pounds  c. R 2 point pinch to 12 pounds  3. Increase function:UE Functional Index Score 40 or more points to promote increased functional abilities  4.  Patient to be independent with home exercise program as demonstrated by performance with correct form without cues.     Long Term

## 2021-04-23 ENCOUNTER — HOSPITAL ENCOUNTER (OUTPATIENT)
Dept: OCCUPATIONAL THERAPY | Age: 61
Setting detail: THERAPIES SERIES
Discharge: HOME | End: 2021-04-23
Payer: MEDICARE

## 2021-04-23 ENCOUNTER — HOSPITAL ENCOUNTER (OUTPATIENT)
Dept: PHYSICAL THERAPY | Age: 61
Setting detail: THERAPIES SERIES
Discharge: HOME | End: 2021-04-23
Payer: MEDICARE

## 2021-04-23 PROCEDURE — 97116 GAIT TRAINING THERAPY: CPT

## 2021-04-23 PROCEDURE — 97110 THERAPEUTIC EXERCISES: CPT

## 2021-04-23 PROCEDURE — 97032 APPL MODALITY 1+ESTIM EA 15: CPT

## 2021-04-23 NOTE — FLOWSHEET NOTE
(place in)   Not completed    Cones stacking 5  Added this date   Flexbar   strength  Wrist Flexion/Ext  Wrist pronation  Wrist supination  Wrist ulnar deviation  Wrist radial dev  Shoulder Adb  Shoulder Add 10 yellow  not completed                       Other: Completed E Stim with \"no pain\" this date. See parameters above. Good use of flexbar as pt reports having one at home, however different color. Pt brought a shoulder support for the R sublux shoulder, educated pt on don/doff and marked with jeannie for correct placement. Treatment Charges: Mins Units   [x]  Modalities:   E-stim   20   1   []  Ultrasound     [x]  Ther Exercise 30 2   []  Manual Therapy     []  Ther Activities     []  Orthotic fit/train     []  Orthotic recheck     []  Other     Total Treatment time 50 3       Assessment: [x] Progressing toward goals. Pt with active tenodesis response, no noted ability to actively extend or flex digits. Demo trace movements when attempting digit extension with wrist in flexed position. Continued electrical stimulation for reduction of R shoulder subluxation and to promote glenohumeral movement patterns. [] No change. [] Other     [x] Patient would benefit from skilled occupational therapy services in order to: Improve  Rehab Potential, Motor control/Tone in UE, ROM, Strength and Coordination deficit in order to ensure good follow through and decrease pain in UE for safe completion of ADLs     Short Term Goals: ( 6  Treatments)  1. Increase AROM (degrees) (extension/flexion)  a. R shoulder to 60/40  b. R elbow -20/130  c. R wrist -30/50  d. R radial/ulnar dev 15/10  2. Increase strength (pounds);  a. R  strength to 22 pounds  b. R lateral pinch to 10 pounds  c. R 2 point pinch to 12 pounds  3. Increase function:UE Functional Index Score 40 or more points to promote increased functional abilities  4.  Patient to be independent with home exercise program as demonstrated by performance with correct form without cues.     Long Term Goals: (  12 Treatments)  1. Move digits in R hand independently with trace movement for promotion of future use for guitar  2. Demo R hand extension to half of [facility] for increased I with  on R side  3. Increase R UE Shoulder ROM to lift arm up for I with adl tasks at home. 50/45 R shoulder    Pt. Education:  [x] Yes  [] No  [] Reviewed Prior HEP/Ed  Method of Education: [x] Verbal  [] Demo  [] Written  Re: e-stim treatment   Comprehension of Education:  [x] Verbalizes understanding. [] Demonstrates understanding. [] Needs review. [] Demonstrates/verbalizes HEP/Ed previously given. Plan: [x] Continue current frequency toward short and long term goals.   [x] Specific Instructions for subsequent treatments: estim for wrist, digit, and thumb extension    [] Other:    Time In: 0794-9207     Electronically signed by:  MODESTO Salas/JANNET

## 2021-04-23 NOTE — PROGRESS NOTES
[x] PlWillie Modi 45  Outpatient Rehabilitation &  Therapy  [address]  [phone]  F: [phone] [] [facility]  [address] [place] [address]  Phone: [phone]  Fax: [phone]        Occupational Therapy Progress Note    Date: 2021      Patient: Uyen Jarrett  : 1960  MRN: [id number]    Referring [facility]   visits left  Medical Diagnosis: History of stroke Z86.73             Rehab Codes: stiffness in shoulder M25.61,, stiffness in elbow M25.62,, stiffness in hand M25.64,, stiffness in wrist M25.63,, lack of coordination R27.8,, fine motor skills loss R29.818, or muscle weakness generalized M62.81,  Onset Date:    3-          Next Dr. Castaneda Postal  Cancels/No shows: 0/0  Date range of services: 3-24-21 to 21     Subjective:  Pain:  [] Yes  [x] No  Location: stiff Pain Rating: (0-10 scale) 0/10 Pain altered Tx:  [x] No  [] Yes  Action: NA Comments: \"its just stiff not painful\"    Objective:  Subjective:    Pain:  []? Yes  [x]? No   Location: shoulder with range Pain Rating: (0-10 scale) 0/10 at rest  Pain altered Tx:  [x]? No  []? Yes  Action:  Pt Comments:  \"I am ready to be all back together\"   Objective:  Modalities:  Objective: Tests/Measurements: Upper Extremity Functional Index  Current Functional Level:  8 decline by (21) functionally impaired as measured with the Upper Extremity Functional Index Survey. 0-80 scale, with 80 = no Deficits  (The UEFI model does not provide any specific cut off points that could classify the upper limb disability degree, however, a minimal detectable change of 9 points is provided. This means that for improvement or deterioration to be considered, between two subsequent evaluations, the scores must differ by at least 9 points.)     Sensibility: Normal    Edema: None      Skin Color: Normal   Skin/Scar condition Not tested     Problems:      Pain, ROM, Strength, Function and Coordination      STRENGTH (Measured in pounds)  3-24-21     pounds RIGHT LEFT    8 DECLINE 8 90   Lateral pinch 8 same 15   2 point pinch 10   2  12   3 jaw pinch na na     The affected extremity is 83 % weaker than the unaffected extremity. (affected score/unaffected score, take the total and subtract from 100)              Right ROM   Elbow Extension/flex -38/112   Shoulder  Adduction/abd 45/28  40/30                            degrees Right ROM         Extension\flex -12/-44     Assessment:  []? Progressing toward goals. Pt with active tenodesis response, min ability to actively extend or flex digits. Demo trace movements when attempting digit extension with wrist in flexed position. [x]? No change. []? Other                          [x]? Patient would benefit from skilled occupational therapy services in order to: Improve  Rehab Potential, Motor control/Tone in UE, ROM, Strength and Coordination deficit in order to ensure good follow through and decrease pain in UE for safe completion   Short Term Goals: ( 6  Treatments)  1. Increase AROM (degrees) (extension/flexion)  a. R shoulder to 60/40 ONGOING  b. R elbow -20/130 ONGOING IMPROVED  c. R wrist -30/50 MET  d. R radial/ulnar dev 15/10  2. Increase strength (pounds);  a. R  strength to 22 pounds ONGOING  b. R lateral pinch to 10 pounds ONGOING  c. R 2 point pinch to 12 pounds ONGOING  3. Increase function:UE Functional Index Score 40 or more points to promote increased functional abilities ONGOING  4. Patient to be independent with home exercise program as demonstrated by performance with correct form without cues. MET     Long Term Goals: (  12 Treatments)  1. Move digits in R hand independently with trace movement for promotion of future use for guitar  2. Demo R hand extension to half of [facility] for increased I with  on R side  3.  Increase R UE Shoulder ROM to lift arm up for I with adl tasks at home. 50/45       Treatment Plan:   Therapeutic Ex 82442, Therapeutic Act 15088, Neuro Muscular Adrian [id number], Manual [id number], Ultrasound [id number] and Electrical Stim Attended [id number]    Patient Status: (requested frequency/duration)     [x] Continue per initial/current plan of care 2 times per week for 6 remaining visits. [] Additional visits necessary. Electronically signed by SANDI Pak on 4/23/2021 at 11:04 AM      If you have any questions or concerns, please don't hesitate to call. Thank you for your referral.    Physician Signature:________________________________Date:__________________  By signing above or cosigning this note, I have reviewed this plan of care and certify a need for medically necessary rehabilitation services.      *PLEASE SIGN ABOVE AND FAX BACK ALL PAGES

## 2021-04-23 NOTE — FLOWSHEET NOTE
[x] [facility]) [facility]  [address]  [phone]  F: [phone] [] [address]  [facility]   [address]  P: [phone]  F: [phone] [] [address] &  Therapy  [address] [facility]  P: [phone]  F: [phone] [] [address]  P: [phone]  F: [phone] [] [address]  [facility]   [address]   [place]: [phone]  F: [phone]      Physical Therapy Daily Treatment Note    Date:  2021  Patient Name:  Naldo Smalls    :  1960  MRN: [id number]  Physician: Dr. Earnestine Fitzgerald: [employer] (38HJ)  Medical Diagnosis:   History of stroke  Rehab Codes: R53.1, R29.3, R27.9, R26.9, M25.60  Next 's appt.: 21  Date of symptom onset: 19  Visit# / total visits:     Cancels/No Shows: 0    Subjective:     Pain:  [] Yes  [x] No Location:  N/A Pain Rating: (0-10 scale) 0/10  Pain altered Tx:  [x] No  [] Yes  Action:  Comments: Pt arrives 8 min late today. Notes he feels tired today, L shoulder tight.     Objective:  Modalities:   Precautions:  Exercises: Bolded completed 2021:  Exercise Reps/ Time Weight/ Level Comments   Nu-step 6 min L7 RUE  device used         Step ups  4\" BUE assist   Lateral weight shifts w/ TKE  blueberry HELD d/t time constraints on    SLS longer holds    BUE assist   SLS eccentric stand>sit      Hamstring curl  RLE Blocking hip flexor compensation   Hip ext/abd      Fwd heel tap  2\", // bars BUE assist               Box squat, LLE bias 2x12 22\"  20\" box, blue foam pad on top   Bilateral ER w/ scap retract 3x15 purple In between rest breaks   Squat, tap ball to step      Standing reaching, all directions 2x1 min Encouraging RLE weightbearing   Seated thoracic/lumbar ext against bolster 15x  Big red bolster behind back                     Leg press - DL  60# DL = double limb   Leg press - SL 20x 10# SL = single limb         Gait 150 ft  Min A for increased RLE weightshift in stance   Stair climbing 2x2 steps  Very fatiguing, marked time ascent/descent   Other:         Treatment Charges: Mins Units   []  Modalities     [x]  Ther Exercise 44 3   []  Manual Therapy     []  Ther Activities     []  Aquatics     []  Vasocompression     [x]  Other: gait 10 1   Total Treatment time 54 4       Assessment: [x] Progressing toward goals. Initiated stair training this date - very fatiguing, needs rest breaks after each attempt on small stairs. Fair balance at top when turning without UE assist, CGA required. Still working on appropriate ascent/descent sequencing, will need continued practice to ensure safety and maximal mobility while continuing to work on quad eccentric control. Ambulation training this date - increased gait speed noted, though does need min A for full RLE weightshift in RLE stance. Min A also needed for sit>stand from 22\" surface with RLE bias. [] No change. [x] Other: 3-4 min seated rest breaks needed after every set of strengthening; significant fatigue continues. [x] Patient would continue to benefit from skilled physical therapy services in order to: progress RLE strength, address gait deviations, improve standing static and dynamic balance impairment, improve safety and independence with functional mobility both at home and in community. STG: (to be met in 10 treatments)  1. ? ROM: Pt will demo no excessive ER of RLE in standing at rest or sitting at rest to indicate reduced tightness in external rotators and normalize posture  2. ? Balance:   a. Pt will be able to reach outside MED towards R both up and down without balance deficit  b.  Pt will be able to maintain RLE SLS for 30s with min UE assist and good neuromuscular control noted  3. ? Strength:  a. 2/5 R knee ext strength to increase R knee control in SLS  b. 4/5 R hip ext/abd, 4+/5 R hamstrings to improve swing and stance phase with gait  4. ? Function:  a. Pt will be able to stand from 24 in chair 10x without UE assist and equal weightbearing with no more than minimal cuing from therapist.  b. Pt will be able to ascend/descend stairs using reciprocal ascent/descent with BUE assist and fair speed, fair R knee eccentric control  c. Pt will be able to ambulate 150 ft with improving L trunk lean, no more than 50% incidence of R knee hyperextension throughout, and at .4m/s   5. Patient to be independent with home exercise program as demonstrated by performance with correct form without cues. 6. Demonstrate Knowledge of fall prevention     LTG: (to be met in 20 treatments)  7. ? Balance:   a. Pt will be able to complete 360 deg turn with near equal weightbearing, albeit slow  b. Pt will score at least 41/56 on Mccurdy to indicate significant change in static/dynamic balance abilities since initial evaluation. 8. ? Strength:  a. 2+/5 R knee ext strength to increase R knee control in SLS  b. 4+/5 R hip ext/abd, 5-/5 R hamstrings to improve swing and stance phase with gait  9. ? Function:  a. Pt will be able to stand from 20 in chair 10x without UE assist and equal weightbearing with no more than minimal cuing from therapist.  b. Pt will be able to ascend/descend stairs using reciprocal ascent/descent with unilat UE assist and fair speed, fair R knee eccentric control  c. Pt will be able to ambulate 300 ft with improving L trunk lean, no more than 25% incidence of R knee hyperextension throughout, and at .5m/s           Patient goals: \"strengthen what we can, improve walking\"    Pt. Education:  [x] Yes  [] No  [x] Reviewed Prior HEP/Ed  Method of Education: [x] Verbal  [] Demo  [] Written  Comprehension of Education:  [x] Verbalizes understanding.   [x] Demonstrates understanding. [] Needs review. [] Demonstrates/verbalizes HEP/Ed previously given. Current HEP: resisted TKEs w/ blueberry band, squats, standing ham curls and marches     Plan: [x] Continue current frequency toward long and short term goals.     [x] Specific Instructions for subsequent treatments: stair climbing, R quad eccentric control      Time In: 10:08 am          Time Out: 11:02 am    Electronically signed by:  Edwin Moyer PT

## 2021-04-26 ENCOUNTER — HOSPITAL ENCOUNTER (OUTPATIENT)
Dept: PHYSICAL THERAPY | Age: 61
Setting detail: THERAPIES SERIES
Discharge: HOME | End: 2021-04-26
Payer: MEDICARE

## 2021-04-26 ENCOUNTER — HOSPITAL ENCOUNTER (OUTPATIENT)
Dept: OCCUPATIONAL THERAPY | Age: 61
Setting detail: THERAPIES SERIES
Discharge: HOME | End: 2021-04-26
Payer: MEDICARE

## 2021-04-26 PROCEDURE — 97110 THERAPEUTIC EXERCISES: CPT

## 2021-04-26 PROCEDURE — 97116 GAIT TRAINING THERAPY: CPT

## 2021-04-26 PROCEDURE — 97032 APPL MODALITY 1+ESTIM EA 15: CPT

## 2021-04-26 NOTE — FLOWSHEET NOTE
[x] Brayan Chase       Occupational Therapy            1st floor       [address], [place]         Phone: [phone]       Fax: [phone] [] [facility]  [address] [place] [address]  Phone: [phone]  Fax: [phone]      Occupational Therapy Daily Treatment Note    Date:  2021  Patient Name:  Sharath Garnett    :  1960  MRN: [id number]  Visit# / total visits: ; Progress note for Medicare patient due at visit 8    Patient: Sharath Garnett                      : 1960                      MRN: [id number]  Referring Provider:  Gerardo Garcia  Insurance: [place] [employer]   visits left  Medical Diagnosis: History of stroke Z86.73             Rehab Codes: stiffness in shoulder M25.61,, stiffness in elbow M25.62,, stiffness in hand M25.64,, stiffness in wrist M25.63,, lack of coordination R27.8,, fine motor skills loss R29.818, or muscle weakness generalized M62.81,  Onset Date:    3-          Next Dr. Guerrero Fili: 21  Cancels/No Shows: 0/0  Visit     Subjective:    Pain:  [] Yes  [x] No Location: shoulder with range Pain Rating: (0-10 scale) 0/10 at rest  Pain altered Tx:  [x] No  [] Yes  Action:  Pt Comments:  \"I am ready to walk back into [employer]\"     Objective:  Modalities:  Modality Flow Sheet:  START STOP Tx Modality   2021  Electrical Stim: [place] cycle  1.  Shoulder subluxation:  Time: 15 minutes   Cycle: 10/20  Patch location: anterior and posterior deltoid, middle deltoid and supraspinatus   Up to ~64Hz intensity         Ultrasound: ___ W/cm2 x ___ mins  Duty factor: __100%  __50%  __33% __20%  Head size:    ___ cm  MHz: __1mHz  __3mHz  Location:     Hot Pack:     Cold Pack:     Precautions: NA  Exercises:  EXERCISE    REPS/     TIME  WEIGHT/    LEVEL COMMENTS   PROM  Shoulder internal/external  Flex/ext  Elbow flex/ext  Wrist flex/ext  Digit extension 30 mins  Completed    Wooden pegs with R hand (place in)   Not completed    Cones stacking 5  Added this date   Flexbar   strength  Wrist Flexion/Ext  Wrist pronation  Wrist supination  Wrist ulnar deviation  Wrist radial dev  Shoulder Adb  Shoulder Add 10 yellow  completed                       Other: Completed E Stim with \"no pain\" this date. See parameters above. Good use of flexbar as pt reports having one at home, however different color. Pt wore the shoulder support for the R sublux shoulder, and reports that it makes a difference with having the shoulder in place while using the elbow and wrist.      Treatment Charges: Mins Units   [x]  Modalities:   E-stim   20   1   []  Ultrasound     [x]  Ther Exercise 40 3   []  Manual Therapy     []  Ther Activities     []  Orthotic fit/train     []  Orthotic recheck     []  Other     Total Treatment time 60 4       Assessment: [x] Progressing toward goals. Pt with active tenodesis response, no noted ability to actively extend or flex digits. Demo trace movements when attempting digit extension with wrist in flexed position. Continued electrical stimulation for reduction of R shoulder subluxation and to promote glenohumeral movement patterns. [] No change. [] Other     [x] Patient would benefit from skilled occupational therapy services in order to: Improve  Rehab Potential, Motor control/Tone in UE, ROM, Strength and Coordination deficit in order to ensure good follow through and decrease pain in UE for safe completion of ADLs     Short Term Goals: ( 6  Treatments)  1. Increase AROM (degrees) (extension/flexion)  a. R shoulder to 60/40  b. R elbow -20/130  c. R wrist -30/50  d. R radial/ulnar dev 15/10  2. Increase strength (pounds);  a. R  strength to 22 pounds  b. R lateral pinch to 10 pounds  c. R 2 point pinch to 12 pounds  3. Increase function:UE Functional Index Score 40 or more points to promote increased functional abilities  4.  Patient to be independent with home exercise program as

## 2021-04-26 NOTE — FLOWSHEET NOTE
weightbearing  - increased consecutive time on 4/26   Seated thoracic/lumbar ext against bolster 15x  Big red bolster behind back                     Leg press - DL  60# DL = double limb   Leg press - SL 20x 10# SL = single limb         Gait [id number]  Min A for increased RLE weightshift in stance, reduced excessive ER at foot/tibia   Stair training 3 rounds, 15-24 steps 2 step stairs in inpatient gym Progressed to reciprocal ascent/descent with unilat UE assist, CGA/David  - reports \"not so hard\" after first set   Other: Significant time spent in stair climbing and training due to fatigue and to allow appropriate time for repetitive blocked practice. Treatment Charges: Mins Units   []  Modalities     [x]  Ther Exercise 6  1   []  Manual Therapy     []  Ther Activities     []  Aquatics     []  Vasocompression     [x]  Other: neuro re-ed 2 --   [x]  Other: gait 45 3   Total Treatment time 53 4       Assessment: [x] Progressing toward goals. Focused on stair training at beginning of session to avoid fatigue and avoid overexertion - requires CGA throughout, When attempting to facilitate RLE weight shift, poor knee neuromuscular control noted, as evidenced by increased R knee hyperextension. MIN VC's throughout for reduced RLE circumduction w/ ascent - Pt with good carryover. Increase fatigue evident with increased reps but overall improved function mobility tolerance as compared to last visit. 1x near LOB w/ stair descent, required mod A by therapist to prevent fall. Reaching with focus on increasing RLE weight shift while promoting appropriate neuromuscular control in RLE stance phase - does require min A for maximal weight shift. Still w/ poor balance w/ this task, at risk for falls. [] No change. [x] Other: 3-4 min seated rest breaks needed after every set of strengthening; significant fatigue continues.    [x] Patient would continue to benefit from skilled physical therapy services in order to: progress

## 2021-04-30 ENCOUNTER — HOSPITAL ENCOUNTER (OUTPATIENT)
Dept: PHYSICAL THERAPY | Age: 61
Setting detail: THERAPIES SERIES
Discharge: HOME | End: 2021-04-30
Payer: MEDICARE

## 2021-04-30 ENCOUNTER — HOSPITAL ENCOUNTER (OUTPATIENT)
Dept: OCCUPATIONAL THERAPY | Age: 61
Setting detail: THERAPIES SERIES
Discharge: HOME | End: 2021-04-30
Payer: MEDICARE

## 2021-04-30 PROCEDURE — 97116 GAIT TRAINING THERAPY: CPT

## 2021-04-30 PROCEDURE — 97110 THERAPEUTIC EXERCISES: CPT

## 2021-04-30 PROCEDURE — 97032 APPL MODALITY 1+ESTIM EA 15: CPT

## 2021-04-30 NOTE — FLOWSHEET NOTE
[x] Brayan Chase       Occupational Therapy            1st floor       [address] [place], [place]         Phone: [phone]       Fax: [phone] [] [facility]  [address] [place] [address]  Phone: [phone]  Fax: [phone]      Occupational Therapy Daily Treatment Note    Date:  2021  Patient Name:  Ciara Ladd    :  1960  MRN: [id number]  Visit# / total visits: ; Progress note for Medicare patient due at visit 8    Patient: Ciara Ladd                      : 1960                      MRN: [id number]  Referring Provider:  Valery Sharp   Insurance: [place] [employer]   visits left  Medical Diagnosis: History of stroke Z86.73             Rehab Codes: stiffness in shoulder M25.61,, stiffness in elbow M25.62,, stiffness in hand M25.64,, stiffness in wrist M25.63,, lack of coordination R27.8,, fine motor skills loss R29.818, or muscle weakness generalized M62.81,  Onset Date:    3-          Next Dr. Toy Monroe: 21  Cancels/No Shows: 0/0  Visit     Subjective:    Pain:  [] Yes  [x] No Location: shoulder with range Pain Rating: (0-10 scale) 0/10 at rest  Pain altered Tx:  [x] No  [] Yes  Action:  Pt Comments: \"This hand is stiff today, sometimes it is really loose thogh\"     Objective:  Modalities:  Modality Flow Sheet:  START STOP Tx Modality   2021  Electrical Stim: [place] cycle  1.  Shoulder subluxation:  Time: 15 minutes   Cycle: 10/20  Patch location: anterior and posterior deltoid, middle deltoid and supraspinatus   Up to ~64Hz intensity         Ultrasound: ___ W/cm2 x ___ mins  Duty factor: __100%  __50%  __33% __20%  Head size:    ___ cm  MHz: __1mHz  __3mHz  Location:     Hot Pack:     Cold Pack:     Precautions: NA  Exercises:  EXERCISE    REPS/     TIME  WEIGHT/    LEVEL COMMENTS   PROM  Shoulder internal/external  Flex/ext  Elbow flex/ext  Wrist flex/ext  Digit extension 30 mins  Completed Wooden pegs with R hand (place in)   completed (to focus on elbow flex and ext)   Cones stacking 5  Added this date   Flexbar   strength  Wrist Flexion/Ext  Wrist pronation  Wrist supination  Wrist ulnar deviation  Wrist radial dev  Shoulder Adb  Shoulder Add 10 yellow  completed                       Other: Completed E Stim with \"no pain\" this date. See parameters above. Pt wore the shoulder support for the R sublux shoulder, and reports that it makes a difference with having the shoulder in place while using the elbow and wrist.      Treatment Charges: Mins Units   [x]  Modalities:   E-stim   20   1   []  Ultrasound     [x]  Ther Exercise 38 3   []  Manual Therapy     []  Ther Activities     []  Orthotic fit/train     []  Orthotic recheck     []  Other     Total Treatment time 58 4     Assessment: [x] Progressing toward goals. Pt with active tenodesis response, no noted ability to actively extend or flex digits. Demo trace movements when attempting digit extension with wrist in flexed position. Continued electrical stimulation for reduction of R shoulder subluxation and to promote glenohumeral movement patterns. [] No change. [] Other     [x] Patient would benefit from skilled occupational therapy services in order to: Improve  Rehab Potential, Motor control/Tone in UE, ROM, Strength and Coordination deficit in order to ensure good follow through and decrease pain in UE for safe completion of ADLs     Short Term Goals: ( 6  Treatments)  1. Increase AROM (degrees) (extension/flexion)  a. R shoulder to 60/40  b. R elbow -20/130  c. R wrist -30/50  d. R radial/ulnar dev 15/10  2. Increase strength (pounds);  a. R  strength to 22 pounds  b. R lateral pinch to 10 pounds  c. R 2 point pinch to 12 pounds  3. Increase function:UE Functional Index Score 40 or more points to promote increased functional abilities  4.  Patient to be independent with home exercise program as demonstrated by performance with correct form without cues.     Long Term Goals: (  12 Treatments)  1. Move digits in R hand independently with trace movement for promotion of future use for guitar  2. Demo R hand extension to half of [facility] for increased I with  on R side  3. Increase R UE Shoulder ROM to lift arm up for I with adl tasks at home. 50/45 R shoulder    Pt. Education:  [x] Yes  [] No  [] Reviewed Prior HEP/Ed  Method of Education: [x] Verbal  [] Demo  [] Written  Re: e-stim treatment   Comprehension of Education:  [x] Verbalizes understanding. [] Demonstrates understanding. [] Needs review. [] Demonstrates/verbalizes HEP/Ed previously given. Plan: [x] Continue current frequency toward short and long term goals.   [x] Specific Instructions for subsequent treatments: estim for wrist, digit, and thumb extension    [] Other:    Time In: 5470-1766     Electronically signed by:  MODESTO Restrepo/L

## 2021-04-30 NOTE — FLOWSHEET NOTE
[x] [facility]) [facility]  [address]  [phone]  F: [phone] [] [address]  [facility] 36   [address]  P: [phone]  F: [phone] [] [address] &  Therapy  [address] [facility]  P: [phone]  F: [phone] [] [address]  P: [phone]  F: [phone] [] [address]  [facility]   [address]   [place]: [phone]  F: [phone]      Physical Therapy Daily Treatment Note    Date:  2021  Patient Name:  Gladys Hodgkin    :  1960  MRN: [id number]  Physician: Dr. Yoo Mood: [employer] (14YZ)  Medical Diagnosis:   History of stroke  Rehab Codes: R53.1, R29.3, R27.9, R26.9, M25.60  Next 's appt.: 21  Date of symptom onset: 19  Visit# / total visits:     Cancels/No Shows: 0     Subjective:     Pain:  [] Yes  [x] No Location:  N/A Pain Rating: (0-10 scale) 0/10  Pain altered Tx:  [x] No  [] Yes  Action:  Comments: Spilled OJ at home and without thinking cleaned it up w/ no assistive device and didn't feel unstable. \"Don't know how I did it. \" Pt reports still having fear putting a lot of weight through the R leg. Expressed interest in meeting with the orthotist to decrease the discomfort of the AFO.         Objective:  Modalities:   Precautions:  Exercises: Bolded completed 2021:  Exercise Reps/ Time Weight/ Level Comments   Nu-step 6 min L7 RUE  device used  - HELD d/t taken this date         Step ups  4\" BUE assist   Lateral weight shifts w/ TKE  blueberry    SLS longer holds  2x12, 3\"  BUE assist   SLS eccentric stand>sit      Hamstring curl 2x15 RLE Blocking hip flexor compensation   Hip ext/abd      Fwd heel tap  2\", // bars BUE assist               Box squat, LLE bias 2x12 22\"  20\" box, blue foam pad on top   Bilateral ER w/ scap retract 3x15 purple In between rest breaks   Squat, tap ball to step      Standing reaching, all directions 2 min  Encouraging RLE weightbearing  - increased consecutive time on 4/26   Seated thoracic/lumbar ext against bolster 15x  Big red bolster behind back                     Leg press - DL 1x10 60# DL = double limb   Leg press - SL 20x 10# SL = single limb         Gait [id number]  Intermittent min A for increased RLE weightshift in stance, reduced excessive ER at foot/tibia   Stair training 3 rounds, 7 min 2 step stairs in inpatient gym Progressed to reciprocal ascent/descent with unilat UE assist, CGA/David   Other: Significant time spent in stair climbing and training due to fatigue and to allow appropriate time for repetitive blocked practice. Treatment Charges: Mins Units   []  Modalities     [x]  Ther Exercise 31 2   []  Manual Therapy     []  Ther Activities     []  Aquatics     []  Vasocompression     []  Other: neuro re-ed     [x]  Other: gait 26 2   Total Treatment time 57 4       Assessment: [x] Progressing toward goals. Pt started with CGA stair training, demo good carryover w/ reciprocal stair training and L unilateral UE support. Pt reported fear putting weight through affected extremity - would continue to benefit from gait training to improve confidence with even weight distribution through both LE for stability KAREN/ADL's. Improved functional mobility tolerance noted w/ fewer/shorter rest breaks after exercises. [] No change. [x] Other: 3-4 min seated rest breaks needed after every set of strengthening; significant fatigue continues.     [x] Patient would continue to benefit from skilled physical therapy services in order to: progress RLE strength, address gait deviations, improve standing static and dynamic balance impairment, improve safety and independence with functional eccentric control  c. Pt will be able to ambulate 300 ft with improving L trunk lean, no more than 25% incidence of R knee hyperextension throughout, and at .5m/s           Patient goals: \"strengthen what we can, improve walking\"    Pt. Education:  [x] Yes  [] No  [x] Reviewed Prior HEP/Ed  Method of Education: [x] Verbal  [] Demo  [] Written  Comprehension of Education:  [x] Verbalizes understanding. [x] Demonstrates understanding. [] Needs review. [] Demonstrates/verbalizes HEP/Ed previously given. Current HEP: resisted TKEs w/ blueberry band, squats, standing ham curls and marches     Plan: [x] Continue current frequency toward long and short term goals.     [x] Specific Instructions for subsequent treatments: stair climbing, R quad eccentric control      Time In: 10:03 am          Time Out: 11:00 am    Electronically signed by:  Ac Jacome PT

## 2021-05-03 ENCOUNTER — HOSPITAL ENCOUNTER (OUTPATIENT)
Dept: OCCUPATIONAL THERAPY | Age: 61
Setting detail: THERAPIES SERIES
Discharge: HOME | End: 2021-05-03
Payer: MEDICARE

## 2021-05-03 ENCOUNTER — HOSPITAL ENCOUNTER (OUTPATIENT)
Dept: PHYSICAL THERAPY | Age: 61
Setting detail: THERAPIES SERIES
Discharge: HOME | End: 2021-05-03
Payer: MEDICARE

## 2021-05-03 NOTE — SIGNIFICANT EVENT
[x] [facility]  [address]    [phone]  F: [phone]     Occupational Therapy Cancel/No Show note    Date: 5/3/2021  Patient: Volodymyr Tomlin  : 1960  MRN: [id number]    Cancels/No Shows to date: 1/0    For today's appointment patient:    [x]  Cancelled    [] Rescheduled appointment    [] No-show     Reason given by patient:    [x]  Patient ill    []  Conflicting appointment    [] No transportation      [] Conflict with work    [] No reason given    [] Weather related    [] COVID-19    [] Other:      Comments:        [x] Next appointment was confirmed    Electronically signed by: Kelley Distance, OTR/L

## 2021-05-03 NOTE — FLOWSHEET NOTE
[x] [facility]) [facility] &  Therapy  [address]    [phone]  F: [phone]   [] [address]  [facility] 36   [address]  P: [phone]  F: [phone]  [] [address] &  Therapy  [facility]  P: [phone]  F: [phone] [] [address]  P: [phone]  F: [phone]  [] [address]  [facility]   [address]   [place]: [phone]  F: [phone]   [] [address]  [place]: [phone]   F: [phone]     Physical Therapy Cancel/No Show note    Date: 5/3/2021  Patient: Marcie Range  : 1960  MRN: [id number]    Cancels/No Shows to date: 1 cx / 0 ns    For today's appointment patient:    [x]  Cancelled    [] Rescheduled appointment    [] No-show     Reason given by patient:    [x]  Patient ill    []  Conflicting appointment    [] No transportation      [] Conflict with work    [] No reason given    [] Weather related    [] [id number]    [] Other:      Comments:        [x] Next appointment was confirmed    Electronically signed by: Juan Alberto David PT

## 2021-05-07 ENCOUNTER — HOSPITAL ENCOUNTER (OUTPATIENT)
Dept: OCCUPATIONAL THERAPY | Age: 61
Setting detail: THERAPIES SERIES
Discharge: HOME | End: 2021-05-07
Payer: MEDICARE

## 2021-05-07 ENCOUNTER — HOSPITAL ENCOUNTER (OUTPATIENT)
Dept: PHYSICAL THERAPY | Age: 61
Setting detail: THERAPIES SERIES
Discharge: HOME | End: 2021-05-07
Payer: MEDICARE

## 2021-05-07 PROCEDURE — 97032 APPL MODALITY 1+ESTIM EA 15: CPT

## 2021-05-07 PROCEDURE — 97140 MANUAL THERAPY 1/> REGIONS: CPT

## 2021-05-07 PROCEDURE — 97110 THERAPEUTIC EXERCISES: CPT

## 2021-05-07 PROCEDURE — 97116 GAIT TRAINING THERAPY: CPT

## 2021-05-07 NOTE — FLOWSHEET NOTE
[x] Brayan Salvatore       Occupational Therapy            1st floor       [address], [place]         Phone: [phone]       Fax: [phone] [] [facility]  [address], [place]  Phone: [phone]  Fax: [phone]      Occupational Therapy Daily Treatment Note    Date:  2021  Patient Name:  Marjorie Webber    :  1960  MRN: [id number]  Visit# / total visits: ; Progress note for Medicare patient due at visit 8    Patient: Marjorie Webber                      : 1960                      MRN: [id number]  Referring Provider:  Lori Astudillo  Insurance: [employer]   visits left  Medical Diagnosis: History of stroke Z86.73             Rehab Codes: stiffness in shoulder M25.61,, stiffness in elbow M25.62,, stiffness in hand M25.64,, stiffness in wrist M25.63,, lack of coordination R27.8,, fine motor skills loss R29.818, or muscle weakness generalized M62.81,  Onset Date:    3-          Next Dr. Jack Smiley: 21  Cancels/No Shows: 0/0    Subjective:    Pain:  [] Yes  [x] No Location: shoulder with range Pain Rating: (0-10 scale) 0/10 at rest  Pain altered Tx:  [x] No  [] Yes  Action:  Pt Comments:  \"I am excited to see what this machine can do for me. \"   Objective:  Modalities:  Modality Flow Sheet:  START STOP Tx Modality   2021  Electrical Stim: [place] cycle  1.  Shoulder subluxation:  Time: 15 minutes   Cycle: 10/20  Patch location: anterior and posterior deltoid, middle deltoid and supraspinatus   Up to ~64Hz intensity         Ultrasound: ___ W/cm2 x ___ mins  Duty factor: __100%  __50%  __33% __20%  Head size:    ___ cm  MHz: __1mHz  __3mHz  Location:     Hot Pack:     Cold Pack:     Precautions: NA  Exercises:  EXERCISE    REPS/     TIME  WEIGHT/    LEVEL COMMENTS   PROM  Shoulder internal/external  Flex/ext  Elbow flex/ext  Wrist flex/ext  Digit extension 30 mins  Completed    Wooden pegs with R hand (place in)   completed    Cone stacking 10  Completed                              Other: Completed E Stim with \"no pain\" this date. See parameters above. Pt demo good spirits this date and reports that the shoulder harness has been helping. Treatment Charges: Mins Units   [x]  Modalities:   E-stim   20   1   []  Ultrasound     [x]  Ther Exercise 10 1   [x]  Manual Therapy 30 2   []  Ther Activities     []  Orthotic fit/train     []  Orthotic recheck     []  Other     Total Treatment time 60 4       Assessment: [x] Progressing toward goals. Pt with active tenodesis response, no noted ability to actively extend or flex digits. Demo trace movements when attempting digit extension with wrist in flexed position. [] No change. [] Other     [x] Patient would benefit from skilled occupational therapy services in order to: Improve  Rehab Potential, Motor control/Tone in UE, ROM, Strength and Coordination deficit in order to ensure good follow through and decrease pain in UE for safe completion of ADLs     Short Term Goals: ( 6  Treatments)  1. Increase AROM (degrees) (extension/flexion)  a. R shoulder to 60/40  b. R elbow -20/130  c. R wrist -30/50  d. R radial/ulnar dev 15/10  2. Increase strength (pounds);  a. R  strength to 22 pounds  b. R lateral pinch to 10 pounds  c. R 2 point pinch to 12 pounds  3. Increase function:UE Functional Index Score 40 or more points to promote increased functional abilities  4. Patient to be independent with home exercise program as demonstrated by performance with correct form without cues.     Long Term Goals: (  12 Treatments)  1. Move digits in R hand independently with trace movement for promotion of future use for guitar  2. Demo R hand extension to half of [facility] for increased I with  on R side  3. Increase R UE Shoulder ROM to lift arm up for I with adl tasks at home. 50/45 R shoulder    Pt.  Education:  [x] Yes  [] No  [] Reviewed Prior HEP/Ed  Method of Education: [x] Verbal  [] Demo  [] Written  Re: e-stim treatment   Comprehension of Education:  [x] Verbalizes understanding. [] Demonstrates understanding. [] Needs review. [] Demonstrates/verbalizes HEP/Ed previously given. Plan: [x] Continue current frequency toward short and long term goals.   [x] Specific Instructions for subsequent treatments: estim for wrist, digit, and thumb extension    [] Other:    Time In: 2452-4592     Electronically signed by:  Janis Bucio OTR/JANNET

## 2021-05-07 NOTE — FLOWSHEET NOTE
[x] [facility]) [facility] &  Therapy  [address]  [phone]  F: [phone] [] [address]  [facility]   [address]  P: [phone]  F: [phone] [] [place]  [address]  P: [phone]  F: [phone] [] [address]  P: [phone]  F: [phone] [] [address]  [facility]   [address]   [place]: [phone]  F: [phone]      Physical Therapy Daily Treatment Note    Date:  2021  Patient Name:  Marjorie Webber    :  1960  MRN: [id number]  Physician: Dr. Krupa Sepulveda: [employer] (76RM)  Medical Diagnosis:   History of stroke  Rehab Codes: R53.1, R29.3, R27.9, R26.9, M25.60  Next 's appt.: 21  Date of symptom onset: 19  Visit# / total visits: 10/20    Cancels/No Shows: 0     Subjective:     Pain:  [] Yes  [x] No Location:  N/A Pain Rating: (0-10 scale) 0/10  Pain altered Tx:  [x] No  [] Yes  Action:  Comments: Continues to report intermittent lack of cane use at home \"when I'm not thinking\" - advised by therapist to always use cane at home; carryover questioned. Pt reports feeling a bit more fatigued coming into the session today.       Objective:  Modalities:   Precautions:  Exercises: Bolded completed 2021:  Exercise Reps/ Time Weight/ Level Comments   Nu-step 6 min L7 RUE  device used  - HELD d/t pt preference this date         Step ups  4\" BUE assist   Lateral weight shifts w/ TKE  blueberry    SLS longer holds  2x12, 3\"  BUE assist   SLS eccentric stand>sit      Hamstring curl 2x15 RLE Blocking hip flexor compensation   Hip ext/abd      Fwd heel tap  2\", // bars BUE assist               Box squat, LLE bias 2x12 22\"  20\" box, blue foam pad on top   Bilateral ER w/ scap retract 3x15 purple In between rest breaks   Squat, tap ball to step      Standing reaching, all directions 2 min  Encouraging RLE weightbearing     Seated thoracic/lumbar ext against bolster 15x  Big red bolster behind back                     Leg press - DL 1x10 60# DL = double limb   Leg press - SL 20x 10# SL = single limb         Gait [id number]  Intermittent min A for increased RLE weightshift in stance, reduced excessive ER at foot/tibia   Stair training 3 rounds, 7 min 2 step stairs in inpatient gym Progressed to reciprocal ascent/descent with unilat UE assist, CGA/David   Other: Significant time spent in stair climbing and training due to fatigue and to allow appropriate time for repetitive blocked practice. STG assessment was performed - billed with therex        Treatment Charges: Mins Units   []  Modalities     [x]  Ther Exercise 18 1   []  Manual Therapy     []  Ther Activities     []  Aquatics     []  Vasocompression     []  Other: neuro re-ed     [x]  Other: gait 30 2   Total Treatment time 48 3       Assessment: [x] Progressing toward goals. STG completed, no notable improvement made in gait mechanics/speed at present. However, pt making good progress with RLE strength training as evidenced by MET STGs below. However, continues to demo very poor knee/hip strength overall, limiting  Pt does report decreased energy. Continues to require CGA for stair training, especially from taller step (6\") descent. Slightly less control this date, energy levels decreased overall but does improve reciprocal control with both ascent/descent with repetition. Will likely still demo safety issues if progressed to typical stair height, so will continue to train on 4-6\" steps at this time. [] No change. [x] Other: 3-4 min seated rest breaks needed after every set of strengthening; significant fatigue continues.     [x] Patient would continue to benefit from skilled physical therapy services in order to: progress RLE strength, address gait deviations, improve standing static and dynamic balance impairment, improve safety and independence with functional mobility both at home and in community. STG: (to be met in 10 treatments) - Assessed on 5/7/21:  1. ? ROM: Pt will demo no excessive ER of RLE in standing at rest or sitting at rest to indicate reduced tightness in external rotators and normalize posture - Making progress, still with mild ER in standing/amb but overall improvement  2. ? Balance:    a. Pt will be able to reach outside MED towards R both up and down without balance deficit - Making progress, still mild balance deficit with further outside MED fwd and right but overall increased stability with RLE weightshift  b. Pt will be able to maintain RLE SLS for 30s with min UE assist and good neuromuscular control noted - Nearly met, mod UE assist required  3. ? Strength:  a. 2/5 R knee ext strength to increase R knee control in SLS - MET, 2/5  b. 4/5 R hip ext/abd, 4+/5 R hamstrings to improve swing and stance phase with gait - Partially met, 4/5 hip ext/abd, 4/5 hamstrings  4. ? Function:   a. Pt will be able to stand from 24 in chair 10x without UE assist and equal weightbearing with no more than minimal cuing from therapist. - MET   b. Pt will be able to ascend/descend stairs using reciprocal ascent/descent with BUE assist and fair speed, fair R knee eccentric control - MET, CGA required, only on 4-6\" steps at current  c. Pt will be able to ambulate 150 ft with improving L trunk lean, no more than 50% incidence of R knee hyperextension throughout, and at .4m/s - NOT MET,  .28 m/s self selected, 23.96 .42 m/s fast    5. Patient to be independent with home exercise program as demonstrated by performance with correct form without cues. - Ongoing, needs cuing for specific exercise completion vs just general activity  6.  Demonstrate Knowledge of fall prevention - Making progress, still demos risky behavior without using AD while ambulating at home from time to time despite education     LTG: (to be met in 20 treatments)  7. ? Balance:   a. Pt will be able to complete 360 deg turn with near equal weightbearing, albeit slow  b. Pt will score at least 41/56 on Mccurdy to indicate significant change in static/dynamic balance abilities since initial evaluation. 8. ? Strength:  a. 2+/5 R knee ext strength to increase R knee control in SLS  b. 4+/5 R hip ext/abd, 5-/5 R hamstrings to improve swing and stance phase with gait  9. ? Function:  a. Pt will be able to stand from 20 in chair 10x without UE assist and equal weightbearing with no more than minimal cuing from therapist.  b. Pt will be able to ascend/descend stairs using reciprocal ascent/descent with unilat UE assist and fair speed, fair R knee eccentric control  c. Pt will be able to ambulate 300 ft with improving L trunk lean, no more than 25% incidence of R knee hyperextension throughout, and at .5m/s           Patient goals: \"strengthen what we can, improve walking\"    Pt. Education:  [x] Yes  [] No  [x] Reviewed Prior HEP/Ed  Method of Education: [x] Verbal  [] Demo  [] Written  Comprehension of Education:  [x] Verbalizes understanding. [x] Demonstrates understanding. [] Needs review. [] Demonstrates/verbalizes HEP/Ed previously given. Current HEP: resisted TKEs w/ blueberry band, squats, standing ham curls and marches     Plan: [x] Continue current frequency toward long and short term goals.     [x] Specific Instructions for subsequent treatments: stair climbing, R quad eccentric control, RLE single limb stance, treadmill training - reprovide HEP       Time In: 10:05 am          Time Out: 11:00 am    Electronically signed by:  Michael West PT

## 2021-05-10 ENCOUNTER — HOSPITAL ENCOUNTER (OUTPATIENT)
Dept: PHYSICAL THERAPY | Age: 61
Setting detail: THERAPIES SERIES
Discharge: HOME | End: 2021-05-10
Payer: MEDICARE

## 2021-05-10 ENCOUNTER — HOSPITAL ENCOUNTER (OUTPATIENT)
Dept: OCCUPATIONAL THERAPY | Age: 61
Setting detail: THERAPIES SERIES
Discharge: HOME | End: 2021-05-10
Payer: MEDICARE

## 2021-05-10 PROCEDURE — 97035 APP MDLTY 1+ULTRASOUND EA 15: CPT

## 2021-05-10 PROCEDURE — 97110 THERAPEUTIC EXERCISES: CPT

## 2021-05-10 PROCEDURE — 97032 APPL MODALITY 1+ESTIM EA 15: CPT

## 2021-05-10 PROCEDURE — 97140 MANUAL THERAPY 1/> REGIONS: CPT

## 2021-05-10 PROCEDURE — 97116 GAIT TRAINING THERAPY: CPT

## 2021-05-10 NOTE — FLOWSHEET NOTE
[x] Brayan Savlatore       Occupational Therapy            1st floor       [address], [place]         Phone: [phone]       Fax: [phone] [] [facility]  [address] [place], [place]  Phone: [phone]  Fax: [phone]      Occupational Therapy Daily Treatment Note    Date:  5/10/2021  Patient Name:  Quintin Farley    :  1960  MRN: [id number]  Visit# / total visits: ; Progress note for Medicare patient due at visit 8    Patient: Quintin Farley                      : 1960                      MRN: [id number]  Referring Provider:  Yuan Gonzalez  Insurance: [employer]   visits left  Medical Diagnosis: History of stroke Z86.73             Rehab Codes: stiffness in shoulder M25.61,, stiffness in elbow M25.62,, stiffness in hand M25.64,, stiffness in wrist M25.63,, lack of coordination R27.8,, fine motor skills loss R29.818, or muscle weakness generalized M62.81,  Onset Date:    3-          Next Dr. Coby Mckenzie: 21  Cancels/No Shows: 0/0    Subjective:    Pain:  [] Yes  [x] No Location: shoulder with range Pain Rating: (0-10 scale) 0/10 at rest  Pain altered Tx:  [x] No  [] Yes  Action:  Pt Comments:  \"I watched a video on how the arm machine works, its really cool\"   Objective:  Modalities:  Modality Flow Sheet:  START STOP Tx Modality   2021  Electrical Stim: [place] cycle  1.  Shoulder subluxation:  Time: 15 minutes   Cycle: 10/20  Patch location: anterior and posterior deltoid, middle deltoid and supraspinatus   Up to ~64Hz intensity         Ultrasound: ___ W/cm2 x ___ mins  Duty factor: __100%  __50%  __33% __20%  Head size:    ___ cm  MHz: __1mHz  __3mHz  Location:     Hot Pack:     Cold Pack:     Precautions: NA  Exercises:  EXERCISE    REPS/     TIME  WEIGHT/    LEVEL COMMENTS   PROM  Shoulder internal/external  Flex/ext  Elbow flex/ext  Wrist flex/ext  Digit extension 30 mins  Completed    Wooden pegs with R hand (place in)   completed    Cone stacking 10  Completed with mirror for visual stimulation on shoulder compensation. Other: Completed E Stim with \"no pain\" this date. See parameters above. Pt demo good spirits this date and reports that the shoulder harness has been helping. Pt demo good isolated motion of shoulder for cone stacking. Pt unable to complete extension of digits for release of cones. Treatment Charges: Mins Units   [x]  Modalities:   E-stim   20   1   []  Ultrasound     [x]  Ther Exercise 10 1   [x]  Manual Therapy 30 2   []  Ther Activities     []  Orthotic fit/train     []  Orthotic recheck     []  Other     Total Treatment time 60 4       Assessment: [x] Progressing toward goals. Pt with active tenodesis response, no noted ability to actively extend or flex digits. Demo trace movements when attempting digit extension with wrist in flexed position. [] No change. [] Other     [x] Patient would benefit from skilled occupational therapy services in order to: Improve  Rehab Potential, Motor control/Tone in UE, ROM, Strength and Coordination deficit in order to ensure good follow through and decrease pain in UE for safe completion of ADLs     Short Term Goals: ( 6  Treatments)  1. Increase AROM (degrees) (extension/flexion)  a. R shoulder to 60/40  b. R elbow -20/130  c. R wrist -30/50  d. R radial/ulnar dev 15/10  2. Increase strength (pounds);  a. R  strength to 22 pounds  b. R lateral pinch to 10 pounds  c. R 2 point pinch to 12 pounds  3. Increase function:UE Functional Index Score 40 or more points to promote increased functional abilities  4. Patient to be independent with home exercise program as demonstrated by performance with correct form without cues.     Long Term Goals: (  12 Treatments)  1. Move digits in R hand independently with trace movement for promotion of future use for guitar  2.  Demo R hand extension to half of [facility] for increased I with  on R side  3. Increase R UE Shoulder ROM to lift arm up for I with adl tasks at home. 50/45 R shoulder    Pt. Education:  [x] Yes  [] No  [] Reviewed Prior HEP/Ed  Method of Education: [x] Verbal  [] Demo  [] Written  Re: e-stim treatment   Comprehension of Education:  [x] Verbalizes understanding. [] Demonstrates understanding. [] Needs review. [] Demonstrates/verbalizes HEP/Ed previously given. Plan: [x] Continue current frequency toward short and long term goals.   [x] Specific Instructions for subsequent treatments: estim for wrist, digit, and thumb extension    [] Other:    Time In: 6336-7428     Electronically signed by:  MODESTO Chiu/JANNET

## 2021-05-10 NOTE — FLOWSHEET NOTE
[x] [facility]) [facility]  [facility]  [address]  [phone]  F: [phone] [] [address]  [facility]   [address]  P: [phone]  F: [phone] [] [address] &  Therapy  [address] [facility]  P: [phone]  F: [phone] [] [address]  P: [phone]  F: [phone] [] [address]  [facility]   [address]   [place]: [phone]  F: [phone]      Physical Therapy Daily Treatment Note    Date:  5/10/2021  Patient Name:  Vandana Schaffer    :  1960  MRN: [id number]  Physician: Dr. Yinka Underwood: [place] [employer] ([id number])  Medical Diagnosis:   History of stroke  Rehab Codes: R53.1, R29.3, R27.9, R26.9, M25.60  Next 's appt.: 21  Date of symptom onset: 19  Visit# / total visits:     Cancels/No Shows: 0     Subjective:     Pain:  [] Yes  [x] No Location:  N/A Pain Rating: (0-10 scale) 0/10  Pain altered Tx:  [x] No  [] Yes  Action:  Comments: Pt reports no pain, just feeling \"tight\" in back.          Objective:  Modalities:   Precautions:  Exercises: Bolded completed 5/10/2021:  Exercise Reps/ Time Weight/ Level Comments   Nu-step 6 min L7 RUE  device used  - HELD d/t pt preference this date         HEP review      Lateral weight shifts w/ TKE 2x20 Purple     SLS longer holds  6x15\"  BUE assist   SLS eccentric stand>sit      Hamstring curl 2x10 2# Blocking hip flexor compensation   Hip ext      Fwd heel tap  2\", // bars BUE assist   Sit-to-Stands  10 21\"          Box squat, LLE bias 2x12 22\"  20\" box, blue foam pad on top   Bilateral ER w/ scap retract 3x15 purple In between rest breaks   Squat, tap ball to step      Standing reaching, all directions 2 min Encouraging RLE weightbearing     Seated thoracic/lumbar ext against bolster 15x  Big red bolster behind back                     Leg press - DL 1x10 60# DL = double limb   Leg press - SL 20x 10# SL = single limb         Gait [id number]  Intermittent min A for increased RLE weightshift in stance, reduced excessive ER at foot/tibia   Stair training 3 rounds, 7 min 2 step stairs in inpatient gym Progressed to reciprocal ascent/descent with unilat UE assist, CGA/David   Treadmill  2x2min  . 3-. 4mph  Min A   - Chair placed on treadmill for breaks    Other: Significant time spent in HEP review to fatigue and to allow appropriate time for repetitive blocked practice. Treatment Charges: Mins Units   []  Modalities     [x]  Ther Exercise 48 3   []  Manual Therapy     []  Ther Activities     []  Aquatics     []  Vasocompression     []  Other: neuro re-ed     [x]  Other: gait 9 1   Total Treatment time 57 4       Assessment: [x] Progressing toward goals. Began session with HEP review, focusing on proper mechanics and resistance level for TKE; min tactile and VC's still required. RLE SLS withheld from HEP at this time until pt demo more stability in session. Pt fatigue seemed worsened today, limiting exercise tolerance; possibly attributing to decrease in SLS time since last session (30s-15s). Initiated Treadmill training - demo'd limitation in R swing phase and decreased R stance time, indicating need for hip flexion/knee extension strengthening. Pt demo'd decreased R circumduction and improved weight shifting in 2nd rep on treadmill, indicating good potential for gait improvements. [] No change. [x] Other: 3-4 min seated rest breaks needed after every set of strengthening; significant fatigue continues.     [x] Patient would continue to benefit from skilled physical therapy services in order to: progress RLE strength, address gait deviations, improve standing static and dynamic balance impairment, improve treatments)  7. ? Balance:   a. Pt will be able to complete 360 deg turn with near equal weightbearing, albeit slow  b. Pt will score at least 41/56 on Mccurdy to indicate significant change in static/dynamic balance abilities since initial evaluation. 8. ? Strength:  a. 2+/5 R knee ext strength to increase R knee control in SLS  b. 4+/5 R hip ext/abd, 5-/5 R hamstrings to improve swing and stance phase with gait  9. ? Function:  a. Pt will be able to stand from 20 in chair 10x without UE assist and equal weightbearing with no more than minimal cuing from therapist.  b. Pt will be able to ascend/descend stairs using reciprocal ascent/descent with unilat UE assist and fair speed, fair R knee eccentric control  c. Pt will be able to ambulate 300 ft with improving L trunk lean, no more than 25% incidence of R knee hyperextension throughout, and at .5m/s           Patient goals: \"strengthen what we can, improve walking\"    Pt. Education:  [x] Yes  [] No  [x] Reviewed Prior HEP/Ed  Method of Education: [x] Verbal  [x] Demo  [x] Written  Comprehension of Education:  [x] Verbalizes understanding. [x] Demonstrates understanding. [x] Needs review for consistency . [] Demonstrates/verbalizes HEP/Ed previously given. Current HEP: resisted TKEs w/ purple band, sit-to-stands (22-23\"), squats, standing ham curls, standing hip ext, and marches     Plan: [x] Continue current frequency toward long and short term goals.     [x] Specific Instructions for subsequent treatments: stair climbing, R quad eccentric control, RLE single limb stance and swing, treadmill training - reprovide HEP       Time In: 10:06 am          Time Out: 11:03 am    Electronically signed by:  Marietta Lucas PT

## 2021-05-14 ENCOUNTER — HOSPITAL ENCOUNTER (OUTPATIENT)
Dept: PHYSICAL THERAPY | Age: 61
Setting detail: THERAPIES SERIES
Discharge: HOME | End: 2021-05-14
Payer: MEDICARE

## 2021-05-14 ENCOUNTER — APPOINTMENT (OUTPATIENT)
Dept: OCCUPATIONAL THERAPY | Age: 61
End: 2021-05-14
Payer: MEDICARE

## 2021-05-14 PROCEDURE — 97116 GAIT TRAINING THERAPY: CPT

## 2021-05-14 PROCEDURE — 97110 THERAPEUTIC EXERCISES: CPT

## 2021-05-14 NOTE — FLOWSHEET NOTE
[x] [facility]) [facility]  [facility]  [address]  [phone]  F: [phone] [] [address]  [facility] [address]   [address]  P: [phone]  F: [phone] [] [address] &  Therapy  [address]  P: [phone]  F: [phone] [] [address]  P: [phone]  F: [phone] [] [address]  [facility]   [address]   [place]: [phone]  F: [phone]      Physical Therapy Daily Treatment Note    Date:  2021  Patient Name:  Jazmin Cardoza    :  1960  MRN: [id number]  Physician: Dr. Anaid Pierre: [place] [employer] ([id number])  Medical Diagnosis:   History of stroke  Rehab Codes: R53.1, R29.3, R27.9, R26.9, M25.60  Next 's appt.: 21  Date of symptom onset: 19  Visit# / total visits:     Cancels/No Shows: 0     Subjective:     Pain:  [] Yes  [x] No Location:  N/A Pain Rating: (0-10 scale) 0/10  Pain altered Tx:  [x] No  [] Yes  Action:  Comments: Pt reports he might've overdid it yesterday with exercises, still noting some cramping/tightness in his right calf/hamstring.         Objective:  Modalities:   Precautions:  Exercises: Bolded completed 2021:  Exercise Reps/ Time Weight/ Level Comments   Nu-step 6 min L7 RUE  device used  - HELD d/t pt preference this date         Lateral weight shifts w/ TKE 2x20 Purple     Foot tap up to step 20x 6\"    SLS longer holds  6x15\"  BUE assist   Hamstring curl 15x 2# Blocking hip flexor compensation   March 15x 2#    Hip ext      Fwd heel tap  2\", // bars BUE assist   Sit-to-Stands  10 21\"          Box squat, LLE bias 2x12 22\"  20\" box, blue foam pad on top   Bilateral ER w/ scap retract 3x15 purple In between rest breaks Squat, tap ball to step      Standing reaching, all directions 2 min  Encouraging RLE weightbearing     Seated thoracic/lumbar ext against bolster 15x  Big red bolster behind back                     Leg press - DL 1x15 60# DL = double limb   Leg press - SL 20x 10# SL = single limb         Gait [id number]  Intermittent min A for increased RLE weightshift in stance, reduced excessive ER at foot/tibia   Stair training 3 rounds, 7 min 2 step stairs in inpatient gym Progressed to reciprocal ascent/descent with unilat UE assist, CGA/David   Treadmill  4x 2min  .5-  . 7mph Min A   - Chair placed on treadmill for breaks    Other: Significant time spent in gait training, although short bouts does require appropriate therapeutic rest (either sitting or standing) between each bout        Treatment Charges: Mins Units   []  Modalities     [x]  Ther Exercise 26 2   []  Manual Therapy     []  Ther Activities     []  Aquatics     []  Vasocompression     []  Other: neuro re-ed     [x]  Other: gait 30 2   Total Treatment time 56 4       Assessment: [x] Progressing toward goals. Continued to initiate treadmill training focused on increased RLE step length, increased RLE weightshift, and reciprocal, even stepping - requires min A at trunk and RLE to allow for this. Continued to be limited to 2 min bouts d/t fatigue, however able to increase speed this date. Does improve gait mechanics with each repetition, more assist needed at RLE when fatiguing around 1 min. [] No change. [x] Other: 3-4 min seated rest breaks needed after every set of strengthening; significant fatigue continues. [x] Patient would continue to benefit from skilled physical therapy services in order to: progress RLE strength, address gait deviations, improve standing static and dynamic balance impairment, improve safety and independence with functional mobility both at home and in community. STG: (to be met in 10 treatments) - Assessed on 5/7/21:  1. ? ROM: Pt will demo no excessive ER of RLE in standing at rest or sitting at rest to indicate reduced tightness in external rotators and normalize posture - Making progress, still with mild ER in standing/amb but overall improvement  2. ? Balance:    a. Pt will be able to reach outside MED towards R both up and down without balance deficit - Making progress, still mild balance deficit with further outside MED fwd and right but overall increased stability with RLE weightshift  b. Pt will be able to maintain RLE SLS for 30s with min UE assist and good neuromuscular control noted - Nearly met, mod UE assist required  3. ? Strength:  a. 2/5 R knee ext strength to increase R knee control in SLS - MET, 2/5  b. 4/5 R hip ext/abd, 4+/5 R hamstrings to improve swing and stance phase with gait - Partially met, 4/5 hip ext/abd, 4/5 hamstrings  4. ? Function:   a. Pt will be able to stand from 24 in chair 10x without UE assist and equal weightbearing with no more than minimal cuing from therapist. - MET   b. Pt will be able to ascend/descend stairs using reciprocal ascent/descent with BUE assist and fair speed, fair R knee eccentric control - MET, CGA required, only on 4-6\" steps at current  c. Pt will be able to ambulate 150 ft with improving L trunk lean, no more than 50% incidence of R knee hyperextension throughout, and at .4m/s - NOT MET,  .28 m/s self selected, 23.96 .42 m/s fast    5. Patient to be independent with home exercise program as demonstrated by performance with correct form without cues. - Ongoing, needs cuing for specific exercise completion vs just general activity  6. Demonstrate Knowledge of fall prevention - Making progress, still demos risky behavior without using AD while ambulating at home from time to time despite education     LTG: (to be met in 20 treatments)  7. ? Balance:   a. Pt will be able to complete 360 deg turn with near equal weightbearing, albeit slow  b.  Pt will score at least 41/56 on Mccurdy to indicate significant change in static/dynamic balance abilities since initial evaluation. 8. ? Strength:  a. 2+/5 R knee ext strength to increase R knee control in SLS  b. 4+/5 R hip ext/abd, 5-/5 R hamstrings to improve swing and stance phase with gait  9. ? Function:  a. Pt will be able to stand from 20 in chair 10x without UE assist and equal weightbearing with no more than minimal cuing from therapist.  b. Pt will be able to ascend/descend stairs using reciprocal ascent/descent with unilat UE assist and fair speed, fair R knee eccentric control  c. Pt will be able to ambulate 300 ft with improving L trunk lean, no more than 25% incidence of R knee hyperextension throughout, and at .5m/s           Patient goals: \"strengthen what we can, improve walking\"    Pt. Education:  [x] Yes  [] No  [x] Reviewed Prior HEP/Ed  Method of Education: [x] Verbal  [x] Demo  [x] Written  Comprehension of Education:  [x] Verbalizes understanding. [x] Demonstrates understanding. [x] Needs review for consistency . [] Demonstrates/verbalizes HEP/Ed previously given. Current HEP: resisted TKEs w/ purple band, sit-to-stands (22-23\"), squats, standing ham curls, standing hip ext, and marches     Plan: [x] Continue current frequency toward long and short term goals.     [x] Specific Instructions for subsequent treatments: stair climbing, R quad eccentric control, RLE single limb stance and swing, treadmill training - reprovide HEP       Time In: 10:04 am          Time Out: 11:00 am    Electronically signed by:  Negra Cunningham PT

## 2021-05-17 ENCOUNTER — HOSPITAL ENCOUNTER (OUTPATIENT)
Dept: OCCUPATIONAL THERAPY | Age: 61
Setting detail: THERAPIES SERIES
Discharge: HOME | End: 2021-05-17
Payer: MEDICARE

## 2021-05-17 ENCOUNTER — HOSPITAL ENCOUNTER (OUTPATIENT)
Dept: PHYSICAL THERAPY | Age: 61
Setting detail: THERAPIES SERIES
Discharge: HOME | End: 2021-05-17
Payer: MEDICARE

## 2021-05-17 PROCEDURE — 97035 APP MDLTY 1+ULTRASOUND EA 15: CPT

## 2021-05-17 PROCEDURE — 97116 GAIT TRAINING THERAPY: CPT

## 2021-05-17 PROCEDURE — 97032 APPL MODALITY 1+ESTIM EA 15: CPT

## 2021-05-17 PROCEDURE — 97140 MANUAL THERAPY 1/> REGIONS: CPT

## 2021-05-17 PROCEDURE — 97110 THERAPEUTIC EXERCISES: CPT

## 2021-05-17 NOTE — FLOWSHEET NOTE
[x] [facility]  [facility]  [address]  [phone]  F: [phone] [] [address]  [employer] 36   [address]  P: [phone]  F: [phone] [] [address] &  Therapy  [address]  P: [phone]  F: [phone] [] [address]  P: [phone]  F: [phone] [] [address]  [facility]   [address]   [place]: [phone]  F: [phone]      Physical Therapy Daily Treatment Note    Date:  2021  Patient Name:  Marielos Jaramillo    :  1960  MRN: [id number]  Physician: Dr. Shikha Spencer: [employer] [employer] ([id number])  Medical Diagnosis:   History of stroke  Rehab Codes: R53.1, R29.3, R27.9, R26.9, M25.60  Next 's appt.: 21  Date of symptom onset: 19  Visit# / total visits:     Cancels/No Shows: 0     Subjective:     Pain:  [] Yes  [x] No Location:  N/A Pain Rating: (0-10 scale) 0/10  Pain altered Tx:  [x] No  [] Yes  Action:  Comments: Pt arrives for H200 and L300 Go fitting/trial from [employer] this date.         Objective:  Modalities:   Precautions:  Exercises: HELD d/t [employer] NMES unit trials, only gait training completed:  Exercise Reps/ Time Weight/ Level Comments   Nu-step 6 min L7 RUE  device used  - HELD d/t pt preference this date         Lateral weight shifts w/ TKE 2x20 Purple     Foot tap up to step 20x 6\"    SLS longer holds  6x15\"  BUE assist   Hamstring curl 15x 2# Blocking hip flexor compensation   March 15x 2#    Hip ext      Fwd heel tap  2\", // bars BUE assist   Sit-to-Stands  10 21\"          Box squat, LLE bias 2x12 22\"  20\" box, blue foam pad on top   Bilateral ER w/ scap retract 3x15 purple In between rest breaks   Squat, tap ball to step      Standing reaching, all directions 2 min  Encouraging RLE weightbearing     Seated thoracic/lumbar ext against bolster 15x  Big red bolster behind back                     Leg press - DL 1x15 60# DL = double limb   Leg press - SL 20x 10# SL = single limb         Gait [id number]  Intermittent min A for increased RLE weightshift in stance, reduced excessive ER at foot/tibia   Stair training 3 rounds, 7 min 2 step stairs in inpatient gym Progressed to reciprocal ascent/descent with unilat UE assist, CGA/David   Treadmill  4x 2min  .5-  . 7mph Min A   - Chair placed on treadmill for breaks    Other: Entirety of session spent on [employer] NMES unit trials - billed only for 8 min gait train using device        Treatment Charges: Mins Units   []  Modalities     []  Ther Exercise     []  Manual Therapy     []  Ther Activities     []  Aquatics     []  Vasocompression     []  Other: neuro re-ed     [x]  Other: gait 8 1   Total Treatment time 8 1       Assessment: [] Progressing toward goals. [] No change. [x] Other: Trialed NMES units through [employer] for improving grasp/finger ext, as well as RLE single limb stability through quad stimulation and increased DF with swing. Pt does demo improved gait mechanics as compared to without AFO, and improved safety of ambulation. With H200 unit, pt is able to actively extend fingers, which pt is unable to do at all voluntarily at this time. To allow for improved motor learning and control, as well as at-home NMES strengthening, pt planning to obtain these devices and hopefully applying this to insurance for coverage. [x] Patient would continue to benefit from skilled physical therapy services in order to: progress RLE strength, address gait deviations, improve standing static and dynamic balance impairment, improve safety and independence with functional mobility both at home and in community.     STG: (to be met in 10 treatments) - Assessed on 5/7/21:  1. ? ROM: Pt will demo

## 2021-05-19 ENCOUNTER — OFFICE VISIT (OUTPATIENT)
Dept: NEUROLOGY | Age: 61
End: 2021-05-19
Payer: MEDICARE

## 2021-05-19 VITALS
HEIGHT: 71 IN | SYSTOLIC BLOOD PRESSURE: 125 MMHG | WEIGHT: 180 LBS | BODY MASS INDEX: 25.2 KG/M2 | DIASTOLIC BLOOD PRESSURE: 78 MMHG | HEART RATE: 95 BPM

## 2021-05-19 DIAGNOSIS — Z86.73 HISTORY OF STROKE: ICD-10-CM

## 2021-05-19 DIAGNOSIS — F32.A ANXIETY AND DEPRESSION: ICD-10-CM

## 2021-05-19 DIAGNOSIS — G81.91 RIGHT HEMIPLEGIA (HCC): Primary | ICD-10-CM

## 2021-05-19 DIAGNOSIS — F41.9 ANXIETY AND DEPRESSION: ICD-10-CM

## 2021-05-19 PROBLEM — I63.9 CVA (CEREBRAL VASCULAR ACCIDENT) (HCC): Status: RESOLVED | Noted: 2019-09-13 | Resolved: 2021-05-19

## 2021-05-19 PROCEDURE — G8427 DOCREV CUR MEDS BY ELIG CLIN: HCPCS | Performed by: NURSE PRACTITIONER

## 2021-05-19 PROCEDURE — G8419 CALC BMI OUT NRM PARAM NOF/U: HCPCS | Performed by: NURSE PRACTITIONER

## 2021-05-19 PROCEDURE — 1036F TOBACCO NON-USER: CPT | Performed by: NURSE PRACTITIONER

## 2021-05-19 PROCEDURE — 99214 OFFICE O/P EST MOD 30 MIN: CPT | Performed by: NURSE PRACTITIONER

## 2021-05-19 PROCEDURE — 3017F COLORECTAL CA SCREEN DOC REV: CPT | Performed by: NURSE PRACTITIONER

## 2021-05-19 RX ORDER — FLUOXETINE HYDROCHLORIDE 20 MG/1
20 CAPSULE ORAL DAILY
Qty: 30 CAPSULE | Refills: 5 | Status: SHIPPED | OUTPATIENT
Start: 2021-05-19 | End: 2021-11-23 | Stop reason: SDUPTHER

## 2021-05-19 NOTE — PROGRESS NOTES
[facility]            [address]angel[address] Vel[address]          [place], [address] [facility]          Dept: [phone]          Dept Fax: [phone]        MD Ritika Gaffney MD Orland Corral, MD Oneida Bertrand, KAREN            5/19/2021      HISTORY OF PRESENT ILLNESS:       I had the pleasure of seeing Leif Almonte, who returns for continuing neurologic care. The patient was seen last on December 17, 2020 for treatment of previous left capsular ischemic stroke secondary to uncontrolled hypertension and uncontrolled diabetes in august 2019. The patient was previously seen by Dr. Mick Bliss and all of the records and diagnostic studies were carefully reviewed. For secondary stroke prevention he is prescribed aspirin 81 mg and lipitor 20 mg daily. The patient is currently in physical therapy and is here today requesting prescriptions for devices for physical therapy. He has previously tried them at PT and they did provide him help. The patient did have minimal dysarthria following his stroke that resided rather quickly. Most of his residual problems were motor based. The patient has noticed that since attending PT his foot drop has improved. He uses a cane to ambulate and has a right foot drop    The patient does report that he has been having issues with issues of spontaneous crying. He is requesting to be restarted on prozac at this time. He denies laughing at inappropriate times.          Testing reviewed:    Labs Completed 1/6/2021  Hemoglobin A1C 6.5High       Cholesterol 167  <200 mg/dL     HDL 34Low   >40 mg/dL     LDL Cholesterol 111  0 - 130 mg/dL           8/5/2019  CTH: Hypodense abnormality in the right? basal ganglia   CTA Head and Neck : Unremarkable      8/6/2019  Brain MRI : Periventricular white matter disease of a chronic nature with a focal area of acute ischemia in the left posterior limb of the internal capsule.      2 D echo  EF 60-65%     OTHER WORKUP:  -> 42   A1c 11.6-> 8.8 (9/13/2019)-> 6.8     PMH/PSH/SH/FMH: Remain unchanged since last visit except those listed in the interval history. PAST MEDICAL HISTORY:         Diagnosis Date    Cerebral artery occlusion with cerebral infarction ([address]     Diabetes mellitus ([address]     High cholesterol     Hypertension         PAST SURGICAL HISTORY:         Procedure Laterality Date    KNEE SURGERY  1983        SOCIAL HISTORY:     Social History     Socioeconomic History    Marital status: Single     Spouse name: Not on file    Number of children: Not on file    Years of education: Not on file    Highest education level: Not on file   Occupational History    Not on file   Tobacco Use    Smoking status: Never Smoker    Smokeless tobacco: Never Used   Vaping Use    Vaping Use: Never used   Substance and Sexual Activity    Alcohol use: Not Currently    Drug use: Never    Sexual activity: Not on file   Other Topics Concern    Not on file   Social History Narrative    Not on file     Social Determinants of Health     Financial Resource Strain:     Difficulty of Paying Living Expenses:    Food Insecurity:     Worried About Running Out of Food in the Last Year:     Ran Out of Food in the Last Year:    Transportation Needs:     Lack of Transportation (Medical):      Lack of Transportation (Non-Medical):    Physical Activity:     Days of Exercise per Week:     Minutes of Exercise per Session:    Stress:     Feeling of Stress :    Social Connections:     Frequency of Communication with Friends and Family:     Frequency of Social Gatherings with Friends and Family:     Attends [employer] Services:     Active Member of Clubs or Organizations:     Attends Club or Organization Meetings:     Marital Status:    Intimate Partner Violence:     Fear of Current or Ex-Partner:     Emotionally Abused:     Physically Abused:     Sexually Abused:        CURRENT MEDICATIONS:     Current Outpatient Medications   Medication Sig Dispense Refill    FLUoxetine (PROZAC) 20 MG capsule Take 1 capsule by mouth daily 30 capsule 5    blood glucose monitor strips Test 3 times a day & as needed for symptoms of irregular blood glucose. 200 strip 3    atorvastatin (LIPITOR) 20 MG tablet Take 1 tablet by mouth daily 30 tablet 5    hydrALAZINE (APRESOLINE) 25 MG tablet TAKE ONE TABLET BY MOUTH DAILY 90 tablet 1    potassium chloride (KLOR-CON M) 20 MEQ TBCR extended release tablet TAKE ONE TABLET BY MOUTH DAILY 90 tablet 1    amLODIPine (NORVASC) 10 MG tablet TAKE ONE TABLET BY MOUTH DAILY 90 tablet 1    LEVEMIR FLEXTOUCH 100 UNIT/ML injection pen INJECT UNDER THE SKIN 16 UNITS ONCE NIGHTLY 5 pen 3    B-D ULTRAFINE III SHORT PEN 31G X 8 MM MISC USE ONE NEEDLE DAILY 100 each 1    Lancets (ONETOUCH DELICA PLUS EQUWAC12W) MISC USE TO TEST TWO TIMES A  each 3    albuterol sulfate HFA (VENTOLIN HFA) 108 (90 Base) MCG/ACT inhaler Inhale 2 puffs into the lungs every 4 hours as needed for Wheezing 3 Inhaler 0    insulin lispro, 1 Unit Dial, (HUMALOG KWIKPEN) 100 UNIT/ML SOPN According to sliding scale. Max dose per day 48 3 pen 1    blood glucose monitor kit and supplies Test 3  times a day & as needed for symptoms of irregular blood glucose. ONE TOUCH METER 1 kit 0    aspirin 81 MG chewable tablet Take 81 mg by mouth daily      Insulin Pen Needle (PEN NEEDLES) 31G X 6 MM MISC Inject 1 each into the skin daily 100 each 3     No current facility-administered medications for this visit. ALLERGIES:     Allergies   Allergen Reactions    Dairycare [Lactase-Lactobacillus]                                  REVIEW OF SYSTEMS        All items selected indicate a positive finding. Those items not selected are negative.   Constitutional [] Weight loss/gain   [] Fatigue  [] Fever/Chills   HEENT [] Hearing Loss  [] Visual Disturbance  [] Tinnitus  [] Eye pain Respiratory [] Shortness of Breath  [] Cough  [] Snoring   Cardiovascular [] Chest Pain  [] Palpitations  [] Lightheaded   GI [] Constipation  [] Diarrhea  [] Swallowing change  [] Nausea/vomiting    [] Urinary Frequency  [] Urinary Urgency   Musculoskeletal [] Neck pain  [] Back pain  [] Muscle pain  [] Restless legs   Dermatologic [] Skin changes   Neurologic [] Memory loss/confusion  [] Seizures  [x] Trouble walking or imbalance  [] Dizziness  [] Sleep disturbance  [x] Weakness  [] Numbness  [] Tremors  [] Speech Difficulty  [] Headaches  [] Light Sensitivity  [] Sound Sensitivity   Endocrinology []Excessive thirst  []Excessive hunger   Psychiatric [x] Anxiety/Depression  [] Hallucination   Allergy/immunology []Hives/environmental allergies   Hematologic/lymph [] Abnormal bleeding  [] Abnormal bruising         PHYSICAL EXAMINATION:       Vitals:    05/19/21 1431   BP: 125/78   Pulse: 95                                              .                                                                                                    General Appearance:  Alert, cooperative, no signs of distress, appears stated age   Head:  Normocephalic, no signs of trauma   Eyes:  Conjunctiva/corneas clear;  eyelids intact   Ears:  Normal external ear and canals   Nose: Nares normal, mucosa normal, no drainage    Throat: Lips and tongue normal; teeth normal;  gums normal   Neck: Supple, intact flexion, extension and rotation;   trachea midline;  no adenopathy;   thyroid: not enlarged;   no carotid pulse abnormality   Back:   Symmetric, no curvature, ROM adequate   Lungs:   Respirations unlabored   Heart:  Regular rate and rhythm           Extremities: Extremities normal, no cyanosis, no edema   Pulses: Symmetric over head and neck   Skin: Skin color, texture normal, no rashes, no lesions                                     NEUROLOGIC EXAMINATION    Neurologic Exam  Mental status    Alert and oriented x 3; intact memory with no confusion, speech or language problems; no hallucinations or delusions  Fund of information appropriate for level of education    Cranial nerves    II - visual fields intact to confrontation bilaterally  III, IV, VI - extra-ocular muscles full: no pupillary defect; no SARAY, no nystagmus, no ptosis   V - normal facial sensation                                                               VII - normal facial symmetry                                                             VIII - intact hearing                                                                             IX, X - symmetrical palate                                                                  XI - symmetrical shoulder shrug                                                       XII - tongue midline without atrophy or fasciculation      Motor function  Right elbow flexion 3/5, right elbow extension 4/5,       Sensory function Intact to light touch, pinprick, vibration, proprioception on all 4 extremities      Cerebellar Intact fine motor movement. No involuntary movements or tremors. No ataxia or dysmetria on finger to nose or heel to shin testing      Reflex function DTR 2+ on bilateral UE and LE, symmetric. Negative Babinski      Gait                    ambulates with a right hemiparetic gait                  Medical Decision Making:        In summary, your patient, Naldo Smalls exhibits the following, with associated plan:    Depression  Restart prozac 20 mg daily  Previous left internal capsule ischemic stroke secondary to uncontrolled hypertension and uncontrolled diabetes in august 2019  Most recent A1C 6.5  Continue physical therapy  Order bioness functional electrical stim L300 right leg for foot drop associated with stroke   Order Bioness functional electrical stim H200 right arm to facilitate gripping of his right hand which will make him more functional.  This will help him with extension and flexion of his right hand as well as

## 2021-05-21 ENCOUNTER — HOSPITAL ENCOUNTER (OUTPATIENT)
Dept: OCCUPATIONAL THERAPY | Age: 61
Setting detail: THERAPIES SERIES
Discharge: HOME | End: 2021-05-21
Payer: MEDICARE

## 2021-05-21 ENCOUNTER — HOSPITAL ENCOUNTER (OUTPATIENT)
Dept: PHYSICAL THERAPY | Age: 61
Setting detail: THERAPIES SERIES
Discharge: HOME | End: 2021-05-21
Payer: MEDICARE

## 2021-05-21 PROCEDURE — 97110 THERAPEUTIC EXERCISES: CPT

## 2021-05-21 PROCEDURE — 97140 MANUAL THERAPY 1/> REGIONS: CPT

## 2021-05-21 PROCEDURE — 97035 APP MDLTY 1+ULTRASOUND EA 15: CPT

## 2021-05-21 PROCEDURE — 97033 APP MDLTY 1+IONTPHRSIS EA 15: CPT

## 2021-05-21 PROCEDURE — 97116 GAIT TRAINING THERAPY: CPT

## 2021-05-21 NOTE — FLOWSHEET NOTE
[x] [facility]) [facility] &  Therapy  [address]  [phone]  F: [phone] [] [address]  [address]   [address]  P: [phone]  F: [phone] [] [address] &  Therapy  [address] [facility]  P: [phone]  F: [phone] [] [address]  P: [phone]  F: [phone] [] [address]  [facility]   [address]   [place]: [phone]  F: [phone]      Physical Therapy Daily Treatment Note    Date:  2021  Patient Name:  Layo Ortiz    :  1960  MRN: [id number]  Physician: Dr. Joseph Fair: [place] [employer] ([id number])  Medical Diagnosis:   History of stroke  Rehab Codes: R53.1, R29.3, R27.9, R26.9, M25.60  Next 's appt.: 21  Date of symptom onset: 19  Visit# / total visits:     Cancels/No Shows: 0     Subjective:     Pain:  [] Yes  [x] No Location:  N/A Pain Rating: (0-10 scale) 0/10  Pain altered Tx:  [x] No  [] Yes  Action:  Comments: Pt notes he's \"okay\", states he's already heard back from his doctor and they've written him the prescription for the Bioness devices. Objective:  Modalities:   Precautions:  Exercises: Bolded exercises completed on 2021:  Exercise Reps/ Time Weight/ Level Comments   Treadmill gait 1x2 min  1x2.5 min  2x3 min .5- . 7mph  RUE  device used  - HELD d/t pt preference this date         Lateral weight shifts w/ TKE 2x20 Purple     Foot tap up to step 20x 6\"    SLS longer holds  6x15\"  BUE assist   Hamstring curl 15x 2# Blocking hip flexor compensation   March 15x 2#    Hip ext      Fwd heel tap  2\", // bars BUE assist   Fwd long lunge 15x  To cone, cues to increase step length and  foot with return         Box progress RLE strength, address gait deviations, improve standing static and dynamic balance impairment, improve safety and independence with functional mobility both at home and in community. STG: (to be met in 10 treatments) - Assessed on 5/7/21:  1. ? ROM: Pt will demo no excessive ER of RLE in standing at rest or sitting at rest to indicate reduced tightness in external rotators and normalize posture - Making progress, still with mild ER in standing/amb but overall improvement  2. ? Balance:    a. Pt will be able to reach outside MED towards R both up and down without balance deficit - Making progress, still mild balance deficit with further outside MED fwd and right but overall increased stability with RLE weightshift  b. Pt will be able to maintain RLE SLS for 30s with min UE assist and good neuromuscular control noted - Nearly met, mod UE assist required  3. ? Strength:  a. 2/5 R knee ext strength to increase R knee control in SLS - MET, 2/5  b. 4/5 R hip ext/abd, 4+/5 R hamstrings to improve swing and stance phase with gait - Partially met, 4/5 hip ext/abd, 4/5 hamstrings  4. ? Function:   a. Pt will be able to stand from 24 in chair 10x without UE assist and equal weightbearing with no more than minimal cuing from therapist. - MET   b. Pt will be able to ascend/descend stairs using reciprocal ascent/descent with BUE assist and fair speed, fair R knee eccentric control - MET, CGA required, only on 4-6\" steps at current  c. Pt will be able to ambulate 150 ft with improving L trunk lean, no more than 50% incidence of R knee hyperextension throughout, and at .4m/s - NOT MET,  .28 m/s self selected, 23.96 .42 m/s fast    5. Patient to be independent with home exercise program as demonstrated by performance with correct form without cues. - Ongoing, needs cuing for specific exercise completion vs just general activity  6.  Demonstrate Knowledge of fall prevention - Making progress, still demos risky behavior without using AD while ambulating at home from time to time despite education     LTG: (to be met in 20 treatments)  7. ? Balance:   a. Pt will be able to complete 360 deg turn with near equal weightbearing, albeit slow  b. Pt will score at least 41/56 on Mccurdy to indicate significant change in static/dynamic balance abilities since initial evaluation. 8. ? Strength:  a. 2+/5 R knee ext strength to increase R knee control in SLS  b. 4+/5 R hip ext/abd, 5-/5 R hamstrings to improve swing and stance phase with gait  9. ? Function:  a. Pt will be able to stand from 20 in chair 10x without UE assist and equal weightbearing with no more than minimal cuing from therapist.  b. Pt will be able to ascend/descend stairs using reciprocal ascent/descent with unilat UE assist and fair speed, fair R knee eccentric control  c. Pt will be able to ambulate 300 ft with improving L trunk lean, no more than 25% incidence of R knee hyperextension throughout, and at .5m/s           Patient goals: \"strengthen what we can, improve walking\"    Pt. Education:  [x] Yes  [] No  [x] Reviewed Prior HEP/Ed  Method of Education: [x] Verbal  [x] Demo  [x] Written  Comprehension of Education:  [x] Verbalizes understanding. [x] Demonstrates understanding. [x] Needs review for consistency . [] Demonstrates/verbalizes HEP/Ed previously given. Current HEP: resisted TKEs w/ purple band, sit-to-stands (22-23\"), squats, standing ham curls, standing hip ext, and marches     Plan: [x] Continue current frequency toward long and short term goals.     [x] Specific Instructions for subsequent treatments: stair climbing, R quad eccentric control, RLE single limb stance and swing, treadmill training - reprovide HEP       Time In:  2:00 pm          Time Out: 2:58 pm    Electronically signed by:  Ac Jacome PT

## 2021-05-21 NOTE — FLOWSHEET NOTE
[x] [facility]       Occupational Therapy            1st floor       [address], [place]         Phone: [phone]       Fax: [phone] [] [facility]  [address] [place] [address]  Phone: [phone]  Fax: [phone]      Occupational Therapy Daily Treatment Note    Date:  2021  Patient Name:  Steve Gee    :  1960  MRN: [id number]  Visit# / total visits: ; Progress note for Medicare patient due at visit 8    Patient: Steve Gee                      : 1960                      MRN: [id number]  Referring Provider:  Go Silva  Insurance: [employer]   visits left  Medical Diagnosis: History of stroke Z86.73             Rehab Codes: stiffness in shoulder M25.61,, stiffness in elbow M25.62,, stiffness in hand M25.64,, stiffness in wrist M25.63,, lack of coordination R27.8,, fine motor skills loss R29.818, or muscle weakness generalized M62.81,  Onset Date:    3-          Next Dr. Ken Russell: 21  Cancels/No Shows: 0/0    Subjective:    Pain:  [] Yes  [x] No Location: shoulder with range Pain Rating: (0-10 scale) 0/10 at rest  Pain altered Tx:  [x] No  [] Yes  Action:  Pt Comments:  \"you should have been there at the visit with the lady\"   Objective:  Modalities:  Modality Flow Sheet:  START STOP Tx Modality   2021  Electrical Stim: [place] cycle  1.  Shoulder subluxation:  Time: 15 minutes   Cycle: 10/10  Patch location: anterior and posterior deltoid, middle deltoid and supraspinatus   Up to ~64Hz intensity         Ultrasound: ___ W/cm2 x ___ mins  Duty factor: __100%  __50%  __33% __20%  Head size:    ___ cm  MHz: __1mHz  __3mHz  Location:     Hot Pack:     Cold Pack:     Precautions: NA  Exercises:  EXERCISE    REPS/     TIME  WEIGHT/    LEVEL COMMENTS   PROM  Shoulder internal/external  Flex/ext  Elbow flex/ext  Wrist flex/ext  Digit extension 30 mins  Completed    Wooden pegs with R without cues.     Long Term Goals: (  12 Treatments)  1. Move digits in R hand independently with trace movement for promotion of future use for guitar  2. Demo R hand extension to half of [facility] for increased I with  on R side  3. Increase R UE Shoulder ROM to lift arm up for I with adl tasks at home. 50/45 R shoulder    Pt. Education:  [x] Yes  [] No  [] Reviewed Prior HEP/Ed  Method of Education: [x] Verbal  [] Demo  [] Written  Re: e-stim treatment   Comprehension of Education:  [x] Verbalizes understanding. [] Demonstrates understanding. [] Needs review. [] Demonstrates/verbalizes HEP/Ed previously given. Plan: [x] Continue current frequency toward short and long term goals.   [x] Specific Instructions for subsequent treatments: estim for wrist, digit, and thumb extension    [] Other:    Time In: 3397-6994    Electronically signed by:  MODESTO Baker/JANNET

## 2021-05-24 ENCOUNTER — HOSPITAL ENCOUNTER (OUTPATIENT)
Dept: OCCUPATIONAL THERAPY | Age: 61
Setting detail: THERAPIES SERIES
Discharge: HOME | End: 2021-05-24
Payer: MEDICARE

## 2021-05-24 ENCOUNTER — HOSPITAL ENCOUNTER (OUTPATIENT)
Dept: PHYSICAL THERAPY | Age: 61
Setting detail: THERAPIES SERIES
Discharge: HOME | End: 2021-05-24
Payer: MEDICARE

## 2021-05-24 PROCEDURE — 97116 GAIT TRAINING THERAPY: CPT

## 2021-05-24 PROCEDURE — 97110 THERAPEUTIC EXERCISES: CPT

## 2021-05-24 PROCEDURE — 97033 APP MDLTY 1+IONTPHRSIS EA 15: CPT

## 2021-05-24 PROCEDURE — 97032 APPL MODALITY 1+ESTIM EA 15: CPT

## 2021-05-24 NOTE — FLOWSHEET NOTE
[x] [phone]  - [facility] TWELVE-STEP [facility] &  Therapy  [address]  [phone]  F: [phone] [] [address]  [address]   [address]  P: [phone]  F: [phone] [] [address] &  Therapy  [address]  P: [phone]  F: [phone] [] [address]  P: [phone]  F: [phone] [] [address]  [facility]   [address]   [place]: [phone]  F: [phone]      Physical Therapy Daily Treatment Note    Date:  2021  Patient Name:  Leif Almonte    :  1960  MRN: [id number]  Physician: Dr. Stareky Prost: [place] [employer] ([id number])  Medical Diagnosis:   History of stroke  Rehab Codes: R53.1, R29.3, R27.9, R26.9, M25.60  Next 's appt.: 21  Date of symptom onset: 19  Visit# / total visits: 15/20    Cancels/No Shows: 0      Subjective:     Pain:  [] Yes  [x] No Location:  N/A Pain Rating: (0-10 scale) 0/10  Pain altered Tx:  [x] No  [] Yes  Action:  Comments: Pt notes he's extremely fatigued today; noting he's feeling less anxiety from his new medication but feels more GI upset and thinks that's why he's \"off\" today. Objective:  Modalities:   Precautions:  Exercises: Bolded exercises completed on 2021:  Exercise Reps/ Time Weight/ Level Comments   Treadmill gait 4x2 min .5- . 7mph  Chair placed on treadmill during breaks         Lateral weight shifts w/ TKE 2x20 Purple     Foot tap up to step 20x 6\"    SLS longer holds  6x15\"  BUE assist   Hamstring curl 15x 2# Blocking hip flexor compensation   March 15x 2#    Hip ext      Fwd heel tap  2\", // bars BUE assist   Fwd long lunge 15x  To cone, cues to increase step length and  foot with return         Box sit<>stand functional mobility both at home and in community. STG: (to be met in 10 treatments) - Assessed on 5/7/21:  1. ? ROM: Pt will demo no excessive ER of RLE in standing at rest or sitting at rest to indicate reduced tightness in external rotators and normalize posture - Making progress, still with mild ER in standing/amb but overall improvement  2. ? Balance:    a. Pt will be able to reach outside MED towards R both up and down without balance deficit - Making progress, still mild balance deficit with further outside MED fwd and right but overall increased stability with RLE weightshift  b. Pt will be able to maintain RLE SLS for 30s with min UE assist and good neuromuscular control noted - Nearly met, mod UE assist required  3. ? Strength:  a. 2/5 R knee ext strength to increase R knee control in SLS - MET, 2/5  b. 4/5 R hip ext/abd, 4+/5 R hamstrings to improve swing and stance phase with gait - Partially met, 4/5 hip ext/abd, 4/5 hamstrings  4. ? Function:   a. Pt will be able to stand from 24 in chair 10x without UE assist and equal weightbearing with no more than minimal cuing from therapist. - MET   b. Pt will be able to ascend/descend stairs using reciprocal ascent/descent with BUE assist and fair speed, fair R knee eccentric control - MET, CGA required, only on 4-6\" steps at current  c. Pt will be able to ambulate 150 ft with improving L trunk lean, no more than 50% incidence of R knee hyperextension throughout, and at .4m/s - NOT MET,  .28 m/s self selected, 23.96 .42 m/s fast    5. Patient to be independent with home exercise program as demonstrated by performance with correct form without cues. - Ongoing, needs cuing for specific exercise completion vs just general activity  6. Demonstrate Knowledge of fall prevention - Making progress, still demos risky behavior without using AD while ambulating at home from time to time despite education     LTG: (to be met in 20 treatments)  7. ? Balance:   a.  Pt will be able to complete 360 deg turn with near equal weightbearing, albeit slow  b. Pt will score at least 41/56 on Mccurdy to indicate significant change in static/dynamic balance abilities since initial evaluation. 8. ? Strength:  a. 2+/5 R knee ext strength to increase R knee control in SLS  b. 4+/5 R hip ext/abd, 5-/5 R hamstrings to improve swing and stance phase with gait  9. ? Function:  a. Pt will be able to stand from 20 in chair 10x without UE assist and equal weightbearing with no more than minimal cuing from therapist.  b. Pt will be able to ascend/descend stairs using reciprocal ascent/descent with unilat UE assist and fair speed, fair R knee eccentric control  c. Pt will be able to ambulate 300 ft with improving L trunk lean, no more than 25% incidence of R knee hyperextension throughout, and at .5m/s           Patient goals: \"strengthen what we can, improve walking\"    Pt. Education:  [x] Yes  [] No  [x] Reviewed Prior HEP/Ed  Method of Education: [x] Verbal  [] Demo  [] Written  Comprehension of Education:  [x] Verbalizes understanding. [x] Demonstrates understanding. [x] Needs review for consistency . [] Demonstrates/verbalizes HEP/Ed previously given. Current HEP: resisted TKEs w/ purple band, sit-to-stands (22-23\"), squats, standing ham curls, standing hip ext, and marches      Plan: [x] Continue current frequency toward long and short term goals.     [x] Specific Instructions for subsequent treatments: stair climbing, R quad eccentric control, RLE single limb stance and swing, treadmill training - reprovide HEP       Time In:  2:00 pm          Time Out: 2:59  pm    Electronically signed by:  Chiqui Lund, PT

## 2021-05-24 NOTE — FLOWSHEET NOTE
[x] Brayan Chase       Occupational Therapy            1st floor       [address], [place]         Phone: [phone]       Fax: [phone] [] [facility]  [address] [place] [address]  Phone: [phone]  Fax: [phone]      Occupational Therapy Daily Treatment Note    Date:  2021  Patient Name:  Dimitrios Christian    :  1960  MRN: [id number]  Visit# / total visits: 15/16; Progress note for Medicare patient due at visit 8    Patient: Dimitrios Christian                      : 1960                      MRN: [id number]  Referring Provider:  Willie Nava  Insurance: [place] [employer]   visits left  Medical Diagnosis: History of stroke Z86.73             Rehab Codes: stiffness in shoulder M25.61,, stiffness in elbow M25.62,, stiffness in hand M25.64,, stiffness in wrist M25.63,, lack of coordination R27.8,, fine motor skills loss R29.818, or muscle weakness generalized M62.81,  Onset Date:    3-          Next Dr. Hair Mort: 21  Cancels/No Shows: 0/0    Subjective:    Pain:  [] Yes  [x] No Location: shoulder with range Pain Rating: (0-10 scale) 0/10 at rest  Pain altered Tx:  [x] No  [] Yes  Action:  Pt Comments:  \"I had an easy weekend\"   Objective:  Modalities:  Modality Flow Sheet:  START STOP Tx Modality   2021  Electrical Stim: [place] cycle  1.  Shoulder subluxation:  Time: 15 minutes   Cycle: 10/10  Patch location: anterior and posterior deltoid, middle deltoid and supraspinatus   Up to ~64Hz intensity         Ultrasound: ___ W/cm2 x ___ mins  Duty factor: __100%  __50%  __33% __20%  Head size:    ___ cm  MHz: __1mHz  __3mHz  Location:     Hot Pack:     Cold Pack:     Precautions: NA  Exercises:  EXERCISE    REPS/     TIME  WEIGHT/    LEVEL COMMENTS   PROM  Shoulder internal/external  Flex/ext  Elbow flex/ext  Wrist flex/ext  Digit extension 30 mins  Completed    Wooden pegs with R hand (place in)   completed Cone stacking 10  NOT Completed with mirror for visual stimulation on shoulder compensation. Other: Pt completed E Stim with \"no pain\" this date. See parameters above. Pt demo good isolated motion of shoulder for cone stacking. Pt unable to complete extension of digits for release of cones. Parameters on ionto continued with 10/10 cycle this date with good return. Session started late and pt required time for RR break. 30 mins of treatment completed this date. Treatment Charges: Mins Units   [x]  Modalities:   E-stim   20   1   []  Ultrasound     [x]  Ther Exercise 10 1   []  Manual Therapy     []  Ther Activities     []  Orthotic fit/train     []  Orthotic recheck     []  Other     Total Treatment time 30 2       Assessment: [x] Progressing toward goals. Pt with active tenodesis response, no noted ability to actively extend or flex digits. Demo trace movements when attempting digit extension with wrist in flexed position. [] No change. [] Other     [x] Patient would benefit from skilled occupational therapy services in order to: Improve  Rehab Potential, Motor control/Tone in UE, ROM, Strength and Coordination deficit in order to ensure good follow through and decrease pain in UE for safe completion of ADLs     Short Term Goals: ( 6  Treatments)  1. Increase AROM (degrees) (extension/flexion)  a. R shoulder to 60/40  b. R elbow -20/130  c. R wrist -30/50  d. R radial/ulnar dev 15/10  2. Increase strength (pounds);  a. R  strength to 22 pounds  b. R lateral pinch to 10 pounds  c. R 2 point pinch to 12 pounds  3. Increase function:UE Functional Index Score 40 or more points to promote increased functional abilities  4. Patient to be independent with home exercise program as demonstrated by performance with correct form without cues.     Long Term Goals: (  12 Treatments)  1.  Move digits in R hand independently with trace movement for promotion of future use for trevor  2. Demo R hand extension to half of [facility] for increased I with  on R side  3. Increase R UE Shoulder ROM to lift arm up for I with adl tasks at home. 50/45 R shoulder    Pt. Education:  [x] Yes  [] No  [] Reviewed Prior HEP/Ed  Method of Education: [x] Verbal  [] Demo  [] Written  Re: e-stim treatment   Comprehension of Education:  [x] Verbalizes understanding. [] Demonstrates understanding. [] Needs review. [] Demonstrates/verbalizes HEP/Ed previously given. Plan: [x] Continue current frequency toward short and long term goals.   [x] Specific Instructions for subsequent treatments: estim for wrist, digit, and thumb extension    [] Other:    Time In: 9078-9610    Electronically signed by:  MODESTO Hunt/JANNET

## 2021-05-28 ENCOUNTER — HOSPITAL ENCOUNTER (OUTPATIENT)
Dept: OCCUPATIONAL THERAPY | Age: 61
Setting detail: THERAPIES SERIES
Discharge: HOME | End: 2021-05-28
Payer: MEDICARE

## 2021-05-28 ENCOUNTER — APPOINTMENT (OUTPATIENT)
Dept: PHYSICAL THERAPY | Age: 61
End: 2021-05-28
Payer: MEDICARE

## 2021-05-28 PROCEDURE — 97032 APPL MODALITY 1+ESTIM EA 15: CPT

## 2021-05-28 PROCEDURE — 97110 THERAPEUTIC EXERCISES: CPT

## 2021-05-28 PROCEDURE — 97033 APP MDLTY 1+IONTPHRSIS EA 15: CPT

## 2021-05-28 PROCEDURE — 97140 MANUAL THERAPY 1/> REGIONS: CPT

## 2021-05-28 NOTE — FLOWSHEET NOTE
[x] Brayan Salvatore       Occupational Therapy            1st floor       [address], [place]         Phone: [phone]       Fax: [phone] [] [facility]  [address] [place] [address]  Phone: [phone]  Fax: [phone]      Occupational Therapy Daily Treatment Note    Date:  2021  Patient Name:  Leif Almonte    :  1960  MRN: [id number]  Visit# / total visits: ; Progress note for Medicare patient due at visit 8    Patient: Leif Almonte                      : 1960                      MRN: [id number]  Referring Provider:  Roel Bush Cost  Insurance: [place] [employer]   visits left  Medical Diagnosis: History of stroke Z86.73             Rehab Codes: stiffness in shoulder M25.61,, stiffness in elbow M25.62,, stiffness in hand M25.64,, stiffness in wrist M25.63,, lack of coordination R27.8,, fine motor skills loss R29.818, or muscle weakness generalized M62.81,  Onset Date:    3-          Next Dr. Noel Hy: 21  Cancels/No Shows: 0/0    Subjective:    Pain:  [] Yes  [x] No Location: shoulder with range Pain Rating: (0-10 scale) 0/10 at rest  Pain altered Tx:  [x] No  [] Yes  Action:  Pt Comments:  \"I had an easy weekend\"   Objective:  Modalities:  Modality Flow Sheet:  START STOP Tx Modality   2021  Electrical Stim: [place] cycle  1.  Shoulder subluxation:  Time: 15 minutes   Cycle: 10/10  Patch location: anterior and posterior deltoid, middle deltoid and supraspinatus   Up to ~64Hz intensity         Ultrasound: ___ W/cm2 x ___ mins  Duty factor: __100%  __50%  __33% __20%  Head size:    ___ cm  MHz: __1mHz  __3mHz  Location:     Hot Pack:     Cold Pack:     Precautions: NA  Exercises:  EXERCISE    REPS/     TIME  WEIGHT/    LEVEL COMMENTS   PROM  Shoulder internal/external  Flex/ext  Elbow flex/ext  Wrist flex/ext  Digit extension 10 mins  Completed    Wooden pegs with R hand (place in)   Not completed Cone stacking   NOT Completed with mirror for visual stimulation on shoulder compensation. Other: Pt completed E Stim with \"no pain\" this date. See parameters above. Parameters on ionto continued with 10/10 cycle this date with good return. Session used to for PN this date in addition to some PROM. Treatment Charges: Mins Units   [x]  Modalities:   E-stim   20   1   []  Ultrasound     []  Ther Exercise     [x]  Manual Therapy 10 1   []  Ther Activities     []  Orthotic fit/train     []  Orthotic recheck     []  Other     Total Treatment time 30 2     Assessment: [x] Progressing toward goals. Pt with active tenodesis response, no noted ability to actively extend or flex digits. Demo trace movements when attempting digit extension with wrist in flexed position. [] No change. [] Other     [x] Patient would benefit from skilled occupational therapy services in order to: Improve  Rehab Potential, Motor control/Tone in UE, ROM, Strength and Coordination deficit in order to ensure good follow through and decrease pain in UE for safe completion of ADLs     Short Term Goals: ( 6  Treatments)  1. Increase AROM (degrees) (extension/flexion)  a. R shoulder to 60/40 ONGOING IMPROVED  b. R elbow -20/130 ONGOING IMPROVED  c. R wrist -30/50 MET  d. R radial/ulnar dev 15/10  2. Increase strength (pounds);  a. R  strength to 22 pounds ONGOING  b. R lateral pinch to 10 pounds ONGOING  c. R 2 point pinch to 12 pounds ONGOING  3. Increase function:UE Functional Index Score 40 or more points to promote increased functional abilities MET  4. Patient to be independent with home exercise program as demonstrated by performance with correct form without cues. MET     Long Term Goals: (  12 Treatments)  1. Move digits in R hand independently with trace movement for promotion of future use for [employer] ONGOING  2.  Demo R hand extension to half of [facility] for increased I with  on R side ONGOING (trace movements)  3. Increase R UE Shoulder ROM to lift arm up for I with adl tasks at home. 40/75     Pt. Education:  [x] Yes  [] No  [] Reviewed Prior HEP/Ed  Method of Education: [x] Verbal  [] Demo  [] Written  Re: e-stim treatment   Comprehension of Education:  [x] Verbalizes understanding. [] Demonstrates understanding. [] Needs review. [] Demonstrates/verbalizes HEP/Ed previously given. Plan: [x] Continue current frequency toward short and long term goals.   [x] Specific Instructions for subsequent treatments: estim for wrist, digit, and thumb extension    [] Other:    Time In: 6737-3967    Electronically signed by:  MODESTO Vann/JANNET

## 2021-05-28 NOTE — PROGRESS NOTES
3.5 inc 1.5 12   3 jaw pinch na na     The affected extremity is  89% weaker than the unaffected extremity. (affected score/unaffected score, take the total and subtract from 100)              Right ROM   Elbow Extension/flex -38/120 inc 8   Shoulder  Adduction/abd 45/28  40/62                            degrees Right ROM         Extension\flex -12/-44     Assessment:  [x]? Progressing toward goals. Pt with active tenodesis response, min ability to actively extend or flex digits. Demo trace movements when attempting digit extension with wrist in flexed position. [x]? No change. []? Other                          [x]? Patient would benefit from skilled occupational therapy services in order to: Improve  Rehab Potential, Motor control/Tone in UE, ROM, Strength and Coordination deficit in order to ensure good follow through and decrease pain in UE for safe completion   Short Term Goals: ( 6  Treatments)  1. Increase AROM (degrees) (extension/flexion)  a. R shoulder to 60/40 ONGOING  b. R elbow -20/130 ONGOING IMPROVED  c. R wrist -30/50 MET  d. R radial/ulnar dev 15/10  2. Increase strength (pounds);  a. R  strength to 22 pounds ONGOING  b. R lateral pinch to 10 pounds ONGOING  c. R 2 point pinch to 12 pounds ONGOING  3. Increase function:UE Functional Index Score 40 or more points to promote increased functional abilities ONGOING  4. Patient to be independent with home exercise program as demonstrated by performance with correct form without cues. MET     Long Term Goals: (  12 Treatments)  1. Move digits in R hand independently with trace movement for promotion of future use for [employer] ONGOING  2. Demo R hand extension to half of [facility] for increased I with  on R side ONGOING (trace movements)  3. Increase R UE Shoulder ROM to lift arm up for I with adl tasks at home.  40/75      Treatment Plan:   Therapeutic Ex 08586, Therapeutic Act 09309, Neuro Muscular Mina 46, Manual 91210, Ultrasound [id number] and Electrical Stim Attended [id number]    Patient Status: (requested frequency/duration)     [x] Continue per initial/current plan of care 2 times per week for 6 additional visits. [] Additional visits necessary. Electronically signed by SANDI Restrepo on 5/28/2021 at 1:05 PM      If you have any questions or concerns, please don't hesitate to call. Thank you for your referral.    Physician Signature:________________________________Date:__________________  By signing above or cosigning this note, I have reviewed this plan of care and certify a need for medically necessary rehabilitation services.      *PLEASE SIGN ABOVE AND FAX BACK ALL PAGES

## 2021-06-02 ENCOUNTER — HOSPITAL ENCOUNTER (OUTPATIENT)
Dept: PHYSICAL THERAPY | Age: 61
Setting detail: THERAPIES SERIES
Discharge: HOME | End: 2021-06-02
Payer: MEDICARE

## 2021-06-02 PROCEDURE — 97116 GAIT TRAINING THERAPY: CPT

## 2021-06-02 NOTE — FLOWSHEET NOTE
[x] [facility]) - [facility] &  Therapy  [address]  [phone]  F: [phone] [] [address]  [facility]   [address]  P: [phone]  F: [phone] [] [address] &  Therapy  [address]  P: [phone]  F: [phone] [] [address]  P: [phone]  F: [phone] [] [address]  [facility]   [address]   [place]: [phone]  F: [phone]      Physical Therapy Daily Treatment Note    Date:  2021  Patient Name:  Wanda Tapia    :  1960  MRN: [id number]  Physician: Dr. Matty Gracia: [place] [employer] (40PW)  Medical Diagnosis:   History of stroke  Rehab Codes: R53.1, R29.3, R27.9, R26.9, M25.60  Next 's appt.: 21  Date of symptom onset: 19  Visit# / total visits:     Cancels/No Shows: 0      Subjective:     Pain:  [] Yes  [x] No Location:  N/A Pain Rating: (0-10 scale) 0/10  Pain altered Tx:  [x] No  [] Yes  Action:  Comments: Pt notes he's doing okay today - still awaiting the [employer] application, unsure if doctor has submitted papers yet. Notes OT is submitting for additional visits.         Objective:  Modalities:   Precautions:  Exercises: Bolded exercises completed on 2021:  Exercise Reps/ Time Weight/ Level Comments   Treadmill gait 5x 2-3 min mph  Chair placed on treadmill during breaks   Overground gait 2x 130 ft  SBQC, CGA         Lateral weight shifts w/ TKE 2x20 Purple     Foot tap up to step 20x 6\"    SLS longer holds  6x15\"  BUE assist   Hamstring curl 15x 2# Blocking hip flexor compensation   March 15x 2#    Hip ext      Fwd heel tap  2\", // bars BUE assist   Fwd long lunge 15x  To cone, cues to increase step length and pick up foot with return         Box sit<>stand 15x  10x 22\"   20\" 20\" box, blue foam pad on top   Bilateral ER w/ scap retract 3x15 purple In between rest breaks   Squat, tap ball to step      Standing reaching, all directions 2 min  Encouraging RLE weightbearing     Seated thoracic/lumbar ext against bolster 15x  Big red bolster behind back                     Leg press - DL 1x15 60# DL = double limb   Leg press - SL 2x15 10# SL = single limb         Stair training 3 rounds, 7 min 2 step stairs in inpatient gym Progressed to reciprocal ascent/descent with unilat UE assist, CGA/David               Mat      Sidelying clams 3x20 blueberry    Bridge 2x10  Cues to reduce height to improve low back pain   Prone knee flexion 3x10 blueberry RLE   Other:         Treatment Charges: Mins Units   []  Modalities     [x]  Ther Exercise 5 --   []  Manual Therapy     []  Ther Activities     []  Aquatics     []  Vasocompression     []  Other: neuro re-ed     [x]  Other: gait 48 4   Total Treatment time 53 4       Assessment: [x] Progressing toward goals. Focus on gait training this date both on treadmill and then subsequent overground training - consistently better step through on RLE on treadmill with improved knee and hip flexion improving drop foot. Overground training to follow to assess carryover of improved stepping - pt with ability to improve automaticity of gait with repetition and min VCs for increased speed during RLE swing phase and coordination with cane to complete 3-point gait pattern vs 4-point. [] No change. [x] Other: Gait speed on treadmill improving, though still with low level endurance, requires frequent longer rest breaks (~3-4 min) between bouts to maximize next set performance.   [x] Patient would continue to benefit from skilled physical therapy services in order to: progress RLE strength, address gait deviations, improve standing static and dynamic balance impairment, improve safety and independence with functional mobility both at home and in community. STG: (to be met in 10 treatments) - Assessed on 5/7/21:  1. ? ROM: Pt will demo no excessive ER of RLE in standing at rest or sitting at rest to indicate reduced tightness in external rotators and normalize posture - Making progress, still with mild ER in standing/amb but overall improvement  2. ? Balance:    a. Pt will be able to reach outside MED towards R both up and down without balance deficit - Making progress, still mild balance deficit with further outside MED fwd and right but overall increased stability with RLE weightshift  b. Pt will be able to maintain RLE SLS for 30s with min UE assist and good neuromuscular control noted - Nearly met, mod UE assist required  3. ? Strength:  a. 2/5 R knee ext strength to increase R knee control in SLS - MET, 2/5  b. 4/5 R hip ext/abd, 4+/5 R hamstrings to improve swing and stance phase with gait - Partially met, 4/5 hip ext/abd, 4/5 hamstrings  4. ? Function:   a. Pt will be able to stand from 24 in chair 10x without UE assist and equal weightbearing with no more than minimal cuing from therapist. - MET   b. Pt will be able to ascend/descend stairs using reciprocal ascent/descent with BUE assist and fair speed, fair R knee eccentric control - MET, CGA required, only on 4-6\" steps at current  c. Pt will be able to ambulate 150 ft with improving L trunk lean, no more than 50% incidence of R knee hyperextension throughout, and at .4m/s - NOT MET,  .28 m/s self selected, 23.96 .42 m/s fast    5. Patient to be independent with home exercise program as demonstrated by performance with correct form without cues. - Ongoing, needs cuing for specific exercise completion vs just general activity  6. Demonstrate Knowledge of fall prevention - Making progress, still demos risky behavior without using AD while ambulating at home from time to time despite education     LTG: (to be met in 20 treatments)  7. ? Balance:   a.  Pt will be able to complete 360 deg turn with near equal weightbearing, albeit slow  b. Pt will score at least 41/56 on Mccurdy to indicate significant change in static/dynamic balance abilities since initial evaluation. 8. ? Strength:  a. 2+/5 R knee ext strength to increase R knee control in SLS  b. 4+/5 R hip ext/abd, 5-/5 R hamstrings to improve swing and stance phase with gait  9. ? Function:  a. Pt will be able to stand from 20 in chair 10x without UE assist and equal weightbearing with no more than minimal cuing from therapist.  b. Pt will be able to ascend/descend stairs using reciprocal ascent/descent with unilat UE assist and fair speed, fair R knee eccentric control  c. Pt will be able to ambulate 300 ft with improving L trunk lean, no more than 25% incidence of R knee hyperextension throughout, and at .5m/s           Patient goals: \"strengthen what we can, improve walking\"    Pt. Education:  [x] Yes  [] No  [x] Reviewed Prior HEP/Ed  Method of Education: [x] Verbal  [] Demo  [] Written  Comprehension of Education:  [x] Verbalizes understanding. [x] Demonstrates understanding. [x] Needs review for consistency . [] Demonstrates/verbalizes HEP/Ed previously given. Current HEP: resisted TKEs w/ purple band, sit-to-stands (22-23\"), squats, standing ham curls, standing hip ext, and marches      Plan: [x] Continue current frequency toward long and short term goals.     [x] Specific Instructions for subsequent treatments: stair climbing, R quad eccentric control, RLE single limb stance and swing, treadmill training - reprovide HEP       Time In:  2:07 pm          Time Out: 3:00  pm    Electronically signed by:  Bran Baugh, PT

## 2021-06-04 ENCOUNTER — HOSPITAL ENCOUNTER (OUTPATIENT)
Dept: PHYSICAL THERAPY | Age: 61
Setting detail: THERAPIES SERIES
Discharge: HOME | End: 2021-06-04
Payer: MEDICARE

## 2021-06-04 PROCEDURE — 97116 GAIT TRAINING THERAPY: CPT

## 2021-06-04 NOTE — FLOWSHEET NOTE
purple In between rest breaks   Squat, tap ball to step      Standing reaching, all directions 2 min  Encouraging RLE weightbearing     Seated thoracic/lumbar ext against bolster 15x  Big red bolster behind back                     Leg press - DL 1x15 60# DL = double limb   Leg press - SL 20x 20# SL = single limb         Stair training 3 rounds, 7 min 2 step stairs in inpatient gym Progressed to reciprocal ascent/descent with unilat UE assist, CGA/David               Mat      Sidelying clams 3x20 blueberry    Bridge 2x10  Cues to reduce height to improve low back pain   Prone knee flexion 3x10 blueberry RLE   Other:         Treatment Charges: Mins Units   []  Modalities     [x]  Ther Exercise 4 --   []  Manual Therapy     []  Ther Activities     []  Aquatics     []  Vasocompression     []  Other: neuro re-ed     [x]  Other: gait 52 4   Total Treatment time 56 4       Assessment: [x] Progressing toward goals. Again focus on treadmill gait training to maximize intensity and repetition of stepping - able to progress an additional .2 mph and consistent 3 min length this date. Still requires extensive rest breaks d/t fatigue, pt reports specifically in quads/glutes of RLE. [] No change. [x] Other: Gait speed on treadmill improving, though still with low level endurance, requires frequent longer rest breaks (~3-4 min) between bouts to maximize next set performance. [x] Patient would continue to benefit from skilled physical therapy services in order to: progress RLE strength, address gait deviations, improve standing static and dynamic balance impairment, improve safety and independence with functional mobility both at home and in community.     STG: (to be met in 10 treatments) - Assessed on 5/7/21:  1. ? ROM: Pt will demo no excessive ER of RLE in standing at rest or sitting at rest to indicate reduced tightness in external rotators and normalize posture - Making progress, still with mild ER in standing/amb but overall improvement  2. ? Balance:    a. Pt will be able to reach outside MED towards R both up and down without balance deficit - Making progress, still mild balance deficit with further outside MED fwd and right but overall increased stability with RLE weightshift  b. Pt will be able to maintain RLE SLS for 30s with min UE assist and good neuromuscular control noted - Nearly met, mod UE assist required  3. ? Strength:  a. 2/5 R knee ext strength to increase R knee control in SLS - MET, 2/5  b. 4/5 R hip ext/abd, 4+/5 R hamstrings to improve swing and stance phase with gait - Partially met, 4/5 hip ext/abd, 4/5 hamstrings  4. ? Function:   a. Pt will be able to stand from 24 in chair 10x without UE assist and equal weightbearing with no more than minimal cuing from therapist. - MET   b. Pt will be able to ascend/descend stairs using reciprocal ascent/descent with BUE assist and fair speed, fair R knee eccentric control - MET, CGA required, only on 4-6\" steps at current  c. Pt will be able to ambulate 150 ft with improving L trunk lean, no more than 50% incidence of R knee hyperextension throughout, and at .4m/s - NOT MET,  .28 m/s self selected, 23.96 .42 m/s fast    5. Patient to be independent with home exercise program as demonstrated by performance with correct form without cues. - Ongoing, needs cuing for specific exercise completion vs just general activity  6. Demonstrate Knowledge of fall prevention - Making progress, still demos risky behavior without using AD while ambulating at home from time to time despite education     LTG: (to be met in 20 treatments)  7. ? Balance:   a. Pt will be able to complete 360 deg turn with near equal weightbearing, albeit slow  b. Pt will score at least 41/56 on Mccurdy to indicate significant change in static/dynamic balance abilities since initial evaluation.   8. ? Strength:  a. 2+/5 R knee ext strength to increase R knee control in SLS  b. 4+/5 R hip ext/abd, 5-/5 R hamstrings to improve swing and stance phase with gait  9. ? Function:  a. Pt will be able to stand from 20 in chair 10x without UE assist and equal weightbearing with no more than minimal cuing from therapist.  b. Pt will be able to ascend/descend stairs using reciprocal ascent/descent with unilat UE assist and fair speed, fair R knee eccentric control  c. Pt will be able to ambulate 300 ft with improving L trunk lean, no more than 25% incidence of R knee hyperextension throughout, and at .5m/s           Patient goals: \"strengthen what we can, improve walking\"    Pt. Education:  [x] Yes  [] No  [x] Reviewed Prior HEP/Ed  Method of Education: [x] Verbal  [] Demo  [] Written  Comprehension of Education:  [x] Verbalizes understanding. [x] Demonstrates understanding. [x] Needs review for consistency . [] Demonstrates/verbalizes HEP/Ed previously given. Current HEP: resisted TKEs w/ purple band, sit-to-stands (22-23\"), squats, standing ham curls, standing hip ext, and marches      Plan: [x] Continue current frequency toward long and short term goals.     [x] Specific Instructions for subsequent treatments: stair climbing, R quad eccentric control, RLE single limb stance and swing, treadmill training - reprovide HEP       Time In:  2:04 pm          Time Out: 3:00  pm    Electronically signed by:  Jim Michaud PT

## 2021-06-07 RX ORDER — PEN NEEDLE, DIABETIC 31 GX5/16"
NEEDLE, DISPOSABLE MISCELLANEOUS
Qty: 100 EACH | Refills: 0 | Status: SHIPPED | OUTPATIENT
Start: 2021-06-07 | End: 2021-08-02 | Stop reason: SDUPTHER

## 2021-06-07 NOTE — TELEPHONE ENCOUNTER
Derrick Mitchell is calling to request a refill on the following medication(s):    Medication Request:  Requested Prescriptions     Pending Prescriptions Disp Refills    B-D ULTRAFINE III SHORT PEN 31G X 8 MM MISC [Pharmacy Med Name: BD UF SHORT PEN NEEDLE [id number]] 100 each 0     Sig: USE ONE NEEDLE DAILY     Last time 2/8/21  Last Visit Date (If Applicable):  3/6/4794    Next Visit Date:    8/2/2021

## 2021-06-09 ENCOUNTER — HOSPITAL ENCOUNTER (OUTPATIENT)
Dept: PHYSICAL THERAPY | Age: 61
Setting detail: THERAPIES SERIES
Discharge: HOME | End: 2021-06-09
Payer: MEDICARE

## 2021-06-09 ENCOUNTER — HOSPITAL ENCOUNTER (OUTPATIENT)
Dept: OCCUPATIONAL THERAPY | Age: 61
Setting detail: THERAPIES SERIES
Discharge: HOME | End: 2021-06-09
Payer: MEDICARE

## 2021-06-09 PROCEDURE — 97116 GAIT TRAINING THERAPY: CPT

## 2021-06-09 PROCEDURE — 97140 MANUAL THERAPY 1/> REGIONS: CPT

## 2021-06-09 PROCEDURE — 97032 APPL MODALITY 1+ESTIM EA 15: CPT

## 2021-06-09 PROCEDURE — 97112 NEUROMUSCULAR REEDUCATION: CPT

## 2021-06-09 PROCEDURE — 97110 THERAPEUTIC EXERCISES: CPT

## 2021-06-09 NOTE — FLOWSHEET NOTE
[x] [facility]) - [facility]  [address]  [phone]  F: [phone] [] [address]  [address]   [address]  P: [phone]  F: [phone] [] [address] &  Therapy  [address]  P: [phone]  F: [phone] [] [address]  P: [phone]  F: [phone] [] [address]  [facility]   [address]   [place]: [phone]  F: [phone]      Physical Therapy Daily Treatment Note    Date:  2021  Patient Name:  Layo Ortiz    :  1960  MRN: [id number]  Physician: Dr. Joseph Fair: [employer] ([id number])  Medical Diagnosis:   History of stroke  Rehab Codes: R53.1, R29.3, R27.9, R26.9, M25.60  Next 's appt.: 21  Date of symptom onset: 19  Visit# / total visits:     Cancels/No Shows: 0       Subjective:     Pain:  [] Yes  [x] No Location:  N/A Pain Rating: (0-10 scale) 0/10  Pain altered Tx:  [x] No  [] Yes  Action:  Comments: Pt notes he's a little tired this date, \"off\". Objective:      ROM  ° A/P STRENGTH    Left Right Left Right   Hip Flex    4-   Ext    3+   IR    3+   ER    2-   ABD    4   ADD    5-          Knee Flex    4+ in available range   Ext  0  2+ (lacking ~25deg ext end range)          Ankle DF  -6  4   PF    2+              ! 0MWT:   - self selected: 23.20s, .43m/s  - fast: 16.09s, .62m/s     Modalities:   Precautions:  Exercises: Bolded exercises completed on 2021:  Exercise Reps/ Time Weight/ Level Comments   Treadmill gait 5x 3 min 1.0-1.2 mph  Chair placed on treadmill during breaks   Overground gait 2x 130 ft  SBQC, CGA         Lateral weight shifts w/ TKE 2x20 Purple     Foot tap up to step 20x 6\"    SLS longer holds  6x15\"  BUE assist   Hamstring curl 15x 2# Blocking hip flexor compensation   March 15x 2#    Hip ext      Fwd heel tap  2\", // bars BUE assist   Fwd long lunge 15x  To cone, cues to increase step length and  foot with return         Box sit<>stand 15x  10x 22\"   20\" 20\" box, blue foam pad on top   Bilateral ER w/ scap retract 3x15 purple In between rest breaks   Squat, tap ball to step      Standing reaching, all directions 2 min  Encouraging RLE weightbearing     Seated thoracic/lumbar ext against bolster 15x  Big red bolster behind back                     Leg press - DL 1x15 60# DL = double limb   Leg press - SL 20x 20# SL = single limb         Stair training 3 rounds, 7 min 2 step stairs in inpatient gym Progressed to reciprocal ascent/descent with unilat UE assist, CGA/David               Mat      Sidelying clams 3x20 blueberry    Bridge 2x10  Cues to reduce height to improve low back pain   Prone knee flexion 3x10 blueberry RLE   Other: HELD most exercises to allow for long term goal assessment, see below:        Treatment Charges: Mins Units   []  Modalities     [x]  Ther Exercise 5 --   []  Manual Therapy     []  Ther Activities     []  Aquatics     []  Vasocompression     [x]  Other: neuro re-ed 20 1   [x]  Other: gait 25 2   Total Treatment time 50 3       Assessment: [x] Progressing toward goals. Pt has improved 1/3 of a manual muscle grade in nearly all major muscle groups - still significant weakness especially in end-range quadriceps that causes instability in RLE stance, resulting in reduced scores on Mccurdy and continued asymmetrical gait. However, symmetry in gait and gait speed is showing promise as well with modest changes since evaluation on 3/24/21. Progress note made to request additional visits d/t slow but steady progress with the past ~2 months of skilled PT.    [] No change.        [] Other:   [x] Patient would continue to benefit from skilled physical therapy services in order to: progress RLE strength, address gait deviations, improve standing static and dynamic balance impairment, improve safety and independence with functional mobility both at home and in community. STG: (to be met in 10 treatments) - Assessed on 5/7/21:  1. ? ROM: Pt will demo no excessive ER of RLE in standing at rest or sitting at rest to indicate reduced tightness in external rotators and normalize posture - Making progress, still with mild ER in standing/amb but overall improvement  2. ? Balance:    a. Pt will be able to reach outside MED towards R both up and down without balance deficit - Making progress, still mild balance deficit with further outside MED fwd and right but overall increased stability with RLE weightshift  b. Pt will be able to maintain RLE SLS for 30s with min UE assist and good neuromuscular control noted - Nearly met, mod UE assist required  3. ? Strength:  a. 2/5 R knee ext strength to increase R knee control in SLS - MET, 2/5  b. 4/5 R hip ext/abd, 4+/5 R hamstrings to improve swing and stance phase with gait - Partially met, 4/5 hip ext/abd, 4/5 hamstrings  4. ? Function:   a. Pt will be able to stand from 24 in chair 10x without UE assist and equal weightbearing with no more than minimal cuing from therapist. - MET   b. Pt will be able to ascend/descend stairs using reciprocal ascent/descent with BUE assist and fair speed, fair R knee eccentric control - MET, CGA required, only on 4-6\" steps at current  c. Pt will be able to ambulate 150 ft with improving L trunk lean, no more than 50% incidence of R knee hyperextension throughout, and at .4m/s - NOT MET,  .28 m/s self selected, 23.96 .42 m/s fast    5. Patient to be independent with home exercise program as demonstrated by performance with correct form without cues. - Ongoing, needs cuing for specific exercise completion vs just general activity  6.  Demonstrate Knowledge of fall prevention - Making progress, still demos risky behavior without using AD while ambulating at home from time to time despite education     LTG: (to be met in 20 treatments) - Assessed on 6/9/21:  7. ? Balance:   a. Pt will be able to complete 360 deg turn with near equal weightbearing, albeit slow - Making progress, improving RLE weightbearing but still significanty limited as compared to LLE, though speed was improved since eval (~6 sec)  b. Pt will score at least 41/56 on Mccurdy to indicate significant change in static/dynamic balance abilities since initial evaluation. - Nearly met, 39/56  8. ? Strength:   a. 2+/5 R knee ext strength to increase R knee control in SLS - MET  b. 4+/5 R hip ext/abd, 5-/5 R hamstrings to improve swing and stance phase with gait - Improving  9. ? Function:   a. Pt will be able to stand from 20 in chair 10x without UE assist and equal weightbearing with no more than minimal cuing from therapist. - NOT MET, able to stand from 20\" height 10x on a good day, though, which is progress  b. Pt will be able to ascend/descend stairs using reciprocal ascent/descent with unilat UE assist and fair speed, fair R knee eccentric control - MET for 2 steps x5 rounds, have not tried full flight yet - most difficulty with reciprocal stepping d/t large hip flexion needed during swing phase  c. Pt will be able to ambulate 300 ft with improving L trunk lean, no more than 25% incidence of R knee hyperextension throughout, and at .5m/s - Making progress, 150ft with . 44 m/s (was .34m/s), and no more than 10% incidence of R knee hyperextension throughout gait           Patient goals: \"strengthen what we can, improve walking\" - Making progress    Pt. Education:  [x] Yes  [] No  [x] Reviewed Prior HEP/Ed  Method of Education: [x] Verbal  [] Demo  [] Written  Comprehension of Education:  [x] Verbalizes understanding. [x] Demonstrates understanding. [x] Needs review for consistency . [] Demonstrates/verbalizes HEP/Ed previously given.      Current HEP: resisted TKEs w/ purple band, sit-to-stands (22-23\"), squats, standing ham curls, standing hip ext, and marches      Plan: [x] Continue current frequency toward long and short term goals - requesting additional visits, see progress note of same date.     [x] Specific Instructions for subsequent treatments: stair climbing, R quad eccentric control, RLE single limb stance and swing, treadmill training - reprovide HEP       Time In:  3:00 pm          Time Out: 3:55   pm    Electronically signed by:  Diaz Chacon PT

## 2021-06-09 NOTE — FLOWSHEET NOTE
COATS BALANCE SCALE 14-Item Long Form Original Version    Patient Name:  Gladys Hodgkin  Date:  6/9/2021    1. SITTING TO STANDING  INSTRUCTIONS: Please stand up. Try not to use your hands for support. (4) able to stand without using hands and stabilize independently  (3) able to stand independently using hands  (2) able to stand using hands after several tries  (1) needs minimal aid to stand or to stabilize  (0) needs moderate or maximal assist to stand  Score: 3    2. STANDING UNSUPPORTED  INSTRUCTIONS: Please stand for two minutes without holding. (4) able to stand safely 2 minutes  (3) able to stand 2 minutes with supervision  (2) able to stand 30 seconds unsupported  (1) needs several tries to stand 30 seconds unsupported  (0) unable to stand 30 seconds unassisted If a subject is able to stand 2  minutes unsupported, score full points for sitting unsupported. Proceed to  item #4. Score: 4    3. SITTING WITH BACK UNSUPPORTED BUT FEET SUPPORTED  ON FLOOR OR ON A STOOL  INSTRUCTIONS: Please sit with arms folded for 2 minutes. (4) able to sit safely and securely 2 minutes  (3) able to sit 2 minutes under supervision  (2) able to sit 30 seconds  (1) able to sit 10 seconds  (0) unable to sit without support 10 seconds  Score: 4     4. STANDING TO SITTING  INSTRUCTIONS: Please sit down. (4) sits safely with minimal use of hands  (3) controls descent by using hands  (2) uses back of legs against chair to control descent  (1) sits independently but has uncontrolled descent  (0) needs assistance to sit  Score: 4    5. TRANSFERS  INSTRUCTIONS: Arrange chairs(s) for a pivot transfer. Ask subject to  transfer one way toward a seat with armrests and one way toward a seat  without armrests. You may use two chairs (one with and one without  armrests) or a bed and a chair.   (4) able to transfer safely with minor use of hands  (3) able to transfer safely definite need of hands  (2) able to transfer with verbal cueing and/or supervision  (1) needs one person to assist  (0) needs two people to assist or supervise to be safe  Score: 3    6. STANDING UNSUPPORTED WITH EYES CLOSED  INSTRUCTIONS: Please close your eyes and stand still for 10 seconds. (4) able to stand 10 seconds safely  (3) able to stand 10 seconds with supervision  (2) able to stand 3 seconds  (1) unable to keep eyes closed 3 seconds but stays steady  (0) needs help to keep from falling  Score: 4    7. STANDING UNSUPPORTED WITH FEET TOGETHER  INSTRUCTIONS: Place your feet together and stand without holding. (4) able to place feet together independently and stand 1 minute safely  (3) able to place feet together independently and stand for 1 minute with  supervision  (2) able to place feet together independently but unable to hold for 30 seconds  (1) needs help to attain position but able to stand 15 seconds feet together  (0) needs help to attain position and unable to hold for 15 seconds  Score: 4    8. REACHING FORWARD WITH OUTSTRETCHED ARM WHILE  STANDING  INSTRUCTIONS: Lift arm to 90 degrees. Stretch out your fingers and reach  forward as far as you can. (Examiner places a ruler at end of fingertips when  arm is at 90 degrees. Fingers should not touch the ruler while reaching  forward. The recorded measure is the distance forward that the finger reaches  while the subject is in the most forward lean position. When possible, ask  subject to use both arms when reaching to avoid rotation of the trunk.). (4) can reach forward confidently >25 cm (10 inches)  (3) can reach forward >12 cm safely (5 inches)  (2) can reach forward >5 cm safely (2 inches)  (1) reaches forward but needs supervision  (0) loses balance while trying/requires external support  Score: 4    9.  OBJECT FROM FLOOR FROM A STANDING POSITION  INSTRUCTIONS:  shoe/slipper which is placed in front of your feet.   (4) able to  slipper safely and easily  (3) able to  slipper but needs supervision  (2) unable to  but reaches 2-5cm (1-2 inches) from slipper and keeps  balance independently  (1) unable to  and needs supervision while trying  (0) unable to try/needs assist to keep from losing balance or falling  Score: 0    10. TURNING TO LOOK BEHIND OVER LEFT AND RIGHT  SHOULDERS WHILE STANDING  INSTRUCTIONS: Turn to look directly behind you over toward left shoulder. Repeat to the right. Examiner may pick an object to look at directly behind the  subject to encourage a better twist turn. (4) looks behind from both sides and weight shifts well  (3) looks behind one side only other side shows less weight shift  (2) turns sideways only but maintains balance  (1) needs supervision when turning  (0) needs assist to keep from losing balance or falling  Score: 4    11. TURN 360 DEGREES  INSTRUCTIONS: Turn completely around in a full [place]. Pause. Then turn a  full [place] in the other direction. (4) able to turn 360 degrees safely in 4 seconds or less  (3) able to turn 360 degrees safely one side only in 4 seconds or less  (2) able to turn 360 degrees safely but slowly  (1) needs close supervision or verbal cueing  (0) needs assistance while turning  Score: 2    12. PLACING ALTERNATE FOOT ON STEP OR STOOL WHILE  STANDING UNSUPPORTED  INSTRUCTIONS: Place each foot alternately on the step/stool. Continue until  each foot has touched the step/stool four times. (4) able to stand independently and safely and complete 8 steps in 20 seconds  (3) able to stand independently and complete 8 steps >20 seconds  (2) able to complete 4 steps without aid with supervision  (1) able to complete >2 steps needs minimal assist  (0) needs assistance to keep from falling/unable to try  Score: 0    13. STANDING UNSUPPORTED ONE FOOT IN FRONT  INSTRUCTIONS: (DEMONSTRATE TO SUBJECT) Place one foot directly in  front of the other.  If you feel that you cannot place your foot directly in front,  try to step far enough ahead that the heel of your forward foot is ahead of the  toes of the other foot. (To score 3 points, the length of the step should exceed  the length of the other foot and the width of the stance should approximate the  subject's normal stride width). (4) able to place foot tandem independently and hold 30 seconds  (3) able to place foot ahead of other independently and hold 30 seconds  (2) able to take small step independently and hold 30 seconds  (1) needs help to step but can hold 15 seconds  (0) loses balance while stepping or standing  Score: 3    14. STANDING ON ONE LEG  INSTRUCTIONS: Stand on one leg as long as you can without holding. (4) able to lift leg independently and hold >10 seconds  (3) able to lift leg independently and hold 5-10 seconds  (2) able to lift leg independently and hold = or >3 seconds  (1) tries to lift leg unable to hold 3 seconds but remains standing  independently  (0) unable to try or needs assist to prevent fall  Score:  0     Total Score:  39/56  Max score 56,a person scoring below 45 is considered to be at risk for falling.     Completed by: Karol Osborn, PT

## 2021-06-09 NOTE — FLOWSHEET NOTE
[x] [facility]       Occupational Therapy            1st floor       [address], [place]         Phone: [phone]       Fax: [phone] [] [facility]  Occupational Therapy  [address], [place]  Phone: [phone]  Fax: [phone]      Occupational Therapy Daily Treatment Note    Date:  2021  Patient Name:  Marissa Bates    :  1960  MRN: [id number]  Visit# / total visits:     Patient: Marissa Bates                      : 1960                      MRN: [id number]  Referring Provider:  Ramon Santaigo  Insurance: [employer] [employer]   visits left  Medical Diagnosis: History of stroke Z86.73             Rehab Codes: stiffness in shoulder M25.61,, stiffness in elbow M25.62,, stiffness in hand M25.64,, stiffness in wrist M25.63,, lack of coordination R27.8,, fine motor skills loss R29.818, or muscle weakness generalized M62.81,  Onset Date:    3-          Next Dr. Yrn Schmidt: 21  Cancels/No Shows: 0/0    Subjective:    Pain:  [] Yes  [x] No Location: shoulder with range Pain Rating: (0-10 scale) 0/10 at rest  Pain altered Tx:  [x] No  [] Yes  Action:  Pt Comments:  \"I am still waiting to hear any news on the [employer] thing\"   Objective:  Modalities:  Modality Flow Sheet:  START STOP Tx Modality   2021  Electrical Stim: [place] cycle  1.  Shoulder subluxation:  Time: 15 minutes   Cycle: 10/10  Patch location: anterior and posterior deltoid, middle deltoid and supraspinatus   Up to ~64Hz intensity         Ultrasound: ___ W/cm2 x ___ mins  Duty factor: __100%  __50%  __33% __20%  Head size:    ___ cm  MHz: __1mHz  __3mHz  Location:     Hot Pack:     Cold Pack:     Precautions: NA  Exercises:  EXERCISE    REPS/     TIME  WEIGHT/    LEVEL COMMENTS   PROM  Shoulder internal/external  Flex/ext  Elbow flex/ext  Wrist flex/ext  Digit extension 10 mins  Completed    Wooden pegs with R hand (place in)   Not completed    Cone stacking NOT Completed with mirror for visual stimulation on shoulder compensation. Flexbar   strength  Wrist Flexion/Ext  Wrist pronation  Wrist supination  Wrist ulnar deviation  Wrist radial dev  Shoulder Adb  Shoulder Add 10 yellow Completed wrist flex/ext this date with noted shoulder spasm on R shoulder with wrist ext                       Other: Pt completed E Stim with \"no pain\" this date. See parameters above. Parameters on ionto continued with 10/10 cycle this date with good return. Treatment Charges: Mins Units   [x]  Modalities:   E-stim   20   1   []  Ultrasound     [x]  Ther Exercise 15 1   [x]  Manual Therapy 20 1   []  Ther Activities     []  Orthotic fit/train     []  Orthotic recheck     []  Other     Total Treatment time 55      Assessment: [x] Progressing toward goals. Pt with active tenodesis response, no noted ability to actively extend or flex digits. Demo trace movements when attempting digit extension with wrist in flexed position. [] No change. [] Other     [x] Patient would benefit from skilled occupational therapy services in order to: Improve  Rehab Potential, Motor control/Tone in UE, ROM, Strength and Coordination deficit in order to ensure good follow through and decrease pain in UE for safe completion of ADLs     Short Term Goals: ( 6  Treatments)  1. Increase AROM (degrees) (extension/flexion)  a. R shoulder to 60/40 ONGOING IMPROVED  b. R elbow -20/130 ONGOING IMPROVED  c. R wrist -30/50 MET  d. R radial/ulnar dev 15/10  2. Increase strength (pounds);  a. R  strength to 22 pounds ONGOING  b. R lateral pinch to 10 pounds ONGOING  c. R 2 point pinch to 12 pounds ONGOING  3. Increase function:UE Functional Index Score 40 or more points to promote increased functional abilities MET  4. Patient to be independent with home exercise program as demonstrated by performance with correct form without cues. MET     Long Term Goals: (  12 Treatments)  1.  Move digits in R hand independently with trace movement for promotion of future use for guitar ONGOING  2. Demo R hand extension to half of [employer] for increased I with  on R side ONGOING (trace movements)  3. Increase R UE Shoulder ROM to lift arm up for I with adl tasks at home. 40/75     Pt. Education:  [x] Yes  [] No  [] Reviewed Prior HEP/Ed  Method of Education: [x] Verbal  [] Demo  [] Written  Re: e-stim treatment   Comprehension of Education:  [x] Verbalizes understanding. [] Demonstrates understanding. [] Needs review. [] Demonstrates/verbalizes HEP/Ed previously given. Plan: [x] Continue current frequency toward short and long term goals.   [x] Specific Instructions for subsequent treatments: estim for wrist, digit, and thumb extension    [] Other:    Time In: 4879-6572    Electronically signed by:  MODESTO Mcmanus/JANNET

## 2021-06-09 NOTE — PROGRESS NOTES
[x] [facility]) - [facility] &  Therapy  [address]  [phone]  F: [phone] [] [address]  [facility]   [address]  P: [phone]  F: [phone] [] [address] &  Therapy  [address]  P: [phone]  F: [phone] [] [address]  P: [phone]  F: [phone] [] [address]  [facility]   [address]   [place]: [phone]  F: [phone]      Physical Therapy Progress Note    Date: 2021      Patient: Marjorie Webber  : 1960  MRN: [id number]    Physician: Dr. Jannette nEnis (now [facility])  Insurance: [employer]vantage ([id number])  Medical Diagnosis:   History of stroke  Rehab Codes: R53.1, R29.3, R27.9, R26.9, M25.60  Next 's appt.: 21  Date of symptom onset: 19  Visit# / total visits:                     Cancels/No Shows: 0    Date range of services: 3/24/21 to 21      Subjective:  Pain:  []? Yes  [x]? No   Location:  N/A Pain Rating: (0-10 scale) 0/10  Pain altered Tx:  [x]? No  []? Yes  Action:  Comments: Pt notes he's a little tired this date, \"off\".      Objective:  Test Measurements:              ROM  ° A/P STRENGTH     Left Right Left Right   Hip Flex       4-   Ext       3+   IR       3+   ER       2-   ABD       4   ADD       5-               Knee Flex       4+ in available range   Ext   0   2+ (lacking ~25deg ext end range)               Ankle DF   -6   4   PF       2+                     Function:   10MWT:   - self selected: 23.20s, .43m/s (.1m/s increase, which is at clinically significant difference at .1m/s)  - fast: 16.09s, .62m/s  (.18m/s increase, which is above clinically significant difference at .1m/s)    Mccurdy Balance Scale: 39/56 (up 4 points from evaluation, with 6 points being Jameson Long[employer])    Assessment: Pt has been making slow but sure progress with RLE strength, gait speed, stair climbing sequencing/functional strength, transfers including sit<>stand, and standing static/dynamic balance. Remains a fall risk d/t significant quad weakness resulting in buckling, which still causes pt to have asymmetrical weightbearing though this has been improving as well during this round of therapy. Gait mechanics still with most difficulty in swing phase - RLE flexion during swing is still difficult but improving with regular treadmill training. Due to slow but noted progress, and patient need for therapist verbal/tactile cues, weightshift assist, and guarding during balance activities, pt will require additional skilled therapy to progress function, reduce fall risk, and maximize stroke recovery. STG: (to be met in 10 treatments) - Assessed on 5/7/21:  1. ? ROM: Pt will demo no excessive ER of RLE in standing at rest or sitting at rest to indicate reduced tightness in external rotators and normalize posture - Making progress, still with mild ER in standing/amb but overall improvement  2. ? Balance:    a. Pt will be able to reach outside MED towards R both up and down without balance deficit - Making progress, still mild balance deficit with further outside MED fwd and right but overall increased stability with RLE weightshift  b. Pt will be able to maintain RLE SLS for 30s with min UE assist and good neuromuscular control noted - Nearly met, mod UE assist required  3. ? Strength:  a. 2/5 R knee ext strength to increase R knee control in SLS - MET, 2/5  b. 4/5 R hip ext/abd, 4+/5 R hamstrings to improve swing and stance phase with gait - Partially met, 4/5 hip ext/abd, 4/5 hamstrings  4. ? Function:   a. Pt will be able to stand from 24 in chair 10x without UE assist and equal weightbearing with no more than minimal cuing from therapist. - MET   b.  Pt will be able to ascend/descend stairs using reciprocal ascent/descent with BUE assist and fair speed, fair R knee eccentric control - MET, CGA required, only on 4-6\" steps at current  c. Pt will be able to ambulate 150 ft with improving L trunk lean, no more than 50% incidence of R knee hyperextension throughout, and at .4m/s - NOT MET,  .28 m/s self selected, 23.96 .42 m/s fast    5. Patient to be independent with home exercise program as demonstrated by performance with correct form without cues. - Ongoing, needs cuing for specific exercise completion vs just general activity  6. Demonstrate Knowledge of fall prevention - Making progress, still demos risky behavior without using AD while ambulating at home from time to time despite education     LTG: (to be met in 36 treatments) - Assessed on 6/9/21, extended d/t incomplete achievement:  7. ? Balance:   a. Pt will be able to complete 360 deg turn with near equal weightbearing, albeit slow - Making progress, improving RLE weightbearing but still significanty limited as compared to LLE, though speed was improved since eval (~6 sec today)  b. Pt will score at least 41/56 on Mccurdy to indicate significant change in static/dynamic balance abilities since initial evaluation. - Nearly met, 39/56  8. ? Strength:   a. 2+/5 R knee ext strength to increase R knee control in SLS - MET  b. 4+/5 R hip ext/abd, 5-/5 R hamstrings to improve swing and stance phase with gait - Making progress  9. ? Function:   a. Pt will be able to stand from 20 in chair 10x without UE assist and equal weightbearing with no more than minimal cuing from therapist. - NOT MET, able to stand from 20\" height 10x, though, which is progress  b.  Pt will be able to ascend/descend stairs using reciprocal ascent/descent with unilat UE assist and fair speed, fair R knee eccentric control - MET for 2 steps x5 rounds, have not tried full flight yet - most difficulty with reciprocal stepping d/t significant hip flexion AROM needed during swing phase  c. Pt will be able to ambulate 300 ft with improving L trunk lean, no more than 25% incidence of R knee hyperextension throughout, and at .5m/s - Making progress, 150ft with . 44 m/s (was .34m/s), and no more than 10% incidence of R knee hyperextension throughout gait           Patient goals: \"strengthen what we can, improve walking\" - Making progress    Treatment Plan:  [x] Therapeutic Exercise   76612  [] Iontophoresis: 4 mg/mL Dexamethasone Sodium Phosphate  mAmin  02399   [x] Therapeutic Activity  80439 [] Vasopneumatic cold with compression  07366    [x] Gait Training   51475 [] Ultrasound   67145   [x] Neuromuscular Re-education  13427 [] Electrical Stimulation Unattended  66245   [x] Manual Therapy  09355 [] Electrical Stimulation Attended  89565   [x] Instruction in HEP  [] Lumbar/Cervical Traction  27907   [] Aquatic Therapy   70989 [] Cold/hotpack    [] Massage   92959      [] Dry Needling, 1 or 2 muscles  56166   [] Biofeedback, first 15 minutes   48712  [] Biofeedback, additional 15 minutes   44437 [] Dry Needling, 3 or more muscles  25722       Patient Status:     [] Continue per initial plan of care. [x] Additional visits necessary. [x] Other: Due to slow but noted progress, and patient need for therapist verbal/tactile cues, weightshift assist, and guarding during balance activities, pt will require additional skilled therapy to progress function, reduce fall risk, and maximize stroke recovery. Requested Frequency/Duration: 2 times per week for 16 treatments. Electronically signed by Robbie Noel PT on 6/9/2021 at 6:03 PM      If you have any questions or concerns, please don't hesitate to call. Thank you for your referral.    Physician Signature:________________________________Date:__________________  By signing above or cosigning this note, I have reviewed this plan of care and certify a need for medically necessary rehabilitation services.      *PLEASE SIGN ABOVE AND FAX BACK ALL PAGES*

## 2021-06-11 ENCOUNTER — HOSPITAL ENCOUNTER (OUTPATIENT)
Dept: OCCUPATIONAL THERAPY | Age: 61
Setting detail: THERAPIES SERIES
Discharge: HOME | End: 2021-06-11
Payer: MEDICARE

## 2021-06-11 ENCOUNTER — HOSPITAL ENCOUNTER (OUTPATIENT)
Dept: PHYSICAL THERAPY | Age: 61
Setting detail: THERAPIES SERIES
Discharge: HOME | End: 2021-06-11
Payer: MEDICARE

## 2021-06-11 ENCOUNTER — TELEPHONE (OUTPATIENT)
Dept: NEUROLOGY | Age: 61
End: 2021-06-11

## 2021-06-11 PROCEDURE — 97116 GAIT TRAINING THERAPY: CPT

## 2021-06-11 PROCEDURE — 97110 THERAPEUTIC EXERCISES: CPT

## 2021-06-11 PROCEDURE — 97032 APPL MODALITY 1+ESTIM EA 15: CPT

## 2021-06-11 PROCEDURE — 97140 MANUAL THERAPY 1/> REGIONS: CPT

## 2021-06-11 PROCEDURE — 97112 NEUROMUSCULAR REEDUCATION: CPT

## 2021-06-11 NOTE — FLOWSHEET NOTE
[x] [facility]       Occupational Therapy            1st floor       [address], [place]         Phone: [phone]       Fax: [phone] [] [facility]  [address] [place] [address]  Phone: [phone]  Fax: [phone]      Occupational Therapy Daily Treatment Note    Date:  2021  Patient Name:  Andre Mendoza    :  1960  MRN: [id number]  Visit# / total visits:     Patient: Andre Mendoza                      : 1960                      MRN: [id number]  Referring Provider:  Osmin Sumner [facility]  Insurance: [employer]   visits left  Medical Diagnosis: History of stroke Z86.73             Rehab Codes: stiffness in shoulder M25.61,, stiffness in elbow M25.62,, stiffness in hand M25.64,, stiffness in wrist M25.63,, lack of coordination R27.8,, fine motor skills loss R29.818, or muscle weakness generalized M62.81,  Onset Date:    3-          Next Dr. Tez Pond: 21  Cancels/No Shows: 0/0    Subjective:    Pain:  [] Yes  [x] No Location: shoulder with range Pain Rating: (0-10 scale) 0/10 at rest  Pain altered Tx:  [x] No  [] Yes  Action:  Pt Comments:  \"I'm still waiting to hear back from the [employer] people. I just want to be back to normal.\"   Objective:  Modalities:  Modality Flow Sheet:  START STOP Tx Modality   2021  Electrical Stim: [place] cycle  1.  Shoulder subluxation:  Time: 20 minutes   Cycle: 10/10  Duty: 50%  Patch location: anterior and posterior deltoid, middle deltoid and supraspinatus   Up to ~64Hz intensity     Ultrasound: ___ W/cm2 x ___ mins  Duty factor: __100%  __50%  __33% __20%  Head size:    ___ cm  MHz: __1mHz  __3mHz  Location:     Hot Pack:     Cold Pack:     Precautions: NA  Exercises:  EXERCISE    REPS/     TIME  WEIGHT/    LEVEL COMMENTS   PROM  Shoulder internal/external  Flex/ext  Elbow flex/ext  Wrist flex/ext  Digit extension 10 mins  Completed    Wooden pegs with R hand (place in)   Completed    Cone stacking   Completed without mirror for visual stimulation on shoulder compensation. Flexbar   strength  Wrist Flexion/Ext  Wrist pronation  Wrist supination  Wrist ulnar deviation  Wrist radial dev  Shoulder Adb  Shoulder Add 10 yellow Not completed this date                       Other: Pt provided E Stim with \"no pain\" this date. See parameters above. Pt completed cone stacking with increased forearm control, no shoulder hike. Pt demo max difficulty with motor control for gross grasp in R hand when placing velcro pegs. Treatment Charges: Mins Units   [x]  Modalities:   E-stim   20   1   []  Ultrasound     [x]  Ther Exercise 15 1   [x]  Manual Therapy 20 1   []  Ther Activities     []  Orthotic fit/train     []  Orthotic recheck     []  Other     Total Treatment time 55      Assessment: [x] Progressing toward goals. Pt with active tenodesis response, no noted ability to actively extend or flex digits. Demo trace movements when attempting digit extension with wrist in flexed position. [] No change. [] Other     [x] Patient would benefit from skilled occupational therapy services in order to: Improve  Rehab Potential, Motor control/Tone in UE, ROM, Strength and Coordination deficit in order to ensure good follow through and decrease pain in UE for safe completion of ADLs     Short Term Goals: ( 6  Treatments)  1. Increase AROM (degrees) (extension/flexion)  a. R shoulder to 60/40 ONGOING IMPROVED  b. R elbow -20/130 ONGOING IMPROVED  c. R wrist -30/50 MET  d. R radial/ulnar dev 15/10  2. Increase strength (pounds);  a. R  strength to 22 pounds ONGOING  b. R lateral pinch to 10 pounds ONGOING  c. R 2 point pinch to 12 pounds ONGOING  3. Increase function:UE Functional Index Score 40 or more points to promote increased functional abilities MET  4. Patient to be independent with home exercise program as demonstrated by performance with correct form without cues.  MET

## 2021-06-11 NOTE — FLOWSHEET NOTE
[x] [facility]) - [facility]  [facility]  [address]  [phone]  F: [phone] [] [address]  [facility]   [address]  P: [phone]  F: [phone] [] [address] &  Therapy  [address] [facility]  P: [phone]  F: [phone] [] [address]  P: [phone]  F: [phone] [] [address]  [facility]   [address]   [place]: [phone]  F: [phone]      Physical Therapy Daily Treatment Note    Date:  2021  Patient Name:  Steve Gee    :  1960  MRN: [id number]  Physician: Dr. Izquierdo Henderson: [employer] ([id number])  Medical Diagnosis:   History of stroke  Rehab Codes: R53.1, R29.3, R27.9, R26.9, M25.60  Next 's appt.: 21  Date of symptom onset: 19  Visit# / total visits:     Cancels/No Shows: 0       Subjective:     Pain:  [] Yes  [x] No Location:  N/A Pain Rating: (0-10 scale) 0/10  Pain altered Tx:  [x] No  [] Yes  Action:  Comments: Re-faxed pt's [employer] application to neurology office for them to fill out since [employer] reports they haven't received physician auth for devices yet. Neurology states they hope to have them filled out and faxed to [employer] by this Monday.          Objective:  Modalities:   Precautions:  Exercises: Bolded exercises completed on 2021:  Exercise Reps/ Time Weight/ Level Comments   Treadmill gait 1x 3 min  4x3.5 min 1.3 mph  Chair placed on treadmill during breaks   Overground gait 2x 130 ft  SBQC, CGA         Lateral weight shifts w/ TKE 2x20 Purple     Foot tap up to step 20x 6\"    SLS longer holds  6x15\"  BUE assist   Hamstring curl 15x 2# Blocking hip flexor compensation   March 15x 2#    Hip ext      Fwd heel tap  2\", // bars BUE assist   Fwd long lunge 15x  To cone, cues to increase step length and  foot with return         Mat sit<>stand 2x15 23\" Mod assist to incr RLE weightbearing   Bilateral ER w/ scap retract 3x15 purple In between rest breaks   Squat, tap ball to step      Standing reaching, all directions 2 min  Encouraging RLE weightbearing     Seated thoracic/lumbar ext against bolster 15x  Big red bolster behind back                     Leg press - DL 1x15 60# DL = double limb   Leg press - SL 20x 20# SL = single limb         Stair training 3 rounds, 7 min 2 step stairs in inpatient gym Progressed to reciprocal ascent/descent with unilat UE assist, CGA/David               Mat      Sidelying clams 3x20 blueberry    Bridge 2x10  Cues to reduce height to improve low back pain   Prone knee flexion 3x10 blueberry RLE   Other:         Treatment Charges: Mins Units   []  Modalities     []  Ther Exercise     []  Manual Therapy     []  Ther Activities     []  Aquatics     []  Vasocompression     [x]  Other: neuro re-ed 8 1   [x]  Other: gait 41 2   Total Treatment time 49 3       Assessment: [x] Progressing toward goals. Pt still requiring mod VCs for increased RLE step length, heel strike, and tactile assist for improved RLE weightshift - still will hyperextend through AFO in terminal stance so naturally reduces stride length on R d/t this. Cues for \"strong knee\" seem to mildly improve this. Still with asymmetrical sit<>stand (23\") with mod therapist assist to improve RLE weightshift - less quad control noted here, still very functionally weak with this. Does complete without UE assist.     [] No change. [] Other:   [x] Patient would continue to benefit from skilled physical therapy services in order to: progress RLE strength, address gait deviations, improve standing static and dynamic balance impairment, improve safety and independence with functional mobility both at home and in community.     STG: (to be met in 10 treatments) - Assessed on 5/7/21:  1. ? ROM: Pt will demo no excessive ER of RLE in standing at rest or sitting at rest to indicate reduced tightness in external rotators and normalize posture - Making progress, still with mild ER in standing/amb but overall improvement  2. ? Balance:    a. Pt will be able to reach outside MED towards R both up and down without balance deficit - Making progress, still mild balance deficit with further outside MED fwd and right but overall increased stability with RLE weightshift  b. Pt will be able to maintain RLE SLS for 30s with min UE assist and good neuromuscular control noted - Nearly met, mod UE assist required  3. ? Strength:  a. 2/5 R knee ext strength to increase R knee control in SLS - MET, 2/5  b. 4/5 R hip ext/abd, 4+/5 R hamstrings to improve swing and stance phase with gait - Partially met, 4/5 hip ext/abd, 4/5 hamstrings  4. ? Function:   a. Pt will be able to stand from 24 in chair 10x without UE assist and equal weightbearing with no more than minimal cuing from therapist. - MET   b. Pt will be able to ascend/descend stairs using reciprocal ascent/descent with BUE assist and fair speed, fair R knee eccentric control - MET, CGA required, only on 4-6\" steps at current  c. Pt will be able to ambulate 150 ft with improving L trunk lean, no more than 50% incidence of R knee hyperextension throughout, and at .4m/s - NOT MET,  .28 m/s self selected, 23.96 .42 m/s fast    5. Patient to be independent with home exercise program as demonstrated by performance with correct form without cues. - Ongoing, needs cuing for specific exercise completion vs just general activity  6. Demonstrate Knowledge of fall prevention - Making progress, still demos risky behavior without using AD while ambulating at home from time to time despite education     LTG: (to be met in 20 treatments) - Assessed on 6/9/21:  7. ? Balance:   a.  Pt will be able to complete 360 deg turn with near equal weightbearing, albeit slow - Making progress, improving RLE weightbearing but still significanty limited as compared to LLE, though speed was improved since eval (~6 sec)  b. Pt will score at least 41/56 on Mccurdy to indicate significant change in static/dynamic balance abilities since initial evaluation. - Nearly met, 39/56  8. ? Strength:   a. 2+/5 R knee ext strength to increase R knee control in SLS - MET  b. 4+/5 R hip ext/abd, 5-/5 R hamstrings to improve swing and stance phase with gait - Improving  9. ? Function:   a. Pt will be able to stand from 20 in chair 10x without UE assist and equal weightbearing with no more than minimal cuing from therapist. - NOT MET, able to stand from 20\" height 10x on a good day, though, which is progress  b. Pt will be able to ascend/descend stairs using reciprocal ascent/descent with unilat UE assist and fair speed, fair R knee eccentric control - MET for 2 steps x5 rounds, have not tried full flight yet - most difficulty with reciprocal stepping d/t large hip flexion needed during swing phase  c. Pt will be able to ambulate 300 ft with improving L trunk lean, no more than 25% incidence of R knee hyperextension throughout, and at .5m/s - Making progress, 150ft with . 44 m/s (was .34m/s), and no more than 10% incidence of R knee hyperextension throughout gait           Patient goals: \"strengthen what we can, improve walking\" - Making progress    Pt. Education:  [x] Yes  [] No  [x] Reviewed Prior HEP/Ed  Method of Education: [x] Verbal  [] Demo  [] Written  Comprehension of Education:  [x] Verbalizes understanding. [x] Demonstrates understanding. [x] Needs review for consistency . [] Demonstrates/verbalizes HEP/Ed previously given.      Current HEP: resisted TKEs w/ purple band, sit-to-stands (22-23\"), squats, standing ham curls, standing hip ext, and marches      Plan: [x] Continue current frequency toward long and short term goals - requesting additional visits, see progress note of same date.     [x] Specific Instructions for subsequent treatments: stair climbing, R quad eccentric control, RLE single limb stance and swing, treadmill training - reprovide HEP       Time In:  2:05 pm          Time Out: 2:59   pm    Electronically signed by:  Bassem Ly PT

## 2021-06-11 NOTE — TELEPHONE ENCOUNTER
Gina Adkins from [facility] PT called. Pt has been evaluated for Bioness device for both foot drop and wrist drop and is good candidate. Pt. is aware and agreeable. They will fax form.

## 2021-06-15 ENCOUNTER — HOSPITAL ENCOUNTER (OUTPATIENT)
Dept: OCCUPATIONAL THERAPY | Age: 61
Setting detail: THERAPIES SERIES
Discharge: HOME | End: 2021-06-15
Payer: MEDICARE

## 2021-06-15 ENCOUNTER — HOSPITAL ENCOUNTER (OUTPATIENT)
Dept: PHYSICAL THERAPY | Age: 61
Setting detail: THERAPIES SERIES
Discharge: HOME | End: 2021-06-15
Payer: MEDICARE

## 2021-06-15 PROCEDURE — 97116 GAIT TRAINING THERAPY: CPT

## 2021-06-15 PROCEDURE — 97033 APP MDLTY 1+IONTPHRSIS EA 15: CPT

## 2021-06-15 PROCEDURE — 97032 APPL MODALITY 1+ESTIM EA 15: CPT

## 2021-06-15 PROCEDURE — 97035 APP MDLTY 1+ULTRASOUND EA 15: CPT

## 2021-06-15 PROCEDURE — 97110 THERAPEUTIC EXERCISES: CPT

## 2021-06-15 NOTE — FLOWSHEET NOTE
[x] [facility]) - [facility]  [facility]  [address]  [phone]  F: [phone] [] [address]  Kl\[facility]\"" 36   [address]  P: [phone]  F: [phone] [] [address] &  Therapy  [address]  P: [phone]  F: [phone] [] [address]  P: [phone]  F: [phone] [] [address]  [facility]   [address]   [place]: [phone]  F: [phone]      Physical Therapy Daily Treatment Note    Date:  6/15/2021  Patient Name:  Ly Alfaro    :  1960  MRN: [id number]  Physician: Dr. Bernadette Tarango: [employer] ([id number])  Medical Diagnosis:   History of stroke  Rehab Codes: R53.1, R29.3, R27.9, R26.9, M25.60  Next 's appt.: 21  Date of symptom onset: 19  Visit# / total visits:  (approved by [facility] on 21; Will need [facility] approval for last 6)    Cancels/No Shows: 0       Subjective:     Pain:  [] Yes  [x] No Location:  N/A Pain Rating: (0-10 scale) 0/10  Pain altered Tx:  [x] No  [] Yes  Action:  Comments: Pt reports he did more walking yesterday than usual so feels a bit tired today.          Objective:  Modalities:   Precautions:  Exercises: Bolded exercises completed on 6/15/2021:  Exercise Reps/ Time Weight/ Level Comments   Treadmill gait 1x1.5 min  1x3.5 min  1x2 min 1.3 mph  1.0 mp h  1.2 mph Chair placed on treadmill during breaks   Overground gait 2x 130 ft  SBQC, CGA         Lateral weight shifts w/ TKE 2x20 Purple     Foot tap up to step 20x 6\"    SLS longer holds  3x15\"  BUE assist   Hamstring curl 2x15 2# Blocking hip flexor compensation  - no weight on 6/15   March 2x15 2# - no weight on 6/15   Hip abd 2x15     Fwd heel tap  2\", // bars BUE assist   Fwd long lunge 15x  To cone, cues to increase step length and  foot with return         Mat sit<>stand 2x15 23\" Mod assist to incr RLE weightbearing   Bilateral ER w/ scap retract 3x15 purple In between rest breaks   Squat, tap ball to step      Standing reaching, all directions 2 min  Encouraging RLE weightbearing     Seated thoracic/lumbar ext against bolster 15x  Big red bolster behind back                     Leg press - DL 1x15 60# DL = double limb   Leg press - SL 20x 20# SL = single limb         Stair training 3 rounds, 7 min 2 step stairs in inpatient gym Progressed to reciprocal ascent/descent with unilat UE assist, CGA/David               Mat      Sidelying clams 3x20 blueberry    Bridge 2x10  Cues to reduce height to improve low back pain   Prone knee flexion 3x10 blueberry RLE   Other:         Treatment Charges: Mins Units   []  Modalities     [x]  Ther Exercise 19 1   []  Manual Therapy     []  Ther Activities     []  Aquatics     []  Vasocompression     []  Other: neuro re-ed     [x]  Other: gait 28 2   Total Treatment time 47 3       Assessment: [] Progressing toward goals. [] No change. [x] Other: More fatigued this date - has an episode during treadmill training where pt gets a RLE spasm and is unable to support himself, max A to prevent fall and immediate treadmill stop. Pt with no injuries or pain after this incident, able to attempt more treadmill training but limited on speed and endurance. Remaining session with low energy with longer rest breaks needed (~5 min), and more standing OKC focused vs CKC. [x] Patient would continue to benefit from skilled physical therapy services in order to: progress RLE strength, address gait deviations, improve standing static and dynamic balance impairment, improve safety and independence with functional mobility both at home and in community.     STG: (to be met in 10 treatments) - Assessed on 5/7/21:  1. ? ROM: Pt will demo no excessive ER of RLE in standing at rest or sitting at rest to indicate reduced tightness in external rotators and normalize posture - Making progress, still with mild ER in standing/amb but overall improvement  2. ? Balance:    a. Pt will be able to reach outside MED towards R both up and down without balance deficit - Making progress, still mild balance deficit with further outside MED fwd and right but overall increased stability with RLE weightshift  b. Pt will be able to maintain RLE SLS for 30s with min UE assist and good neuromuscular control noted - Nearly met, mod UE assist required  3. ? Strength:  a. 2/5 R knee ext strength to increase R knee control in SLS - MET, 2/5  b. 4/5 R hip ext/abd, 4+/5 R hamstrings to improve swing and stance phase with gait - Partially met, 4/5 hip ext/abd, 4/5 hamstrings  4. ? Function:   a. Pt will be able to stand from 24 in chair 10x without UE assist and equal weightbearing with no more than minimal cuing from therapist. - MET   b. Pt will be able to ascend/descend stairs using reciprocal ascent/descent with BUE assist and fair speed, fair R knee eccentric control - MET, CGA required, only on 4-6\" steps at current  c. Pt will be able to ambulate 150 ft with improving L trunk lean, no more than 50% incidence of R knee hyperextension throughout, and at .4m/s - NOT MET,  .28 m/s self selected, 23.96 .42 m/s fast    5. Patient to be independent with home exercise program as demonstrated by performance with correct form without cues. - Ongoing, needs cuing for specific exercise completion vs just general activity  6. Demonstrate Knowledge of fall prevention - Making progress, still demos risky behavior without using AD while ambulating at home from time to time despite education     LTG: (to be met in 36 treatments) - Assessed on 6/9/21 and extended d/t incomplete achievement:  7. ? Balance:   a.  Pt will be able to complete 360 deg turn with near equal weightbearing, albeit slow - Making progress, improving RLE weightbearing but still significanty limited as compared to LLE, though speed was improved since eval (~6 sec)  b. Pt will score at least 41/56 on Mccurdy to indicate significant change in static/dynamic balance abilities since initial evaluation. - Nearly met, 39/56  8. ? Strength:   a. 2+/5 R knee ext strength to increase R knee control in SLS - MET  b. 4+/5 R hip ext/abd, 5-/5 R hamstrings to improve swing and stance phase with gait - Improving  9. ? Function:   a. Pt will be able to stand from 20 in chair 10x without UE assist and equal weightbearing with no more than minimal cuing from therapist. - NOT MET, able to stand from 20\" height 10x on a good day, though, which is progress  b. Pt will be able to ascend/descend stairs using reciprocal ascent/descent with unilat UE assist and fair speed, fair R knee eccentric control - MET for 2 steps x5 rounds, have not tried full flight yet - most difficulty with reciprocal stepping d/t large hip flexion needed during swing phase  c. Pt will be able to ambulate 300 ft with improving L trunk lean, no more than 25% incidence of R knee hyperextension throughout, and at .5m/s - Making progress, 150ft with . 44 m/s (was .34m/s), and no more than 10% incidence of R knee hyperextension throughout gait           Patient goals: \"strengthen what we can, improve walking\" - Making progress    Pt. Education:  [x] Yes  [] No  [x] Reviewed Prior HEP/Ed  Method of Education: [x] Verbal  [] Demo  [] Written  Comprehension of Education:  [x] Verbalizes understanding. [x] Demonstrates understanding. [x] Needs review for consistency . [] Demonstrates/verbalizes HEP/Ed previously given.      Current HEP: resisted TKEs w/ purple band, sit-to-stands (22-23\"), squats, standing ham curls, standing hip ext, and marches      Plan: [x] Continue current frequency toward long and short term goals   [x] Specific Instructions for subsequent treatments: stair climbing, R quad eccentric control, RLE single limb stance and swing, treadmill training - reprovide HEP       Time In:  12:13 pm          Time Out: 1:00  pm    Electronically signed by:  Fran Amador PT

## 2021-06-15 NOTE — FLOWSHEET NOTE
[x] [facility]       Occupational Therapy            1st floor       [address], [place]         Phone: [phone]       Fax: [phone] [] [facility]  [address] [place] [address]  Phone: [phone]  Fax: [phone]      Occupational Therapy Daily Treatment Note    Date:  6/15/2021  Patient Name:  Quintin Farley    :  1960  MRN: [id number]  Visit# / total visits:     Patient: Quintin Farley                      : 1960                      MRN: [id number]  Referring Provider:  Yuan Gonzalez  Insurance: [employer]   visits left  Medical Diagnosis: History of stroke Z86.73             Rehab Codes: stiffness in shoulder M25.61,, stiffness in elbow M25.62,, stiffness in hand M25.64,, stiffness in wrist M25.63,, lack of coordination R27.8,, fine motor skills loss R29.818, or muscle weakness generalized M62.81,  Onset Date:    3-          Next Dr. Coby Mckenzie: 21  Cancels/No Shows: 0/0    Subjective:    Pain:  [] Yes  [x] No Location: shoulder with range Pain Rating: (0-10 scale) 0/10 at rest  Pain altered Tx:  [x] No  [] Yes  Action:  Pt Comments:  \"I feel weak today\"   Objective:  Modalities:  Modality Flow Sheet:  START STOP Tx Modality   2021  Electrical Stim: [place] cycle  1.  Shoulder subluxation:  Time: 20 minutes   Cycle: 10/10  Duty: 50%  Patch location: anterior and posterior deltoid, middle deltoid and supraspinatus   Up to ~64Hz intensity     Ultrasound: ___ W/cm2 x ___ mins  Duty factor: __100%  __50%  __33% __20%  Head size:    ___ cm  MHz: __1mHz  __3mHz  Location:     Hot Pack:     Cold Pack:     Precautions: NA  Exercises:  EXERCISE    REPS/     TIME  WEIGHT/    LEVEL COMMENTS   PROM  Shoulder internal/external  Flex/ext  Elbow flex/ext  Wrist flex/ext  Digit extension 10 mins  Completed    Wooden pegs with R hand (place in)   Not Completed    Cone stacking   Completed without mirror for visual stimulation on shoulder compensation. Flexbar   strength  Wrist Flexion/Ext  Wrist pronation  Wrist supination  Wrist ulnar deviation  Wrist radial dev  Shoulder Adb  Shoulder Add 10 yellow Not completed this date                       Other: Pt provided E Stim with \"no pain\" this date. See parameters above. Pt completed cone stacking with increased forearm control, no shoulder compensation. Pt demo max difficulty with motor control for gross grasp in R hand when placing velcro pegs. Treatment Charges: Mins Units   [x]  Modalities:   E-stim   20   1   []  Ultrasound     [x]  Ther Exercise 15 1   [x]  Manual Therapy 20 1   []  Ther Activities     []  Orthotic fit/train     []  Orthotic recheck     []  Other     Total Treatment time 55      Assessment: [x] Progressing toward goals. Pt with active tenodesis response, no noted ability to actively extend or flex digits. Demo trace movements when attempting digit extension with wrist in flexed position. [] No change. [] Other     [x] Patient would benefit from skilled occupational therapy services in order to: Improve  Rehab Potential, Motor control/Tone in UE, ROM, Strength and Coordination deficit in order to ensure good follow through and decrease pain in UE for safe completion of ADLs     Short Term Goals: ( 6  Treatments)  1. Increase AROM (degrees) (extension/flexion)  a. R shoulder to 60/40 ONGOING IMPROVED  b. R elbow -20/130 ONGOING IMPROVED  c. R wrist -30/50 MET  d. R radial/ulnar dev 15/10  2. Increase strength (pounds);  a. R  strength to 22 pounds ONGOING  b. R lateral pinch to 10 pounds ONGOING  c. R 2 point pinch to 12 pounds ONGOING  3. Increase function:UE Functional Index Score 40 or more points to promote increased functional abilities MET  4. Patient to be independent with home exercise program as demonstrated by performance with correct form without cues. MET     Long Term Goals: (  12 Treatments)  1.  Move digits in R hand independently with trace movement for promotion of future use for guitar ONGOING  2. Demo R hand extension to [facility] for increased I with  on R side ONGOING (trace movements)  3. Increase R UE Shoulder ROM to lift arm up for I with adl tasks at home. 40/75     Pt. Education:  [x] Yes  [] No  [] Reviewed Prior HEP/Ed  Method of Education: [x] Verbal  [] Demo  [] Written  Re: e-stim treatment   Comprehension of Education:  [x] Verbalizes understanding. [] Demonstrates understanding. [] Needs review. [] Demonstrates/verbalizes HEP/Ed previously given. Plan: [x] Continue current frequency toward short and long term goals. [x] Specific Instructions for subsequent treatments: estim for wrist, digit, and thumb extension    [] Other:    Time In: 9539-0427    Electronically signed by:  MODESTO Restrepo/L  OTD-S        Medicare Cap   [] Physical Therapy  [] Speech Therapy  [x] Occupational therapy  *PT and Speech caps combine      $2100 Limit for PT and Speech combined  $2100 Limit for OT individually  At the beginning of the month where you expect to go over $2100, please add the [address] [place] [address] modifier      Patient Name: Madisyn Iglesias: 1960    Note:  This is an estimate of charges billed.      [facility] Name # units/ charge $$$ charge Daily Total Charge Ongoing Total $$$   6-15-21 EStim 1 13.03 13.03     TE 1 29.21 29.31     MAN 1 21.34 21.34 1349.72

## 2021-06-17 ENCOUNTER — HOSPITAL ENCOUNTER (OUTPATIENT)
Dept: OCCUPATIONAL THERAPY | Age: 61
Setting detail: THERAPIES SERIES
Discharge: HOME | End: 2021-06-17
Payer: MEDICARE

## 2021-06-17 ENCOUNTER — HOSPITAL ENCOUNTER (OUTPATIENT)
Dept: PHYSICAL THERAPY | Age: 61
Setting detail: THERAPIES SERIES
Discharge: HOME | End: 2021-06-17
Payer: MEDICARE

## 2021-06-17 PROCEDURE — 97032 APPL MODALITY 1+ESTIM EA 15: CPT

## 2021-06-17 PROCEDURE — 97140 MANUAL THERAPY 1/> REGIONS: CPT

## 2021-06-17 PROCEDURE — 97110 THERAPEUTIC EXERCISES: CPT

## 2021-06-17 PROCEDURE — 97116 GAIT TRAINING THERAPY: CPT

## 2021-06-17 NOTE — FLOWSHEET NOTE
[x] [facility]) - [facility]  [facility]  [address]  [phone]  F: [phone] [] [address]  [facility]   [address]  P: [phone]  F: [phone] [] [address] &  Therapy  [address] [facility]  P: [phone]  F: [phone] [] [address]  P: [phone]  F: [phone] [] [address]  [facility]   [address]   [place]: [phone]  F: [phone]      Physical Therapy Daily Treatment Note    Date:  2021  Patient Name:  Newell Ahumada    :  1960  MRN: [id number]  Physician: Dr. Augusto Cramer: [employer] ([id number])  Medical Diagnosis:   History of stroke  Rehab Codes: R53.1, R29.3, R27.9, R26.9, M25.60  Next 's appt.: 21  Date of symptom onset: 19  Visit# / total visits:  (approved by Christine Carter on 21; Will need [facility] approval for last 6)    Cancels/No Shows: 0       Subjective:     Pain:  [] Yes  [x] No Location:  N/A Pain Rating: (0-10 scale) 0/10  Pain altered Tx:  [x] No  [] Yes  Action:  Comments: Pt reports feeling better today; thinks it was a low blood sugar issue last time. Pt reports he ate a banana and orange juice before today's session.          Objective:  Modalities:   Precautions:  Exercises: Bolded exercises completed on 2021:  Exercise Reps/ Time Weight/ Level Comments   Treadmill gait 3x3.5 min 1.3 mph   Chair placed on treadmill during breaks   Overground gait 2x 60 ft  SBQC, CGA         Stair climbing 2x10 steps  unilat UE assist, CGA               Lateral weight shifts w/ TKE 2x20 Purple     Foot tap up to step 20x 6\"    SLS longer holds  3x15\"  BUE assist   Hamstring curl 2x15 2# Blocking hip flexor compensation  - no weight on 6/15   March 2x15 2# - no weight on 6/15   Hip abd 2x15     Fwd heel tap  2\", // bars BUE assist   Fwd long lunge 15x  To cone, cues to increase step length and  foot with return         Mat sit<>stand 2x15 23\" Mod assist to incr RLE weightbearing   Bilateral ER w/ scap retract 3x15 purple In between rest breaks   Squat, tap ball to step      Standing reaching, all directions 2 min  Encouraging RLE weightbearing     Seated thoracic/lumbar ext against bolster 15x  Big red bolster behind back                     Leg press - DL 2x20 60# DL = double limb   Leg press - SL 20x 20# SL = single limb               Mat      Sidelying clams 3x20 blueberry    Bridge 2x10  Cues to reduce height to improve low back pain   Prone knee flexion 3x10 blueberry RLE   Other:         Treatment Charges: Mins Units   []  Modalities     [x]  Ther Exercise 8 1   []  Manual Therapy     []  Ther Activities     []  Aquatics     []  Vasocompression     []  Other: neuro re-ed     [x]  Other: gait 50 3   Total Treatment time 58 4       Assessment: [x] Progressing toward goals. Better energy today - able to complete each treadmill training session at 1.3 mph for a full 3.5 min, a new record for both speed and endurance. Does still require mod VCs for increased RLE weightshift and longer step length, though is limited in stride d/t poor quad control in terminal stance resulting in genu recurvatum. Attempted full staircase this date, able to complete reciprocal stepping ascent, marked time descent d/t lack of mobility from AFO on RLE. Pt does attempt reciprocal stepping but just has hard landing on LLE d/t lack of eccentric R quad control/DF mobility. Will benefit from continued practice to improve safety and endurance, as well as R quad concentric strength and smoothness of RLE swing. [] No change.        [x] Other: Continues to require significant therapeutic rest breaks to allow for increased tolerance of upcoming sets, tasks d/t RLE fatigue and aerobic endurance. [x] Patient would continue to benefit from skilled physical therapy services in order to: progress RLE strength, address gait deviations, improve standing static and dynamic balance impairment, improve safety and independence with functional mobility both at home and in community. STG: (to be met in 10 treatments) - Assessed on 5/7/21:  1. ? ROM: Pt will demo no excessive ER of RLE in standing at rest or sitting at rest to indicate reduced tightness in external rotators and normalize posture - Making progress, still with mild ER in standing/amb but overall improvement  2. ? Balance:    a. Pt will be able to reach outside MED towards R both up and down without balance deficit - Making progress, still mild balance deficit with further outside MED fwd and right but overall increased stability with RLE weightshift  b. Pt will be able to maintain RLE SLS for 30s with min UE assist and good neuromuscular control noted - Nearly met, mod UE assist required  3. ? Strength:  a. 2/5 R knee ext strength to increase R knee control in SLS - MET, 2/5  b. 4/5 R hip ext/abd, 4+/5 R hamstrings to improve swing and stance phase with gait - Partially met, 4/5 hip ext/abd, 4/5 hamstrings  4. ? Function:   a. Pt will be able to stand from 24 in chair 10x without UE assist and equal weightbearing with no more than minimal cuing from therapist. - MET   b. Pt will be able to ascend/descend stairs using reciprocal ascent/descent with BUE assist and fair speed, fair R knee eccentric control - MET, CGA required, only on 4-6\" steps at current  c. Pt will be able to ambulate 150 ft with improving L trunk lean, no more than 50% incidence of R knee hyperextension throughout, and at .4m/s - NOT MET,  .28 m/s self selected, 23.96 .42 m/s fast    5. Patient to be independent with home exercise program as demonstrated by performance with correct form without cues.  - Ongoing, needs cuing for specific Verbalizes understanding. [x] Demonstrates understanding. [x] Needs review for consistency . [] Demonstrates/verbalizes HEP/Ed previously given.      Current HEP: resisted TKEs w/ purple band, sit-to-stands (22-23\"), squats, standing ham curls, standing hip ext, and marches      Plan: [x] Continue current frequency toward long and short term goals   [x] Specific Instructions for subsequent treatments: stair climbing, R quad eccentric control, RLE single limb stance and swing, treadmill training - reprovide HEP       Time In:  12:02 pm          Time Out: 1:03  pm    Electronically signed by:  Michael West PT

## 2021-06-17 NOTE — FLOWSHEET NOTE
[x] [facility]       Occupational Therapy            1st floor       [address], [place]         Phone: [phone]       Fax: [phone] [] [facility]  [address] [place] [address]  Phone: [phone]  Fax: [phone]      Occupational Therapy Daily Treatment Note    Date:  2021  Patient Name:  Marielos Jaramillo    :  1960  MRN: [id number]  Visit# / total visits:     Patient: Marielos Jaramillo                      : 1960                      MRN: [id number]  Referring Provider:  Modesta Hernández  Insurance: [employer]   visits left  Medical Diagnosis: History of stroke Z86.73             Rehab Codes: stiffness in shoulder M25.61,, stiffness in elbow M25.62,, stiffness in hand M25.64,, stiffness in wrist M25.63,, lack of coordination R27.8,, fine motor skills loss R29.818, or muscle weakness generalized M62.81,  Onset Date:    3-          Next Dr. Larios : 21  Cancels/No Shows: 0/0    Subjective:    Pain:  [] Yes  [x] No Location: shoulder with range Pain Rating: (0-10 scale) 0/10 at rest  Pain altered Tx:  [x] No  [] Yes  Action:  Pt Comments:  \"I feel weak today\"   Objective:  Modalities:  Modality Flow Sheet:  START STOP Tx Modality   2021  Electrical Stim: [place] cycle  1.  Shoulder subluxation:  Time: 20 minutes   Cycle: 10/10  Duty: 50%  Patch location: anterior and posterior deltoid, middle deltoid and supraspinatus   Up to ~64Hz intensity     Ultrasound: ___ W/cm2 x ___ mins  Duty factor: __100%  __50%  __33% __20%  Head size:    ___ cm  MHz: __1mHz  __3mHz  Location:     Hot Pack:     Cold Pack:     Precautions: NA  Exercises:  EXERCISE    REPS/     TIME  WEIGHT/    LEVEL COMMENTS   PROM  Shoulder internal/external  Flex/ext  Elbow flex/ext  Wrist flex/ext  Digit extension 10 mins  Completed    Wooden pegs with R hand (place in)   Not Completed    Cone stacking   Completed without mirror for visual stimulation on shoulder compensation. Flexbar   strength  Wrist Flexion/Ext  Wrist pronation  Wrist supination  Wrist ulnar deviation  Wrist radial dev  Shoulder Adb  Shoulder Add 10 yellow Not completed this date   R UE external rotation and sponge move across midline 16 sponges  Therapist held pt shoulder for promotion of increased body mechanics and decreased compensatory methods                 Other: Pt provided E Stim with \"no pain\" this date. See parameters above. Treatment Charges: Mins Units   [x]  Modalities:   E-stim   20   1   []  Ultrasound     [x]  Ther Exercise 15 1   [x]  Manual Therapy 20 1   []  Ther Activities     []  Orthotic fit/train     []  Orthotic recheck     []  Other     Total Treatment time 55      Assessment: [x] Progressing toward goals. Pt with active tenodesis response, no noted ability to actively extend or flex digits. Demo trace movements when attempting digit extension with wrist in flexed position. [] No change. [] Other     [x] Patient would benefit from skilled occupational therapy services in order to: Improve  Rehab Potential, Motor control/Tone in UE, ROM, Strength and Coordination deficit in order to ensure good follow through and decrease pain in UE for safe completion of ADLs     Short Term Goals: ( 6  Treatments)  1. Increase AROM (degrees) (extension/flexion)  a. R shoulder to 60/40 ONGOING IMPROVED  b. R elbow -20/130 ONGOING IMPROVED  c. R wrist -30/50 MET  d. R radial/ulnar dev 15/10  2. Increase strength (pounds);  a. R  strength to 22 pounds ONGOING  b. R lateral pinch to 10 pounds ONGOING  c. R 2 point pinch to 12 pounds ONGOING  3. Increase function:UE Functional Index Score 40 or more points to promote increased functional abilities MET  4. Patient to be independent with home exercise program as demonstrated by performance with correct form without cues. MET     Long Term Goals: (  12 Treatments)  1.  Move digits in R hand independently

## 2021-06-22 ENCOUNTER — HOSPITAL ENCOUNTER (OUTPATIENT)
Dept: PHYSICAL THERAPY | Age: 61
Setting detail: THERAPIES SERIES
Discharge: HOME | End: 2021-06-22
Payer: MEDICARE

## 2021-06-22 ENCOUNTER — HOSPITAL ENCOUNTER (OUTPATIENT)
Dept: OCCUPATIONAL THERAPY | Age: 61
Setting detail: THERAPIES SERIES
Discharge: HOME | End: 2021-06-22
Payer: MEDICARE

## 2021-06-22 PROCEDURE — 97110 THERAPEUTIC EXERCISES: CPT

## 2021-06-22 PROCEDURE — 97140 MANUAL THERAPY 1/> REGIONS: CPT

## 2021-06-22 PROCEDURE — 97116 GAIT TRAINING THERAPY: CPT

## 2021-06-22 PROCEDURE — 97032 APPL MODALITY 1+ESTIM EA 15: CPT

## 2021-06-22 NOTE — FLOWSHEET NOTE
[x] [facility]       Occupational Therapy            1st floor       [address], [place]         Phone: [phone]       Fax: [phone] [] [facility]  [address] [place] [address]  Phone: [phone]  Fax: [phone]      Occupational Therapy Daily Treatment Note    Date:  2021  Patient Name:  Marjorie Webber    :  1960  MRN: [id number]  Visit# / total visits:     Patient: Marjorie Webber                      : 1960                      MRN: [id number]  Referring Provider:  Lori Astudillo  Insurance: [employer]   visits left  Medical Diagnosis: History of stroke Z86.73             Rehab Codes: stiffness in shoulder M25.61,, stiffness in elbow M25.62,, stiffness in hand M25.64,, stiffness in wrist M25.63,, lack of coordination R27.8,, fine motor skills loss R29.818, or muscle weakness generalized M62.81,  Onset Date:    3-          Next Dr. Jack Smiley: 21  Cancels/No Shows: 0/0    Subjective:    Pain:  [] Yes  [x] No Location: shoulder with range Pain Rating: (0-10 scale) 0/10 at rest  Pain altered Tx:  [x] No  [] Yes  Action:  Pt Comments:  \"I feel much better today\"   Objective:  Modalities:  Modality Flow Sheet:  START STOP Tx Modality   2021  Electrical Stim: [place] cycle  1.  Shoulder subluxation:  Time: 20 minutes   Cycle: 10/10  Duty: 50%  Patch location: anterior and posterior deltoid, middle deltoid and supraspinatus   Up to ~64Hz intensity     Ultrasound: ___ W/cm2 x ___ mins  Duty factor: __100%  __50%  __33% __20%  Head size:    ___ cm  MHz: __1mHz  __3mHz  Location:     Hot Pack:     Cold Pack:     Precautions: NA  Exercises:  EXERCISE    REPS/     TIME  WEIGHT/    LEVEL COMMENTS   PROM  Shoulder internal/external  Flex/ext  Elbow flex/ext  Wrist flex/ext  Digit extension 10 mins  Completed    Wooden pegs with R hand (place in)   Not Completed    Cone stacking   Completed without mirror for visual stimulation on shoulder compensation. Flexbar   strength  Wrist Flexion/Ext  Wrist pronation  Wrist supination  Wrist ulnar deviation  Wrist radial dev  Shoulder Adb  Shoulder Add 10 yellow  completed this date  Wrist flexion and radial deviation                       Other: Pt provided E Stim with \"no pain\" this date. See parameters above. Pt completed cone stacking with increased forearm control, no shoulder compensation. Pt demo mod difficulty with motor control for gross grasp in R hand with yellow flexbar. Treatment Charges: Mins Units   [x]  Modalities:   E-stim   20   1   []  Ultrasound     [x]  Ther Exercise 15 1   [x]  Manual Therapy 20 1   []  Ther Activities     []  Orthotic fit/train     []  Orthotic recheck     []  Other     Total Treatment time 55      Assessment: [x] Progressing toward goals. Pt with active tenodesis response, no noted ability to actively extend or flex digits. Demo trace movements when attempting digit extension with wrist in flexed position. [] No change. [] Other     [x] Patient would benefit from skilled occupational therapy services in order to: Improve  Rehab Potential, Motor control/Tone in UE, ROM, Strength and Coordination deficit in order to ensure good follow through and decrease pain in UE for safe completion of ADLs     Short Term Goals: ( 6  Treatments)  1. Increase AROM (degrees) (extension/flexion)  a. R shoulder to 60/40 ONGOING IMPROVED  b. R elbow -20/130 ONGOING IMPROVED  c. R wrist -30/50 MET  d. R radial/ulnar dev 15/10  2. Increase strength (pounds);  a. R  strength to 22 pounds ONGOING  b. R lateral pinch to 10 pounds ONGOING  c. R 2 point pinch to 12 pounds ONGOING  3. Increase function:UE Functional Index Score 40 or more points to promote increased functional abilities MET  4. Patient to be independent with home exercise program as demonstrated by performance with correct form without cues.  MET     Long Term Goals: (  12 Treatments)  1. Move digits in R hand independently with trace movement for promotion of future use for guitar ONGOING  2. Demo R hand extension to half of [facility] for increased I with  on R side ONGOING (trace movements)  3. Increase R UE Shoulder ROM to lift arm up for I with adl tasks at home. 40/75     Pt. Education:  [x] Yes  [] No  [] Reviewed Prior HEP/Ed  Method of Education: [x] Verbal  [] Demo  [] Written  Re: e-stim treatment   Comprehension of Education:  [x] Verbalizes understanding. [] Demonstrates understanding. [] Needs review. [] Demonstrates/verbalizes HEP/Ed previously given. Plan: [x] Continue current frequency toward short and long term goals. [x] Specific Instructions for subsequent treatments: estim for wrist, digit, and thumb extension    [] Other:    Time In: 4911-7743    Electronically signed by:  MODESTO Nolan/JANNET  OTD-S        Medicare Cap   [] Physical Therapy  [] Speech Therapy  [x] Occupational therapy  *PT and Speech caps combine      $2100 Limit for PT and Speech combined  $2100 Limit for OT individually  At the beginning of the month where you expect to go over $2100, please add the [address] [place] [address] modifier      Patient Name: Oliver Parisi: 1960    Note:  This is an estimate of charges billed.      Date of [address] Name # units/ charge $$$ charge Daily Total Charge Ongoing Total $$$   6-22-21 EStim 1 13.03 13.03     TE 1 29.21 29.31     MAN 1 21.34 21.34 1413.30

## 2021-06-22 NOTE — FLOWSHEET NOTE
[x] [facility]) [facility] &  Therapy  [address]  [phone]  F: [phone] [] [address]  [facility] [address]   [address]  P: [phone]  F: [phone] [] [address] &  Therapy  [address]  P: [phone]  F: [phone] [] [address]  P: [phone]  F: [phone] [] [address]  [facility] [place]   [address]   [place]: [phone]  F: [phone]      Physical Therapy Daily Treatment Note    Date:  2021  Patient Name:  Loco Vivar    :  1960  MRN: [id number]  Physician: Dr. Eleazar Patrick: [employer] ([id number])  Medical Diagnosis:   History of stroke  Rehab Codes: R53.1, R29.3, R27.9, R26.9, M25.60  Next 's appt.: 21  Date of symptom onset: 19  Visit# / total visits:  (approved by Serena Santamaria on 21; Will need [facility] approval for last 6)   Cancels/No Shows: 0       Subjective:     Pain:  [] Yes  [x] No Location:  N/A Pain Rating: (0-10 scale) 0/10  Pain altered Tx:  [x] No  [] Yes  Action:  Comments: Pt reports he feels \"okay\" today. States he fell over the weekend when playing with his niece, but \"I know how to fall\" and reports he was fine, able to push back to stand using LLE. Denies any injuries. Notes he didn't complete any HEP since last visit except \"walking around more\".          Objective:  Modalities:   Precautions:  Exercises: Bolded exercises completed on 2021:  Exercise Reps/ Time Weight/ Level Comments   Treadmill gait 3x3.5 min 1.3 mph   Chair placed on treadmill during breaks   Overground gait 2x 60 ft  SBQC, CGA         Stair climbing 2x10 steps  unilat UE assist, CGA               Lateral weight shifts w/ TKE 2x20 Purple     Foot tap up to step 20x 6\"    SLS longer holds  3x15\"  BUE assist   Hamstring curl 2x15 2# Blocking hip flexor compensation  - no weight on 6/15   March 2x15 2# - no weight on 6/15   Hip abd 2x15     Fwd heel tap  2\", // bars BUE assist   Fwd long lunge 15x  To cone, cues to increase step length and  foot with return         Mat sit<>stand 2x15 23\" Mod assist to incr RLE weightbearing   Bilateral ER w/ scap retract 3x15 purple In between rest breaks   Squat, tap ball to step      Standing reaching, all directions 2 min  Encouraging RLE weightbearing     Seated thoracic/lumbar ext against bolster 15x  Big red bolster behind back                     Leg press - DL 2x20 60# DL = double limb   Leg press - SL 20x 20# SL = single limb               Mat      Sidelying clams 3x20 blueberry    Bridge 2x10  Cues to reduce height to improve low back pain   Prone knee flexion 3x10 blueberry RLE   Other:         Treatment Charges: Mins Units   []  Modalities     [x]  Ther Exercise 6 1   []  Manual Therapy     []  Ther Activities     []  Aquatics     []  Vasocompression     []  Other: neuro re-ed     [x]  Other: gait 51 3   Total Treatment time 57 4       Assessment: [x] Progressing toward goals. Still remains limited to 1.3 mph - attempted to increase and pt reports too fast to keep up with LLE swing. Does demo slightly decreased quad endurance with gait training this date, as evidenced by increased genu recurvatum in mid/terminal stance this date. Min tactile cues to relax L scapular elevation with gait and upright trunk throughout gait train. More fatigued with stair climbing as well this date, one instance of near buckling on RLE with ascent while in RLE SLS, min A to prevent LOB.  Pt still tends to have difficulty with achieving maximal weightbearing/muscle contribution from RLE when completing bilateral exercises - may need to deviate from functional training next visit to focus on some additional single-limb focus standing/WB exercises, as pt needs so much rest break with functional activity d/t low tolerance that although intensity is adequate, repetition is less than desirable. [] No change. [x] Other: Continues to require significant therapeutic rest breaks to allow for increased tolerance of upcoming sets, tasks d/t RLE fatigue and aerobic endurance. [x] Patient would continue to benefit from skilled physical therapy services in order to: progress RLE strength, address gait deviations, improve standing static and dynamic balance impairment, improve safety and independence with functional mobility both at home and in community. STG: (to be met in 10 treatments) - Assessed on 5/7/21:  1. ? ROM: Pt will demo no excessive ER of RLE in standing at rest or sitting at rest to indicate reduced tightness in external rotators and normalize posture - Making progress, still with mild ER in standing/amb but overall improvement  2. ? Balance:    a. Pt will be able to reach outside MED towards R both up and down without balance deficit - Making progress, still mild balance deficit with further outside MED fwd and right but overall increased stability with RLE weightshift  b. Pt will be able to maintain RLE SLS for 30s with min UE assist and good neuromuscular control noted - Nearly met, mod UE assist required  3. ? Strength:  a. 2/5 R knee ext strength to increase R knee control in SLS - MET, 2/5  b. 4/5 R hip ext/abd, 4+/5 R hamstrings to improve swing and stance phase with gait - Partially met, 4/5 hip ext/abd, 4/5 hamstrings  4. ? Function:   a. Pt will be able to stand from 24 in chair 10x without UE assist and equal weightbearing with no more than minimal cuing from therapist. - MET   b. Pt will be able to ascend/descend stairs using reciprocal ascent/descent with BUE assist and fair speed, fair R knee eccentric control - MET, CGA required, only on 4-6\" steps at current  c.  Pt will be able to .44 m/s (was .34m/s), and no more than 10% incidence of R knee hyperextension throughout gait           Patient goals: \"strengthen what we can, improve walking\" - Making progress    Pt. Education:  [x] Yes  [] No  [x] Reviewed Prior HEP/Ed  Method of Education: [x] Verbal  [] Demo  [] Written  Comprehension of Education:  [x] Verbalizes understanding. [x] Demonstrates understanding. [x] Needs review for consistency . [] Demonstrates/verbalizes HEP/Ed previously given.      Current HEP: resisted TKEs w/ purple band, sit-to-stands (22-23\"), squats, standing ham curls, standing hip ext, and marches      Plan: [x] Continue current frequency toward long and short term goals   [x] Specific Instructions for subsequent treatments: stair climbing, R quad eccentric control, RLE single limb stance and swing, treadmill training - reprovide HEP       Time In:  1:03 pm          Time Out: 2:00  pm    Electronically signed by:  Marv Sepulveda, PT

## 2021-06-24 ENCOUNTER — HOSPITAL ENCOUNTER (OUTPATIENT)
Dept: OCCUPATIONAL THERAPY | Age: 61
Setting detail: THERAPIES SERIES
Discharge: HOME | End: 2021-06-24
Payer: MEDICARE

## 2021-06-24 ENCOUNTER — HOSPITAL ENCOUNTER (OUTPATIENT)
Dept: PHYSICAL THERAPY | Age: 61
Setting detail: THERAPIES SERIES
Discharge: HOME | End: 2021-06-24
Payer: MEDICARE

## 2021-06-24 PROCEDURE — 97110 THERAPEUTIC EXERCISES: CPT

## 2021-06-24 PROCEDURE — 97140 MANUAL THERAPY 1/> REGIONS: CPT

## 2021-06-24 PROCEDURE — 97032 APPL MODALITY 1+ESTIM EA 15: CPT

## 2021-06-24 PROCEDURE — 97116 GAIT TRAINING THERAPY: CPT

## 2021-06-24 NOTE — FLOWSHEET NOTE
[x] [facility]       Occupational Therapy            1st floor       [address], [place]         Phone: [phone]       Fax: [phone] [] [facility]  [address] [place] [address]  Phone: [phone]  Fax: [phone]      Occupational Therapy Daily Treatment Note    Date:  2021  Patient Name:  Tami Orr    :  1960  MRN: [id number]  Visit# / total visits:  Visit total corrected this date   Patient: Tami Orr                      : 1960                      MRN: [id number]  Referring Provider:  Arelis Dwoling  Insurance: [employer]   visits left  Medical Diagnosis: History of stroke Z86.73             Rehab Codes: stiffness in shoulder M25.61,, stiffness in elbow M25.62,, stiffness in hand M25.64,, stiffness in wrist M25.63,, lack of coordination R27.8,, fine motor skills loss R29.818, or muscle weakness generalized M62.81,  Onset Date:    3-          Next Dr. Mayra Champion: 21  Cancels/No Shows: 0/0    Subjective:    Pain:  [] Yes  [x] No Location: shoulder with range Pain Rating: (0-10 scale) 0/10 at rest  Pain altered Tx:  [x] No  [] Yes  Action:  Pt Comments: \"My fingers are looser today\"   Objective:  Modalities:  Modality Flow Sheet:  START STOP Tx Modality   2021  Electrical Stim: [place] cycle  1.  Shoulder subluxation:  Time: 20 minutes   Cycle: 10/10  Duty: 50%  Patch location: anterior and posterior deltoid, middle deltoid and supraspinatus   Up to ~64Hz intensity     Ultrasound: ___ W/cm2 x ___ mins  Duty factor: __100%  __50%  __33% __20%  Head size:    ___ cm  MHz: __1mHz  __3mHz  Location:     Hot Pack:     Cold Pack:     Precautions: NA  Exercises:  EXERCISE    REPS/     TIME  WEIGHT/    LEVEL COMMENTS   PROM  Shoulder internal/external  Flex/ext  Elbow flex/ext  Wrist flex/ext  Digit extension 10 mins  Completed    Wooden pegs with R hand (place in)   Not Completed    Cone stacking   Completed without mirror for visual stimulation on shoulder compensation. Flexbar   strength  Wrist Flexion/Ext  Wrist pronation  Wrist supination  Wrist ulnar deviation  Wrist radial dev  Shoulder Adb  Shoulder Add 10 yellow  completed this date  Wrist flexion and radial deviation   Elbow extension with Therapist holding shoulder to prevent trunk movement. Started this date. Other: Pt provided E Stim with \"no pain\" this date. See parameters above. Pt completed cone stacking with increased forearm control, no shoulder compensation. Pt demo mod difficulty with motor control for gross grasp in R hand with yellow flexbar. Encouraged pt to continue to do symmetrical isolated digit extension for promotion of increased finger control. Pt reports \"it does not work\". Additional encouragement given. Treatment Charges: Mins Units   [x]  Modalities:   E-stim   20   1   []  Ultrasound     [x]  Ther Exercise 15 1   [x]  Manual Therapy 20 1   []  Ther Activities     []  Orthotic fit/train     []  Orthotic recheck     []  Other     Total Treatment time 55      Assessment: [x] Progressing toward goals. Pt with active tenodesis response, no noted ability to actively extend or flex digits. Demo trace movements when attempting digit extension with wrist in flexed position. [] No change. [] Other     [x] Patient would benefit from skilled occupational therapy services in order to: Improve  Rehab Potential, Motor control/Tone in UE, ROM, Strength and Coordination deficit in order to ensure good follow through and decrease pain in UE for safe completion of ADLs     Short Term Goals: ( 6  Treatments)  1. Increase AROM (degrees) (extension/flexion)  a. R shoulder to 60/40 ONGOING IMPROVED  b. R elbow -20/130 ONGOING IMPROVED  c. R wrist -30/50 MET  d. R radial/ulnar dev 15/10  2.  Increase strength (pounds);  a. R  strength to 22 pounds ONGOING  b. R lateral pinch to 10 pounds ONGOING  c. R 2 point pinch to 12 pounds ONGOING  3. Increase function:UE Functional Index Score 40 or more points to promote increased functional abilities MET  4. Patient to be independent with home exercise program as demonstrated by performance with correct form without cues. MET     Long Term Goals: (  12 Treatments)  1. Move digits in R hand independently with trace movement for promotion of future use for guitar ONGOING  2. Demo R hand extension to half of [facility] [place] [facility] for increased I with  on R side ONGOING (trace movements)  3. Increase R UE Shoulder ROM to lift arm up for I with adl tasks at home. 40/75     Pt. Education:  [x] Yes  [] No  [] Reviewed Prior HEP/Ed  Method of Education: [x] Verbal  [] Demo  [] Written  Re: e-stim treatment   Comprehension of Education:  [x] Verbalizes understanding. [] Demonstrates understanding. [] Needs review. [] Demonstrates/verbalizes HEP/Ed previously given. Plan: [x] Continue current frequency toward short and long term goals. [x] Specific Instructions for subsequent treatments: estim for wrist, digit, and thumb extension    [] Other:    Time In: 5823-8145    Electronically signed by:  Janis Bucio, OTR/L  OTD-S        Medicare Cap   [] Physical Therapy  [] Speech Therapy  [x] Occupational therapy  *PT and Speech caps combine      $2100 Limit for PT and Speech combined  $2100 Limit for OT individually  At the beginning of the month where you expect to go over $2100, please add the [address] [place] [address] modifier      Patient Name: Clifford Nazario: 1960    Note:  This is an estimate of charges billed.      Date of [address] Name # units/ charge $$$ charge Daily Total Charge Ongoing Total $$$   6-24-21 EStim 1 13.03 13.03     TE 1 29.21 29.31     MAN 1 21.34 21.34 1476.88

## 2021-06-24 NOTE — FLOWSHEET NOTE
[x] [address]  - [facility] TWELVE-STEP [facility] &  Therapy  [address]  [phone]  F: [phone] [] [address]  [address]   [address]  P: [phone]  F: [phone] [] [address] &  Therapy  [address]  P: [phone]  F: [phone] [] [address]  P: [phone]  F: [phone] [] [address]  [facility]   [address]   [place]: [phone]  F: [phone]      Physical Therapy Daily Treatment Note    Date:  2021  Patient Name:  Ly Alfaro    :  1960  MRN: [id number]  Physician: Dr. Bernadette Tarango: [employer] ([id number])  Medical Diagnosis:   History of stroke  Rehab Codes: R53.1, R29.3, R27.9, R26.9, M25.60  Next 's appt.: 21  Date of symptom onset: 19  Visit# / total visits:  [facility]; Will need [facility] for last 6)   Cancels/No Shows: 0       Subjective:     Pain:  [] Yes  [x] No Location:  N/A Pain Rating: (0-10 scale) 0/10  Pain altered Tx:  [x] No  [] Yes  Action:  Comments: Pt reports he's not sure how his legs are doing since he just sat for an hour in OT. .. \"we'll see\".          Objective:  Modalities:   Precautions:  Exercises: Bolded exercises completed on 2021:  Exercise Reps/ Time Weight/ Level Comments   Treadmill gait 2x4 min  1x4.5 min 1.1 mph   Chair placed on treadmill during breaks   Overground gait 200 ft  SBQC, CGA         Stair climbing 2x10 steps  unilat UE assist, CGA  - reciprocal                Lateral weight shifts w/ TKE 2x20 Purple     Foot tap up to step 20x 6\"    SLS longer holds  2x30\"  BUE assist   Hamstring curl 2x15 2# Blocking hip flexor compensation  - no weight on 6/15   March 2x15 2# - no weight on 6/15   Hip abd 2x15     Fwd heel tap  2\", // bars BUE assist   Fwd long lunge 15x  To cone, cues to increase step length and  foot with return         Mat sit<>stand 2x15 23\" Mod assist to incr RLE weightbearing   Bilateral ER w/ scap retract 3x15 purple In between rest breaks   Squat, tap ball to step      Standing reaching, all directions 2 min  Encouraging RLE weightbearing     Seated thoracic/lumbar ext against bolster 15x  Big red bolster behind back                     Leg press - DL 2x20 60# DL = double limb   Leg press - SL 20x 20# SL = single limb               Mat      Sidelying clams 3x20 blueberry    Bridge 2x10  Cues to reduce height to improve low back pain   Prone knee flexion 3x10 blueberry RLE   Other:         Treatment Charges: Mins Units   []  Modalities     [x]  Ther Exercise 3 --   []  Manual Therapy     []  Ther Activities     []  Aquatics     []  Vasocompression     []  Other: neuro re-ed     [x]  Other: gait 53 4   Total Treatment time 55 4       Assessment: [x] Progressing toward goals. Reduced to 1. 1mph per pt request, ut able to push to longer distances (4 min, 4.5 min) this date. Pt does start out with improving equal stance time and stability noted in RLE SLS, but this quickly fatigues after ~30s and requires mod VCs to increase heel strike, step length, and RLE weightshift. Overground training demos less carryover today than typical, with mod VCs to continue to increase speed of LLE swing especially at quads for quicker knee extension to prepare for heel strike. Stair climbing still very difficult for pt and lacks terminal knee extension to assist with RLE stance phase while on stairs. [] No change. [x] Other: Continues to require significant therapeutic rest breaks to allow for increased tolerance of upcoming sets, tasks d/t RLE fatigue and aerobic endurance.   [x] Patient would continue to benefit from skilled physical therapy services in order to: progress RLE strength, address gait deviations, improve standing static and dynamic balance impairment, improve safety and independence with functional mobility both at home and in community. STG: (to be met in 10 treatments) - Assessed on 5/7/21:  1. ? ROM: Pt will demo no excessive ER of RLE in standing at rest or sitting at rest to indicate reduced tightness in external rotators and normalize posture - Making progress, still with mild ER in standing/amb but overall improvement  2. ? Balance:    a. Pt will be able to reach outside MED towards R both up and down without balance deficit - Making progress, still mild balance deficit with further outside MED fwd and right but overall increased stability with RLE weightshift  b. Pt will be able to maintain RLE SLS for 30s with min UE assist and good neuromuscular control noted - Nearly met, mod UE assist required  3. ? Strength:  a. 2/5 R knee ext strength to increase R knee control in SLS - MET, 2/5  b. 4/5 R hip ext/abd, 4+/5 R hamstrings to improve swing and stance phase with gait - Partially met, 4/5 hip ext/abd, 4/5 hamstrings  4. ? Function:   a. Pt will be able to stand from 24 in chair 10x without UE assist and equal weightbearing with no more than minimal cuing from therapist. - MET   b. Pt will be able to ascend/descend stairs using reciprocal ascent/descent with BUE assist and fair speed, fair R knee eccentric control - MET, CGA required, only on 4-6\" steps at current  c. Pt will be able to ambulate 150 ft with improving L trunk lean, no more than 50% incidence of R knee hyperextension throughout, and at .4m/s - NOT MET,  .28 m/s self selected, 23.96 .42 m/s fast    5. Patient to be independent with home exercise program as demonstrated by performance with correct form without cues. - Ongoing, needs cuing for specific exercise completion vs just general activity  6.  Demonstrate Knowledge of fall prevention - Making progress, still demos risky behavior without using AD while ambulating at home from time to time despite education     LTG: (to be met in 36 treatments) - Assessed on 6/9/21 and extended d/t incomplete achievement:  7. ? Balance:   a. Pt will be able to complete 360 deg turn with near equal weightbearing, albeit slow - Making progress, improving RLE weightbearing but still significanty limited as compared to LLE, though speed was improved since eval (~6 sec)  b. Pt will score at least 41/56 on Mccurdy to indicate significant change in static/dynamic balance abilities since initial evaluation. - Nearly met, 39/56  8. ? Strength:   a. 2+/5 R knee ext strength to increase R knee control in SLS - MET  b. 4+/5 R hip ext/abd, 5-/5 R hamstrings to improve swing and stance phase with gait - Improving  9. ? Function:   a. Pt will be able to stand from 20 in chair 10x without UE assist and equal weightbearing with no more than minimal cuing from therapist. - NOT MET, able to stand from 20\" height 10x on a good day, though, which is progress  b. Pt will be able to ascend/descend stairs using reciprocal ascent/descent with unilat UE assist and fair speed, fair R knee eccentric control - MET for 2 steps x5 rounds, have not tried full flight yet - most difficulty with reciprocal stepping d/t large hip flexion needed during swing phase  c. Pt will be able to ambulate 300 ft with improving L trunk lean, no more than 25% incidence of R knee hyperextension throughout, and at .5m/s - Making progress, 150ft with . 44 m/s (was .34m/s), and no more than 10% incidence of R knee hyperextension throughout gait           Patient goals: \"strengthen what we can, improve walking\" - Making progress    Pt. Education:  [x] Yes  [] No  [x] Reviewed Prior HEP/Ed  Method of Education: [x] Verbal  [] Demo  [] Written  Comprehension of Education:  [x] Verbalizes understanding. [x] Demonstrates understanding. [x] Needs review for consistency .   [] Demonstrates/verbalizes HEP/Ed previously given.     Current HEP: resisted TKEs w/ purple band, sit-to-stands (22-23\"), squats, standing ham curls, standing hip ext, and marches      Plan: [x] Continue current frequency toward long and short term goals   [x] Specific Instructions for subsequent treatments: stair climbing, R quad eccentric control, RLE single limb stance and swing, treadmill training - reprovide HEP       Time In:  2:00 pm          Time Out: 3:00  pm    Electronically signed by:  Lorna Gilmore, PT

## 2021-06-29 ENCOUNTER — HOSPITAL ENCOUNTER (OUTPATIENT)
Dept: PHYSICAL THERAPY | Age: 61
Setting detail: THERAPIES SERIES
Discharge: HOME | End: 2021-06-29
Payer: MEDICARE

## 2021-06-29 ENCOUNTER — HOSPITAL ENCOUNTER (OUTPATIENT)
Dept: OCCUPATIONAL THERAPY | Age: 61
Setting detail: THERAPIES SERIES
Discharge: HOME | End: 2021-06-29
Payer: MEDICARE

## 2021-06-29 ENCOUNTER — APPOINTMENT (OUTPATIENT)
Dept: OCCUPATIONAL THERAPY | Age: 61
End: 2021-06-29
Payer: MEDICARE

## 2021-06-29 PROCEDURE — 97140 MANUAL THERAPY 1/> REGIONS: CPT

## 2021-06-29 PROCEDURE — 97110 THERAPEUTIC EXERCISES: CPT

## 2021-06-29 PROCEDURE — 97032 APPL MODALITY 1+ESTIM EA 15: CPT

## 2021-06-29 PROCEDURE — 97116 GAIT TRAINING THERAPY: CPT

## 2021-06-29 NOTE — FLOWSHEET NOTE
[x] [facility]  [address]  [phone]  F: [phone] [] [address]  [facility]   [address]  P: [phone]  F: [phone] [] [address] &  Therapy  [address]  P: [phone]  F: [phone] [] [address]  P: [phone]  F: [phone] [] [address]  [facility]   [address]   [place]: [phone]  F: [phone]      Physical Therapy Daily Treatment Note    Date:  2021  Patient Name:  Vandana Schaffer    :  1960  MRN: [id number]  Physician: Dr. Yinka Underwood: [employer] ([id number])  Medical Diagnosis:   History of stroke  Rehab Codes: R53.1, R29.3, R27.9, R26.9, M25.60  Next 's appt.: 21  Date of symptom onset: 19  Visit# / total visits:  (approved by [facility] on 21; Will need [facility] approval for last 6)   Cancels/No Shows: 0       Subjective:     Pain:  [] Yes  [x] No Location:  N/A Pain Rating: (0-10 scale) 0/10  Pain altered Tx:  [x] No  [] Yes  Action:  Comments: Pt reports he feels good today.          Objective:  Modalities:   Precautions:  Exercises: Bolded exercises completed on 2021:  Exercise Reps/ Time Weight/ Level Comments   Treadmill gait 2x4 min   1.1 mph   Chair placed on treadmill during breaks   Overground gait 2x200 ft  SBQC, CGA         Stair climbing 1x10 steps  unilat UE assist, CGA  - reciprocal                Lateral weight shifts w/ TKE 2x20 Purple     Foot tap up to step 20x 6\"    SLS longer holds  2x30\"  BUE assist   Hamstring curl 2x15 2# Blocking hip flexor compensation  - no weight on 6/15   March 2x15 2# - no weight on 6/15   Hip abd 2x15     Fwd heel tap  2\", // bars BOSSMANE assist   Fwd long lunge 15x  To cone, cues to increase step length and  foot with return         Mat sit<>stand 2x15 23\" Mod assist to incr RLE weightbearing   Bilateral ER w/ scap retract 3x15 purple In between rest breaks   Squat, tap ball to step 15x 6\" step    Standing reaching, all directions 2 min cones Encouraging RLE weightbearing     Seated thoracic/lumbar ext against bolster 15x  Big red bolster behind back                     Leg press - DL 2x20 60# DL = double limb   Leg press - SL 20x 20# SL = single limb               Mat      Sidelying clams 3x20 blueberry    Bridge 2x10  Cues to reduce height to improve low back pain   Prone knee flexion 3x10 blueberry RLE   Other:         Treatment Charges: Mins Units   []  Modalities     [x]  Ther Exercise 20 2   []  Manual Therapy     []  Ther Activities     []  Aquatics     []  Vasocompression     []  Other: neuro re-ed     [x]  Other: gait 34 2   Total Treatment time 54 4       Assessment: [x] Progressing toward goals. Able to continue to achieve 4 min ambulation with BUE assist on treadmill at 1. 1mph - still requires mod VCs for increased step length, hip/knee flexion in swing, and heel strike on RLE. Difficulty with carryover throughout as pt gets fatigued. Focused more on overground integration of gait mechanics practiced on treadmill - able to increase speed of RLE swing phase this date as compared to previous sessions, though still slower than LLE. Encouraged turning to R to get increased RLE weightshift - tends to just pivot on RLE unless cued to  leg. [] No change. [x] Other: Continues to require significant therapeutic rest breaks to allow for increased tolerance of upcoming sets, tasks d/t RLE fatigue and aerobic endurance.   [x] Patient would continue to benefit from skilled physical therapy services in order to: progress RLE strength, address gait deviations, improve standing static and dynamic balance impairment, improve safety and independence with functional mobility both at home and in community. STG: (to be met in 10 treatments) - Assessed on 5/7/21:  1. ? ROM: Pt will demo no excessive ER of RLE in standing at rest or sitting at rest to indicate reduced tightness in external rotators and normalize posture - Making progress, still with mild ER in standing/amb but overall improvement  2. ? Balance:    a. Pt will be able to reach outside MED towards R both up and down without balance deficit - Making progress, still mild balance deficit with further outside MED fwd and right but overall increased stability with RLE weightshift  b. Pt will be able to maintain RLE SLS for 30s with min UE assist and good neuromuscular control noted - Nearly met, mod UE assist required  3. ? Strength:  a. 2/5 R knee ext strength to increase R knee control in SLS - MET, 2/5  b. 4/5 R hip ext/abd, 4+/5 R hamstrings to improve swing and stance phase with gait - Partially met, 4/5 hip ext/abd, 4/5 hamstrings  4. ? Function:   a. Pt will be able to stand from 24 in chair 10x without UE assist and equal weightbearing with no more than minimal cuing from therapist. - MET   b. Pt will be able to ascend/descend stairs using reciprocal ascent/descent with BUE assist and fair speed, fair R knee eccentric control - MET, CGA required, only on 4-6\" steps at current  c. Pt will be able to ambulate 150 ft with improving L trunk lean, no more than 50% incidence of R knee hyperextension throughout, and at .4m/s - NOT MET,  .28 m/s self selected, 23.96 .42 m/s fast    5. Patient to be independent with home exercise program as demonstrated by performance with correct form without cues. - Ongoing, needs cuing for specific exercise completion vs just general activity  6.  Demonstrate Knowledge of fall prevention - Making progress, still demos risky behavior without using AD while ambulating at home from time to time despite education     LTG: (to be met in 36 treatments) - Assessed on 6/9/21 and extended d/t incomplete achievement:  7. ? Balance:   a. Pt will be able to complete 360 deg turn with near equal weightbearing, albeit slow - Making progress, improving RLE weightbearing but still significanty limited as compared to LLE, though speed was improved since eval (~6 sec)  b. Pt will score at least 41/56 on Mccurdy to indicate significant change in static/dynamic balance abilities since initial evaluation. - Nearly met, 39/56  8. ? Strength:   a. 2+/5 R knee ext strength to increase R knee control in SLS - MET  b. 4+/5 R hip ext/abd, 5-/5 R hamstrings to improve swing and stance phase with gait - Improving  9. ? Function:   a. Pt will be able to stand from 20 in chair 10x without UE assist and equal weightbearing with no more than minimal cuing from therapist. - NOT MET, able to stand from 20\" height 10x on a good day, though, which is progress  b. Pt will be able to ascend/descend stairs using reciprocal ascent/descent with unilat UE assist and fair speed, fair R knee eccentric control - MET for 2 steps x5 rounds, have not tried full flight yet - most difficulty with reciprocal stepping d/t large hip flexion needed during swing phase  c. Pt will be able to ambulate 300 ft with improving L trunk lean, no more than 25% incidence of R knee hyperextension throughout, and at .5m/s - Making progress, 150ft with . 44 m/s (was .34m/s), and no more than 10% incidence of R knee hyperextension throughout gait           Patient goals: \"strengthen what we can, improve walking\" - Making progress    Pt. Education:  [x] Yes  [] No  [x] Reviewed Prior HEP/Ed  Method of Education: [x] Verbal  [] Demo  [] Written  Comprehension of Education:  [x] Verbalizes understanding. [x] Demonstrates understanding. [x] Needs review for consistency . [] Demonstrates/verbalizes HEP/Ed previously given.      Current HEP: resisted TKEs w/ purple band, sit-to-stands (22-23\"), squats, standing ham curls, standing hip ext, and marches      Plan: [x] Continue current frequency toward long and short term goals   [x] Specific Instructions for subsequent treatments: stair climbing, R quad eccentric control, RLE single limb stance and swing, treadmill training - reprovide HEP       Time In:  1:06 pm          Time Out: 2:00  pm    Electronically signed by:  Airam Rios PT

## 2021-06-29 NOTE — FLOWSHEET NOTE
[x] Brayan Salvatore       Occupational Therapy            1st floor       [address], [place]         Phone: [phone]       Fax: [phone] [] [facility]  [address] [place] [address]  Phone: [phone]  Fax: [phone]      Occupational Therapy Daily Treatment Note    Date:  2021  Patient Name:  Quintin Farley    :  1960  MRN: [id number]  Visit# / total visits:     Patient: Quintin Farley                      : 1960                      MRN: [id number]  Referring Provider:  Yuan Gonzalez  Insurance: [place] [employer]   visits left  Medical Diagnosis: History of stroke Z86.73             Rehab Codes: stiffness in shoulder M25.61,, stiffness in elbow M25.62,, stiffness in hand M25.64,, stiffness in wrist M25.63,, lack of coordination R27.8,, fine motor skills loss R29.818, or muscle weakness generalized M62.81,  Onset Date:    3-          Next Dr. Tenoroi Simper: 21  Cancels/No Shows: 0/0    Subjective:    Pain:  [] Yes  [x] No Location: shoulder with range Pain Rating: (0-10 scale) 0/10 at rest  Pain altered Tx:  [x] No  [] Yes  Action:  Pt Comments: \"My fingers are looser today\"   Objective:  Modalities:  Modality Flow Sheet:  START STOP Tx Modality   2021  Electrical Stim: [place] cycle  1.  Shoulder subluxation:  Time: 20 minutes   Cycle: 10/10  Duty: 50%  Patch location: anterior and posterior deltoid, middle deltoid and supraspinatus   Up to ~64Hz intensity     Ultrasound: ___ W/cm2 x ___ mins  Duty factor: __100%  __50%  __33% __20%  Head size:    ___ cm  MHz: __1mHz  __3mHz  Location:     Hot Pack:     Cold Pack:     Precautions: NA  Exercises:  EXERCISE    REPS/     TIME  WEIGHT/    LEVEL COMMENTS   PROM  Shoulder internal/external  Flex/ext  Elbow flex/ext  Wrist flex/ext  Digit extension 10 mins  Completed    Wooden pegs with R hand (place in)   Not Completed    Cone stacking   Completed without mirror for visual stimulation on shoulder compensation. Flexbar   strength  Wrist Flexion/Ext  Wrist pronation  Wrist supination  Wrist ulnar deviation  Wrist radial dev  Shoulder Adb  Shoulder Add 10 yellow  completed this date  Wrist flexion and radial deviation   Elbow extension with Therapist holding shoulder to prevent trunk movement. Started this date. Other: Pt provided E Stim with \"no pain\" this date. See parameters above. Pt completed cone stacking with increased forearm control, no shoulder compensation. Pt demo mod difficulty with motor control for gross grasp in R hand with yellow flexbar. Encouraged pt to continue to do symmetrical isolated digit extension for promotion of increased finger control. Pt reports \"it does not work\". Pt completed 10 R wrist extension exercises to promote increased wrist movement. Pt could benefit from additional 8 visits of OP OT for increased R elbow, wrist and digit movement. Please sign if in agreement with addition of 8 OT visit. Treatment Charges: Mins Units   [x]  Modalities:   E-stim   20   1   []  Ultrasound     [x]  Ther Exercise 15 1   [x]  Manual Therapy 20 1   []  Ther Activities     []  Orthotic fit/train     []  Orthotic recheck     []  Other     Total Treatment time 55      Assessment: [x] Progressing toward goals. Pt with active tenodesis response, no noted ability to actively extend or flex digits. Demo trace movements when attempting digit extension with wrist in flexed position. [] No change. [] Other     [x] Patient would benefit from skilled occupational therapy services in order to: Improve  Rehab Potential, Motor control/Tone in UE, ROM, Strength and Coordination deficit in order to ensure good follow through and decrease pain in UE for safe completion of ADLs     Short Term Goals: ( 6  Treatments)  1. Increase AROM (degrees) (extension/flexion)  a.  R shoulder to 60/40 ONGOING IMPROVED  b. R elbow -20/130 ONGOING IMPROVED  c. R wrist -30/50 MET  d. R radial/ulnar dev 15/10  2. Increase strength (pounds);  a. R  strength to 22 pounds ONGOING  b. R lateral pinch to 10 pounds ONGOING  c. R 2 point pinch to 12 pounds ONGOING  3. Increase function:UE Functional Index Score 40 or more points to promote increased functional abilities MET  4. Patient to be independent with home exercise program as demonstrated by performance with correct form without cues. MET     Long Term Goals: (  12 Treatments)  1. Move digits in R hand independently with trace movement for promotion of future use for guitar ONGOING  2. Demo R hand extension to half of [facility] for increased I with  on R side ONGOING (trace movements)  3. Increase R UE Shoulder ROM to lift arm up for I with adl tasks at home. 40/75     Pt. Education:  [x] Yes  [] No  [] Reviewed Prior HEP/Ed  Method of Education: [x] Verbal  [] Demo  [] Written  Re: e-stim treatment   Comprehension of Education:  [x] Verbalizes understanding. [] Demonstrates understanding. [] Needs review. [] Demonstrates/verbalizes HEP/Ed previously given. Plan: [x] Continue current frequency toward short and long term goals. [x] Specific Instructions for subsequent treatments: estim for wrist, digit, and thumb extension    [] Other:    Time In: 9370-8452    Electronically signed by:  MODESTO Sarmiento/L          Medicare Cap   [] Physical Therapy  [] Speech Therapy  [x] Occupational therapy  *PT and Speech caps combine      $2100 Limit for PT and Speech combined  $2100 Limit for OT individually  At the beginning of the month where you expect to go over $2100, please add the [address] [place] [address] modifier      Patient Name: Teersa Dowd: 1960    Note:  This is an estimate of charges billed.      Date of [address] Name # units/ charge $$$ charge Daily Total Charge Ongoing Total $$$   6-24-21 EStim 1 13.03 13.03     TE 1 29.21 29.31     MAN 1 21.34 21.34 1561.25 no

## 2021-07-01 ENCOUNTER — HOSPITAL ENCOUNTER (OUTPATIENT)
Dept: OCCUPATIONAL THERAPY | Age: 61
Setting detail: THERAPIES SERIES
Discharge: HOME | End: 2021-07-01
Payer: MEDICARE

## 2021-07-01 ENCOUNTER — HOSPITAL ENCOUNTER (OUTPATIENT)
Dept: PHYSICAL THERAPY | Age: 61
Setting detail: THERAPIES SERIES
Discharge: HOME | End: 2021-07-01
Payer: MEDICARE

## 2021-07-01 PROCEDURE — 97110 THERAPEUTIC EXERCISES: CPT

## 2021-07-01 PROCEDURE — 97032 APPL MODALITY 1+ESTIM EA 15: CPT

## 2021-07-01 PROCEDURE — 97116 GAIT TRAINING THERAPY: CPT

## 2021-07-01 NOTE — FLOWSHEET NOTE
[x] [facility]       Occupational Therapy            1st floor       [address] [place], [place]         Phone: [phone]       Fax: [phone] [] [facility]  [address] [place] [address]  Phone: [phone]  Fax: [phone]      Occupational Therapy Daily Treatment Note    Date:  2021  Patient Name:  Ivory Urbina    :  1960  MRN: [id number]  Visit# / total visits:     Patient: Ivory Urbina                      : 1960                      MRN: [id number]  Referring Provider:  Bianca Bryan  Insurance: [place] [employer]   visits left  Medical Diagnosis: History of stroke Z86.73             Rehab Codes: stiffness in shoulder M25.61,, stiffness in elbow M25.62,, stiffness in hand M25.64,, stiffness in wrist M25.63,, lack of coordination R27.8,, fine motor skills loss R29.818, or muscle weakness generalized M62.81,  Onset Date:    3-          Next Dr. Ball Prime: 21   Cancels/No Shows: 0/0    Subjective:    Pain:  [] Yes  [x] No Location: shoulder with range Pain Rating: (0-10 scale) 0/10 at rest  Pain altered Tx:  [x] No  [] Yes  Action:  Pt Comments: Pt with no new complaints upon arrival. Pt denies pain with R shoulder. Objective:  Modalities:  Modality Flow Sheet:  START STOP Tx Modality   2021  Electrical Stim: [place] cycle  1.  Shoulder subluxation:  Time: 20 minutes   Cycle: 10/10  Duty: 50%  Patch location: anterior and posterior deltoid, middle deltoid and supraspinatus   Up to ~64Hz intensity     Ultrasound: ___ W/cm2 x ___ mins  Duty factor: __100%  __50%  __33% __20%  Head size:    ___ cm  MHz: __1mHz  __3mHz  Location:     Hot Pack:     Cold Pack:     Precautions: NA  Exercises:  EXERCISE    REPS/     TIME  WEIGHT/    LEVEL COMMENTS   PROM  Shoulder internal/external  Flex/ext  Elbow flex/ext  Wrist flex/ext  Digit extension 10 mins  Completed    Wooden pegs with R hand (place in)   Not Completed    Cone stacking   Completed without mirror for visual stimulation on shoulder compensation. Flexbar   strength  Wrist Flexion/Ext  Wrist pronation  Wrist supination  Wrist ulnar deviation  Wrist radial dev  Shoulder Adb  Shoulder Add 10 yellow  Not completed this date  Wrist flexion and radial deviation   Elbow extension with Therapist holding shoulder to prevent trunk movement. Not completed                 Other: NA     Treatment Charges: Mins Units   [x]  Modalities:   E-stim   20   1   []  Ultrasound     [x]  Ther Exercise 38 3   []  Manual Therapy     []  Ther Activities     []  Orthotic fit/train     []  Orthotic recheck     []  Other     Total Treatment time 58      Assessment: [x] Progressing toward goals. Good muscle contraction noted with e-stim treatment to R shoulder. Mod difficulty with picking up cones using RUE and stacking them at midline. Pt unable to extend fingers to release cone from R hand. Pt is able to pull R hand off of cone if cone is stabilized with moderate effort if cone is stabilized. Pt with very minimal subluxation noted with R shoulder, improved from the past.        [x] Patient would benefit from skilled occupational therapy services in order to: Improve  Rehab Potential, Motor control/Tone in UE, ROM, Strength and Coordination deficit in order to ensure good follow through and decrease pain in UE for safe completion of ADLs     Short Term Goals: ( 6  Treatments)  1. Increase AROM (degrees) (extension/flexion)  a. R shoulder to 60/40 ONGOING IMPROVED  b. R elbow -20/130 ONGOING IMPROVED  c. R wrist -30/50 MET  d. R radial/ulnar dev 15/10  2. Increase strength (pounds);  a. R  strength to 22 pounds ONGOING  b. R lateral pinch to 10 pounds ONGOING  c. R 2 point pinch to 12 pounds ONGOING  3. Increase function:UE Functional Index Score 40 or more points to promote increased functional abilities MET  4.  Patient to be independent with home exercise program as demonstrated by performance with correct form without cues. MET     Long Term Goals: (  12 Treatments)  1. Move digits in R hand independently with trace movement for promotion of future use for guitar ONGOING  2. Demo R hand extension to half of [facility] for increased I with  on R side ONGOING (trace movements)  3. Increase R UE Shoulder ROM to lift arm up for I with adl tasks at home. 40/75     Pt. Education:  [] Yes  [x] No  [] Reviewed Prior HEP/Ed  Method of Education: [] Verbal  [] Demo  [] Written  Re:   Comprehension of Education:   [] Verbalizes understanding. [] Demonstrates understanding. [] Needs review. [] Demonstrates/verbalizes HEP/Ed previously given. Plan: [x] Continue current frequency toward short and long term goals. [x] Specific Instructions for subsequent treatments: estim for shoulder subluxation    [] Other:    Time In: 6277-4849  Electronically signed by:  Francesca Wakefield OTR/L          Medicare Cap   [] Physical Therapy  [] Speech Therapy  [x] Occupational therapy  *PT and Speech caps combine      $2100 Limit for PT and Speech combined  $2100 Limit for OT individually  At the beginning of the month where you expect to go over $2100, please add the [address] [place] modifier      Patient Name: Ashley Newton: 1960    Note:  This is an estimate of charges billed.      Date of [address] Name # units/ charge $$$ charge Daily Total Charge Ongoing Total $$$   7-1-21 EStim 1 13.03      TE 3 29.21+21.34+21.34 84.92         1646.17

## 2021-07-01 NOTE — FLOWSHEET NOTE
[x] [facility]) - [facility]  [address]  [phone]  F: [phone] [] [address]  [place] [address]   [address]  P: [phone]  F: [phone] [] [address] &  Therapy  [address]  P: [phone]  F: [phone] [] [address]  P: [phone]  F: [phone] [] [address]  [facility]   [address]   [place]: [phone]  F: [phone]      Physical Therapy Daily Treatment Note    Date:  2021  Patient Name:  Wale Bright    :  1960  MRN: [id number]  Physician: Dr. Shanti Horton: [place] [employer] (51VO)  Medical Diagnosis:   History of stroke  Rehab Codes: R53.1, R29.3, R27.9, R26.9, M25.60  Next 's appt.: 21  Date of symptom onset: 19  Visit# / total visits: 36 (approved by Evonne Escobar on 21; Will need [facility] approval for last 6)   Cancels/No Shows: 0       Subjective:     Pain:  [] Yes  [x] No Location:  N/A Pain Rating: (0-10 scale) 0/10  Pain altered Tx:  [x] No  [] Yes  Action:  Comments: Pt in good spirits today.          Objective:  Modalities:   Precautions:  Exercises: Bolded exercises completed on 2021:  Exercise Reps/ Time Weight/ Level Comments   Treadmill gait 1x 5 min  1x 5.5 min   1.2 mph   Chair placed on treadmill during breaks   Overground gait 2x200 ft  SBQC, CGA         Stair climbing 2x10 steps  unilat UE assist, CGA  - reciprocal                Lateral weight shifts w/ TKE 2x20 Purple     Foot tap up to step 20x 6\"    SLS longer holds  2x30\"  BUE assist   Hamstring curl 2x15 2# Blocking hip flexor compensation  - no weight on 6/15   March 2x15 2# - no weight on 6/15   Hip abd 2x15     Fwd heel tap  2\", // bars BUE assist   Fwd long lunge 15x  To cone, cues to increase step length and  foot with return         Mat sit<>stand 2x15 23\" Mod assist to incr RLE weightbearing   Bilateral ER w/ scap retract 3x15 purple In between rest breaks   Squat, tap ball to step 15x 6\" step    Standing reaching, all directions 2 min cones Encouraging RLE weightbearing     Seated thoracic/lumbar ext against bolster 15x  Big red bolster behind back                     Leg press - DL 2x20 60# DL = double limb   Leg press - SL 20x 20# SL = single limb               Mat      Sidelying clams 3x20 blueberry    Bridge 2x10  Cues to reduce height to improve low back pain   Prone knee flexion 3x10 blueberry RLE   Other:         Treatment Charges: Mins Units   []  Modalities     [x]  Ther Exercise 24 2   []  Manual Therapy     []  Ther Activities     []  Aquatics     []  Vasocompression     []  Other: neuro re-ed     [x]  Other: gait 30 2   Total Treatment time 54 4       Assessment: [x] Progressing toward goals. Progressed gait training distance with less VCs needed for increased step length and heel strike this date; quad control in terminal stance still poor but swing phase improved this date on treadmill as well. Overall more energy this date; continued to add more standing RLE-focused strengthening d/t improving tolerance, mod tactile assist to encourage appropriate TKE in standing when in RLE CKC. [] No change. [x] Other: Continues to require significant therapeutic rest breaks to allow for increased tolerance of upcoming sets, tasks d/t RLE fatigue and aerobic endurance. [x] Patient would continue to benefit from skilled physical therapy services in order to: progress RLE strength, address gait deviations, improve standing static and dynamic balance impairment, improve safety and independence with functional mobility both at home and in community.     STG: (to be met in 10 treatments) - Assessed on 5/7/21:  1. ? ROM: Pt will demo no excessive ER of RLE in standing at rest or sitting at rest to indicate reduced tightness in external rotators and normalize posture - Making progress, still with mild ER in standing/amb but overall improvement  2. ? Balance:    a. Pt will be able to reach outside MED towards R both up and down without balance deficit - Making progress, still mild balance deficit with further outside MED fwd and right but overall increased stability with RLE weightshift  b. Pt will be able to maintain RLE SLS for 30s with min UE assist and good neuromuscular control noted - Nearly met, mod UE assist required  3. ? Strength:  a. 2/5 R knee ext strength to increase R knee control in SLS - MET, 2/5  b. 4/5 R hip ext/abd, 4+/5 R hamstrings to improve swing and stance phase with gait - Partially met, 4/5 hip ext/abd, 4/5 hamstrings  4. ? Function:   a. Pt will be able to stand from 24 in chair 10x without UE assist and equal weightbearing with no more than minimal cuing from therapist. - MET   b. Pt will be able to ascend/descend stairs using reciprocal ascent/descent with BUE assist and fair speed, fair R knee eccentric control - MET, CGA required, only on 4-6\" steps at current  c. Pt will be able to ambulate 150 ft with improving L trunk lean, no more than 50% incidence of R knee hyperextension throughout, and at .4m/s - NOT MET,  .28 m/s self selected, 23.96 .42 m/s fast    5. Patient to be independent with home exercise program as demonstrated by performance with correct form without cues. - Ongoing, needs cuing for specific exercise completion vs just general activity  6. Demonstrate Knowledge of fall prevention - Making progress, still demos risky behavior without using AD while ambulating at home from time to time despite education     LTG: (to be met in 36 treatments) - Assessed on 6/9/21 and extended d/t incomplete achievement:  7. ? Balance:   a.  Pt will be able to complete 360 deg turn with near equal weightbearing, albeit slow - Making progress, improving RLE weightbearing but still significanty limited as compared to LLE, though speed was improved since eval (~6 sec)  b. Pt will score at least 41/56 on Mccurdy to indicate significant change in static/dynamic balance abilities since initial evaluation. - Nearly met, 39/56  8. ? Strength:   a. 2+/5 R knee ext strength to increase R knee control in SLS - MET  b. 4+/5 R hip ext/abd, 5-/5 R hamstrings to improve swing and stance phase with gait - Improving  9. ? Function:   a. Pt will be able to stand from 20 in chair 10x without UE assist and equal weightbearing with no more than minimal cuing from therapist. - NOT MET, able to stand from 20\" height 10x on a good day, though, which is progress  b. Pt will be able to ascend/descend stairs using reciprocal ascent/descent with unilat UE assist and fair speed, fair R knee eccentric control - MET for 2 steps x5 rounds, have not tried full flight yet - most difficulty with reciprocal stepping d/t large hip flexion needed during swing phase  c. Pt will be able to ambulate 300 ft with improving L trunk lean, no more than 25% incidence of R knee hyperextension throughout, and at .5m/s - Making progress, 150ft with . 44 m/s (was .34m/s), and no more than 10% incidence of R knee hyperextension throughout gait           Patient goals: \"strengthen what we can, improve walking\" - Making progress    Pt. Education:  [x] Yes  [] No  [x] Reviewed Prior HEP/Ed  Method of Education: [x] Verbal  [] Demo  [] Written  Comprehension of Education:  [x] Verbalizes understanding. [x] Demonstrates understanding. [x] Needs review for consistency . [] Demonstrates/verbalizes HEP/Ed previously given.      Current HEP: resisted TKEs w/ purple band, sit-to-stands (22-23\"), squats, standing ham curls, standing hip ext, and marches      Plan: [x] Continue current frequency toward long and short term goals   [x] Specific Instructions for subsequent treatments: stair climbing, R quad eccentric control, RLE single limb stance and swing, treadmill training - reprovide HEP       Time In:  3:06 pm          Time Out: 4:00  pm    Electronically signed by:  Margarette Houston PT

## 2021-07-06 ENCOUNTER — HOSPITAL ENCOUNTER (OUTPATIENT)
Dept: PHYSICAL THERAPY | Age: 61
Setting detail: THERAPIES SERIES
Discharge: HOME | End: 2021-07-06
Payer: MEDICARE

## 2021-07-06 PROCEDURE — 97110 THERAPEUTIC EXERCISES: CPT

## 2021-07-06 PROCEDURE — 97116 GAIT TRAINING THERAPY: CPT

## 2021-07-06 RX ORDER — LISINOPRIL 10 MG/1
TABLET ORAL
Qty: 90 TABLET | Refills: 0 | OUTPATIENT
Start: 2021-07-06

## 2021-07-06 RX ORDER — HYDRALAZINE HYDROCHLORIDE 25 MG/1
TABLET, FILM COATED ORAL
Qty: 72 TABLET | Refills: 0 | OUTPATIENT
Start: 2021-07-06

## 2021-07-06 RX ORDER — AMLODIPINE BESYLATE 10 MG/1
TABLET ORAL
Qty: 72 TABLET | Refills: 0 | OUTPATIENT
Start: 2021-07-06

## 2021-07-06 NOTE — FLOWSHEET NOTE
[x] [facility] TWELVESTEP [facility] &  Therapy  [address]  [phone]  F: [phone] [] [address]  [facility] 36   [address]  P: [phone]  F: [phone] [] [address] &  Therapy  [address]  P: [phone]  F: [phone] [] [address]  P: [phone]  F: [phone] [] [address]  [facility]   [address]   [place]: [phone]  F: [phone]      Physical Therapy Daily Treatment Note    Date:  2021  Patient Name:  Deepak Godoy    :  1960  MRN: [id number]  Physician: Dr. Ramos Danger: [place] [employer] ([id number])  Medical Diagnosis:   History of stroke  Rehab Codes: R53.1, R29.3, R27.9, R26.9, M25.60  Next 's appt.: 21  Date of symptom onset: 19  Visit# / total visits: /36 (approved by Nirali Carrasquillo on 21; Will need [facility] approval for last 6)   Cancels/No Shows: 0       Subjective:     Pain:  [] Yes  [x] No Location:  N/A Pain Rating: (0-10 scale) 0/10  Pain altered Tx:  [x] No  [] Yes  Action:  Comments: Pt reports he's attempting to call [employer] to get updates on UE/LE units; most concerned about UE unit. States his aunt was in town over the holiday weekend and remarked that he was walking pretty well.      Objective:  Modalities:   Precautions:  Exercises: Bolded exercises completed on 2021:  Exercise Reps/ Time Weight/ Level Comments   Treadmill gait 1x 5 min  1x 5.5 min   1.1 mph   Chair placed on treadmill during breaks   Overground gait 2x200 ft  SBQC, CGA         Stair climbing 2x10 steps  unilat UE assist, CGA  - reciprocal                Lateral weight shifts w/ TKE 1x30 Purple     Foot tap up to step 20x 6\"    SLS longer holds 2x30\"  BUE assist   Hamstring curl 2x15 2# Blocking hip flexor compensation  - no weight on 6/15   March 2x15 2# - no weight on 6/15   Hip abd 2x15     Fwd heel tap  2\", // bars BUE assist   Fwd long lunge 15x  To cone, cues to increase step length and  foot with return         Box sit<>stand 10x  10x 22\"  20\" Mod assist to incr RLE weightbearing   Bilateral ER w/ scap retract 3x15 purple In between rest breaks   Squat, tap ball to step 15x 6\" step    Standing reaching, all directions 2 min cones Encouraging RLE weightbearing     Seated thoracic/lumbar ext against bolster 15x  Big red bolster behind back                     Leg press - DL 2x20 60# DL = double limb   Leg press - SL 20x 20# SL = single limb               Mat      Sidelying clams 3x20 blueberry    Bridge 2x10  Cues to reduce height to improve low back pain   Prone knee flexion 3x10 blueberry RLE   Other:         Treatment Charges: Mins Units   []  Modalities     [x]  Ther Exercise 15 1   []  Manual Therapy     []  Ther Activities     []  Aquatics     []  Vasocompression     []  Other: neuro re-ed     [x]  Other: gait 25 2   Total Treatment time 40 3    Began session at 1:15pm d/t late arrival and restroom break prior to therapy - not billed for this time    Assessment: [x] Progressing toward goals. More VCs needed for increased step length, hip flexion/dorsiflexion in swing as able with difficulty with carryover. Pt notes it's a \"tough day\" today, but is able to increase endurance and push to 5.5 min on treadmill this date, which a first. Very difficult with box squats this date with new lower height - very fatigued after this, difficult to push through exercises after this. [] No change. [x] Other: Continues to require significant therapeutic rest breaks to allow for increased tolerance of upcoming sets, tasks d/t RLE fatigue and aerobic endurance.   [x] Patient would continue to benefit from skilled physical therapy services in order to: progress RLE strength, address gait deviations, improve standing static and dynamic balance impairment, improve safety and independence with functional mobility both at home and in community. STG: (to be met in 10 treatments) - Assessed on 5/7/21:  1. ? ROM: Pt will demo no excessive ER of RLE in standing at rest or sitting at rest to indicate reduced tightness in external rotators and normalize posture - Making progress, still with mild ER in standing/amb but overall improvement  2. ? Balance:    a. Pt will be able to reach outside MED towards R both up and down without balance deficit - Making progress, still mild balance deficit with further outside MED fwd and right but overall increased stability with RLE weightshift  b. Pt will be able to maintain RLE SLS for 30s with min UE assist and good neuromuscular control noted - Nearly met, mod UE assist required  3. ? Strength:  a. 2/5 R knee ext strength to increase R knee control in SLS - MET, 2/5  b. 4/5 R hip ext/abd, 4+/5 R hamstrings to improve swing and stance phase with gait - Partially met, 4/5 hip ext/abd, 4/5 hamstrings  4. ? Function:   a. Pt will be able to stand from 24 in chair 10x without UE assist and equal weightbearing with no more than minimal cuing from therapist. - MET   b. Pt will be able to ascend/descend stairs using reciprocal ascent/descent with BUE assist and fair speed, fair R knee eccentric control - MET, CGA required, only on 4-6\" steps at current  c. Pt will be able to ambulate 150 ft with improving L trunk lean, no more than 50% incidence of R knee hyperextension throughout, and at .4m/s - NOT MET,  .28 m/s self selected, 23.96 .42 m/s fast    5. Patient to be independent with home exercise program as demonstrated by performance with correct form without cues. - Ongoing, needs cuing for specific exercise completion vs just general activity  6.  Demonstrate Knowledge of fall prevention - Making progress, still demos risky behavior given.     Current HEP: resisted TKEs w/ purple band, sit-to-stands (22-23\"), squats, standing ham curls, standing hip ext, and marches      Plan: [x] Continue current frequency toward long and short term goals   [x] Specific Instructions for subsequent treatments: stair climbing, R quad eccentric control, RLE single limb stance and swing, treadmill training - reprovide HEP       Time In:  1:05 pm          Time Out: 2:00  pm    Electronically signed by:  Dinh De Anda PT

## 2021-07-06 NOTE — TELEPHONE ENCOUNTER
Lin Alvarez is calling to request a refill on the following medication(s):    Medication Request:  Requested Prescriptions     Pending Prescriptions Disp Refills    amLODIPine (NORVASC) 10 MG tablet [Pharmacy Med Name: amLODIPine BESYLATE 10 MG TAB] 72 tablet 0     Sig: TAKE ONE TABLET BY MOUTH DAILY    hydrALAZINE (APRESOLINE) 25 MG tablet [Pharmacy Med Name: hydrALAZINE 25 MG TABLET] 72 tablet 0     Sig: TAKE ONE TABLET BY MOUTH DAILY    lisinopril (PRINIVIL;ZESTRIL) 10 MG tablet [Pharmacy Med Name: LISINOPRIL 10 MG TABLET] 90 tablet 0     Sig: TAKE ONE TABLET BY MOUTH DAILY     Last filled 3/18/21 90 day 1 refill, not due  Told at last ishaan not on lisinopril    Last Visit Date (If Applicable):  4/6/9537    Next Visit Date:    8/2/2021

## 2021-07-07 DIAGNOSIS — I63.9 ISCHEMIC STROKE (HCC): ICD-10-CM

## 2021-07-07 DIAGNOSIS — E78.5 HYPERLIPIDEMIA, UNSPECIFIED HYPERLIPIDEMIA TYPE: ICD-10-CM

## 2021-07-07 RX ORDER — ATORVASTATIN CALCIUM 20 MG/1
20 TABLET, FILM COATED ORAL DAILY
Qty: 30 TABLET | Refills: 4 | Status: SHIPPED | OUTPATIENT
Start: 2021-07-07 | End: 2021-11-23 | Stop reason: SDUPTHER

## 2021-07-08 ENCOUNTER — HOSPITAL ENCOUNTER (OUTPATIENT)
Dept: OCCUPATIONAL THERAPY | Age: 61
Setting detail: THERAPIES SERIES
Discharge: HOME | End: 2021-07-08
Payer: MEDICARE

## 2021-07-08 ENCOUNTER — HOSPITAL ENCOUNTER (OUTPATIENT)
Dept: PHYSICAL THERAPY | Age: 61
Setting detail: THERAPIES SERIES
Discharge: HOME | End: 2021-07-08
Payer: MEDICARE

## 2021-07-08 PROCEDURE — 97110 THERAPEUTIC EXERCISES: CPT

## 2021-07-08 PROCEDURE — 97032 APPL MODALITY 1+ESTIM EA 15: CPT

## 2021-07-08 PROCEDURE — 97116 GAIT TRAINING THERAPY: CPT

## 2021-07-08 NOTE — FLOWSHEET NOTE
[x] [facility]       Occupational Therapy            1st floor       [address] [facility]         Phone: [phone]       Fax: [phone] [] [facility] Occupational [facility]  [address]   [facility]  Phone: [phone]  Fax: [phone]      Occupational Therapy Daily Treatment Note    Date:  2021  Patient Name:  Russell Asif    :  1960  MRN: [id number]  Visit# / total visits:     Patient: Russell Asif                      : 1960                      MRN: [id number]  Referring Provider:  Doretha Enamorado  Insurance: [employer]age   visits left  Medical Diagnosis: History of stroke Z86.73             Rehab Codes: stiffness in shoulder M25.61,, stiffness in elbow M25.62,, stiffness in hand M25.64,, stiffness in wrist M25.63,, lack of coordination R27.8,, fine motor skills loss R29.818, or muscle weakness generalized M62.81,  Onset Date:    3-          Next Dr. [facility]: 21   Cancels/No Shows: 0/0    Subjective:    Pain:  [] Yes  [x] No Location: shoulder with range Pain Rating: (0-10 scale) 0/10 at rest  Pain altered Tx:  [x] No  [] Yes  Action:  Pt Comments: Pt with no new complaints upon arrival. Pt denies pain with R shoulder. Objective:  Modalities:  Modality Flow Sheet:  START STOP Tx Modality   2021 Not  completed Electrical Stim: [place] cycle  1.  Shoulder subluxation:  Time: 20 minutes   Cycle: 10/10  Duty: 50%  Patch location: anterior and posterior deltoid, middle deltoid and supraspinatus   Up to ~64Hz intensity    Added this date Electrical Stim: [place] cycle  1. Elbow extension  Time: 20 minutes   Cycle: 10/10  Duty: 50%  Patch location:Triceps belly and tricepts instertion  Up to ~45Hz intensity             Cold Pack:     Precautions: NA  Exercises:  EXERCISE    REPS/     TIME  WEIGHT/    LEVEL COMMENTS   PROM  Shoulder internal/external  Flex/ext  Elbow flex/ext  Wrist flex/ext  Digit extension 10 mins  Completed    Wooden pegs with R hand (place in)   Not Completed    Cone stacking   Completed without mirror for visual stimulation on shoulder compensation. Flexbar   strength  Wrist Flexion/Ext  Wrist pronation  Wrist supination  Wrist ulnar deviation  Wrist radial dev  Shoulder Adb  Shoulder Add 10 yellow  Not completed this date  Wrist flexion and radial deviation   Elbow extension with Therapist holding shoulder to prevent trunk movement. Not completed                 Other: NA     Treatment Charges: Mins Units   [x]  Modalities:   E-stim   20   1   []  Ultrasound     [x]  Ther Exercise 38 3   []  Manual Therapy     []  Ther Activities     []  Orthotic fit/train     []  Orthotic recheck     []  Other     Total Treatment time 58      Assessment: [x] Progressing toward goals. Good muscle contraction noted with e-stim treatment to R elbow extension. Min difficulty with picking up cones using RUE and stacking them at midline. Pt unable to extend fingers to release cone from R hand. Pt is able to pull R hand off of cone if cone is stabilized with moderate effort. Pt with very minimal subluxation noted with R shoulder, improved from the past.        [x] Patient would benefit from skilled occupational therapy services in order to: Improve  Rehab Potential, Motor control/Tone in UE, ROM, Strength and Coordination deficit in order to ensure good follow through and decrease pain in UE for safe completion of ADLs     Short Term Goals: ( 6  Treatments)  1. Increase AROM (degrees) (extension/flexion)  a. R shoulder to 60/40 ONGOING IMPROVED  b. R elbow -20/130 ONGOING IMPROVED  c. R wrist -30/50 MET  d. R radial/ulnar dev 15/10  2. Increase strength (pounds);  a. R  strength to 22 pounds ONGOING  b. R lateral pinch to 10 pounds ONGOING  c. R 2 point pinch to 12 pounds ONGOING  3. Increase function:UE Functional Index Score 40 or more points to promote increased functional abilities MET  4.  Patient to be independent with home exercise program as demonstrated by performance with correct form without cues. MET     Long Term Goals: (  12 Treatments)  1. Move digits in R hand independently with trace movement for promotion of future use for guitar ONGOING  2. Demo R hand extension to half of [facility] for increased I with  on R side ONGOING (trace movements)  3. Increase R UE Shoulder ROM to lift arm up for I with adl tasks at home. 40/75     Pt. Education:  [] Yes  [x] No  [] Reviewed Prior HEP/Ed  Method of Education: [] Verbal  [] Demo  [] Written  Re:   Comprehension of Education:   [x] Verbalizes understanding. [] Demonstrates understanding. [] Needs review. [] Demonstrates/verbalizes HEP/Ed previously given. Plan: [x] Continue current frequency toward short and long term goals. [x] Specific Instructions for subsequent treatments: estim for shoulder subluxation and or elbow extension [] Other:    Time In: 6763-1505  Electronically signed by:  Elly Singer, OTR/LR/L          Medicare Cap   [] Physical Therapy  [] Speech Therapy  [x] Occupational therapy  *PT and Speech caps combine      $2100 Limit for PT and Speech combined  $2100 Limit for OT individually  At the beginning of the month where you expect to go over $2100, please add the [address] [place] [address] modifier      Patient Name: Jones Relic: 1960    Note:  This is an estimate of charges billed.      Date of [place] [address] Name # units/ charge $$$ charge Daily Total Charge Ongoing Total $$$   7-8-21 EStim 1 13.03      TE 3 29.21+21.34+21.34 84.92         1731.09

## 2021-07-08 NOTE — FLOWSHEET NOTE
[x] [facility]) - [facility]  [address]  [phone]  F: [phone] [] [address]  [place] [address]   [address]  P: [phone]  F: [phone] [] [address] &  Therapy  [address]  P: [phone]  F: [phone] [] [address]  P: [phone]  F: [phone] [] [address]  [facility]   [address]   [place]: [phone]  F: [phone]      Physical Therapy Daily Treatment Note    Date:  2021  Patient Name:  Keturah Klinefelter    :  1960  MRN: [id number]  Physician: Dr. Calixto Kovacs: [place] [employer] (04CP)  Medical Diagnosis:   History of stroke  Rehab Codes: R53.1, R29.3, R27.9, R26.9, M25.60  Next 's appt.: 21  Date of symptom onset: 19  Visit# / total visits: 36 (approved by Carolyn Marcano on 21; Will need [facility] approval for last 6)   Cancels/No Shows: 0       Subjective:     Pain:  [] Yes  [x] No Location:  N/A Pain Rating: (0-10 scale) 0/10  Pain altered Tx:  [x] No  [] Yes  Action:  Comments: Pt reports he had a good day in OT, did well with extending elbow actively with NMES.     Objective:  Modalities:   Precautions:  Exercises: Bolded exercises completed on 2021:  Exercise Reps/ Time Weight/ Level Comments   Treadmill gait 2x 6 min  1x    1.1-1.2 mph   Chair placed on treadmill during breaks   Overground gait 2x100 ft  SBQC, CGA         Stair climbing 1x10 steps  unilat UE assist, CGA  - reciprocal                Lateral weight shifts w/ TKE 1x30 Purple     Foot tap up to step 20x 6\"    SLS longer holds  2x30\"  BUE assist   Hamstring curl 2x15 2# Blocking hip flexor compensation  - no weight on 6/15   March 2x15 2# - no weight Demonstrates/verbalizes HEP/Ed previously given.      Current HEP: resisted TKEs w/ purple band, sit-to-stands (22-23\"), squats, standing ham curls, standing hip ext, and marches      Plan: [x] Continue current frequency toward long and short term goals   [x] Specific Instructions for subsequent treatments: stair climbing, R quad eccentric control, RLE single limb stance and swing, treadmill training - reprovide HEP       Time In:  2:06 pm          Time Out: 2:55  pm    Electronically signed by:  Margarette Houston, PT

## 2021-07-13 ENCOUNTER — HOSPITAL ENCOUNTER (OUTPATIENT)
Dept: OCCUPATIONAL THERAPY | Age: 61
Setting detail: THERAPIES SERIES
Discharge: HOME | End: 2021-07-13
Payer: MEDICARE

## 2021-07-13 ENCOUNTER — HOSPITAL ENCOUNTER (OUTPATIENT)
Dept: PHYSICAL THERAPY | Age: 61
Setting detail: THERAPIES SERIES
Discharge: HOME | End: 2021-07-13
Payer: MEDICARE

## 2021-07-13 PROCEDURE — 97110 THERAPEUTIC EXERCISES: CPT

## 2021-07-13 PROCEDURE — 97032 APPL MODALITY 1+ESTIM EA 15: CPT

## 2021-07-13 PROCEDURE — 97116 GAIT TRAINING THERAPY: CPT

## 2021-07-13 NOTE — FLOWSHEET NOTE
[x] [facility]) - [facility]  [address]  [phone]  F: [phone] [] [address]  [facility] 36   [address]  P: [phone]  F: [phone] [] [address] &  Therapy  [address]  P: [phone]  F: [phone] [] [address]  P: [phone]  F: [phone] [] [address]  [facility]   [address]   [place]: [phone]  F: [phone]      Physical Therapy Daily Treatment Note    Date:  2021  Patient Name:  Nivia Rojas    :  1960  MRN: [id number]  Physician: Dr. Norma Roche: [place] [employer] ([id number])  Medical Diagnosis:   History of stroke  Rehab Codes: R53.1, R29.3, R27.9, R26.9, M25.60  Next 's appt.: 21  Date of symptom onset: 19  Visit# / total visits: 36 (approved by Marina Presley on 21; Will need [facility] approval for last 6)    Cancels/No Shows: 0        Subjective:     Pain:  [] Yes  [x] No Location:  N/A Pain Rating: (0-10 scale) 0/10  Pain altered Tx:  [x] No  [] Yes  Action:  Comments: Pt reports he was very tired after last session, notes he feels his blood sugar was low. States the first day of therapy for the week is always harder than the second, notes he's doing \"okay\".      Objective:  Modalities:   Precautions:  Exercises: Bolded exercises completed on 2021:  Exercise Reps/ Time Weight/ Level Comments   Treadmill gait 1x 5 min  1x 1 min   1.2 mph   Chair placed on treadmill during breaks   Overground gait 2x100 ft  SBQC, CGA         Stair climbing 1x10 steps  unilat UE assist, CGA  - reciprocal                Lateral weight shifts w/ TKE 1x30 Purple     Ant weightshift w/ TKE 2x12 purple    Foot tap up to step 20x 6\"    SLS longer holds  2x30\"  unilat UE assist   Hamstring curl 2x15 2# Blocking hip flexor compensation  - no weight on 7/13 d/t fatigue   March 2x15 2# - no weight on 6/15   Hip abd 2x15     Fwd heel tap  2\", // bars BUE assist   Fwd long lunge 15x  To cone, cues to increase step length and  foot with return         Box sit<>stand 10x  10x 22\"  20\" Mod assist to incr RLE weightbearing   Bilateral ER w/ scap retract 2x20 purple In between rest breaks   Squat, tap ball to step 15x 6\" step    Standing reaching, all directions 2 min cones Encouraging RLE weightbearing     Seated thoracic/lumbar ext against bolster 15x  Big red bolster behind back                     Leg press - DL 2x20 60# DL = double limb   Leg press - SL 20x 20# SL = single limb               Mat      Sidelying clams 3x20 blueberry    Bridge 2x10  Cues to reduce height to improve low back pain   Prone knee flexion 3x10 blueberry RLE   Other:         Treatment Charges: Mins Units   []  Modalities     [x]  Ther Exercise 50 3   []  Manual Therapy     []  Ther Activities     []  Aquatics     []  Vasocompression     []  Other: neuro re-ed     [x]  Other: gait 12 1   Total Treatment time 62 4       Assessment: [x] Progressing toward goals. Able to achieve 1.2 mph this date but decreased heel strike and step length overall d/t different shoes today, a higher foam heel making it difficult d/t worsened toe/foot clearance with swing. Pt only able to complete 5 min x1 with second set pt requesting stopping treadmill d/t feeling incoordinated, unable to take proper step. Focused on standing RLE strength the rest of the session with focus on TKE, quad eccentric control - min-mod assist to provide full weightshift into RLE with this. [] No change. [x] Other: Continues to require significant therapeutic rest breaks to allow for increased tolerance of upcoming sets, tasks d/t RLE fatigue and aerobic endurance.   [x] Patient would continue to benefit from skilled physical therapy services in order to: progress RLE strength, address gait deviations, improve standing static and dynamic balance impairment, improve safety and independence with functional mobility both at home and in community. STG: (to be met in 10 treatments) - Assessed on 5/7/21:  1. ? ROM: Pt will demo no excessive ER of RLE in standing at rest or sitting at rest to indicate reduced tightness in external rotators and normalize posture - Making progress, still with mild ER in standing/amb but overall improvement  2. ? Balance:    a. Pt will be able to reach outside MED towards R both up and down without balance deficit - Making progress, still mild balance deficit with further outside MED fwd and right but overall increased stability with RLE weightshift  b. Pt will be able to maintain RLE SLS for 30s with min UE assist and good neuromuscular control noted - Nearly met, mod UE assist required  3. ? Strength:  a. 2/5 R knee ext strength to increase R knee control in SLS - MET, 2/5  b. 4/5 R hip ext/abd, 4+/5 R hamstrings to improve swing and stance phase with gait - Partially met, 4/5 hip ext/abd, 4/5 hamstrings  4. ? Function:   a. Pt will be able to stand from 24 in chair 10x without UE assist and equal weightbearing with no more than minimal cuing from therapist. - MET   b. Pt will be able to ascend/descend stairs using reciprocal ascent/descent with BUE assist and fair speed, fair R knee eccentric control - MET, CGA required, only on 4-6\" steps at current  c. Pt will be able to ambulate 150 ft with improving L trunk lean, no more than 50% incidence of R knee hyperextension throughout, and at .4m/s - NOT MET,  .28 m/s self selected, 23.96 .42 m/s fast    5. Patient to be independent with home exercise program as demonstrated by performance with correct form without cues. - Ongoing, needs cuing for specific exercise completion vs just general activity  6.  Demonstrate Knowledge of fall prevention - Making progress, still demos risky behavior without using AD while ambulating at home from time to time despite education     LTG: (to be met in 36 treatments) - Assessed on 6/9/21 and extended d/t incomplete achievement:  7. ? Balance:   a. Pt will be able to complete 360 deg turn with near equal weightbearing, albeit slow - Making progress, improving RLE weightbearing but still significanty limited as compared to LLE, though speed was improved since eval (~6 sec)  b. Pt will score at least 41/56 on Mccurdy to indicate significant change in static/dynamic balance abilities since initial evaluation. - Nearly met, 39/56  8. ? Strength:   a. 2+/5 R knee ext strength to increase R knee control in SLS - MET  b. 4+/5 R hip ext/abd, 5-/5 R hamstrings to improve swing and stance phase with gait - Improving  9. ? Function:   a. Pt will be able to stand from 20 in chair 10x without UE assist and equal weightbearing with no more than minimal cuing from therapist. - NOT MET, able to stand from 20\" height 10x on a good day, though, which is progress  b. Pt will be able to ascend/descend stairs using reciprocal ascent/descent with unilat UE assist and fair speed, fair R knee eccentric control - MET for 2 steps x5 rounds, have not tried full flight yet - most difficulty with reciprocal stepping d/t large hip flexion needed during swing phase  c. Pt will be able to ambulate 300 ft with improving L trunk lean, no more than 25% incidence of R knee hyperextension throughout, and at .5m/s - Making progress, 150ft with . 44 m/s (was .34m/s), and no more than 10% incidence of R knee hyperextension throughout gait           Patient goals: \"strengthen what we can, improve walking\" - Making progress    Pt. Education:  [x] Yes  [] No  [x] Reviewed Prior HEP/Ed  Method of Education: [x] Verbal  [] Demo  [] Written  Comprehension of Education:  [x] Verbalizes understanding. [x] Demonstrates understanding.   [x] Needs review for consistency . [] Demonstrates/verbalizes HEP/Ed previously given.      Current HEP: resisted TKEs w/ purple band, sit-to-stands (22-23\"), squats, standing ham curls, standing hip ext, and marches      Plan: [x] Continue current frequency toward long and short term goals   [x] Specific Instructions for subsequent treatments: stair climbing, R quad eccentric control, RLE single limb stance and swing, treadmill training - reprovide HEP       Time In:  1:00 pm          Time Out: 2:02  pm    Electronically signed by:  Twin Steve PT

## 2021-07-13 NOTE — FLOWSHEET NOTE
[x] [facility]       Occupational Therapy            1st floor       [address], [place]         Phone: [phone]       Fax: [phone] [] [facility]  [address] [place] [address]  Phone: [phone]  Fax: [phone]      Occupational Therapy Daily Treatment Note    Date:  2021  Patient Name:  Chio Sloan    :  1960  MRN: [id number]  Visit# / total visits:     Patient: Chio Sloan                      : 1960                      MRN: [id number]  Referring Provider:  Keisha Adam  Insurance: [place] [employer]   visits left  Medical Diagnosis: History of stroke Z86.73             Rehab Codes: stiffness in shoulder M25.61,, stiffness in elbow M25.62,, stiffness in hand M25.64,, stiffness in wrist M25.63,, lack of coordination R27.8,, fine motor skills loss R29.818, or muscle weakness generalized M62.81,  Onset Date:    3-          Next Dr. Nichols Dy: 21   Cancels/No Shows: 0/0    Subjective:    Pain:  [] Yes  [x] No Location: shoulder with range Pain Rating: (0-10 scale) 0/10 at rest  Pain altered Tx:  [x] No  [] Yes  Action:  Pt Comments: Pt reports \"PT wore me out\"    Objective:  Modalities:  Modality Flow Sheet:  START STOP Tx Modality   2021 Not  completed Electrical Stim: [place] cycle  1. Shoulder subluxation:  Time: 20 minutes   Cycle: 1010  Duty: 50%  Patch location: anterior and posterior deltoid, middle deltoid and supraspinatus   Up to ~64Hz intensity    Added this date Electrical Stim: [place] cycle  1. Elbow extension  Time: 20 minutes   Cycle: 10/10  Duty: 50%  Patch location:Triceps belly and tricepts instertion  Up to ~45Hz intensity       Added this date Electrical Stim: [place] cycle  1. Wrist extension  Time: 20 minutes   Cycle: 1010  Duty: 50%  Patch location:Electrode placement for isolated wrist extension stimulation.  The lateral condyle is marked with the X, and the 15/10  2. Increase strength (pounds);  a. R  strength to 22 pounds ONGOING  b. R lateral pinch to 10 pounds ONGOING  c. R 2 point pinch to 12 pounds ONGOING  3. Increase function:UE Functional Index Score 40 or more points to promote increased functional abilities MET  4. Patient to be independent with home exercise program as demonstrated by performance with correct form without cues. MET     Long Term Goals: (  12 Treatments)  1. Move digits in R hand independently with trace movement for promotion of future use for guitar ONGOING  2. Demo R hand extension to half of [facility] for increased I with  on R side ONGOING (trace movements)  3. Increase R UE Shoulder ROM to lift arm up for I with adl tasks at home. 40/75     Pt. Education:  [] Yes  [x] No  [] Reviewed Prior HEP/Ed  Method of Education: [] Verbal  [] Demo  [] Written  Re:   Comprehension of Education:   [x] Verbalizes understanding. [] Demonstrates understanding. [] Needs review. [] Demonstrates/verbalizes HEP/Ed previously given. Plan: [x] Continue current frequency toward short and long term goals. [x] Specific Instructions for subsequent treatments: estim for shoulder subluxation and or elbow extension [] Other:    Time In: 3306-1020  Electronically signed by:  MODESTO Saldivar/LR/L          Medicare Cap   [] Physical Therapy  [] Speech Therapy  [x] Occupational therapy  *PT and Speech caps combine      $2100 Limit for PT and Speech combined  $2100 Limit for OT individually  At the beginning of the month where you expect to go over $2100, please add the [address] [place] 22 modifier      Patient Name: Ginna Gregg: 1960    Note:  This is an estimate of charges billed.      [facility] Name # units/ charge $$$ charge Daily Total Charge Ongoing Total $$$   7-8-21 EStim 1 13.03      TE 3 29.21+21.34+21.34 84.92    7-13-21 EStim  TE 1  3 29.21+21.34+21.34 84.92 1816.01

## 2021-07-14 RX ORDER — LANCETS 33 GAUGE
EACH MISCELLANEOUS
Qty: 100 EACH | Refills: 2 | Status: SHIPPED | OUTPATIENT
Start: 2021-07-14 | End: 2021-11-05

## 2021-07-14 NOTE — TELEPHONE ENCOUNTER
Agus Daniele is calling to request a refill on the following medication(s):    Medication Request:  Requested Prescriptions     Pending Prescriptions Disp Refills    Lancets ([address]) [address] [facility] [Pharmacy Med Name: Alfredfrederick Micky PLUS 30C LANCT]  2     Sig: USE TO TEST TWO TIMES A DAY       Last Visit Date (If Applicable):  7/6/3251    Next Visit Date:    8/2/2021

## 2021-07-15 ENCOUNTER — HOSPITAL ENCOUNTER (OUTPATIENT)
Dept: PHYSICAL THERAPY | Age: 61
Setting detail: THERAPIES SERIES
Discharge: HOME | End: 2021-07-15
Payer: MEDICARE

## 2021-07-15 ENCOUNTER — HOSPITAL ENCOUNTER (OUTPATIENT)
Dept: OCCUPATIONAL THERAPY | Age: 61
Setting detail: THERAPIES SERIES
Discharge: HOME | End: 2021-07-15
Payer: MEDICARE

## 2021-07-15 PROCEDURE — 97110 THERAPEUTIC EXERCISES: CPT

## 2021-07-15 PROCEDURE — 97116 GAIT TRAINING THERAPY: CPT

## 2021-07-15 PROCEDURE — 97032 APPL MODALITY 1+ESTIM EA 15: CPT

## 2021-07-15 NOTE — FLOWSHEET NOTE
[x] [facility]       Occupational Therapy            1st floor       [address], [place]         Phone: [phone]       Fax: [phone] [] [facility]  [address] [place] [address]  Phone: [phone]  Fax: [phone]      Occupational Therapy Daily Treatment Note    Date:  7/15/2021  Patient Name:  Ivory Urbina    :  1960  MRN: [id number]  Visit# / total visits:     Patient: Ivory Urbina                      : 1960                      MRN: [id number]  Referring Provider:  Bianca Bryan  Insurance: [place] [employer]   visits left  Medical Diagnosis: History of stroke Z86.73             Rehab Codes: stiffness in shoulder M25.61,, stiffness in elbow M25.62,, stiffness in hand M25.64,, stiffness in wrist M25.63,, lack of coordination R27.8,, fine motor skills loss R29.818, or muscle weakness generalized M62.81,  Onset Date:    3-          Next Dr. Ball Prime: 21   Cancels/No Shows: 0/0    Subjective:    Pain:  [] Yes  [x] No Location: shoulder with range Pain Rating: (0-10 scale) 0/10 at rest  Pain altered Tx:  [x] No  [] Yes  Action:  Pt Comments: Pt reports \"I have been working my wrist at home\"    Objective:  Modalities:  Modality Flow Sheet:  START STOP Tx Modality   2021 Not  completed Electrical Stim: [place] cycle  1. Shoulder subluxation:  Time: 20 minutes   Cycle: 10/10  Duty: 50%  Patch location: anterior and posterior deltoid, middle deltoid and supraspinatus   Up to ~64Hz intensity    Completed Electrical Stim: [place] cycle  1. Elbow extension  Time: 20 minutes   Cycle: 10/10  Duty: 50%  Patch location:Triceps belly and tricepts instertion  Up to ~45Hz intensity       Completed Electrical Stim: [place] cycle  1. Wrist extension  Time: 20 minutes   Cycle: 10/10  Duty: 50%  Patch location:Electrode placement for isolated wrist extension stimulation.  The lateral condyle is marked with the X, and the negative electrode is placed just distal to that marking. The positive electrode is placed over the tendinous area of the forearm. The stimulated contraction can be seen to be dominantly wrist extension with minimal finger extension and would be graded three out of five muscle grade. Up to ~45Hz intensity        Cold Pack:     Precautions: NA  Exercises:  EXERCISE    REPS/     TIME  WEIGHT/    LEVEL COMMENTS   PROM  Shoulder internal/external  Flex/ext  Elbow flex/ext  Wrist flex/ext  Digit extension 10 mins  Completed    Wooden pegs with R hand (place in)   Not Completed    Cone stacking      Flexbar   strength  Wrist Flexion/Ext  Wrist pronation  Wrist supination  Wrist ulnar deviation  Wrist radial dev  Shoulder Adb  Shoulder Add 10 yellow  Not completed this date  Wrist flexion and radial deviation   Elbow extension with Therapist holding shoulder to prevent trunk movement. Not completed                 Other: Completed this date per pt request. Pt completing AROM with E Stim to promote increased strength of triceps for elbow extension. Pt completed E stim to wrist extensors for promotion of wrist extension. Treatment Charges: Mins Units   [x]  Modalities:   E-stim   40   1   []  Ultrasound     [x]  Ther Exercise 38 3   []  Manual Therapy     []  Ther Activities     []  Orthotic fit/train     []  Orthotic recheck     []  Other     Total Treatment time 58      Assessment: [x] Progressing toward goals. Good muscle contraction noted with e-stim treatment to R elbow extension and R wrist extension. [x] Patient would benefit from skilled occupational therapy services in order to: Improve  Rehab Potential, Motor control/Tone in UE, ROM, Strength and Coordination deficit in order to ensure good follow through and decrease pain in UE for safe completion of ADLs     Short Term Goals: ( 6  Treatments)  1. Increase AROM (degrees) (extension/flexion)  a.  R shoulder to 60/40 ONGOING IMPROVED  b. R elbow -20/130 ONGOING IMPROVED  c. R wrist -30/50 MET  d. R radial/ulnar dev 15/10  2. Increase strength (pounds);  a. R  strength to 22 pounds ONGOING  b. R lateral pinch to 10 pounds ONGOING  c. R 2 point pinch to 12 pounds ONGOING  3. Increase function:UE Functional Index Score 40 or more points to promote increased functional abilities MET  4. Patient to be independent with home exercise program as demonstrated by performance with correct form without cues. MET     Long Term Goals: (  12 Treatments)  1. Move digits in R hand independently with trace movement for promotion of future use for guitar ONGOING  2. Demo R hand extension to [facility] of [facility] for increased I with  on R side ONGOING (trace movements)  3. Increase R UE Shoulder ROM to lift arm up for I with adl tasks at home. 40/75     Pt. Education:  [] Yes  [x] No  [] Reviewed Prior HEP/Ed  Method of Education: [] Verbal  [] Demo  [] Written  Re:   Comprehension of Education:   [x] Verbalizes understanding. [] Demonstrates understanding. [] Needs review. [] Demonstrates/verbalizes HEP/Ed previously given. Plan: [x] Continue current frequency toward short and long term goals. [x] Specific Instructions for subsequent treatments: estim for shoulder subluxation and or elbow extension [] Other:    Time In: 5270-2346  Electronically signed by:  Staci Haq OTR/LR/L          Medicare Cap   [] Physical Therapy  [] Speech Therapy  [x] Occupational therapy  *PT and Speech caps combine      $2100 Limit for PT and Speech combined  $2100 Limit for OT individually  At the beginning of the month where you expect to go over $2100, please add the [address] [place] modifier      Patient Name: Diana Bain: 1960    Note:  This is an estimate of charges billed.      Date of [facility] Name # units/ charge $$$ charge Daily Total Charge Ongoing Total $$$   7-8-21 EStim 1 13.03      TE 3 29.21+21.34+21.34 84.92    7-13-21 EStim  TE 1  3 29.21+21.34+21.34 84.92 1816.01   7-15-21 EStim  TE 1  3 29.21+21.34+21.34 84.92 1900.93

## 2021-07-15 NOTE — FLOWSHEET NOTE
[x] [facility]) - [facility]  [address]  [phone]  F: [phone] [] [address]  [facility]   [address]  P: [phone]  F: [phone] [] [address] &  Therapy  [address] [facility]  P: [phone]  F: [phone] [] [address]  P: [phone]  F: [phone] [] [address]  [facility]   [address]   [place]: [phone]  F: [phone]      Physical Therapy Daily Treatment Note    Date:  7/15/2021  Patient Name:  Shweta Dent    :  1960  MRN: [id number]  Physician: Dr. Mojica Market: [place] [employer] (10XX)  Medical Diagnosis:   History of stroke  Rehab Codes: R53.1, R29.3, R27.9, R26.9, M25.60  Next 's appt.: 21  Date of symptom onset: 19  Visit# / total visits: 29/36 (approved by Janet Durán on 21; Will need [facility] approval for last 6)    Cancels/No Shows: 0        Subjective:     Pain:  [] Yes  [x] No Location:  N/A Pain Rating: (0-10 scale) 0/10  Pain altered Tx:  [x] No  [] Yes  Action:  Comments: Pt reports feeling \"okay\" today.      Objective:  Modalities:   Precautions:  Exercises: Bolded exercises completed on 7/15/2021:  Exercise Reps/ Time Weight/ Level Comments   Treadmill gait 2x 5.5 min 1.2 mph   Chair placed on treadmill during breaks   Overground gait 1x100 ft  SBQC, CGA         Stair climbing 1x10 steps  unilat UE assist, CGA  - reciprocal                Ant weightshift w/ TKE 2x12 purple    Foot tap up to step 20x 6\"    SLS longer holds  2x30\"  unilat UE assist   Hamstring curl 2x15 2# Blocking hip flexor compensation  - no weight on 7/15 d/t fatigue   March 2x15 2# - no weight on 6/15   Hip abd 2x15     Fwd heel tap  2\", // bars BUE assist   Fwd long lunge 15x  To cone, cues to increase step length and  foot with return         Box sit<>stand 10x  10x 22\"  20\" Mod assist to incr RLE weightbearing   Bilateral ER w/ scap retract 2x20 purple In between rest breaks   Squat, tap ball to step 15x 6\" step    Standing reaching, all directions 2 min cones Encouraging RLE weightbearing     Seated thoracic/lumbar ext against bolster 15x  Big red bolster behind back                     Leg press - DL 2x20 60# DL = double limb   Leg press - SL 20x 20# SL = single limb               Mat      Sidelying clams 3x20 blueberry    Bridge 2x10  Cues to reduce height to improve low back pain   Prone knee flexion 3x10 blueberry RLE   Other:         Treatment Charges: Mins Units   []  Modalities     [x]  Ther Exercise 38 3   []  Manual Therapy     []  Ther Activities     []  Aquatics     []  Vasocompression     []  Other: neuro re-ed     [x]  Other: gait 20 1   Total Treatment time 58 4       Assessment: [x] Progressing toward goals. Better gait mechanics on treadmill this date d/t wearing typical shoes. Limited to 5.5 min d/t fatigue with higher speed at 1.2 mph. Does still require min-mod VCs for heel strike and step length, with min WB assist onto RLE intermittently. Continued focus on RLE SLS exercises with TKE focus - mod A for full weightshift anteriorly into RLE initially, but able to progress to no assist with cues. Progressed to RLE SLS with LLE step through - mod tactile/verbal cues required to continue to engage quads throughout, but able to complete. [] No change. [x] Other: Continues to require significant therapeutic rest breaks to allow for increased tolerance of upcoming sets, tasks d/t RLE fatigue and aerobic endurance.   [x] Patient would continue to benefit from skilled physical therapy services in order to: progress RLE strength, address gait deviations, improve standing static and dynamic balance impairment, improve safety and independence with functional mobility both at home and in community. STG: (to be met in 10 treatments) - Assessed on 5/7/21:  1. ? ROM: Pt will demo no excessive ER of RLE in standing at rest or sitting at rest to indicate reduced tightness in external rotators and normalize posture - Making progress, still with mild ER in standing/amb but overall improvement  2. ? Balance:    a. Pt will be able to reach outside MED towards R both up and down without balance deficit - Making progress, still mild balance deficit with further outside MED fwd and right but overall increased stability with RLE weightshift  b. Pt will be able to maintain RLE SLS for 30s with min UE assist and good neuromuscular control noted - Nearly met, mod UE assist required  3. ? Strength:  a. 2/5 R knee ext strength to increase R knee control in SLS - MET, 2/5  b. 4/5 R hip ext/abd, 4+/5 R hamstrings to improve swing and stance phase with gait - Partially met, 4/5 hip ext/abd, 4/5 hamstrings  4. ? Function:   a. Pt will be able to stand from 24 in chair 10x without UE assist and equal weightbearing with no more than minimal cuing from therapist. - MET   b. Pt will be able to ascend/descend stairs using reciprocal ascent/descent with BUE assist and fair speed, fair R knee eccentric control - MET, CGA required, only on 4-6\" steps at current  c. Pt will be able to ambulate 150 ft with improving L trunk lean, no more than 50% incidence of R knee hyperextension throughout, and at .4m/s - NOT MET,  .28 m/s self selected, 23.96 .42 m/s fast    5. Patient to be independent with home exercise program as demonstrated by performance with correct form without cues. - Ongoing, needs cuing for specific exercise completion vs just general activity  6.  Demonstrate Knowledge of fall prevention - Making progress, still demos risky behavior without using AD while ambulating at home from time to time despite education     LTG: (to be met in 36 treatments) - Assessed on 6/9/21 and extended d/t incomplete achievement:  7. ? Balance:   a. Pt will be able to complete 360 deg turn with near equal weightbearing, albeit slow - Making progress, improving RLE weightbearing but still significanty limited as compared to LLE, though speed was improved since eval (~6 sec)  b. Pt will score at least 41/56 on Mccurdy to indicate significant change in static/dynamic balance abilities since initial evaluation. - Nearly met, 39/56  8. ? Strength:   a. 2+/5 R knee ext strength to increase R knee control in SLS - MET  b. 4+/5 R hip ext/abd, 5-/5 R hamstrings to improve swing and stance phase with gait - Improving  9. ? Function:   a. Pt will be able to stand from 20 in chair 10x without UE assist and equal weightbearing with no more than minimal cuing from therapist. - NOT MET, able to stand from 20\" height 10x on a good day, though, which is progress  b. Pt will be able to ascend/descend stairs using reciprocal ascent/descent with unilat UE assist and fair speed, fair R knee eccentric control - MET for 2 steps x5 rounds, have not tried full flight yet - most difficulty with reciprocal stepping d/t large hip flexion needed during swing phase  c. Pt will be able to ambulate 300 ft with improving L trunk lean, no more than 25% incidence of R knee hyperextension throughout, and at .5m/s - Making progress, 150ft with . 44 m/s (was .34m/s), and no more than 10% incidence of R knee hyperextension throughout gait           Patient goals: \"strengthen what we can, improve walking\" - Making progress    Pt. Education:  [x] Yes  [] No  [x] Reviewed Prior HEP/Ed  Method of Education: [x] Verbal  [] Demo  [] Written  Comprehension of Education:  [x] Verbalizes understanding. [x] Demonstrates understanding. [x] Needs review for consistency . [] Demonstrates/verbalizes HEP/Ed previously given.      Current HEP: resisted TKEs w/ purple band, sit-to-stands (22-23\"), squats, standing ham curls, standing hip ext, and marches      Plan: [x] Continue current frequency toward long and short term goals   [x] Specific Instructions for subsequent treatments: stair climbing, R quad eccentric control, RLE single limb stance and swing, treadmill training - reprovide HEP       Time In:  2:00 pm          Time Out: 2:58  pm    Electronically signed by:  Blanca Watkins PT

## 2021-07-20 ENCOUNTER — HOSPITAL ENCOUNTER (OUTPATIENT)
Dept: OCCUPATIONAL THERAPY | Age: 61
Setting detail: THERAPIES SERIES
Discharge: HOME | End: 2021-07-20
Payer: MEDICARE

## 2021-07-20 PROCEDURE — 97110 THERAPEUTIC EXERCISES: CPT

## 2021-07-20 PROCEDURE — 97032 APPL MODALITY 1+ESTIM EA 15: CPT

## 2021-07-20 NOTE — FLOWSHEET NOTE
[x] [facility]       Occupational Therapy            1st floor       [address], [place]         Phone: [phone]       Fax: [phone] [] [facility]  [address], [place]  Phone: [phone]  Fax: [phone]      Occupational Therapy Daily Treatment Note    Date:  2021  Patient Name:  Redd Nova    :  1960  MRN: [id number]  Visit# / total visits:     Patient: Redd Nova                      : 1960                      MRN: [id number]  Referring Provider:  Ulisses Wilson  Insurance: [place] [employer]   visits left  Medical Diagnosis: History of stroke Z86.73             Rehab Codes: stiffness in shoulder M25.61,, stiffness in elbow M25.62,, stiffness in hand M25.64,, stiffness in wrist M25.63,, lack of coordination R27.8,, fine motor skills loss R29.818, or muscle weakness generalized M62.81,  Onset Date:    3-          Next Dr. Luna Pereyra: 21   Cancels/No Shows: 0/0    Subjective:    Pain:  [] Yes  [x] No Location: shoulder with range Pain Rating: (0-10 scale) 0/10 at rest  Pain altered Tx:  [x] No  [] Yes  Action:  Pt Comments: Pt reports \"I have been working a lot at [employer]"    Objective:  Modalities:  Modality Flow Sheet:  START STOP Tx Modality   2021 Not  completed Electrical Stim: [place] cycle  1. Shoulder subluxation:  Time: 20 minutes   Cycle: 10/10  Duty: 50%  Patch location: anterior and posterior deltoid, middle deltoid and supraspinatus   Up to ~64Hz intensity    Completed Electrical Stim: [place] cycle  1. Elbow extension  Time: 20 minutes   Cycle: 10/10  Duty: 50%  Patch location:Triceps belly and tricepts instertion  Up to ~45Hz intensity       Completed Electrical Stim: [place] cycle  1. Wrist extension  Time: 20 minutes   Cycle: 1010  Duty: 50%  Patch location:Electrode placement for isolated wrist extension stimulation.  The lateral condyle is marked with the X, and the negative electrode is placed just distal to that marking. The positive electrode is placed over the tendinous area of the forearm. The stimulated contraction can be seen to be dominantly wrist extension with minimal finger extension and would be graded three out of five muscle grade. Up to ~45Hz intensity        Cold Pack:     Precautions: NA  Exercises:  EXERCISE    REPS/     TIME  WEIGHT/    LEVEL COMMENTS   PROM  Shoulder internal/external  Flex/ext  Elbow flex/ext  Wrist flex/ext  Digit extension 10 mins  NOT Completed    Wooden pegs with R hand (place in)   Not Completed    [facility] stacking      Flexbar   strength  Wrist Flexion/Ext  Wrist pronation  Wrist supination  Wrist ulnar deviation  Wrist radial dev  Shoulder Adb  Shoulder Add 10 yellow  completed this date  Wrist flexion and radial deviation   Elbow extension with Therapist holding shoulder to prevent trunk movement. Not completed                 Other: Pt completing AROM with E Stim to promote increased strength of triceps for elbow extension. Pt completed E stim to wrist extensors for promotion of wrist extension. Pt reports fatigue with AROM during E Stim. Pt demo increased I with muscle movements with E Stim. Treatment Charges: Mins Units   [x]  Modalities:   E-stim   40   1   []  Ultrasound     [x]  Ther Exercise 39 3   []  Manual Therapy     []  Ther Activities     []  Orthotic fit/train     []  Orthotic recheck     []  Other     Total Treatment time 59      Assessment: [x] Progressing toward goals. Good muscle contraction noted with e-stim treatment to R elbow extension and R wrist extension. [x] Patient would benefit from skilled occupational therapy services in order to: Improve  Rehab Potential, Motor control/Tone in UE, ROM, Strength and Coordination deficit in order to ensure good follow through and decrease pain in UE for safe completion of ADLs     Short Term Goals: ( 6  Treatments)  1.  Increase AROM (degrees) (extension/flexion)  a. R shoulder to 60/40 ONGOING IMPROVED  b. R elbow -20/130 ONGOING IMPROVED  c. R wrist -30/50 MET  d. R radial/ulnar dev 15/10  2. Increase strength (pounds);  a. R  strength to 22 pounds ONGOING  b. R lateral pinch to 10 pounds ONGOING  c. R 2 point pinch to 12 pounds ONGOING  3. Increase function:UE Functional Index Score 40 or more points to promote increased functional abilities MET  4. Patient to be independent with home exercise program as demonstrated by performance with correct form without cues. MET     Long Term Goals: (  12 Treatments)  1. Move digits in R hand independently with trace movement for promotion of future use for guitar ONGOING  2. Demo R hand extension to half of [facility] for increased I with  on R side ONGOING (trace movements)  3. Increase R UE Shoulder ROM to lift arm up for I with adl tasks at home. 40/75     Pt. Education:  [] Yes  [x] No  [] Reviewed Prior HEP/Ed  Method of Education: [] Verbal  [] Demo  [] Written  Re:   Comprehension of Education:   [x] Verbalizes understanding. [] Demonstrates understanding. [] Needs review. [] Demonstrates/verbalizes HEP/Ed previously given. Plan: [x] Continue current frequency toward short and long term goals. [x] Specific Instructions for subsequent treatments: estim for shoulder subluxation and or elbow extension [] Other:    Time In: 5726-9197  Electronically signed by:  Elliott Montero OTR/L         Medicare Cap   [] Physical Therapy  [] Speech Therapy  [x] Occupational therapy  *PT and Speech caps combine      $2100 Limit for PT and Speech combined  $2100 Limit for OT individually  At the beginning of the month where you expect to go over $2100, please add the [address] [place] [address] modifier      Patient Name: Hina Del Rio: 1960    Note:  This is an estimate of charges billed.      Date of [address] Name # units/ charge $$$ charge Daily Total Charge Ongoing Total $$$   7-8-21 EStim 1 13.03      TE 3 29.21+21.34+21.34 84.92    7-13-21 EStim  TE 1  3 29.21+21.34+21.34 84.92 1816.01   7-15-21 EStim  TE 1  3 29.21+21.34+21.34 84.92 1900.93   7-20-21 EStim  TE 1  3 29.21+21.34+21.34 84.92 1985.85

## 2021-07-22 ENCOUNTER — HOSPITAL ENCOUNTER (OUTPATIENT)
Dept: OCCUPATIONAL THERAPY | Age: 61
Setting detail: THERAPIES SERIES
Discharge: HOME | End: 2021-07-22
Payer: MEDICARE

## 2021-07-22 PROCEDURE — 97032 APPL MODALITY 1+ESTIM EA 15: CPT

## 2021-07-22 PROCEDURE — 97110 THERAPEUTIC EXERCISES: CPT

## 2021-07-22 NOTE — FLOWSHEET NOTE
[x] [facility]       Occupational Therapy            1st floor       [address], [place]         Phone: [phone]       Fax: [phone] [] [facility]  [address], [place]  Phone: [phone]  Fax: [phone]      Occupational Therapy Daily Treatment Note    Date:  2021  Patient Name:  Nevaeh Cancino    :  1960  MRN: [id number]  Visit# / total visits:     Patient: Nevaeh Cancino                      : 1960                      MRN: [id number]  Referring Provider:  Dion Bradford  Insurance: [place] [employer]   visits left  Medical Diagnosis: History of stroke Z86.73             Rehab Codes: stiffness in shoulder M25.61,, stiffness in elbow M25.62,, stiffness in hand M25.64,, stiffness in wrist M25.63,, lack of coordination R27.8,, fine motor skills loss R29.818, or muscle weakness generalized M62.81,  Onset Date:    3-          Next Dr. Mccall [place]: 21   Cancels/No Shows: 0/0    Subjective:    Pain:  [] Yes  [x] No Location: shoulder with range Pain Rating: (0-10 scale) 0/10 at rest  Pain altered Tx:  [x] No  [] Yes  Action:  Pt Comments: Pt reports \"I have been having a lot of weird dreams\"    Objective:  Modalities:  Modality Flow Sheet:  START STOP Tx Modality   2021 Not  completed Electrical Stim: [place] cycle  1. Shoulder subluxation:  Time: 20 minutes   Cycle: 10/10  Duty: 50%  Patch location: anterior and posterior deltoid, middle deltoid and supraspinatus   Up to ~64Hz intensity    Completed Electrical Stim: [place] cycle  1. Elbow extension  Time: 20 minutes   Cycle: 10/10  Duty: 50%  Patch location:Triceps belly and tricepts instertion  Up to ~45Hz intensity       Completed Electrical Stim: [place] cycle  1. Wrist extension  Time: 20 minutes   Cycle: 10/10  Duty: 50%  Patch location:Electrode placement for isolated wrist extension stimulation.  The lateral condyle is marked with the X, and the negative electrode is placed just distal to that marking. The positive electrode is placed over the tendinous area of the forearm. The stimulated contraction can be seen to be dominantly wrist extension with minimal finger extension and would be graded three out of five muscle grade. Up to ~45Hz intensity        Cold Pack:     Precautions: NA  Exercises:  EXERCISE    REPS/     TIME  WEIGHT/    LEVEL COMMENTS   PROM  Shoulder internal/external  Flex/ext  Elbow flex/ext  Wrist flex/ext  Digit extension 10 mins  NOT Completed    Wooden pegs with R hand (place in)   Not Completed    [facility] stacking      Flexbar   strength  Wrist Flexion/Ext  Wrist pronation  Wrist supination  Wrist ulnar deviation  Wrist radial dev  Shoulder Adb  Shoulder Add 10 yellow  NOT completed this date  Wrist flexion and radial deviation   Elbow extension with Therapist holding shoulder to prevent trunk movement. completed                 Other: Pt completing AROM with E Stim to promote increased strength of triceps for elbow extension. Pt completed E stim to wrist extensors for promotion of wrist extension. Pt reports fatigue with AROM during E Stim. Pt demo increased I with muscle movements with E Stim. Plan to add in digit extension with E Stim in future visit. Treatment Charges: Mins Units   [x]  Modalities:   E-stim   40   1   []  Ultrasound     [x]  Ther Exercise 40 3   []  Manual Therapy     []  Ther Activities     []  Orthotic fit/train     []  Orthotic recheck     []  Other     Total Treatment time 40      Assessment: [x] Progressing toward goals. Good muscle contraction noted with e-stim treatment to R elbow extension and R wrist extension. [x] Patient would benefit from skilled occupational therapy services in order to:  Improve  Rehab Potential, Motor control/Tone in UE, ROM, Strength and Coordination deficit in order to ensure good follow through and decrease pain in UE for safe completion of ADLs Short Term Goals: ( 6  Treatments)  1. Increase AROM (degrees) (extension/flexion)  a. R shoulder to 60/40 ONGOING IMPROVED  b. R elbow -20/130 ONGOING IMPROVED  c. R wrist -30/50 MET  d. R radial/ulnar dev 15/10  2. Increase strength (pounds);  a. R  strength to 22 pounds ONGOING  b. R lateral pinch to 10 pounds ONGOING  c. R 2 point pinch to 12 pounds ONGOING  3. Increase function:UE Functional Index Score 40 or more points to promote increased functional abilities MET  4. Patient to be independent with home exercise program as demonstrated by performance with correct form without cues. MET     Long Term Goals: (  12 Treatments)  1. Move digits in R hand independently with trace movement for promotion of future use for guitar ONGOING  2. Demo R hand extension to half of [facility] for increased I with  on R side ONGOING (trace movements)  3. Increase R UE Shoulder ROM to lift arm up for I with adl tasks at home. 40/75     Pt. Education:  [] Yes  [x] No  [] Reviewed Prior HEP/Ed  Method of Education: [] Verbal  [] Demo  [] Written  Re:   Comprehension of Education:   [x] Verbalizes understanding. [] Demonstrates understanding. [] Needs review. [] Demonstrates/verbalizes HEP/Ed previously given. Plan: [x] Continue current frequency toward short and long term goals. [x] Specific Instructions for subsequent treatments: estim for shoulder subluxation and or elbow extension [] Other:    Time In: 2573-1582  Electronically signed by:  MODESTO Baca/L         Medicare Cap   [] Physical Therapy  [] Speech Therapy  [x] Occupational therapy  *PT and Speech caps combine      $2100 Limit for PT and Speech combined  $2100 Limit for OT individually  At the beginning of the month where you expect to go over $2100, please add the [address] [place] [address] modifier      Patient Name: Sierra Rogers: 1960    Note:  This is an estimate of charges billed.      Date of [address] Name # units/ charge $$$ charge Daily Total Charge Ongoing Total $$$   7-8-21 EStim 1 13.03      TE 3 29.21+21.34+21.34 84.92    7-13-21 EStim  TE 1  3 29.21+21.34+21.34 84.92 1816.01   7-15-21 EStim  TE 1  3 29.21+21.34+21.34 84.92 1900.93   7-20-21 EStim  TE 1  3 29.21+21.34+21.34 84.92 1985.85   7-22-21 E Stim  TE 1  3 29.21+21.34+21.34 84.92 2070.77

## 2021-07-27 ENCOUNTER — HOSPITAL ENCOUNTER (OUTPATIENT)
Dept: OCCUPATIONAL THERAPY | Age: 61
Setting detail: THERAPIES SERIES
Discharge: HOME | End: 2021-07-27
Payer: MEDICARE

## 2021-07-27 ENCOUNTER — HOSPITAL ENCOUNTER (OUTPATIENT)
Dept: PHYSICAL THERAPY | Age: 61
Setting detail: THERAPIES SERIES
Discharge: HOME | End: 2021-07-27
Payer: MEDICARE

## 2021-07-27 PROCEDURE — 97112 NEUROMUSCULAR REEDUCATION: CPT

## 2021-07-27 PROCEDURE — 97032 APPL MODALITY 1+ESTIM EA 15: CPT

## 2021-07-27 PROCEDURE — 97110 THERAPEUTIC EXERCISES: CPT

## 2021-07-27 PROCEDURE — 97116 GAIT TRAINING THERAPY: CPT

## 2021-07-27 NOTE — FLOWSHEET NOTE
COATS BALANCE SCALE 14-Item Long Form Original Version    Patient Name:  Nurys Perdomo  Date:  7/27/2021    1. SITTING TO STANDING  INSTRUCTIONS: Please stand up. Try not to use your hands for support. (4) able to stand without using hands and stabilize independently  (3) able to stand independently using hands  (2) able to stand using hands after several tries  (1) needs minimal aid to stand or to stabilize  (0) needs moderate or maximal assist to stand  Score: 3    2. STANDING UNSUPPORTED  INSTRUCTIONS: Please stand for two minutes without holding. (4) able to stand safely 2 minutes  (3) able to stand 2 minutes with supervision  (2) able to stand 30 seconds unsupported  (1) needs several tries to stand 30 seconds unsupported  (0) unable to stand 30 seconds unassisted If a subject is able to stand 2  minutes unsupported, score full points for sitting unsupported. Proceed to  item #4. Score: 4    3. SITTING WITH BACK UNSUPPORTED BUT FEET SUPPORTED  ON FLOOR OR ON A STOOL  INSTRUCTIONS: Please sit with arms folded for 2 minutes. (4) able to sit safely and securely 2 minutes  (3) able to sit 2 minutes under supervision  (2) able to sit 30 seconds  (1) able to sit 10 seconds  (0) unable to sit without support 10 seconds  Score: 4     4. STANDING TO SITTING  INSTRUCTIONS: Please sit down. (4) sits safely with minimal use of hands  (3) controls descent by using hands  (2) uses back of legs against chair to control descent  (1) sits independently but has uncontrolled descent  (0) needs assistance to sit  Score: 3    5. TRANSFERS  INSTRUCTIONS: Arrange chairs(s) for a pivot transfer. Ask subject to  transfer one way toward a seat with armrests and one way toward a seat  without armrests. You may use two chairs (one with and one without  armrests) or a bed and a chair.   (4) able to transfer safely with minor use of hands  (3) able to transfer safely definite need of hands  (2) able to transfer with verbal cueing and/or supervision  (1) needs one person to assist  (0) needs two people to assist or supervise to be safe  Score: 3    6. STANDING UNSUPPORTED WITH EYES CLOSED  INSTRUCTIONS: Please close your eyes and stand still for 10 seconds. (4) able to stand 10 seconds safely  (3) able to stand 10 seconds with supervision  (2) able to stand 3 seconds  (1) unable to keep eyes closed 3 seconds but stays steady  (0) needs help to keep from falling  Score: 4    7. STANDING UNSUPPORTED WITH FEET TOGETHER  INSTRUCTIONS: Place your feet together and stand without holding. (4) able to place feet together independently and stand 1 minute safely  (3) able to place feet together independently and stand for 1 minute with  supervision  (2) able to place feet together independently but unable to hold for 30 seconds  (1) needs help to attain position but able to stand 15 seconds feet together  (0) needs help to attain position and unable to hold for 15 seconds  Score: 4    8. REACHING FORWARD WITH OUTSTRETCHED ARM WHILE  STANDING  INSTRUCTIONS: Lift arm to 90 degrees. Stretch out your fingers and reach  forward as far as you can. (Examiner places a ruler at end of fingertips when  arm is at 90 degrees. Fingers should not touch the ruler while reaching  forward. The recorded measure is the distance forward that the finger reaches  while the subject is in the most forward lean position. When possible, ask  subject to use both arms when reaching to avoid rotation of the trunk.). (4) can reach forward confidently >25 cm (10 inches)  (3) can reach forward >12 cm safely (5 inches)  (2) can reach forward >5 cm safely (2 inches)  (1) reaches forward but needs supervision  (0) loses balance while trying/requires external support  Score: 4    9.  OBJECT FROM FLOOR FROM A STANDING POSITION  INSTRUCTIONS:  shoe/slipper which is placed in front of your feet.   (4) able to  slipper safely and easily  (3) able to  slipper but needs supervision  (2) unable to  but reaches 2-5cm (1-2 inches) from slipper and keeps  balance independently  (1) unable to  and needs supervision while trying  (0) unable to try/needs assist to keep from losing balance or falling  Score: 3    10. TURNING TO LOOK BEHIND OVER LEFT AND RIGHT  SHOULDERS WHILE STANDING  INSTRUCTIONS: Turn to look directly behind you over toward left shoulder. Repeat to the right. Examiner may pick an object to look at directly behind the  subject to encourage a better twist turn. (4) looks behind from both sides and weight shifts well  (3) looks behind one side only other side shows less weight shift  (2) turns sideways only but maintains balance  (1) needs supervision when turning  (0) needs assist to keep from losing balance or falling  Score: 4    11. TURN 360 DEGREES  INSTRUCTIONS: Turn completely around in a full [place]. Pause. Then turn a  full [place] in the other direction. (4) able to turn 360 degrees safely in 4 seconds or less  (3) able to turn 360 degrees safely one side only in 4 seconds or less  (2) able to turn 360 degrees safely but slowly  (1) needs close supervision or verbal cueing  (0) needs assistance while turning  Score: 1    12. PLACING ALTERNATE FOOT ON STEP OR STOOL WHILE  STANDING UNSUPPORTED  INSTRUCTIONS: Place each foot alternately on the step/stool. Continue until  each foot has touched the step/stool four times. (4) able to stand independently and safely and complete 8 steps in 20 seconds  (3) able to stand independently and complete 8 steps >20 seconds  (2) able to complete 4 steps without aid with supervision  (1) able to complete >2 steps needs minimal assist  (0) needs assistance to keep from falling/unable to try  Score: 0    13. STANDING UNSUPPORTED ONE FOOT IN FRONT  INSTRUCTIONS: (DEMONSTRATE TO SUBJECT) Place one foot directly in  front of the other.  If you feel that you cannot place your foot directly in front,  try to step far enough ahead that the heel of your forward foot is ahead of the  toes of the other foot. (To score 3 points, the length of the step should exceed  the length of the other foot and the width of the stance should approximate the  subject's normal stride width). (4) able to place foot tandem independently and hold 30 seconds  (3) able to place foot ahead of other independently and hold 30 seconds  (2) able to take small step independently and hold 30 seconds  (1) needs help to step but can hold 15 seconds  (0) loses balance while stepping or standing  Score: 2    14. STANDING ON ONE LEG  INSTRUCTIONS: Stand on one leg as long as you can without holding. (4) able to lift leg independently and hold >10 seconds  (3) able to lift leg independently and hold 5-10 seconds  (2) able to lift leg independently and hold = or >3 seconds  (1) tries to lift leg unable to hold 3 seconds but remains standing  independently  (0) unable to try or needs assist to prevent fall  Score:  1    Total Score:  40/56  Max score 56,a person scoring below 45 is considered to be at risk for falling.     Completed by: Leatha Carrillo, PT

## 2021-07-27 NOTE — FLOWSHEET NOTE
[x] [facility]) - [facility] &  Therapy  [address]  [phone]  F: [phone] [] [address]  [facility]   [address]  P: [phone]  F: [phone] [] [address] &  Therapy  [address]  P: [phone]  F: [phone] [] [address]  P: [phone]  F: [phone] [] [address]  [facility]   [address]   [place]: [phone]  F: [phone]      Physical Therapy Daily Treatment Note    Date:  2021  Patient Name:  Thom Wiggins    :  1960  MRN: [id number]  Physician: Dr. Bharath Carrera: [place] [employer] (21PY)  Medical Diagnosis:   History of stroke  Rehab Codes: R53.1, R29.3, R27.9, R26.9, M25.60  Next 's appt.: 21  Date of symptom onset: 19  Visit# / total visits: 30/36 (approved by Starla Callahan on 21; Will need [facility] approval for last 6)    Cancels/No Shows: 0        Subjective:     Pain:  [] Yes  [x] No Location:  N/A Pain Rating: (0-10 scale) 0/10  Pain altered Tx:  [x] No  [] Yes  Action:  Comments: Pt reports feeling \"okay\" today.      Objective:  10MWT:    - self selected: 21.8s (avg of 2 trials): .46 m/s (.44 m/s at last progress note on 21)  - fast: 15.42s (avg of 2 trials): .65 m/s    Stair climbing: reciprocal ascent with unilat UE assist; descent is step-to with unilat UE assist    Mccurdy/56     Modalities:   Precautions:  Exercises: Bolded exercises completed on 2021:  Exercise Reps/ Time Weight/ Level Comments   Treadmill gait 2x 5.5 min 1.2 mph   Chair placed on treadmill during breaks   Overground gait 1x100 ft  SBQC, CGA         Stair climbing 1x10 steps  unilat UE assist, CGA  - reciprocal                Ant weightshift w/ TKE 2x12 purple    Foot tap up to step 20x 6\"    SLS longer holds  2x30\"  unilat UE assist   Hamstring curl 2x15 2# Blocking hip flexor compensation  - no weight on 7/15 d/t fatigue   March 2x15 2# - no weight on 6/15   Hip abd 2x15     Fwd heel tap  2\", // bars BUE assist   Fwd long lunge 15x  To cone, cues to increase step length and  foot with return         Box sit<>stand 10x  10x 22\"  20\" Mod assist to incr RLE weightbearing   Bilateral ER w/ scap retract 2x20 purple In between rest breaks   Squat, tap ball to step 15x 6\" step    Standing reaching, all directions 2 min cones Encouraging RLE weightbearing     Seated thoracic/lumbar ext against bolster 15x  Big red bolster behind back                     Leg press - DL 2x20 60# DL = double limb   Leg press - SL 20x 20# SL = single limb               Mat      Sidelying clams 3x20 blueberry    Bridge 2x10  Cues to reduce height to improve low back pain   Prone knee flexion 3x10 blueberry RLE   Other:         Treatment Charges: Mins Units   []  Modalities     []  Ther Exercise     []  Manual Therapy     []  Ther Activities     []  Aquatics     []  Vasocompression     [x]  Other: neuro re-ed 21 1   [x]  Other: gait 25 2   Total Treatment time 46 3       Assessment: [x] Progressing toward goals. Progress made with gait functional endurance, and slight increase in gait speed and Mccurdy balance score, though not significant (however, only ~1.5 mo since last assessment). Biggest challenge remains significant R quad strength deficits and functional mobility tolerance - pt has made improvements with this in therapy and when taking time off therapy and only working on HEP, seems to slightly regress. Would benefit from additional visits to address these deficits and promote maximal functioning and safety with CVA diagnosis. [] No change.        [x] Other: Continues to require significant therapeutic rest breaks to allow for increased tolerance of upcoming sets, tasks d/t RLE fatigue and aerobic endurance. Long-term COVID-19 effects likely impacting this as well. [x] Patient would continue to benefit from skilled physical therapy services in order to: progress RLE strength, address gait deviations, improve standing static and dynamic balance impairment, improve safety and independence with functional mobility both at home and in community. STG: (to be met in 10 treatments) - Assessed on 5/7/21:  1. ? ROM: Pt will demo no excessive ER of RLE in standing at rest or sitting at rest to indicate reduced tightness in external rotators and normalize posture - Making progress, still with mild ER in standing/amb but overall improvement  2. ? Balance:    a. Pt will be able to reach outside MED towards R both up and down without balance deficit - Making progress, still mild balance deficit with further outside MED fwd and right but overall increased stability with RLE weightshift  b. Pt will be able to maintain RLE SLS for 30s with min UE assist and good neuromuscular control noted - Nearly met, mod UE assist required  3. ? Strength:  a. 2/5 R knee ext strength to increase R knee control in SLS - MET, 2/5  b. 4/5 R hip ext/abd, 4+/5 R hamstrings to improve swing and stance phase with gait - Partially met, 4/5 hip ext/abd, 4/5 hamstrings  4. ? Function:   a. Pt will be able to stand from 24 in chair 10x without UE assist and equal weightbearing with no more than minimal cuing from therapist. - MET   b. Pt will be able to ascend/descend stairs using reciprocal ascent/descent with BUE assist and fair speed, fair R knee eccentric control - MET, CGA required, only on 4-6\" steps at current  c. Pt will be able to ambulate 150 ft with improving L trunk lean, no more than 50% incidence of R knee hyperextension throughout, and at .4m/s - NOT MET,  .28 m/s self selected, 23.96 .42 m/s fast    5.  Patient to be independent with home exercise program as demonstrated by performance with correct form without cues. - Ongoing, needs cuing for specific exercise completion vs just general activity  6. Demonstrate Knowledge of fall prevention - Making progress, still demos risky behavior without using AD while ambulating at home from time to time despite education     LTG: (to be met in 36 treatments) - Re-assessed on 7/27/21 by Bret Mclaughlin, PT:  7. ? Balance:   a. Pt will be able to complete 360 deg turn with near equal weightbearing, albeit slow - Making progress, improving RLE weightbearing but still remains significanty limited as compared to LLE, though speed was improved since eval (~6 sec). Safety still a concern and requires supervision with turning without UE assist or device. b. Pt will score at least 41/56 on Mccurdy to indicate significant change in static/dynamic balance abilities since initial evaluation. - Nearly met, 40/56   8. ? Strength:   a. 2+/5 R knee ext strength to increase R knee control in SLS - MET, still 2+/5  b. 4+/5 R hip ext/abd, 5-/5 R hamstrings to improve swing and stance phase with gait - Making progress, 4/5 hip ext/abd, 4+/5 adduction, 4+/5 hamstring  9. ? Function:   a. Pt will be able to stand from 20 in chair 10x without UE assist and equal weightbearing with no more than minimal cuing from therapist. - NOT MET, requires 22in surface for 10x with min VCs for equal weightbearing with fair carryover  b. Pt will be able to ascend/descend stairs using reciprocal ascent/descent with unilat UE assist and fair speed, fair R knee eccentric control - MET for ascent, descent is step-to pattern  c. Pt will be able to ambulate 300 ft with improving L trunk lean, no more than 25% incidence of R knee hyperextension throughout, and at .5m/s - Making progress, 350 ft with . 46 m/s (was .44m/s), and no more than 10% incidence of R knee hyperextension throughout gait            Patient goals: \"strengthen what we can, improve walking\" - Making progress    Pt. Education:  [x] Yes  [] No  [x] Reviewed Prior HEP/Ed  Method of Education: [x] Verbal  [] Demo  [] Written  Comprehension of Education:  [x] Verbalizes understanding. [x] Demonstrates understanding. [x] Needs review for consistency . [] Demonstrates/verbalizes HEP/Ed previously given.      Current HEP: resisted TKEs w/ purple band, sit-to-stands (22-23\"), squats, standing ham curls, standing hip ext, and marches      Plan: [x] Continue current frequency toward long and short term goals - REQUESTING ADDITIONAL VISITS, SEE PROG NOTE OF SAME DATE  [x] Specific Instructions for subsequent treatments: stair climbing, R quad eccentric control, RLE single limb stance and swing, treadmill training - reprovide HEP       Time In:  1:00 pm          Time Out: 1:58  pm    Electronically signed by:  Nurys Villanueva, PT

## 2021-07-29 ENCOUNTER — HOSPITAL ENCOUNTER (OUTPATIENT)
Dept: OCCUPATIONAL THERAPY | Age: 61
Setting detail: THERAPIES SERIES
Discharge: HOME | End: 2021-07-29
Payer: MEDICARE

## 2021-07-29 PROCEDURE — 97110 THERAPEUTIC EXERCISES: CPT

## 2021-07-29 PROCEDURE — 97032 APPL MODALITY 1+ESTIM EA 15: CPT

## 2021-07-29 RX ORDER — LISINOPRIL 10 MG/1
TABLET ORAL
Qty: 90 TABLET | Refills: 0 | OUTPATIENT
Start: 2021-07-29

## 2021-07-29 NOTE — FLOWSHEET NOTE
[x] [facility]       Occupational Therapy            1st floor       [address], [place]         Phone: [phone]       Fax: [phone] [] [facility]  [address], [place]  Phone: [phone]  Fax: [phone]      Occupational Therapy Daily Treatment Note    Date:  2021  Patient Name:  Chio Sloan YOB: 1960  MRN: [id number]  Visit# / total visits:     Patient: Chio Sloan                      : 1960                      MRN: [id number]  Referring Provider:  Keisha Adam  Insurance: [employer] [employer]   visits left  Medical Diagnosis: History of stroke Z86.73             Rehab Codes: stiffness in shoulder M25.61,, stiffness in elbow M25.62,, stiffness in hand M25.64,, stiffness in wrist M25.63,, lack of coordination R27.8,, fine motor skills loss R29.818, or muscle weakness generalized M62.81,  Onset Date:    3-          Next Dr. Nichols Dy: 21   Cancels/No Shows: 0/0    Subjective:    Pain:  [] Yes  [x] No Location: shoulder with range Pain Rating: (0-10 scale) 0/10 at rest  Pain altered Tx:  [x] No  [] Yes  Action:  Pt Comments: Pt reports \"I have been having a lot of weird dreams still\"    Objective:  Modalities:  Modality Flow Sheet:  START STOP Tx Modality   2021 Not  completed Electrical Stim: [place] cycle  1. Shoulder subluxation:  Time: 20 minutes   Cycle: 10/10  Duty: 50%  Patch location: anterior and posterior deltoid, middle deltoid and supraspinatus   Up to ~64Hz intensity    Completed Electrical Stim: [place] cycle  1. Elbow extension  Time: 20 minutes   Cycle: 10/10  Duty: 50%  Patch location:Triceps belly and tricepts instertion  Up to ~45Hz intensity       Completed Electrical Stim: [place] cycle  1. Wrist extension  Time: 20 minutes   Cycle: 10/10  Duty: 50%  Patch location:Electrode placement for isolated wrist extension stimulation.  The lateral condyle is marked with the X, and the negative electrode is placed just distal to that marking. The positive electrode is placed over the tendinous area of the forearm. The stimulated contraction can be seen to be dominantly wrist extension with minimal finger extension and would be graded three out of five muscle grade. Up to ~45Hz intensity        Cold Pack:     Precautions: NA  Exercises:  EXERCISE    REPS/     TIME  WEIGHT/    LEVEL COMMENTS   PROM  Shoulder internal/external  Flex/ext  Elbow flex/ext  Wrist flex/ext  Digit extension 10 mins  Completed for elbow this date, pt demo mod tightness   Wooden pegs with R hand (place in)   Not Completed    Cone stacking      Flexbar   strength  Wrist Flexion/Ext  Wrist pronation  Wrist supination  Wrist ulnar deviation  Wrist radial dev  Shoulder Adb  Shoulder Add 10 yellow  NOT completed this date  Wrist flexion and radial deviation   Elbow extension with Therapist holding shoulder to prevent trunk movement. completed                 Other: Pt completing AROM with E Stim to promote increased strength of triceps for elbow extension. Pt completed E stim to wrist extensors for promotion of wrist extension. Pt reports fatigue with AROM during E Stim. Pt demo increased I with muscle movements with E Stim. added in digit extension with E Stim in this visit, fair return. Treatment Charges: Mins Units   [x]  Modalities:   E-stim   58   1   []  Ultrasound     [x]  Ther Exercise 56 3   []  Manual Therapy     []  Ther Activities     []  Orthotic fit/train     []  Orthotic recheck     []  Other     Total Treatment time 58      Assessment: [x] Progressing toward goals. Good muscle contraction noted with e-stim treatment to R elbow extension and R wrist extension. [x] Patient would benefit from skilled occupational therapy services in order to:  Improve  Rehab Potential, Motor control/Tone in UE, ROM, Strength and Coordination deficit in order to ensure good follow through and decrease pain in UE for safe completion of ADLs     Short Term Goals: ( 6  Treatments)  1. Increase AROM (degrees) (extension/flexion)  a. R shoulder to 60/40 ONGOING IMPROVED  b. R elbow -20/130 ONGOING IMPROVED  c. R wrist -30/50 MET  d. R radial/ulnar dev 15/10  2. Increase strength (pounds);  a. R  strength to 22 pounds ONGOING  b. R lateral pinch to 10 pounds ONGOING  c. R 2 point pinch to 12 pounds ONGOING  3. Increase function:UE Functional Index Score 40 or more points to promote increased functional abilities MET  4. Patient to be independent with home exercise program as demonstrated by performance with correct form without cues. MET     Long Term Goals: (  12 Treatments)  1. Move digits in R hand independently with trace movement for promotion of future use for guitar ONGOING  2. Demo R hand extension to [facility] of [facility] for increased I with  on R side ONGOING (trace movements)  3. Increase R UE Shoulder ROM to lift arm up for I with adl tasks at home. 40/75     Pt. Education:  [] Yes  [x] No  [] Reviewed Prior HEP/Ed  Method of Education: [] Verbal  [] Demo  [] Written  Re:   Comprehension of Education:   [x] Verbalizes understanding. [] Demonstrates understanding. [] Needs review. [] Demonstrates/verbalizes HEP/Ed previously given. Plan: [x] Continue current frequency toward short and long term goals. [x] Specific Instructions for subsequent treatments: estim for shoulder subluxation and or elbow extension [] Other:    Time In: 3632-4157  Electronically signed by:  MODESTO Baca/L         Medicare Cap   [] Physical Therapy  [] Speech Therapy  [x] Occupational therapy  *PT and Speech caps combine      $2100 Limit for PT and Speech combined  $2100 Limit for OT individually  At the beginning of the month where you expect to go over $2100, please add the [address] [place] [address] modifier      Patient Name: Sierra Rogers: 1960    Note:  This is an estimate of charges billed.      Date of [employer] Charge Name # units/ charge $$$ charge Daily Total Charge Ongoing Total $$$   7-8-21 EStim 1 13.03      TE 3 29.21+21.34+21.34 84.92    7-13-21 EStim  TE 1  3 29.21+21.34+21.34 84.92 1816.01   7-15-21 EStim  TE 1  3 29.21+21.34+21.34 84.92 1900.93   7-20-21 EStim  TE 1  3 29.21+21.34+21.34 84.92 1985.85   7-22-21 E Stim  TE 1  3 29.21+21.34+21.34 84.92 2070.77   7-29-21 E Stim  Te 1  3 29.21+21.34+21.34 84.92 2155.69

## 2021-07-29 NOTE — TELEPHONE ENCOUNTER
Aleyda Hines is calling to request a refill on the following medication(s):    Medication Request:  Requested Prescriptions     Pending Prescriptions Disp Refills    lisinopril (PRINIVIL;ZESTRIL) 10 MG tablet [Pharmacy Med Name: LISINOPRIL 10 MG TABLET] 90 tablet 0     Sig: TAKE ONE TABLET BY MOUTH DAILY     Last filled     Last Visit Date (If Applicable):  0/5/6563    Next Visit Date:    8/2/2021

## 2021-08-02 ENCOUNTER — OFFICE VISIT (OUTPATIENT)
Dept: INTERNAL MEDICINE CLINIC | Age: 61
End: 2021-08-02

## 2021-08-02 VITALS
DIASTOLIC BLOOD PRESSURE: 70 MMHG | TEMPERATURE: 98.1 F | BODY MASS INDEX: 25.62 KG/M2 | HEIGHT: 71 IN | OXYGEN SATURATION: 99 % | WEIGHT: 183 LBS | SYSTOLIC BLOOD PRESSURE: 128 MMHG | RESPIRATION RATE: 16 BRPM | HEART RATE: 91 BPM

## 2021-08-02 DIAGNOSIS — I10 ESSENTIAL HYPERTENSION: Primary | ICD-10-CM

## 2021-08-02 DIAGNOSIS — F32.A ANXIETY AND DEPRESSION: ICD-10-CM

## 2021-08-02 DIAGNOSIS — Z28.21 INFLUENZA VACCINATION DECLINED: ICD-10-CM

## 2021-08-02 DIAGNOSIS — E78.2 MIXED HYPERLIPIDEMIA: ICD-10-CM

## 2021-08-02 DIAGNOSIS — N18.31 TYPE 1 DIABETES MELLITUS WITH STAGE 3A CHRONIC KIDNEY DISEASE (HCC): ICD-10-CM

## 2021-08-02 DIAGNOSIS — Z28.21 TETANUS, DIPHTHERIA, AND ACELLULAR PERTUSSIS (TDAP) VACCINATION DECLINED: ICD-10-CM

## 2021-08-02 DIAGNOSIS — Z86.16 HISTORY OF COVID-19: ICD-10-CM

## 2021-08-02 DIAGNOSIS — G81.91 RIGHT HEMIPLEGIA (HCC): ICD-10-CM

## 2021-08-02 DIAGNOSIS — E10.22 TYPE 1 DIABETES MELLITUS WITH STAGE 3A CHRONIC KIDNEY DISEASE (HCC): ICD-10-CM

## 2021-08-02 DIAGNOSIS — F41.9 ANXIETY AND DEPRESSION: ICD-10-CM

## 2021-08-02 DIAGNOSIS — Z28.21 PNEUMOCOCCAL VACCINATION DECLINED: ICD-10-CM

## 2021-08-02 DIAGNOSIS — Z86.73 HISTORY OF STROKE: ICD-10-CM

## 2021-08-02 PROCEDURE — 99214 OFFICE O/P EST MOD 30 MIN: CPT | Performed by: FAMILY MEDICINE

## 2021-08-02 RX ORDER — LISINOPRIL 10 MG/1
TABLET ORAL
Qty: 90 TABLET | Refills: 0 | OUTPATIENT
Start: 2021-08-02

## 2021-08-02 RX ORDER — GLUCOSAMINE HCL/CHONDROITIN SU 500-400 MG
CAPSULE ORAL
Qty: 200 STRIP | Refills: 5 | Status: SHIPPED | OUTPATIENT
Start: 2021-08-02 | End: 2022-08-29

## 2021-08-02 RX ORDER — PEN NEEDLE, DIABETIC 31 GX5/16"
NEEDLE, DISPOSABLE MISCELLANEOUS
Qty: 150 EACH | Refills: 5 | Status: SHIPPED | OUTPATIENT
Start: 2021-08-02

## 2021-08-02 SDOH — ECONOMIC STABILITY: FOOD INSECURITY: WITHIN THE PAST 12 MONTHS, YOU WORRIED THAT YOUR FOOD WOULD RUN OUT BEFORE YOU GOT MONEY TO BUY MORE.: NEVER TRUE

## 2021-08-02 SDOH — ECONOMIC STABILITY: FOOD INSECURITY: WITHIN THE PAST 12 MONTHS, THE FOOD YOU BOUGHT JUST DIDN'T LAST AND YOU DIDN'T HAVE MONEY TO GET MORE.: NEVER TRUE

## 2021-08-02 ASSESSMENT — SOCIAL DETERMINANTS OF HEALTH (SDOH): HOW HARD IS IT FOR YOU TO PAY FOR THE VERY BASICS LIKE FOOD, HOUSING, MEDICAL CARE, AND HEATING?: NOT HARD AT ALL

## 2021-08-02 ASSESSMENT — ENCOUNTER SYMPTOMS: BACK PAIN: 1

## 2021-08-02 NOTE — TELEPHONE ENCOUNTER
Vi Dai is calling to request a refill on the following medication(s):    Medication Request:  Requested Prescriptions     Pending Prescriptions Disp Refills    lisinopril (PRINIVIL;ZESTRIL) 10 MG tablet [Pharmacy Med Name: LISINOPRIL 10 MG TABLET] 90 tablet 0     Sig: TAKE ONE TABLET BY MOUTH DAILY     Told on 4/5/21 that no longer taking    Last Visit Date (If Applicable):  8/5/7406    Next Visit Date:    8/2/2021

## 2021-08-02 NOTE — PROGRESS NOTES
Subjective:      Patient ID: Keturah Klinefelter is a 64 y.o. male. Diabetes  He presents for his follow-up diabetic visit. He has type 1 diabetes mellitus. His disease course has been fluctuating. Hypoglycemia symptoms include nervousness/anxiousness. Associated symptoms include weakness. There are no hypoglycemic complications. Symptoms are stable. Diabetic complications include a CVA and nephropathy. Risk factors for coronary artery disease include diabetes mellitus, dyslipidemia, male sex, hypertension and sedentary lifestyle. Current diabetic treatment includes insulin injections. He is compliant with treatment all of the time. He is following a generally healthy diet. Meal planning includes ADA exchanges, calorie counting, carbohydrate counting and avoidance of concentrated sweets. He participates in exercise three times a week. His home blood glucose trend is fluctuating minimally. An ACE inhibitor/angiotensin II receptor blocker is being taken. Review of Systems   Musculoskeletal: Positive for arthralgias, back pain and gait problem. Neurological: Positive for weakness. Psychiatric/Behavioral: Positive for dysphoric mood. The patient is nervous/anxious. Past family and social history unremarkable. Diagnosis Orders   1. Essential hypertension     2. Type 1 diabetes mellitus with stage 3a chronic kidney disease (HCC)  CBC    Comprehensive Metabolic Panel    Hemoglobin A1C    Lipid Panel    Microalbumin, Ur    Urinalysis with Microscopic    TSH without Reflex    PSA screening   3. Mixed hyperlipidemia  CBC    Comprehensive Metabolic Panel    Hemoglobin A1C    Lipid Panel    Microalbumin, Ur    Urinalysis with Microscopic    TSH without Reflex    PSA screening   4. Right hemiplegia ([address]     5. Anxiety and depression     6. Influenza vaccination declined     7. Pneumococcal vaccination declined     8. Tetanus, diphtheria, and acellular pertussis (Tdap) vaccination declined     9.  History of COVID-19 10. History of stroke           Objective:   Physical Exam  Vitals and nursing note reviewed. Constitutional:       Appearance: He is well-developed. HENT:      Head: Normocephalic and atraumatic. Right Ear: External ear normal.      Left Ear: External ear normal.      Nose: Nose normal.   Eyes:      Conjunctiva/sclera: Conjunctivae normal.      Pupils: Pupils are equal, round, and reactive to light. Cardiovascular:      Rate and Rhythm: Normal rate and regular rhythm. Heart sounds: Normal heart sounds. Comments: Hypertension  Hyperlipidemia  Insulin-dependent diabetes mellitus  Pulmonary:      Effort: Pulmonary effort is normal.      Breath sounds: Normal breath sounds. Abdominal:      General: Bowel sounds are normal.      Palpations: Abdomen is soft. Genitourinary:     Comments: Diabetic nephropathy  Musculoskeletal:         General: Normal range of motion. Cervical back: Normal range of motion and neck supple. Skin:     General: Skin is warm and dry. Neurological:      Mental Status: He is alert and oriented to person, place, and time. Deep Tendon Reflexes: Reflexes are normal and symmetric. Comments: History of CVA with right spastic hemiplegia   Psychiatric:      Comments: Anxious  Depressed on fluoxetine         Assessment:       Diagnosis Orders   1. Essential hypertension     2. Type 1 diabetes mellitus with stage 3a chronic kidney disease (HCC)  CBC    Comprehensive Metabolic Panel    Hemoglobin A1C    Lipid Panel    Microalbumin, Ur    Urinalysis with Microscopic    TSH without Reflex    PSA screening   3. Mixed hyperlipidemia  CBC    Comprehensive Metabolic Panel    Hemoglobin A1C    Lipid Panel    Microalbumin, Ur    Urinalysis with Microscopic    TSH without Reflex    PSA screening   4. Right hemiplegia ([address]     5. Anxiety and depression     6. Influenza vaccination declined     7. Pneumococcal vaccination declined     8.  Tetanus, diphtheria, and acellular pertussis (Tdap) vaccination declined     9. History of COVID-19     10. History of stroke             Plan:      71-year-old -American male came along with his wife for routine follow-up. He is afebrile hemodynamically stable, clinical examination is stable  Insulin-dependent diabetes mellitus with random hyperglycemia. He is advised to continue current regimen, adhere to ADA 1800 diet, daily moderate exercise and lifestyle change. A1c as of 3/2021 was 6.5  Hypertension well-controlled with random blood pressure equal less than 130/80. Optimize diabetes. Consume less than 2 g of salt a day  Hyperlipidemia on statin that he is tolerating well  Monofilament testing is unremarkable. He is counseled on diabetic foot care  He is advised to update annual dilated eye exam  History of CVA with right spastic hemiplegia. He is established with neurology  Anxiety/depression on Prozac  He denies tobacco, excessive alcohol or illicit drug use. He is happy with care provided by his wife  He is updated on COVID-19 vaccination  Med list reviewed advised to continue with compliance  Further recommendations to follow labs  Call for any concern  This note is created with a voice recognition program and while intend to generate a document that accurately reflects the content of the visit, no guarantee can be provided that every mistake has been identified and corrected by editing.           Karyle Gill, MD

## 2021-08-03 ENCOUNTER — HOSPITAL ENCOUNTER (OUTPATIENT)
Dept: OCCUPATIONAL THERAPY | Age: 61
Setting detail: THERAPIES SERIES
Discharge: HOME | End: 2021-08-03
Payer: MEDICARE

## 2021-08-03 PROCEDURE — 97032 APPL MODALITY 1+ESTIM EA 15: CPT

## 2021-08-03 PROCEDURE — 97110 THERAPEUTIC EXERCISES: CPT

## 2021-08-06 ENCOUNTER — HOSPITAL ENCOUNTER (OUTPATIENT)
Age: 61
Setting detail: SPECIMEN
Discharge: HOME | End: 2021-08-06
Payer: MEDICARE

## 2021-08-06 DIAGNOSIS — E78.2 MIXED HYPERLIPIDEMIA: ICD-10-CM

## 2021-08-06 DIAGNOSIS — N18.31 TYPE 1 DIABETES MELLITUS WITH STAGE 3A CHRONIC KIDNEY DISEASE (HCC): ICD-10-CM

## 2021-08-06 DIAGNOSIS — E10.22 TYPE 1 DIABETES MELLITUS WITH STAGE 3A CHRONIC KIDNEY DISEASE (HCC): ICD-10-CM

## 2021-08-06 LAB
ALBUMIN SERPL-MCNC: 4 G/DL (ref 3.5–5.2)
ALBUMIN/GLOBULIN RATIO: 1.4 (ref 1–2.5)
ALP BLD-CCNC: 83 U/L (ref 40–129)
ALT SERPL-CCNC: 24 U/L (ref 5–41)
ANION GAP SERPL CALCULATED.3IONS-SCNC: 13 MMOL/L (ref 9–17)
AST SERPL-CCNC: 23 U/L
BILIRUB SERPL-MCNC: 0.22 MG/DL (ref 0.3–1.2)
BUN BLDV-MCNC: 24 MG/DL (ref 8–23)
BUN/CREAT BLD: ABNORMAL (ref 9–20)
CALCIUM SERPL-MCNC: 8.9 MG/DL (ref 8.6–10.4)
CHLORIDE BLD-SCNC: 106 MMOL/L (ref 98–107)
CHOLESTEROL/HDL RATIO: 4.5
CHOLESTEROL: 130 MG/DL
CO2: 22 MMOL/L (ref 20–31)
CREAT SERPL-MCNC: 1.25 MG/DL (ref 0.7–1.2)
ESTIMATED AVERAGE GLUCOSE: 151 MG/DL
GFR AFRICAN AMERICAN: >60 ML/MIN
GFR NON-AFRICAN AMERICAN: 59 ML/MIN
GFR SERPL CREATININE-BSD FRML MDRD: ABNORMAL ML/MIN/{1.73_M2}
GFR SERPL CREATININE-BSD FRML MDRD: ABNORMAL ML/MIN/{1.73_M2}
GLUCOSE BLD-MCNC: 135 MG/DL (ref 70–99)
HBA1C MFR BLD: 6.9 % (ref 4–6)
HCT VFR BLD CALC: 38.7 % (ref 40.7–50.3)
HDLC SERPL-MCNC: 29 MG/DL
HEMOGLOBIN: 12.8 G/DL (ref 13–17)
LDL CHOLESTEROL: 65 MG/DL (ref 0–130)
MCH RBC QN AUTO: 29.7 PG (ref 25.2–33.5)
MCHC RBC AUTO-ENTMCNC: 33.1 G/DL (ref 28.4–34.8)
MCV RBC AUTO: 89.8 FL (ref 82.6–102.9)
NRBC AUTOMATED: 0.1 PER 100 WBC
PDW BLD-RTO: 13.7 % (ref 11.8–14.4)
PLATELET # BLD: 230 K/UL (ref 138–453)
PMV BLD AUTO: 10.9 FL (ref 8.1–13.5)
POTASSIUM SERPL-SCNC: 4.3 MMOL/L (ref 3.7–5.3)
PROSTATE SPECIFIC ANTIGEN: 1.4 UG/L
RBC # BLD: 4.31 M/UL (ref 4.21–5.77)
SODIUM BLD-SCNC: 141 MMOL/L (ref 135–144)
TOTAL PROTEIN: 6.8 G/DL (ref 6.4–8.3)
TRIGL SERPL-MCNC: 180 MG/DL
TSH SERPL DL<=0.05 MIU/L-ACNC: 2.15 MIU/L (ref 0.3–5)
VLDLC SERPL CALC-MCNC: ABNORMAL MG/DL (ref 1–30)
WBC # BLD: 25.3 K/UL (ref 3.5–11.3)

## 2021-08-24 ENCOUNTER — HOSPITAL ENCOUNTER (OUTPATIENT)
Dept: PHYSICAL THERAPY | Age: 61
Setting detail: THERAPIES SERIES
Discharge: HOME | End: 2021-08-24
Payer: MEDICARE

## 2021-08-24 PROCEDURE — 97116 GAIT TRAINING THERAPY: CPT

## 2021-08-24 NOTE — FLOWSHEET NOTE
[x] [facility]) - [facility]  [address]  [phone]  F: [phone] [] [address]  [facility]   [address]  P: [phone]  F: [phone] [] [address] &  Therapy  [address]  P: [phone]  F: [phone] [] [address]  P: [phone]  F: [phone] [] [address]  [facility]   [address]   [place]: [phone]  F: [phone]      Physical Therapy Daily Treatment Note    Date:  2021  Patient Name:  Russell Asif    :  1960  MRN: [id number]  Physician: Dr. Dotty Dale: [employer] (34SD)  Medical Diagnosis:   History of stroke  Rehab Codes: R53.1, R29.3, R27.9, R26.9, M25.60  Next 's appt.: 21  Date of symptom onset: 19  Visit# / total visits:  (extended via [place] [employer] \"reset\" in )    Cancels/No Shows: 0        Subjective:     Pain:  [] Yes  [x] No Location:  N/A Pain Rating: (0-10 scale) 0/10  Pain altered Tx:  [x] No  [] Yes  Action:  Comments: Pt arrives after ~1 mo break from therapy d/t awaiting insurance approval. Notes he's been trying to complete mostly strengthening exercises (squats, leg raises) at home in the meantime. Objective:    Modalities:   Precautions:  Exercises: Bolded exercises completed on 2021:  Exercise Reps/ Time Weight/ Level Comments   Treadmill gait 2x 4 min . 8 mph   Chair placed on treadmill during breaks   Overground gait 1x100 ft  SBQC, CGA         Stair climbing 1x10 steps  unilat UE assist, CGA  - reciprocal    Gait w/ obstacles 2x50 ft  Foam, 6\" curb step, hurdles         Ant weightshift w/ TKE 2x12 purple    Foot tap up to step 20x 6\"    SLS longer holds  2x30\"  rayat UE assist Hamstring curl 2x15 2# Blocking hip flexor compensation  - no weight on 7/15 d/t fatigue   March 2x15 2# - no weight on 6/15   Hip abd 2x15     Fwd heel tap  2\", // bars BUE assist   Fwd long lunge 15x  To cone, cues to increase step length and  foot with return         Box sit<>stand 10x  10x 22\"  20\" Mod assist to incr RLE weightbearing   Bilateral ER w/ scap retract 2x20 purple In between rest breaks   Squat, tap ball to step 15x 6\" step    Standing reaching, all directions 2 min cones Encouraging RLE weightbearing     Seated thoracic/lumbar ext against bolster 15x  Big red bolster behind back                     Leg press - DL 2x20 60# DL = double limb   Leg press - SL 20x 20# SL = single limb               Mat      Sidelying clams 3x20 blueberry    Bridge 2x10  Cues to reduce height to improve low back pain   Prone knee flexion 3x10 blueberry RLE   Other:         Treatment Charges: Mins Units   []  Modalities     [x]  Ther Exercise 6 --   []  Manual Therapy     []  Ther Activities     []  Aquatics     []  Vasocompression     [x]  Other: neuro re-ed     [x]  Other: gait 42 3   Total Treatment time 48 3       Assessment: [x] Progressing toward goals. Returns to therapy after ~ 1mo break, had to reduce speeds/time on treadmill d/t fatigue. Instance of R knee \"locking up\" x2 on treadmill (actually a hamstring spasm causing R knee to buckle and nearly causing fall - modA by therapist to prevent this). Pt reports this has happened several times when walking at home (denies any falls). Slow/minimal exercise completed this date d/t pt fatigue and requiring extended seated rest breaks. [] No change. [x] Other: Continues to require significant therapeutic rest breaks to allow for increased tolerance of upcoming sets, tasks d/t RLE fatigue and aerobic endurance. Long-term COVID-19 effects likely impacting this as well.   [x] Patient would continue to benefit from skilled physical therapy services in order without using AD while ambulating at home from time to time despite education     LTG: (to be met in 36 treatments) - Re-assessed on 7/27/21 by Santino Allen, PT:  7. ? Balance:   a. Pt will be able to complete 360 deg turn with near equal weightbearing, albeit slow - Making progress, improving RLE weightbearing but still remains significanty limited as compared to LLE, though speed was improved since eval (~6 sec). Safety still a concern and requires supervision with turning without UE assist or device. b. Pt will score at least 41/56 on Mccurdy to indicate significant change in static/dynamic balance abilities since initial evaluation. - Nearly met, 40/56   8. ? Strength:   a. 2+/5 R knee ext strength to increase R knee control in SLS - MET, still 2+/5  b. 4+/5 R hip ext/abd, 5-/5 R hamstrings to improve swing and stance phase with gait - Making progress, 4/5 hip ext/abd, 4+/5 adduction, 4+/5 hamstring  9. ? Function:   a. Pt will be able to stand from 20 in chair 10x without UE assist and equal weightbearing with no more than minimal cuing from therapist. - NOT MET, requires 22in surface for 10x with min VCs for equal weightbearing with fair carryover  b. Pt will be able to ascend/descend stairs using reciprocal ascent/descent with unilat UE assist and fair speed, fair R knee eccentric control - MET for ascent, descent is step-to pattern  c. Pt will be able to ambulate 300 ft with improving L trunk lean, no more than 25% incidence of R knee hyperextension throughout, and at .5m/s - Making progress, 350 ft with . 46 m/s (was .44m/s), and no more than 10% incidence of R knee hyperextension throughout gait            Patient goals: \"strengthen what we can, improve walking\" - Making progress    Pt. Education:  [x] Yes  [] No  [x] Reviewed Prior HEP/Ed  Method of Education: [x] Verbal  [] Demo  [] Written  Comprehension of Education:  [x] Verbalizes understanding. [x] Demonstrates understanding.   [x] Needs review for consistency . [] Demonstrates/verbalizes HEP/Ed previously given.      Current HEP: resisted TKEs w/ purple band, sit-to-stands (22-23\"), squats, standing ham curls, standing hip ext, and marches      Plan: [x] Continue current frequency toward long and short term goals   [x] Specific Instructions for subsequent treatments: stair climbing, R quad eccentric control, RLE single limb stance and swing, treadmill training - reprovide HEP       Time In:  4:07 pm          Time Out: 4:59 pm    Electronically signed by:  Marcello Carvajal, PT

## 2021-08-26 ENCOUNTER — HOSPITAL ENCOUNTER (OUTPATIENT)
Dept: PHYSICAL THERAPY | Age: 61
Setting detail: THERAPIES SERIES
Discharge: HOME | End: 2021-08-26
Payer: MEDICARE

## 2021-08-26 PROCEDURE — 97110 THERAPEUTIC EXERCISES: CPT

## 2021-08-26 PROCEDURE — 97116 GAIT TRAINING THERAPY: CPT

## 2021-08-26 NOTE — FLOWSHEET NOTE
[x] [facility]) - [facility]  [address]  [phone]  F: [phone] [] [address]  [facility]   [address]  P: [phone]  F: [phone] [] [address] &  Therapy  [address] [facility]  P: [phone]  F: [phone] [] [address]  P: [phone]  F: [phone] [] [address]  [facility]   [address]   [place]: [phone]  F: [phone]      Physical Therapy Daily Treatment Note    Date:  2021  Patient Name:  Vi Dai    :  1960  MRN: [id number]  Physician: Dr. Darrius Anderson: [employer] (40HV)  Medical Diagnosis:   History of stroke  Rehab Codes: R53.1, R29.3, R27.9, R26.9, M25.60  Next 's appt.: 21  Date of symptom onset: 19  Visit# / total visits: 32/45 (extended via [place] Adv \"reset\" in )    Cancels/No Shows: 0        Subjective:     Pain:  [] Yes  [x] No Location:  N/A Pain Rating: (0-10 scale) 0/10  Pain altered Tx:  [x] No  [] Yes  Action:  Comments: Pt notes he felt \"not so great\" after last visit - just very fatigued, bothered by heat outside.      Objective:    Modalities:   Precautions:  Exercises: Bolded exercises completed on 2021:  Exercise Reps/ Time Weight/ Level Comments   Treadmill gait 1x 4 min  2x4.5 min 1.0 mph   Chair placed on treadmill during breaks   Overground gait 1x100 ft  SBQC, CGA         Stair climbing 1x10 steps  unilat UE assist, CGA  - reciprocal    Gait w/ obstacles 2x50 ft  Foam, 6\" curb step, hurdles         Ant weightshift w/ TKE 2x12 purple    Foot tap up to step 20x 6\"    SLS longer holds  2x30\"  unilat UE assist   Hamstring curl 2x15 2# Blocking hip flexor compensation  - no weight on 7/15 d/t fatigue   March 2x15 2# - no weight on 6/15   Hip abd 2x15     Fwd heel tap  2\", // bars BUE assist   Fwd long lunge 15x  To cone, cues to increase step length and  foot with return         Box sit<>stand 10x  10x 22\"  20\" Mod assist to incr RLE weightbearing   Bilateral ER w/ scap retract 3x20 purple In between rest breaks   Squat, tap ball to step 15x 6\" step    Standing reaching, all directions 2 min cones Encouraging RLE weightbearing     Seated thoracic/lumbar ext against bolster 15x  Big red bolster behind back                     Leg press - DL 2x20 60# DL = double limb   Leg press - SL 20x 20# SL = single limb               Mat      Sidelying clams 3x20 blueberry    Bridge 2x10  Cues to reduce height to improve low back pain   Prone knee flexion 3x10 blueberry RLE   Other:         Treatment Charges: Mins Units   []  Modalities     [x]  Ther Exercise 13 1   []  Manual Therapy     []  Ther Activities     []  Aquatics     []  Vasocompression     [x]  Other: neuro re-ed     [x]  Other: gait 40 3   Total Treatment time 53 4       Assessment: [x] Progressing toward goals. Able to progress speed and distance for gait training on treadmill - less cues needed for increased step length this date as compared to last session, but still requires min VCs. Fatigue limits carryover. Continues to require extended rest breaks, difficulty encouraging more rapid progression of exercise. Pt with reported hamstring cramp that limits walking/obstacle progressions this date. Requires min A for full RLE knee ext on leg press. [] No change. [x] Other: Continues to require significant therapeutic rest breaks to allow for increased tolerance of upcoming sets, tasks d/t RLE fatigue and aerobic endurance. Long-term COVID-19 effects likely impacting this as well.   [x] Patient would continue to benefit from skilled physical therapy services in order to: progress RLE strength, address gait deviations, improve standing static and dynamic balance impairment, improve safety and independence with functional mobility both at home and in community. STG: (to be met in 10 treatments) - Assessed on 5/7/21:  1. ? ROM: Pt will demo no excessive ER of RLE in standing at rest or sitting at rest to indicate reduced tightness in external rotators and normalize posture - Making progress, still with mild ER in standing/amb but overall improvement  2. ? Balance:    a. Pt will be able to reach outside MED towards R both up and down without balance deficit - Making progress, still mild balance deficit with further outside MED fwd and right but overall increased stability with RLE weightshift  b. Pt will be able to maintain RLE SLS for 30s with min UE assist and good neuromuscular control noted - Nearly met, mod UE assist required  3. ? Strength:  a. 2/5 R knee ext strength to increase R knee control in SLS - MET, 2/5  b. 4/5 R hip ext/abd, 4+/5 R hamstrings to improve swing and stance phase with gait - Partially met, 4/5 hip ext/abd, 4/5 hamstrings  4. ? Function:   a. Pt will be able to stand from 24 in chair 10x without UE assist and equal weightbearing with no more than minimal cuing from therapist. - MET   b. Pt will be able to ascend/descend stairs using reciprocal ascent/descent with BUE assist and fair speed, fair R knee eccentric control - MET, CGA required, only on 4-6\" steps at current  c. Pt will be able to ambulate 150 ft with improving L trunk lean, no more than 50% incidence of R knee hyperextension throughout, and at .4m/s - NOT MET,  .28 m/s self selected, 23.96 .42 m/s fast    5. Patient to be independent with home exercise program as demonstrated by performance with correct form without cues. - Ongoing, needs cuing for specific exercise completion vs just general activity  6.  Demonstrate Knowledge of fall prevention - Making progress, still demos risky behavior without using AD while ambulating at home from time to time despite education     LTG: (to be met in 45 treatments) - Re-assessed on 7/27/21 by Bret Mclaughlin, PT, extended d/t incomplete achievement:  7. ? Balance:   a. Pt will be able to complete 360 deg turn with near equal weightbearing, albeit slow - Making progress, improving RLE weightbearing but still remains significanty limited as compared to LLE, though speed was improved since eval (~6 sec). Safety still a concern and requires supervision with turning without UE assist or device. b. Pt will score at least 41/56 on Mccurdy to indicate significant change in static/dynamic balance abilities since initial evaluation. - Nearly met, 40/56   8. ? Strength:   a. 2+/5 R knee ext strength to increase R knee control in SLS - MET, still 2+/5  b. 4+/5 R hip ext/abd, 5-/5 R hamstrings to improve swing and stance phase with gait - Making progress, 4/5 hip ext/abd, 4+/5 adduction, 4+/5 hamstring  9. ? Function:   a. Pt will be able to stand from 20 in chair 10x without UE assist and equal weightbearing with no more than minimal cuing from therapist. - NOT MET, requires 22in surface for 10x with min VCs for equal weightbearing with fair carryover  b. Pt will be able to ascend/descend stairs using reciprocal ascent/descent with unilat UE assist and fair speed, fair R knee eccentric control - MET for ascent, descent is step-to pattern  c. Pt will be able to ambulate 300 ft with improving L trunk lean, no more than 25% incidence of R knee hyperextension throughout, and at .5m/s - Making progress, 350 ft with . 46 m/s (was .44m/s), and no more than 10% incidence of R knee hyperextension throughout gait            Patient goals: \"strengthen what we can, improve walking\" - Making progress    Pt. Education:  [x] Yes  [] No  [x] Reviewed Prior HEP/Ed  Method of Education: [x] Verbal  [] Demo  [] Written  Comprehension of Education:  [x] Verbalizes understanding. [x] Demonstrates understanding. [x] Needs review for consistency .   [] Demonstrates/verbalizes HEP/Ed previously given.      Current HEP: resisted TKEs w/ purple band, sit-to-stands (22-23\"), squats, standing ham curls, standing hip ext, and marches      Plan: [x] Continue current frequency toward long and short term goals   [x] Specific Instructions for subsequent treatments: stair climbing, R quad eccentric control, RLE single limb stance and swing, treadmill training - reprovide HEP       Time In:  12:03 pm          Time Out: 1:00 pm    Electronically signed by:  Twin Steve PT

## 2021-08-31 ENCOUNTER — HOSPITAL ENCOUNTER (OUTPATIENT)
Dept: PHYSICAL THERAPY | Age: 61
Setting detail: THERAPIES SERIES
Discharge: HOME | End: 2021-08-31
Payer: MEDICARE

## 2021-08-31 PROCEDURE — 97110 THERAPEUTIC EXERCISES: CPT

## 2021-08-31 NOTE — FLOWSHEET NOTE
[x] [facility]) - [facility]  [address]  [phone]  F: [phone] [] [address]  [facility] 36   [address]  P: [phone]  F: [phone] [] [address] &  Therapy  [address] [facility]  P: [phone]  F: [phone] [] [address]  P: [phone]  F: [phone] [] [address]  [facility]   [address]   [place]: [phone]  F: [phone]      Physical Therapy Daily Treatment Note    Date:  2021  Patient Name:  Irma Primrose    :  1960  MRN: [id number]  Physician: Dr. Cifuentes Dam: [employer] (59OO)  Medical Diagnosis:   History of stroke  Rehab Codes: R53.1, R29.3, R27.9, R26.9, M25.60  Next 's appt.: 21  Date of symptom onset: 19  Visit# / total visits: 33/45 (extended via [place] Adv \"reset\" in )    Cancels/No Shows: 0        Subjective:     Pain:  [] Yes  [x] No Location:  N/A Pain Rating: (0-10 scale) 0/10  Pain altered Tx:  [x] No  [] Yes  Action:  Comments: Pt notes he feels \"so-so\" today, though slightly better without the heat. Requests that he rides the upright bike today.      Objective:    Modalities:   Precautions:  Exercises: Bolded exercises completed on 2021:  Exercise Reps/ Time Weight/ Level Comments   Upright bike 4x3-4 min L7-10    Treadmill gait 1x 4 min  2x4.5 min 1.0 mph   Chair placed on treadmill during breaks   Overground gait 1x100 ft  SBQC, CGA         Stair climbing 1x10 steps  unilat UE assist, CGA  - reciprocal    Gait w/ obstacles 2x50 ft  Foam, 6\" curb step, hurdles         Ant weightshift w/ TKE 2x12 purple    Foot tap up to step 20x 6\"    SLS longer holds  2x30\"  unilat UE assist   Hamstring curl 2x15 2# Blocking hip flexor compensation  - no weight on 8/31 d/t fatigue   March 2x15 2# - no weight on 6/15   Hip abd 2x15     Fwd step down 2x10 4\", // bars BUE assist   Fwd long lunge 15x  To cone, cues to increase step length and  foot with return         Box sit<>stand 10x  10x 22\"  20\" Mod assist to incr RLE weightbearing   Bilateral ER w/ scap retract 3x20 purple In between rest breaks   Squat, tap ball to step 15x 6\" step    Standing reaching, all directions 2 min cones Encouraging RLE weightbearing     Seated thoracic/lumbar ext against bolster 15x  Big red bolster behind back                     Leg press - DL 2x20 60# DL = double limb   Leg press - SL 20x 20# SL = single limb               Mat      Sidelying clams 3x20 blueberry    Bridge 2x10  Cues to reduce height to improve low back pain   Prone knee flexion 3x10 blueberry RLE   Other:         Treatment Charges: Mins Units   []  Modalities     [x]  Ther Exercise 53 4   []  Manual Therapy     []  Ther Activities     []  Aquatics     []  Vasocompression     []  Other: neuro re-ed     []  Other: gait     Total Treatment time 53 4       Assessment: [x] Progressing toward goals. Initiated upright bike training for aerobic cardiovascular training and RLE hip/knee flexion strengthening. Pt very fatigued with this, requires intermittent rest break on bike and rest afterwards. However, pt very pleased with level of intensity that he's able to push to that he is unable to on treadmill d/t balance. Will continue with this upright bike vs treadmill training for higher intensity exercise and strengthening, followed by overground gait training. Unable to attempt much overground gait training this date d/t high levels of fatigue; significant difficulty with all exercises after bike training d/t this fatigue, but will progress as able. [] No change.        [x] Other: Continues to require significant therapeutic rest breaks to allow for increased tolerance of upcoming sets, tasks d/t RLE fatigue and aerobic endurance. Long-term COVID-19 effects likely impacting this as well. [x] Patient would continue to benefit from skilled physical therapy services in order to: progress RLE strength, address gait deviations, improve standing static and dynamic balance impairment, improve safety and independence with functional mobility both at home and in community. STG: (to be met in 10 treatments) - Assessed on 5/7/21:  1. ? ROM: Pt will demo no excessive ER of RLE in standing at rest or sitting at rest to indicate reduced tightness in external rotators and normalize posture - Making progress, still with mild ER in standing/amb but overall improvement  2. ? Balance:    a. Pt will be able to reach outside MED towards R both up and down without balance deficit - Making progress, still mild balance deficit with further outside MED fwd and right but overall increased stability with RLE weightshift  b. Pt will be able to maintain RLE SLS for 30s with min UE assist and good neuromuscular control noted - Nearly met, mod UE assist required  3. ? Strength:  a. 2/5 R knee ext strength to increase R knee control in SLS - MET, 2/5  b. 4/5 R hip ext/abd, 4+/5 R hamstrings to improve swing and stance phase with gait - Partially met, 4/5 hip ext/abd, 4/5 hamstrings  4. ? Function:   a. Pt will be able to stand from 24 in chair 10x without UE assist and equal weightbearing with no more than minimal cuing from therapist. - MET   b. Pt will be able to ascend/descend stairs using reciprocal ascent/descent with BUE assist and fair speed, fair R knee eccentric control - MET, CGA required, only on 4-6\" steps at current  c. Pt will be able to ambulate 150 ft with improving L trunk lean, no more than 50% incidence of R knee hyperextension throughout, and at .4m/s - NOT MET,  .28 m/s self selected, 23.96 .42 m/s fast    5.  Patient to be independent with home exercise program as demonstrated by performance with correct form without cues. - Ongoing, needs cuing for specific exercise completion vs just general activity  6. Demonstrate Knowledge of fall prevention - Making progress, still demos risky behavior without using AD while ambulating at home from time to time despite education     LTG: (to be met in 45 treatments) - Re-assessed on 7/27/21 by Gay Costa PT, extended d/t incomplete achievement:  7. ? Balance:   a. Pt will be able to complete 360 deg turn with near equal weightbearing, albeit slow - Making progress, improving RLE weightbearing but still remains significanty limited as compared to LLE, though speed was improved since eval (~6 sec). Safety still a concern and requires supervision with turning without UE assist or device. b. Pt will score at least 41/56 on Mccurdy to indicate significant change in static/dynamic balance abilities since initial evaluation. - Nearly met, 40/56   8. ? Strength:   a. 2+/5 R knee ext strength to increase R knee control in SLS - MET, still 2+/5  b. 4+/5 R hip ext/abd, 5-/5 R hamstrings to improve swing and stance phase with gait - Making progress, 4/5 hip ext/abd, 4+/5 adduction, 4+/5 hamstring  9. ? Function:   a. Pt will be able to stand from 20 in chair 10x without UE assist and equal weightbearing with no more than minimal cuing from therapist. - NOT MET, requires 22in surface for 10x with min VCs for equal weightbearing with fair carryover  b. Pt will be able to ascend/descend stairs using reciprocal ascent/descent with unilat UE assist and fair speed, fair R knee eccentric control - MET for ascent, descent is step-to pattern  c. Pt will be able to ambulate 300 ft with improving L trunk lean, no more than 25% incidence of R knee hyperextension throughout, and at .5m/s - Making progress, 350 ft with . 46 m/s (was .44m/s), and no more than 10% incidence of R knee hyperextension throughout gait            Patient goals: \"strengthen what we can, improve walking\" - Making progress    Pt. Education:  [x] Yes  [] No  [x] Reviewed Prior HEP/Ed  Method of Education: [x] Verbal  [] Demo  [] Written  Comprehension of Education:  [x] Verbalizes understanding. [x] Demonstrates understanding. [x] Needs review for consistency . [] Demonstrates/verbalizes HEP/Ed previously given.      Current HEP: resisted TKEs w/ purple band, sit-to-stands (22-23\"), squats, standing ham curls, standing hip ext, and marches      Plan: [x] Continue current frequency toward long and short term goals   [x] Specific Instructions for subsequent treatments: stair climbing, R quad eccentric control, RLE single limb stance and swing, treadmill training - reprovide HEP       Time In:  12:06 pm          Time Out: 12:59 pm    Electronically signed by:  Nurys Villanueva, PT

## 2021-09-02 ENCOUNTER — HOSPITAL ENCOUNTER (OUTPATIENT)
Dept: PHYSICAL THERAPY | Age: 61
Setting detail: THERAPIES SERIES
Discharge: HOME | End: 2021-09-02
Payer: MEDICARE

## 2021-09-02 PROCEDURE — 97110 THERAPEUTIC EXERCISES: CPT

## 2021-09-02 NOTE — FLOWSHEET NOTE
[x] [facility]) - [facility] [place] [facility]  [address]  [phone]  F: [phone] [] [address]  [address]   [address]  P: [phone]  F: [phone] [] [address] &  Therapy  [address]  P: [phone]  F: [phone] [] [address]  P: [phone]  F: [phone] [] [address]  [facility]   [address]   [place]: [phone]  F: [phone]      Physical Therapy Daily Treatment Note    Date:  2021  Patient Name:  Clayton Moncada    :  1960  MRN: [id number]  Physician: Dr. Malini Chew: [employer] ([id number])  Medical Diagnosis:   History of stroke  Rehab Codes: R53.1, R29.3, R27.9, R26.9, M25.60  Next 's appt.: 21  Date of symptom onset: 19  Visit# / total visits: 34/45 (extended via [place] [employer] \"reset\" in )    Cancels/No Shows: 0        Subjective:     Pain:  [] Yes  [x] No Location:  N/A Pain Rating: (0-10 scale) 0/10  Pain altered Tx:  [x] No  [] Yes  Action:  Comments: Pt notes he felt okay after the bike last visit, sore and fatigued but this improved that day.      Objective:    Modalities:   Precautions:  Exercises: Bolded exercises completed on 2021:  Exercise Reps/ Time Weight/ Level Comments   Upright bike 3x3.5 min L7    Treadmill gait 1x 4 min  2x4.5 min 1.0 mph   Chair placed on treadmill during breaks   Overground gait 1x100 ft  SBQC, CGA         Stair climbing 1x10 steps  unilat UE assist, CGA  - reciprocal    Gait w/ obstacles 2x50 ft  Foam, 6\" curb step, hurdles         Ant weightshift to SLS hold 12x10\"     Foot tap up to step 20x 6\"    SLS longer holds  2x30\"  unilat UE assist   Hamstring curl 2x15  Blocking hip flexor compensation     March 2x15 2# - no weight on 6/15   Hip abd 2x15     Fwd step down 2x10 4\", // bars BUE assist   Fwd long lunge 15x  To cone, cues to increase step length and  foot with return         Box sit<>stand w/ right reach 10x 20\" Mod assist to incr RLE weightbearing   Bilateral ER w/ scap retract 3x20 purple In between rest breaks   Squat, tap ball to step 15x 6\" step    Standing reaching, all directions 2 min cones Encouraging RLE weightbearing     Seated thoracic/lumbar ext against bolster 15x  Big red bolster behind back                     Leg press - DL 2x20 60# DL = double limb   Leg press - SL 20x 20# SL = single limb               Mat      Sidelying clams 3x20 blueberry    Bridge 2x10  Cues to reduce height to improve low back pain   Prone knee flexion 3x10 blueberry RLE   Other:         Treatment Charges: Mins Units   []  Modalities     [x]  Ther Exercise 57 4   []  Manual Therapy     []  Ther Activities     []  Aquatics     []  Vasocompression     []  Other: neuro re-ed     []  Other: gait     Total Treatment time 57 4       Assessment: [x] Progressing toward goals. Began with some sit<>stands with RLE WB focus - added RUE reach to improve RLE weightshift. Does need VCs to increase knee flexion on RLE to allow improved weightshift. Poor quad strength still evident with RLE step downs and requires min therapist assist to encourage increased weightshift as well as control with descent. Still requires encouragement to push through fatigue; very deconditioned. [] No change. [x] Other: Continues to require significant therapeutic rest breaks to allow for increased tolerance of upcoming sets, tasks d/t RLE fatigue and aerobic endurance. Long-term COVID-19 effects likely impacting this as well.   [x] Patient would continue to benefit from skilled physical therapy services in order to: progress RLE strength, address gait deviations, improve standing static and dynamic balance impairment, improve safety and independence with functional mobility both at home and in community. STG: (to be met in 10 treatments) - Assessed on 5/7/21:  1. ? ROM: Pt will demo no excessive ER of RLE in standing at rest or sitting at rest to indicate reduced tightness in external rotators and normalize posture - Making progress, still with mild ER in standing/amb but overall improvement  2. ? Balance:    a. Pt will be able to reach outside MED towards R both up and down without balance deficit - Making progress, still mild balance deficit with further outside MED fwd and right but overall increased stability with RLE weightshift  b. Pt will be able to maintain RLE SLS for 30s with min UE assist and good neuromuscular control noted - Nearly met, mod UE assist required  3. ? Strength:  a. 2/5 R knee ext strength to increase R knee control in SLS - MET, 2/5  b. 4/5 R hip ext/abd, 4+/5 R hamstrings to improve swing and stance phase with gait - Partially met, 4/5 hip ext/abd, 4/5 hamstrings  4. ? Function:   a. Pt will be able to stand from 24 in chair 10x without UE assist and equal weightbearing with no more than minimal cuing from therapist. - MET   b. Pt will be able to ascend/descend stairs using reciprocal ascent/descent with BUE assist and fair speed, fair R knee eccentric control - MET, CGA required, only on 4-6\" steps at current  c. Pt will be able to ambulate 150 ft with improving L trunk lean, no more than 50% incidence of R knee hyperextension throughout, and at .4m/s - NOT MET,  .28 m/s self selected, 23.96 .42 m/s fast    5. Patient to be independent with home exercise program as demonstrated by performance with correct form without cues. - Ongoing, needs cuing for specific exercise completion vs just general activity  6.  Demonstrate Knowledge of fall prevention - Making progress, still demos risky behavior without using AD while ambulating at home from time to time despite education     LTG: (to be met in 39 treatments) - Re-assessed on 7/27/21 by Carlton Carrasquillo, PT, extended d/t incomplete achievement:  7. ? Balance:   a. Pt will be able to complete 360 deg turn with near equal weightbearing, albeit slow - Making progress, improving RLE weightbearing but still remains significanty limited as compared to LLE, though speed was improved since eval (~6 sec). Safety still a concern and requires supervision with turning without UE assist or device. b. Pt will score at least 41/56 on Mccurdy to indicate significant change in static/dynamic balance abilities since initial evaluation. - Nearly met, 40/56   8. ? Strength:   a. 2+/5 R knee ext strength to increase R knee control in SLS - MET, still 2+/5  b. 4+/5 R hip ext/abd, 5-/5 R hamstrings to improve swing and stance phase with gait - Making progress, 4/5 hip ext/abd, 4+/5 adduction, 4+/5 hamstring  9. ? Function:   a. Pt will be able to stand from 20 in chair 10x without UE assist and equal weightbearing with no more than minimal cuing from therapist. - NOT MET, requires 22in surface for 10x with min VCs for equal weightbearing with fair carryover  b. Pt will be able to ascend/descend stairs using reciprocal ascent/descent with unilat UE assist and fair speed, fair R knee eccentric control - MET for ascent, descent is step-to pattern  c. Pt will be able to ambulate 300 ft with improving L trunk lean, no more than 25% incidence of R knee hyperextension throughout, and at .5m/s - Making progress, 350 ft with . 46 m/s (was .44m/s), and no more than 10% incidence of R knee hyperextension throughout gait            Patient goals: \"strengthen what we can, improve walking\" - Making progress    Pt. Education:  [x] Yes  [] No  [x] Reviewed Prior HEP/Ed  Method of Education: [x] Verbal  [] Demo  [] Written  Comprehension of Education:  [x] Verbalizes understanding. [x] Demonstrates understanding. [x] Needs review for consistency . [] Demonstrates/verbalizes HEP/Ed previously given. Current HEP: resisted TKEs w/ purple band, sit-to-stands (22-23\"), squats, standing ham curls, standing hip ext, and marches      Plan: [x] Continue current frequency toward long and short term goals   [x] Specific Instructions for subsequent treatments: stair climbing, R quad eccentric control, RLE single limb stance and swing, treadmill training - reprovide HEP       Time In:  2:02 pm          Time Out: 3:03 pm    Electronically signed by:  Keiko Casey PT

## 2021-09-07 ENCOUNTER — HOSPITAL ENCOUNTER (OUTPATIENT)
Dept: PHYSICAL THERAPY | Age: 61
Setting detail: THERAPIES SERIES
Discharge: HOME | End: 2021-09-07
Payer: MEDICARE

## 2021-09-07 PROCEDURE — 97110 THERAPEUTIC EXERCISES: CPT

## 2021-09-07 NOTE — FLOWSHEET NOTE
[x] [facility]) - [facility]  [facility]  [address]  [phone]  F: [phone] [] [address]  [facility]   [address]  P: [phone]  F: [phone] [] [address] &  Therapy  [address]  P: [phone]  F: [phone] [] [address]  P: [phone]  F: [phone] [] [address]  [facility]   [address]   [place]: [phone]  F: [phone]      Physical Therapy Daily Treatment Note    Date:  2021  Patient Name:  Regis Rothman    :  1960  MRN: [id number]  Physician: Dr. Pena Brenda: [employer] ([id number])  Medical Diagnosis:   History of stroke  Rehab Codes: R53.1, R29.3, R27.9, R26.9, M25.60  Next 's appt.: 21  Date of symptom onset: 19  Visit# / total visits: 35/45 (extended via [place] [employer] \"reset\" in )    Cancels/No Shows: 0        Subjective:     Pain:  [] Yes  [x] No Location:  N/A Pain Rating: (0-10 scale) 0/10  Pain altered Tx:  [x] No  [] Yes  Action:  Comments: Pt notes he feels okay today.       Objective:    Modalities:   Precautions:  Exercises: Bolded exercises completed on 2021:  Exercise Reps/ Time Weight/ Level Comments   Upright bike 2x4.5 min L4    Treadmill gait 1x 4 min  2x4.5 min 1.0 mph   Chair placed on treadmill during breaks   Overground gait 1x100 ft  SBQC, CGA         Stair climbing 1x10 steps  unilat UE assist, CGA  - reciprocal    Gait w/ obstacles 2x50 ft  Foam, 6\" curb step, hurdles         Ant weightshift to SLS hold 12x10\"     Foot tap up to step 20x 6\"    SLS longer holds  2x30\"  unilat UE assist   Hamstring curl 2x15  Blocking hip flexor compensation     March 2x15 2# - no weight on 6/15   Hip abd 2x15 Fwd step down 2x10 4\", // bars BUE assist   Fwd long lunge 15x  To cone, cues to increase step length and  foot with return         Box sit<>stand w/ right reach 10x 20\" Mod assist to incr RLE weightbearing   Bilateral ER w/ scap retract 3x20 purple In between rest breaks   Squat, tap ball to step 15x 6\" step    Standing reaching, all directions 2 min cones Encouraging RLE weightbearing     Seated thoracic/lumbar ext against bolster 15x  Big red bolster behind back                     Leg press - DL 2x20 60# DL = double limb   Leg press - SL 20x 20# SL = single limb               Mat      Sidelying clams 3x20 blueberry    Bridge 2x10  Cues to reduce height to improve low back pain   Prone knee flexion 3x10 blueberry RLE   Other: BP assessed during bout of nausea: 132/78 mmHg, HR:89bpm, O2: 96%        Treatment Charges: Mins Units   []  Modalities     [x]  Ther Exercise 42 3   []  Manual Therapy     []  Ther Activities     []  Aquatics     []  Vasocompression     []  Other: neuro re-ed     []  Other: gait     Total Treatment time 42 3       Assessment: [x] Progressing toward goals. Able to progress upright bike training time, indicating slowly improving BLE functional endurance, though pt continues to be very fatigued after these sets. Reports feeling nauseas after pushing hard to fatigue with bicycle and then following with longer SLS holds despite long seated rest breaks in between these tasks. Long seated rest to reduce nausea, no improvement. Held remainder of session (~10 min) d/t this to avoid worsening. Skilled time spent in vitals assessment - within normal range for all. [] No change. [x] Other: Significant time spent in trying to optimize upright bike completion - more difficulty with keeping RLE on step, getting smooth revolutions, and increased fatigue with same amount of resistance.  Spend ~20 min on this d/t pt requesting AFO be removed despite therapist recommending to keep donned d/t lack of ankle control. This did indeed worsen ability to keep R foot in pedal, so pt requests to re-don AFO and to do it himself. Continues to require significant therapeutic rest breaks to allow for increased tolerance of upcoming sets, tasks d/t RLE fatigue and aerobic endurance. Long-term COVID-19 effects likely impacting this as well. [x] Patient would continue to benefit from skilled physical therapy services in order to: progress RLE strength, address gait deviations, improve standing static and dynamic balance impairment, improve safety and independence with functional mobility both at home and in community. STG: (to be met in 10 treatments) - Assessed on 5/7/21:  1. ? ROM: Pt will demo no excessive ER of RLE in standing at rest or sitting at rest to indicate reduced tightness in external rotators and normalize posture - Making progress, still with mild ER in standing/amb but overall improvement  2. ? Balance:    a. Pt will be able to reach outside MED towards R both up and down without balance deficit - Making progress, still mild balance deficit with further outside MED fwd and right but overall increased stability with RLE weightshift  b. Pt will be able to maintain RLE SLS for 30s with min UE assist and good neuromuscular control noted - Nearly met, mod UE assist required  3. ? Strength:  a. 2/5 R knee ext strength to increase R knee control in SLS - MET, 2/5  b. 4/5 R hip ext/abd, 4+/5 R hamstrings to improve swing and stance phase with gait - Partially met, 4/5 hip ext/abd, 4/5 hamstrings  4. ? Function:   a. Pt will be able to stand from 24 in chair 10x without UE assist and equal weightbearing with no more than minimal cuing from therapist. - MET   b. Pt will be able to ascend/descend stairs using reciprocal ascent/descent with BUE assist and fair speed, fair R knee eccentric control - MET, CGA required, only on 4-6\" steps at current  c.  Pt will be able to ambulate 150 ft with improving L trunk lean, no more than 50% incidence of R knee hyperextension throughout, and at .4m/s - NOT MET,  .28 m/s self selected, 23.96 .42 m/s fast    5. Patient to be independent with home exercise program as demonstrated by performance with correct form without cues. - Ongoing, needs cuing for specific exercise completion vs just general activity  6. Demonstrate Knowledge of fall prevention - Making progress, still demos risky behavior without using AD while ambulating at home from time to time despite education     LTG: (to be met in 45 treatments) - Re-assessed on 7/27/21 by Pilar Fletcher, PT, extended d/t incomplete achievement:  7. ? Balance:   a. Pt will be able to complete 360 deg turn with near equal weightbearing, albeit slow - Making progress, improving RLE weightbearing but still remains significanty limited as compared to LLE, though speed was improved since eval (~6 sec). Safety still a concern and requires supervision with turning without UE assist or device. b. Pt will score at least 41/56 on Mccurdy to indicate significant change in static/dynamic balance abilities since initial evaluation. - Nearly met, 40/56   8. ? Strength:   a. 2+/5 R knee ext strength to increase R knee control in SLS - MET, still 2+/5  b. 4+/5 R hip ext/abd, 5-/5 R hamstrings to improve swing and stance phase with gait - Making progress, 4/5 hip ext/abd, 4+/5 adduction, 4+/5 hamstring  9. ? Function:   a. Pt will be able to stand from 20 in chair 10x without UE assist and equal weightbearing with no more than minimal cuing from therapist. - NOT MET, requires 22in surface for 10x with min VCs for equal weightbearing with fair carryover  b. Pt will be able to ascend/descend stairs using reciprocal ascent/descent with unilat UE assist and fair speed, fair R knee eccentric control - MET for ascent, descent is step-to pattern  c.  Pt will be able to ambulate 300 ft with improving L trunk lean, no more than 25% incidence of R knee hyperextension throughout, and at .5m/s - Making progress, 350 ft with . 46 m/s (was .44m/s), and no more than 10% incidence of R knee hyperextension throughout gait            Patient goals: \"strengthen what we can, improve walking\" - Making progress    Pt. Education:  [x] Yes  [] No  [x] Reviewed Prior HEP/Ed  Method of Education: [x] Verbal  [] Demo  [] Written  Comprehension of Education:  [x] Verbalizes understanding. [x] Demonstrates understanding. [x] Needs review for consistency . [] Demonstrates/verbalizes HEP/Ed previously given.      Current HEP: resisted TKEs w/ purple band, sit-to-stands (22-23\"), squats, standing ham curls, standing hip ext, and marches      Plan: [x] Continue current frequency toward long and short term goals   [x] Specific Instructions for subsequent treatments: stair climbing, R quad eccentric control, RLE single limb stance and swing, treadmill training - reprovide HEP       Time In:  12:07 pm          Time Out: 12:55 pm    Electronically signed by:  Marcello Carvajal, PT

## 2021-09-08 RX ORDER — HYDRALAZINE HYDROCHLORIDE 25 MG/1
TABLET, FILM COATED ORAL
Qty: 90 TABLET | Refills: 0 | Status: SHIPPED | OUTPATIENT
Start: 2021-09-08 | End: 2021-11-29

## 2021-09-08 RX ORDER — AMLODIPINE BESYLATE 10 MG/1
TABLET ORAL
Qty: 90 TABLET | Refills: 0 | Status: SHIPPED | OUTPATIENT
Start: 2021-09-08 | End: 2021-11-29

## 2021-09-09 ENCOUNTER — HOSPITAL ENCOUNTER (OUTPATIENT)
Dept: PHYSICAL THERAPY | Age: 61
Setting detail: THERAPIES SERIES
Discharge: HOME | End: 2021-09-09
Payer: MEDICARE

## 2021-09-09 PROCEDURE — 97116 GAIT TRAINING THERAPY: CPT

## 2021-09-09 PROCEDURE — 97110 THERAPEUTIC EXERCISES: CPT

## 2021-09-09 NOTE — FLOWSHEET NOTE
[x] [facility]) - [facility]  [address]  [phone]  F: [phone] [] [address]  [place] [address]   [address]  P: [phone]  F: [phone] [] [address] &  Therapy  [address] [facility]  P: [phone]  F: [phone] [] [address]  P: [phone]  F: [phone] [] [address]  [facility]   [address]   [place]: [phone]  F: [phone]      Physical Therapy Daily Treatment Note    Date:  2021  Patient Name:  Thom Wiggins    :  1960  MRN: [id number]  Physician: Dr. Bharath Carrera: [employer] ([id number])  Medical Diagnosis:   History of stroke  Rehab Codes: R53.1, R29.3, R27.9, R26.9, M25.60  Next 's appt.: 21  Date of symptom onset: 19  Visit# / total visits: 36/45 (extended via [place] Adv \"reset\" in )    Cancels/No Shows: 0        Subjective:     Pain:  [] Yes  [x] No Location:  N/A Pain Rating: (0-10 scale) 0/10  Pain altered Tx:  [x] No  [] Yes  Action:  Comments: Pt states he was frustrated with last session, states \"I know I can do better\". Brought music for motivation for increased intensity/endurance for bike training today.      Objective:    Modalities:   Precautions:  Exercises: Bolded exercises completed on 2021:  Exercise Reps/ Time Weight/ Level Comments   Upright bike 2x5 min L6    Treadmill gait 1x 4 min  2x4.5 min 1.0 mph   Chair placed on treadmill during breaks   Overground gait 2x200 ft  1300 ft  SBQC, SBA         Stair climbing 1x10 steps  unilat UE assist, CGA  - reciprocal    Gait w/ obstacles 2x50 ft  Foam, 6\" curb step, hurdles         Ant weightshift to SLS hold 12x10\"     Foot tap up to step 20x 6\"    SLS longer holds  2x30\"  unilat UE assist   Hamstring curl 2x15  Blocking hip flexor compensation     March 2x15 2# - no weight on 6/15   Hip abd 2x15     Fwd step down 2x10 4\", // bars BUE assist   Fwd long lunge 15x  To cone, cues to increase step length and  foot with return   Lateral step up 15x 4\" unilat UE assist         Box sit<>stand w/ right reach 10x 20\" Mod assist to incr RLE weightbearing   Bilateral ER w/ scap retract 3x20 purple In between rest breaks   Squat, tap ball to step 15x 6\" step    Standing reaching, all directions 2 min cones Encouraging RLE weightbearing     Seated thoracic/lumbar ext against bolster 15x  Big red bolster behind back                     Leg press - DL x30 60# DL = double limb   Leg press - SL 20x 20# SL = single limb               Mat      Sidelying clams 3x20 blueberry    Bridge 2x10  Cues to reduce height to improve low back pain   Prone knee flexion 3x10 blueberry RLE   Other:        Treatment Charges: Mins Units   []  Modalities     [x]  Ther Exercise 40 3   []  Manual Therapy     []  Ther Activities     []  Aquatics     []  Vasocompression     []  Other: neuro re-ed     [x]  Other: gait 16 1   Total Treatment time 56 4       Assessment: [x] Progressing toward goals. With music motivation during bike training, able to push to higher intensity, increase resistance, and further bouts. After higher-intensity aerobic condition, attempted to take advantage of increased neural circuit activation and focus on gait training and mechanics, specifically more rapid RLE step length and consecutive stepping. Able to complete initially but does require min VCs to continue, worsens with fatigue after ~200ft. [] No change. [x] Other: Continues to require significant therapeutic rest breaks to allow for increased tolerance of upcoming sets, tasks d/t RLE fatigue and aerobic endurance. Long-term COVID-19 effects likely impacting this as well.   [x] Patient would continue to benefit from skilled physical therapy services in order to: progress RLE strength, address gait deviations, improve standing static and dynamic balance impairment, improve safety and independence with functional mobility both at home and in community. STG: (to be met in 10 treatments) - Assessed on 5/7/21:  1. ? ROM: Pt will demo no excessive ER of RLE in standing at rest or sitting at rest to indicate reduced tightness in external rotators and normalize posture - Making progress, still with mild ER in standing/amb but overall improvement  2. ? Balance:    a. Pt will be able to reach outside MED towards R both up and down without balance deficit - Making progress, still mild balance deficit with further outside MED fwd and right but overall increased stability with RLE weightshift  b. Pt will be able to maintain RLE SLS for 30s with min UE assist and good neuromuscular control noted - Nearly met, mod UE assist required  3. ? Strength:  a. 2/5 R knee ext strength to increase R knee control in SLS - MET, 2/5  b. 4/5 R hip ext/abd, 4+/5 R hamstrings to improve swing and stance phase with gait - Partially met, 4/5 hip ext/abd, 4/5 hamstrings  4. ? Function:   a. Pt will be able to stand from 24 in chair 10x without UE assist and equal weightbearing with no more than minimal cuing from therapist. - MET   b. Pt will be able to ascend/descend stairs using reciprocal ascent/descent with BUE assist and fair speed, fair R knee eccentric control - MET, CGA required, only on 4-6\" steps at current  c. Pt will be able to ambulate 150 ft with improving L trunk lean, no more than 50% incidence of R knee hyperextension throughout, and at .4m/s - NOT MET,  .28 m/s self selected, 23.96 .42 m/s fast    5. Patient to be independent with home exercise program as demonstrated by performance with correct form without cues. - Ongoing, needs cuing for specific exercise completion vs just general activity  6.  Demonstrate Knowledge of fall prevention - Making progress, still demos risky behavior without using AD while ambulating at home from time to time despite education     LTG: (to be met in 45 treatments) - Re-assessed on 7/27/21 by Bret Mclaughlin, PT, extended d/t incomplete achievement:  7. ? Balance:   a. Pt will be able to complete 360 deg turn with near equal weightbearing, albeit slow - Making progress, improving RLE weightbearing but still remains significanty limited as compared to LLE, though speed was improved since eval (~6 sec). Safety still a concern and requires supervision with turning without UE assist or device. b. Pt will score at least 41/56 on Mccurdy to indicate significant change in static/dynamic balance abilities since initial evaluation. - Nearly met, 40/56   8. ? Strength:   a. 2+/5 R knee ext strength to increase R knee control in SLS - MET, still 2+/5  b. 4+/5 R hip ext/abd, 5-/5 R hamstrings to improve swing and stance phase with gait - Making progress, 4/5 hip ext/abd, 4+/5 adduction, 4+/5 hamstring  9. ? Function:   a. Pt will be able to stand from 20 in chair 10x without UE assist and equal weightbearing with no more than minimal cuing from therapist. - NOT MET, requires 22in surface for 10x with min VCs for equal weightbearing with fair carryover  b. Pt will be able to ascend/descend stairs using reciprocal ascent/descent with unilat UE assist and fair speed, fair R knee eccentric control - MET for ascent, descent is step-to pattern  c. Pt will be able to ambulate 300 ft with improving L trunk lean, no more than 25% incidence of R knee hyperextension throughout, and at .5m/s - Making progress, 350 ft with . 46 m/s (was .44m/s), and no more than 10% incidence of R knee hyperextension throughout gait            Patient goals: \"strengthen what we can, improve walking\" - Making progress    Pt.  Education:  [x] Yes  [] No  [x] Reviewed Prior HEP/Ed  Method of Education: [x] Verbal  [] Demo  [] Written  Comprehension of Education:  [x] Verbalizes understanding. [x] Demonstrates understanding. [x] Needs review for consistency . [] Demonstrates/verbalizes HEP/Ed previously given.      Current HEP: resisted TKEs w/ purple band, sit-to-stands (22-23\"), squats, standing ham curls, standing hip ext, and marches      Plan: [x] Continue current frequency toward long and short term goals   [x] Specific Instructions for subsequent treatments: stair climbing, R quad eccentric control, RLE single limb stance and swing, treadmill training - reprovide HEP       Time In:  2:02 pm          Time Out: 3:02 pm    Electronically signed by:  Guy Mejia, PT

## 2021-09-14 ENCOUNTER — HOSPITAL ENCOUNTER (OUTPATIENT)
Dept: PHYSICAL THERAPY | Age: 61
Setting detail: THERAPIES SERIES
Discharge: HOME | End: 2021-09-14
Payer: MEDICARE

## 2021-09-14 PROCEDURE — 97110 THERAPEUTIC EXERCISES: CPT

## 2021-09-14 NOTE — FLOWSHEET NOTE
[x] [facility]) [facility] [place] [facility]  [address]  [phone]  F: [phone] [] [address]  [facility] 36   [address]  P: [phone]  F: [phone] [] [address] &  Therapy  [address] [facility] [address]  P: [phone]  F: [phone] [] [address]  P: [phone]  F: [phone] [] [address]  [facility]   [address]   [place]: [phone]  F: [phone]      Physical Therapy Daily Treatment Note    Date:  2021  Patient Name:  Chio Sloan    :  1960  MRN: [id number]  Physician: Dr. Olga Worrell: [place] [employer] ([id number])  Medical Diagnosis:   History of stroke  Rehab Codes: R53.1, R29.3, R27.9, R26.9, M25.60  Next 's appt.: 21  Date of symptom onset: 19  Visit# / total visits: 37/45 (extended via [place] Adv \"reset\" in )    Cancels/No Shows: 0        Subjective:     Pain:  [] Yes  [x] No Location:  N/A Pain Rating: (0-10 scale) 0/10  Pain altered Tx:  [x] No  [] Yes  Action:  Comments: Pt arrives noting weekend was okay - arrives with music again today for improved motivation.      Objective:    Modalities:   Precautions:  Exercises: Bolded exercises completed on 2021:  Exercise Reps/ Time Weight/ Level Comments   Upright bike 1x5 min L6    Treadmill gait 1x 4 min  2x4.5 min 1.0 mph   Chair placed on treadmill during breaks   Overground gait 2x200 ft  1300 ft  SBQC, SBA         Stair climbing 1x10 steps  unilat UE assist, CGA  - reciprocal    Gait w/ obstacles 2x50 ft  Foam, 6\" curb step, hurdles         Ant weightshift to SLS hold 12x10\"     Foot tap up to step 20x 6\"    SLS longer holds  2x30\"  unilat UE assist   Hamstring curl 2x15  Blocking hip flexor compensation      March 2x15 2# - no weight on 6/15   Hip abd 2x15     Fwd step down 2x15 4\", // bars BUE assist   Fwd long lunge 15x  To cone, cues to increase step length and  foot with return   Lateral step up 15x 4\" unilat UE assist         Box sit<>stand w/ right reach 10x 20\" Mod assist to incr RLE weightbearing   Bilateral ER w/ scap retract 3x20 purple In between rest breaks   Squat, tap ball to step 15x 6\" step    Standing reaching, all directions 2 min cones Encouraging RLE weightbearing     Seated thoracic/lumbar ext against bolster 15x  Big red bolster behind back                     Leg press - DL x30 60# DL = double limb   Leg press - SL 20x 20# SL = single limb               Mat      Sidelying clams 3x20 blueberry    Bridge 2x10  Cues to reduce height to improve low back pain   Prone knee flexion 3x10 blueberry RLE   Other:        Treatment Charges: Mins Units   []  Modalities     [x]  Ther Exercise 50 3   []  Manual Therapy     []  Ther Activities     []  Aquatics     []  Vasocompression     []  Other: neuro re-ed     [x]  Other: gait     Total Treatment time 50 3       Assessment: [x] Progressing toward goals. Better hamstring activation/strength this date as compared to prior sessions, still with significant R quad weakness limiting control on step downs from 4\" step and requires CGA, BUE assist (therapist controlling hand placement on R). More fatigued this date; had to complete bike training at end of visit d/t availability and was too fatigued in LLE to complete more than 1 set of 5 min higher intensity training. Will continue to address RLE weakness as able in upcoming sessions. [] No change. [x] Other: Continues to require significant therapeutic rest breaks to allow for increased tolerance of upcoming sets, tasks d/t RLE fatigue and aerobic endurance. Long-term COVID-19 effects likely impacting this as well.   [x] Patient would continue to benefit from skilled physical therapy services in order to: progress RLE strength, address gait deviations, improve standing static and dynamic balance impairment, improve safety and independence with functional mobility both at home and in community. STG: (to be met in 10 treatments) - Assessed on 5/7/21:  1. ? ROM: Pt will demo no excessive ER of RLE in standing at rest or sitting at rest to indicate reduced tightness in external rotators and normalize posture - Making progress, still with mild ER in standing/amb but overall improvement  2. ? Balance:    a. Pt will be able to reach outside MED towards R both up and down without balance deficit - Making progress, still mild balance deficit with further outside MED fwd and right but overall increased stability with RLE weightshift  b. Pt will be able to maintain RLE SLS for 30s with min UE assist and good neuromuscular control noted - Nearly met, mod UE assist required  3. ? Strength:  a. 2/5 R knee ext strength to increase R knee control in SLS - MET, 2/5  b. 4/5 R hip ext/abd, 4+/5 R hamstrings to improve swing and stance phase with gait - Partially met, 4/5 hip ext/abd, 4/5 hamstrings  4. ? Function:   a. Pt will be able to stand from 24 in chair 10x without UE assist and equal weightbearing with no more than minimal cuing from therapist. - MET   b. Pt will be able to ascend/descend stairs using reciprocal ascent/descent with BUE assist and fair speed, fair R knee eccentric control - MET, CGA required, only on 4-6\" steps at current  c. Pt will be able to ambulate 150 ft with improving L trunk lean, no more than 50% incidence of R knee hyperextension throughout, and at .4m/s - NOT MET,  .28 m/s self selected, 23.96 .42 m/s fast    5. Patient to be independent with home exercise program as demonstrated by performance with correct form without cues. - Ongoing, needs cuing for specific exercise completion vs just general activity  6.  Demonstrate Knowledge of fall prevention - Making progress, still demos risky behavior without using AD while ambulating at home from time to time despite education     LTG: (to be met in 45 treatments) - Re-assessed on 7/27/21 by Estela Hogue, PT, extended d/t incomplete achievement:  7. ? Balance:   a. Pt will be able to complete 360 deg turn with near equal weightbearing, albeit slow - Making progress, improving RLE weightbearing but still remains significanty limited as compared to LLE, though speed was improved since eval (~6 sec). Safety still a concern and requires supervision with turning without UE assist or device. b. Pt will score at least 41/56 on Mccurdy to indicate significant change in static/dynamic balance abilities since initial evaluation. - Nearly met, 40/56   8. ? Strength:   a. 2+/5 R knee ext strength to increase R knee control in SLS - MET, still 2+/5  b. 4+/5 R hip ext/abd, 5-/5 R hamstrings to improve swing and stance phase with gait - Making progress, 4/5 hip ext/abd, 4+/5 adduction, 4+/5 hamstring  9. ? Function:   a. Pt will be able to stand from 20 in chair 10x without UE assist and equal weightbearing with no more than minimal cuing from therapist. - NOT MET, requires 22in surface for 10x with min VCs for equal weightbearing with fair carryover  b. Pt will be able to ascend/descend stairs using reciprocal ascent/descent with unilat UE assist and fair speed, fair R knee eccentric control - MET for ascent, descent is step-to pattern  c. Pt will be able to ambulate 300 ft with improving L trunk lean, no more than 25% incidence of R knee hyperextension throughout, and at .5m/s - Making progress, 350 ft with . 46 m/s (was .44m/s), and no more than 10% incidence of R knee hyperextension throughout gait            Patient goals: \"strengthen what we can, improve walking\" - Making progress    Pt.  Education:  [x] Yes  [] No  [x] Reviewed Prior HEP/Ed  Method of Education: [x] Verbal  [] Demo  [] Written  Comprehension of Education:  [x] Betina Trejo understanding. [x] Demonstrates understanding. [x] Needs review for consistency . [] Demonstrates/verbalizes HEP/Ed previously given.      Current HEP: resisted TKEs w/ purple band, sit-to-stands (22-23\"), squats, standing ham curls, standing hip ext, and marches      Plan: [x] Continue current frequency toward long and short term goals   [x] Specific Instructions for subsequent treatments: stair climbing, R quad eccentric control, RLE single limb stance and swing, treadmill training - reprovide HEP       Time In:  1:02 pm          Time Out: 1:56 pm    Electronically signed by:  Antelmo Green PT

## 2021-09-16 ENCOUNTER — APPOINTMENT (OUTPATIENT)
Dept: PHYSICAL THERAPY | Age: 61
End: 2021-09-16
Payer: MEDICARE

## 2021-09-21 ENCOUNTER — HOSPITAL ENCOUNTER (OUTPATIENT)
Dept: PHYSICAL THERAPY | Age: 61
Setting detail: THERAPIES SERIES
Discharge: HOME | End: 2021-09-21
Payer: MEDICARE

## 2021-09-21 PROCEDURE — 97110 THERAPEUTIC EXERCISES: CPT

## 2021-09-21 NOTE — FLOWSHEET NOTE
[x] [facility]) - [facility]  [address]  [phone]  F: [phone] [] [address]  [facility] 36   [address]  P: [phone]  F: [phone] [] [address] &  Therapy  [address] [facility] [address]  P: [phone]  F: [phone] [] [address]  P: [phone]  F: [phone] [] [address]  [facility]   [address]   [place]: [phone]  F: [phone]      Physical Therapy Daily Treatment Note    Date:  2021  Patient Name:  Clayton Moncada    :  1960  MRN: [id number]  Physician: Dr. Malini Chew: [place] [employer] ([id number])  Medical Diagnosis:   History of stroke  Rehab Codes: R53.1, R29.3, R27.9, R26.9, M25.60  Next 's appt.: 21  Date of symptom onset: 19  Visit# / total visits: 38/45 (extended via [place] Adv \"reset\" in )    Cancels/No Shows: 0        Subjective:     Pain:  [] Yes  [x] No Location:  N/A Pain Rating: (0-10 scale) 0/10  Pain altered Tx:  [x] No  [] Yes  Action:  Comments: Pt arrives noting no new changes, still able to intermittently improve use of hemiparetic arm when angry or emotionally heightened.     Objective:    Modalities:   Precautions:  Exercises: Bolded exercises completed on 2021:  Exercise Reps/ Time Weight/ Level Comments   Upright bike 1x5 min  1x5.5 min L6    Treadmill gait 1x 4 min  2x4.5 min 1.0 mph   Chair placed on treadmill during breaks   Overground gait 1x200 ft  SBQC, SBA         Stair climbing 1x10 steps  unilat UE assist, CGA  - reciprocal    Gait w/ obstacles 2x50 ft  Foam, 6\" curb step, hurdles         Ant weightshift to SLS hold 15x10\"     Foot tap up to step 20x 6\"    SLS longer holds  2x30\"  unilat UE assist Hamstring curl 2x15  Blocking hip flexor compensation      March 2x15 2# - no weight on 6/15   Hip abd 2x15     Fwd step down 2x15 4\", // bars BUE assist   Fwd long lunge 15x  To cone, cues to increase step length and  foot with return   Lateral step up 15x 4\" unilat UE assist         Box sit<>stand w/ right reach 10x 20\" Mod assist to incr RLE weightbearing   Bilateral ER w/ scap retract 3x20 purple In between rest breaks   Squat, tap ball to step 15x 6\" step    Standing reaching, all directions 2 min cones Encouraging RLE weightbearing     Seated thoracic/lumbar ext against bolster 15x  Big red bolster behind back                     Leg press - DL x30 60# DL = double limb   Leg press - SL 20x 20# SL = single limb               Mat      Sidelying clams 3x20 blueberry    Bridge 2x10  Cues to reduce height to improve low back pain   Prone knee flexion 3x10 blueberry RLE   Other:        Treatment Charges: Mins Units   []  Modalities     [x]  Ther Exercise 43 3   []  Manual Therapy     []  Ther Activities     []  Aquatics     []  Vasocompression     []  Other: neuro re-ed     [x]  Other: gait 5 --   Total Treatment time 48 3       Assessment: [x] Progressing toward goals. Reporting 15-17 RPE with bike training for aerobic conditioning as well as increased priming of neuromuscular system prior to gait/weightshift training. Still requires min A from therapist for safety when completing anterior weightshifts into SLS holds to prevent excessive buckling or hyperextension, as this remains very fatiguing for pt. Does take intermittent standing rest throughout set. VERY fatigued after this, only able to complete one other set of standing exercises as well as limited gait training. Had to hold remainder of exercises for session. Will continue to progress tolerance as able, emphasized HEP completion. [] No change.        [x] Other: Continues to require significant therapeutic rest breaks to allow for increased by performance with correct form without cues. - Ongoing, needs cuing for specific exercise completion vs just general activity  6. Demonstrate Knowledge of fall prevention - Making progress, still demos risky behavior without using AD while ambulating at home from time to time despite education     LTG: (to be met in 45 treatments) - Re-assessed on 7/27/21 by Chinyere Hamilton PT, extended d/t incomplete achievement:  7. ? Balance:   a. Pt will be able to complete 360 deg turn with near equal weightbearing, albeit slow - Making progress, improving RLE weightbearing but still remains significanty limited as compared to LLE, though speed was improved since eval (~6 sec). Safety still a concern and requires supervision with turning without UE assist or device. b. Pt will score at least 41/56 on Mccurdy to indicate significant change in static/dynamic balance abilities since initial evaluation. - Nearly met, 40/56   8. ? Strength:   a. 2+/5 R knee ext strength to increase R knee control in SLS - MET, still 2+/5  b. 4+/5 R hip ext/abd, 5-/5 R hamstrings to improve swing and stance phase with gait - Making progress, 4/5 hip ext/abd, 4+/5 adduction, 4+/5 hamstring  9. ? Function:   a. Pt will be able to stand from 20 in chair 10x without UE assist and equal weightbearing with no more than minimal cuing from therapist. - NOT MET, requires 22in surface for 10x with min VCs for equal weightbearing with fair carryover  b. Pt will be able to ascend/descend stairs using reciprocal ascent/descent with unilat UE assist and fair speed, fair R knee eccentric control - MET for ascent, descent is step-to pattern  c. Pt will be able to ambulate 300 ft with improving L trunk lean, no more than 25% incidence of R knee hyperextension throughout, and at .5m/s - Making progress, 350 ft with . 46 m/s (was .44m/s), and no more than 10% incidence of R knee hyperextension throughout gait            Patient goals: \"strengthen what we can, improve walking\" - Making progress    Pt. Education:  [x] Yes  [] No  [x] Reviewed Prior HEP/Ed  Method of Education: [x] Verbal  [] Demo  [] Written  Comprehension of Education:  [x] Verbalizes understanding. [x] Demonstrates understanding. [x] Needs review for consistency . [] Demonstrates/verbalizes HEP/Ed previously given.      Current HEP: resisted TKEs w/ purple band, sit-to-stands (22-23\"), squats, standing ham curls, standing hip ext, and marches      Plan: [x] Continue current frequency toward long and short term goals   [x] Specific Instructions for subsequent treatments: stair climbing, R quad eccentric control, RLE single limb stance and swing, treadmill training - reprovide HEP       Time In:  1:05 pm          Time Out: 1:58 pm    Electronically signed by:  Luz Diaz, PT

## 2021-09-23 ENCOUNTER — HOSPITAL ENCOUNTER (OUTPATIENT)
Dept: PHYSICAL THERAPY | Age: 61
Setting detail: THERAPIES SERIES
Discharge: HOME | End: 2021-09-23
Payer: MEDICARE

## 2021-09-23 PROCEDURE — 97110 THERAPEUTIC EXERCISES: CPT

## 2021-09-23 NOTE — FLOWSHEET NOTE
[x] [facility]) - [facility]  [facility]  [address]  [phone]  F: [phone] [] [address]  [facility]   [address]  P: [phone]  F: [phone] [] [address] &  Therapy  [address]  P: [phone]  F: [phone] [] [address]  P: [phone]  F: [phone] [] [address]  [facility]   [address]   [place]: [phone]  F: [phone]      Physical Therapy Daily Treatment Note    Date:  2021  Patient Name:  Anne Holman    :  1960  MRN: [id number]  Physician: Dr. Ovidio Pena: [place] [employer] (24LX)  Medical Diagnosis:   History of stroke  Rehab Codes: R53.1, R29.3, R27.9, R26.9, M25.60  Next 's appt.: 21  Date of symptom onset: 19  Visit# / total visits: 39/45 (extended via [place] [facility] \"reset\" in )    Cancels/No Shows: 0        Subjective:     Pain:  [] Yes  [x] No Location:  N/A Pain Rating: (0-10 scale) 0/10  Pain altered Tx:  [x] No  [] Yes  Action:  Comments: Pt notes he was able to get hand fully open today actively earlier, though it's tight now.      Objective:    Modalities:   Precautions:  Exercises: Bolded exercises completed on 2021:  Exercise Reps/ Time Weight/ Level Comments   Upright bike 2x6 min L6 Lengthy rest breaks d/t fatigue   Treadmill gait 1x 4 min  2x4.5 min 1.0 mph   Chair placed on treadmill during breaks   Overground gait 1x200 ft  SBQC, SBA         Stair climbing 1x10 steps  unilat UE assist, CGA  - reciprocal    Gait w/ obstacles 2x50 ft  Foam, 6\" curb step, hurdles         Ant weightshift to SLS hold 15x10\"     Foot tap up to step 20x 6\"    SLS longer holds  2x30\"  unilat UE assist   Hamstring curl 2x8 2# Blocking hip flexor compensation   - progressed weight 9/23 March 2x15 2# - no weight on 6/15   Hip abd 2x15     Fwd step down 2x15 4\", // bars BUE assist   Fwd long lunge 15x  To cone, cues to increase step length and  foot with return   Lateral step up 1x15 4\", RLE Bilat UE assist   Fwd/back step up and over 1x15 4\"          Box sit<>stand w/ right reach 10x 20\" Mod assist to incr RLE weightbearing   Bilateral ER w/ scap retract 3x20 purple In between rest breaks   Squat, tap ball to step 15x 6\" step    Standing reaching, all directions 2 min cones Encouraging RLE weightbearing     Seated thoracic/lumbar ext against bolster 15x  Big red bolster behind back                     Leg press - DL x30 60# DL = double limb   Leg press - SL 20x 20# SL = single limb               Mat      Sidelying clams 3x20 blueberry    Bridge 2x10  Cues to reduce height to improve low back pain   Prone knee flexion 3x10 blueberry RLE   Other:        Treatment Charges: Mins Units   []  Modalities     [x]  Ther Exercise 50 3   []  Manual Therapy     []  Ther Activities     []  Aquatics     []  Vasocompression     []  Other: neuro re-ed     []  Other: gait     Total Treatment time 50 3       Assessment: [x] Progressing toward goals. Continues to report 15-17 RPE for bike intensity, achieving high intensity exercise for BLEs and aerobically. Requires therapist set up for bike use and intermittent assist to replace foot on pedal, as well as optimal pedal placement. Increased speed of bike training this date as well by pt. Continued to address strength deficits in RLE with single-limb tasks, does require therapist assist for safety at knee and to encourage maximal weightshift into RLE vs into LUE on support surface. Continues to require encouragement to push through fatigue and increase exercise completion, though somewhat still resistant to this. [] No change.        [x] Other: Continues to require significant therapeutic rest exercise program as demonstrated by performance with correct form without cues. - Ongoing, needs cuing for specific exercise completion vs just general activity  6. Demonstrate Knowledge of fall prevention - Making progress, still demos risky behavior without using AD while ambulating at home from time to time despite education     LTG: (to be met in 45 treatments) - Re-assessed on 7/27/21 by Blaine Claudio PT, extended d/t incomplete achievement:  7. ? Balance:   a. Pt will be able to complete 360 deg turn with near equal weightbearing, albeit slow - Making progress, improving RLE weightbearing but still remains significanty limited as compared to LLE, though speed was improved since eval (~6 sec). Safety still a concern and requires supervision with turning without UE assist or device. b. Pt will score at least 41/56 on Mccurdy to indicate significant change in static/dynamic balance abilities since initial evaluation. - Nearly met, 40/56   8. ? Strength:   a. 2+/5 R knee ext strength to increase R knee control in SLS - MET, still 2+/5  b. 4+/5 R hip ext/abd, 5-/5 R hamstrings to improve swing and stance phase with gait - Making progress, 4/5 hip ext/abd, 4+/5 adduction, 4+/5 hamstring  9. ? Function:   a. Pt will be able to stand from 20 in chair 10x without UE assist and equal weightbearing with no more than minimal cuing from therapist. - NOT MET, requires 22in surface for 10x with min VCs for equal weightbearing with fair carryover  b. Pt will be able to ascend/descend stairs using reciprocal ascent/descent with unilat UE assist and fair speed, fair R knee eccentric control - MET for ascent, descent is step-to pattern  c. Pt will be able to ambulate 300 ft with improving L trunk lean, no more than 25% incidence of R knee hyperextension throughout, and at .5m/s - Making progress, 350 ft with . 46 m/s (was .44m/s), and no more than 10% incidence of R knee hyperextension throughout gait            Patient goals: \"strengthen what we can, improve walking\" - Making progress    Pt. Education:  [x] Yes  [] No  [x] Reviewed Prior HEP/Ed  Method of Education: [x] Verbal  [] Demo  [] Written  Comprehension of Education:  [x] Verbalizes understanding. [x] Demonstrates understanding. [x] Needs review for consistency . [] Demonstrates/verbalizes HEP/Ed previously given.      Current HEP: resisted TKEs w/ purple band, sit-to-stands (22-23\"), squats, standing ham curls, standing hip ext, and marches      Plan: [x] Continue current frequency toward long and short term goals   [x] Specific Instructions for subsequent treatments: stair climbing, R quad eccentric control, RLE single limb stance and swing, treadmill training - reprovide HEP       Time In:  2:06 pm          Time Out: 3:00 pm    Electronically signed by:  Nurys Villanueva, PT

## 2021-09-28 ENCOUNTER — HOSPITAL ENCOUNTER (OUTPATIENT)
Dept: PHYSICAL THERAPY | Age: 61
Setting detail: THERAPIES SERIES
Discharge: HOME | End: 2021-09-28
Payer: MEDICARE

## 2021-09-28 NOTE — FLOWSHEET NOTE
[x] [facility]) [facility] &  Therapy  [address]    [phone]  F: [phone]   [] [email or web address]  [facility]   [address]  P: [phone]  F: [phone]  [] [address]  [address]  P: [phone]  F: [phone] [] [address]  P: [phone]  F: [phone]  [] [address]  [facility]   [address]   [place]: [phone]  F: [phone]   [] [facility]  [address]  [place]: [phone]   F: [phone]     Physical Therapy Cancel/No Show note    Date: 2021  Patient: Stephen Umanzor  : 1960  MRN: [id number]    Cancels/No Shows to date: 2 cx / 0 ns    For today's appointment patient:    [x]  Cancelled    [] Rescheduled appointment    [] No-show     Reason given by patient:    []  Patient ill    []  Conflicting appointment    [] No transportation      [] Conflict with work    [] No reason given    [] Weather related    [] COVID-19    [x] Other:      Comments:  Clinic notified patient that his preferred therapist would not be available today, but a different therapist would be able to see him. Patient politely declined, wishing to be seen by primary therapist only.       [x] Next appointment was confirmed    Electronically signed by: Kelvin Staff, PT

## 2021-09-29 DIAGNOSIS — G81.91 RIGHT HEMIPLEGIA (HCC): ICD-10-CM

## 2021-09-29 NOTE — TELEPHONE ENCOUNTER
Shweta Dent is calling to request a refill on the following medication(s):    Medication Request:  Requested Prescriptions     Pending Prescriptions Disp Refills    potassium chloride (KLOR-CON M) 20 MEQ TBCR extended release tablet [Pharmacy Med Name: POTASSIUM CHLORIDE ER 20 MEQ TABLET] 29 tablet      Sig: TAKE ONE TABLET BY MOUTH DAILY       Last Visit Date (If Applicable):  0/5/7845    Next Visit Date:    11/2/2021

## 2021-09-30 ENCOUNTER — HOSPITAL ENCOUNTER (OUTPATIENT)
Dept: PHYSICAL THERAPY | Age: 61
Setting detail: THERAPIES SERIES
Discharge: HOME | End: 2021-09-30
Payer: MEDICARE

## 2021-09-30 PROCEDURE — 97110 THERAPEUTIC EXERCISES: CPT

## 2021-09-30 RX ORDER — POTASSIUM CHLORIDE 1500 MG/1
TABLET, FILM COATED, EXTENDED RELEASE ORAL
Qty: 90 TABLET | Refills: 1 | Status: SHIPPED | OUTPATIENT
Start: 2021-09-30 | End: 2022-03-31

## 2021-09-30 NOTE — FLOWSHEET NOTE
[x] [address]  - [facility] TWELVE-STEP [facility] &  Therapy  [address]  [phone]  F: [phone] [] [address]  [employer] [address]   [address]  P: [phone]  F: [phone] [] [address] &  Therapy  [address]  P: [phone]  F: [phone] [] [address]  P: [phone]  F: [phone] [] [address]  [facility]   [address]   [place]: [phone]  F: [phone]      Physical Therapy Daily Treatment Note    Date:  2021  Patient Name:  Mack Suazo    :  1960  MRN: [id number]  Physician: Dr. Maulik Ferrara: [place] [employer] (68OS)  Medical Diagnosis:   History of stroke  Rehab Codes: R53.1, R29.3, R27.9, R26.9, M25.60  Next 's appt.: 21  Date of symptom onset: 19  Visit# / total visits: 40/45 (extended via [place] [facility] \"reset\" in )    Cancels/No Shows: 0        Subjective:     Pain:  [] Yes  [x] No Location:  N/A Pain Rating: (0-10 scale) 0/10  Pain altered Tx:  [x] No  [] Yes  Action:  Comments: Pt notes no new changes today.      Objective:    Modalities:   Precautions:  Exercises: Bolded exercises completed on 2021:  Exercise Reps/ Time Weight/ Level Comments   Upright bike 2x7 min L6 Lengthy rest breaks d/t fatigue; standing exercises completed in between  - RPE 17-18 per pt   Treadmill gait 1x 4 min  2x4.5 min 1.0 mph   Chair placed on treadmill during breaks   Overground gait 1x200 ft  SBQC, SBA         Stair climbing 1x10 steps  unilat UE assist, CGA  - reciprocal    Gait w/ obstacles 2x50 ft  Foam, 6\" curb step, hurdles         Ant weightshift to SLS hold 15x10\"     Foot tap up to step 20x 6\"    SLS longer holds  2x30\"  unilat UE assist   Hamstring curl 2x15 2# Blocking hip flexor compensation   - progressed weight 9/23 March 2x15 2# - no weight on 6/15   Hip abd 2x15     Fwd step down 2x15 4\", // bars BUE assist   Fwd long lunge 15x  To cone, cues to increase step length and  foot with return   Lateral step up 1x15 4\", RLE Bilat UE assist   Fwd/back step up and over 1x15 4\"          Box sit<>stand w/ right reach 10x 20\" Mod assist to incr RLE weightbearing   Bilateral ER w/ scap retract 3x20 purple In between rest breaks   Squat, tap ball to step 15x 6\" step    Standing reaching, all directions 2 min cones Encouraging RLE weightbearing     Seated thoracic/lumbar ext against bolster 15x  Big red bolster behind back                     Leg press - DL x20 60# DL = double limb   Leg press - SL 20x 20# SL = single limb               Mat      Sidelying clams 3x20 blueberry    Bridge 2x10  Cues to reduce height to improve low back pain   Prone knee flexion 3x10 blueberry RLE   Other:        Treatment Charges: Mins Units   []  Modalities     [x]  Ther Exercise 47 3   []  Manual Therapy     []  Ther Activities     []  Aquatics     []  Vasocompression     []  Other: neuro re-ed     []  Other: gait     Total Treatment time 47 3       Assessment: [x] Progressing toward goals. Working to increased subjective intensity per pt this date, up to 17-18 on RPE scale this date with bike training for increased HR. Does still require therapist assist for safe set up, donning/doffing machine to be able to utilize upright bike for aerobic training/BLE strengthening. Still with extremely reduced exercise tolerance requiring longer breaks, requires encouragement to continue with LLE strengthening. [] No change. [x] Other: Continues to require significant therapeutic rest breaks to allow for increased tolerance of upcoming sets, tasks d/t RLE fatigue and aerobic endurance. Long-term COVID-19 effects likely impacting this as well.   [x] Patient would continue to benefit from skilled physical therapy services in order to: progress RLE strength, address gait deviations, improve standing static and dynamic balance impairment, improve safety and independence with functional mobility both at home and in community. STG: (to be met in 10 treatments) - Assessed on 5/7/21:  1. ? ROM: Pt will demo no excessive ER of RLE in standing at rest or sitting at rest to indicate reduced tightness in external rotators and normalize posture - Making progress, still with mild ER in standing/amb but overall improvement  2. ? Balance:    a. Pt will be able to reach outside MED towards R both up and down without balance deficit - Making progress, still mild balance deficit with further outside MED fwd and right but overall increased stability with RLE weightshift  b. Pt will be able to maintain RLE SLS for 30s with min UE assist and good neuromuscular control noted - Nearly met, mod UE assist required  3. ? Strength:  a. 2/5 R knee ext strength to increase R knee control in SLS - MET, 2/5  b. 4/5 R hip ext/abd, 4+/5 R hamstrings to improve swing and stance phase with gait - Partially met, 4/5 hip ext/abd, 4/5 hamstrings  4. ? Function:   a. Pt will be able to stand from 24 in chair 10x without UE assist and equal weightbearing with no more than minimal cuing from therapist. - MET    b. Pt will be able to ascend/descend stairs using reciprocal ascent/descent with BUE assist and fair speed, fair R knee eccentric control - MET, CGA required, only on 4-6\" steps at current  c. Pt will be able to ambulate 150 ft with improving L trunk lean, no more than 50% incidence of R knee hyperextension throughout, and at .4m/s - NOT MET,  .28 m/s self selected, 23.96 .42 m/s fast    5. Patient to be independent with home exercise program as demonstrated by performance with correct form without cues. - Ongoing, needs cuing for specific exercise completion vs just general activity  6.  Demonstrate Knowledge of fall prevention - Making progress, still demos risky behavior without using AD while ambulating at home from time to time despite education     LTG: (to be met in 45 treatments) - Re-assessed on 7/27/21 by Debra Lainez, PT, extended d/t incomplete achievement:  7. ? Balance:   a. Pt will be able to complete 360 deg turn with near equal weightbearing, albeit slow - Making progress, improving RLE weightbearing but still remains significanty limited as compared to LLE, though speed was improved since eval (~6 sec). Safety still a concern and requires supervision with turning without UE assist or device. b. Pt will score at least 41/56 on Mccurdy to indicate significant change in static/dynamic balance abilities since initial evaluation. - Nearly met, 40/56   8. ? Strength:   a. 2+/5 R knee ext strength to increase R knee control in SLS - MET, still 2+/5  b. 4+/5 R hip ext/abd, 5-/5 R hamstrings to improve swing and stance phase with gait - Making progress, 4/5 hip ext/abd, 4+/5 adduction, 4+/5 hamstring  9. ? Function:   a. Pt will be able to stand from 20 in chair 10x without UE assist and equal weightbearing with no more than minimal cuing from therapist. - NOT MET, requires 22in surface for 10x with min VCs for equal weightbearing with fair carryover  b. Pt will be able to ascend/descend stairs using reciprocal ascent/descent with unilat UE assist and fair speed, fair R knee eccentric control - MET for ascent, descent is step-to pattern  c. Pt will be able to ambulate 300 ft with improving L trunk lean, no more than 25% incidence of R knee hyperextension throughout, and at .5m/s - Making progress, 350 ft with . 46 m/s (was .44m/s), and no more than 10% incidence of R knee hyperextension throughout gait            Patient goals: \"strengthen what we can, improve walking\" - Making progress    Pt.  Education:  [x] Yes  [] No  [x] Reviewed Prior HEP/Ed  Method of Education: [x] Verbal  [] Demo  [] Written  Comprehension of Education:  [x] Verbalizes understanding. [x] Demonstrates understanding. [x] Needs review for consistency . [] Demonstrates/verbalizes HEP/Ed previously given.      Current HEP: resisted TKEs w/ purple band, sit-to-stands (22-23\"), squats, standing ham curls, standing hip ext, and marches      Plan: [x] Continue current frequency toward long and short term goals   [x] Specific Instructions for subsequent treatments: stair climbing, R quad eccentric control, RLE single limb stance and swing, treadmill training - reprovide HEP       Time In:  2:08 pm          Time Out: 3:00 pm    Electronically signed by:  Mata Giron, PT

## 2021-10-05 ENCOUNTER — HOSPITAL ENCOUNTER (OUTPATIENT)
Dept: PHYSICAL THERAPY | Age: 61
Setting detail: THERAPIES SERIES
Discharge: HOME | End: 2021-10-05
Payer: MEDICARE

## 2021-10-05 PROCEDURE — 97110 THERAPEUTIC EXERCISES: CPT

## 2021-10-05 NOTE — FLOWSHEET NOTE
[x] [facility]) - [facility]  [address]  [phone]  F: [phone] [] [address]  [employer] [address]   [address]  P: [phone]  F: [phone] [] [address] &  Therapy  [address]  P: [phone]  F: [phone] [] [address]  P: [phone]  F: [phone] [] [address]  [facility]   [address]   [place]: [phone]  F: [phone]      Physical Therapy Daily Treatment Note    Date:  10/5/2021  Patient Name:  Alexandria Mallory    :  1960  MRN: [id number]  Physician: Dr. Chou Ar: [employer] ([id number])  Medical Diagnosis:   History of stroke  Rehab Codes: R53.1, R29.3, R27.9, R26.9, M25.60  Next 's appt.: 21  Date of symptom onset: 19  Visit# / total visits: 41/45 (extended via [employer] Adv \"reset\" in )    Cancels/No Shows: 0        Subjective:     Pain:  [] Yes  [x] No Location:  N/A Pain Rating: (0-10 scale) 0/10  Pain altered Tx:  [x] No  [] Yes  Action:  Comments: Pt notes he had a slow weekend. Arrives 8 min late this date.      Objective:    Modalities:   Precautions:  Exercises: Bolded exercises completed on 10/5/2021:  Exercise Reps/ Time Weight/ Level Comments   Upright bike 1x7 min  1x3 min L7 Lengthy rest breaks d/t fatigue; standing exercises completed in between  - RPE 17-18 per pt  - progressed resistance 10/5   Treadmill gait 1x 4 min  2x4.5 min 1.0 mph   Chair placed on treadmill during breaks   Overground gait 1x200 ft  SBQC, SBA         Stair climbing 1x10 steps  unilat UE assist, CGA  - reciprocal    Gait w/ obstacles 2x50 ft  Foam, 6\" curb step, hurdles         Ant weightshift to SLS hold 15x10\"     Foot tap up to step 20x 6\"    SLS longer holds  2x30\"  unilat UE assist   Hamstring curl 2x15 2# Blocking hip flexor compensation   - progressed weight 9/23 March 2x15 2# - no weight on 6/15   Hip abd 2x15     Fwd step down 2x15 4\", // bars BUE assist   Fwd long lunge 2x12  To cone, cues to increase step length and  foot with return   Lateral step up 1x15 4\", RLE Bilat UE assist   Fwd/back step up and over 1x15 4\"          Box sit<>stand w/ right reach 10x 20\" Mod assist to incr RLE weightbearing   Bilateral ER w/ scap retract 3x20 purple In between rest breaks   Squat, tap ball to step 15x 6\" step    Standing reaching, all directions 2 min cones Encouraging RLE weightbearing     Seated thoracic/lumbar ext against bolster 15x  Big red bolster behind back                     Leg press - DL x20 60# DL = double limb   Leg press - SL 20x 20# SL = single limb               Mat      Sidelying clams 3x20 blueberry    Bridge 2x10  Cues to reduce height to improve low back pain   Prone knee flexion 3x10 blueberry RLE   Other:        Treatment Charges: Mins Units   []  Modalities     [x]  Ther Exercise 49 3   []  Manual Therapy     []  Ther Activities     []  Aquatics     []  Vasocompression     []  Other: neuro re-ed     []  Other: gait     Total Treatment time 49 3       Assessment: [x] Progressing toward goals. Able to progress bike resistance to 1 level increase this date with same amount of time (7 min) for first set in order to promote higher-intensity exercise for neuroplastic benefit post-stroke, however too much to tolerate for second set and had to reduce length of time on bike d/t fatigue per pt. Continues to require therapist assist to safely don/doff upright bike, as well as R foot placement on pedal with intermittent correction throughout training. Also continues to require lengthy rest breaks, however, to recover.  Pt motivated to continue exercises when framed as \"circuit training\" with emphasis on reduced rest and variety of exercises performed - focused on RLE weightshift and strengthening. [] No change. [x] Other: Continues to require significant therapeutic rest breaks to allow for increased tolerance of upcoming sets, tasks d/t RLE fatigue and aerobic endurance. Long-term COVID-19 effects likely impacting this as well. [x] Patient would continue to benefit from skilled physical therapy services in order to: progress RLE strength, address gait deviations, improve standing static and dynamic balance impairment, improve safety and independence with functional mobility both at home and in community. STG: (to be met in 10 treatments) - Assessed on 5/7/21:  1. ? ROM: Pt will demo no excessive ER of RLE in standing at rest or sitting at rest to indicate reduced tightness in external rotators and normalize posture - Making progress, still with mild ER in standing/amb but overall improvement  2. ? Balance:    a. Pt will be able to reach outside MED towards R both up and down without balance deficit - Making progress, still mild balance deficit with further outside MED fwd and right but overall increased stability with RLE weightshift  b. Pt will be able to maintain RLE SLS for 30s with min UE assist and good neuromuscular control noted - Nearly met, mod UE assist required  3. ? Strength:  a. 2/5 R knee ext strength to increase R knee control in SLS - MET, 2/5  b. 4/5 R hip ext/abd, 4+/5 R hamstrings to improve swing and stance phase with gait - Partially met, 4/5 hip ext/abd, 4/5 hamstrings  4. ? Function:   a. Pt will be able to stand from 24 in chair 10x without UE assist and equal weightbearing with no more than minimal cuing from therapist. - MET    b. Pt will be able to ascend/descend stairs using reciprocal ascent/descent with BUE assist and fair speed, fair R knee eccentric control - MET, CGA required, only on 4-6\" steps at current  c.  Pt will be able to ambulate 150 ft with improving L trunk lean, no more than 50% incidence of R knee hyperextension throughout, and at .4m/s - NOT MET,  .28 m/s self selected, 23.96 .42 m/s fast    5. Patient to be independent with home exercise program as demonstrated by performance with correct form without cues. - Ongoing, needs cuing for specific exercise completion vs just general activity  6. Demonstrate Knowledge of fall prevention - Making progress, still demos risky behavior without using AD while ambulating at home from time to time despite education     LTG: (to be met in 45 treatments) - Re-assessed on 7/27/21 by Robby Whittaker, PT, extended d/t incomplete achievement:  7. ? Balance:   a. Pt will be able to complete 360 deg turn with near equal weightbearing, albeit slow - Making progress, improving RLE weightbearing but still remains significanty limited as compared to LLE, though speed was improved since eval (~6 sec). Safety still a concern and requires supervision with turning without UE assist or device. b. Pt will score at least 41/56 on Mccurdy to indicate significant change in static/dynamic balance abilities since initial evaluation. - Nearly met, 40/56   8. ? Strength:   a. 2+/5 R knee ext strength to increase R knee control in SLS - MET, still 2+/5  b. 4+/5 R hip ext/abd, 5-/5 R hamstrings to improve swing and stance phase with gait - Making progress, 4/5 hip ext/abd, 4+/5 adduction, 4+/5 hamstring  9. ? Function:   a. Pt will be able to stand from 20 in chair 10x without UE assist and equal weightbearing with no more than minimal cuing from therapist. - NOT MET, requires 22in surface for 10x with min VCs for equal weightbearing with fair carryover  b. Pt will be able to ascend/descend stairs using reciprocal ascent/descent with unilat UE assist and fair speed, fair R knee eccentric control - MET for ascent, descent is step-to pattern  c.  Pt will be able to ambulate 300 ft with improving L trunk lean, no more than 25% incidence of R knee hyperextension throughout, and at .5m/s - Making progress, 350 ft with . 46 m/s (was .44m/s), and no more than 10% incidence of R knee hyperextension throughout gait            Patient goals: \"strengthen what we can, improve walking\" - Making progress    Pt. Education:  [x] Yes  [] No  [x] Reviewed Prior HEP/Ed  Method of Education: [x] Verbal  [] Demo  [] Written  Comprehension of Education:  [x] Verbalizes understanding. [x] Demonstrates understanding. [x] Needs review for consistency . [] Demonstrates/verbalizes HEP/Ed previously given.      Current HEP: resisted TKEs w/ purple band, sit-to-stands (22-23\"), squats, standing ham curls, standing hip ext, and marches      Plan: [x] Continue current frequency toward long and short term goals   [x] Specific Instructions for subsequent treatments: stair climbing, R quad eccentric control, RLE single limb stance and swing, treadmill training - reprovide HEP       Time In:  1:08 pm          Time Out: 2:03  pm    Electronically signed by:  Sonia Kwon, PT

## 2021-10-07 ENCOUNTER — HOSPITAL ENCOUNTER (OUTPATIENT)
Dept: PHYSICAL THERAPY | Age: 61
Setting detail: THERAPIES SERIES
Discharge: HOME | End: 2021-10-07
Payer: MEDICARE

## 2021-10-07 PROCEDURE — 97110 THERAPEUTIC EXERCISES: CPT

## 2021-10-07 NOTE — FLOWSHEET NOTE
from skilled physical therapy services in order to: progress RLE strength, address gait deviations, improve standing static and dynamic balance impairment, improve safety and independence with functional mobility both at home and in community. STG: (to be met in 10 treatments) - Assessed on 5/7/21:  1. ? ROM: Pt will demo no excessive ER of RLE in standing at rest or sitting at rest to indicate reduced tightness in external rotators and normalize posture - Making progress, still with mild ER in standing/amb but overall improvement  2. ? Balance:    a. Pt will be able to reach outside MED towards R both up and down without balance deficit - Making progress, still mild balance deficit with further outside MED fwd and right but overall increased stability with RLE weightshift  b. Pt will be able to maintain RLE SLS for 30s with min UE assist and good neuromuscular control noted - Nearly met, mod UE assist required  3. ? Strength:  a. 2/5 R knee ext strength to increase R knee control in SLS - MET, 2/5  b. 4/5 R hip ext/abd, 4+/5 R hamstrings to improve swing and stance phase with gait - Partially met, 4/5 hip ext/abd, 4/5 hamstrings  4. ? Function:   a. Pt will be able to stand from 24 in chair 10x without UE assist and equal weightbearing with no more than minimal cuing from therapist. - MET    b. Pt will be able to ascend/descend stairs using reciprocal ascent/descent with BUE assist and fair speed, fair R knee eccentric control - MET, CGA required, only on 4-6\" steps at current  c. Pt will be able to ambulate 150 ft with improving L trunk lean, no more than 50% incidence of R knee hyperextension throughout, and at .4m/s - NOT MET,  .28 m/s self selected, 23.96 .42 m/s fast    5. Patient to be independent with home exercise program as demonstrated by performance with correct form without cues. - Ongoing, needs cuing for specific exercise completion vs just general activity  6.  Demonstrate Knowledge of fall prevention - Making progress, still demos risky behavior without using AD while ambulating at home from time to time despite education     LTG: (to be met in 45 treatments) - Re-assessed on 7/27/21 by Elisa Pacheco, PT, extended d/t incomplete achievement:  7. ? Balance:   a. Pt will be able to complete 360 deg turn with near equal weightbearing, albeit slow - Making progress, improving RLE weightbearing but still remains significanty limited as compared to LLE, though speed was improved since eval (~6 sec). Safety still a concern and requires supervision with turning without UE assist or device. b. Pt will score at least 41/56 on Mccurdy to indicate significant change in static/dynamic balance abilities since initial evaluation. - Nearly met, 40/56   8. ? Strength:   a. 2+/5 R knee ext strength to increase R knee control in SLS - MET, still 2+/5  b. 4+/5 R hip ext/abd, 5-/5 R hamstrings to improve swing and stance phase with gait - Making progress, 4/5 hip ext/abd, 4+/5 adduction, 4+/5 hamstring  9. ? Function:   a. Pt will be able to stand from 20 in chair 10x without UE assist and equal weightbearing with no more than minimal cuing from therapist. - NOT MET, requires 22in surface for 10x with min VCs for equal weightbearing with fair carryover  b. Pt will be able to ascend/descend stairs using reciprocal ascent/descent with unilat UE assist and fair speed, fair R knee eccentric control - MET for ascent, descent is step-to pattern  c. Pt will be able to ambulate 300 ft with improving L trunk lean, no more than 25% incidence of R knee hyperextension throughout, and at .5m/s - Making progress, 350 ft with . 46 m/s (was .44m/s), and no more than 10% incidence of R knee hyperextension throughout gait            Patient goals: \"strengthen what we can, improve walking\" - Making progress    Pt.  Education:  [x] Yes  [] No  [x] Reviewed Prior HEP/Ed  Method of Education: [x] Verbal  [] Demo  [] Written  Comprehension of Education:  [x] Verbalizes understanding. [x] Demonstrates understanding. [x] Needs review for consistency . [] Demonstrates/verbalizes HEP/Ed previously given.      Current HEP: resisted TKEs w/ purple band, sit-to-stands (22-23\"), squats, standing ham curls, standing hip ext, and marches      Plan: [x] Continue current frequency toward long and short term goals   [x] Specific Instructions for subsequent treatments: stair climbing, R quad eccentric control, RLE single limb stance and swing, treadmill training - reprovide HEP       Time In:  12:00 pm          Time Out: 1:00  pm    Electronically signed by:  Omega Pang PT

## 2021-10-12 ENCOUNTER — APPOINTMENT (OUTPATIENT)
Dept: PHYSICAL THERAPY | Age: 61
End: 2021-10-12
Payer: MEDICARE

## 2021-10-14 ENCOUNTER — HOSPITAL ENCOUNTER (OUTPATIENT)
Dept: PHYSICAL THERAPY | Age: 61
Setting detail: THERAPIES SERIES
Discharge: HOME | End: 2021-10-14
Payer: MEDICARE

## 2021-10-14 PROCEDURE — 97116 GAIT TRAINING THERAPY: CPT

## 2021-10-14 PROCEDURE — 97110 THERAPEUTIC EXERCISES: CPT

## 2021-10-14 NOTE — FLOWSHEET NOTE
[x] [facility]) - [facility]  [address]  [phone]  F: [phone] [] [address]  [facility]   [address]  P: [phone]  F: [phone] [] [address] &  Therapy  [address] [facility] [address] [facility]  P: [phone]  F: [phone] [] [address]  P: [phone]  F: [phone] [] [address]  [facility]   [address]   [place]: [phone]  F: [phone]      Physical Therapy Daily Treatment Note    Date:  10/14/2021  Patient Name:  Arias Bravo    :  1960  MRN: [id number]  Physician: Dr. Tip Breen: [employer] ([id number])  Medical Diagnosis:   History of stroke  Rehab Codes: R53.1, R29.3, R27.9, R26.9, M25.60  Next 's appt.: 21  Date of symptom onset: 19  Visit# / total visits: 43/45 (extended via [employer] \"reset\" in )    Cancels/No Shows: 0        Subjective:     Pain:  [] Yes  [x] No Location:  N/A Pain Rating: (0-10 scale) 0/10  Pain altered Tx:  [x] No  [] Yes  Action:  Comments: Pt reports he thinks he's doing okay today.      Objective:    Modalities:   Precautions:  Exercises: Bolded exercises completed on 10/14/2021:  Exercise Reps/ Time Weight/ Level Comments   Upright bike 1x7 min   L6 Lengthy rest breaks d/t fatigue; standing exercises completed in between  - RPE 17-18 per pt   Treadmill gait 1x 4 min  2x4.5 min 1.0 mph   Chair placed on treadmill during breaks   Overground gait 2x4 min  SBQC, SBA  - focusing on increased speed         Stair climbing 1x10 steps  unilat UE assist, CGA  - reciprocal    Gait w/ obstacles 2x50 ft  Foam, 6\" curb step, hurdles         Ant weightshift to SLS hold 15x10\"     Foot tap up to step 20x 6\"    SLS longer holds 2x30\"  unilat UE assist   Hamstring curl 1x15 2# Blocking hip flexor compensation   - progressed weight 9/23 March 2x15 2# - no weight on 6/15   Hip abd 2x15     Fwd step down 2x15 4\", // bars BUE assist   Fwd long lunge 1x12  To cone, cues to increase step length and  foot with return   Lateral step up 1x15 4\", RLE Bilat UE assist   Fwd/back step up and over 1x15 4\"          Box sit<>stand w/ right reach 10x 20\" Mod assist to incr RLE weightbearing   Bilateral ER w/ scap retract 3x20 purple In between rest breaks   Squat, tap ball to step 15x 6\" step    Standing reaching, all directions 2 min cones Encouraging RLE weightbearing     Seated thoracic/lumbar ext against bolster 15x  Big red bolster behind back                     Leg press - DL x30 70# DL = double limb   Leg press - SL 20x 20# SL = single limb               Mat      Sidelying clams 3x20 blueberry    Bridge 2x10  Cues to reduce height to improve low back pain   Prone knee flexion 3x10 blueberry RLE   Other:        Treatment Charges: Mins Units   []  Modalities     [x]  Ther Exercise 48 3   []  Manual Therapy     []  Ther Activities     []  Aquatics     []  Vasocompression     []  Other: neuro re-ed     [x]  Other: gait 8 1   Total Treatment time 56 4       Assessment: [x] Progressing toward goals. Better energy/tolerance to exercise this date - still requires therapist assist for safe don/doff upright bike, but able to push to longer distance this date; exertion noted. RPE for this remains 17. Able to complete single limb training with no seated rest breaks this date between sets, improved control of quad noted but does still require CGA/SBA for safety d/t buckling and assist with maintaining proper foot placement throughout exercise. [] No change. [x] Other: Continues to require significant therapeutic rest breaks to allow for increased tolerance of upcoming sets, tasks d/t RLE fatigue and aerobic endurance.  Long-term COVID-19 effects likely impacting this as well. [x] Patient would continue to benefit from skilled physical therapy services in order to: progress RLE strength, address gait deviations, improve standing static and dynamic balance impairment, improve safety and independence with functional mobility both at home and in community. STG: (to be met in 10 treatments) - Assessed on 5/7/21:  1. ? ROM: Pt will demo no excessive ER of RLE in standing at rest or sitting at rest to indicate reduced tightness in external rotators and normalize posture - Making progress, still with mild ER in standing/amb but overall improvement  2. ? Balance:    a. Pt will be able to reach outside MED towards R both up and down without balance deficit - Making progress, still mild balance deficit with further outside MED fwd and right but overall increased stability with RLE weightshift  b. Pt will be able to maintain RLE SLS for 30s with min UE assist and good neuromuscular control noted - Nearly met, mod UE assist required  3. ? Strength:  a. 2/5 R knee ext strength to increase R knee control in SLS - MET, 2/5  b. 4/5 R hip ext/abd, 4+/5 R hamstrings to improve swing and stance phase with gait - Partially met, 4/5 hip ext/abd, 4/5 hamstrings  4. ? Function:   a. Pt will be able to stand from 24 in chair 10x without UE assist and equal weightbearing with no more than minimal cuing from therapist. - MET    b. Pt will be able to ascend/descend stairs using reciprocal ascent/descent with BUE assist and fair speed, fair R knee eccentric control - MET, CGA required, only on 4-6\" steps at current  c. Pt will be able to ambulate 150 ft with improving L trunk lean, no more than 50% incidence of R knee hyperextension throughout, and at .4m/s - NOT MET,  .28 m/s self selected, 23.96 .42 m/s fast    5. Patient to be independent with home exercise program as demonstrated by performance with correct form without cues.  - Ongoing, needs cuing for specific exercise completion vs just general activity  6. Demonstrate Knowledge of fall prevention - Making progress, still demos risky behavior without using AD while ambulating at home from time to time despite education     LTG: (to be met in 45 treatments) - Re-assessed on 7/27/21 by Debra Lainez, PT, extended d/t incomplete achievement:  7. ? Balance:   a. Pt will be able to complete 360 deg turn with near equal weightbearing, albeit slow - Making progress, improving RLE weightbearing but still remains significanty limited as compared to LLE, though speed was improved since eval (~6 sec). Safety still a concern and requires supervision with turning without UE assist or device. b. Pt will score at least 41/56 on Mccurdy to indicate significant change in static/dynamic balance abilities since initial evaluation. - Nearly met, 40/56   8. ? Strength:   a. 2+/5 R knee ext strength to increase R knee control in SLS - MET, still 2+/5  b. 4+/5 R hip ext/abd, 5-/5 R hamstrings to improve swing and stance phase with gait - Making progress, 4/5 hip ext/abd, 4+/5 adduction, 4+/5 hamstring  9. ? Function:   a. Pt will be able to stand from 20 in chair 10x without UE assist and equal weightbearing with no more than minimal cuing from therapist. - NOT MET, requires 22in surface for 10x with min VCs for equal weightbearing with fair carryover  b. Pt will be able to ascend/descend stairs using reciprocal ascent/descent with unilat UE assist and fair speed, fair R knee eccentric control - MET for ascent, descent is step-to pattern  c. Pt will be able to ambulate 300 ft with improving L trunk lean, no more than 25% incidence of R knee hyperextension throughout, and at .5m/s - Making progress, 350 ft with . 46 m/s (was .44m/s), and no more than 10% incidence of R knee hyperextension throughout gait            Patient goals: \"strengthen what we can, improve walking\" - Making progress    Pt.  Education:  [x] Yes  [] No  [x] Reviewed Prior HEP/Ed  Method of Education: [x] Verbal  [] Demo  [] Written  Comprehension of Education:  [x] Verbalizes understanding. [x] Demonstrates understanding. [x] Needs review for consistency . [] Demonstrates/verbalizes HEP/Ed previously given.      Current HEP: resisted TKEs w/ purple band, sit-to-stands (22-23\"), squats, standing ham curls, standing hip ext, and marches      Plan: [x] Continue current frequency toward long and short term goals   [x] Specific Instructions for subsequent treatments: stair climbing, R quad eccentric control, RLE single limb stance and swing, treadmill training - reprovide HEP       Time In:  12:01 pm          Time Out: 1:02  pm    Electronically signed by:  Brittny Calvillo PT

## 2021-10-19 ENCOUNTER — APPOINTMENT (OUTPATIENT)
Dept: PHYSICAL THERAPY | Age: 61
End: 2021-10-19
Payer: MEDICARE

## 2021-10-21 ENCOUNTER — HOSPITAL ENCOUNTER (OUTPATIENT)
Dept: PHYSICAL THERAPY | Age: 61
Setting detail: THERAPIES SERIES
Discharge: HOME | End: 2021-10-21
Payer: MEDICARE

## 2021-10-21 PROCEDURE — 97110 THERAPEUTIC EXERCISES: CPT

## 2021-10-21 PROCEDURE — 97116 GAIT TRAINING THERAPY: CPT

## 2021-10-21 NOTE — FLOWSHEET NOTE
[x] [facility]) - [facility] [place] [facility]  [facility]  [address]  [phone]  F: [phone] [] [address]  [employer]   [address]  P: [phone]  F: [phone] [] [address] &  Therapy  [address]  P: [phone]  F: [phone] [] [address]  P: [phone]  F: [phone] [] [address]  [facility]   [address]   [place]: [phone]  F: [phone]      Physical Therapy Daily Treatment Note    Date:  10/21/2021  Patient Name:  Shelbie Garcia    :  1960  MRN: [id number]  Physician: Dr. Je Jaimes: [place] [employer] (36NU)  Medical Diagnosis:   History of stroke  Rehab Codes: R53.1, R29.3, R27.9, R26.9, M25.60  Next 's appt.: 21  Date of symptom onset: 19  Visit# / total visits: 44/45 (extended via [place] [facility] \"reset\" in )    Cancels/No Shows: 0        Subjective:     Pain:  [] Yes  [x] No Location:  N/A Pain Rating: (0-10 scale) 0/10  Pain altered Tx:  [x] No  [] Yes  Action:  Comments: Pt reports no new changes since last visit.      Objective:    Modalities:   Precautions:  Exercises: Bolded exercises completed on 10/21/2021:  Exercise Reps/ Time Weight/ Level Comments   Upright bike 1x7 min   L6 Lengthy rest breaks d/t fatigue; standing exercises completed in between  - RPE 17-18 per pt   Treadmill gait 1x 4 min  2x4.5 min 1.0 mph   Chair placed on treadmill during breaks   Overground gait 2x270 ft  SBQC, SBA  - focusing on increased speed         Stair climbing 1x10 steps  unilat UE assist, CGA  - reciprocal    Gait w/ obstacles 2x50 ft  Foam, 6\" curb step, hurdles         Ant weightshift to SLS hold 15x10\"     Foot tap up to step 20x 6\"    SLS longer holds 2x30\"  unilat UE assist   Hamstring curl 1x15 2# Blocking hip flexor compensation   - progressed weight 9/23 March 2x15 2# - no weight on 6/15   Hip abd 2x15     Fwd step down 2x15 4\", // bars BUE assist   Fwd long lunge 1x12  To cone, cues to increase step length and  foot with return   Lateral step up 1x15 4\", RLE Bilat UE assist   Fwd/back step up and over 1x15 4\"    Squat w/ LLE on 2\" box 15x           Box sit<>stand w/ right reach 10x 20\" Mod assist to incr RLE weightbearing   Bilateral ER w/ scap retract 2x20 purple In between rest breaks   Squat, tap ball to step 15x 6\" step    Standing reaching, all directions 2 min cones Encouraging RLE weightbearing     Seated thoracic/lumbar ext against bolster 15x  Big red bolster behind back                     Leg press - DL x15 50# DL = double limb  - reduced weight and reps on 10/21 d/t fatigue   Leg press - SL 20x 20# SL = single limb               Mat      Sidelying clams 3x20 blueberry    Bridge 2x10  Cues to reduce height to improve low back pain   Prone knee flexion 3x10 blueberry RLE   Other:        Treatment Charges: Mins Units   []  Modalities     [x]  Ther Exercise 40 2   []  Manual Therapy     []  Ther Activities     []  Aquatics     []  Vasocompression     []  Other: neuro re-ed     [x]  Other: gait 10 1   Total Treatment time 50 3       Assessment: [x] Progressing toward goals. Able to improve gait endurance this date with less cues needed for rapid RLE swing phase and consecutive stepping; just min VCs (5-10%) needed. Able to complete 3 sets of exercises focused on RLE strengthening before seated rest break. Will be appropriate for D/C at next session - will continue to revisit HEP and make a more extensive exercise list for pt d/t finishing therapy, and stress importance of routine completion. [] No change.        [x] Other: Continues to require significant therapeutic rest breaks to allow for increased tolerance of upcoming sets, tasks d/t RLE fatigue and aerobic endurance. Long-term COVID-19 effects likely impacting this as well. [x] Patient would continue to benefit from skilled physical therapy services in order to: progress RLE strength, address gait deviations, improve standing static and dynamic balance impairment, improve safety and independence with functional mobility both at home and in community. STG: (to be met in 10 treatments) - Assessed on 5/7/21:  1. ? ROM: Pt will demo no excessive ER of RLE in standing at rest or sitting at rest to indicate reduced tightness in external rotators and normalize posture - Making progress, still with mild ER in standing/amb but overall improvement  2. ? Balance:    a. Pt will be able to reach outside MED towards R both up and down without balance deficit - Making progress, still mild balance deficit with further outside MED fwd and right but overall increased stability with RLE weightshift  b. Pt will be able to maintain RLE SLS for 30s with min UE assist and good neuromuscular control noted - Nearly met, mod UE assist required  3. ? Strength:  a. 2/5 R knee ext strength to increase R knee control in SLS - MET, 2/5  b. 4/5 R hip ext/abd, 4+/5 R hamstrings to improve swing and stance phase with gait - Partially met, 4/5 hip ext/abd, 4/5 hamstrings  4. ? Function:   a. Pt will be able to stand from 24 in chair 10x without UE assist and equal weightbearing with no more than minimal cuing from therapist. - MET    b. Pt will be able to ascend/descend stairs using reciprocal ascent/descent with BUE assist and fair speed, fair R knee eccentric control - MET, CGA required, only on 4-6\" steps at current  c. Pt will be able to ambulate 150 ft with improving L trunk lean, no more than 50% incidence of R knee hyperextension throughout, and at .4m/s - NOT MET,  .28 m/s self selected, 23.96 .42 m/s fast    5.  Patient to be independent with home exercise program as demonstrated by performance with correct form without cues. - Ongoing, needs cuing for specific exercise completion vs just general activity  6. Demonstrate Knowledge of fall prevention - Making progress, still demos risky behavior without using AD while ambulating at home from time to time despite education     LTG: (to be met in 45 treatments) - Re-assessed on 7/27/21 by Ginger Abarca, PT, extended d/t incomplete achievement:  7. ? Balance:   a. Pt will be able to complete 360 deg turn with near equal weightbearing, albeit slow - Making progress, improving RLE weightbearing but still remains significanty limited as compared to LLE, though speed was improved since eval (~6 sec). Safety still a concern and requires supervision with turning without UE assist or device. b. Pt will score at least 41/56 on Mccurdy to indicate significant change in static/dynamic balance abilities since initial evaluation. - Nearly met, 40/56   8. ? Strength:   a. 2+/5 R knee ext strength to increase R knee control in SLS - MET, still 2+/5  b. 4+/5 R hip ext/abd, 5-/5 R hamstrings to improve swing and stance phase with gait - Making progress, 4/5 hip ext/abd, 4+/5 adduction, 4+/5 hamstring  9. ? Function:   a. Pt will be able to stand from 20 in chair 10x without UE assist and equal weightbearing with no more than minimal cuing from therapist. - NOT MET, requires 22in surface for 10x with min VCs for equal weightbearing with fair carryover  b. Pt will be able to ascend/descend stairs using reciprocal ascent/descent with unilat UE assist and fair speed, fair R knee eccentric control - MET for ascent, descent is step-to pattern  c. Pt will be able to ambulate 300 ft with improving L trunk lean, no more than 25% incidence of R knee hyperextension throughout, and at .5m/s - Making progress, 350 ft with . 46 m/s (was .44m/s), and no more than 10% incidence of R knee hyperextension throughout gait            Patient goals: \"strengthen what we can, improve walking\" - Making progress    Pt. Education:  [x] Yes  [] No  [x] Reviewed Prior HEP/Ed  Method of Education: [x] Verbal  [] Demo  [] Written  Comprehension of Education:  [x] Verbalizes understanding. [x] Demonstrates understanding. [x] Needs review for consistency . [] Demonstrates/verbalizes HEP/Ed previously given.      Current HEP: resisted TKEs w/ purple band, sit-to-stands (22-23\"), squats, standing ham curls, standing hip ext, and marches      Plan: [x] Continue current frequency toward long and short term goals   [x] Specific Instructions for subsequent treatments: stair climbing, R quad eccentric control, RLE single limb stance and swing, treadmill training - reprovide HEP       Time In:  2:05 pm          Time Out: 2:58  pm    Electronically signed by:  Richa Prabhakar PT

## 2021-10-26 ENCOUNTER — HOSPITAL ENCOUNTER (OUTPATIENT)
Dept: PHYSICAL THERAPY | Age: 61
Setting detail: THERAPIES SERIES
Discharge: HOME | End: 2021-10-26
Payer: MEDICARE

## 2021-10-26 PROCEDURE — 97110 THERAPEUTIC EXERCISES: CPT

## 2021-10-26 PROCEDURE — 97112 NEUROMUSCULAR REEDUCATION: CPT

## 2021-10-26 PROCEDURE — 97116 GAIT TRAINING THERAPY: CPT

## 2021-10-26 NOTE — FLOWSHEET NOTE
[x] [facility]) - [facility] [[email or web address]] [facility] [place] [facility]  [facility]  [address]  [phone]  F: [phone] [] [address]  [email or web address]   [address]  P: [phone]  F: [phone] [] [address] &  Therapy  [address]  P: [phone]  F: [phone] [] [address]  P: [phone]  F: [phone] [] [address]  [facility]   [address]   [place]: [phone]  F: [phone]      Physical Therapy Daily Treatment Note    Date:  10/26/2021  Patient Name:  Dariela Palumbo    :  1960  MRN: [id number]  Physician: Dr. Edward Fam: [place] [employer] (36LS)  Medical Diagnosis:   History of stroke  Rehab Codes: R53.1, R29.3, R27.9, R26.9, M25.60  Next 's appt.: 21  Date of symptom onset: 19  Visit# / total visits: 45/45 (extended via [place] [facility] \"reset\" in )    Cancels/No Shows: 0        Subjective:     Pain:  [] Yes  [x] No Location:  N/A Pain Rating: (0-10 scale) 0/10  Pain altered Tx:  [x] No  [] Yes  Action:  Comments: Pt arrives noting he has to use restroom first.     Objective:  10MWT:  - self selected: 18.93s = .53m/s  - fast: 16.22s = .62 m/s    Modalities:   Precautions:  Exercises: Bolded exercises completed on 10/26/2021:  Exercise Reps/ Time Weight/ Level Comments   Upright bike 1x7 min   L6 Lengthy rest breaks d/t fatigue; standing exercises completed in between  - RPE 17-18 per pt   Treadmill gait 1x 4 min  2x4.5 min 1.0 mph   Chair placed on treadmill during breaks   Overground gait 2x300 ft  SBQC, SBA  - focusing on increased speed         Stair climbing 1x10 steps  unilat UE assist, CGA  - reciprocal    Gait w/ obstacles 2x50 ft  Foam, 6\" curb step, hurdles         Ant weightshift to SLS hold 15x10\"     Foot tap up to step 20x 6\"    SLS longer holds  2x30\"  unilat UE assist   Hamstring curl 1x15 2# Blocking hip flexor compensation   - progressed weight 9/23 March 2x15 2# - no weight on 6/15   Hip abd 2x15     Fwd step down 2x15 4\", // bars BUE assist   Fwd long lunge 1x12  To cone, cues to increase step length and  foot with return   Lateral step up 1x15 4\", RLE Bilat UE assist   Fwd/back step up and over 1x15 4\"    Squat w/ LLE on 2\" box 15x           Box sit<>stand w/ right reach 10x 20\" Mod assist to incr RLE weightbearing   Bilateral ER w/ scap retract 2x20 purple In between rest breaks   Squat, tap ball to step 15x 6\" step    Standing reaching, all directions 2 min cones Encouraging RLE weightbearing     Seated thoracic/lumbar ext against bolster 15x  Big red bolster behind back                     Leg press - DL x15 50# DL = double limb  - reduced weight and reps on 10/21 d/t fatigue   Leg press - SL 20x 20# SL = single limb               Mat      Sidelying clams 3x20 blueberry    Bridge 2x10  Cues to reduce height to improve low back pain   Prone knee flexion 3x10 blueberry RLE   Other: MAJORITY OF SESSION SPENT IN LTG ASSESSMENT, INCLUDING COATS ADMINISTRATION        Treatment Charges: Mins Units   []  Modalities     [x]  Ther Exercise 15 1   []  Manual Therapy     []  Ther Activities     []  Aquatics     []  Vasocompression     [x]  Other: neuro re-ed 15 1   [x]  Other: gait 10 1   Total Treatment time 40 3       Assessment: [x] Progressing toward goals. LTG assessment focus this date - pt demos no improvement on Co[place] Balance score since last check (though overall has made slight improvement, no clinically significant difference however). However, pt has made significant progress in functional mobility tolerance and gait speed, progressing from . 34 m/s to .53 m/s, increasing him to an unlimited household ambulator vs limited.  Still very unsteady in RLE SLS, so provided written HEP with focus on safe SLS tasks on RLE to improve weightshift and functional endurance of limb. Pt verbalizes understanding to all. [] No change. [x] Discharge    STG: (to be met in 10 treatments) - Assessed on 5/7/21:  1. ? ROM: Pt will demo no excessive ER of RLE in standing at rest or sitting at rest to indicate reduced tightness in external rotators and normalize posture - Making progress, still with mild ER in standing/amb but overall improvement  2. ? Balance:    a. Pt will be able to reach outside MED towards R both up and down without balance deficit - Making progress, still mild balance deficit with further outside MED fwd and right but overall increased stability with RLE weightshift  b. Pt will be able to maintain RLE SLS for 30s with min UE assist and good neuromuscular control noted - Nearly met, mod UE assist required  3. ? Strength:  a. 2/5 R knee ext strength to increase R knee control in SLS - MET, 2/5  b. 4/5 R hip ext/abd, 4+/5 R hamstrings to improve swing and stance phase with gait - Partially met, 4/5 hip ext/abd, 4/5 hamstrings  4. ? Function:   a. Pt will be able to stand from 24 in chair 10x without UE assist and equal weightbearing with no more than minimal cuing from therapist. - MET    b. Pt will be able to ascend/descend stairs using reciprocal ascent/descent with BUE assist and fair speed, fair R knee eccentric control - MET, CGA required, only on 4-6\" steps at current  c. Pt will be able to ambulate 150 ft with improving L trunk lean, no more than 50% incidence of R knee hyperextension throughout, and at .4m/s - NOT MET,  .28 m/s self selected, 23.96 .42 m/s fast    5. Patient to be independent with home exercise program as demonstrated by performance with correct form without cues. - Ongoing, needs cuing for specific exercise completion vs just general activity  6.  Demonstrate Knowledge of fall prevention - Making progress, still demos risky behavior without using AD while ambulating at home from time to time despite education     LTG: (to be met in 45 treatments) - Re-assessed on 10/26/21 by Winter Barker, PT:  7. ? Balance:   a. Pt will be able to complete 360 deg turn with near equal weightbearing, albeit slow - Making progress, improving RLE weightbearing but still remains significanty limited as compared to LLE, though speed was improved since eval (~6 sec). Safety still a concern and requires supervision with turning without UE assist or device. b. Pt will score at least 41/56 on Mccurdy to indicate significant change in static/dynamic balance abilities since initial evaluation. - NOT MET, 39/56  8. ? Strength:   a. 2+/5 R knee ext strength to increase R knee control in SLS - MET, still 2+/5  b. 4+/5 R hip ext/abd, 5-/5 R hamstrings to improve swing and stance phase with gait - NOT MET: tested on mat: 4/5 hip ext, 4-/5 hip abd, 4+/5 adduction, 4-/5 hamstring  9. ? Function:    a. Pt will be able to stand from 20 in chair 10x without UE assist and equal weightbearing with no more than minimal cuing from therapist. - NOT MET, requires 21\" seat  b. Pt will be able to ascend/descend stairs using reciprocal ascent/descent with unilat UE assist and fair speed, fair R knee eccentric control - MET for ascent, descent is step-to pattern  c. Pt will be able to ambulate 300 ft with improving L trunk lean, no more than 25% incidence of R knee hyperextension throughout, and at .5m/s - MET, .53 m/s gait speed with no more than 5% incidence of R knee hyperext           Patient goals: \"strengthen what we can, improve walking\" - Making progress    Pt. Education:  [x] Yes  [] No  [x] Reviewed Prior HEP/Ed  Method of Education: [x] Verbal  [x] Demo  [x] Written  Comprehension of Education:  [x] Verbalizes understanding. [x] Demonstrates understanding. [x] Needs review for consistency . [] Demonstrates/verbalizes HEP/Ed previously given.      Access Code: [email or web address]  URL: [email or web address]  Date: 10/26/2021  Prepared by: Ginger Abarca    Exercises  Single Leg Stance with Support - 1 x daily - 7 x weekly - 10 reps - 10 sec hold  Lunge with Counter Support - 1 x daily - 7 x weekly - 3 sets - 15 reps  Standing Knee Flexion - 1 x daily - 7 x weekly - 3 sets - 15 reps  Forward Step Up - 1 x daily - 7 x weekly - 3 sets - 10 reps  Standing Hip Abduction with Counter Support - 1 x daily - 7 x weekly - 3 sets - 15 reps  Standing Hip Extension with Counter Support - 1 x daily - 7 x weekly - 3 sets - 15 reps       Plan: [x] Discharge      Time In:  1:00 pm          Time Out: 2:02  pm    Electronically signed by:  Sheila Salas PT

## 2021-10-26 NOTE — DISCHARGE SUMMARY
[facility] &  [facility]rapy  [address]  [phone]  F: [phone] [] [address]  [facility]   [address]  P: [phone]  F: [phone] [] [address] &  Therapy  [address]  P: [phone]  F: [phone] [] [address]  P: [phone]  F: [phone] [] [address]  [facility]   [address]   [place]: [phone]  F: [phone]      Physical Therapy Discharge Note    Date: 10/26/2021      Patient: Little May  : 1960  MRN: [id number]    Physician: Dr. Sunny Pantoja: [place] [employer] (30vs)  Medical Diagnosis:   History of stroke  Rehab Codes: R53.1, R29.3, R27.9, R26.9, M25.60  Next 's appt.: 21  Date of symptom onset: 19  Visit# / total visits: 45/45 (extended via [place] [facility] \"reset\" in )    Cancels/No Shows: 0      Date of initial visit: 3/24/21                Date of final visit: 10/26/21      Subjective:  Pain:  []? Yes  [x]? No   Location:  N/A Pain Rating: (0-10 scale) 0/10  Pain altered Tx:  [x]? No  []? Yes  Action:  Comments: Pt arrives noting he has to use restroom first.     Objective:  Test Measurements:  RLE strength:  - 4/5 hip ext, 4-/5 hip abd, 4+/5 adduction, 4-/5 hamstring, 2+/5 knee ext    Function:   - 10MWT:  - self selected: 18.93s = .53m/s  - fast: 16.22s = .62 m/s    Mccurdy/56    Assessment:  [x]? Progressing toward goals. LTG assessment focus this date - pt demos no improvement on Mccurdy Balance score since last check (though overall has made slight improvement, no clinically significant difference however).  However, pt has made significant progress in functional mobility tolerance and gait speed, progressing from .34 m/s to .53 m/s, increasing him to an unlimited household ambulator vs limited. Still very unsteady in RLE SLS, so provided written HEP with focus on safe SLS tasks on RLE to improve weightshift and functional endurance of limb. Pt verbalizes understanding to all.                          []? No change. [x]? Discharge    STG: (to be met in 10 treatments) - Assessed on 5/7/21:  1. ? ROM: Pt will demo no excessive ER of RLE in standing at rest or sitting at rest to indicate reduced tightness in external rotators and normalize posture - Making progress, still with mild ER in standing/amb but overall improvement  2. ? Balance:    a. Pt will be able to reach outside MED towards R both up and down without balance deficit - Making progress, still mild balance deficit with further outside MED fwd and right but overall increased stability with RLE weightshift  b. Pt will be able to maintain RLE SLS for 30s with min UE assist and good neuromuscular control noted - Nearly met, mod UE assist required  3. ? Strength:  a. 2/5 R knee ext strength to increase R knee control in SLS - MET, 2/5  b. 4/5 R hip ext/abd, 4+/5 R hamstrings to improve swing and stance phase with gait - Partially met, 4/5 hip ext/abd, 4/5 hamstrings  4. ? Function:   a. Pt will be able to stand from 24 in chair 10x without UE assist and equal weightbearing with no more than minimal cuing from therapist. - MET    b. Pt will be able to ascend/descend stairs using reciprocal ascent/descent with BUE assist and fair speed, fair R knee eccentric control - MET, CGA required, only on 4-6\" steps at current  c. Pt will be able to ambulate 150 ft with improving L trunk lean, no more than 50% incidence of R knee hyperextension throughout, and at .4m/s - NOT MET,  .28 m/s self selected, 23.96 .42 m/s fast    5. Patient to be independent with home exercise program as demonstrated by performance with correct form without cues.  - Ongoing, needs cuing for specific exercise completion vs just general activity  6. Demonstrate Knowledge of fall prevention - Making progress, still demos risky behavior without using AD while ambulating at home from time to time despite education     LTG: (to be met in 45 treatments) - Re-assessed on 10/26/21 by Jero Garcia, PT:  7. ? Balance:   a. Pt will be able to complete 360 deg turn with near equal weightbearing, albeit slow - Making progress, improving RLE weightbearing but still remains significanty limited as compared to LLE, though speed was improved since eval (~6 sec). Safety still a concern and requires supervision with turning without UE assist or device. b. Pt will score at least 41/56 on Mccurdy to indicate significant change in static/dynamic balance abilities since initial evaluation. - NOT MET, 39/56  8. ? Strength:   a. 2+/5 R knee ext strength to increase R knee control in SLS - MET, still 2+/5  b. 4+/5 R hip ext/abd, 5-/5 R hamstrings to improve swing and stance phase with gait - NOT MET: tested on mat: 4/5 hip ext, 4-/5 hip abd, 4+/5 adduction, 4-/5 hamstring  9. ? Function:    a. Pt will be able to stand from 20 in chair 10x without UE assist and equal weightbearing with no more than minimal cuing from therapist. - NOT MET, requires 21\" seat  b. Pt will be able to ascend/descend stairs using reciprocal ascent/descent with unilat UE assist and fair speed, fair R knee eccentric control - MET for ascent, descent is step-to pattern  c.  Pt will be able to ambulate 300 ft with improving L trunk lean, no more than 25% incidence of R knee hyperextension throughout, and at .5m/s - MET, .53 m/s gait speed with no more than 5% incidence of R knee hyperext           Patient goals: \"strengthen what we can, improve walking\" - Making progress    Treatment to Date:  [x] Therapeutic Exercise    [] Modalities:  [x] Therapeutic Activity    [] Ultrasound  [] Electrical Stimulation  [x] Gait Training     [] Massage       [] Lumbar/Cervical Traction  [x] Neuromuscular Re-education [] Cold/hotpack [] Iontophoresis: 4 mg/mL  [x] Instruction in Home Exercise Program                     Dexamethasone Sodium  [] Manual Therapy             Phosphate 40-80 mAmin  [] Aquatic Therapy                   [] Vasocompression/    [] Other:             Game Ready    Discharge Status:     [] Pt recovered from conditions. Treatment goals were met. [x] Pt received maximum benefit. No further therapy indicated at this time. [x] Pt to continue exercise/home instructions independently. [] Therapy interrupted due to:    [] Pt has 2 or more no shows/cancels, is discontinued per our policy. [x] Pt has completed prescribed number of treatment sessions. [] Other:         Electronically signed by Anupam Lowe PT on 10/26/2021 at 2:14 PM      If you have any questions or concerns, please don't hesitate to call.   Thank you for your referral.

## 2021-10-26 NOTE — FLOWSHEET NOTE
COATS BALANCE SCALE 14-Item Long Form Original Version    Patient Name:  Waleska Davalos  Date:  10/26/2021    1. SITTING TO STANDING  INSTRUCTIONS: Please stand up. Try not to use your hands for support. (4) able to stand without using hands and stabilize independently  (3) able to stand independently using hands  (2) able to stand using hands after several tries  (1) needs minimal aid to stand or to stabilize  (0) needs moderate or maximal assist to stand  Score: 4    2. STANDING UNSUPPORTED  INSTRUCTIONS: Please stand for two minutes without holding. (4) able to stand safely 2 minutes  (3) able to stand 2 minutes with supervision  (2) able to stand 30 seconds unsupported  (1) needs several tries to stand 30 seconds unsupported  (0) unable to stand 30 seconds unassisted If a subject is able to stand 2  minutes unsupported, score full points for sitting unsupported. Proceed to  item #4. Score: 4    3. SITTING WITH BACK UNSUPPORTED BUT FEET SUPPORTED  ON FLOOR OR ON A STOOL  INSTRUCTIONS: Please sit with arms folded for 2 minutes. (4) able to sit safely and securely 2 minutes  (3) able to sit 2 minutes under supervision  (2) able to sit 30 seconds  (1) able to sit 10 seconds  (0) unable to sit without support 10 seconds  Score: 4     4. STANDING TO SITTING  INSTRUCTIONS: Please sit down. (4) sits safely with minimal use of hands  (3) controls descent by using hands  (2) uses back of legs against chair to control descent  (1) sits independently but has uncontrolled descent  (0) needs assistance to sit  Score: 4    5. TRANSFERS  INSTRUCTIONS: Arrange chairs(s) for a pivot transfer. Ask subject to  transfer one way toward a seat with armrests and one way toward a seat  without armrests. You may use two chairs (one with and one without  armrests) or a bed and a chair.   (4) able to transfer safely with minor use of hands  (3) able to transfer safely definite need of hands  (2) able to transfer with verbal cueing and/or supervision  (1) needs one person to assist  (0) needs two people to assist or supervise to be safe  Score: 3    6. STANDING UNSUPPORTED WITH EYES CLOSED  INSTRUCTIONS: Please close your eyes and stand still for 10 seconds. (4) able to stand 10 seconds safely  (3) able to stand 10 seconds with supervision  (2) able to stand 3 seconds  (1) unable to keep eyes closed 3 seconds but stays steady  (0) needs help to keep from falling  Score: 4    7. STANDING UNSUPPORTED WITH FEET TOGETHER  INSTRUCTIONS: Place your feet together and stand without holding. (4) able to place feet together independently and stand 1 minute safely  (3) able to place feet together independently and stand for 1 minute with  supervision  (2) able to place feet together independently but unable to hold for 30 seconds  (1) needs help to attain position but able to stand 15 seconds feet together  (0) needs help to attain position and unable to hold for 15 seconds  Score: 1    8. REACHING FORWARD WITH OUTSTRETCHED ARM WHILE  STANDING  INSTRUCTIONS: Lift arm to 90 degrees. Stretch out your fingers and reach  forward as far as you can. (Examiner places a ruler at end of fingertips when  arm is at 90 degrees. Fingers should not touch the ruler while reaching  forward. The recorded measure is the distance forward that the finger reaches  while the subject is in the most forward lean position. When possible, ask  subject to use both arms when reaching to avoid rotation of the trunk.). (4) can reach forward confidently >25 cm (10 inches)  (3) can reach forward >12 cm safely (5 inches)  (2) can reach forward >5 cm safely (2 inches)  (1) reaches forward but needs supervision  (0) loses balance while trying/requires external support  Score: 2    9.  OBJECT FROM FLOOR FROM A STANDING POSITION  INSTRUCTIONS:  shoe/slipper which is placed in front of your feet.   (4) able to  slipper safely and easily  (3) able to  slipper but needs supervision  (2) unable to  but reaches 2-5cm (1-2 inches) from slipper and keeps  balance independently  (1) unable to  and needs supervision while trying  (0) unable to try/needs assist to keep from losing balance or falling  Score: 3    10. TURNING TO LOOK BEHIND OVER LEFT AND RIGHT  SHOULDERS WHILE STANDING  INSTRUCTIONS: Turn to look directly behind you over toward left shoulder. Repeat to the right. Examiner may pick an object to look at directly behind the  subject to encourage a better twist turn. (4) looks behind from both sides and weight shifts well  (3) looks behind one side only other side shows less weight shift  (2) turns sideways only but maintains balance  (1) needs supervision when turning  (0) needs assist to keep from losing balance or falling  Score: 3    11. TURN 360 DEGREES  INSTRUCTIONS: Turn completely around in a full [place]. Pause. Then turn a  full [place] in the other direction. (4) able to turn 360 degrees safely in 4 seconds or less  (3) able to turn 360 degrees safely one side only in 4 seconds or less  (2) able to turn 360 degrees safely but slowly  (1) needs close supervision or verbal cueing  (0) needs assistance while turning  Score: 2    12. PLACING ALTERNATE FOOT ON STEP OR STOOL WHILE  STANDING UNSUPPORTED  INSTRUCTIONS: Place each foot alternately on the step/stool. Continue until  each foot has touched the step/stool four times. (4) able to stand independently and safely and complete 8 steps in 20 seconds  (3) able to stand independently and complete 8 steps >20 seconds  (2) able to complete 4 steps without aid with supervision  (1) able to complete >2 steps needs minimal assist  (0) needs assistance to keep from falling/unable to try  Score: 1    13. STANDING UNSUPPORTED ONE FOOT IN FRONT  INSTRUCTIONS: (DEMONSTRATE TO SUBJECT) Place one foot directly in  front of the other.  If you feel that you cannot place your foot directly in front,  try to step far enough ahead that the heel of your forward foot is ahead of the  toes of the other foot. (To score 3 points, the length of the step should exceed  the length of the other foot and the width of the stance should approximate the  subject's normal stride width). (4) able to place foot tandem independently and hold 30 seconds  (3) able to place foot ahead of other independently and hold 30 seconds  (2) able to take small step independently and hold 30 seconds  (1) needs help to step but can hold 15 seconds  (0) loses balance while stepping or standing  Score: 2    14. STANDING ON ONE LEG  INSTRUCTIONS: Stand on one leg as long as you can without holding. (4) able to lift leg independently and hold >10 seconds  (3) able to lift leg independently and hold 5-10 seconds  (2) able to lift leg independently and hold = or >3 seconds  (1) tries to lift leg unable to hold 3 seconds but remains standing  independently  (0) unable to try or needs assist to prevent fall  Score:  0    Total Score:  37/56  Max score 56,a person scoring below 45 is considered to be at risk for falling.     Completed by: Alli Arnett, PT

## 2021-10-29 NOTE — TELEPHONE ENCOUNTER
Vic Watt is calling to request a refill on the following medication(s):    Medication Request:  Requested Prescriptions     Pending Prescriptions Disp Refills    LEVEMIR FLEXTOUCH 100 UNIT/ML injection pen 5 pen 3     Sig: INJECT UNDER THE SKIN 16 UNITS ONCE NIGHTLY       Last Visit Date (If Applicable):  5/3/3218    Next Visit Date:    11/2/2021

## 2021-10-31 RX ORDER — INSULIN DETEMIR 100 [IU]/ML
INJECTION, SOLUTION SUBCUTANEOUS
Qty: 5 PEN | Refills: 0 | Status: SHIPPED | OUTPATIENT
Start: 2021-10-31 | End: 2021-11-02 | Stop reason: DRUGHIGH

## 2021-11-02 ENCOUNTER — OFFICE VISIT (OUTPATIENT)
Dept: INTERNAL MEDICINE CLINIC | Age: 61
End: 2021-11-02
Payer: MEDICARE

## 2021-11-02 VITALS
OXYGEN SATURATION: 99 % | WEIGHT: 184 LBS | HEART RATE: 95 BPM | HEIGHT: 71 IN | TEMPERATURE: 97.7 F | BODY MASS INDEX: 25.76 KG/M2 | DIASTOLIC BLOOD PRESSURE: 82 MMHG | RESPIRATION RATE: 16 BRPM | SYSTOLIC BLOOD PRESSURE: 128 MMHG

## 2021-11-02 DIAGNOSIS — Z28.21 PNEUMOCOCCAL VACCINATION DECLINED: ICD-10-CM

## 2021-11-02 DIAGNOSIS — Z28.21 INFLUENZA VACCINATION DECLINED: ICD-10-CM

## 2021-11-02 DIAGNOSIS — E78.2 MIXED HYPERLIPIDEMIA: ICD-10-CM

## 2021-11-02 DIAGNOSIS — Z28.21 TETANUS, DIPHTHERIA, AND ACELLULAR PERTUSSIS (TDAP) VACCINATION DECLINED: ICD-10-CM

## 2021-11-02 DIAGNOSIS — F32.A ANXIETY AND DEPRESSION: ICD-10-CM

## 2021-11-02 DIAGNOSIS — E10.22 TYPE 1 DIABETES MELLITUS WITH STAGE 3A CHRONIC KIDNEY DISEASE (HCC): Primary | ICD-10-CM

## 2021-11-02 DIAGNOSIS — F41.9 ANXIETY AND DEPRESSION: ICD-10-CM

## 2021-11-02 DIAGNOSIS — Z86.73 HISTORY OF STROKE: ICD-10-CM

## 2021-11-02 DIAGNOSIS — Z86.16 HISTORY OF COVID-19: ICD-10-CM

## 2021-11-02 DIAGNOSIS — N18.31 TYPE 1 DIABETES MELLITUS WITH STAGE 3A CHRONIC KIDNEY DISEASE (HCC): Primary | ICD-10-CM

## 2021-11-02 DIAGNOSIS — I10 ESSENTIAL HYPERTENSION: ICD-10-CM

## 2021-11-02 DIAGNOSIS — G81.91 RIGHT HEMIPLEGIA (HCC): ICD-10-CM

## 2021-11-02 PROCEDURE — 3017F COLORECTAL CA SCREEN DOC REV: CPT | Performed by: FAMILY MEDICINE

## 2021-11-02 PROCEDURE — 99214 OFFICE O/P EST MOD 30 MIN: CPT | Performed by: FAMILY MEDICINE

## 2021-11-02 PROCEDURE — G8484 FLU IMMUNIZE NO ADMIN: HCPCS | Performed by: FAMILY MEDICINE

## 2021-11-02 PROCEDURE — G8419 CALC BMI OUT NRM PARAM NOF/U: HCPCS | Performed by: FAMILY MEDICINE

## 2021-11-02 PROCEDURE — 2022F DILAT RTA XM EVC RTNOPTHY: CPT | Performed by: FAMILY MEDICINE

## 2021-11-02 PROCEDURE — G8427 DOCREV CUR MEDS BY ELIG CLIN: HCPCS | Performed by: FAMILY MEDICINE

## 2021-11-02 PROCEDURE — 3044F HG A1C LEVEL LT 7.0%: CPT | Performed by: FAMILY MEDICINE

## 2021-11-02 PROCEDURE — 1036F TOBACCO NON-USER: CPT | Performed by: FAMILY MEDICINE

## 2021-11-02 RX ORDER — INSULIN DETEMIR 100 [IU]/ML
INJECTION, SOLUTION SUBCUTANEOUS
Qty: 5 PEN | Refills: 0 | Status: SHIPPED | COMMUNITY
Start: 2021-11-02 | End: 2022-03-14 | Stop reason: ALTCHOICE

## 2021-11-02 ASSESSMENT — ENCOUNTER SYMPTOMS
BACK PAIN: 1
EYES NEGATIVE: 1
ALLERGIC/IMMUNOLOGIC NEGATIVE: 1
GASTROINTESTINAL NEGATIVE: 1
RESPIRATORY NEGATIVE: 1

## 2021-11-02 NOTE — PROGRESS NOTES
Subjective:      Patient ID: Lev Ingram is a 64 y.o. male. Diabetes  He presents for his follow-up diabetic visit. He has type 1 diabetes mellitus. His disease course has been fluctuating. Hypoglycemia symptoms include nervousness/anxiousness. There are no diabetic associated symptoms. There are no hypoglycemic complications. Symptoms are stable. Diabetic complications include nephropathy. Risk factors for coronary artery disease include diabetes mellitus, dyslipidemia, stress, male sex and hypertension. Current diabetic treatment includes insulin injections. His weight is stable. He is following a generally healthy diet. Meal planning includes ADA exchanges, avoidance of concentrated sweets, calorie counting and carbohydrate counting. He has had a previous visit with a dietitian. He participates in exercise three times a week. His home blood glucose trend is fluctuating minimally. Review of Systems   Constitutional: Negative. HENT: Negative. Eyes: Negative. Respiratory: Negative. Cardiovascular: Negative. Gastrointestinal: Negative. Endocrine: Negative. Musculoskeletal: Positive for arthralgias, back pain and gait problem. Skin: Negative. Allergic/Immunologic: Negative. Hematological: Negative. Psychiatric/Behavioral: The patient is nervous/anxious. Past family and social history unremarkable. Diagnosis Orders   1. Type 1 diabetes mellitus with stage 3a chronic kidney disease ([address]     2. Essential hypertension     3. Mixed hyperlipidemia     4. Right hemiplegia ([address]     5. Anxiety and depression     6. Influenza vaccination declined     7. Pneumococcal vaccination declined     8. Tetanus, diphtheria, and acellular pertussis (Tdap) vaccination declined     9. History of COVID-19     10. History of stroke           Objective:   Physical Exam  Vitals and nursing note reviewed. Constitutional:       Appearance: He is well-developed.    HENT:      Head: Normocephalic and atraumatic. Right Ear: External ear normal.      Left Ear: External ear normal.      Nose: Nose normal.   Eyes:      Conjunctiva/sclera: Conjunctivae normal.      Pupils: Pupils are equal, round, and reactive to light. Cardiovascular:      Rate and Rhythm: Normal rate and regular rhythm. Heart sounds: Normal heart sounds. Comments: Hypertension  Hyperlipidemia  Insulin-dependent diabetes mellitus  Pulmonary:      Effort: Pulmonary effort is normal.      Breath sounds: Normal breath sounds. Comments: Asthma  Abdominal:      General: Bowel sounds are normal.      Palpations: Abdomen is soft. Genitourinary:     Comments: Diabetic nephropathy  Musculoskeletal:         General: Normal range of motion. Cervical back: Normal range of motion and neck supple. Skin:     General: Skin is warm and dry. Neurological:      Mental Status: He is alert and oriented to person, place, and time. Deep Tendon Reflexes: Reflexes are normal and symmetric. Comments: History of CVA with right hemiplegia   Psychiatric:      Comments: Anxiety/depression         Assessment:       Diagnosis Orders   1. Type 1 diabetes mellitus with stage 3a chronic kidney disease ([facility]     2. Essential hypertension     3. Mixed hyperlipidemia     4. Right hemiplegia ([facility] [facility]     5. Anxiety and depression     6. Influenza vaccination declined     7. Pneumococcal vaccination declined     8. Tetanus, diphtheria, and acellular pertussis (Tdap) vaccination declined     9. History of COVID-19     10. History of stroke             Plan:      27-year-old -American male is brought in by family member for routine follow-up. He does not voice any distress, afebrile hemodynamically stable, clinical examination is stable  Insulin-dependent diabetes mellitus with modest fluctuation as seen on Accu-Chek log. I advised him to increase Levemir from 16 units to 20 units subcu daily. Caution hypoglycemia.   Continue sliding scale.  He will benefit from ADA 1800 diet  Hypertension well-controlled with random blood pressure equal less than 130/80. Optimize diabetes and consume less than 2 g of salt a day  Hyperlipidemia on statin that he is tolerating well  Monofilament testing is unremarkable. He is counseled on diabetic foot care  He is advised to update annual dilated eye exam  History of CVA with residual right hemiplegia. He ambulates with a quad cane. He is established with neurology. History of asthma. No recent decompensation. He denies daily or nocturnal symptoms. Continue current inhalers  Anxiety/depression on SSRI with positive response  Diabetic nephropathy with creatinine of 1.25. Maintain oral hydration, avoid nephrotoxic drugs, anti-inflammatory radiocontrast.  Optimize diabetes and blood pressure  He declined influenza pneumococcal vaccine. However he states that he is up in Covid vaccination. Consider booster vaccination  Med list and available labs reviewed, discussed with patient, questions answered  Call for any concern  This note is created with a voice recognition program and while intend to generate a document that accurately reflects the content of the visit, no guarantee can be provided that every mistake has been identified and corrected by editing.           Rosibel Posey MD

## 2021-11-05 RX ORDER — LANCETS 33 GAUGE
EACH MISCELLANEOUS
Qty: 100 EACH | Refills: 3 | Status: SHIPPED | OUTPATIENT
Start: 2021-11-05 | End: 2022-03-22

## 2021-11-05 NOTE — TELEPHONE ENCOUNTER
Dick Avitia is calling to request a refill on the following medication(s):    Medication Request:    Last  Filled 7/14/21 #100 with 2 Rf    Requested Prescriptions     Pending Prescriptions Disp Refills    Lancets ([address]) [address] [facility] [Pharmacy Med Name: [facility]]       Sig: USE TO TEST BLOOD SUGAR TWO TIMES A DAY       Last Visit Date (If Applicable):  11/0/1171    Next Visit Date:    2/2/2022

## 2021-11-23 ENCOUNTER — OFFICE VISIT (OUTPATIENT)
Dept: NEUROLOGY | Age: 61
End: 2021-11-23
Payer: MEDICARE

## 2021-11-23 VITALS
TEMPERATURE: 98.2 F | SYSTOLIC BLOOD PRESSURE: 129 MMHG | WEIGHT: 189 LBS | HEIGHT: 71 IN | DIASTOLIC BLOOD PRESSURE: 80 MMHG | BODY MASS INDEX: 26.46 KG/M2 | HEART RATE: 86 BPM

## 2021-11-23 DIAGNOSIS — F41.9 ANXIETY AND DEPRESSION: ICD-10-CM

## 2021-11-23 DIAGNOSIS — Z86.73 HISTORY OF STROKE: ICD-10-CM

## 2021-11-23 DIAGNOSIS — F32.A ANXIETY AND DEPRESSION: ICD-10-CM

## 2021-11-23 DIAGNOSIS — E78.2 MIXED HYPERLIPIDEMIA: ICD-10-CM

## 2021-11-23 DIAGNOSIS — E78.5 HYPERLIPIDEMIA, UNSPECIFIED HYPERLIPIDEMIA TYPE: ICD-10-CM

## 2021-11-23 DIAGNOSIS — I63.9 ISCHEMIC STROKE (HCC): ICD-10-CM

## 2021-11-23 DIAGNOSIS — G81.91 RIGHT HEMIPLEGIA (HCC): Primary | ICD-10-CM

## 2021-11-23 PROCEDURE — G8484 FLU IMMUNIZE NO ADMIN: HCPCS | Performed by: NURSE PRACTITIONER

## 2021-11-23 PROCEDURE — G8427 DOCREV CUR MEDS BY ELIG CLIN: HCPCS | Performed by: NURSE PRACTITIONER

## 2021-11-23 PROCEDURE — 99214 OFFICE O/P EST MOD 30 MIN: CPT | Performed by: NURSE PRACTITIONER

## 2021-11-23 PROCEDURE — 3017F COLORECTAL CA SCREEN DOC REV: CPT | Performed by: NURSE PRACTITIONER

## 2021-11-23 PROCEDURE — G8419 CALC BMI OUT NRM PARAM NOF/U: HCPCS | Performed by: NURSE PRACTITIONER

## 2021-11-23 PROCEDURE — 1036F TOBACCO NON-USER: CPT | Performed by: NURSE PRACTITIONER

## 2021-11-23 RX ORDER — FLUOXETINE HYDROCHLORIDE 20 MG/1
20 CAPSULE ORAL DAILY
Qty: 30 CAPSULE | Refills: 11 | Status: SHIPPED | OUTPATIENT
Start: 2021-11-23 | End: 2022-08-23 | Stop reason: SDUPTHER

## 2021-11-23 RX ORDER — ATORVASTATIN CALCIUM 20 MG/1
20 TABLET, FILM COATED ORAL DAILY
Qty: 30 TABLET | Refills: 11 | Status: SHIPPED | OUTPATIENT
Start: 2021-11-23 | End: 2022-08-23 | Stop reason: SDUPTHER

## 2021-11-23 NOTE — PROGRESS NOTES
[facility]            [address]          [place], [address] [facility]          Dept: [phone]          Dept Fax: [phone]    E. Belinda River, MD Carleene Dry, MD Ahmed B. Wynelle Bumpers, MD Radonna Nutting, MD Helena Ohs, CNP            11/23/2021      HISTORY OF PRESENT ILLNESS:       I had the pleasure of seeing Stephen Umanzor, who returns for continuing neurologic care. The patient was seen last on May 19, 2021 for treatment of a previous left internal capsule ischemic stroke and depression. The patient had a previous left internal capsule ischemic stroke in August 2019 which was secondary to uncontrolled hypertension and uncontrolled diabetes. At his last visit, EasyQasa functional electrical stimulators were ordered for his right leg for management of right foot drop and his right arm to increase his extension, flexion and  of his right hand. For secondary stroke prevention he is prescribed aspirin 81 mg daily and lipitor 20 mg daily. He reports today that he did not receive the EasyQasa electrical stimulators prior to today's visit. He notes that he has continued to have a return in the function in his right hand. He denies any pain in his extremities or any cramping. He does get intermittent tingling in his right hand but notes that it is manageable. He has remained compliant to aspirin and lipitor and denies any side effects. The patient recently completed lab work which revealed a mildy elevated A1C at 6.9 and elevated triglycerides at 180. He also has depression and is prescribed prozac 20 mg daily for management. He has remained compliant to prozac and notes that he is no longer having episodes of spontaneous crying.         Testing reviewed:    Labs Completed 8/6/2021    Cholesterol <200 mg/dL 130      HDL >40 mg/dL 29 Low      LDL Cholesterol 0 - 130 mg/dL 65     Triglycerides <150 mg/dL 180 High      Hemoglobin A1C 4.0 - 6.0 % 6.9 High      ALT 5 - 41 U/L 24    AST <40 U/L 23        Labs Completed 1/6/2021  Hemoglobin A1C 6.5High        Cholesterol 167  <200 mg/dL      HDL 34Low   >40 mg/dL      LDL Cholesterol 111  0 - 130 mg/dL               8/5/2019  CTH: Hypodense abnormality in the right? basal ganglia   CTA Head and Neck : Unremarkable      8/6/2019  Brain MRI : Periventricular white matter disease of a chronic nature with a focal area of acute ischemia in the left posterior limb of the internal capsule.      2 D echo  EF 60-65%     OTHER WORKUP:  -> 42   A1c 11.6-> 8.8 (9/13/2019)-> 6.8     PMH/PSH/SH/FMH: Remain unchanged since last visit except those listed in the interval history.           PAST MEDICAL HISTORY:         Diagnosis Date    [facility] occlusion [facility]     Diabetes mellitus ([facility]     High cholesterol     Hypertension         PAST SURGICAL HISTORY:         Procedure Laterality Date    KNEE SURGERY  1983        SOCIAL HISTORY:     Social History     Socioeconomic History    Marital status: Single     Spouse name: Not on file    Number of children: Not on file    Years of education: Not on file    Highest education level: Not on file   Occupational History    Not on file   Tobacco Use    Smoking status: Never Smoker    Smokeless tobacco: Never Used   Vaping Use    Vaping Use: Never used   Substance and Sexual Activity    Alcohol use: Not Currently    Drug use: Never    Sexual activity: Not on file   Other Topics Concern    Not on file   Social History Narrative    Not on file     Social Determinants of Health     Financial Resource Strain: Low Risk     Difficulty of Paying Living Expenses: Not hard at all   Food Insecurity: No Food Insecurity    Worried About Running Out of Food in the Last Year: Never true    Crystal of Food in the Last Year: Never true   Transportation Needs:     Lack of Transportation (Medical):  Not on file    Lack of Transportation (Non-Medical): Not on file   Physical Activity:     Days of Exercise per Week: Not on file    Minutes of Exercise per Session: Not on file   Stress:     Feeling of Stress : Not on file   Social Connections:     Frequency of Communication with Friends and Family: Not on file    Frequency of Social Gatherings with Friends and Family: Not on file    Attends Sikh Services: Not on file    Active Member of Clubs or Organizations: Not on file    Attends Club or Organization Meetings: Not on file    Marital Status: Not on file   Intimate Partner Violence:     Fear of Current or Ex-Partner: Not on file    Emotionally Abused: Not on file    Physically Abused: Not on file    Sexually Abused: Not on file   Housing Stability:     Unable to Pay for Housing in the Last Year: Not on file    Number of Places Lived in the Last Year: Not on file    Unstable Housing in the Last Year: Not on file       CURRENT MEDICATIONS:     Current Outpatient Medications   Medication Sig Dispense Refill    FLUoxetine (PROZAC) 20 MG capsule Take 1 capsule by mouth daily 30 capsule 11    atorvastatin (LIPITOR) 20 MG tablet Take 1 tablet by mouth daily 30 tablet 11    Lancets (ONETOUCH DELICA PLUS [id number]) MISC USE TO TEST BLOOD SUGAR TWO TIMES A  each 3    LEVEMIR FLEXTOUCH 100 UNIT/ML injection pen INJECT UNDER THE SKIN 20 UNITS ONCE NIGHTLY 5 pen 0    potassium chloride (KLOR-CON M) 20 MEQ TBCR extended release tablet TAKE ONE TABLET BY MOUTH DAILY 90 tablet 1    amLODIPine (NORVASC) 10 MG tablet TAKE ONE TABLET BY MOUTH DAILY 90 tablet 0    hydrALAZINE (APRESOLINE) 25 MG tablet TAKE ONE TABLET BY MOUTH DAILY 90 tablet 0    Insulin Pen Needle (B-D ULTRAFINE III SHORT PEN) 31G X 8 MM MISC USE ONE - FOUR NEEDLE DAILY 150 each 5    blood glucose monitor strips Test 3 times a day & as needed for symptoms of irregular blood glucose.  200 strip 5    insulin lispro, 1 Unit Dial, (HUMALOG KWIKPEN) 100 UNIT/ML SOPN According to sliding scale. Max dose per day 48 3 pen 1    blood glucose monitor kit and supplies Test 3  times a day & as needed for symptoms of irregular blood glucose. ONE TOUCH METER 1 kit 0    aspirin 81 MG chewable tablet Take 81 mg by mouth daily      Insulin Pen Needle (PEN NEEDLES) 31G X 6 MM MISC Inject 1 each into the skin daily 100 each 3     No current facility-administered medications for this visit. ALLERGIES:     Allergies   Allergen Reactions    Dairycare [Lactase-Lactobacillus]                                  REVIEW OF SYSTEMS        All items selected indicate a positive finding. Those items not selected are negative. Constitutional [] Weight loss/gain   [] Fatigue  [] Fever/Chills   HEENT [] Hearing Loss  [] Visual Disturbance  [] Tinnitus  [] Eye pain   Respiratory [] Shortness of Breath  [] Cough  [] Snoring   Cardiovascular [] Chest Pain  [] Palpitations  [] Lightheaded   GI [] Constipation  [] Diarrhea  [] Swallowing change  [] Nausea/vomiting    [] Urinary Frequency  [] Urinary Urgency   Musculoskeletal [] Neck pain  [] Back pain  [] Muscle pain  [] Restless legs   Dermatologic [] Skin changes   Neurologic [] Memory loss/confusion  [] Seizures  [x] Trouble walking or imbalance  [] Dizziness  [] Sleep disturbance  [x] Weakness  [] Numbness  [] Tremors  [] Speech Difficulty  [] Headaches  [] Light Sensitivity  [] Sound Sensitivity   Endocrinology []Excessive thirst  []Excessive hunger   Psychiatric [x] Anxiety/Depression  [] Hallucination   Allergy/immunology []Hives/environmental allergies   Hematologic/lymph [] Abnormal bleeding  [] Abnormal bruising         PHYSICAL EXAMINATION:       Vitals:    11/23/21 1000   BP: 129/80   Pulse: 86   Temp: 98.2 °F (36.8 °C)                                              .                                                                                                     General Appearance:  Alert, cooperative, no signs of distress, appears stated age   Head:  Normocephalic, no signs of trauma   Eyes:  Conjunctiva/corneas clear;  eyelids intact   Ears:  Normal external ear and canals   Nose: Nares normal, mucosa normal, no drainage    Throat: Lips and tongue normal; teeth normal;  gums normal   Neck: Supple, intact flexion, extension and rotation;   trachea midline;  no adenopathy;   thyroid: not enlarged;   no carotid pulse abnormality   Back:   Symmetric, no curvature, ROM adequate   Lungs:   Respirations unlabored   Heart:  Regular rate and rhythm           Extremities: Extremities normal, no cyanosis, no edema   Pulses: Symmetric over head and neck   Skin: Skin color, texture normal, no rashes, no lesions                                     NEUROLOGIC EXAMINATION    Neurologic Exam  Mental status    Alert and oriented x 3; intact memory with no confusion, speech or language problems; no hallucinations or delusions  Fund of information appropriate for level of education    Cranial nerves    II - visual fields intact to confrontation bilaterally  III, IV, VI - extra-ocular muscles full: no pupillary defect; no SARAY, no nystagmus, no ptosis   V - normal facial sensation                                                               VII - normal facial symmetry                                                             VIII - intact hearing                                                                             IX, X - symmetrical palate                                                                  XI - symmetrical shoulder shrug                                                       XII - tongue midline without atrophy or fasciculation      Motor function  Normal muscle bulk and tone; strength 5/5 on all 4 extremities, no pronator drift      Sensory function Intact to light touch, pinprick, vibration, proprioception on all 4 extremities      Cerebellar Intact fine motor movement. No involuntary movements or tremors.  No ataxia or dysmetria on finger to nose or heel to shin testing      Reflex function DTR 2+ on bilateral UE and LE, symmetric. Down going toes bilaterally      Gait                   normal base and arm swing                  Medical Decision Making: In summary, your patient, Marcelo Mike exhibits the following, with associated plan:    Depression  Continue prozac 20 mg daily  Previous left internal capsule ischemic stroke secondary to uncontrolled hypertension and uncontrolled diabetes in august 2019  Most recent A1C 6.9 in August 2021  Triglycerides were mildly elevated in August 2021 at 180   Continue aspirin 81 mg daily  Continue lipitor 20 mg daily  Patient given a letter for a handicap placard   Return for follow up visit in 3 months, at this visit we will likely refer the patient to physical therapy              Signed: Niall Gonsalves CNP      *Please note that portions of this note were completed with a voice recognition program.  Although every effort was made to insure the accuracy of this automated transcription, some errors in transcription may have occurred, occasionally words and are mis-transcribed    Provider Attestation: The documentation recorded by the scribe accurately reflects the service I personally performed and the decisions made by myself. Portions of this note were transcribed by a scribe. I personally performed the history, physical exam, and medical decision-making and confirm the accuracy of the information in the transcribed note. Scribe Attestation:   By signing my name below, Jamaica Upton, attest that this documentation has been prepared under the direction and in the presence of Niall Gonsalves CNP.

## 2021-11-23 NOTE — LETTER
[facility]  [address] [facility] [address]  Phone: [phone]  Fax: [phone], APRN - CNP         November 23, 2021     Patient: Sue Can   YOB: 1960   Date of Visit: 11/23/2021       To Whom It May Concern: It is my medical opinion that Sue Can requires a disability parking placard for the following reasons:  He has limited walking ability due to a neurologic condition. Duration of need: 10 years or greater  Dx: Ischemic Stroke I63.9    If you have any questions or concerns, please don't hesitate to call.     Sincerely,        Zac Dover, APRN - CNP

## 2021-11-29 RX ORDER — AMLODIPINE BESYLATE 10 MG/1
TABLET ORAL
Qty: 90 TABLET | Refills: 0 | Status: SHIPPED | OUTPATIENT
Start: 2021-11-29 | End: 2022-01-24

## 2021-11-29 RX ORDER — HYDRALAZINE HYDROCHLORIDE 25 MG/1
TABLET, FILM COATED ORAL
Qty: 90 TABLET | Refills: 0 | Status: SHIPPED | OUTPATIENT
Start: 2021-11-29 | End: 2022-01-24

## 2021-11-29 RX ORDER — AMLODIPINE BESYLATE 10 MG/1
TABLET ORAL
Qty: 82 TABLET | OUTPATIENT
Start: 2021-11-29

## 2021-11-29 RX ORDER — HYDRALAZINE HYDROCHLORIDE 25 MG/1
TABLET, FILM COATED ORAL
Qty: 82 TABLET | OUTPATIENT
Start: 2021-11-29

## 2021-11-29 NOTE — TELEPHONE ENCOUNTER
Caroline Blackman is calling to request a refill on the following medication(s):    Medication Request:  Requested Prescriptions     Refused Prescriptions Disp Refills    hydrALAZINE (APRESOLINE) 25 MG tablet [Pharmacy Med Name: hydrALAZINE 25 MG TABLET] 82 tablet      Sig: TAKE ONE TABLET BY MOUTH DAILY     Refused By: Sriram Akhtar     Reason for Refusal: Duplicate Request    amLODIPine (NORVASC) 10 MG tablet [Pharmacy Med Name: amLODIPine BESYLATE 10 MG TAB] 82 tablet      Sig: TAKE ONE TABLET BY MOUTH DAILY     Refused By: Sriram Akhtar     Reason for Refusal: Duplicate Request       Last Visit Date (If Applicable):  21/3/9289    Next Visit Date:    2/2/2022

## 2021-11-29 NOTE — TELEPHONE ENCOUNTER
Shelbie Garcia is calling to request a refill on the following medication(s):    Medication Request:    Last filled 9/8/21 #90 with 0 rf    Requested Prescriptions     Pending Prescriptions Disp Refills    amLODIPine (NORVASC) 10 MG tablet [Pharmacy Med Name: amLODIPine BESYLATE 10 MG TAB] 72 tablet      Sig: TAKE ONE TABLET BY MOUTH DAILY    hydrALAZINE (APRESOLINE) 25 MG tablet [Pharmacy Med Name: hydrALAZINE 25 MG TABLET] 72 tablet      Sig: TAKE ONE TABLET BY MOUTH DAILY       Last Visit Date (If Applicable):  33/0/3825    Next Visit Date:    2/2/2022

## 2022-01-24 RX ORDER — HYDRALAZINE HYDROCHLORIDE 25 MG/1
TABLET, FILM COATED ORAL
Qty: 86 TABLET | Refills: 0 | OUTPATIENT
Start: 2022-01-24

## 2022-01-24 RX ORDER — HYDRALAZINE HYDROCHLORIDE 25 MG/1
TABLET, FILM COATED ORAL
Qty: 90 TABLET | Refills: 0 | Status: SHIPPED | OUTPATIENT
Start: 2022-01-24 | End: 2022-05-23

## 2022-01-24 RX ORDER — AMLODIPINE BESYLATE 10 MG/1
TABLET ORAL
Qty: 86 TABLET | Refills: 0 | OUTPATIENT
Start: 2022-01-24

## 2022-01-24 RX ORDER — AMLODIPINE BESYLATE 10 MG/1
TABLET ORAL
Qty: 90 TABLET | Refills: 0 | Status: SHIPPED | OUTPATIENT
Start: 2022-01-24 | End: 2022-05-23

## 2022-01-24 NOTE — TELEPHONE ENCOUNTER
Chelsey Dorsey is calling to request a refill on the following medication(s):    Medication Request:    Last filled 11/29/21 #90 with 0 RF    Requested Prescriptions     Refused Prescriptions Disp Refills    amLODIPine (NORVASC) 10 MG tablet [Pharmacy Med Name: amLODIPine BESYLATE 10 MG TAB] 86 tablet 0     Sig: TAKE ONE TABLET BY MOUTH DAILY     Refused By: Mabel Drake     Reason for Refusal: Duplicate Request    hydrALAZINE (APRESOLINE) 25 MG tablet [Pharmacy Med Name: hydrALAZINE 25 MG TABLET] 86 tablet 0     Sig: TAKE ONE TABLET BY MOUTH DAILY     Refused By: Mabel Drake     Reason for Refusal: Duplicate Request       Last Visit Date (If Applicable):  17/8/6431    Next Visit Date:    2/2/2022

## 2022-01-24 NOTE — TELEPHONE ENCOUNTER
Donny Hernández is calling to request a refill on the following medication(s):    Medication Request:    Last filled 11/29/21 #90 with 0 Rf    Requested Prescriptions     Pending Prescriptions Disp Refills    amLODIPine (NORVASC) 10 MG tablet [Pharmacy Med Name: amLODIPine BESYLATE 10 MG TAB] 82 tablet      Sig: TAKE ONE TABLET BY MOUTH DAILY    hydrALAZINE (APRESOLINE) 25 MG tablet [Pharmacy Med Name: hydrALAZINE 25 MG TABLET] 82 tablet      Sig: TAKE ONE TABLET BY MOUTH DAILY       Last Visit Date (If Applicable):  46/1/5961    Next Visit Date:    2/2/2022

## 2022-02-01 ASSESSMENT — PATIENT HEALTH QUESTIONNAIRE - PHQ9
SUM OF ALL RESPONSES TO PHQ QUESTIONS 1-9: 0
4. FEELING TIRED OR HAVING LITTLE ENERGY: 0
6. FEELING BAD ABOUT YOURSELF - OR THAT YOU ARE A FAILURE OR HAVE LET YOURSELF OR YOUR FAMILY DOWN: 0
2. FEELING DOWN, DEPRESSED OR HOPELESS: 0
1. LITTLE INTEREST OR PLEASURE IN DOING THINGS: 0
SUM OF ALL RESPONSES TO PHQ9 QUESTIONS 1 & 2: 0
8. MOVING OR SPEAKING SO SLOWLY THAT OTHER PEOPLE COULD HAVE NOTICED. OR THE OPPOSITE, BEING SO FIGETY OR RESTLESS THAT YOU HAVE BEEN MOVING AROUND A LOT MORE THAN USUAL: 0
7. TROUBLE CONCENTRATING ON THINGS, SUCH AS READING THE NEWSPAPER OR WATCHING TELEVISION: 0
SUM OF ALL RESPONSES TO PHQ QUESTIONS 1-9: 0
3. TROUBLE FALLING OR STAYING ASLEEP: 0
5. POOR APPETITE OR OVEREATING: 0
SUM OF ALL RESPONSES TO PHQ QUESTIONS 1-9: 0
9. THOUGHTS THAT YOU WOULD BE BETTER OFF DEAD, OR OF HURTING YOURSELF: 0
10. IF YOU CHECKED OFF ANY PROBLEMS, HOW DIFFICULT HAVE THESE PROBLEMS MADE IT FOR YOU TO DO YOUR WORK, TAKE CARE OF THINGS AT HOME, OR GET ALONG WITH OTHER PEOPLE: 0
SUM OF ALL RESPONSES TO PHQ QUESTIONS 1-9: 0

## 2022-02-02 ENCOUNTER — VIRTUAL VISIT (OUTPATIENT)
Dept: INTERNAL MEDICINE CLINIC | Age: 62
End: 2022-02-02
Payer: COMMERCIAL

## 2022-02-02 DIAGNOSIS — Z86.16 HISTORY OF COVID-19: ICD-10-CM

## 2022-02-02 DIAGNOSIS — Z28.21 TETANUS, DIPHTHERIA, AND ACELLULAR PERTUSSIS (TDAP) VACCINATION DECLINED: ICD-10-CM

## 2022-02-02 DIAGNOSIS — G81.91 RIGHT HEMIPLEGIA (HCC): ICD-10-CM

## 2022-02-02 DIAGNOSIS — F32.A ANXIETY AND DEPRESSION: ICD-10-CM

## 2022-02-02 DIAGNOSIS — F41.9 ANXIETY AND DEPRESSION: ICD-10-CM

## 2022-02-02 DIAGNOSIS — Z28.21 PNEUMOCOCCAL VACCINATION DECLINED: ICD-10-CM

## 2022-02-02 DIAGNOSIS — I10 ESSENTIAL HYPERTENSION: Primary | ICD-10-CM

## 2022-02-02 DIAGNOSIS — N18.31 TYPE 1 DIABETES MELLITUS WITH STAGE 3A CHRONIC KIDNEY DISEASE (HCC): ICD-10-CM

## 2022-02-02 DIAGNOSIS — Z28.21 INFLUENZA VACCINATION DECLINED: ICD-10-CM

## 2022-02-02 DIAGNOSIS — E78.2 MIXED HYPERLIPIDEMIA: ICD-10-CM

## 2022-02-02 DIAGNOSIS — Z86.73 HISTORY OF STROKE: ICD-10-CM

## 2022-02-02 DIAGNOSIS — E10.22 TYPE 1 DIABETES MELLITUS WITH STAGE 3A CHRONIC KIDNEY DISEASE (HCC): ICD-10-CM

## 2022-02-02 PROCEDURE — G8419 CALC BMI OUT NRM PARAM NOF/U: HCPCS | Performed by: FAMILY MEDICINE

## 2022-02-02 PROCEDURE — 2022F DILAT RTA XM EVC RTNOPTHY: CPT | Performed by: FAMILY MEDICINE

## 2022-02-02 PROCEDURE — G8484 FLU IMMUNIZE NO ADMIN: HCPCS | Performed by: FAMILY MEDICINE

## 2022-02-02 PROCEDURE — 99214 OFFICE O/P EST MOD 30 MIN: CPT | Performed by: FAMILY MEDICINE

## 2022-02-02 PROCEDURE — G8427 DOCREV CUR MEDS BY ELIG CLIN: HCPCS | Performed by: FAMILY MEDICINE

## 2022-02-02 PROCEDURE — 3017F COLORECTAL CA SCREEN DOC REV: CPT | Performed by: FAMILY MEDICINE

## 2022-02-02 PROCEDURE — 3046F HEMOGLOBIN A1C LEVEL >9.0%: CPT | Performed by: FAMILY MEDICINE

## 2022-02-02 PROCEDURE — 1036F TOBACCO NON-USER: CPT | Performed by: FAMILY MEDICINE

## 2022-02-02 NOTE — PROGRESS NOTES
Subjective:      Patient ID: Jasmeet Rios is a 64 y.o. male. 2022    TELEHEALTH EVALUATION -- Audio/Visual (During KBUFS-45 public health emergency)    HPI:    Jasmeet Rios (:  1960) has requested an audio/video evaluation for the following concern(s):    Dependent diabetes mellitus  Hypertension  Hyperlipidemia  Diabetic nephropathy  History of CVA  Anxiety/depression    Review of Systems    Prior to Visit Medications    Medication Sig Taking? Authorizing Provider   amLODIPine (NORVASC) 10 MG tablet TAKE ONE TABLET BY MOUTH DAILY Yes Shirley Webber MD   hydrALAZINE (APRESOLINE) 25 MG tablet TAKE ONE TABLET BY MOUTH DAILY Yes Shirley Webber MD   FLUoxetine (PROZAC) 20 MG capsule Take 1 capsule by mouth daily Yes TOY Valdivia CNP   atorvastatin (LIPITOR) 20 MG tablet Take 1 tablet by mouth daily Yes TOY Valdivia CNP   Lancets (ONETOUCH DELICA PLUS [id number]) MISC USE TO TEST BLOOD SUGAR TWO TIMES A DAY Yes Shirley Webber MD   LEVEMIR FLEXTOUCH 100 UNIT/ML injection pen INJECT UNDER THE SKIN 20 UNITS ONCE NIGHTLY Yes Shirley Webber MD   potassium chloride (KLOR-CON M) 20 MEQ TBCR extended release tablet TAKE ONE TABLET BY MOUTH DAILY Yes Shirley Webber MD   Insulin Pen Needle (B-D ULTRAFINE III SHORT PEN) 31G X 8 MM MISC USE ONE - FOUR NEEDLE DAILY Yes Shirley Webber MD   blood glucose monitor strips Test 3 times a day & as needed for symptoms of irregular blood glucose. Yes Shirley Webber MD   insulin lispro, 1 Unit Dial, (HUMALOG KWIKPEN) 100 UNIT/ML SOPN According to sliding scale. Max dose per day 48 Yes Shirley Webber MD   blood glucose monitor kit and supplies Test 3  times a day & as needed for symptoms of irregular blood glucose.  Caryl Mcleod MD   aspirin 81 MG chewable tablet Take 81 mg by mouth daily Yes Historical Provider, MD   Insulin Pen Needle (PEN NEEDLES) 31G X 6 MM MISC Inject 1 each into the skin daily Yes Noe Collado MD Social History     Tobacco Use    Smoking status: Never Smoker    Smokeless tobacco: Never Used   Vaping Use    Vaping Use: Never used   Substance Use Topics    Alcohol use: Not Currently    Drug use: Never        Allergies   Allergen Reactions    Dairycare [Lactase-Lactobacillus]        PHYSICAL EXAMINATION:  [ INSTRUCTIONS:  \"[x]\" Indicates a positive item  \"[]\" Indicates a negative item  -- DELETE ALL ITEMS NOT EXAMINED]  Vital Signs: (As obtained by patient/caregiver or practitioner observation)    Blood pressure-  Heart rate-    Respiratory rate-    Temperature-  Pulse oximetry-     Constitutional: [x] Appears well-developed and well-nourished [x] No apparent distress      [] Abnormal-   Mental status  [x] Alert and awake  [x] Oriented to person/place/time [x]Able to follow commands      Eyes:  EOM    [x]  Normal  [] Abnormal-  Sclera  [x]  Normal  [] Abnormal -         Discharge [x]  None visible  [] Abnormal -    HENT:   [x] Normocephalic, atraumatic.   [] Abnormal   [x] Mouth/Throat: Mucous membranes are moist.     External Ears [x] Normal  [] Abnormal-     Neck: [x] No visualized mass     Pulmonary/Chest: [x] Respiratory effort normal.  [x] No visualized signs of difficulty breathing or respiratory distress        [] Abnormal-      Musculoskeletal:   [] Normal gait with no signs of ataxia         [] Normal range of motion of neck        [x] Abnormal-degenerative polyarthralgia    Neurological:        [] No Facial Asymmetry (Cranial nerve 7 motor function) (limited exam to video visit)          [] No gaze palsy        [x] Abnormal-history of CVA with residual hemiplegia      Skin:        [x] No significant exanthematous lesions or discoloration noted on facial skin         [] Abnormal-            Psychiatric:       [] Normal Affect [] No Hallucinations        [x] Abnormal-anxiety/depression  Other pertinent observable physical exam findings-     ASSESSMENT/PLAN:  This is a pleasant 64year-old male with underlying history significant for insulin-dependent diabetes mellitus. He denies hypoglycemia. Review of the record shows that A1c worsened modestly from 6.5-6.9. He is advised to continue current regimen, adhere to ADA 1800 diet daily moderate exercise  Hypertension we aim to keep his blood pressure equal less than 130/80. Consume less than 2 g of salt a day  Hyperlipidemia on statin that he is tolerating well  He is counseled on diabetic foot care  He is advised to update annual dilated eye exam  History of CVA with hemiplegia. He is advised to continue aspirin, statin  Anxiety/depression stable on Prozac that he is tolerating well  Diabetic nephropathy with creatinine 1.25. Avoid nephrotoxic drugs, anti-inflammatory radiocontrast.  Optimize diabetes and blood pressure  He denies tobacco, excessive alcohol or illicit drug use  He is updated on Covid vaccination including booster however reluctant to influenza and pneumococcal vaccine  He is encouraged to call for any concern      No follow-ups on file. Laurie Doll, was evaluated through a synchronous (real-time) audio-video encounter. The patient (or guardian if applicable) is aware that this is a billable service, which includes applicable co-pays. This Virtual Visit was conducted with patient's (and/or legal guardian's) consent. The visit was conducted pursuant to the emergency declaration under the [address] [place] [address], [address] [place] [address] authority and the [employer] General Act. Patient identification was verified, and a caregiver was present when appropriate. The patient was located at home in a state where the provider was licensed to provide care. Total time spent on this encounter: 24 minutes    --Tran Shell MD on 2/2/2022 at 10:15 AM    An electronic signature was used to authenticate this note.   This note is created with a voice recognition program and while intend to generate a document that accurately reflects the content of the visit, no guarantee can be provided that every mistake has been identified and corrected by editing.     Melissa West MD

## 2022-02-22 ENCOUNTER — OFFICE VISIT (OUTPATIENT)
Dept: NEUROLOGY | Age: 62
End: 2022-02-22
Payer: COMMERCIAL

## 2022-02-22 ENCOUNTER — HOSPITAL ENCOUNTER (OUTPATIENT)
Age: 62
Setting detail: SPECIMEN
Discharge: HOME | End: 2022-02-22

## 2022-02-22 ENCOUNTER — TELEPHONE (OUTPATIENT)
Dept: INTERNAL MEDICINE CLINIC | Age: 62
End: 2022-02-22

## 2022-02-22 VITALS
WEIGHT: 182 LBS | BODY MASS INDEX: 25.48 KG/M2 | SYSTOLIC BLOOD PRESSURE: 119 MMHG | HEART RATE: 82 BPM | TEMPERATURE: 97.6 F | HEIGHT: 71 IN | DIASTOLIC BLOOD PRESSURE: 79 MMHG

## 2022-02-22 DIAGNOSIS — D72.829 LEUKOCYTOSIS, UNSPECIFIED TYPE: Primary | ICD-10-CM

## 2022-02-22 DIAGNOSIS — I63.9 ISCHEMIC STROKE (HCC): ICD-10-CM

## 2022-02-22 DIAGNOSIS — Z86.73 HISTORY OF STROKE: ICD-10-CM

## 2022-02-22 DIAGNOSIS — F32.A ANXIETY AND DEPRESSION: ICD-10-CM

## 2022-02-22 DIAGNOSIS — E78.2 MIXED HYPERLIPIDEMIA: ICD-10-CM

## 2022-02-22 DIAGNOSIS — E10.22 TYPE 1 DIABETES MELLITUS WITH STAGE 3A CHRONIC KIDNEY DISEASE (HCC): ICD-10-CM

## 2022-02-22 DIAGNOSIS — G81.91 RIGHT HEMIPLEGIA (HCC): Primary | ICD-10-CM

## 2022-02-22 DIAGNOSIS — N18.31 TYPE 1 DIABETES MELLITUS WITH STAGE 3A CHRONIC KIDNEY DISEASE (HCC): ICD-10-CM

## 2022-02-22 DIAGNOSIS — E78.5 HYPERLIPIDEMIA, UNSPECIFIED HYPERLIPIDEMIA TYPE: ICD-10-CM

## 2022-02-22 DIAGNOSIS — F41.9 ANXIETY AND DEPRESSION: ICD-10-CM

## 2022-02-22 LAB
ALBUMIN SERPL-MCNC: 4.6 G/DL (ref 3.5–5.2)
ALBUMIN/GLOBULIN RATIO: 1.8 (ref 1–2.5)
ALP BLD-CCNC: 84 U/L (ref 40–129)
ALT SERPL-CCNC: 13 U/L (ref 5–41)
ANION GAP SERPL CALCULATED.3IONS-SCNC: 15 MMOL/L (ref 9–17)
AST SERPL-CCNC: 15 U/L
BILIRUB SERPL-MCNC: 0.41 MG/DL (ref 0.3–1.2)
BUN BLDV-MCNC: 22 MG/DL (ref 8–23)
CALCIUM SERPL-MCNC: 9.4 MG/DL (ref 8.6–10.4)
CHLORIDE BLD-SCNC: 106 MMOL/L (ref 98–107)
CHOLESTEROL/HDL RATIO: 5.1
CHOLESTEROL: 173 MG/DL
CO2: 24 MMOL/L (ref 20–31)
CREAT SERPL-MCNC: 1.48 MG/DL (ref 0.7–1.2)
ESTIMATED AVERAGE GLUCOSE: 160 MG/DL
GFR AFRICAN AMERICAN: 59 ML/MIN
GFR NON-AFRICAN AMERICAN: 48 ML/MIN
GFR SERPL CREATININE-BSD FRML MDRD: ABNORMAL ML/MIN/{1.73_M2}
GLUCOSE BLD-MCNC: 96 MG/DL (ref 70–99)
HBA1C MFR BLD: 7.2 % (ref 4–6)
HCT VFR BLD CALC: 41.7 % (ref 40.7–50.3)
HDLC SERPL-MCNC: 34 MG/DL
HEMOGLOBIN: 13.5 G/DL (ref 13–17)
LDL CHOLESTEROL: 110 MG/DL (ref 0–130)
MCH RBC QN AUTO: 29.2 PG (ref 25.2–33.5)
MCHC RBC AUTO-ENTMCNC: 32.4 G/DL (ref 28.4–34.8)
MCV RBC AUTO: 90.3 FL (ref 82.6–102.9)
NRBC AUTOMATED: 0.1 PER 100 WBC
PDW BLD-RTO: 13.7 % (ref 11.8–14.4)
PLATELET # BLD: 228 K/UL (ref 138–453)
PMV BLD AUTO: 10.9 FL (ref 8.1–13.5)
POTASSIUM SERPL-SCNC: 4.2 MMOL/L (ref 3.7–5.3)
PROSTATE SPECIFIC ANTIGEN: 0.85 UG/L
RBC # BLD: 4.62 M/UL (ref 4.21–5.77)
SODIUM BLD-SCNC: 145 MMOL/L (ref 135–144)
TOTAL PROTEIN: 7.1 G/DL (ref 6.4–8.3)
TRIGL SERPL-MCNC: 147 MG/DL
TSH SERPL DL<=0.05 MIU/L-ACNC: 3.69 MIU/L (ref 0.3–5)
WBC # BLD: 34.2 K/UL (ref 3.5–11.3)

## 2022-02-22 PROCEDURE — G8419 CALC BMI OUT NRM PARAM NOF/U: HCPCS | Performed by: NURSE PRACTITIONER

## 2022-02-22 PROCEDURE — 3017F COLORECTAL CA SCREEN DOC REV: CPT | Performed by: NURSE PRACTITIONER

## 2022-02-22 PROCEDURE — G8427 DOCREV CUR MEDS BY ELIG CLIN: HCPCS | Performed by: NURSE PRACTITIONER

## 2022-02-22 PROCEDURE — G8484 FLU IMMUNIZE NO ADMIN: HCPCS | Performed by: NURSE PRACTITIONER

## 2022-02-22 PROCEDURE — 99214 OFFICE O/P EST MOD 30 MIN: CPT | Performed by: NURSE PRACTITIONER

## 2022-02-22 PROCEDURE — 1036F TOBACCO NON-USER: CPT | Performed by: NURSE PRACTITIONER

## 2022-02-22 NOTE — PROGRESS NOTES
[facility]            [address]          [facility], [address] [facility]          Dept: [phone]          Dept Fax: [phone]    MD Isa Bennett MD Ahmed B. Doneta Reddish, MD Sanders Gambles, MD Londell Mallow, KAREN            2/22/2022      HISTORY OF PRESENT ILLNESS:       I had the pleasure of seeing Ashley Chew, who returns for continuing neurologic care. The patient was seen last on November 23, 2021 for treatment of depression and left internal capsule ischemic stroke. The patient has depression. The patient is prescribed prozac 20 mg daily. The patient has previous left internal capsule ischemic stroke secondary to uncontrolled hypertension and uncontrolled diabetes in August 2019. The patient's most recent A1C was 6.9 in August 2021. The patient's triglycerides were mildly elevated at 180. The patient is prescribed aspirin 81 mg daily and Lipitor 20 mg daily. The patient is here today reporting no changes in right foot steppage drop and right arm decreased extension, flexion, and  of his right hand. The patient states he feels his right hand  has improved as he is able to hold a guitar pick now. The patient reports there is certain times of the day he is able to extend his fingers. The patient ambulates with a cane. The patient has no pain or cramping associated with right hand. The patient reports intermittent tingling in his right hand. The patient reports no swallowing issues.      Testing reviewed:     Labs Completed 8/6/2021     Cholesterol <200 mg/dL 130       HDL >40 mg/dL 29 Low       LDL Cholesterol 0 - 130 mg/dL 65      Triglycerides <150 mg/dL 180 High       Hemoglobin A1C 4.0 - 6.0 % 6.9 High       ALT 5 - 41 U/L 24    AST <40 U/L 23          Labs Completed 1/6/2021  Hemoglobin A1C 6.5High        Cholesterol 167  <200 mg/dL      HDL 34Low   >40 mg/dL      LDL Cholesterol 111  0 - 130 mg/dL             8/5/2019  CTH: Hypodense abnormality in the right? basal ganglia   CTA Head and Neck : Unremarkable      8/6/2019  Brain MRI : Periventricular white matter disease of a chronic nature with a focal area of acute ischemia in the left posterior limb of the internal capsule.      2 D echo  EF 60-65%     OTHER WORKUP:  -> 42   A1c 11.6-> 8.8 (9/13/2019)-> 6.8     PMH/PSH/SH/FMH: Remain unchanged since last visit except those listed in the interval history.     PAST MEDICAL HISTORY:         Diagnosis Date    Cerebral artery occlusion with [facility] infarction [facility] [address]     Diabetes mellitus ([facility] [address]     High cholesterol     Hypertension         PAST SURGICAL HISTORY:         Procedure Laterality Date    KNEE SURGERY  1983        SOCIAL HISTORY:     Social History     Socioeconomic History    Marital status: Single     Spouse name: Not on file    Number of children: Not on file    Years of education: Not on file    Highest education level: Not on file   Occupational History    Not on file   Tobacco Use    Smoking status: Never Smoker    Smokeless tobacco: Never Used   Vaping Use    Vaping Use: Never used   Substance and Sexual Activity    Alcohol use: Not Currently    Drug use: Never    Sexual activity: Not on file   Other Topics Concern    Not on file   Social History Narrative    Not on file     Social Determinants of Health     Financial Resource Strain: Low Risk     Difficulty of Paying Living Expenses: Not hard at all   Food Insecurity: No Food Insecurity    Worried About Running Out of Food in the Last Year: Never true    Crystal of Food in the Last Year: Never true   Transportation Needs:     Lack of Transportation (Medical): Not on file    Lack of Transportation (Non-Medical):  Not on file   Physical Activity:     Days of Exercise per Week: Not on file    Minutes of Exercise per Session: Not on file   Stress:     Feeling of Stress : Not on file   Social Connections:     Frequency of Communication with Friends and Family: Not on file    Frequency of Social Gatherings with Friends and Family: Not on file    Attends Scientologist Services: Not on file    Active Member of Clubs or Organizations: Not on file    Attends Club or Organization Meetings: Not on file    Marital Status: Not on file   Intimate Partner Violence:     Fear of Current or Ex-Partner: Not on file    Emotionally Abused: Not on file    Physically Abused: Not on file    Sexually Abused: Not on file   Housing Stability:     Unable to Pay for Housing in the Last Year: Not on file    Number of Places Lived in the Last Year: Not on file    Unstable Housing in the Last Year: Not on file       CURRENT MEDICATIONS:     Current Outpatient Medications   Medication Sig Dispense Refill    amLODIPine (NORVASC) 10 MG tablet TAKE ONE TABLET BY MOUTH DAILY 90 tablet 0    hydrALAZINE (APRESOLINE) 25 MG tablet TAKE ONE TABLET BY MOUTH DAILY 90 tablet 0    FLUoxetine (PROZAC) 20 MG capsule Take 1 capsule by mouth daily 30 capsule 11    atorvastatin (LIPITOR) 20 MG tablet Take 1 tablet by mouth daily 30 tablet 11    Lancets (ONETOUCH DELICA PLUS [id number]) MISC USE TO TEST BLOOD SUGAR TWO TIMES A  each 3    LEVEMIR FLEXTOUCH 100 UNIT/ML injection pen INJECT UNDER THE SKIN 20 UNITS ONCE NIGHTLY 5 pen 0    potassium chloride (KLOR-CON M) 20 MEQ TBCR extended release tablet TAKE ONE TABLET BY MOUTH DAILY 90 tablet 1    Insulin Pen Needle (B-D ULTRAFINE III SHORT PEN) 31G X 8 MM MISC USE ONE - FOUR NEEDLE DAILY 150 each 5    blood glucose monitor strips Test 3 times a day & as needed for symptoms of irregular blood glucose. 200 strip 5    insulin lispro, 1 Unit Dial, (HUMALOG KWIKPEN) 100 UNIT/ML SOPN According to sliding scale. Max dose per day 48 3 pen 1    blood glucose monitor kit and supplies Test 3  times a day & as needed for symptoms of irregular blood glucose.  ONE TOUCH METER 1 kit 0    aspirin 81 MG chewable tablet Take 81 mg by mouth daily      Insulin Pen Needle (PEN NEEDLES) 31G X 6 MM MISC Inject 1 each into the skin daily 100 each 3     No current facility-administered medications for this visit. ALLERGIES:     Allergies   Allergen Reactions    Dairycare [Lactase-Lactobacillus]                                  REVIEW OF SYSTEMS        All items selected indicate a positive finding. Those items not selected are negative. Constitutional [] Weight loss/gain   [] Fatigue  [] Fever/Chills   HEENT [] Hearing Loss  [] Visual Disturbance  [] Tinnitus  [] Eye pain   Respiratory [] Shortness of Breath  [] Cough  [] Snoring   Cardiovascular [] Chest Pain  [] Palpitations  [] Lightheaded   GI [] Constipation  [] Diarrhea  [] Swallowing change  [] Nausea/vomiting    [] Urinary Frequency  [] Urinary Urgency   Musculoskeletal [] Neck pain  [] Back pain  [] Muscle pain  [] Restless legs   Dermatologic [] Skin changes   Neurologic [] Memory loss/confusion  [] Seizures  [x] Trouble walking or imbalance  [] Dizziness  [] Sleep disturbance  [x] Weakness  [] Numbness  [] Tremors  [] Speech Difficulty  [] Headaches  [] Light Sensitivity  [] Sound Sensitivity   Endocrinology []Excessive thirst  []Excessive hunger   Psychiatric [x] Anxiety/Depression  [] Hallucination   Allergy/immunology []Hives/environmental allergies   Hematologic/lymph [] Abnormal bleeding  [] Abnormal bruising         PHYSICAL EXAMINATION:       Vitals:    02/22/22 0932   BP: 119/79   Pulse: 82   Temp: 97.6 °F (36.4 °C)                                              .                                                                                                     General Appearance:  Alert, cooperative, no signs of distress, appears stated age   Head:  Normocephalic, no signs of trauma   Eyes:  Conjunctiva/corneas clear;  eyelids intact   Ears:  Normal external ear and canals   Nose: Nares normal, mucosa normal, no drainage    Throat: Lips and tongue normal; teeth normal;  gums normal   Neck: Supple, intact flexion, extension and rotation;   trachea midline;  no adenopathy;   thyroid: not enlarged;   no carotid pulse abnormality   Back:   Symmetric, no curvature, ROM adequate   Lungs:   Respirations unlabored   Heart:  Regular rate and rhythm           Extremities: Extremities normal, no cyanosis, no edema   Pulses: Symmetric over head and neck   Skin: Skin color, texture normal, no rashes, no lesions                                     NEUROLOGIC EXAMINATION    Neurologic Exam  Mental status    Alert and oriented x 3; intact memory with no confusion, speech or language problems; no hallucinations or delusions  Fund of information appropriate for level of education    Cranial nerves    II - visual fields intact to confrontation bilaterally  III, IV, VI - extra-ocular muscles full: no pupillary defect; no SARAY, no nystagmus, no ptosis   V - normal facial sensation                                                               VII - normal facial symmetry                                                             VIII - intact hearing                                                                             IX, X - symmetrical palate                                                                  XI - symmetrical shoulder shrug                                                       XII - tongue midline without atrophy or fasciculation      Motor function   right hemiparesis with his decreased tone      Sensory function Intact to light touch, pinprick, vibration, proprioception on all 4 extremities      Cerebellar Intact fine motor movement. No involuntary movements or tremors. No ataxia or dysmetria on finger to nose or heel to shin testing      Reflex function DTR 2+ on bilateral UE and LE, symmetric. Down going toes bilaterally      Gait                   hemiparetic gait with a right foot drop                   Medical Decision Making:        In summary, your patient, Steve Gee exhibits the following, with associated plan:    1. Depression. 1. Continue prozac 20 mg daily  2. Previous left internal capsule ischemic stroke secondary to uncontrolled hypertension and uncontrolled diabetes in august 2019. The patient has right hemiparetic gait with a  foot drop and decreased arm extension, flexion, and  of his right hand. 1. Continue aspirin 81 mg daily  2. Continue lipitor 20 mg daily  3. Referral made to physical and occupational therapy. 4. Return for follow up visit in 3 months.      Signed: Frannie Schmidt CNP      *Please note that portions of this note were completed with a voice recognition program.  Although every effort was made to insure the accuracy of this automated transcription, some errors in transcription may have occurred, occasionally words and are mis-transcribed    Provider Attestation: The documentation recorded by the scribe accurately reflects the service I personally performed and the decisions made by myself. Portions of this note were transcribed by a scribe. I personally performed the history, physical exam, and medical decision-making and confirm the accuracy of the information in the transcribed note. Scribe Attestation:   By signing my name below, Alexandria Monique, attest that this documentation has been prepared under the direction and in the presence of rFannie Schmidt CNP.

## 2022-02-28 RX ORDER — LEVOFLOXACIN 500 MG/1
500 TABLET, FILM COATED ORAL DAILY
Qty: 10 TABLET | Refills: 0 | Status: SHIPPED | OUTPATIENT
Start: 2022-02-28 | End: 2022-03-10

## 2022-03-01 ENCOUNTER — TELEPHONE (OUTPATIENT)
Dept: ONCOLOGY | Age: 62
End: 2022-03-01

## 2022-03-01 ENCOUNTER — HOSPITAL ENCOUNTER (OUTPATIENT)
Age: 62
Discharge: HOME | End: 2022-03-03
Payer: MEDICARE

## 2022-03-01 ENCOUNTER — HOSPITAL ENCOUNTER (OUTPATIENT)
Age: 62
Setting detail: SPECIMEN
Discharge: HOME | End: 2022-03-01
Payer: MEDICARE

## 2022-03-01 ENCOUNTER — HOSPITAL ENCOUNTER (OUTPATIENT)
Dept: GENERAL RADIOLOGY | Age: 62
Discharge: HOME | End: 2022-03-03
Payer: MEDICARE

## 2022-03-01 DIAGNOSIS — D72.829 LEUKOCYTOSIS, UNSPECIFIED TYPE: ICD-10-CM

## 2022-03-01 LAB
HCT VFR BLD CALC: 39.4 % (ref 40.7–50.3)
HEMOGLOBIN: 13.2 G/DL (ref 13–17)
MCH RBC QN AUTO: 29.8 PG (ref 25.2–33.5)
MCHC RBC AUTO-ENTMCNC: 33.5 G/DL (ref 28.4–34.8)
MCV RBC AUTO: 88.9 FL (ref 82.6–102.9)
NRBC AUTOMATED: 0 PER 100 WBC
PDW BLD-RTO: 13.6 % (ref 11.8–14.4)
PLATELET # BLD: 211 K/UL (ref 138–453)
PMV BLD AUTO: 10.5 FL (ref 8.1–13.5)
RBC # BLD: 4.43 M/UL (ref 4.21–5.77)
WBC # BLD: 31.1 K/UL (ref 3.5–11.3)

## 2022-03-01 PROCEDURE — 71046 X-RAY EXAM CHEST 2 VIEWS: CPT

## 2022-03-01 PROCEDURE — 36415 COLL VENOUS BLD VENIPUNCTURE: CPT

## 2022-03-01 PROCEDURE — 85027 COMPLETE CBC AUTOMATED: CPT

## 2022-03-02 ENCOUNTER — TELEPHONE (OUTPATIENT)
Dept: INTERNAL MEDICINE CLINIC | Age: 62
End: 2022-03-02

## 2022-03-02 NOTE — TELEPHONE ENCOUNTER
Received fax from insurance stating levemir will not be covered anymore unless PA is done.  They did provide patient with 30 day supply    Need pa or alternative    Fax was scanned in

## 2022-03-03 ENCOUNTER — HOSPITAL ENCOUNTER (OUTPATIENT)
Age: 62
Setting detail: SPECIMEN
Discharge: HOME | End: 2022-03-03

## 2022-03-03 LAB
-: NORMAL
BILIRUBIN URINE: NEGATIVE
CASTS UA: NORMAL /LPF (ref 0–8)
COLOR: YELLOW
EPITHELIAL CELLS UA: NORMAL /HPF (ref 0–5)
GLUCOSE URINE: NEGATIVE
KETONES, URINE: NEGATIVE
LEUKOCYTE ESTERASE, URINE: NEGATIVE
NITRITE, URINE: NEGATIVE
PH UA: 5 (ref 5–8)
PROTEIN UA: ABNORMAL
RBC UA: NORMAL /HPF (ref 0–4)
SPECIFIC GRAVITY UA: 1.02 (ref 1–1.03)
TURBIDITY: CLEAR
URINE HGB: NEGATIVE
UROBILINOGEN, URINE: NORMAL
WBC UA: NORMAL /HPF (ref 0–5)

## 2022-03-04 LAB
CULTURE: NO GROWTH
SPECIMEN DESCRIPTION: NORMAL

## 2022-03-14 RX ORDER — INSULIN GLARGINE 100 [IU]/ML
20 INJECTION, SOLUTION SUBCUTANEOUS NIGHTLY
Qty: 6 PEN | Refills: 1 | Status: SHIPPED | OUTPATIENT
Start: 2022-03-14 | End: 2022-09-23

## 2022-03-15 NOTE — FLOWSHEET NOTE
PT to ED pt was 80% on RA in triage. PT lung sounds very diminished. PT states that SOB started last Thursday. PT coughing and unable to talk due to SOB.   [x] Brayan Chase       Occupational Therapy            1st floor       [address], [place]         Phone: [phone]       Fax: [phone] [] [facility]  [address] [place] [address]  Phone: [phone]  Fax: [phone]      Occupational Therapy Daily Treatment Note    Date:  2021  Patient Name:  Andre Mendoza    :  1960  MRN: [id number]  Visit# / total visits: ; Progress note for Medicare patient due at visit 8    Patient: Andre Mendoza                      : 1960                      MRN: [id number]  Referring Provider:  Osmin Wilkinson  Insurance: [employer]   visits left  Medical Diagnosis: History of stroke Z86.73             Rehab Codes: stiffness in shoulder M25.61,, stiffness in elbow M25.62,, stiffness in hand M25.64,, stiffness in wrist M25.63,, lack of coordination R27.8,, fine motor skills loss R29.818, or muscle weakness generalized M62.81,  Onset Date:    3-          Next Dr. Reagan Salty: 21  Cancels/No Shows: 0/0    Subjective:    Pain:  [] Yes  [x] No Location: shoulder with range Pain Rating: (0-10 scale) 0/10 at rest  Pain altered Tx:  [x] No  [] Yes  Action:  Pt Comments:  \"some days are better than others\"   Objective:  Modalities:  Modality Flow Sheet:  START STOP Tx Modality   2021  Electrical Stim: [place] cycle  1.  Shoulder subluxation:  Time: 15 minutes   Cycle: 10/20  Patch location: anterior and posterior deltoid, middle deltoid and supraspinatus   Up to ~64Hz intensity         Ultrasound: ___ W/cm2 x ___ mins  Duty factor: __100%  __50%  __33% __20%  Head size:    ___ cm  MHz: __1mHz  __3mHz  Location:     Hot Pack:     Cold Pack:     Precautions: NA  Exercises:  EXERCISE    REPS/     TIME  WEIGHT/    LEVEL COMMENTS   PROM  Shoulder internal/external  Flex/ext  Elbow flex/ext  Wrist flex/ext  Digit extension 30 mins  Completed    Wooden pegs with R hand (place in) completed    Cone stacking 10  Completed with mirror for visual stimulation on shoulder compensation. Other: Pt arrived 7 mins late. Pt completed E Stim with \"no pain\" this date. See parameters above. Pt demo fair spirits this date and reports that the shoulder harness has been helping. Pt demo good isolated motion of shoulder for cone stacking. Pt unable to complete extension of digits for release of cones. Reports lifted a laundry basket with both hands and carried it in a room. Pt reports inability to extend digits to let go of laundry basket. Treatment Charges: Mins Units   [x]  Modalities:   E-stim   20   1   []  Ultrasound     [x]  Ther Exercise 10 1   [x]  Manual Therapy 30 2   []  Ther Activities     []  Orthotic fit/train     []  Orthotic recheck     []  Other     Total Treatment time 60 4       Assessment: [x] Progressing toward goals. Pt with active tenodesis response, no noted ability to actively extend or flex digits. Demo trace movements when attempting digit extension with wrist in flexed position. [] No change. [] Other     [x] Patient would benefit from skilled occupational therapy services in order to: Improve  Rehab Potential, Motor control/Tone in UE, ROM, Strength and Coordination deficit in order to ensure good follow through and decrease pain in UE for safe completion of ADLs     Short Term Goals: ( 6  Treatments)  1. Increase AROM (degrees) (extension/flexion)  a. R shoulder to 60/40  b. R elbow -20/130  c. R wrist -30/50  d. R radial/ulnar dev 15/10  2. Increase strength (pounds);  a. R  strength to 22 pounds  b. R lateral pinch to 10 pounds  c. R 2 point pinch to 12 pounds  3. Increase function:UE Functional Index Score 40 or more points to promote increased functional abilities  4. Patient to be independent with home exercise program as demonstrated by performance with correct form without cues.     Long Term Goals: (  12 Treatments)  1.  Move digits in R hand independently with trace movement for promotion of future use for guitar  2. Demo R hand extension to half of [facility] for increased I with  on R side  3. Increase R UE Shoulder ROM to lift arm up for I with adl tasks at home. 50/45 R shoulder    Pt. Education:  [x] Yes  [] No  [] Reviewed Prior HEP/Ed  Method of Education: [x] Verbal  [] Demo  [] Written  Re: e-stim treatment   Comprehension of Education:  [x] Verbalizes understanding. [] Demonstrates understanding. [] Needs review. [] Demonstrates/verbalizes HEP/Ed previously given. Plan: [x] Continue current frequency toward short and long term goals.   [x] Specific Instructions for subsequent treatments: estim for wrist, digit, and thumb extension    [] Other:    Time In: 3703-5864    Electronically signed by:  MODESTO Larsen/JANNET

## 2022-03-22 RX ORDER — LANCETS 33 GAUGE
EACH MISCELLANEOUS
Qty: 100 EACH | Refills: 3 | Status: SHIPPED | OUTPATIENT
Start: 2022-03-22

## 2022-03-22 NOTE — TELEPHONE ENCOUNTER
René Mena is calling to request a refill on the following medication(s):    Medication Request:  Requested Prescriptions     Pending Prescriptions Disp Refills    Lancets ([address]) [address] [facility] [Pharmacy Med Name: [facility]]       Sig: USE TO TEST BLOOD SUGAR TWO TIMES A DAY       Last Visit Date (If Applicable):  2/1/8266    Next Visit Date:    4/1/2022

## 2022-03-31 ENCOUNTER — INITIAL CONSULT (OUTPATIENT)
Dept: ONCOLOGY | Age: 62
End: 2022-03-31
Payer: MEDICARE

## 2022-03-31 ENCOUNTER — TELEPHONE (OUTPATIENT)
Dept: ONCOLOGY | Age: 62
End: 2022-03-31

## 2022-03-31 ENCOUNTER — TELEPHONE (OUTPATIENT)
Dept: INFUSION THERAPY | Facility: MEDICAL CENTER | Age: 62
End: 2022-03-31

## 2022-03-31 ENCOUNTER — HOSPITAL ENCOUNTER (OUTPATIENT)
Facility: MEDICAL CENTER | Age: 62
Discharge: HOME | End: 2022-03-31
Payer: MEDICARE

## 2022-03-31 VITALS
WEIGHT: 185.6 LBS | DIASTOLIC BLOOD PRESSURE: 88 MMHG | SYSTOLIC BLOOD PRESSURE: 144 MMHG | HEART RATE: 98 BPM | HEIGHT: 71 IN | BODY MASS INDEX: 25.98 KG/M2 | TEMPERATURE: 98.7 F | RESPIRATION RATE: 16 BRPM

## 2022-03-31 DIAGNOSIS — D72.820 LYMPHOCYTOSIS: ICD-10-CM

## 2022-03-31 DIAGNOSIS — D75.838 OTHER THROMBOCYTOSIS: ICD-10-CM

## 2022-03-31 DIAGNOSIS — D72.9 NEUTROPHILIA: ICD-10-CM

## 2022-03-31 DIAGNOSIS — D72.9 NEUTROPHILIA: Primary | ICD-10-CM

## 2022-03-31 DIAGNOSIS — G81.91 RIGHT HEMIPLEGIA (HCC): ICD-10-CM

## 2022-03-31 PROBLEM — D72.828 NEUTROPHILIA: Status: ACTIVE | Noted: 2022-03-31

## 2022-03-31 LAB
ABSOLUTE EOS #: 0 K/UL (ref 0–0.44)
ABSOLUTE IMMATURE GRANULOCYTE: 0 K/UL (ref 0–0.3)
ABSOLUTE LYMPH #: 23.75 K/UL (ref 1.1–3.7)
ABSOLUTE MONO #: 2.25 K/UL (ref 0.1–1.2)
BASOPHILS # BLD: 0 % (ref 0–2)
BASOPHILS ABSOLUTE: 0 K/UL (ref 0–0.2)
EOSINOPHILS RELATIVE PERCENT: 0 % (ref 1–4)
HCT VFR BLD CALC: 40.2 % (ref 40.7–50.3)
HEMOGLOBIN: 13.1 G/DL (ref 13–17)
IMMATURE GRANULOCYTES: 0 %
LYMPHOCYTES # BLD: 74 % (ref 24–43)
MCH RBC QN AUTO: 29 PG (ref 25.2–33.5)
MCHC RBC AUTO-ENTMCNC: 32.6 G/DL (ref 28.4–34.8)
MCV RBC AUTO: 89.1 FL (ref 82.6–102.9)
MONOCYTES # BLD: 7 % (ref 3–12)
NRBC AUTOMATED: 0 PER 100 WBC
PDW BLD-RTO: 13.6 % (ref 11.8–14.4)
PLATELET # BLD: 197 K/UL (ref 138–453)
PMV BLD AUTO: 10.2 FL (ref 8.1–13.5)
RBC # BLD: 4.51 M/UL (ref 4.21–5.77)
SEG NEUTROPHILS: 19 % (ref 36–65)
SEGMENTED NEUTROPHILS ABSOLUTE COUNT: 6.1 K/UL (ref 1.5–8.1)
WBC # BLD: 32.1 K/UL (ref 3.5–11.3)

## 2022-03-31 PROCEDURE — 99211 OFF/OP EST MAY X REQ PHY/QHP: CPT | Performed by: INTERNAL MEDICINE

## 2022-03-31 PROCEDURE — 36415 COLL VENOUS BLD VENIPUNCTURE: CPT

## 2022-03-31 PROCEDURE — 99205 OFFICE O/P NEW HI 60 MIN: CPT | Performed by: INTERNAL MEDICINE

## 2022-03-31 PROCEDURE — 88184 FLOWCYTOMETRY/ TC 1 MARKER: CPT

## 2022-03-31 PROCEDURE — 85025 COMPLETE CBC W/AUTO DIFF WBC: CPT

## 2022-03-31 PROCEDURE — 81270 JAK2 GENE: CPT

## 2022-03-31 PROCEDURE — 81206 BCR/ABL1 GENE MAJOR BP: CPT

## 2022-03-31 PROCEDURE — 88185 FLOWCYTOMETRY/TC ADD-ON: CPT

## 2022-03-31 RX ORDER — POTASSIUM CHLORIDE 1500 MG/1
20 TABLET, FILM COATED, EXTENDED RELEASE ORAL DAILY
Qty: 90 TABLET | Refills: 1 | Status: SHIPPED | OUTPATIENT
Start: 2022-03-31 | End: 2022-09-13

## 2022-03-31 NOTE — PROGRESS NOTES
_               Mr. Vi Dai is a very pleasant 64 y.o. male with history of multiple co morbidities as listed. The patient is referred for evaluation and management of leukocytosis. Patient had history of diabetes and hypertension. He had CVA with right-sided hemiplegia in 2019. He had residual right-sided weakness. Patient has no other major health problems. Patient was noted to have persistent elevation of white blood cells over the last few years. He had no symptoms related to that abnormality. No repeated infections. No fever or night sweats. No weight loss or decreased appetite. No enlarged lymph nodes. No history of smoking or alcohol drinking. Lizz Pierce PAST MEDICAL HISTORY: has a past medical history of Cerebral artery occlusion with cerebral infarction ([facility] [address] Diabetes mellitus ([facility] [address] High cholesterol, and Hypertension. PAST SURGICAL HISTORY: has a past surgical history that includes knee surgery (1983). CURRENT MEDICATIONS:  has a current medication list which includes the following prescription(s): potassium chloride, onetouch delica plus [id number], lantus solostar, amlodipine, hydralazine, fluoxetine, atorvastatin, b-d ultrafine iii short pen, blood glucose test strips, insulin lispro (1 unit dial), blood glucose monitor kit and supplies, aspirin, and pen needles. ALLERGIES:  is allergic to dairycare [lactase-lactobacillus]. FAMILY HISTORY: Negative for any hematological or oncological conditions. SOCIAL HISTORY:  reports that he has never smoked. He has never used smokeless tobacco. He reports previous alcohol use. He reports that he does not use drugs. REVIEW OF SYSTEMS:     · General: No weakness or fatigue. No unanticipated weight loss or decreased appetite. No fever or chills. · Eyes: No blurred vision, eye pain or double vision. · Ears: No hearing problems or drainage.  No tinnitus. · Throat: No sore throat, problems with swallowing or dysphagia. · Respiratory: No cough, sputum or hemoptysis. No shortness of breath. No pleuritic chest pain. · Cardiovascular: No chest pain, orthopnea or PND. No lower extremity edema. No palpitation. · Gastrointestinal: No problems with swallowing. No abdominal pain or bloating. No nausea or vomiting. No diarrhea or constipation. No GI bleeding. · Genitourinary: No dysuria, hematuria, frequency or urgency. · Musculoskeletal: No muscle aches or pains. No limitation of movement. No back pain. No gait disturbance, No joint complaints. · Dermatologic: No skin rashes or pruritus. No skin lesions or discolorations. · Psychiatric: No depression, anxiety, or stress or signs of schizophrenia. No change in mood or affect. · Hematologic: No history of bleeding tendency. No bruises or ecchymosis. No history of clotting problems. · Infectious disease: No fever, chills or frequent infections. · Endocrine: No polydipsia or polyuria. No temperature intolerance. · Neurologic: As above. .   · Allergic/Immunologic: No nasal congestion or hives. No repeated infections. PHYSICAL EXAM:  The patient is not in acute distress. Vital signs: Blood pressure (!) 144/88, pulse 98, temperature 98.7 °F (37.1 °C), temperature source Temporal, resp. rate 16, height 5' 11\" (1.803 m), weight 185 lb 9.6 oz (84.2 kg).      General appearance - well appearing, not in pain or distress  Mental status - good mood, alert and oriented  Eyes - pupils equal and reactive, extraocular eye movements intact  Ears - bilateral TM's and external ear canals normal  Nose - normal and patent, no erythema, discharge or polyps  Mouth - mucous membranes moist, pharynx normal without lesions  Neck - supple, no significant adenopathy  Lymphatics - no palpable lymphadenopathy, no hepatosplenomegaly  Chest - clear to auscultation, no wheezes, rales or rhonchi, symmetric air entry  Heart - normal rate, regular rhythm, normal S1, S2, no murmurs, rubs, clicks or gallops  Abdomen - soft, nontender, nondistended, no masses or organomegaly  Neurological - alert, oriented, residual right-sided weakness with power 3-4 over 5. Musculoskeletal - no joint tenderness, deformity or swelling  Extremities - peripheral pulses normal, no pedal edema, no clubbing or cyanosis  Skin - normal coloration and turgor, no rashes, no suspicious skin lesions noted     Review of Diagnostic data:   Lab Results   Component Value Date    WBC 31.1 (HH) 03/01/2022    HGB 13.2 03/01/2022    HCT 39.4 (L) 03/01/2022    MCV 88.9 03/01/2022     03/01/2022       Chemistry        Component Value Date/Time     (H) 02/22/2022 1013    K 4.2 02/22/2022 1013     02/22/2022 1013    CO2 24 02/22/2022 1013    BUN 22 02/22/2022 1013    CREATININE 1.48 (H) 02/22/2022 1013        Component Value Date/Time    CALCIUM 9.4 02/22/2022 1013    ALKPHOS 84 02/22/2022 1013    AST 15 02/22/2022 1013    ALT 13 02/22/2022 1013    BILITOT 0.41 02/22/2022 1013            IMPRESSION:   Persistent leukocytosis  Persistent neutropenia  Persistent lymphocytosis  History of CVA/right-sided hemiplegia with residual weakness  History of hypertension and diabetes    PLAN: I reviewed the labs available to me and discussed with the patient. For more than 60 minutes of face to face discussion, I explained to the patient the nature of this hematologic problem. I explained the significance of these abnormalities in layman language. Reviewing patient's labs obviously is having persistent leukocytosis at least since 2019. I do not have any records from before. He had multiple lab tests during the last 3 years which showed persistent elevation of neutrophils and lymphocytes. No anemia or thrombocytopenia. No blasts or immature cells.   The results are concerning for possible underlying lymphoproliferative disorder or myeloproliferative disorder. Further work-up is needed. I will do flow cytometry and JAK2 gene mutation and BCR ABL. We will make further recommendations based on results. Possible need for bone marrow testing. Patient totally understands and agrees. Patient's questions were answered to the best of his satisfaction and he verbalized full understanding and agreement.

## 2022-03-31 NOTE — TELEPHONE ENCOUNTER
LANI ARRIVES VIA WHEELCHAIR FOR CONSULTATION APPOINTMENT  DR Rvaen Meraz IN TO SEE PATIENT  ORDERS RECEIVED  LABS SOON  CALL WITH RESULTS AFTER TEST RESULTS  POSSIBLE BONE MARROW TEST  LABS ON EXIT CDP BCR-ABL QUANTITATIVE FLOW CYTOMETRY JAK2 GENE MUTATION  NOTE PRINTED AND TAKEN INTO TRIAGE TO WATCH FOR RESULTS AND NOTIFY DR Raven Meraz MD VISIT TBD  AVS PRINTED AND GIVEN TO PATIENT WITH INSTRUCTIONS  PATIENT DISCHARGED VIA WHEELCHAIR

## 2022-03-31 NOTE — LETTER
_    Jett Monzon MD    4/1/2022     Genie Graves MD   [address] [place] [id number]    Dear Dr Jose Juan Thorne: Thank you for referring Terell Rodrigues, 1960, to me for evaluation. Below are the relevant portions of my assessment and plan of care. Mr. Terell Rodrigues is a very pleasant 64 y.o. male with history of multiple co morbidities as listed. The patient is referred for evaluation and management of leukocytosis. Patient had history of diabetes and hypertension. He had CVA with right-sided hemiplegia in 2019. He had residual right-sided weakness. Patient has no other major health problems. Patient was noted to have persistent elevation of white blood cells over the last few years. He had no symptoms related to that abnormality. No repeated infections. No fever or night sweats. No weight loss or decreased appetite. No enlarged lymph nodes. No history of smoking or alcohol drinking. Dayne Myles PAST MEDICAL HISTORY: has a past medical history of Cerebral artery occlusion with cerebral infarction ([facility] [address] Diabetes mellitus ([facility] [address] High cholesterol, and Hypertension. PAST SURGICAL HISTORY: has a past surgical history that includes knee surgery (1983). CURRENT MEDICATIONS:  has a current medication list which includes the following prescription(s): potassium chloride, onetouch delica plus [id number], lantus solostar, amlodipine, hydralazine, fluoxetine, atorvastatin, b-d ultrafine iii short pen, blood glucose test strips, insulin lispro (1 unit dial), blood glucose monitor kit and supplies, aspirin, and pen needles. ALLERGIES:  is allergic to dairycare [lactase-lactobacillus]. FAMILY HISTORY: Negative for any hematological or oncological conditions. SOCIAL HISTORY:  reports that he has never smoked. He has never used smokeless tobacco. He reports previous alcohol use. He reports that he does not use drugs. REVIEW OF SYSTEMS:     · General: No weakness or fatigue.  No unanticipated weight loss or decreased appetite. No fever or chills. · Eyes: No blurred vision, eye pain or double vision. · Ears: No hearing problems or drainage. No tinnitus. · Throat: No sore throat, problems with swallowing or dysphagia. · Respiratory: No cough, sputum or hemoptysis. No shortness of breath. No pleuritic chest pain. · Cardiovascular: No chest pain, orthopnea or PND. No lower extremity edema. No palpitation. · Gastrointestinal: No problems with swallowing. No abdominal pain or bloating. No nausea or vomiting. No diarrhea or constipation. No GI bleeding. · Genitourinary: No dysuria, hematuria, frequency or urgency. · Musculoskeletal: No muscle aches or pains. No limitation of movement. No back pain. No gait disturbance, No joint complaints. · Dermatologic: No skin rashes or pruritus. No skin lesions or discolorations. · Psychiatric: No depression, anxiety, or stress or signs of schizophrenia. No change in mood or affect. · Hematologic: No history of bleeding tendency. No bruises or ecchymosis. No history of clotting problems. · Infectious disease: No fever, chills or frequent infections. · Endocrine: No polydipsia or polyuria. No temperature intolerance. · Neurologic: As above. .   · Allergic/Immunologic: No nasal congestion or hives. No repeated infections. PHYSICAL EXAM:  The patient is not in acute distress. Vital signs: Blood pressure (!) 144/88, pulse 98, temperature 98.7 °F (37.1 °C), temperature source Temporal, resp. rate 16, height 5' 11\" (1.803 m), weight 185 lb 9.6 oz (84.2 kg).      General appearance - well appearing, not in pain or distress  Mental status - good mood, alert and oriented  Eyes - pupils equal and reactive, extraocular eye movements intact  Ears - bilateral TM's and external ear canals normal  Nose - normal and patent, no erythema, discharge or polyps  Mouth - mucous membranes moist, pharynx normal without lesions  Neck - supple, no significant adenopathy  Lymphatics - no palpable lymphadenopathy, no hepatosplenomegaly  Chest - clear to auscultation, no wheezes, rales or rhonchi, symmetric air entry  Heart - normal rate, regular rhythm, normal S1, S2, no murmurs, rubs, clicks or gallops  Abdomen - soft, nontender, nondistended, no masses or organomegaly  Neurological - alert, oriented, residual right-sided weakness with power 3-4 over 5. Musculoskeletal - no joint tenderness, deformity or swelling  Extremities - peripheral pulses normal, no pedal edema, no clubbing or cyanosis  Skin - normal coloration and turgor, no rashes, no suspicious skin lesions noted     Review of Diagnostic data:   Lab Results   Component Value Date    WBC 31.1 (HH) 03/01/2022    HGB 13.2 03/01/2022    HCT 39.4 (L) 03/01/2022    MCV 88.9 03/01/2022     03/01/2022       Chemistry        Component Value Date/Time     (H) 02/22/2022 1013    K 4.2 02/22/2022 1013     02/22/2022 1013    CO2 24 02/22/2022 1013    BUN 22 02/22/2022 1013    CREATININE 1.48 (H) 02/22/2022 1013        Component Value Date/Time    CALCIUM 9.4 02/22/2022 1013    ALKPHOS 84 02/22/2022 1013    AST 15 02/22/2022 1013    ALT 13 02/22/2022 1013    BILITOT 0.41 02/22/2022 1013            IMPRESSION:   Persistent leukocytosis  Persistent neutropenia  Persistent lymphocytosis  History of CVA/right-sided hemiplegia with residual weakness  History of hypertension and diabetes    PLAN: I reviewed the labs available to me and discussed with the patient. For more than 60 minutes of face to face discussion, I explained to the patient the nature of this hematologic problem. I explained the significance of these abnormalities in layman language. Reviewing patient's labs obviously is having persistent leukocytosis at least since 2019. I do not have any records from before. He had multiple lab tests during the last 3 years which showed persistent elevation of neutrophils and lymphocytes.   No anemia or thrombocytopenia. No blasts or immature cells. The results are concerning for possible underlying lymphoproliferative disorder or myeloproliferative disorder. Further work-up is needed. I will do flow cytometry and JAK2 gene mutation and BCR ABL. We will make further recommendations based on results. Possible need for bone marrow testing. Patient totally understands and agrees. Patient's questions were answered to the best of his satisfaction and he verbalized full understanding and agreement. If you have questions, please do not hesitate to call me. I look forward to following Claudette Frederic along with you. Sincerely,                            [address] [facility], [place], operated by [facility]                            This note is created with the assistance of a speech recognition program.  While intending to generate a document that actually reflects the content of the visit, the document can still have some errors including those of syntax and sound a like substitutions which may escape proof reading. It such instances, actual meaning can be extrapolated by contextual diversion.

## 2022-03-31 NOTE — TELEPHONE ENCOUNTER
Elisa Cohen is calling to request a refill on the following medication(s):    Last Visit Date (If Applicable):  2/2/9860    Next Visit Date:    4/1/2022    Medication Request:  Requested Prescriptions     Pending Prescriptions Disp Refills    potassium chloride (KLOR-CON M) 20 MEQ TBCR extended release tablet [Pharmacy Med Name: POTASSIUM CHLORIDE ER 20 MEQ TABLET] 30 tablet 3     Sig: Take 1 tablet by mouth daily

## 2022-04-01 ENCOUNTER — OFFICE VISIT (OUTPATIENT)
Dept: INTERNAL MEDICINE CLINIC | Age: 62
End: 2022-04-01
Payer: MEDICARE

## 2022-04-01 VITALS
TEMPERATURE: 97.5 F | BODY MASS INDEX: 25.9 KG/M2 | HEART RATE: 91 BPM | WEIGHT: 185 LBS | RESPIRATION RATE: 16 BRPM | HEIGHT: 71 IN | SYSTOLIC BLOOD PRESSURE: 138 MMHG | DIASTOLIC BLOOD PRESSURE: 82 MMHG | OXYGEN SATURATION: 97 %

## 2022-04-01 DIAGNOSIS — F32.A ANXIETY AND DEPRESSION: ICD-10-CM

## 2022-04-01 DIAGNOSIS — I10 ESSENTIAL HYPERTENSION: Primary | ICD-10-CM

## 2022-04-01 DIAGNOSIS — G81.91 RIGHT HEMIPLEGIA (HCC): ICD-10-CM

## 2022-04-01 DIAGNOSIS — E10.22 TYPE 1 DIABETES MELLITUS WITH STAGE 3A CHRONIC KIDNEY DISEASE (HCC): ICD-10-CM

## 2022-04-01 DIAGNOSIS — E78.2 MIXED HYPERLIPIDEMIA: ICD-10-CM

## 2022-04-01 DIAGNOSIS — D72.820 LYMPHOCYTOSIS: ICD-10-CM

## 2022-04-01 DIAGNOSIS — F41.9 ANXIETY AND DEPRESSION: ICD-10-CM

## 2022-04-01 DIAGNOSIS — Z86.16 HISTORY OF COVID-19: ICD-10-CM

## 2022-04-01 DIAGNOSIS — Z28.21 PNEUMOCOCCAL VACCINATION DECLINED: ICD-10-CM

## 2022-04-01 DIAGNOSIS — Z86.73 HISTORY OF STROKE: ICD-10-CM

## 2022-04-01 DIAGNOSIS — Z28.21 INFLUENZA VACCINATION DECLINED: ICD-10-CM

## 2022-04-01 DIAGNOSIS — N18.31 TYPE 1 DIABETES MELLITUS WITH STAGE 3A CHRONIC KIDNEY DISEASE (HCC): ICD-10-CM

## 2022-04-01 DIAGNOSIS — Z28.21 TETANUS, DIPHTHERIA, AND ACELLULAR PERTUSSIS (TDAP) VACCINATION DECLINED: ICD-10-CM

## 2022-04-01 PROBLEM — D72.828 NEUTROPHILIA: Status: RESOLVED | Noted: 2022-03-31 | Resolved: 2022-04-01

## 2022-04-01 PROBLEM — D72.9 NEUTROPHILIA: Status: RESOLVED | Noted: 2022-03-31 | Resolved: 2022-04-01

## 2022-04-01 PROCEDURE — 99214 OFFICE O/P EST MOD 30 MIN: CPT | Performed by: FAMILY MEDICINE

## 2022-04-01 PROCEDURE — 3051F HG A1C>EQUAL 7.0%<8.0%: CPT | Performed by: FAMILY MEDICINE

## 2022-04-01 RX ORDER — INSULIN LISPRO 100 [IU]/ML
INJECTION, SOLUTION INTRAVENOUS; SUBCUTANEOUS
Qty: 15 PEN | Refills: 1 | Status: SHIPPED | OUTPATIENT
Start: 2022-04-01

## 2022-04-01 RX ORDER — FLUTICASONE PROPIONATE 50 MCG
1 SPRAY, SUSPENSION (ML) NASAL DAILY
Qty: 32 G | Refills: 1 | Status: SHIPPED | OUTPATIENT
Start: 2022-04-01

## 2022-04-01 NOTE — PROGRESS NOTES
Subjective:      Patient ID: Lin Alvarez is a 64 y.o. male. Diabetes  He presents for his follow-up diabetic visit. He has type 1 diabetes mellitus. His disease course has been improving. Hypoglycemia symptoms include nervousness/anxiousness. There are no diabetic associated symptoms. There are no hypoglycemic complications. Symptoms are stable. Diabetic complications include a CVA and nephropathy. Risk factors for coronary artery disease include diabetes mellitus, dyslipidemia, male sex, hypertension, stress and sedentary lifestyle. Current diabetic treatment includes insulin injections. He is compliant with treatment all of the time. His weight is stable. He is following a generally healthy diet. Meal planning includes ADA exchanges, avoidance of concentrated sweets, calorie counting and carbohydrate counting. He has had a previous visit with a dietitian. He participates in exercise three times a week. His home blood glucose trend is decreasing steadily. Review of Systems   Constitutional: Negative. HENT: Negative. Eyes: Negative. Respiratory: Negative. Cardiovascular: Negative. Gastrointestinal: Negative. Endocrine: Negative. Musculoskeletal: Positive for arthralgias. Skin: Negative. Allergic/Immunologic: Negative. Neurological: Negative. Hematological: Negative. Psychiatric/Behavioral: The patient is nervous/anxious. Past family and social history unremarkable. Diagnosis Orders   1. Essential hypertension     2. Mixed hyperlipidemia     3. Right hemiplegia ([address]     4. Anxiety and depression     5. Influenza vaccination declined     6. Pneumococcal vaccination declined     7. Tetanus, diphtheria, and acellular pertussis (Tdap) vaccination declined     8. History of COVID-19     9. Type 1 diabetes mellitus with stage 3a chronic kidney disease [facility])  External Referral To Nephrology   10. History of stroke     6.  Lymphocytosis           Objective:   Physical Exam  Vitals and nursing note reviewed. Constitutional:       Appearance: He is well-developed. HENT:      Head: Normocephalic and atraumatic. Right Ear: External ear normal.      Left Ear: External ear normal.      Nose: Nose normal.      Comments: Allergies allergic rhinitis  Eyes:      Conjunctiva/sclera: Conjunctivae normal.      Pupils: Pupils are equal, round, and reactive to light. Cardiovascular:      Rate and Rhythm: Normal rate and regular rhythm. Heart sounds: Normal heart sounds. Comments: Insulin-dependent diabetes mellitus  Hypertension  Hyperlipidemia  Pulmonary:      Effort: Pulmonary effort is normal.      Breath sounds: Normal breath sounds. Abdominal:      General: Bowel sounds are normal.      Palpations: Abdomen is soft. Musculoskeletal:         General: Normal range of motion. Cervical back: Normal range of motion and neck supple. Comments: Musculoskeletal pain   Skin:     General: Skin is warm and dry. Neurological:      Mental Status: He is alert and oriented to person, place, and time. Deep Tendon Reflexes: Reflexes are normal and symmetric. Psychiatric:      Comments: Anxiety/depression on SSRI         Assessment:       Diagnosis Orders   1. Essential hypertension     2. Mixed hyperlipidemia     3. Right hemiplegia ([address]     4. Anxiety and depression     5. Influenza vaccination declined     6. Pneumococcal vaccination declined     7. Tetanus, diphtheria, and acellular pertussis (Tdap) vaccination declined     8. History of COVID-19     9. Type 1 diabetes mellitus with stage 3a chronic kidney disease [facility])  External Referral To Nephrology   10. History of stroke     6. Lymphocytosis             Plan:      60-year-old male is presented came along with family member for routine follow-up. He is afebrile hemodynamically stable  Insulin-dependent diabetes mellitus with A1c of 7.2. Accu-Chek log shows progressive improvement. No hypoglycemia.   He is advised to continue current regimen, ADA 1800 diet, daily moderate exercise  Hypertension well-controlled with random blood pressure equal less than 130/80  Hyperlipidemia on statin that he is tolerating well  History of CVA with residual hemiplegia. He ambulates with a walker. Fall precaution is advised  Diabetic nephropathy with creatinine of 1.48. Avoid nephrotoxic drugs, anti-inflammatory radiocontrast.  He will be scheduled with nephrology  Signs and symptoms of allergies allergic rhinitis. Start Flonase, over-the-counter antihistamine and nasal saline  Anxiety/depression on Prozac that he is tolerating well  He has got good family support  He denies tobacco, excessive alcohol or illicit drug use  He declined all age-appropriate vaccination  Lymphocytosis of indeterminate urology. He was recently seen by hematology. Med list and available labs reviewed, discussed with patient, questions answered  He is encouraged to call for any concern  This note is created with a voice recognition program and while intend to generate a document that accurately reflects the content of the visit, no guarantee can be provided that every mistake has been identified and corrected by editing.           Daly Fish MD

## 2022-04-05 LAB
FLOW CYTOMETRY BL: NORMAL
SURGICAL PATHOLOGY REPORT: NORMAL

## 2022-04-08 LAB
BCR-ABL QUANTITATIVE: NOT DETECTED
BCR-ABL1, PERCENT: 0 %
BCR/ABL SOURCE: NORMAL
EER BCR-ABL1, MAJOR: NORMAL

## 2022-04-10 LAB
JAK2 QNT, SOURCE: NORMAL
V617 MUTATION, PERCENT: 0 %
V617F MUTATION, QNT: NOT DETECTED

## 2022-04-12 ENCOUNTER — TELEPHONE (OUTPATIENT)
Dept: INFUSION THERAPY | Facility: MEDICAL CENTER | Age: 62
End: 2022-04-12

## 2022-04-12 NOTE — TELEPHONE ENCOUNTER
PER TRIAGE, I TRIED TO CALL [facility] TO SCHEDULE A F/U WITH DR Thu Diaz. PER MD NOT RV SOON. I HAD TO LEAVE A MESSAGE TO CALL THE OFFICE TO SCHEDULE.

## 2022-04-13 ENCOUNTER — TELEPHONE (OUTPATIENT)
Dept: INFUSION THERAPY | Facility: MEDICAL CENTER | Age: 62
End: 2022-04-13

## 2022-04-13 ENCOUNTER — TELEPHONE (OUTPATIENT)
Dept: INTERNAL MEDICINE CLINIC | Age: 62
End: 2022-04-13

## 2022-04-13 RX ORDER — INSULIN LISPRO 100 [IU]/ML
INJECTION, SOLUTION INTRAVENOUS; SUBCUTANEOUS
Qty: 15 PEN | Refills: 1 | Status: SHIPPED
Start: 2022-04-13 | End: 2022-05-05 | Stop reason: SDUPTHER

## 2022-04-13 NOTE — TELEPHONE ENCOUNTER
4/7/22 per King Panda RN physician has reviewed labs and states patient needs MD visit soon. Was to scheduling and will see physician on 5/5/22. Copy of physician and King Panda RN notes to  for scan to chart. Telephone Encounter by Cori Nam NCMA at 09/08/18 11:13 AM     Author:  Cori Nam NCMA Service:  (none) Author Type:  Certified Medical Assistant     Filed:  09/08/18 11:15 AM Encounter Date:  9/8/2018 Status:  Signed     :  Cori Nam NCMA (Certified Medical Assistant)            Pt had TSH done on 8/15/18 - please advise on refill.  Ok or dose change?[HH1.1M]  Electronically Signed by:    TONIA Aquino , 9/8/2018[HH1.2T]        Revision History        User Key Date/Time User Provider Type Action    > HH1.2 09/08/18 11:15 AM Cori Nam NCMA Certified Medical Assistant Sign     HH1.1 09/08/18 11:13 AM Cori Nam NCMA Certified Medical Assistant     M - Manual, T - Template

## 2022-05-04 ENCOUNTER — HOSPITAL ENCOUNTER (OUTPATIENT)
Facility: MEDICAL CENTER | Age: 62
End: 2022-05-04

## 2022-05-05 ENCOUNTER — OFFICE VISIT (OUTPATIENT)
Dept: ONCOLOGY | Age: 62
End: 2022-05-05
Payer: MEDICARE

## 2022-05-05 ENCOUNTER — TELEPHONE (OUTPATIENT)
Dept: ONCOLOGY | Age: 62
End: 2022-05-05

## 2022-05-05 VITALS
TEMPERATURE: 98.5 F | HEART RATE: 90 BPM | WEIGHT: 187.5 LBS | RESPIRATION RATE: 16 BRPM | DIASTOLIC BLOOD PRESSURE: 84 MMHG | BODY MASS INDEX: 26.15 KG/M2 | SYSTOLIC BLOOD PRESSURE: 131 MMHG

## 2022-05-05 DIAGNOSIS — D72.820 LYMPHOCYTOSIS: ICD-10-CM

## 2022-05-05 DIAGNOSIS — C91.10 CLL (CHRONIC LYMPHOCYTIC LEUKEMIA) (HCC): Primary | ICD-10-CM

## 2022-05-05 DIAGNOSIS — R59.1 LYMPHADENOPATHY: ICD-10-CM

## 2022-05-05 PROCEDURE — 99211 OFF/OP EST MAY X REQ PHY/QHP: CPT | Performed by: INTERNAL MEDICINE

## 2022-05-05 PROCEDURE — 99214 OFFICE O/P EST MOD 30 MIN: CPT | Performed by: INTERNAL MEDICINE

## 2022-05-05 NOTE — TELEPHONE ENCOUNTER
Pippa Smith MD VISIT  RV 2 MTHS W/ CBC @ RV  CT SCAN SOON  CT SCAN IS ON 5/13/22 @ 10AM AT [facility] ARRIVAL @ 9:30AM  LABS CDP 7/7/22  MD VISIT 7/7/22 @ 9:15AM  AVS PRINTED W/ INSTRUCTIONS AND GIVEN TO PT ON EXIT

## 2022-05-07 PROBLEM — C91.10 CLL (CHRONIC LYMPHOCYTIC LEUKEMIA) (HCC): Status: ACTIVE | Noted: 2022-05-07

## 2022-05-07 PROBLEM — R59.1 LYMPHADENOPATHY: Status: ACTIVE | Noted: 2022-05-07

## 2022-05-08 NOTE — PROGRESS NOTES
Chief Complaint   Patient presents with    Follow-up    Discuss Labs     DIAGNOSIS:       CLL, Stage 1  Lymphadenopathy. Persistent leukocytosis  Persistent neutropenia  Persistent lymphocytosis  History of CVA/right-sided hemiplegia with residual weakness  History of hypertension and diabetes  CURRENT THERAPY:         Work up in progress. BRIEF CASE HISTORY:      Mr. Shweta Dent is a very pleasant 64 y.o. male with history of multiple co morbidities as listed. The patient is referred for evaluation and management of leukocytosis. Patient had history of diabetes and hypertension. He had CVA with right-sided hemiplegia in 2019. He had residual right-sided weakness. Patient has no other major health problems. Patient was noted to have persistent elevation of white blood cells over the last few years. He had no symptoms related to that abnormality. No repeated infections. No fever or night sweats. No weight loss or decreased appetite. No enlarged lymph nodes. No history of smoking or alcohol drinking. .   INTERIM HISTORY:   Seen for follow up lymphocytosis. Flow cytometry showed CLL. C/o bulge in inguinal region. No fever or night sweats. No weight loss or decreased appetite. PAST MEDICAL HISTORY: has a past medical history of Cerebral artery occlusion with cerebral infarction ([facility] [address] Diabetes mellitus ([facility] [address] High cholesterol, and Hypertension. PAST SURGICAL HISTORY: has a past surgical history that includes knee surgery (1983).      CURRENT MEDICATIONS:  has a current medication list which includes the following prescription(s): insulin lispro (1 unit dial), fluticasone, potassium chloride, onetouch delica plus [id number], lantus solostar, amlodipine, hydralazine, fluoxetine, atorvastatin, b-d ultrafine iii short pen, blood glucose test strips, blood glucose monitor kit and supplies, aspirin, and pen needles. ALLERGIES:  is allergic to dairycare [lactase-lactobacillus]. FAMILY HISTORY: Negative for any hematological or oncological conditions. SOCIAL HISTORY:  reports that he has never smoked. He has never used smokeless tobacco. He reports previous alcohol use. He reports that he does not use drugs. REVIEW OF SYSTEMS:     · General: No weakness or fatigue. No unanticipated weight loss or decreased appetite. No fever or chills. · Eyes: No blurred vision, eye pain or double vision. · Ears: No hearing problems or drainage. No tinnitus. · Throat: No sore throat, problems with swallowing or dysphagia. · Respiratory: No cough, sputum or hemoptysis. No shortness of breath. No pleuritic chest pain. · Cardiovascular: No chest pain, orthopnea or PND. No lower extremity edema. No palpitation. · Gastrointestinal: No problems with swallowing. No abdominal pain or bloating. No nausea or vomiting. No diarrhea or constipation. No GI bleeding. · Genitourinary: No dysuria, hematuria, frequency or urgency. · Musculoskeletal: No muscle aches or pains. No limitation of movement. No back pain. No gait disturbance, No joint complaints. · Dermatologic: No skin rashes or pruritus. No skin lesions or discolorations. · Psychiatric: No depression, anxiety, or stress or signs of schizophrenia. No change in mood or affect. · Hematologic: No history of bleeding tendency. No bruises or ecchymosis. No history of clotting problems. · Infectious disease: No fever, chills or frequent infections. · Endocrine: No polydipsia or polyuria. No temperature intolerance. · Neurologic: As above. .   · Allergic/Immunologic: No nasal congestion or hives. No repeated infections. PHYSICAL EXAM:  The patient is not in acute distress. Vital signs: Blood pressure 131/84, pulse 90, temperature 98.5 °F (36.9 °C), temperature source Temporal, resp. rate 16, weight 187 lb 8 oz (85 kg).      General appearance - well appearing, not in pain or distress  Mental status - good mood, alert and oriented  Eyes - pupils equal and reactive, extraocular eye movements intact  Ears - bilateral TM's and external ear canals normal  Nose - normal and patent, no erythema, discharge or polyps  Mouth - mucous membranes moist, pharynx normal without lesions  Neck - supple, no significant adenopathy  Lymphatics -bilateral axillary and inguinal lymphadenopathy. no hepatosplenomegaly  Chest - clear to auscultation, no wheezes, rales or rhonchi, symmetric air entry  Heart - normal rate, regular rhythm, normal S1, S2, no murmurs, rubs, clicks or gallops  Abdomen - soft, nontender, nondistended, no masses or organomegaly  Neurological - alert, oriented, residual right-sided weakness with power 3-4 over 5. Musculoskeletal - no joint tenderness, deformity or swelling  Extremities - peripheral pulses normal, no pedal edema, no clubbing or cyanosis  Skin - normal coloration and turgor, no rashes, no suspicious skin lesions noted     Review of Diagnostic data:   Lab Results   Component Value Date    WBC 32.1 (HH) 03/31/2022    HGB 13.1 03/31/2022    HCT 40.2 (L) 03/31/2022    MCV 89.1 03/31/2022     03/31/2022       Chemistry        Component Value Date/Time     (H) 02/22/2022 1013    K 4.2 02/22/2022 1013     02/22/2022 1013    CO2 24 02/22/2022 1013    BUN 22 02/22/2022 1013    CREATININE 1.48 (H) 02/22/2022 1013        Component Value Date/Time    CALCIUM 9.4 02/22/2022 1013    ALKPHOS 84 02/22/2022 1013    AST 15 02/22/2022 1013    ALT 13 02/22/2022 1013    BILITOT 0.41 02/22/2022 1013            IMPRESSION:   CLL, Stage 1  Lymphadenopathy. Persistent leukocytosis  Persistent neutropenia  Persistent lymphocytosis  History of CVA/right-sided hemiplegia with residual weakness  History of hypertension and diabetes    PLAN: I reviewed the labs as above and discussed with the patient. Flow cytometry showed CLL.  C/o bulge in inguinal region. Exam showed bilateral axillary and inguinal lymphadenopathy. I will do scan for better evaluation. I explained the nature of CLL, staging, prognosis and treatment. Discussed indications for treatment of CLL. Will decide about treatment after reviewing scans. We will make further recommendations based on results. Patient's questions were answered to the best of his satisfaction and he verbalized full understanding and agreement.

## 2022-05-13 ENCOUNTER — HOSPITAL ENCOUNTER (OUTPATIENT)
Dept: CT IMAGING | Age: 62
Discharge: HOME | End: 2022-05-15
Payer: MEDICARE

## 2022-05-13 DIAGNOSIS — D72.820 LYMPHOCYTOSIS: ICD-10-CM

## 2022-05-13 DIAGNOSIS — R59.1 LYMPHADENOPATHY: ICD-10-CM

## 2022-05-13 DIAGNOSIS — C91.10 CLL (CHRONIC LYMPHOCYTIC LEUKEMIA) (HCC): ICD-10-CM

## 2022-05-13 PROCEDURE — 74176 CT ABD & PELVIS W/O CONTRAST: CPT

## 2022-05-23 RX ORDER — HYDRALAZINE HYDROCHLORIDE 25 MG/1
TABLET, FILM COATED ORAL
Qty: 86 TABLET | Refills: 0 | Status: SHIPPED | OUTPATIENT
Start: 2022-05-23 | End: 2022-09-06

## 2022-05-23 RX ORDER — AMLODIPINE BESYLATE 10 MG/1
TABLET ORAL
Qty: 86 TABLET | Refills: 0 | Status: SHIPPED | OUTPATIENT
Start: 2022-05-23 | End: 2022-08-29

## 2022-05-23 NOTE — TELEPHONE ENCOUNTER
----- Message from Lauren Marr sent at 2/28/2020  4:09 PM CST -----  Contact: Pt--- [phone]  Type:  RX Refill Request    Who Called:  Pt    Refill or New Rx: refill    RX Name and Strength:  fenofibrate (TRICOR) 48 MG tablet    levothyroxine (SYNTHROID, LEVOTHROID) 175 MCG tablet     Preferred Pharmacy with phone number: [address] [address] [facility] [phone] (Phone)  [phone] (Fax)    Would the patient rather a call back or a response via [facility]? Call    Best Call Back Number: [phone]     James Christy is calling to request a refill on the following medication(s):    Medication Request:    Last filled 1/24/2022 #90 with 0 RF    Requested Prescriptions     Pending Prescriptions Disp Refills    amLODIPine (NORVASC) 10 MG tablet [Pharmacy Med Name: amLODIPine BESYLATE 10 MG TAB] 86 tablet      Sig: TAKE ONE TABLET BY MOUTH DAILY    hydrALAZINE (APRESOLINE) 25 MG tablet [Pharmacy Med Name: hydrALAZINE 25 MG TABLET] 86 tablet      Sig: TAKE ONE TABLET BY MOUTH DAILY       Last Visit Date (If Applicable):  8/9/9571    Next Visit Date:    7/1/2022

## 2022-06-08 RX ORDER — HYDRALAZINE HYDROCHLORIDE 25 MG/1
TABLET, FILM COATED ORAL
Qty: 86 TABLET | Refills: 0 | OUTPATIENT
Start: 2022-06-08

## 2022-06-08 RX ORDER — AMLODIPINE BESYLATE 10 MG/1
TABLET ORAL
Qty: 86 TABLET | Refills: 0 | OUTPATIENT
Start: 2022-06-08

## 2022-06-08 NOTE — TELEPHONE ENCOUNTER
Deepak Godoy is calling to request a refill on the following medication(s):    Medication Request:  Requested Prescriptions     Refused Prescriptions Disp Refills    hydrALAZINE (APRESOLINE) 25 MG tablet [Pharmacy Med Name: hydrALAZINE 25 MG TABLET] 86 tablet 0     Sig: TAKE ONE TABLET BY MOUTH DAILY     Refused By: Aga Campos     Reason for Refusal: Duplicate Request    amLODIPine (NORVASC) 10 MG tablet [Pharmacy Med Name: amLODIPine BESYLATE 10 MG TAB] 86 tablet 0     Sig: TAKE ONE TABLET BY MOUTH DAILY     Refused By: Aga Campos     Reason for Refusal: Duplicate Request       Last Visit Date (If Applicable):  1/8/4392    Next Visit Date:    7/1/2022

## 2022-07-01 ENCOUNTER — OFFICE VISIT (OUTPATIENT)
Dept: INTERNAL MEDICINE CLINIC | Age: 62
End: 2022-07-01
Payer: MEDICARE

## 2022-07-01 VITALS
SYSTOLIC BLOOD PRESSURE: 138 MMHG | TEMPERATURE: 98.1 F | HEIGHT: 71 IN | BODY MASS INDEX: 25.76 KG/M2 | DIASTOLIC BLOOD PRESSURE: 90 MMHG | WEIGHT: 184 LBS | HEART RATE: 90 BPM | RESPIRATION RATE: 20 BRPM | OXYGEN SATURATION: 97 %

## 2022-07-01 DIAGNOSIS — E10.22 TYPE 1 DIABETES MELLITUS WITH STAGE 3A CHRONIC KIDNEY DISEASE (HCC): Primary | ICD-10-CM

## 2022-07-01 DIAGNOSIS — Z23 NEED FOR TDAP VACCINATION: ICD-10-CM

## 2022-07-01 DIAGNOSIS — Z28.21 PNEUMOCOCCAL VACCINATION DECLINED: ICD-10-CM

## 2022-07-01 DIAGNOSIS — G81.91 RIGHT HEMIPLEGIA (HCC): ICD-10-CM

## 2022-07-01 DIAGNOSIS — E78.2 MIXED HYPERLIPIDEMIA: ICD-10-CM

## 2022-07-01 DIAGNOSIS — Z86.16 HISTORY OF COVID-19: ICD-10-CM

## 2022-07-01 DIAGNOSIS — F32.A ANXIETY AND DEPRESSION: ICD-10-CM

## 2022-07-01 DIAGNOSIS — C91.10 CLL (CHRONIC LYMPHOCYTIC LEUKEMIA) (HCC): ICD-10-CM

## 2022-07-01 DIAGNOSIS — I10 ESSENTIAL HYPERTENSION: ICD-10-CM

## 2022-07-01 DIAGNOSIS — Z12.11 COLON CANCER SCREENING: ICD-10-CM

## 2022-07-01 DIAGNOSIS — Z28.21 INFLUENZA VACCINATION DECLINED: ICD-10-CM

## 2022-07-01 DIAGNOSIS — F41.9 ANXIETY AND DEPRESSION: ICD-10-CM

## 2022-07-01 DIAGNOSIS — Z28.21 TETANUS, DIPHTHERIA, AND ACELLULAR PERTUSSIS (TDAP) VACCINATION DECLINED: ICD-10-CM

## 2022-07-01 DIAGNOSIS — R59.1 LYMPHADENOPATHY: ICD-10-CM

## 2022-07-01 DIAGNOSIS — N18.31 TYPE 1 DIABETES MELLITUS WITH STAGE 3A CHRONIC KIDNEY DISEASE (HCC): Primary | ICD-10-CM

## 2022-07-01 PROBLEM — Z86.73 HISTORY OF STROKE: Status: RESOLVED | Noted: 2021-05-19 | Resolved: 2022-07-01

## 2022-07-01 PROBLEM — D72.820 LYMPHOCYTOSIS: Status: RESOLVED | Noted: 2022-04-01 | Resolved: 2022-07-01

## 2022-07-01 PROCEDURE — 3051F HG A1C>EQUAL 7.0%<8.0%: CPT | Performed by: FAMILY MEDICINE

## 2022-07-01 PROCEDURE — 99214 OFFICE O/P EST MOD 30 MIN: CPT | Performed by: FAMILY MEDICINE

## 2022-07-01 ASSESSMENT — ENCOUNTER SYMPTOMS
RESPIRATORY NEGATIVE: 1
EYES NEGATIVE: 1
GASTROINTESTINAL NEGATIVE: 1
ALLERGIC/IMMUNOLOGIC NEGATIVE: 1

## 2022-07-01 NOTE — PROGRESS NOTES
Subjective:      Patient ID: Luis Enrique Watson is a 58 y.o. male. Diabetes  He presents for his follow-up diabetic visit. He has type 1 diabetes mellitus. His disease course has been improving. Hypoglycemia symptoms include nervousness/anxiousness. Associated symptoms include weakness. There are no hypoglycemic complications. Symptoms are stable. Diabetic complications include a CVA. Risk factors for coronary artery disease include diabetes mellitus, dyslipidemia, male sex and hypertension. Current diabetic treatment includes insulin injections. He is compliant with treatment all of the time. His weight is stable. He is following a generally healthy diet. Meal planning includes ADA exchanges, avoidance of concentrated sweets, calorie counting and carbohydrate counting. He has had a previous visit with a dietitian. He participates in exercise three times a week. His home blood glucose trend is decreasing steadily. His overall blood glucose range is 110-130 mg/dl. An ACE inhibitor/angiotensin II receptor blocker is being taken. Review of Systems   Constitutional: Negative. HENT: Negative. Eyes: Negative. Respiratory: Negative. Cardiovascular: Negative. Gastrointestinal: Negative. Endocrine: Negative. Musculoskeletal: Negative. Skin: Negative. Allergic/Immunologic: Negative. Neurological: Positive for weakness. Hematological: Negative. Psychiatric/Behavioral: The patient is nervous/anxious. Past family and social history unremarkable. Diagnosis Orders   1. Type 1 diabetes mellitus with stage 3a chronic kidney disease ([facility] [address]     2. Need for Tdap vaccination  Tetanus-Diphth-Acell Pertussis (BOOSTRIX) injection 0.5 mL   3. Colon cancer screening  Fecal DNA Colorectal cancer screening (Cologuard)   4. CLL (chronic lymphocytic leukemia) ([facility] [address]     5. Lymphadenopathy     6. Essential hypertension     7. Mixed hyperlipidemia     8. Right hemiplegia ([facility] [address]     9.  Anxiety and depression 10. Influenza vaccination declined     6. Pneumococcal vaccination declined     12. Tetanus, diphtheria, and acellular pertussis (Tdap) vaccination declined     15. History of COVID-19           Objective:   Physical Exam  Vitals and nursing note reviewed. Constitutional:       Appearance: He is well-developed. HENT:      Head: Normocephalic and atraumatic. Right Ear: External ear normal.      Left Ear: External ear normal.      Nose: Nose normal.   Eyes:      Conjunctiva/sclera: Conjunctivae normal.      Pupils: Pupils are equal, round, and reactive to light. Cardiovascular:      Rate and Rhythm: Normal rate and regular rhythm. Heart sounds: Normal heart sounds. Comments: Insulin-dependent diabetes mellitus  Hypertension  Hyperlipidemia  Pulmonary:      Effort: Pulmonary effort is normal.      Breath sounds: Normal breath sounds. Abdominal:      General: Bowel sounds are normal.      Palpations: Abdomen is soft. Musculoskeletal:         General: Normal range of motion. Cervical back: Normal range of motion and neck supple. Comments: Degenerative polyarthralgia   Skin:     General: Skin is warm and dry. Neurological:      Mental Status: He is alert and oriented to person, place, and time. Deep Tendon Reflexes: Reflexes are normal and symmetric. Comments: History of CVA with residual right hemiplegia   Psychiatric:      Comments: Anxious         Assessment:       Diagnosis Orders   1. Type 1 diabetes mellitus with stage 3a chronic kidney disease ([facility] [address]     2. Need for Tdap vaccination  Tetanus-Diphth-Acell Pertussis (BOOSTRIX) injection 0.5 mL   3. Colon cancer screening  Fecal DNA Colorectal cancer screening (Cologuard)   4. CLL (chronic lymphocytic leukemia) ([facility] [address]     5. Lymphadenopathy     6. Essential hypertension     7. Mixed hyperlipidemia     8. Right hemiplegia ([facility] [address]     9. Anxiety and depression     10. Influenza vaccination declined     11.  Pneumococcal

## 2022-07-07 ENCOUNTER — HOSPITAL ENCOUNTER (OUTPATIENT)
Facility: MEDICAL CENTER | Age: 62
Discharge: HOME | End: 2022-07-07
Payer: MEDICARE

## 2022-07-07 ENCOUNTER — TELEPHONE (OUTPATIENT)
Dept: ONCOLOGY | Age: 62
End: 2022-07-07

## 2022-07-07 ENCOUNTER — OFFICE VISIT (OUTPATIENT)
Dept: ONCOLOGY | Age: 62
End: 2022-07-07
Payer: MEDICARE

## 2022-07-07 VITALS
SYSTOLIC BLOOD PRESSURE: 140 MMHG | BODY MASS INDEX: 25.72 KG/M2 | RESPIRATION RATE: 16 BRPM | TEMPERATURE: 98.2 F | WEIGHT: 184.4 LBS | DIASTOLIC BLOOD PRESSURE: 91 MMHG | HEART RATE: 84 BPM

## 2022-07-07 DIAGNOSIS — C91.10 CLL (CHRONIC LYMPHOCYTIC LEUKEMIA) (HCC): Primary | ICD-10-CM

## 2022-07-07 DIAGNOSIS — C91.10 CLL (CHRONIC LYMPHOCYTIC LEUKEMIA) (HCC): ICD-10-CM

## 2022-07-07 LAB
ABSOLUTE EOS #: 0 K/UL (ref 0–0.44)
ABSOLUTE IMMATURE GRANULOCYTE: 0 K/UL (ref 0–0.3)
ABSOLUTE LYMPH #: 23.87 K/UL (ref 1.1–3.7)
ABSOLUTE MONO #: 0.62 K/UL (ref 0.1–1.2)
BASOPHILS # BLD: 0 % (ref 0–2)
BASOPHILS ABSOLUTE: 0 K/UL (ref 0–0.2)
EOSINOPHILS RELATIVE PERCENT: 0 % (ref 1–4)
HCT VFR BLD CALC: 39.2 % (ref 40.7–50.3)
HEMOGLOBIN: 12.7 G/DL (ref 13–17)
IMMATURE GRANULOCYTES: 0 %
LYMPHOCYTES # BLD: 77 % (ref 24–43)
MCH RBC QN AUTO: 29.3 PG (ref 25.2–33.5)
MCHC RBC AUTO-ENTMCNC: 32.4 G/DL (ref 28.4–34.8)
MCV RBC AUTO: 90.5 FL (ref 82.6–102.9)
MONOCYTES # BLD: 2 % (ref 3–12)
MORPHOLOGY: ABNORMAL
NRBC AUTOMATED: 0 PER 100 WBC
PDW BLD-RTO: 13.6 % (ref 11.8–14.4)
PLATELET # BLD: 193 K/UL (ref 138–453)
PMV BLD AUTO: 10.1 FL (ref 8.1–13.5)
RBC # BLD: 4.33 M/UL (ref 4.21–5.77)
SEG NEUTROPHILS: 21 % (ref 36–65)
SEGMENTED NEUTROPHILS ABSOLUTE COUNT: 6.51 K/UL (ref 1.5–8.1)
WBC # BLD: 31 K/UL (ref 3.5–11.3)

## 2022-07-07 PROCEDURE — 36415 COLL VENOUS BLD VENIPUNCTURE: CPT

## 2022-07-07 PROCEDURE — 99211 OFF/OP EST MAY X REQ PHY/QHP: CPT | Performed by: INTERNAL MEDICINE

## 2022-07-07 PROCEDURE — 99214 OFFICE O/P EST MOD 30 MIN: CPT | Performed by: INTERNAL MEDICINE

## 2022-07-07 PROCEDURE — 85025 COMPLETE CBC W/AUTO DIFF WBC: CPT

## 2022-07-07 NOTE — PROGRESS NOTES
Chief Complaint   Patient presents with    Follow-up    Discuss Labs     DIAGNOSIS:       CLL, Stage 1  Lymphadenopathy. Persistent leukocytosis  Persistent neutropenia  Persistent lymphocytosis  History of CVA/right-sided hemiplegia with residual weakness  History of hypertension and diabetes  CURRENT THERAPY:         Observation for CLL. No indications for treatment. BRIEF CASE HISTORY:      Mr. Sue Can is a very pleasant 58 y.o. male with history of multiple co morbidities as listed. The patient is referred for evaluation and management of leukocytosis. Patient had history of diabetes and hypertension. He had CVA with right-sided hemiplegia in 2019. He had residual right-sided weakness. Patient has no other major health problems. Patient was noted to have persistent elevation of white blood cells over the last few years. He had no symptoms related to that abnormality. No repeated infections. No fever or night sweats. No weight loss or decreased appetite. No enlarged lymph nodes. No history of smoking or alcohol drinking. .   INTERIM HISTORY:   Seen for follow up  CLL. Doing well clinically. No fever or night sweats. No weight loss or decreased appetite. No new enlarged lymph nodes. No repeated infections. PAST MEDICAL HISTORY: has a past medical history of Cerebral artery occlusion with cerebral infarction ([facility] [address] Diabetes mellitus ([facility] [address] High cholesterol, and Hypertension. PAST SURGICAL HISTORY: has a past surgical history that includes knee surgery (1983).      CURRENT MEDICATIONS:  has a current medication list which includes the following prescription(s): amlodipine, hydralazine, insulin lispro (1 unit dial), fluticasone, potassium chloride, onetouch delica plus [id number], lantus solostar, fluoxetine, atorvastatin, b-d ultrafine iii short pen, blood glucose test strips, blood glucose 6.4 oz (83.6 kg). General appearance - well appearing, not in pain or distress  Mental status - good mood, alert and oriented  Eyes - pupils equal and reactive, extraocular eye movements intact  Ears - bilateral TM's and external ear canals normal  Nose - normal and patent, no erythema, discharge or polyps  Mouth - mucous membranes moist, pharynx normal without lesions  Neck - supple, no significant adenopathy  Lymphatics -bilateral axillary and inguinal lymphadenopathy. no hepatosplenomegaly  Chest - clear to auscultation, no wheezes, rales or rhonchi, symmetric air entry  Heart - normal rate, regular rhythm, normal S1, S2, no murmurs, rubs, clicks or gallops  Abdomen - soft, nontender, nondistended, no masses or organomegaly  Neurological - alert, oriented, residual right-sided weakness with power 3-4 over 5. Musculoskeletal - no joint tenderness, deformity or swelling  Extremities - peripheral pulses normal, no pedal edema, no clubbing or cyanosis  Skin - normal coloration and turgor, no rashes, no suspicious skin lesions noted     Review of Diagnostic data:   Lab Results   Component Value Date    WBC 31.0 (HH) 07/07/2022    HGB 12.7 (L) 07/07/2022    HCT 39.2 (L) 07/07/2022    MCV 90.5 07/07/2022     07/07/2022       Chemistry        Component Value Date/Time     (H) 02/22/2022 1013    K 4.2 02/22/2022 1013     02/22/2022 1013    CO2 24 02/22/2022 1013    BUN 22 02/22/2022 1013    CREATININE 1.48 (H) 02/22/2022 1013        Component Value Date/Time    CALCIUM 9.4 02/22/2022 1013    ALKPHOS 84 02/22/2022 1013    AST 15 02/22/2022 1013    ALT 13 02/22/2022 1013    BILITOT 0.41 02/22/2022 1013            IMPRESSION:   CLL, Stage 1  Lymphadenopathy. Persistent leukocytosis  Persistent neutropenia  Persistent lymphocytosis  History of CVA/right-sided hemiplegia with residual weakness  History of hypertension and diabetes    PLAN: I reviewed the labs as above and discussed with the patient. Flow cytometry showed CLL. C/o bulge in inguinal region. Exam showed bilateral axillary and inguinal lymphadenopathy. CT scan results were explained to the patient. Patient has axillary and inguinal lymphadenopathy. However we do not see an indication to start treatment yet. Labs are showing stable leukocytosis. I explained the nature of CLL, staging, prognosis and treatment. Discussed indications for treatment of CLL. I will see him in 3 months with repeat labs. Sooner for any problems. Patient's questions were answered to the best of his satisfaction and he verbalized full understanding and agreement.

## 2022-07-07 NOTE — TELEPHONE ENCOUNTER
Laly Rodrigues MD VISIT  RV 3-4 months with CBC at RV  LABS CDP 10/13/22  MD VISIT 10/13/22 @ 9:15AM  AVS PRINTED W/ INSTRUCTIONS AND GIVEN TO PT ON EXIT

## 2022-08-23 ENCOUNTER — OFFICE VISIT (OUTPATIENT)
Dept: NEUROLOGY | Age: 62
End: 2022-08-23
Payer: MEDICARE

## 2022-08-23 VITALS
HEART RATE: 99 BPM | WEIGHT: 179 LBS | HEIGHT: 71 IN | DIASTOLIC BLOOD PRESSURE: 82 MMHG | SYSTOLIC BLOOD PRESSURE: 128 MMHG | BODY MASS INDEX: 25.06 KG/M2

## 2022-08-23 DIAGNOSIS — G81.91 RIGHT HEMIPLEGIA (HCC): Primary | ICD-10-CM

## 2022-08-23 DIAGNOSIS — E78.5 HYPERLIPIDEMIA, UNSPECIFIED HYPERLIPIDEMIA TYPE: ICD-10-CM

## 2022-08-23 DIAGNOSIS — F41.9 ANXIETY AND DEPRESSION: ICD-10-CM

## 2022-08-23 DIAGNOSIS — F32.A ANXIETY AND DEPRESSION: ICD-10-CM

## 2022-08-23 DIAGNOSIS — I63.9 ISCHEMIC STROKE (HCC): ICD-10-CM

## 2022-08-23 PROCEDURE — 99214 OFFICE O/P EST MOD 30 MIN: CPT | Performed by: NURSE PRACTITIONER

## 2022-08-23 RX ORDER — FLUOXETINE HYDROCHLORIDE 20 MG/1
20 CAPSULE ORAL DAILY
Qty: 30 CAPSULE | Refills: 11 | Status: SHIPPED | OUTPATIENT
Start: 2022-08-23

## 2022-08-23 RX ORDER — ATORVASTATIN CALCIUM 40 MG/1
40 TABLET, FILM COATED ORAL DAILY
Qty: 30 TABLET | Refills: 11 | Status: SHIPPED | OUTPATIENT
Start: 2022-08-23

## 2022-08-23 NOTE — PROGRESS NOTES
[facility]            [address]          [facility], [address]          Dept: [phone]          Dept Fax: [phone]    2E. MD Ritika Cooley [address] [facility] Anderson[facility] Ale, MD Miko Gonzalez, KAREN            8/23/2022      HISTORY OF PRESENT ILLNESS:       I had the pleasure of seeing Ashlee Shepard, who returns for continuing neurologic care. The patient was seen last on February 22, 2022 for treatment of depression and a previous left internal capsule ischemic stroke secondary to uncontrolled hypertension and diabetes in August 2019 with residual right hemiparesis. The patient previously had a  left internal capsule ischemic stroke secondary to uncontrolled hypertension and diabetes in August 2019 with residual right hemiparesis. For secondary stroke prevention he was prescribed aspirin 81 mg daily and lipitor 20 mg daily. A lipid panel was completed in February 2022 which returned a mildly elevated LDL at 110. He reports today he has continued having right sided weakness however it has not worsened since his previous visit. He reports that he uses his AFO brace when he is in public however it is uncomfortable and he will not wear it at his house. For management of his depression he was prescribed prozac 20 mg daily. He has remained compliant to prozac which he reports has been effective.          Testing reviewed:    Lipid Panel 2/22/2022  Cholesterol <200 mg/dL 173      HDL >40 mg/dL 34 Low      LDL Cholesterol 0 - 130 mg/dL 110     Labs Completed 8/6/2021     Cholesterol <200 mg/dL 130       HDL >40 mg/dL 29 Low       LDL Cholesterol 0 - 130 mg/dL 65      Triglycerides <150 mg/dL 180 High       Hemoglobin A1C 4.0 - 6.0 % 6.9 High       ALT 5 - 41 U/L 24    AST <40 U/L 23          Labs Completed 1/6/2021  Hemoglobin A1C 6.5High        Cholesterol 167  <200 mg/dL      HDL 34Low   >40 mg/dL      LDL Cholesterol 111  0 - 130 mg/dL 8/5/2019  CTH: Hypodense abnormality in the right? basal ganglia   CTA Head and Neck : Unremarkable      8/6/2019  Brain MRI : Periventricular white matter disease of a chronic nature with a focal area of acute ischemia in the left posterior limb of the internal capsule.       2 D echo  EF 60-65%     OTHER WORKUP:  -> 42   A1c 11.6-> 8.8 (9/13/2019)-> 6.8      PAST MEDICAL HISTORY:         Diagnosis Date    Cerebral artery occlusion with cerebral infarction (HCC)     Diabetes mellitus (HCC)     High cholesterol     Hypertension         PAST SURGICAL HISTORY:         Procedure Laterality Date    KNEE SURGERY  1983        SOCIAL HISTORY:     Social History     Socioeconomic History    Marital status: Single     Spouse name: Not on file    Number of children: Not on file    Years of education: Not on file    Highest education level: Not on file   Occupational History    Not on file   Tobacco Use    Smoking status: Never    Smokeless tobacco: Never   Vaping Use    Vaping Use: Never used   Substance and Sexual Activity    Alcohol use: Not Currently    Drug use: Never    Sexual activity: Not on file   Other Topics Concern    Not on file   Social History Narrative    Not on file     Social Determinants of Health     Financial Resource Strain: Not on file   Food Insecurity: Not on file   Transportation Needs: Not on file   Physical Activity: Not on file   Stress: Not on file   Social Connections: Not on file   Intimate Partner Violence: Not on file   Housing Stability: Not on file       CURRENT MEDICATIONS:     Current Outpatient Medications   Medication Sig Dispense Refill    atorvastatin (LIPITOR) 40 MG tablet Take 1 tablet by mouth daily 30 tablet 11    FLUoxetine (PROZAC) 20 MG capsule Take 1 capsule by mouth daily 30 capsule 11    amLODIPine (NORVASC) 10 MG tablet TAKE ONE TABLET BY MOUTH DAILY 86 tablet 0    hydrALAZINE (APRESOLINE) 25 MG tablet TAKE ONE TABLET BY MOUTH DAILY 86 tablet 0    insulin lispro, 1 Unit Dial, (HUMALOG KWIKPEN) 100 UNIT/ML SOPN According to sliding scale. Max dose per day 48 15 pen 1    fluticasone (FLONASE) 50 MCG/ACT nasal spray 1 spray by Each Nostril route daily 32 g 1    potassium chloride (KLOR-CON M) 20 MEQ TBCR extended release tablet Take 1 tablet by mouth daily 90 tablet 1    insulin glargine (LANTUS SOLOSTAR) 100 UNIT/ML injection pen Inject 20 Units into the skin nightly 6 pen 1    aspirin 81 MG chewable tablet Take 81 mg by mouth daily      Lancets (ONETOUCH DELICA PLUS [id number]) MISC USE TO TEST BLOOD SUGAR TWO TIMES A  each 3    Insulin Pen Needle (B-D ULTRAFINE III SHORT PEN) 31G X 8 MM MISC USE ONE - FOUR NEEDLE DAILY 150 each 5    blood glucose monitor strips Test 3 times a day & as needed for symptoms of irregular blood glucose. 200 strip 5    blood glucose monitor kit and supplies Test 3  times a day & as needed for symptoms of irregular blood glucose. ONE TOUCH METER 1 kit 0    Insulin Pen Needle (PEN NEEDLES) 31G X 6 MM MISC Inject 1 each into the skin daily 100 each 3     No current facility-administered medications for this visit. ALLERGIES:     Allergies   Allergen Reactions    Dairycare [Lactase-Lactobacillus]                                  REVIEW OF SYSTEMS        All items selected indicate a positive finding. Those items not selected are negative.   Constitutional [] Weight loss/gain   [] Fatigue  [] Fever/Chills   HEENT [] Hearing Loss  [] Visual Disturbance  [] Tinnitus  [] Eye pain   Respiratory [] Shortness of Breath  [] Cough  [] Snoring   Cardiovascular [] Chest Pain  [] Palpitations  [] Lightheaded   GI [] Constipation  [] Diarrhea  [] Swallowing change  [] Nausea/vomiting    [] Urinary Frequency  [] Urinary Urgency   Musculoskeletal [] Neck pain  [] Back pain  [] Muscle pain  [] Restless legs   Dermatologic [] Skin changes   Neurologic [] Memory loss/confusion  [] Seizures  [x] Trouble walking or imbalance  [] Dizziness  [] Sleep disturbance  [x] Weakness  [] Numbness  [] Tremors  [] Speech Difficulty  [] Headaches  [] Light Sensitivity  [] Sound Sensitivity   Endocrinology []Excessive thirst  []Excessive hunger   Psychiatric [x] Anxiety/Depression  [] Hallucination   Allergy/immunology []Hives/environmental allergies   Hematologic/lymph [] Abnormal bleeding  [] Abnormal bruising         PHYSICAL EXAMINATION:       Vitals:    08/23/22 0939   BP: (!) 142/89   Pulse: 97                                              .                                                                                                    General Appearance:  Alert, cooperative, no signs of distress, appears stated age   Head:  Normocephalic, no signs of trauma   Eyes:  Conjunctiva/corneas clear;  eyelids intact   Ears:  Normal external ear and canals   Nose: Nares normal, mucosa normal, no drainage    Throat: Lips and tongue normal; teeth normal;  gums normal   Neck: Supple, intact flexion, extension and rotation;   trachea midline;  no adenopathy;   thyroid: not enlarged;   no carotid pulse abnormality   Back:   Symmetric, no curvature, ROM adequate   Lungs:   Respirations unlabored   Heart:  Regular rate and rhythm           Extremities: Extremities normal, no cyanosis, no edema   Pulses: Symmetric over head and neck   Skin: Skin color, texture normal, no rashes, no lesions                                     NEUROLOGIC EXAMINATION    Neurologic Exam  Mental status    Alert and oriented x 3; intact memory with no confusion, speech or language problems; no hallucinations or delusions  Fund of information appropriate for level of education    Cranial nerves    II - visual fields intact to confrontation bilaterally  III, IV, VI - extra-ocular muscles full: no pupillary defect; no SARAY, no nystagmus, no ptosis   V - normal facial sensation                                                               VII - normal facial symmetry VIII - intact hearing                                                                             IX, X - symmetrical palate                                                                  XI - symmetrical shoulder shrug                                                       XII - tongue midline without atrophy or fasciculation      Motor function  Spastic hemiparesis on the right      Sensory function Intact to light touch, pinprick, vibration, proprioception on all 4 extremities      Cerebellar Intact fine motor movement. No involuntary movements or tremors. No ataxia or dysmetria on finger to nose or heel to shin testing      Reflex function DTR 2+ on bilateral UE and LE, symmetric. Down going toes bilaterally      Gait                  Hemiparetic gait                  Medical Decision Making: In summary, your patient, Ksenia Nolen exhibits the following, with associated plan:    Depression. Continue prozac 20 mg daily  Previous left internal capsule ischemic stroke secondary to uncontrolled hypertension and uncontrolled diabetes in August 2019. The patient has a right hemiparetic gait with a foot drop and decreased arm extension, flexion, and  of his right hand. Continue aspirin 81 mg daily  Increase lipitor to 40 mg daily  Recent lipid panel in February 2022 returned a mildly elevated LDL at 110, we will obtain a repeat lipid panel in January 2023 with target LDL less than 100  Referral to physical therapy was made at today's visit    Return for follow up visit in 6 months.              Signed: Obed Correa CNP      *Please note that portions of this note were completed with a voice recognition program.  Although every effort was made to insure the accuracy of this automated transcription, some errors in transcription may have occurred, occasionally words and are mis-transcribed    Provider Attestation: The documentation recorded by the scribe accurately reflects the service I personally performed and the decisions made by myself. Portions of this note were transcribed by a scribe. I personally performed the history, physical exam, and medical decision-making and confirm the accuracy of the information in the transcribed note. Scribe Attestation:   By signing my name below, Rocael Nolasco, attest that this documentation has been prepared under the direction and in the presence of Nay Walker CNP.

## 2022-08-26 NOTE — TELEPHONE ENCOUNTER
Ludy Yo is calling to request a refill on the following medication(s):    Medication Request:  Requested Prescriptions     Pending Prescriptions Disp Refills    blood glucose test strips (ONETOUCH ULTRA) strip [Pharmacy Med Name: Valri Osler TEST STRIP] 100 strip      Sig: USE ONE STRIP TO TEST THREE TIMES A DAY AND AS NEEDED FOR SYMPTOMS OF IRREGULAR BLOOD GLUCOSE    amLODIPine (NORVASC) 10 MG tablet [Pharmacy Med Name: amLODIPine BESYLATE 10 MG TAB] 86 tablet 0     Sig: TAKE ONE TABLET BY MOUTH DAILY       Last Visit Date (If Applicable):  2/6/2549    Next Visit Date:    11/25/2022

## 2022-08-29 RX ORDER — AMLODIPINE BESYLATE 10 MG/1
TABLET ORAL
Qty: 90 TABLET | Refills: 0 | Status: SHIPPED | OUTPATIENT
Start: 2022-08-29 | End: 2022-08-30

## 2022-08-29 RX ORDER — BLOOD SUGAR DIAGNOSTIC
STRIP MISCELLANEOUS
Qty: 100 STRIP | Refills: 0 | Status: SHIPPED | OUTPATIENT
Start: 2022-08-29 | End: 2022-08-30

## 2022-08-29 NOTE — TELEPHONE ENCOUNTER
Ashlee Shepard is calling to request a refill on the following medication(s):    Medication Request:  Requested Prescriptions     Pending Prescriptions Disp Refills    amLODIPine (NORVASC) 10 MG tablet [Pharmacy Med Name: amLODIPine BESYLATE 10 MG TAB] 86 tablet      Sig: TAKE ONE TABLET BY MOUTH DAILY    ONETOUCH ULTRA strip [Pharmacy Med Name: Bloomer Meres TEST STRIP] 100 strip 0     Sig: USE ONE STRIP TO TEST THREE TIMES A DAY AND AS NEEDED FOR SYMPTOMS OF IRREGULAR BLOOD GLUCOSE       Last Visit Date (If Applicable):  2/9/5511    Next Visit Date:    11/25/2022

## 2022-08-30 RX ORDER — AMLODIPINE BESYLATE 10 MG/1
TABLET ORAL
Qty: 90 TABLET | Refills: 0 | Status: SHIPPED | OUTPATIENT
Start: 2022-08-30

## 2022-08-30 RX ORDER — BLOOD SUGAR DIAGNOSTIC
STRIP MISCELLANEOUS
Qty: 100 STRIP | Refills: 0 | Status: SHIPPED | OUTPATIENT
Start: 2022-08-30

## 2022-09-06 RX ORDER — HYDRALAZINE HYDROCHLORIDE 25 MG/1
TABLET, FILM COATED ORAL
Qty: 90 TABLET | Refills: 1 | Status: SHIPPED | OUTPATIENT
Start: 2022-09-06 | End: 2022-10-10

## 2022-09-06 NOTE — TELEPHONE ENCOUNTER
Nik Soto is calling to request a refill on the following medication(s):    Medication Request:  Requested Prescriptions     Pending Prescriptions Disp Refills    hydrALAZINE (APRESOLINE) 25 MG tablet [Pharmacy Med Name: hydrALAZINE 25 MG TABLET] 86 tablet 0     Sig: TAKE ONE TABLET BY MOUTH DAILY     Last filled 5/23/22 # 86 no refill  Order pending    Last Visit Date (If Applicable):  2/9/5739    Next Visit Date:    11/25/2022

## 2022-09-08 ENCOUNTER — HOSPITAL ENCOUNTER (OUTPATIENT)
Dept: PHYSICAL THERAPY | Age: 62
Setting detail: THERAPIES SERIES
Discharge: HOME | End: 2022-09-08
Payer: MEDICARE

## 2022-09-08 PROCEDURE — 97162 PT EVAL MOD COMPLEX 30 MIN: CPT

## 2022-09-08 NOTE — CONSULTS
[x] [facility]  [address]    [phone]  F: [phone] [] [address]  [facility]   [address]  P: [phone]  F: [phone] [] [place]  [address] [facility]  P: [phone]  F: [phone] [] [address]  [facility]   [address]   P: [phone]  F: [phone] [] [facility]  [address] [facility]  [place]: [phone]   F: [phone]        Physical Therapy Neurological Evaluation    Date:  2022  Patient: Antonio Singleton  : 1960  MRN: [id number]  Physician: [facility]   Insurance: [address] [place] [address])  Medical Diagnosis: I63.9 (ICD-10-CM) - Ischemic stroke [facility]) Rehab Codes: R26.89, M26.81, R29.3  Next 's appt.: 22 PCP  Date of symptom onset: 22 (chronic onset)    Subjective:   CC: Pt arrives for physical therapy evaluation of impairments secondary to chronic stroke - has right hemiparesis that results in impaired gait, balance impairment. Would like to \"get back to normal\" and is interested in [employer] devices for home for UE/LE.     Pertinent medical hx: 2019 (currently 3 years post) - Left internal capsule ischemic CVA secondary to uncontrolled HTN/DM          PMHx: [] Unremarkable [x] Diabetes [x] HTN  [] Pacemaker   [] MI/Heart Problems [] Cancer [] Arthritis [] Other:              [x] Refer to full medical chart  In EPIC     Comorbidities:   [] Obesity [] Dialysis  [] Other:   [] Asthma/COPD [] Dementia [] Other:   [x] Stroke [] Sleep apnea [] Other:   [] Vascular disease [] Rheumatic disease [] Other:     Tests: [] X-Ray: [] MRI:  [] Other:    Medications: [x] Refer to full medical record [] None [] Other:  Allergies:       [x] Refer to full medical record [] None [] Other:    Function:  Hand Dominance  [] Right  [] Left  Patient lives with:  At parents house   In what type of home [x]  One story   [] Two story   [] Split level   Number of stairs to enter 1   With handrail on the N/A   Bathroom has a []  Tub only  [x] Tub/shower combo   [] Walk in shower    []  Grab bars   Washing machine is on [x]  Main level   [] Second level   [] Basement   Employer N/A   Job Status   [x] Disability     Work activities/duties Plays [employer]       Durable Medical Equipment:  Current DME available: small base quad cane      ADL/IADL Previous level of function Current level of function Who currently assists the patient with task   Bathing  [x] Independent  [] Assist [] Independent  [] Assist    Dress/grooming [x] Independent  [] Assist [] Independent  [] Assist    Transfer/mobility [x] Independent  [] Assist [] Independent  [] Assist    Feeding [x] Independent  [] Assist [] Independent  [] Assist    Toileting [x] Independent  [] Assist [] Independent  [] Assist    Driving [] Independent  [x] Assist [] Independent  [] Assist    Housekeeping [x] Independent  [] Assist [] Independent  [] Assist    Grocery shop/meal prep [x] Independent  [] Assist [] Independent  [] Assist      Gait Prior level of function Current level of function    [x] Independent  [] Assist [x] Independent  [] Assist   Device: [] Independent [] Independent    [] Straight Cane [x] Quad cane [] Straight Cane [x] Quad cane    [] Standard walker [] Rolling walker   [] 4 wheeled walker [] Standard walker [] Rolling walker   [] 4 wheeled walker    [] Wheelchair [] Wheelchair       Pain:  [] Yes  [x] No Location: N/A Pain Rating: (0-10 scale) 0/10  Pain altered Tx:  [] Yes  [] No  Action:             Objective:    POSTURE No deficit Deficit Not Tested Comments   Kyphosis [x] [] []    Scoliosis [x] [] []    Forward Head [] [x] []    Rounded Shldrs [] [x] []    Slumped Sitting [] [x] [] R scapula: depressed, downward tilt, winging, abducted.  Two-finger GH subluxation   Skin Integrity [] [] [x]           NEUROLOGICAL       Reflexes [] [] [x] [] 0: absent  [] 1+: diminished  [] 2+: brisk, normal  [] 3+: brisker than average, slight hyperactive  [] 4+: very brisk, hyperactive, clonus    Special reflexes:   Babinski:   [] negative [] positive  Hoffmans: []negative  [] positive   Cranial Nerves [] [] []    Sensation [] [] []    Bladder/Bowel [x] [] []    Coordination [] [] [x]                                    Present         Absent                                         UE/LE           UE/LE  Dysdiadochokinesia:       [] []            [] []  Dysmetria:                     [] []            [] []   Tone [] [x] [] [] 0: no increase in muscle tone  [] 1: slight increase in muscle tone, manifested by a catch and release or by minimal resistance at end ROM  [] 1+: slight increase in muscle tone, manifested by a catch and release or by minimal resistance throughout remainder (less than half) of ROM  [x] 2: more marked increase in muscle tone throughout most of ROM, but affected part easily moved - R elbow flexors, wrist flexors, finger flexors  [] 3: considerable increase in tone, passive movement difficult  [] 4:  rigid in flexion or extension   Clonus [] [] []    Tremors [] [] []           Gait  [] [x] [] Analysis: R hemiparetic gait, genurecurvatum in R stance, decreased RLE stance time, slightly externally rotated positioning of RLE in stance   Comments:         ROM  °A/P STRENGTH    Left Right Left Right   Shoulder Flex  50/110     Abd  50/90     Elbow Flex  WNL     Ext  0     Wrist Flex       Ext  10deg     Hand        Hip Flex   5 4+   Ext   4 3   Abd   4 3+   Knee Flex       Ext  Hyper   2   Ankle DF  0  2+   PF  WNL  1          Bryan test: ~5deg iliopsoas tightness on R    Hip ext:   - R:no elevation of other limb, just resistance felt on RLE  - L: elevation of other limb but does cave into hip flexion on test leg slightly            FUNCTION INDEP MIN ASSIST MOD ASSIST MAX ASSIST NOT TESTED   Bed Mobility        -rolling [x] [] [] [] []   -sit to supine [x] [] [] [] []   -supine to sit [x] [] [] [] []   Transfers        -sit to stand [x] [] [] [] []   -sliding board [] [] [] [] [x]   -pivot [] [] [] [] [x]   Balance        -sitting static [x] [] [] [] []   -dynamic [x] [] [] [] []   -standing static [x] [] [] [] []   -dynamic [] [x] [] [] []   W/C Mobility [] [] [] [] [x]   Groom/Dress [] [] [] [] [x]     Core set neurological outcome measures: [] Mccurdy Balance Scale:   [x] 10mWT: .42m/s self selected; .59 m/s fast - both with SBQC         [x] FGA: 13/30 (<22 indicates higher risk for falls)  [x] 5x STS: 17.76s but requires unilat UE assist (so scores 0)  [x] 6MWT: complete in upcoming visits      Activity-Specific Balance   Confidence Scale  Score: 58% self confidence in balance tasks     Comments:    Assessment:  Patient would benefit from skilled physical therapy services in order to: progress R hemibody strength in RUE/RLE, increase gait speed and improve mechanics, improve functional quad/hip strength to increase stability in standing and ease of transfers, improve balance within gait tasks, and overall improve quality of life. Problems:    [] ? Pain:  [x] ? ROM: R hip flexors, R gastroc  [x] ? Strength:  [x] ? Function:  [x] Other:  balance/gait impairment     STG: (to be met in 8 treatments)    ? ROM: Negative for iliopsoas tightness on Bryan test on RLE for improved ability to achieve terminal hip ext  ? Balance:   Pt able to ambulate with intermittent head turns, stopping without slowed gait speed nor instability noted  Pt able to step over 4\" objects while walking with no speed decrease, no instability noted  ? Strength:  At least 3/5 R quads to allow improved stability with RLE SLS positions including stair climbing, etc  ? Function:   Pt able to ambulate at least .52m/s with [facility] on level ground to progress towards decreased fall risk  Pt able to ascend/descend flight of stairs with improving speed, reciprocal ascent/descent with unilat UE assist  Patient to be independent with home exercise program as demonstrated by performance with correct form without cues. Demonstrate Knowledge of fall prevention    LTG: (to be met in 16 treatments)  ? Balance: At least 19/30 on FGA to indicate significant improvement in dynamic balance  At least 75% confidence on ABC scale to indicate increased self efficacy with balance  ? Strength: At least 3+/5 R quads to allow further improving stability with RLE SLS positions including stair climbing, etc  ? Function:   Pt able to ambulate at least .62m/s with [facility] [place] on level ground to progress towards decreased fall risk  Pt able to ascend/descend flight of stairs with further improving speed and safety reciprocal ascent/descent with unilat UE assist  Able to complete 5x STS without UE assist in under 1 min to indicate improving functional BLE power and strength for transfers        Patient goals: \"get back to normal\"    Rehab Potential:  [x] Good  [] Fair  [] Poor   Suggested Professional Referral:  [x] No  [] Yes:  Barriers to Goal Achievement[de-identified]  [] No  [x] Yes:  Domestic Concerns:  [x] No  [] Yes:    Pt. Education:  [x] Plans/Goals, Risks/Benefits discussed  [] Home exercise program  Method of Education: [x] Verbal  [] Demo  [] Written  Comprehension of Education:  [x] Verbalizes understanding. [] Demonstrates understanding. [] Needs Review. [] Demonstrates/verbalizes understanding of HEP/Ed previously given.     Treatment Plan:  [x] Therapeutic Exercise   86562  [] Iontophoresis: 4 mg/mL Dexamethasone Sodium Phosphate  mAmin  55763   [x] Therapeutic Activity  02285 [] Vasopneumatic cold with compression  03087    [x] Gait Training   09438 [] Ultrasound   94201   [x] Neuromuscular Re-education  56349 [] Electrical Stimulation Unattended  50545   [x] Manual Therapy  61442 [] Electrical Stimulation Attended  [id number]   [x] Instruction in HEP  [] Dry Needling   [] Aquatic Therapy   [id number] [x] Cold/hotpack    [] Massage   [id number]      [] Lumbar/Cervical Traction  [id number]     []  Medication allergies reviewed for use of    Dexamethasone Sodium Phosphate 4mg/ml     with iontophoresis treatments. Pt is not allergic. Frequency: 2 x/weeks for 16 visits    Todays Treatment:  Modalities:   Exercises:  Exercise Reps/ Time Weight/ Level Comments                                 Other:HELD treatment d/t insurance limitations - will provide thorough HEP in upcoming sessions    Specific Instructions for next treatment:  6MWT, fall prevention  - get OT order  - bodyweight support treadmill train  - gastroc/hip flexor stretching  - Xcite for RLE stance control with LLE OKC tasks  - progressively decreased height sit<>stands without UE assist  - single limb quad strengthening - leg press, leg extension, etc    Evaluation Complexity:  History (Personal factors, comorbidities) [] 0 [] 1-2 [x] 3+   Exam (limitations, restrictions) [] 1-2 [] 3 [x] 4+   Clinical presentation (progression) [] Stable [x] Evolving  [] Unstable   Decision Making [] Low [x] Moderate [] High    [] Low Complexity [x] Moderate Complexity [] High Complexity       Treatment Charges: Mins Units   [x] Evaluation       []  Low       [x]  Moderate       []  High 40 1   []  Modalities     []  Ther Exercise     []  Manual Therapy     []  Ther Activities     []  Aquatics     []  Vasocompression     []  Other       TOTAL TREATMENT TIME: 40 min    Time in:4:05 pm      Time out: 4:45 pm    Electronically signed by: Dana Trejo PT        Physician Signature:________________________________Date:__________________  By signing above or cosigning this note, I have reviewed this plan of care and certify a need for medically necessary rehabilitation services.      *PLEASE SIGN ABOVE AND FAX BACK ALL PAGES*

## 2022-09-08 NOTE — FLOWSHEET NOTE
Functional Gait Assessment (FGA)    Gait Level Surface: 1  Instructions: Walk at your normal speed from here to the next ioana (6 m/20 ft)  Grading: Rena  the highest category that applies. (3) Normal - Walks 6 m (20 ft) in less than 5.5 seconds, no assistive devices, good speed, no evidence for imbalance, normal gait pattern, deviates no more than 6 in. outside of the 12        in walkway width. (2) Mild impairment - Walks 6 in (20 ft) in less than 7 seconds but greater than 5.5 seconds, uses assistive device, slower speed, mild gait deviations, or deviates 6-10 in. outside of        the 12 in walkway width. (1) Moderate impairment - Walks 6 m (20 ft) slow speed, abnormal gait pattern, evidence for imbalance, or deviates 10-15 in outside of the 12 in walkway width. Requires more than 7       seconds to ambulate 6 m (20 ft). (0) Significant impairment - Cannot walk 6 m (20 ft) without assistance, severe gait deviations or imbalance, deviates greater than 15 in. outside of the 12 in. walkway width or        reaches and touches the wall. Change in Gait Speed: 2  Instructions: Begin walking at your normal pace (for 1.5 m (5ft)). When I tell you \"go\", walk as fast as you can (for 1.5m (5 ft)). When I tell you \"slow\", walk as slowly as you can (for 1.5m (5ft)). Grading: Rena  the highest category that applies. (3) Normal - Able to smoothly change walking speed without loss of balance or gait deviation. Shows a significant difference in walking speeds between normal, fast, and slow speeds. Deviates no more than 6 in. outside of the 12 in. walkway width. (2) Mild impairment - Is able to change speed but demonstrates mild gait deviations, deviates 6-10 in. outside of the 12 in. walkway width, or no gait deviations but        unable to achieve a significant change in velocity, or uses an assistive device.    (1) Moderate impairment - Makes only minor adjustments to walking speed, or accomplishes a change in speed with significant gait deviations, deviates 10-15 in outside the 12 in.              walkway width, or changes speed but loses balance but is able to recover and continue walking.   (0) Severe impairment - Cannot change speeds, deviates greater than 15 in outside 12 in walkway width, or loses balance and has to reach for wall or be caught    Gait with Horizontal Head Turns: 2  Instructions: Walk from here to the next ioana 6 m (20ft) away. Begin walking at your normal pace. Keep walking straight; after 3 steps, turn your head to the right and keep walking straight while looking to the right. After 3 more steps, turn your head to the left and keep walking straight while looking left. Continue alternating looking right and left every 3 steps until you have completed 2 repetitions in each direction. Grading: Esteban Gomes the highest category that applies. (3) Normal - Performs head turns smoothly with no change in gait. (2) Mild impairment - Performs head turns smoothly with slight change in gait velocity (eg. Minor disruption to smooth gait path), deviates 6-10 in. outside 12 in. walkway width, or uses       an assistive device. (1) Moderate impairment - Performs head turns with moderate change in gait velocity, slows down, deviates 10-15 in. outside 12 in. walkway width but recovers, can continue to walk   (0) Severe impairment - Cannot change speeds, deviates greater than 15 in. outside 12 in. walkway width, or loses balance and has to reach for wall or be caught. Gait with Vertical Head Turns: 2  Instructions: Walk from here to the next ioana (6m, 20 ft). Begin walking at your normal pace. Keep walking straight; after three steps, tip your head up and keep walking straight while looking up. After 3 more steps, tip your head down, keep walking straight while looking down. Continue alternating looking up and down every 3 steps until you have completed 2 repetitions in each direction.   Grading: Ioana the highest category that applies. (3) Normal - Performs head turns with no change in gait. Deviates no more than 6 in. outside 12 in. walkway width. (2) Mild impairment - Performs task with slight change in gait velocity (eg. Minor disruption to smooth gait path), deviates 6-10 in. outside 12 in. walkway width, or uses assistive device. (1) Moderate impairment - Performs task with moderate change in gait velocity, slows down, deviates 10-15 in. outside 12 in. walkway width but recovers, can continue to walk. (0) Severe impairment -Performs task with severe disruption of gait (eg. Staggers 15 in outside 12 in. walkway width, loses balance, stops, reaches for wall)    Gait and Pivot Turn: 1  Instructions: Begin walking at your normal pace. When I tell you, \"turn and stop\", turn as quickly as you can to face the opposite direction and stop. Grading: Gabriel Rose the highest category that applies. (3) Normal - Pivot turns safely within 3 seconds and stops quickly with no loss of balance. (2) Mild impairment - Pivot turns safely in >3 seconds and stops with no loss of balance, or pivot turns safely within 3 seconds and stops with mild imbalance, requires small steps to        catch balance. (1) Moderate impairment - Turns slowly, requires verbal cueing, or requires several small steps to catch balance following turn and stop. (0) Severe impairment - Cannot turn safely, requires assistance to turn and stop. Step Over Obstacle: 1  Instructions: Begin walking at your normal speed. When you come to the shoe box, step over it, not around it, and keep walking. Grading: Gabriel Rose the highest category that applies. (3) Normal - Is able to step over 2 stacked shoe boxes taped together (9 in. total height) without changing gait speed; no evidence of imbalance. (2) Mild impairment - Is able to step over one shoe box (4.5 in total height) without changing gait speed; no evidence of imbalance.    (1) Moderate impairment - Is able to step over one shoe box (4.5 in. Total height) but must slow dwon and adjust steps to clear box safely. May require verbal cueing.   (0) Severe impairment - Cannot perform without assistance. Gait with Narrow Base of Support: 0  Instructions: Walk on the floor with arms folded across the chest, feet aligned heel to toe in tandem for a distance of 12 ft. The number of steps taken in a striaght line are counted for a maximum of 10 steps. Grading: Isamar aKy the highest category that applies. (3) Normal - Is able to ambulate for 10 steps heel to toe with no staggering.   (2) Mild impairment - Ambulates 7-9 steps. (1) Moderate impairment - Ambulates 4-7 steps. (0) Severe impairment - Ambulates less than 4 steps heel to toe or cannot perform without assistance. Gait with Eyes Closed: 1  Instructions: Walk at your normal speed from here to the next ioana (6 m, 20 ft) with your eyes closed. Grading: Isamar Kay the highest category that applies. (3) Normal - Walks 6 m (20 ft), no assistive devices, good speed, no evidence of imbalance, normal gait pattern, deviates no more than 6 in outside 12 in. walkway width. Ambulates        6 m (20ft) in less than 7 seconds. (2) Mild impairment - Walks 6 m (20 ft), uses assistive device, slower speed, mild gait deviations, deviates 6-10 in. outside 12 in. walkway width. Ambulates 6 m (20 ft) in less than 9        seconds but greater than 7 seconds. (1) Moderate impairment - Walks 6 m (20 ft), slow speed, abnormal gait pattern, evidence for imbalance, deviates 10-15 in. outside 12 in. walkway width. Requires more than 9        seconds to ambulate 6 m (20 ft). (0) Severe impairment - Cannot walk 6 m (20 ft) without assistance, severe gait deviations or imbalance, deviates greater than 15 in. outside 12 in. walkway width or will not attempt        task. Ambulating Backwards: 1  Instructions: Walk backwards until I tell you to stop.   Grading: Isamar Kay the highest category that applies. (3) Normal - Walks 6 m (20 ft), no assistive devices, good speed, no evidence for imbalance, normal gait pattern, deviates no more than 6 in. outside 12 in. walkway width. (2) Mild impairment - Walks 6 m (20 ft), uses assistive device, slower speed, mild gait deviations, deviates 6-10 in. outside 12 in. walkway width. (1) Moderate impairment - Walks 6 m (20 ft), slow speed, abnormal gait pattern, evidence for imbalance, deviates 10-15 in. outside 12 in. walkway width. (0) Severe impairment - Cannot walk 6m (20 ft) without assistance, severe gait deviations or imbalance, deviates greater than 15 in. outside 12in. walkway width or will not attempt        task. Steps: 2  Instructions: Walk up these stairs as you would at home (ie. Using the rail if necessary). At the top turn around and walk down. Grading: Margarita Sharif the highest category that applies. (3) Normal - Alternating feet, no rail   (2) Mild impairment - Alternating feet, must use rail. (1) Moderate impairment - Two feet to a stair, must use rail.   (0) Severe impairment - Cannot do safely.         Total Score: 13 (maximum score = 30)      Cutoff scores:   - Non-Specific Older Adults: < or equal to 22/30 = risk of falls   - Parkinson's Disease: <15/30 = fall risk, Anson and Yahr 1-4, <18/30 = fall risk for inpatients; Hoen and Yahr 1-4

## 2022-09-12 RX ORDER — HYDRALAZINE HYDROCHLORIDE 25 MG/1
TABLET, FILM COATED ORAL
Qty: 86 TABLET | OUTPATIENT
Start: 2022-09-12

## 2022-09-12 NOTE — TELEPHONE ENCOUNTER
Lev Ingram is calling to request a refill on the following medication(s):    Medication Request:  Requested Prescriptions     Pending Prescriptions Disp Refills    hydrALAZINE (APRESOLINE) 25 MG tablet [Pharmacy Med Name: hydrALAZINE 25 MG TABLET] 86 tablet      Sig: TAKE ONE TABLET BY MOUTH DAILY     Last filled 9/6/22 90 day 1 refill  Too soon    Last Visit Date (If Applicable):  5/0/0659    Next Visit Date:    11/25/2022

## 2022-09-13 DIAGNOSIS — G81.91 RIGHT HEMIPLEGIA (HCC): ICD-10-CM

## 2022-09-13 RX ORDER — POTASSIUM CHLORIDE 1500 MG/1
TABLET, FILM COATED, EXTENDED RELEASE ORAL
Qty: 90 TABLET | Refills: 0 | Status: SHIPPED | OUTPATIENT
Start: 2022-09-13

## 2022-09-13 NOTE — TELEPHONE ENCOUNTER
Vic Watt is calling to request a refill on the following medication(s):    Medication Request:  Requested Prescriptions     Pending Prescriptions Disp Refills    potassium chloride (KLOR-CON M) 20 MEQ TBCR extended release tablet [Pharmacy Med Name: POTASSIUM CHLORIDE ER 20 MEQ TABLET] 30 tablet      Sig: TAKE ONE TABLET BY MOUTH DAILY     Last filled 3/31/22 90 day 1 refill  Order pending    Last Visit Date (If Applicable):  0/6/7774    Next Visit Date:    11/25/2022

## 2022-09-16 DIAGNOSIS — E78.5 HYPERLIPIDEMIA, UNSPECIFIED HYPERLIPIDEMIA TYPE: ICD-10-CM

## 2022-09-16 DIAGNOSIS — I63.9 ISCHEMIC STROKE (HCC): ICD-10-CM

## 2022-09-16 RX ORDER — ATORVASTATIN CALCIUM 20 MG/1
TABLET, FILM COATED ORAL
Qty: 75 TABLET | OUTPATIENT
Start: 2022-09-16

## 2022-09-23 DIAGNOSIS — N18.31 TYPE 1 DIABETES MELLITUS WITH STAGE 3A CHRONIC KIDNEY DISEASE (HCC): Primary | ICD-10-CM

## 2022-09-23 DIAGNOSIS — E10.22 TYPE 1 DIABETES MELLITUS WITH STAGE 3A CHRONIC KIDNEY DISEASE (HCC): Primary | ICD-10-CM

## 2022-09-23 RX ORDER — INSULIN GLARGINE 100 [IU]/ML
INJECTION, SOLUTION SUBCUTANEOUS
Qty: 18 ML | Refills: 1 | Status: SHIPPED | OUTPATIENT
Start: 2022-09-23

## 2022-09-23 NOTE — TELEPHONE ENCOUNTER
Mack Suazo is calling to request a refill on the following medication(s):    Medication Request:  Requested Prescriptions     Pending Prescriptions Disp Refills    LANTUS SOLOSTAR 100 UNIT/ML injection pen [Pharmacy Med Name: LANTUS SOLOSTAR 100 UNIT/ML] 18 mL 1     Sig: INJECT 20 UNITS UNDER THE SKIN ONCE NIGHTLY       Last Visit Date (If Applicable):  1/6/9650    Next Visit Date:    11/25/2022

## 2022-09-26 DIAGNOSIS — N18.31 TYPE 1 DIABETES MELLITUS WITH STAGE 3A CHRONIC KIDNEY DISEASE (HCC): ICD-10-CM

## 2022-09-26 DIAGNOSIS — E10.22 TYPE 1 DIABETES MELLITUS WITH STAGE 3A CHRONIC KIDNEY DISEASE (HCC): ICD-10-CM

## 2022-09-26 RX ORDER — INSULIN GLARGINE 100 [IU]/ML
INJECTION, SOLUTION SUBCUTANEOUS
Qty: 18 ML | Refills: 1 | OUTPATIENT
Start: 2022-09-26

## 2022-09-27 NOTE — PRE-CERTIFICATION NOTE
..       [x] [facility]  [address]  [phone]  F: [phone] [] [address]  [place] [address]   [address]  P: [phone]  F: [phone] [] [place]  [address] [facility]  P: [phone]  F: [phone] [] [address]  [facility]   [address]   [place]: [phone]  F: [phone]  [] Bk Castillo   Outpatient Occupational Therapy  [address] [facility] [address]: [phone]  F: [phone]          Therapy Pre-certification Note      9/27/2022    Peace Dozier  1960   [id number]      Insurance approval was received for Physical Therapy from [employer] on 9/25/22. Approval was received for 16 visits, from 9/15/22 to 12/15/22. Authorization number [id number]    Approved CPT codes: 11023; 14038; 57914; 53853; 21390 x 64 units. **Per [facility] rep PTO -  Physical Therapy Outpatient  Non Par provider covers all requested CPT codes**    Patient was contacted to be scheduled and has been scheduled for follow-up visits.       Electronically signed by Rosita Rodriguez on 9/27/2022 at 4:05 PM

## 2022-09-28 ENCOUNTER — TELEPHONE (OUTPATIENT)
Dept: NEUROLOGY | Age: 62
End: 2022-09-28

## 2022-09-28 DIAGNOSIS — N18.31 TYPE 1 DIABETES MELLITUS WITH STAGE 3A CHRONIC KIDNEY DISEASE (HCC): ICD-10-CM

## 2022-09-28 DIAGNOSIS — E10.22 TYPE 1 DIABETES MELLITUS WITH STAGE 3A CHRONIC KIDNEY DISEASE (HCC): ICD-10-CM

## 2022-09-28 RX ORDER — INSULIN GLARGINE 100 [IU]/ML
INJECTION, SOLUTION SUBCUTANEOUS
Qty: 18 ML | Refills: 1 | OUTPATIENT
Start: 2022-09-28

## 2022-09-28 NOTE — TELEPHONE ENCOUNTER
Jeremy Pepper LPT for [facility] called in. She said that they  have a new piece of equipment that she feels Gene Raine would greatly benefit from. She is asking if Wayne Husbands CNP would order occupational therapy for him and then they would use this equipment for him. If so we can place an order.  The nuber for [facility]'s PT is [phone]

## 2022-09-28 NOTE — TELEPHONE ENCOUNTER
Alexandria Mallory is calling to request a refill on the following medication(s):    Medication Request:  Requested Prescriptions     Pending Prescriptions Disp Refills    LANTUS SOLOSTAR 100 UNIT/ML injection pen [Pharmacy Med Name: LANTUS SOLOSTAR 100 UNIT/ML] 18 mL 1     Sig: INJECT 20 UNITS UNDER THE SKIN ONCE NIGHTLY     Filled 9/23/22, too soon    Last Visit Date (If Applicable):  2/3/1686    Next Visit Date:    11/25/2022

## 2022-09-29 DIAGNOSIS — G81.91 RIGHT HEMIPLEGIA (HCC): Primary | ICD-10-CM

## 2022-10-04 ENCOUNTER — TELEPHONE (OUTPATIENT)
Dept: INTERNAL MEDICINE CLINIC | Age: 62
End: 2022-10-04

## 2022-10-04 ENCOUNTER — HOSPITAL ENCOUNTER (OUTPATIENT)
Dept: PHYSICAL THERAPY | Age: 62
Setting detail: THERAPIES SERIES
Discharge: HOME | End: 2022-10-04
Payer: MEDICARE

## 2022-10-04 DIAGNOSIS — G81.91 RIGHT HEMIPLEGIA (HCC): Primary | ICD-10-CM

## 2022-10-04 DIAGNOSIS — I63.9 ISCHEMIC STROKE (HCC): ICD-10-CM

## 2022-10-04 DIAGNOSIS — E10.22 TYPE 1 DIABETES MELLITUS WITH STAGE 3A CHRONIC KIDNEY DISEASE (HCC): ICD-10-CM

## 2022-10-04 DIAGNOSIS — C91.10 CLL (CHRONIC LYMPHOCYTIC LEUKEMIA) (HCC): ICD-10-CM

## 2022-10-04 DIAGNOSIS — N18.31 TYPE 1 DIABETES MELLITUS WITH STAGE 3A CHRONIC KIDNEY DISEASE (HCC): ICD-10-CM

## 2022-10-04 DIAGNOSIS — I63.00 CEREBROVASCULAR ACCIDENT (CVA) DUE TO THROMBOSIS OF PRECEREBRAL ARTERY (HCC): ICD-10-CM

## 2022-10-04 PROCEDURE — 97112 NEUROMUSCULAR REEDUCATION: CPT

## 2022-10-04 NOTE — TELEPHONE ENCOUNTER
Patient calling to ask for a order be fax to [facility] at [address]  [phone]  Wheelchair replacement

## 2022-10-04 NOTE — FLOWSHEET NOTE
[] [facility]) - [facility] &  Therapy  [address]  [phone]  F: [phone] [] [address]  [place] [address]   [address]  P: [phone]  F: [phone] [] [place] &  Therapy  [address]  P: [phone]  F: [phone] [] [address]  P: [phone]  F: [phone] [] [address]  [facility]   [address]   [place]: [phone]  F: [phone]      Physical Therapy Daily Treatment Note    Date:  10/4/2022  Patient Name:  Toan Mcmullen    :  1960  MRN: [id number]  Physician: TOY Masterson-KAREN                              Insurance: Jonathan Pizano after eval)  Medical Diagnosis: I63.9 (ICD-10-CM) - Ischemic stroke [facility])    Rehab Codes: R26.89, M26.81, R29.3  Next 's appt.: 22 PCP  Date of symptom onset: 22 (chronic onset)  Visit# / total visits: ; Progress note for Medicare patient due at visit 8     Cancels/No Shows: 0    Subjective:    Pain:  [] Yes  [x] No Location:  N/A Pain Rating: (0-10 scale) 0/10  Pain altered Tx:  [x] No  [] Yes  Action:  Comments: Pt wondering if he's been given the OT order yet from his neurologist.    Objective:  Modalities:   Precautions:  Exercises:  Exercise Reps/ Time Weight/ Level Comments   6MWT x  6MWT: 11 lengths x 39 ft = 429 ft         Bryan test stretch x  Attempted, full length today noted         Xcite   Billed under neuro, including skilled set up and don/doffing time   Muscle testing/set up x     LLE step taps 3x10 4\"    RLE modified SLS holds 3x1 min                                                                       Other: Increased seated rest in between sets of Xcite use d/t increased fatigue noted, muscle fasciculation    6MWT, fall prevention  - get OT order  - bodyweight support treadmill train  - gastroc/hip flexor stretching  - Xcite for RLE stance control with LLE OKC tasks  - progressively decreased height sit<>stands without UE assist  - single limb quad strengthening - leg press, leg extension, etc      Treatment Charges: Mins Units   []  Modalities     []  Ther Exercise     []  Manual Therapy     []  Ther Activities     []  Aquatics     [x]  Gait 6 --   [x]  Other: Neuro 40 3   Total Treatment time 46 3       Assessment: [x] Progressing toward goals. Initiated treatment with Javier Pulling this date to increase quad activation with single limb stance activities. Did spent initial time in set up, which requires increased time and muscle testing - pt gets strong quad contraction with Xcite, and utilization of sit<>stand task, paused in stand, allows for increased quad recruitment and firing in SLS tasks on RLE. Will continue to utilize this modality to increase quad recruitment, decreased buckling in SLS tasks including gait/stair climbing. [] No change. [x] Other: Scheduled OT eval as neurology just recently put in order. [x] Patient would continue to benefit from skilled physical therapy services in order to:  progress R hemibody strength in RUE/RLE, increase gait speed and improve mechanics, improve functional quad/hip strength to increase stability in standing and ease of transfers, improve balance within gait tasks, and overall improve quality of life. STG: (to be met in 8 treatments)     ? ROM: Negative for iliopsoas tightness on Bryan test on RLE for improved ability to achieve terminal hip ext  ? Balance:   Pt able to ambulate with intermittent head turns, stopping without slowed gait speed nor instability noted  Pt able to step over 4\" objects while walking with no speed decrease, no instability noted  ? Strength:  At least 3/5 R quads to allow improved stability with RLE SLS positions including stair climbing, etc  ? Function:   Pt able to ambulate at least .52m/s with [employer] on level ground to progress towards decreased fall risk  Pt able to ascend/descend flight of stairs with improving speed, reciprocal ascent/descent with unilat UE assist  Patient to be independent with home exercise program as demonstrated by performance with correct form without cues. Demonstrate Knowledge of fall prevention     LTG: (to be met in 16 treatments)  ? Balance: At least 19/30 on FGA to indicate significant improvement in dynamic balance  At least 75% confidence on ABC scale to indicate increased self efficacy with balance  ? Strength: At least 3+/5 R quads to allow further improving stability with RLE SLS positions including stair climbing, etc  ? Function:   Pt able to ambulate at least .62m/s with [employer] on level ground to progress towards decreased fall risk  Pt able to ascend/descend flight of stairs with further improving speed and safety reciprocal ascent/descent with unilat UE assist  Able to complete 5x STS without UE assist in under 1 min to indicate improving functional BLE power and strength for transfers           Patient goals: \"get back to normal\"    Pt. Education:  [x] Yes  [] No  [] Reviewed Prior HEP/Ed  Method of Education: [x] Verbal  [] Demo  [] Written  Comprehension of Education:  [x] Verbalizes understanding. [x] Demonstrates understanding. [] Needs review. [] Demonstrates/verbalizes HEP/Ed previously given. Plan: [x] Continue current frequency toward long and short term goals.     [x] Specific Instructions for subsequent treatments: Xcite training, litegait training      Time In:1:02 pm            Time Out: 2:00 pm    Electronically signed by:  Lova Gosselin, PT

## 2022-10-10 RX ORDER — HYDRALAZINE HYDROCHLORIDE 25 MG/1
TABLET, FILM COATED ORAL
Qty: 90 TABLET | Refills: 1 | Status: SHIPPED | OUTPATIENT
Start: 2022-10-10

## 2022-10-10 NOTE — TELEPHONE ENCOUNTER
Dennise Dumont is calling to request a refill on the following medication(s):    Medication Request:  Requested Prescriptions     Pending Prescriptions Disp Refills    hydrALAZINE (APRESOLINE) 25 MG tablet [Pharmacy Med Name: hydrALAZINE 25 MG TABLET] 86 tablet 0     Sig: TAKE ONE TABLET BY MOUTH DAILY       Last Visit Date (If Applicable):  8/5/0895    Next Visit Date:    11/25/2022

## 2022-10-11 ENCOUNTER — HOSPITAL ENCOUNTER (OUTPATIENT)
Dept: PHYSICAL THERAPY | Age: 62
Setting detail: THERAPIES SERIES
Discharge: HOME | End: 2022-10-11
Payer: MEDICARE

## 2022-10-11 PROCEDURE — 97112 NEUROMUSCULAR REEDUCATION: CPT

## 2022-10-11 RX ORDER — HYDRALAZINE HYDROCHLORIDE 25 MG/1
TABLET, FILM COATED ORAL
Qty: 86 TABLET | OUTPATIENT
Start: 2022-10-11

## 2022-10-11 NOTE — FLOWSHEET NOTE
[x] [facility]) [facility] &  Therapy  [address]  [phone]  F: [phone] [] [address]  [facility] [address]   [address]  P: [phone]  F: [phone] [] [address] &  Therapy  [address]  P: [phone]  F: [phone] [] [address]  P: [phone]  F: [phone] [] [address]  [facility]   [address]   [place]: [phone]  F: [phone]      Physical Therapy Daily Treatment Note    Date:  10/11/2022  Patient Name:  Evon Peterson    :  1960  MRN: [id number]  Physician: JACQUELIN Branham                              Insurance: [facility] after eval)  Medical Diagnosis: I63.9 (ICD-10-CM) - Ischemic stroke [facility])    Rehab Codes: R26.89, M26.81, R29.3  Next 's appt.: 22 PCP  Date of symptom onset: 22 (chronic onset)  Visit# / total visits: 3/16; Progress note for Medicare patient due at visit 8     Cancels/No Shows: 0    Subjective:    Pain:  [] Yes  [x] No Location:  N/A Pain Rating: (0-10 scale) 0/10  Pain altered Tx:  [x] No  [] Yes  Action:  Comments: Pt notes he had felt tired after last visit, \"like the leg got a good workout\". Objective:  Modalities: Treatment this date included using Functional Electrical Stimulation via the Xcite 2. Stimulation parameters and outcomes can be viewed on [email or web address] with the patients' username (patient ID generated by SnapSense without any patient identifiers) and password (patients' pin generated by SnapSense without any patient identifiers). A check of the patients' skin prior to application of electrodes, and after the treatment concluded was completed and no issues noted. See below in treatment log for treatments performed.  LOG IN: [id number]; PIN: [id number]    Precautions: Left hemiparesis, fal risk  Exercises:  Exercise Reps/ Time Weight/ Level Comments   6MWT   6MWT: 11 lengths x 39 ft = 429 ft         Xcite   Billed under neuro, including skilled set up and don/doffing time   LLE step taps 4x10 4\" Progressing from unilat UE assist to none   RLE modified SLS holds 3x1 min     LAQs 3x15 RLE    Prone knee flexion 3x10 RLE                                  Resisted rows 3x10 blue                                        Other: Increased seated rest in between sets of Xcite use d/t increased fatigue noted, muscle fasciculation    HEP NEXT    6MWT, fall prevention  - get OT order  - bodyweight support treadmill train  - gastroc/hip flexor stretching  - Xcite for RLE stance control with LLE OKC tasks  - progressively decreased height sit<>stands without UE assist  - single limb quad strengthening - leg press, leg extension, etc      Treatment Charges: Mins Units   []  Modalities     [x]  Ther Exercise 2 --   []  Manual Therapy     []  Ther Activities     []  Aquatics     [x]  Gait 3 --   [x]  Other: Neuro 45 3   Total Treatment time 50 3       Assessment: [x] Progressing toward goals. Continued with FES to R quads/hamstrings this date to encourage increased quad activation and strength for further single limb stance strengthening/stability. Added LAQs, prone hip flexion for increased isolated strengthening of weakest RLE muscle groups - good control, smooth contraction noted and able to achieve full TKE and smoother knee flexion with stim donned. Will progress within functional activities as well. [] No change. [] Other:   [x] Patient would continue to benefit from skilled physical therapy services in order to:  progress R hemibody strength in RUE/RLE, increase gait speed and improve mechanics, improve functional quad/hip strength to increase stability in standing and ease of transfers, improve balance within gait tasks, and overall improve quality of life. STG: (to be met in 8 treatments)     ? ROM: Negative for iliopsoas tightness on Bryan test on RLE for improved ability to achieve terminal hip ext  ? Balance:   Pt able to ambulate with intermittent head turns, stopping without slowed gait speed nor instability noted  Pt able to step over 4\" objects while walking with no speed decrease, no instability noted  ? Strength: At least 3/5 R quads to allow improved stability with RLE SLS positions including stair climbing, etc  ? Function:   Pt able to ambulate at least .52m/s with [employer] on level ground to progress towards decreased fall risk  Pt able to ascend/descend flight of stairs with improving speed, reciprocal ascent/descent with unilat UE assist  Patient to be independent with home exercise program as demonstrated by performance with correct form without cues. Demonstrate Knowledge of fall prevention     LTG: (to be met in 16 treatments)  ? Balance: At least 19/30 on FGA to indicate significant improvement in dynamic balance  At least 75% confidence on ABC scale to indicate increased self efficacy with balance  ? Strength: At least 3+/5 R quads to allow further improving stability with RLE SLS positions including stair climbing, etc  ? Function:   Pt able to ambulate at least .62m/s with [employer] on level ground to progress towards decreased fall risk  Pt able to ascend/descend flight of stairs with further improving speed and safety reciprocal ascent/descent with unilat UE assist  Able to complete 5x STS without UE assist in under 1 min to indicate improving functional BLE power and strength for transfers           Patient goals: \"get back to normal\"    Pt. Education:  [x] Yes  [] No  [] Reviewed Prior HEP/Ed  Method of Education: [x] Verbal  [] Demo  [] Written  Comprehension of Education:  [x] Verbalizes understanding. [x] Demonstrates understanding. [] Needs review. [] Demonstrates/verbalizes HEP/Ed previously given.      Plan: [x] Continue current frequency toward long and short term goals.     [x] Specific Instructions for subsequent treatments: Xcite training, litegait training      Time In:1:08 pm            Time Out: 2:00 pm    Electronically signed by:  Sanjay Pérez PT

## 2022-10-12 ENCOUNTER — HOSPITAL ENCOUNTER (OUTPATIENT)
Dept: OCCUPATIONAL THERAPY | Age: 62
Setting detail: THERAPIES SERIES
Discharge: HOME | End: 2022-10-12
Payer: MEDICARE

## 2022-10-12 PROCEDURE — 97165 OT EVAL LOW COMPLEX 30 MIN: CPT

## 2022-10-12 NOTE — CONSULTS
[x] [facility]       Occupational Therapy 1st floor       [address] [place], [place]         Phone: [phone]       Fax: [phone] [] [facility]  [address] [place]  Phone: [phone]  Fax: [phone]       Occupational Therapy Hand & Upper Extremity  Initial Evaluation    Date: 10/12/2022      Patient: Charline Vann  : 1960  MRN: [id number]  Referring Provider:  TOY Miles CNP  Insurance: [facility] Medicaid - [place] after eval   Medical Diagnosis: Right hemiplegia G81.91   Rehab Codes: lack of coordination R27.8,, fine motor skills loss R29.818,, or muscle weakness generalized M62.81,  Onset Date: 22, 2019 CVA occurred     Next Dr. Avelar Lipoma: nothing scheduled     Subjective:   CC: hand not function   HPI: (onset date) Pt arrives for occupational therapy evaluation of impairments secondary to chronic stroke - has right hemiparesis that results impaired function, strength, and ROM of RUE. Would like to \"get back to normal\" and is interested in Bioness devices for home for UE/LE. Pt reports he completes bicep curls, flexbar exercises, and hand gripping exercises. Reports his hand will sometimes open in the morning, but not so much the rest of the day. Reports he can occasionally maintain  on laundry basket to transport. Mechanism of Injury: CVA  Surgery Date:  NA Precautions: None    Involved Extremity: Right   Dominant Extremity: Right, however pt has learned to become more L hand dominant since CVA    Past Medical History: 2019 (currently 3 years post) - Left internal capsule ischemic CVA secondary to uncontrolled HTN/DM          Comorbidities: Stroke, Diabetes     Medications: Refer to Medical chart in Epic Allergies:   Other dairy    Pain: Intensity:   0/10  Location:   NA     Pain Type: NA  Pain Altered Tx: no  Action Taken:none            Home Environment:    Pt lives in a  and House With [address]  The washing machine is on and the main level    Vocation    Job Status []Normal duty []Light duty [x] Off due to condition []Retired  []Not employed []Disability  []Other:  []  Return to work: Work activities/duties      Avocational Activities Play guitar, instruments, walking, play with niece     [] Chris Gray     [] Grandparenting     [] Care giver [] OTHER    [x] NA       ADL/IADL Previous level of function Current level of function Who assists or modifications made or needed   Bathing  [x] Independent    [] Assist  [x] Independent    [] Assist  Tub/shower, has shower chair with back    Dress/grooming [x] Independent    [] Assist  [x] Independent    [] Assist     Transfer/mobility [x] Independent    [] Assist  [x] Independent    [] Assist     Feeding [x] Independent    [] Assist  [x] Independent    [] Assist     Toileting [x] Independent    [] Assist  [x] Independent    [] Assist     Driving [x] Independent    [] Assist  [] Independent    [x] Assist  Family, friends    Housekeeping [x] Independent    [] Assist  [] Independent    [x] Assist  Family, friends    Grocery shop/meal prep [x] Independent    [] Assist  [] Independent    [x] Assist  Can prepare microwave meals, doesn't complete any cooking      Device: Quad Cane   Orthosis: NA    Objective: Tests/Measurements: Upper Extremity Functional Index  Current Functional Level:  35/80 functionally impaired as measured with the Upper Extremity Functional Index Survey. 0-80 scale, with 80 = no Deficits  (The UEFI model does not provide any specific cut off points that could classify the upper limb disability degree, however, a minimal detectable change of 9 points is provided. This means that for improvement or deterioration to be considered, between two subsequent evaluations, the scores must differ by at least 9 points.)    Sensibility: Normal Edema: None      Skin Color: Normal Skin/Scar condition Intact    STRENGTH       Right   (pounds) Left   (pounds)    10 90   Lateral pinch 6 - assist for hand placement  11   2 point pinch 5 - assist for hand placement  11   3 jaw pinch 4 - assist for hand placement  14     Bilateral  strength is normally symmetrical or up to 10% stronger on the dominant extremity, depending on the individual's physically activity level. The affected extremity is 89% weaker than the unaffected extremity (affected score/unaffected score, take the total and subtract from 100)      AROM:     DIGITS: no active extension, hand resting in fisted position. Able to actively make a tight fist than resting state.       WRIST/FOREARM      Right ROM (degrees)   Extension (60-70) No active    Flexion (70-80) No active    Radial Deviation (20-25) No active    Ulnar Deviation (30-40) No active    Forearm Pronation (80-90) Rests in pronated position   Forearm Supination (80-90) 45 degrees   (0 is used for neutral, + is used for hyperextension, - is used for extensor lag per ASHT)    ELBOW      Right ROM (degrees) Left ROM (degrees)   Flexion (145-150) 140 WNL   Extension  (0) -35 WNL         SHOULDER       Right ROM (degrees) Right Strength (MMT) Left ROM (degrees) Left Strength  (MMT)   Flexion (180) 45 NT WNL 5/5   Extension (60) 34 NT WNL NT   Abduction (180) NT NT     Adduction       Internal Rotation (70)       External Rotation (80-90) Unable to complete             Problems: ROM, Strength, Function, and Coordination     Assessment:  Patient would benefit from skilled occupational therapy services in order to: Restore  functional /grasp, ROM, Strength, and Activity tolerance in order to improve functional use of UE in ADL performance    Short Term Goals: (  6    Treatments)  Increase AROM  R shoulder flexion by 10 degrees to improve ability to complete functional activities   Demo R shoulder external rotation of 10 degrees to improve ability to complete functional activities   R elbow extension by 5 degrees to improve ability to complete functional activities   Demo trace active extension of all R digits   Increase strength (pounds);  R  by 2 pounds to improve functional use of R hand to complete daily activities   All R pinches by 1 pound to improve functional use of R hand to complete daily activities   Patient to be independent with home exercise program as demonstrated by performance with correct form without cues. Long Term Goals: (  12  Treatments)  Increase functional skills AEB a score of 50/80 with UEFI  Demo active extension of all R digits to release object  Increase R  strength by 5+ pounds to complete daily activities with less difficulty  Increase all R pinch strength measurements by 2+ pounds for less difficulty completing daily activities   Demo functional movements with R hand following use of Bioness/Xcite for carryover of completion of functional activities   Increase R shoulder flexion by 15+ degrees for less difficulty completing daily activities     Patient Goals: get hand functioning     Treatment Potential: Fair Suggested Professional Referral: No - pt completing physical therapy treatment   Domestic Concerns: No   Barriers to Goal Achievement: No    Comments/Assessment: Pt familiar to this clinic from past therapy episode. Pt has not completed any outpatient therapy since last episode in February 2020. Pt is interested in use of Bioness H200 and possibly obtaining Bioness H200 for home use. Demo's great scapular movement. Presents with ~1 finger width subluxation of R shoulder. R scapula presents in depressed and retracted position at rest. No pain with R shoulder. Some functional movement with R shoulder and elbow noted. No functional movement of wrist, forearm, or digit extension. Demo a fair functional fist with R hand, able to hold onto lighter weight objects. Unable to complete fine motor activities with R hand. Pt has learned to adapt many activities to complete with LUE at this point in time.  Pt wishes to gain greater use of RUE. Home Program Initiated: [place], auth required after eval  Comprehension of Education: Yes and Needs Review       Plans, Goals, Risks, Benefits, Discussed with, and Patient    Treatment Plan:   [x]  Therapeutic Exercise   65932              []  Iontophoresis: 4 mg/mL Dexamethasone Sodium Phosphate  mAmin  36318    [x]  Therapeutic Activity  15054  []  Vasopneumatic cold with compression  78870                []  ADL training  62601  []  Ultrasound        [id number]    [x]  Neuromuscular Re-education  16184  []  Electrical Stimulation Attended  86314    [x]  Manual Therapy  75912  Orthotic    []  Fit  43547     []  Train 75787    [x]  Instruction in [address]    []  Fit [id number]      []  Train 58569    []  Cognitive Interventions, (first 15 min 32364, subsequent 15 min 81066)  []  Cold/hotpack      []  Massage   02662               Treatment Plan:  Frequency: 2 x/week for 12 visits     Today's Treatment:  Modalities: NA    Precautions: August 2019 (currently 3 years post) - Left internal capsule ischemic CVA secondary to uncontrolled HTN/DM    Exercise Flow Sheet:  Exercise Reps/Time Weight/Level Comments                                       Other: no treatment initiated this date due to insurance authorization required after eval. Initiate treatment next session. Specific Instructions for Next Treatment: trial bioness     Evaluation Complexity:  History (Personal factors, comorbidities) [x]  0 []  1-2 []  3+   Exam (limitations, restrictions) [x]  1-2 []  3 []  4+   Decision Making [x]  Low []  Moderate []  High   ?  [x]  Low Complexity []  Moderate   Complexity []  High Complexity      Treatment Charges: Mins Units   Evaluation  []  High  []  Moderate  [x]  Low        37        1   []  Ther Exercise     []  Manual Therapy     []  Ther Activities     []  Orthotic fit/train     []  Orthotic recheck     []       Total Treatment time 0 min      Time In: 2538   Time out: 1545     Electronically signed by MODESTO Lopez/L on 10/12/2022 at 3:11 PM    Physician Signature: _________________________ Date: _______________  By signing above or cosigning this note, I have reviewed this plan of care and certify a need for medically necessary rehabilitation services.      *PLEASE SIGN ABOVE AND FAX BACK ALL PAGES*

## 2022-10-13 ENCOUNTER — TELEPHONE (OUTPATIENT)
Dept: ONCOLOGY | Age: 62
End: 2022-10-13

## 2022-10-13 ENCOUNTER — HOSPITAL ENCOUNTER (OUTPATIENT)
Dept: PHYSICAL THERAPY | Age: 62
Setting detail: THERAPIES SERIES
Discharge: HOME | End: 2022-10-13
Payer: MEDICARE

## 2022-10-13 ENCOUNTER — HOSPITAL ENCOUNTER (OUTPATIENT)
Age: 62
Setting detail: SPECIMEN
Discharge: HOME | End: 2022-10-13
Payer: MEDICARE

## 2022-10-13 ENCOUNTER — OFFICE VISIT (OUTPATIENT)
Dept: ONCOLOGY | Age: 62
End: 2022-10-13
Payer: MEDICARE

## 2022-10-13 VITALS
RESPIRATION RATE: 16 BRPM | HEART RATE: 86 BPM | SYSTOLIC BLOOD PRESSURE: 138 MMHG | WEIGHT: 179.8 LBS | DIASTOLIC BLOOD PRESSURE: 93 MMHG | BODY MASS INDEX: 25.08 KG/M2 | TEMPERATURE: 98.1 F

## 2022-10-13 DIAGNOSIS — C91.10 CLL (CHRONIC LYMPHOCYTIC LEUKEMIA) (HCC): ICD-10-CM

## 2022-10-13 DIAGNOSIS — C91.10 CLL (CHRONIC LYMPHOCYTIC LEUKEMIA) (HCC): Primary | ICD-10-CM

## 2022-10-13 LAB
ABSOLUTE EOS #: 0 K/UL (ref 0–0.4)
ABSOLUTE IMMATURE GRANULOCYTE: 0 K/UL (ref 0–0.3)
ABSOLUTE LYMPH #: 19.58 K/UL (ref 1–4.8)
ABSOLUTE MONO #: 2.45 K/UL (ref 0.2–0.8)
BASOPHILS # BLD: 0 %
BASOPHILS ABSOLUTE: 0 K/UL (ref 0–0.2)
EOSINOPHILS RELATIVE PERCENT: 0 % (ref 1–4)
HCT VFR BLD CALC: 40.6 % (ref 40.7–50.3)
HEMOGLOBIN: 13.3 G/DL (ref 13–17)
IMMATURE GRANULOCYTES: 0 %
LYMPHOCYTES # BLD: 72 % (ref 24–44)
MCH RBC QN AUTO: 30.1 PG (ref 25.2–33.5)
MCHC RBC AUTO-ENTMCNC: 32.8 G/DL (ref 28–38)
MCV RBC AUTO: 91.9 FL (ref 82.6–102.9)
MONOCYTES # BLD: 9 % (ref 1–7)
PDW BLD-RTO: 13.8 % (ref 11.8–14.4)
PLATELET # BLD: 217 K/UL (ref 138–453)
PMV BLD AUTO: 9.8 FL (ref 8.1–13.5)
RBC # BLD: 4.42 M/UL (ref 4.21–5.77)
SEG NEUTROPHILS: 19 % (ref 36–66)
SEGMENTED NEUTROPHILS ABSOLUTE COUNT: 5.17 K/UL (ref 1.8–7.7)
WBC # BLD: 27.2 K/UL (ref 3.5–11.3)

## 2022-10-13 PROCEDURE — 99214 OFFICE O/P EST MOD 30 MIN: CPT | Performed by: INTERNAL MEDICINE

## 2022-10-13 PROCEDURE — 99211 OFF/OP EST MAY X REQ PHY/QHP: CPT | Performed by: INTERNAL MEDICINE

## 2022-10-13 PROCEDURE — 36415 COLL VENOUS BLD VENIPUNCTURE: CPT

## 2022-10-13 PROCEDURE — 85025 COMPLETE CBC W/AUTO DIFF WBC: CPT

## 2022-10-13 NOTE — TELEPHONE ENCOUNTER
Guero Bello FOR MD VISIT  DR Johnson Shells IN TO SEE PATIENT  ORDERS RECEVIED  RV 3-4 MONTHS WITH CBC  LABS CDP 02/02/23  MD VISIT  02/02/23 @9AM  AVS PRINTED AND GIVEN TO PATIENT WITH INSTRUCTIONS  PATIENT DISCHARGED VIA WHEELCHAIR

## 2022-10-13 NOTE — FLOWSHEET NOTE
[x] [facility]) [facility] &  Therapy  [address]    [phone]  F: [phone]   [] [address]  [facility]   [address]  P: [phone]  F: [phone]  [] [address]  [address]  P: [phone]  F: [phone] [] [address]  P: [phone]  F: [phone]  [] [address]  [facility]   [address]   [place]: [phone]  F: [phone]   [] [facility]  [address] [facility] [address]  [place]: [phone]   F: [phone]     Physical Therapy Cancel/No Show note    Date: 10/13/2022  Patient: Dennise Dumont  : 1960  MRN: [id number]    Cancels/No Shows to date: 0 --not counted against patient    For today's appointment patient:    [x]  Cancelled    [] Rescheduled appointment    [] No-show     Reason given by patient:    []  Patient ill    []  Conflicting appointment    [] No transportation      [] Conflict with work    [] No reason given    [] Weather related    [] [id number]    [x] Other: Provider cancelled appointment secondary to an emergency   Comments:        [x] Next appointment was confirmed    Electronically signed by: Lenon Fothergill, PTA

## 2022-10-15 NOTE — PROGRESS NOTES
Chief Complaint   Patient presents with    Follow-up    Discuss Labs     DIAGNOSIS:       CLL, Stage 1  Lymphadenopathy. Persistent leukocytosis  Persistent neutropenia  Persistent lymphocytosis  History of CVA/right-sided hemiplegia with residual weakness  History of hypertension and diabetes  CURRENT THERAPY:         Observation for CLL. No indications for treatment. BRIEF CASE HISTORY:      Mr. Toan Mcmullen is a very pleasant 58 y.o. male with history of multiple co morbidities as listed. The patient is referred for evaluation and management of leukocytosis. Patient had history of diabetes and hypertension. He had CVA with right-sided hemiplegia in 2019. He had residual right-sided weakness. Patient has no other major health problems. Patient was noted to have persistent elevation of white blood cells over the last few years. He had no symptoms related to that abnormality. No repeated infections. No fever or night sweats. No weight loss or decreased appetite. No enlarged lymph nodes. No history of smoking or alcohol drinking. .   INTERIM HISTORY:   Seen for follow up  CLL. Doing well clinically. No fever or night sweats. No weight loss or decreased appetite. No new enlarged lymph nodes. No repeated infections. PAST MEDICAL HISTORY: has a past medical history of Cerebral artery occlusion with cerebral infarction ([facility] [address] Diabetes mellitus ([facility] [address] High cholesterol, and Hypertension. PAST SURGICAL HISTORY: has a past surgical history that includes knee surgery (1983).      CURRENT MEDICATIONS:  has a current medication list which includes the following prescription(s): hydralazine, lantus solostar, potassium chloride, amlodipine, onetouch ultra, atorvastatin, fluoxetine, insulin lispro (1 unit dial), fluticasone, onetouch delica plus [id number], b-d ultrafine iii short pen, blood glucose monitor kit and supplies, aspirin, and pen needles. ALLERGIES:  is allergic to dairycare [lactase-lactobacillus]. FAMILY HISTORY: Negative for any hematological or oncological conditions. SOCIAL HISTORY:  reports that he has never smoked. He has never used smokeless tobacco. He reports that he does not currently use alcohol. He reports that he does not use drugs. REVIEW OF SYSTEMS:     General: No weakness or fatigue. No unanticipated weight loss or decreased appetite. No fever or chills. Eyes: No blurred vision, eye pain or double vision. Ears: No hearing problems or drainage. No tinnitus. Throat: No sore throat, problems with swallowing or dysphagia. Respiratory: No cough, sputum or hemoptysis. No shortness of breath. No pleuritic chest pain. Cardiovascular: No chest pain, orthopnea or PND. No lower extremity edema. No palpitation. Gastrointestinal: No problems with swallowing. No abdominal pain or bloating. No nausea or vomiting. No diarrhea or constipation. No GI bleeding. Genitourinary: No dysuria, hematuria, frequency or urgency. Musculoskeletal: No muscle aches or pains. No limitation of movement. No back pain. No gait disturbance, No joint complaints. Dermatologic: No skin rashes or pruritus. No skin lesions or discolorations. Psychiatric: No depression, anxiety, or stress or signs of schizophrenia. No change in mood or affect. Hematologic: No history of bleeding tendency. No bruises or ecchymosis. No history of clotting problems. Infectious disease: No fever, chills or frequent infections. Endocrine: No polydipsia or polyuria. No temperature intolerance. Neurologic: As above. .   Allergic/Immunologic: No nasal congestion or hives. No repeated infections. PHYSICAL EXAM:  The patient is not in acute distress. Vital signs: Blood pressure (!) 138/93, pulse 86, temperature 98.1 °F (36.7 °C), temperature source Temporal, resp. rate 16, weight 179 lb 12.8 oz (81.6 kg).      General appearance - well appearing, not in pain or distress  Mental status - good mood, alert and oriented  Eyes - pupils equal and reactive, extraocular eye movements intact  Ears - bilateral TM's and external ear canals normal  Nose - normal and patent, no erythema, discharge or polyps  Mouth - mucous membranes moist, pharynx normal without lesions  Neck - supple, no significant adenopathy  Lymphatics -bilateral axillary and inguinal lymphadenopathy. no hepatosplenomegaly  Chest - clear to auscultation, no wheezes, rales or rhonchi, symmetric air entry  Heart - normal rate, regular rhythm, normal S1, S2, no murmurs, rubs, clicks or gallops  Abdomen - soft, nontender, nondistended, no masses or organomegaly  Neurological - alert, oriented, residual right-sided weakness with power 3-4 over 5. Musculoskeletal - no joint tenderness, deformity or swelling  Extremities - peripheral pulses normal, no pedal edema, no clubbing or cyanosis  Skin - normal coloration and turgor, no rashes, no suspicious skin lesions noted     Review of Diagnostic data:   Lab Results   Component Value Date    WBC 27.2 (H) 10/13/2022    HGB 13.3 10/13/2022    HCT 40.6 (L) 10/13/2022    MCV 91.9 10/13/2022     10/13/2022       Chemistry        Component Value Date/Time     (H) 02/22/2022 1013    K 4.2 02/22/2022 1013     02/22/2022 1013    CO2 24 02/22/2022 1013    BUN 22 02/22/2022 1013    CREATININE 1.48 (H) 02/22/2022 1013        Component Value Date/Time    CALCIUM 9.4 02/22/2022 1013    ALKPHOS 84 02/22/2022 1013    AST 15 02/22/2022 1013    ALT 13 02/22/2022 1013    BILITOT 0.41 02/22/2022 1013            IMPRESSION:   CLL, Stage 1  Lymphadenopathy. Persistent leukocytosis  Persistent neutropenia  Persistent lymphocytosis  History of CVA/right-sided hemiplegia with residual weakness  History of hypertension and diabetes    PLAN: I reviewed the labs as above and discussed with the patient. Flow cytometry showed CLL.  C/o bulge in inguinal region. Exam showed bilateral axillary and inguinal lymphadenopathy. CT scan results were explained to the patient. Patient has axillary and inguinal lymphadenopathy. However we do not see an indication to start treatment yet. Labs are showing stable leukocytosis. I explained the nature of CLL, staging, prognosis and treatment. Discussed indications for treatment of CLL. I will see him in 3 months with repeat labs. Sooner for any problems. Patient's questions were answered to the best of his satisfaction and he verbalized full understanding and agreement.

## 2022-10-17 NOTE — SIGNIFICANT EVENT
[x] [facility]) - [facility] &  Therapy  [address]    [phone]  F: [phone]   [] [address]  [facility]   [address]  P: [phone]  F: [phone]  [] [address] &  Therapy  [address] [facility]  P: [phone]  F: [phone] [] [address]  P: [phone]  F: [phone]  [] [address]  [facility]   [address]   [place]: [phone]  F: [phone]   [] [place] [address] [place] [address]  [place]: [phone]   F: [phone]     Physical Therapy Cancel/No Show note    Date: 10/17/2022  Patient: Naveen Garnica  : 1960  MRN: [id number]    Cancels/No Shows to date: 0/0   - not counted against pt    For today's appointment patient:    []  Cancelled    [] Rescheduled appointment    [] No-show     Reason given by patient:    []  Patient ill    []  Conflicting appointment    [] No transportation      [] Conflict with work    [] No reason given    [] Weather related    [] COVID-19    [x] Other:      Comments:  Patient was contacted to be cx by provider 10.18.22. Pending Thurs. Will be in at 2pm for OT with Barb.           [x] Next appointment was confirmed    Electronically signed by: Kermit Rivera PTA

## 2022-10-18 ENCOUNTER — HOSPITAL ENCOUNTER (OUTPATIENT)
Dept: PHYSICAL THERAPY | Age: 62
Setting detail: THERAPIES SERIES
Discharge: HOME | End: 2022-10-18
Payer: MEDICARE

## 2022-10-18 ENCOUNTER — HOSPITAL ENCOUNTER (OUTPATIENT)
Dept: OCCUPATIONAL THERAPY | Age: 62
Setting detail: THERAPIES SERIES
Discharge: HOME | End: 2022-10-18
Payer: MEDICARE

## 2022-10-18 NOTE — SIGNIFICANT EVENT
[x] [address]        Occupational Therapy       [address] [facility], 1st Floor       Phone: [phone]       Fax: [phone] [] [facility] Occupational  Therapy at [facility]       Phone: [phone]       Fax: [phone]          Occupational Therapy Cancel/No Show note    Date: 10/18/2022  Patient: Lydia Sarkar  : 1960  MRN: [id number]  Cancels/No Shows to date:     For today's appointment patient:  [x]  Cancelled  []  Rescheduled appointment  []  No-show     Reason given by patient:  []  Patient ill  []  Conflicting appointment  []  No transportation    []  Conflict with work  []  No reason given  [x]  Other: clinic cancel due to not having insurance authorization     Comments:      Electronically signed by: MODESTO Gee/JANNET

## 2022-10-20 ENCOUNTER — HOSPITAL ENCOUNTER (OUTPATIENT)
Dept: OCCUPATIONAL THERAPY | Age: 62
Setting detail: THERAPIES SERIES
Discharge: HOME | End: 2022-10-20
Payer: MEDICARE

## 2022-10-20 ENCOUNTER — HOSPITAL ENCOUNTER (OUTPATIENT)
Dept: PHYSICAL THERAPY | Age: 62
Setting detail: THERAPIES SERIES
End: 2022-10-20
Payer: MEDICARE

## 2022-10-20 NOTE — PRE-CERTIFICATION NOTE
..       [x] [address]  [address]  [phone]  F: [phone] [] [address]  [facility] [address]   [address]  P: [phone]  F: [phone] [] [address]  [address] [facility]  P: [phone]  F: [phone] [] [address]  [facility]   [address]   [place]: [phone]  F: [phone]  [] Bk Castillo   Outpatient Occupational Therapy  [address] [facility] [address] E: [phone]  F: [phone]          Therapy Pre-certification Note      10/20/2022    Skye Bradford  1960   [id number]      Insurance approval was received for Occupational Therapy from Vi Sanchez on 10/20/22. Approval was received for 24 visits, from 10/13/22 to 12/30/22. Authorization number [id number]. Patient was contacted to be scheduled and has been scheduled for follow-up visits.       Electronically signed by Ivory Maurice on 10/20/2022 at 9:11 AM

## 2022-10-25 ENCOUNTER — HOSPITAL ENCOUNTER (OUTPATIENT)
Dept: OCCUPATIONAL THERAPY | Age: 62
Setting detail: THERAPIES SERIES
Discharge: HOME | End: 2022-10-25
Payer: MEDICARE

## 2022-10-25 ENCOUNTER — HOSPITAL ENCOUNTER (OUTPATIENT)
Dept: PHYSICAL THERAPY | Age: 62
Setting detail: THERAPIES SERIES
Discharge: HOME | End: 2022-10-25
Payer: MEDICARE

## 2022-10-25 NOTE — SIGNIFICANT EVENT
[x] [facility] - [facility] &  Therapy  [address]    [phone]  F: [phone]   [] [address]  [facility] 36   [address]  P: [phone]  F: [phone]  [] [address] &  Therapy  [address]  P: [phone]  F: [phone] [] [address]  P: [phone]  F: [phone]  [] [address] [facility]   [address]   [place]: [phone]  F: [phone]   [] [place] [address] [facility] [address]  [place]: [phone]   F: [phone]     Physical Therapy Cancel/No Show note    Date: 10/25/2022  Patient: Marsha Aburto  : 1960  MRN: [id number]    Cancels/No Shows to date: 0/0   - not counted against pt    For today's appointment patient:    []  Cancelled    [] Rescheduled appointment    [] No-show     Reason given by patient:    []  Patient ill    []  Conflicting appointment    [] No transportation      [] Conflict with work    [] No reason given    [] Weather related    [] COVID-19    [x] Other:      Comments:  Canceled for PT due to preferred provider being off.       [x] Next appointment was confirmed    Electronically signed by: Kuldip Santiago PT

## 2022-10-27 ENCOUNTER — HOSPITAL ENCOUNTER (OUTPATIENT)
Dept: OCCUPATIONAL THERAPY | Age: 62
Setting detail: THERAPIES SERIES
Discharge: HOME | End: 2022-10-27
Payer: MEDICARE

## 2022-10-27 ENCOUNTER — HOSPITAL ENCOUNTER (OUTPATIENT)
Dept: PHYSICAL THERAPY | Age: 62
Setting detail: THERAPIES SERIES
End: 2022-10-27
Payer: MEDICARE

## 2022-10-27 NOTE — SIGNIFICANT EVENT
[x] [address]        Occupational Therapy       [address] [facility], 1st Floor       Phone: [phone]       Fax: [phone] [] [facility] Occupational  Therapy at [facility]       Phone: [phone]       Fax: [phone]          Occupational Therapy Cancel/No Show note    Date: 10/27/2022  Patient: Hayden Richards  : 1960  MRN: [id number]  Cancels/No Shows to date:     For today's appointment patient:  [x]  Cancelled  []  Rescheduled appointment  []  No-show     Reason given by patient:  []  Patient ill  []  Conflicting appointment  []  No transportation    []  Conflict with work  []  No reason given  [x]  Other:  pt wanted to cancel OT appt due to PT appt being canceled by clinic    Comments:      Electronically signed by: MODESTO Wilcox/JANNET

## 2022-11-03 ENCOUNTER — HOSPITAL ENCOUNTER (OUTPATIENT)
Dept: PHYSICAL THERAPY | Age: 62
Setting detail: THERAPIES SERIES
Discharge: HOME | End: 2022-11-03
Payer: MEDICARE

## 2022-11-03 ENCOUNTER — HOSPITAL ENCOUNTER (OUTPATIENT)
Dept: OCCUPATIONAL THERAPY | Age: 62
Setting detail: THERAPIES SERIES
Discharge: HOME | End: 2022-11-03
Payer: MEDICARE

## 2022-11-03 PROCEDURE — 97112 NEUROMUSCULAR REEDUCATION: CPT

## 2022-11-03 PROCEDURE — 97110 THERAPEUTIC EXERCISES: CPT

## 2022-11-03 NOTE — FLOWSHEET NOTE
[x] [facility]       Occupational Therapy            1st floor       [address] [place], [place]         Phone: [phone]       Fax: [phone] [] [place]  Occupational Therapy  [address] [place], [place]  Phone: [phone]  Fax: [phone]      Occupational Therapy Daily Treatment Note    Date:  11/3/2022  Patient Name:  Agus Mobley    :  1960  MRN: [id number]  Referring Provider:  TOY Shah CNP  Insurance: [employer] - 24 visits, from 10/13/22 to 22  Medical Diagnosis: Right hemiplegia G81.91            Rehab Codes: lack of coordination R27.8,, fine motor skills loss R29.818,, or muscle weakness generalized M62.81,  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: nothing scheduled   Visit# / total visits: ; Progress note for patient due at visit 6-8    Cancels/No Shows:       Subjective:    Pain:  [] Yes  [x] No Location: N/A Pain Rating: (0-10 scale) 0/10  Pain altered Tx:  [x] No  [] Yes  Action: NA  Pt Comments: Reports RUE is tight upon arrival. States he tried stretching it before he came today. Precautions: NA  Surgery procedure and date: NA    Objective: Today's Treatment:  Modalities:    Treatment this date included using Functional Electrical Stimulation via the Petpace H200,  Stimulation parameters and outcomes can be viewed on [facility] [employer] [employer] with the patients' username (patient ID is generated by first initial of first name, and first initial of last name, followed by two digit month and two digit day the treatment commenced), and no patient specific password required. A check of the patients' skin prior to application of electrodes, and after the treatment concluded was completed and no adverse reactions noted. Name: Fozia Mustafa  Patient ID: [id number]      See below in treatment log for treatments performed.     Exercises:  EXERCISE    REPS/     TIME  WEIGHT/    LEVEL COMMENTS   Bioness Training  45 minutes   Grasp and release, key pinching    Passive stretching - L shoulder  15 minutes   Added & completed                                  Other: NA      Assessment: [x] Progressing toward goals. Initiated [address] training today to promote increased function with L hand. Increased time needed for set-up and instruction of Bioness device. Pt has had a rep trial the H200 in the past, but at the time his insurance wouldn't cover the cost. Very positive response of digits and thumb noted with both programs. Pt able to maintain a strong pinch with key pinching program. Pt excited about potentially being able to hold guitar pick with this program utilized. Able to grasp and release a skinny cone with good success. Pt very excited about results of H200. States he hasn't seen his hand move like that since the stroke happened. Did state the device was very tight. Significant tightness of R shoulder noted. Fair tolerance of passive stretching to R shoulder. [] No change.   [] Other     [x] Patient would continue to benefit from skilled occupational therapy services in order to: Restore  functional /grasp, ROM, Strength, and Activity tolerance in order to improve functional use of UE in ADL performance       Short Term Goals: (  6    Treatments)  Increase AROM  R shoulder flexion by 10 degrees to improve ability to complete functional activities   Demo R shoulder external rotation of 10 degrees to improve ability to complete functional activities   R elbow extension by 5 degrees to improve ability to complete functional activities   Demo trace active extension of all R digits   Increase strength (pounds);  R  by 2 pounds to improve functional use of R hand to complete daily activities   All R pinches by 1 pound to improve functional use of R hand to complete daily activities   Patient to be independent with home exercise program as demonstrated by performance with correct form

## 2022-11-03 NOTE — FLOWSHEET NOTE
[x] [facility]) [facility] &  Therapy  [address]  [phone]  F: [phone] [] [address]  [facility] 36   [address]  P: [phone]  F: [phone] [] [facility] &  Therapy  [address]  P: [phone]  F: [phone] [] [address] [address] [address]  P: [phone]  F: [phone] [] [address]  [facility]   [address]   [place]: [phone]  F: [phone]      Physical Therapy Daily Treatment Note    Date:  11/3/2022  Patient Name:  Nay Farmer    :  1960  MRN: [id number]  Physician: TOY Clark-KAREN                              Insurance: [employer] [facility]  Medical Diagnosis: I63.9 (ICD-10-CM) - Ischemic stroke [facility])    Rehab Codes: R26.89, M26.81, R29.3  Next 's appt.: 22 PCP  Date of symptom onset: 22 (chronic onset)  Visit# / total visits: ; Progress note for Medicare patient due at visit 8     Cancels/No Shows: 0    Subjective:    Pain:  [] Yes  [x] No Location:  N/A Pain Rating: (0-10 scale) 0/10  Pain altered Tx:  [x] No  [] Yes  Action:  Comments: Pt notes adding the prone knee flexion exercise last week made his leg very tired, feeling like it got a good workout. Objective:  Modalities: Treatment this date included using Functional Electrical Stimulation via the Xcite 2. Stimulation parameters and outcomes can be viewed on [email or web address] with the patients' username (patient ID generated by [email or web address] without any patient identifiers) and password (patients' pin generated by [email or web address] without any patient identifiers). A check of the patients' skin prior to application of electrodes, and after the treatment concluded was completed and no issues noted.     See below in treatment log for treatments performed. LOG IN: [id number]; PIN: [id number]    Precautions: Left hemiparesis, fal risk  Exercises:  Exercise Reps/ Time Weight/ Level Comments   6MWT   6MWT: 11 lengths x 39 ft = 429 ft         Xcite   Billed under neuro, including skilled set up and don/doffing time   Sit <> stand 1x10  1x15  No UE assist   LLE step taps 4x10 4\" Progressing from unilat UE assist to none   RLE modified SLS holds      LAQs 3x15 RLE    Prone knee flexion 3x10 RLE                                  Resisted rows 3x10 blue                                        Other:     Time spent in Keller set up, as well as increased seated rest in between sets of Xcite use d/t increased fatigue noted from increased muscle recruitment, as evidenced by muscle fasciculation. . these longer rest breaks allow optimal completion of multiple exercises as well as increased hypertrophy/strength building - billed with neuro      6MWT, fall prevention  - get OT order  - bodyweight support treadmill train  - gastroc/hip flexor stretching  - Xcite for RLE stance control with LLE OKC tasks  - progressively decreased height sit<>stands without UE assist  - single limb quad strengthening - leg press, leg extension, etc      Treatment Charges: Mins Units   []  Modalities     [x]  Ther Exercise 2 --   []  Manual Therapy     []  Ther Activities     []  Aquatics     []  Gait     [x]  Other: Neuro 53 4   Total Treatment time 55 4       Assessment: [x] Progressing toward goals. Continues to receive good challenge from Keller training on single limb stance control - very fatigued quickly and requires increased seated rest breaks. Needs therapist assist to control rotation of hip with prone knee flexion. Still able to get better activation with Xcite for all strengthening; increased motor unit recruitment and increased strength of contraction with all exercises - less buckling with stance control, smoother hamstring contraction all with Xcite. [] No change.      [] Other:   [x] Patient would continue to benefit from skilled physical therapy services in order to:  progress R hemibody strength in RUE/RLE, increase gait speed and improve mechanics, improve functional quad/hip strength to increase stability in standing and ease of transfers, improve balance within gait tasks, and overall improve quality of life. STG: (to be met in 8 treatments)  ? ROM: Negative for iliopsoas tightness on Bryan test on RLE for improved ability to achieve terminal hip ext  ? Balance:   Pt able to ambulate with intermittent head turns, stopping without slowed gait speed nor instability noted  Pt able to step over 4\" objects while walking with no speed decrease, no instability noted  ? Strength: At least 3/5 R quads to allow improved stability with RLE SLS positions including stair climbing, etc  ? Function:   Pt able to ambulate at least .52m/s with GLO on level ground to progress towards decreased fall risk  Pt able to ascend/descend flight of stairs with improving speed, reciprocal ascent/descent with unilat UE assist  Patient to be independent with home exercise program as demonstrated by performance with correct form without cues. Demonstrate Knowledge of fall prevention     LTG: (to be met in 16 treatments)  ? Balance: At least 19/30 on FGA to indicate significant improvement in dynamic balance  At least 75% confidence on ABC scale to indicate increased self efficacy with balance  ? Strength:  At least 3+/5 R quads to allow further improving stability with RLE SLS positions including stair climbing, etc  ? Function:   Pt able to ambulate at least .62m/s with GLO on level ground to progress towards decreased fall risk  Pt able to ascend/descend flight of stairs with further improving speed and safety reciprocal ascent/descent with unilat UE assist  Able to complete 5x STS without UE assist in under 1 min to indicate improving functional BLE power and strength for transfers Patient goals: \"get back to normal\"    Pt. Education:  [x] Yes  [] No  [] Reviewed Prior HEP/Ed  Method of Education: [x] Verbal  [] Demo  [] Written  Comprehension of Education:  [x] Verbalizes understanding. [x] Demonstrates understanding. [] Needs review. [] Demonstrates/verbalizes HEP/Ed previously given. Current HEP as of 11/3: sit to stands no hands, LLE step taps w/ cane in LUE     Plan: [x] Continue current frequency toward long and short term goals.  er   [x] Specific Instructions for subsequent treatments: Xcite training, litegait training      Time In: 12:05 pm            Time Out: 1:03 pm    Electronically signed by:  Nikko Koehler PT

## 2022-11-08 ENCOUNTER — HOSPITAL ENCOUNTER (OUTPATIENT)
Dept: OCCUPATIONAL THERAPY | Age: 62
Setting detail: THERAPIES SERIES
Discharge: HOME | End: 2022-11-08
Payer: MEDICARE

## 2022-11-08 PROCEDURE — 97110 THERAPEUTIC EXERCISES: CPT

## 2022-11-08 PROCEDURE — 97112 NEUROMUSCULAR REEDUCATION: CPT

## 2022-11-08 NOTE — FLOWSHEET NOTE
[x] [facility]       Occupational Therapy            1st floor       [address], [place]         Phone: [phone]       Fax: [phone] [] [facility]  [address], [place]  Phone: [phone]  Fax: [phone]      Occupational Therapy Daily Treatment Note    Date:  2022  Patient Name:  Saint Spore    :  1960  MRN: [id number]  Referring Provider:  TOY Herrera CNP  Insurance: [employer] - 24 visits, from 10/13/22 to 22  Medical Diagnosis: Right hemiplegia G81.91            Rehab Codes: lack of coordination R27.8,, fine motor skills loss R29.818,, or muscle weakness generalized M62.81,  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: nothing scheduled   Visit# / total visits: 3/24; Progress note for patient due at visit 8-10    Cancels/No Shows:       Subjective:    Pain:  [] Yes  [x] No Location: N/A Pain Rating: (0-10 scale) 0/10  Pain altered Tx:  [x] No  [] Yes  Action: NA  Pt Comments: Reports RUE is tight upon arrival. States he tried stretching it before he came today. Precautions: NA  Surgery procedure and date: NA    Objective: Today's Treatment:  Modalities:    Treatment this date included using Functional Electrical Stimulation via the [employer] H200,  Stimulation parameters and outcomes can be viewed on [facility] [employer] with the patients' username (patient ID is generated by first initial of first name, and first initial of last name, followed by two digit month and two digit day the treatment commenced), and no patient specific password required. A check of the patients' skin prior to application of electrodes, and after the treatment concluded was completed and no adverse reactions noted. Name: Glenna Beavers  Patient ID: [id number]      See below in treatment log for treatments performed.     Exercises:  EXERCISE    REPS/     TIME  WEIGHT/ LEVEL COMMENTS   Bioness Training  45 minutes   NOT completed - Grasp and release, key pinching    Passive stretching - L shoulder  30  minutes   Completed    Bicep curls  10 reps each   Added & completed - first set no weight, 2nd & 3rd sets 2lb   Scapular exercises - elevation/depression, protraction/retraction  15 reps each   Added & completed; issued for HEP                      Other: NA      Assessment: [x] Progressing toward goals. Unable to continue with Bioness training due to device not being charged. Completed additional passive stretching to RUE and initiated strengthening exercises. Moderate difficulty completing bicep curls with weight, modified technique needed to complete. Significant tightness of R shoulder noted. Minimal complaints with passive stretching to R shoulder. ~1 finger width subluxation noted of R GH joint. Potential ktape in future sessions for [address] [facility]. [] No change. [] Other     [x] Patient would continue to benefit from skilled occupational therapy services in order to: Restore  functional /grasp, ROM, Strength, and Activity tolerance in order to improve functional use of UE in ADL performance       Short Term Goals: (  8-10    Treatments)  Increase AROM  R shoulder flexion by 10 degrees to improve ability to complete functional activities   Demo R shoulder external rotation of 10 degrees to improve ability to complete functional activities   R elbow extension by 5 degrees to improve ability to complete functional activities   Demo trace active extension of all R digits   Increase strength (pounds);  R  by 2 pounds to improve functional use of R hand to complete daily activities   All R pinches by 1 pound to improve functional use of R hand to complete daily activities   Patient to be independent with home exercise program as demonstrated by performance with correct form without cues.      Long Term Goals: (  24  Treatments)  Increase functional skills AEB a score of 50/80 with UEFI  Demo active extension of all R digits to release object  Increase R  strength by 5+ pounds to complete daily activities with less difficulty  Increase all R pinch strength measurements by 2+ pounds for less difficulty completing daily activities   Demo functional movements with R hand following use of Bioness/Xcite for carryover of completion of functional activities   Increase R shoulder flexion by 15+ degrees for less difficulty completing daily activities      Patient Goals: get hand functioning       Pt. Education:  [] Yes  [x] No  [] Reviewed Prior HEP/Ed   Method of Education: [] Verbal  [] Demo  [] Written  Re:   Comprehension of Education:  [] Verbalizes understanding. [] Demonstrates understanding. [] Needs review. [] Demonstrates/verbalizes HEP/Ed previously given. Plan: [x] Continue current frequency toward short and long term goals.   [x] Specific Instructions for subsequent treatments: bioness, stretching of L shoulder    [] Other:       Time In: 1405  Time Out: 1505        Treatment Charges: Mins Units   []  Modalities:      []  Ultrasound     [x]  Ther Exercise 45 3   []  Manual Therapy     []  Ther Activities     []  Orthotic fit/train     []  Orthotic recheck     [x]  Other: neuro re-ed 15 1   Total Treatment time 60 4       Electronically signed by:  MODESTO Brasher/JANNET

## 2022-11-10 ENCOUNTER — APPOINTMENT (OUTPATIENT)
Dept: OCCUPATIONAL THERAPY | Age: 62
End: 2022-11-10
Payer: MEDICARE

## 2022-11-10 ENCOUNTER — HOSPITAL ENCOUNTER (OUTPATIENT)
Dept: OCCUPATIONAL THERAPY | Age: 62
Setting detail: THERAPIES SERIES
Discharge: HOME | End: 2022-11-10
Payer: MEDICARE

## 2022-11-10 PROCEDURE — 97112 NEUROMUSCULAR REEDUCATION: CPT

## 2022-11-10 PROCEDURE — 97110 THERAPEUTIC EXERCISES: CPT

## 2022-11-10 NOTE — FLOWSHEET NOTE
[x] [facility]       Occupational Therapy            1st floor       [address], [place]         Phone: [phone]       Fax: [phone] [] [facility]up[facility]  [address], [place]  Phone: [phone]  Fax: [phone]      Occupational Therapy Daily Treatment Note    Date:  11/10/2022  Patient Name:  Raisa Hayes    :  1960  MRN: [id number]  Referring Provider:  TOY Norris CNP  Insurance: [employer] - 24 visits, from 10/13/22 to 22  Medical Diagnosis: Right hemiplegia G81.91            Rehab Codes: lack of coordination R27.8,, fine motor skills loss R29.818,, or muscle weakness generalized M62.81,  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: nothing scheduled   Visit# / total visits: ; Progress note for patient due at visit 8-10    Cancels/No Shows:       Subjective:    Pain:  [] Yes  [x] No Location: N/A Pain Rating: (0-10 scale) 0/10  Pain altered Tx:  [x] No  [] Yes  Action: NA  Pt Comments: No new reports or changes since last visit. Precautions: NA  Surgery procedure and date: NA    Objective: Today's Treatment:  Modalities:    Treatment this date included using Functional Electrical Stimulation via the [employer] H200,  Stimulation parameters and outcomes can be viewed on [facility] [employer] [employer] with the patients' username (patient ID is generated by first initial of first name, and first initial of last name, followed by two digit month and two digit day the treatment commenced), and no patient specific password required. A check of the patients' skin prior to application of electrodes, and after the treatment concluded was completed and no adverse reactions noted. Name: Bk Martini  Patient ID: [id number]      See below in treatment log for treatments performed.     Exercises:  EXERCISE    REPS/     TIME  WEIGHT/    LEVEL COMMENTS   Bioness Training  30  minutes Completed - Grasp and release   Passive stretching - L shoulder  30  minutes   Not Completed    Bicep curls  10 reps, 2 sets  1lb Completed    Scapular exercises - elevation/depression, protraction/retraction  15 reps each   Not completed   Towel slides  10 reps, 2 sets    Added & completed    External rotation  15 reps, 2 sets   Added & completed          Other: NA      Assessment: [x] Progressing toward goals. Returned to [employer]. Initially, very strong extensor response noted with all digits. After a few minutes of use, middle digit began to \"lag\" and not extend as far as it was. Completed grasp and release with cones under the grasp and release program - mod difficulty with coordinating hand placement due to impaired shoulder movements. Added table top towel slides to promote improved L shoulder ROM and function. Able to complete external rotation to a functional degree with support given at R shoulder. Moderate difficulty completing bicep curls with weight, with lateral support given at elbow, pt better able to control movement. ~1 finger width subluxation noted of R GH joint. Potential ktape in future sessions for [facility] stability. Significant fatigue of RUE noted at end of session. [] No change.   [] Other     [x] Patient would continue to benefit from skilled occupational therapy services in order to: Restore  functional /grasp, ROM, Strength, and Activity tolerance in order to improve functional use of UE in ADL performance       Short Term Goals: (  8-10    Treatments)  Increase AROM  R shoulder flexion by 10 degrees to improve ability to complete functional activities   Demo R shoulder external rotation of 10 degrees to improve ability to complete functional activities   R elbow extension by 5 degrees to improve ability to complete functional activities   Demo trace active extension of all R digits   Increase strength (pounds);  R  by 2 pounds to improve functional use of R hand to complete daily activities   All R pinches by 1 pound to improve functional use of R hand to complete daily activities   Patient to be independent with home exercise program as demonstrated by performance with correct form without cues. Long Term Goals: (  24  Treatments)  Increase functional skills AEB a score of 50/80 with UEFI  Demo active extension of all R digits to release object  Increase R  strength by 5+ pounds to complete daily activities with less difficulty  Increase all R pinch strength measurements by 2+ pounds for less difficulty completing daily activities   Demo functional movements with R hand following use of Bioness/Xcite for carryover of completion of functional activities   Increase R shoulder flexion by 15+ degrees for less difficulty completing daily activities      Patient Goals: get hand functioning       Pt. Education:  [] Yes  [x] No  [] Reviewed Prior HEP/Ed   Method of Education: [] Verbal  [] Demo  [] Written  Re:   Comprehension of Education:  [] Verbalizes understanding. [] Demonstrates understanding. [] Needs review. [] Demonstrates/verbalizes HEP/Ed previously given. Plan: [x] Continue current frequency toward short and long term goals.   [x] Specific Instructions for subsequent treatments: bioness, stretching of L shoulder    [] Other:       Time In: 1304  Time Out: 1400        Treatment Charges: Mins Units   []  Modalities:      []  Ultrasound     [x]  Ther Exercise 26 2   []  Manual Therapy     []  Ther Activities     []  Orthotic fit/train     []  Orthotic recheck     [x]  Other: neuro re-ed 30 2   Total Treatment time 56 4       Electronically signed by:  MODESTO Reddy/JANNET

## 2022-11-10 NOTE — FLOWSHEET NOTE
[x] [facility]       Occupational Therapy            1st floor       [address], [place]         Phone: [phone]       Fax: [phone] [] [facility]  Occupational Therapy  [address]  [place], [place]  Phone: [phone]  Fax: [phone]      Occupational Therapy Daily Treatment Note    Date:  11/10/2022  Patient Name:  Hans Sosa    :  1960  MRN: [id number]  Referring Provider:  TOY Martínez CNP  Insurance: [employer] - 24 visits, from 10/13/22 to 22  Medical Diagnosis: Right hemiplegia G81.91            Rehab Codes: lack of coordination R27.8,, fine motor skills loss R29.818,, or muscle weakness generalized M62.81,  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: nothing scheduled   Visit# / total visits: ; Progress note for patient due at visit 8-10    Cancels/No Shows:       Subjective:    Pain:  [] Yes  [x] No Location: N/A Pain Rating: (0-10 scale) 0/10  Pain altered Tx:  [x] No  [] Yes  Action: NA  Pt Comments: No new reports or changes since last visit. Precautions: NA  Surgery procedure and date: NA    Objective: Today's Treatment:  Modalities:    Treatment this date included using Functional Electrical Stimulation via the [employer] H200,  Stimulation parameters and outcomes can be viewed on [facility] [employer] with the patients' username (patient ID is generated by first initial of first name, and first initial of last name, followed by two digit month and two digit day the treatment commenced), and no patient specific password required. A check of the patients' skin prior to application of electrodes, and after the treatment concluded was completed and no adverse reactions noted. Name: Jesus Colindres  Patient ID: [id number]      See below in treatment log for treatments performed.     Exercises:  EXERCISE    REPS/     TIME  WEIGHT/    LEVEL COMMENTS   Bioness Training  30  minutes Completed - Grasp and release   Passive stretching - L shoulder  30  minutes   Not Completed    Bicep curls  10 reps, 2 sets  1lb Completed    Scapular exercises - elevation/depression, protraction/retraction  15 reps each   Not completed   Towel slides  10 reps, 2 sets    Added & completed    External rotation  15 reps, 2 sets   Added & completed          Other: NA      Assessment: [x] Progressing toward goals. Returned to [employer]. Initially, very strong extensor response noted with all digits. After a few minutes of use, middle digit began to \"lag\" and not extend as far as it was. Completed grasp and release with cones under the grasp and release program - mod difficulty with coordinating hand placement due to impaired shoulder movements. Added table top towel slides to promote improved L shoulder ROM and function. Able to complete external rotation to a functional degree with support given at R shoulder. Moderate difficulty completing bicep curls with weight, with lateral support given at elbow, pt better able to control movement. ~1 finger width subluxation noted of R GH joint. Potential ktape in future sessions for [facility]. [] No change.   [] Other     [x] Patient would continue to benefit from skilled occupational therapy services in order to: Restore  functional /grasp, ROM, Strength, and Activity tolerance in order to improve functional use of UE in ADL performance       Short Term Goals: (  8-10    Treatments)  Increase AROM  R shoulder flexion by 10 degrees to improve ability to complete functional activities   Demo R shoulder external rotation of 10 degrees to improve ability to complete functional activities   R elbow extension by 5 degrees to improve ability to complete functional activities   Demo trace active extension of all R digits   Increase strength (pounds);  R  by 2 pounds to improve functional use of R hand to complete daily activities   All R pinches by 1 pound to improve functional use of R hand to complete daily activities   Patient to be independent with home exercise program as demonstrated by performance with correct form without cues. Long Term Goals: (  24  Treatments)  Increase functional skills AEB a score of 50/80 with UEFI  Demo active extension of all R digits to release object  Increase R  strength by 5+ pounds to complete daily activities with less difficulty  Increase all R pinch strength measurements by 2+ pounds for less difficulty completing daily activities   Demo functional movements with R hand following use of Bioness/Xcite for carryover of completion of functional activities   Increase R shoulder flexion by 15+ degrees for less difficulty completing daily activities      Patient Goals: get hand functioning       Pt. Education:  [] Yes  [x] No  [] Reviewed Prior HEP/Ed   Method of Education: [] Verbal  [] Demo  [] Written  Re:   Comprehension of Education:  [] Verbalizes understanding. [] Demonstrates understanding. [] Needs review. [] Demonstrates/verbalizes HEP/Ed previously given. Plan: [x] Continue current frequency toward short and long term goals.   [x] Specific Instructions for subsequent treatments: bioness, stretching of L shoulder    [] Other:       Time In: 1304  Time Out: 1400        Treatment Charges: Mins Units   []  Modalities:      []  Ultrasound     [x]  Ther Exercise 26 2   []  Manual Therapy     []  Ther Activities     []  Orthotic fit/train     []  Orthotic recheck     [x]  Other: neuro re-ed 30 2   Total Treatment time 56 4       Electronically signed by:  MODESTO Duffy/JANNET

## 2022-11-15 ENCOUNTER — HOSPITAL ENCOUNTER (OUTPATIENT)
Dept: OCCUPATIONAL THERAPY | Age: 62
Setting detail: THERAPIES SERIES
Discharge: HOME | End: 2022-11-15
Payer: MEDICARE

## 2022-11-15 ENCOUNTER — HOSPITAL ENCOUNTER (OUTPATIENT)
Dept: PHYSICAL THERAPY | Age: 62
Setting detail: THERAPIES SERIES
Discharge: HOME | End: 2022-11-15
Payer: MEDICARE

## 2022-11-15 PROCEDURE — 97110 THERAPEUTIC EXERCISES: CPT

## 2022-11-15 PROCEDURE — 97112 NEUROMUSCULAR REEDUCATION: CPT

## 2022-11-15 PROCEDURE — 97116 GAIT TRAINING THERAPY: CPT

## 2022-11-15 NOTE — FLOWSHEET NOTE
[x] [facility]       Occupational Therapy            1st floor       [address], [place]         Phone: [phone]       Fax: [phone] [] [facility]  [address], [place]  Phone: [phone]  Fax: [phone]      Occupational Therapy Daily Treatment Note    Date:  11/15/2022  Patient Name:  Marsha Aburto    :  1960  MRN: [id number]  Referring Provider:  TOY Allen CNP  Insurance: [facility] Medicaid - 24 visits, from 10/13/22 to 22  Medical Diagnosis: Right hemiplegia G81.91            Rehab Codes: lack of coordination R27.8,, fine motor skills loss R29.818,, or muscle weakness generalized M62.81,  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 [address]: nothing scheduled   Visit# / total visits: ; Progress note for patient due at visit 8-10    Cancels/No Shows: 2      Subjective:    Pain:  [] Yes  [x] No Location: N/A Pain Rating: (0-10 scale) 0/10  Pain altered Tx:  [x] No  [] Yes  Action: NA  Pt Comments: Reports R thumb is improving. Reports noticing some changes with R hand. Reports some soreness with RUE following last treatment session. Precautions: NA  Surgery procedure and date: NA    Objective: Today's Treatment:  Modalities:    Treatment this date included using Functional Electrical Stimulation via the [employer] H200,  Stimulation parameters and outcomes can be viewed on [facility] [employer] [employer] with the patients' username (patient ID is generated by first initial of first name, and first initial of last name, followed by two digit month and two digit day the treatment commenced), and no patient specific password required. A check of the patients' skin prior to application of electrodes, and after the treatment concluded was completed and no adverse reactions noted.     Name: Hamilton Acosta  Patient ID: [id number]      See below in treatment log for treatments performed. Exercises:  EXERCISE    REPS/     TIME  WEIGHT/    LEVEL COMMENTS   Bioness Training  30  minutes   Completed - Grasp and release, neuromodulation    Passive stretching - R shoulder, elbow, wrist, digits  15  minutes   Completed    Bicep curls - seated 15 reps, 2 sets  2lb wrist weight  Resistance increased, Completed    Scapular exercises - elevation/depression, protraction/retraction  15 reps each   Not completed   Towel slides  10 reps, 2 sets    Not completed    External rotation  15 reps, 2 sets   Not completed          Other: NA      Assessment: [x] Progressing toward goals. Continued Bioness H200 training. Utilized neuromodulation mode for decreased spasticity of digits. Positive extensor and flexor response noted with all digits, however middle digit extension not quite as strong as others. Middle digit does twitch, indicating that it is receiving stimulation. Moderate difficulty completing bicep curls with weight, with lateral support given at elbow, pt better able to control movement. ~1 finger width subluxation noted of R GH joint. Potential ktape in future sessions for [address] stability. Moderate fatigue of RUE noted at end of session. [] No change.   [] Other     [x] Patient would continue to benefit from skilled occupational therapy services in order to: Restore  functional /grasp, ROM, Strength, and Activity tolerance in order to improve functional use of UE in ADL performance       Short Term Goals: (  8-10    Treatments)  Increase AROM  R shoulder flexion by 10 degrees to improve ability to complete functional activities   Demo R shoulder external rotation of 10 degrees to improve ability to complete functional activities   R elbow extension by 5 degrees to improve ability to complete functional activities   Demo trace active extension of all R digits   Increase strength (pounds);  R  by 2 pounds to improve functional use of R hand to complete daily activities   All R pinches by 1 pound to improve functional use of R hand to complete daily activities   Patient to be independent with home exercise program as demonstrated by performance with correct form without cues. Long Term Goals: (  24  Treatments)  Increase functional skills AEB a score of 50/80 with UEFI  Demo active extension of all R digits to release object  Increase R  strength by 5+ pounds to complete daily activities with less difficulty  Increase all R pinch strength measurements by 2+ pounds for less difficulty completing daily activities   Demo functional movements with R hand following use of Bioness/Xcite for carryover of completion of functional activities   Increase R shoulder flexion by 15+ degrees for less difficulty completing daily activities      Patient Goals: get hand functioning       Pt. Education:  [] Yes  [x] No  [] Reviewed Prior HEP/Ed   Method of Education: [] Verbal  [] Demo  [] Written  Re:   Comprehension of Education:  [] Verbalizes understanding. [] Demonstrates understanding. [] Needs review. [] Demonstrates/verbalizes HEP/Ed previously given. Plan: [x] Continue current frequency toward short and long term goals.   [x] Specific Instructions for subsequent treatments: bioness, stretching of L shoulder    [] Other:       Time In: 1406  Time Out: 1501        Treatment Charges: Mins Units   []  Modalities:      []  Ultrasound     [x]  Ther Exercise 25 2   []  Manual Therapy     []  Ther Activities     []  Orthotic fit/train     []  Orthotic recheck     [x]  Other: neuro re-ed 30 2   Total Treatment time 55 4       Electronically signed by:  MODESTO hSearer/JANNET

## 2022-11-15 NOTE — FLOWSHEET NOTE
[x] [facility]) [facility] &  Therapy  [address]  [phone]  F: [phone] [] [address]  [facility]   [address]  P: [phone]  F: [phone] [] [place] &  Therapy  [address]  P: [phone]  F: [phone] [] [address]  P: [phone]  F: [phone] [] [address]  [facility]   [address]   [place]: [phone]  F: [phone]      Physical Therapy Daily Treatment Note    Date:  11/15/2022  Patient Name:  Jameel Botello    :  1960  MRN: [id number]  Physician: JACQUELIN Genao                              Insurance: [employer] [facility] after eval)  Medical Diagnosis: I63.9 (ICD-10-CM) - Ischemic stroke [facility])    Rehab Codes: R26.89, M26.81, R29.3  Next 's appt.: 22 PCP  Date of symptom onset: 22 (chronic onset)  Visit# / total visits: ; Progress note for Medicare patient due at visit 8     Cancels/No Shows: 0    Subjective:    Pain:  [] Yes  [x] No Location:  N/A Pain Rating: (0-10 scale) 0/10  Pain altered Tx:  [x] No  [] Yes  Action:  Comments: Pt notes adding the prone knee flexion exercise last week made his leg very tired, feeling like it got a good workout. Objective:  Modalities: Treatment this date included using Functional Electrical Stimulation via the Xcite 2. Stimulation parameters and outcomes can be viewed on [email or web address] with the patients' username (patient ID generated by [employer] without any patient identifiers) and password (patients' pin generated by [employer] without any patient identifiers). A check of the patients' skin prior to application of electrodes, and after the treatment concluded was completed and no issues noted.     See below in treatment log for but pt limited to 3 min consecutively this date with heavy LUE reliance despite bodyweight support. Requires significant seated rest break d/t fatigue. [] No change. [x] Other: 1x seated rest and needs to eat candy d/t pt feeling mildly lightheaded, weak. Notes he did not eat before coming to therapy; will do this for upcoming sessions. [x] Patient would continue to benefit from skilled physical therapy services in order to:  progress R hemibody strength in RUE/RLE, increase gait speed and improve mechanics, improve functional quad/hip strength to increase stability in standing and ease of transfers, improve balance within gait tasks, and overall improve quality of life. STG: (to be met in 8 treatments)  ? ROM: Negative for iliopsoas tightness on Bryan test on RLE for improved ability to achieve terminal hip ext  ? Balance:   Pt able to ambulate with intermittent head turns, stopping without slowed gait speed nor instability noted  Pt able to step over 4\" objects while walking with no speed decrease, no instability noted  ? Strength: At least 3/5 R quads to allow improved stability with RLE SLS positions including stair climbing, etc  ? Function:   Pt able to ambulate at least .52m/s with [employer] on level ground to progress towards decreased fall risk  Pt able to ascend/descend flight of stairs with improving speed, reciprocal ascent/descent with unilat UE assist  Patient to be independent with home exercise program as demonstrated by performance with correct form without cues. Demonstrate Knowledge of fall prevention     LTG: (to be met in 16 treatments)  ? Balance: At least 19/30 on FGA to indicate significant improvement in dynamic balance  At least 75% confidence on ABC scale to indicate increased self efficacy with balance  ? Strength:  At least 3+/5 R quads to allow further improving stability with RLE SLS positions including stair climbing, etc  ? Function:   Pt able to ambulate at least .62m/s with SBQC on level ground to progress towards decreased fall risk  Pt able to ascend/descend flight of stairs with further improving speed and safety reciprocal ascent/descent with unilat UE assist  Able to complete 5x STS without UE assist in under 1 min to indicate improving functional BLE power and strength for transfers           Patient goals: \"get back to normal\"    Pt. Education:  [x] Yes  [] No  [] Reviewed Prior HEP/Ed  Method of Education: [x] Verbal  [] Demo  [] Written  Comprehension of Education:  [x] Verbalizes understanding. [x] Demonstrates understanding. [] Needs review. [] Demonstrates/verbalizes HEP/Ed previously given. Current HEP as of 11/3: sit to stands no hands, LLE step taps w/ cane in LUE     Plan: [x] Continue current frequency toward long and short term goals.  er   [x] Specific Instructions for subsequent treatments: Xcite training, litegait training      Time In: 3:02 pm            Time Out: 4:04 pm    Electronically signed by:  Maryellen Contreras, PT

## 2022-11-17 ENCOUNTER — HOSPITAL ENCOUNTER (OUTPATIENT)
Dept: PHYSICAL THERAPY | Age: 62
Setting detail: THERAPIES SERIES
Discharge: HOME | End: 2022-11-17
Payer: MEDICARE

## 2022-11-17 ENCOUNTER — HOSPITAL ENCOUNTER (OUTPATIENT)
Dept: OCCUPATIONAL THERAPY | Age: 62
Setting detail: THERAPIES SERIES
Discharge: HOME | End: 2022-11-17
Payer: MEDICARE

## 2022-11-17 PROCEDURE — 97110 THERAPEUTIC EXERCISES: CPT

## 2022-11-17 PROCEDURE — 97112 NEUROMUSCULAR REEDUCATION: CPT

## 2022-11-17 NOTE — FLOWSHEET NOTE
[x] [facility]       Occupational Therapy            1st floor       [address], [place]         Phone: [phone]       Fax: [phone] [] [place]  Occupational Therapy  [address], [place]  Phone: [phone]  Fax: [phone]      Occupational Therapy Daily Treatment Note    Date:  2022  Patient Name:  Gerardo Howard    :  1960  MRN: [id number]  Referring Provider:  TOY Bartholomew CNP  Insurance: [employer] - 24 visits, from 10/13/22 to 22  Medical Diagnosis: Right hemiplegia G81.91            Rehab Codes: lack of coordination R27.8,, fine motor skills loss R29.818,, or muscle weakness generalized M62.81,  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: nothing scheduled   Visit# / total visits: ; Progress note for patient due at visit 8-10    Cancels/No Shows: 2      Subjective:    Pain:  [] Yes  [x] No Location: N/A Pain Rating: (0-10 scale) 0/10  Pain altered Tx:  [x] No  [] Yes  Action: NA  Pt Comments: Reports R thumb movement is improving. Reports noticing some changes with R hand. Reports some soreness with RUE following last treatment session. Precautions: NA  Surgery procedure and date: NA    Objective: Today's Treatment:  Modalities:    Treatment this date included using Functional Electrical Stimulation via the Commun[employer] H200,  Stimulation parameters and outcomes can be viewed on [facility] with the patients' username (patient ID is generated by first initial of first name, and first initial of last name, followed by two digit month and two digit day the treatment commenced), and no patient specific password required. A check of the patients' skin prior to application of electrodes, and after the treatment concluded was completed and no adverse reactions noted.     Name: Bryson Ruelas  Patient ID: [id number]      See below in treatment log for treatments performed. Exercises:  EXERCISE    REPS/     TIME  WEIGHT/    LEVEL COMMENTS   Bioness Training  30  minutes   Completed - neuromodulation (10 minutes), grasp and release (15 minutes) plus time for set up   Passive stretching - R shoulder, elbow, wrist, digits  15  minutes   Completed    Bicep curls - seated 15 reps, 2 sets  2lb wrist weight  Not Completed    Scapular exercises - elevation/depression, protraction/retraction  15 reps each   Not completed   Towel slides  10 reps, 2 sets    Not completed    External rotation  15 reps, 2 sets   Not completed          Other: NA      Assessment: [x] Progressing toward goals. Continued Bioness H200 training. Utilized neuromodulation mode for decreased spasticity of digits. Increased extensor response noted with digits as Neuromodulation mode progressed. Returned to grasp and release mode with digits observed to be extending to a further degree. Will continue to begin with neuromodulation mode to decrease spasticity. Positive extensor and flexor response noted with all digits, however middle digit extension not quite as strong as others. 3 rest breaks required with Bioness training due to forearm muscle fatigue. Mild stiffness of RUE with passive stretching. Moderate fatigue of RUE noted at end of session. Additional exercises not completed due to increased time spent on Neuromodulation mode with Bioness. [] No change.   [] Other     [x] Patient would continue to benefit from skilled occupational therapy services in order to: Restore  functional /grasp, ROM, Strength, and Activity tolerance in order to improve functional use of UE in ADL performance       Short Term Goals: (  8-10    Treatments)  Increase AROM  R shoulder flexion by 10 degrees to improve ability to complete functional activities   Demo R shoulder external rotation of 10 degrees to improve ability to complete functional activities   R elbow extension by 5 degrees to improve ability to complete functional activities   Demo trace active extension of all R digits   Increase strength (pounds);  R  by 2 pounds to improve functional use of R hand to complete daily activities   All R pinches by 1 pound to improve functional use of R hand to complete daily activities   Patient to be independent with home exercise program as demonstrated by performance with correct form without cues. Long Term Goals: (  24  Treatments)  Increase functional skills AEB a score of 50/80 with UEFI  Demo active extension of all R digits to release object  Increase R  strength by 5+ pounds to complete daily activities with less difficulty  Increase all R pinch strength measurements by 2+ pounds for less difficulty completing daily activities   Demo functional movements with R hand following use of Bioness/Xcite for carryover of completion of functional activities   Increase R shoulder flexion by 15+ degrees for less difficulty completing daily activities      Patient Goals: get hand functioning       Pt. Education:  [] Yes  [x] No  [] Reviewed Prior HEP/Ed   Method of Education: [] Verbal  [] Demo  [] Written  Re:   Comprehension of Education:  [] Verbalizes understanding. [] Demonstrates understanding. [] Needs review. [] Demonstrates/verbalizes HEP/Ed previously given. Plan: [x] Continue current frequency toward short and long term goals.   [x] Specific Instructions for subsequent treatments: bioness, stretching of L shoulder    [] Other:       Time In: 5646  Time Out: 1401        Treatment Charges: Mins Units   []  Modalities:      []  Ultrasound     [x]  Ther Exercise 26 2   []  Manual Therapy     []  Ther Activities     []  Orthotic fit/train     []  Orthotic recheck     [x]  Other: neuro re-ed 30 2   Total Treatment time 56 4       Electronically signed by:  MODESTO Castano/JANNET

## 2022-11-17 NOTE — FLOWSHEET NOTE
[x] [facility]) [facility] &  Therapy  [address]  [phone]  F: [phone] [] [address]  [facility]   [address]  P: [phone]  F: [phone] [] [place] &  Therapy  [address]  P: [phone]  F: [phone] [] [address]  P: [phone]  F: [phone] [] [address]  [facility]   [address]   [place]: [phone]  F: [phone]      Physical Therapy Daily Treatment Note    Date:  2022  Patient Name:  Maryann Rubalcava    :  1960  MRN: [id number]  Physician: TOY Hitchcock-KAREN                              Insurance: [address] [place] [address] after [facility]  Medical Diagnosis: I63.9 (ICD-10-CM) - Ischemic stroke [facility])    Rehab Codes: R26.89, M26.81, R29.3  Next 's appt.: 22 PCP  Date of symptom onset: 22 (chronic onset)  Visit# / total visits: ; Progress note for Medicare patient due at visit 8     Cancels/No Shows: 0    Subjective:    Pain:  [] Yes  [x] No Location:  N/A Pain Rating: (0-10 scale) 0/10  Pain altered Tx:  [x] No  [] Yes  Action:  Comments: Pt notes he was very sore after last visit, requests to not do Cande Brizuela in same session; will plan to stagger them. Would like to start with Mai Mckenzie today for RLE stengthening. Objective:  Modalities: Treatment this date included using Functional Electrical Stimulation via the Xcite 2. Stimulation parameters and outcomes can be viewed on [email or web address] with the patients' username (patient ID generated by [employer] without any patient identifiers) and password (patients' pin generated by [employer] without any patient identifiers).      A check of the patients' skin prior to application of electrodes, and after the treatment concluded was completed and no issues noted. See below in treatment log for treatments performed. LOG IN: [id number]; PIN: [id number]    Precautions: Left hemiparesis, fal risk  Exercises: Bolded completed 11/17/2022:  Exercise Reps/ Time Weight/ Level Comments   6MWT   6MWT: 11 lengths x 39 ft = 429 ft         LiteGait train 2x3 min . 8mph    No additional BWS Very significantly fatigued, requires increased seated rest  - set up time billed for with gait               Xcite   Billed under neuro, including skilled set up and don/doffing time   Sit <> stand 2x10  No UE assist   LLE step taps 3x15 4\" Progressing from unilat UE assist to none   RLE modified SLS holds 2x90\" 4\" LLE on step  - concurrent tasks: 10x trunk twist, 5x reach to toe   LAQs 3x15 RLE    Prone knee flexion 3x10 RLE    Step ups 1x  20x 6\"  4\" In inpatient gym; unilat UE assist  - too difficult 6\" today                           Resisted rows 3x15 blue                                        Other: Pt requiring increased seated rest today d/t more fatigued overall    Time spent in [place] set up, as well as increased seated rest in between sets of Xcite use d/t increased fatigue noted from increased muscle recruitment, as evidenced by muscle fasciculation. . these longer rest breaks allow optimal completion of multiple exercises as well as increased hypertrophy/strength building - billed with neuro      6MWT, fall prevention  - bodyweight support treadmill train  - gastroc/hip flexor stretching  - Xcite for RLE stance control with LLE OKC tasks  - progressively decreased height sit<>stands without UE assist  - single limb quad strengthening - leg press, leg extension, etc      Treatment Charges: Mins Units   []  Modalities     [x]  Ther Exercise 6 1   []  Manual Therapy     []  Ther Activities     []  Aquatics     []  Gait     [x]  Other: Neuro 48 3   Total Treatment time 54 4       Assessment: [x] Progressing toward goals.  Adjusted treatment d/t too high intensity last visit for pt d/t prolonged fatigue. Performed Xcite training for adjust motor unit recruitment during RLE strengthening in [facility] for stance control - min tactile assist to avoid hyperextension at knee initially. Added step ups this date for further quad/hamstring strengthening - very difficult on 6\" and causes too much buckling, regressed to 4\" which demonstrated more eccentric control and more coordinated completion. [] No change. [x] Other: Continues to require extended seated rest breaks d/t overall fatigue from [place] training, as well as pt being somewhat self limiting with any fatigue-inducing exercise. [x] Patient would continue to benefit from skilled physical therapy services in order to:  progress R hemibody strength in RUE/RLE, increase gait speed and improve mechanics, improve functional quad/hip strength to increase stability in standing and ease of transfers, improve balance within gait tasks, and overall improve quality of life. STG: (to be met in 8 treatments)  ? ROM: Negative for iliopsoas tightness on Bryan test on RLE for improved ability to achieve terminal hip ext  ? Balance:   Pt able to ambulate with intermittent head turns, stopping without slowed gait speed nor instability noted  Pt able to step over 4\" objects while walking with no speed decrease, no instability noted  ? Strength: At least 3/5 R quads to allow improved stability with RLE SLS positions including stair climbing, etc  ? Function:   Pt able to ambulate at least .52m/s with [place] on level ground to progress towards decreased fall risk  Pt able to ascend/descend flight of stairs with improving speed, reciprocal ascent/descent with unilat UE assist  Patient to be independent with home exercise program as demonstrated by performance with correct form without cues. Demonstrate Knowledge of fall prevention     LTG: (to be met in 16 treatments)  ? Balance:    At least 19/30 on FGA to indicate significant improvement in dynamic balance  At least 75% confidence on ABC scale to indicate increased self efficacy with balance  ? Strength: At least 3+/5 R quads to allow further improving stability with RLE SLS positions including stair climbing, etc  ? Function:   Pt able to ambulate at least .62m/s with [place] on level ground to progress towards decreased fall risk  Pt able to ascend/descend flight of stairs with further improving speed and safety reciprocal ascent/descent with unilat UE assist  Able to complete 5x STS without UE assist in under 1 min to indicate improving functional BLE power and strength for transfers           Patient goals: \"get back to normal\"    Pt. Education:  [x] Yes  [] No  [x] Reviewed Prior HEP/Ed  Method of Education: [x] Verbal  [] Demo  [] Written  Comprehension of Education:  [x] Verbalizes understanding. [x] Demonstrates understanding. [] Needs review. [] Demonstrates/verbalizes HEP/Ed previously given. Current HEP as of 11/3: sit to stands no hands, LLE step taps w/ cane in LUE     Plan: [x] Continue current frequency toward long and short term goals.  er   [x] Specific Instructions for subsequent treatments: Xcite training, litegait training      Time In: 2:02 pm            Time Out: 3:01 pm    Electronically signed by:  Vinod Bhakta PT

## 2022-11-22 ENCOUNTER — HOSPITAL ENCOUNTER (OUTPATIENT)
Dept: PHYSICAL THERAPY | Age: 62
Setting detail: THERAPIES SERIES
Discharge: HOME | End: 2022-11-22
Payer: MEDICARE

## 2022-11-22 ENCOUNTER — HOSPITAL ENCOUNTER (OUTPATIENT)
Dept: OCCUPATIONAL THERAPY | Age: 62
Setting detail: THERAPIES SERIES
Discharge: HOME | End: 2022-11-22
Payer: MEDICARE

## 2022-11-22 PROCEDURE — 97110 THERAPEUTIC EXERCISES: CPT

## 2022-11-22 PROCEDURE — 97112 NEUROMUSCULAR REEDUCATION: CPT

## 2022-11-22 NOTE — FLOWSHEET NOTE
[x] [facility]) [facility]  Therapy  [address]  [phone]  F: [phone] [] [email or web address]  [facility]   [address]  P: [phone]  F: [phone] [] [place] &  Therapy  [address]  P: [phone]  F: [phone] [] [address]  P: [phone]  F: [phone] [] [address]  [facility]   [address]   [place]: [phone]  F: [phone]      Physical Therapy Daily Treatment Note    Date:  2022  Patient Name:  Yang Walker    :  1960  MRN: [id number]  Physician: JACQUELIN Rhodes                              Insurance: [employer] after al)  Medical Diagnosis: I63.9 (ICD-10-CM) - Ischemic stroke [facility])    Rehab Codes: R26.89, M26.81, R29.3  Next 's appt.: 22 PCP  Date of symptom onset: 22 (chronic onset)  Visit# / total visits: ; Progress note for Medicare patient due at visit 8     Cancels/No Shows: 0    Subjective:    Pain:  [] Yes  [x] No Location:  N/A Pain Rating: (0-10 scale) 0/10  Pain altered Tx:  [x] No  [] Yes  Action:  Comments: Pt notes he was very sore after last visit, requests to not do [place] in same session; will plan to stagger them. Would like to start with [place] today for RLE stengthening. Objective:  Modalities: Treatment this date included using Functional Electrical Stimulation via the Xcite 2. Stimulation parameters and outcomes can be viewed on [email or web address] with the patients' username (patient ID generated by [employer] without any patient identifiers) and password (patients' pin generated by [employer] without any patient identifiers).      A check of the patients' skin prior to application of electrodes, and after the treatment concluded was completed and no issues noted. See below in treatment log for treatments performed. LOG IN: [id number]; PIN: [id number]    Precautions: Left hemiparesis, fal risk  Exercises: Bolded completed 11/22/2022:  Exercise Reps/ Time Weight/ Level Comments   6MWT   6MWT: 11 lengths x 39 ft = 429 ft         LiteGait train 2x3 min . 8mph    No additional BWS Very significantly fatigued, requires increased seated rest  - set up time billed for with gait               Xcite   Billed under neuro, including skilled set up and don/doffing time   Sit <> stand 1x15  No UE assist   RLE biased sit<>stand 2x10 4\" LLE on step, elevated mat table   LLE step taps 3x15 4\" Progressing from unilat UE assist to none  - 2 sets on 11/22   RLE modified SLS holds 2x90\" 4\" LLE on step  - concurrent tasks: 10x trunk twist, 5x reach to toe   LAQs 3x15 RLE    Prone knee flexion 3x10 RLE    Step ups 20x 4\" In inpatient gym; unilat UE assist                             Resisted rows 3x15 blue                Gait train 175 ft  .46m/s gait speed noted during concurrent 10MWT                     Other: Pt requiring increased seated rest today d/t more fatigued overall    Time spent in [place] set up, as well as increased seated rest in between sets of Xcite use d/t increased fatigue noted from increased muscle recruitment, as evidenced by muscle fasciculation. . these longer rest breaks allow optimal completion of multiple exercises as well as increased hypertrophy/strength building - billed with neuro      6MWT, fall prevention  - bodyweight support treadmill train  - gastroc/hip flexor stretching  - Xcite for RLE stance control with LLE OKC tasks  - progressively decreased height sit<>stands without UE assist  - single limb quad strengthening - leg press, leg extension, etc      Treatment Charges: Mins Units   []  Modalities     []  Ther Exercise     []  Manual Therapy     []  Ther Activities     []  Aquatics     [x]  Gait 4 --   [x]  Other: Neuro 46 3   Total Treatment time 50 3       Assessment: [x] Progressing toward goals. Progressed to RLE biased sit to stands - much more challenging for patient, requires increased rest after these sets. Better stability/endurance with supported SLS standing on RLE this date. Stair climbing still very fatiguing for RLE, only one set performed. Pt gait demostrates improved even stance time for the majority of the time, but will have periods of knee hyperextension that significantly slow pt down, still having to overcome this, often occurs with larger steps on RLE. [] No change. [x] Other: Continues to require extended seated rest breaks d/t overall fatigue from [place] training, as well as pt being somewhat self limiting with any fatigue-inducing exercise. [x] Patient would continue to benefit from skilled physical therapy services in order to:  progress R hemibody strength in RUE/RLE, increase gait speed and improve mechanics, improve functional quad/hip strength to increase stability in standing and ease of transfers, improve balance within gait tasks, and overall improve quality of life. STG: (to be met in 8 treatments)  ? ROM: Negative for iliopsoas tightness on Bryan test on RLE for improved ability to achieve terminal hip ext  ? Balance:   Pt able to ambulate with intermittent head turns, stopping without slowed gait speed nor instability noted  Pt able to step over 4\" objects while walking with no speed decrease, no instability noted  ? Strength: At least 3/5 R quads to allow improved stability with RLE SLS positions including stair climbing, etc  ? Function:   Pt able to ambulate at least .52m/s with [employer] [place] on level ground to progress towards decreased fall risk  Pt able to ascend/descend flight of stairs with improving speed, reciprocal ascent/descent with unilat UE assist  Patient to be independent with home exercise program as demonstrated by performance with correct form without cues.   Demonstrate Knowledge of fall prevention     LTG: (to be met in 16 treatments)  ? Balance: At least 19/30 on FGA to indicate significant improvement in dynamic balance  At least 75% confidence on ABC scale to indicate increased self efficacy with balance  ? Strength: At least 3+/5 R quads to allow further improving stability with RLE SLS positions including stair climbing, etc  ? Function:   Pt able to ambulate at least .62m/s with [employer] [place] on level ground to progress towards decreased fall risk  Pt able to ascend/descend flight of stairs with further improving speed and safety reciprocal ascent/descent with unilat UE assist  Able to complete 5x STS without UE assist in under 1 min to indicate improving functional BLE power and strength for transfers           Patient goals: \"get back to normal\"    Pt. Education:  [x] Yes  [] No  [x] Reviewed Prior HEP/Ed  Method of Education: [x] Verbal  [] Demo  [] Written  Comprehension of Education:  [x] Verbalizes understanding. [x] Demonstrates understanding. [] Needs review. [] Demonstrates/verbalizes HEP/Ed previously given. Current HEP as of 11/3: sit to stands no hands, LLE step taps w/ cane in LUE     Plan: [x] Continue current frequency toward long and short term goals.  er   [x] Specific Instructions for subsequent treatments: Xcite training, litegait training      Time In: 3:05 pm            Time Out: 4:00 pm    Electronically signed by:  Papo Cordoba, PT

## 2022-11-22 NOTE — FLOWSHEET NOTE
[x] [facility]       Occupational Therapy            1st floor       [address], [place]         Phone: [phone]       Fax: [phone] [] [place]  Occupational Therapy  [address]  [place], [place]  Phone: [phone]  Fax: [phone]      Occupational Therapy Daily Treatment Note    Date:  2022  Patient Name:  Brijesh Doyle    :  1960  MRN: [id number]  Referring Provider:  TOY Osorio CNP  Insurance: [employer] - 24 visits, from 10/13/22 to 22  Medical Diagnosis: Right hemiplegia G81.91            Rehab Codes: lack of coordination R27.8,, fine motor skills loss R29.818,, or muscle weakness generalized M62.81,  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: nothing scheduled   Visit# / total visits: ; Progress note for patient due at visit 8-10    Cancels/No Shows: 2      Subjective:    Pain:  [] Yes  [x] No Location: N/A Pain Rating: (0-10 scale) 0/10  Pain altered Tx:  [x] No  [] Yes  Action: NA  Pt Comments: Reports R hand is feeling looser. Precautions: NA  Surgery procedure and date: NA    Objective: Today's Treatment:  Modalities:    Treatment this date included using Functional Electrical Stimulation via the Blue [place] H200,  Stimulation parameters and outcomes can be viewed on [facility] [employer] with the patients' username (patient ID is generated by first initial of first name, and first initial of last name, followed by two digit month and two digit day the treatment commenced), and no patient specific password required. A check of the patients' skin prior to application of electrodes, and after the treatment concluded was completed and no adverse reactions noted. Name: Leo Frankel  Patient ID: [id number]      See below in treatment log for treatments performed.     Exercises:  EXERCISE    REPS/     TIME  WEIGHT/    LEVEL COMMENTS   Bioness Training  30  minutes Completed - neuromodulation (10 minutes), grasp and release (15 minutes) plus time for set up   Passive stretching - R shoulder, elbow, wrist, digits  15  minutes   Completed    Bicep curls - standing 15 reps 3lb wrist weight  Resistance increased, Completed    Scapular exercises - elevation/depression, protraction/retraction  15 reps each   Not completed   Towel slides  10 reps, 2 sets    Not completed    External rotation  15 reps  Completed    Error augmentation - elbow extension 5 reps  Manual resistance  Added & completed    Other: NA      Assessment: [x] Progressing toward goals. Continued Bioness H200 training. Started with neuromodulation mode for decreased spasticity of digits, fair extensor response of all digits, middle digit the most limited. Returned to grasp and release mode with digits observed to be extending to a further degree. Will continue to begin with neuromodulation mode to decrease spasticity. Positive extensor and flexor response noted with all digits, however middle digit extension not quite as strong as others. 2 rest breaks required with Bioness training due to forearm muscle fatigue. Moderate fatigue of RUE noted at end of session. Completed bicep curls in standing position this date to allow for greater elbow extension. Added in elbow extension activity with manual resistance to promote error augmentation and allow for greater elbow extension actively, once resistance was removed. Active external rotation of R shoulder is modified but pt is able to engage scapula. Will begin application for [employer]Payment H200 for home use. [] No change.   [] Other     [x] Patient would continue to benefit from skilled occupational therapy services in order to: Restore  functional /grasp, ROM, Strength, and Activity tolerance in order to improve functional use of UE in ADL performance       Short Term Goals: (  8-10    Treatments)  Increase AROM  R shoulder flexion by 10 degrees to improve ability to complete functional activities   Demo R shoulder external rotation of 10 degrees to improve ability to complete functional activities   R elbow extension by 5 degrees to improve ability to complete functional activities   Demo trace active extension of all R digits   Increase strength (pounds);  R  by 2 pounds to improve functional use of R hand to complete daily activities   All R pinches by 1 pound to improve functional use of R hand to complete daily activities   Patient to be independent with home exercise program as demonstrated by performance with correct form without cues. Long Term Goals: (  24  Treatments)  Increase functional skills AEB a score of 50/80 with UEFI  Demo active extension of all R digits to release object  Increase R  strength by 5+ pounds to complete daily activities with less difficulty  Increase all R pinch strength measurements by 2+ pounds for less difficulty completing daily activities   Demo functional movements with R hand following use of Bioness/Xcite for carryover of completion of functional activities   Increase R shoulder flexion by 15+ degrees for less difficulty completing daily activities      Patient Goals: get hand functioning       Pt. Education:  [] Yes  [x] No  [] Reviewed Prior HEP/Ed   Method of Education: [] Verbal  [] Demo  [] Written  Re:   Comprehension of Education:  [] Verbalizes understanding. [] Demonstrates understanding. [] Needs review. [] Demonstrates/verbalizes HEP/Ed previously given. Plan: [x] Continue current frequency toward short and long term goals.   [x] Specific Instructions for subsequent treatments: bioness, stretching of L shoulder    [] Other:       Time In: 1407  Time Out: 1501        Treatment Charges: Mins Units   []  Modalities:      []  Ultrasound     [x]  Ther Exercise 24 2   []  Manual Therapy     []  Ther Activities     []  Orthotic fit/train     []  Orthotic recheck     [x]  Other: neuro re-ed 30 2   Total Treatment time 54 4       Electronically signed by:  Merari Epsinoza, OTR/L

## 2022-11-23 DIAGNOSIS — I63.9 ISCHEMIC STROKE (HCC): ICD-10-CM

## 2022-11-23 DIAGNOSIS — E78.5 HYPERLIPIDEMIA, UNSPECIFIED HYPERLIPIDEMIA TYPE: ICD-10-CM

## 2022-11-23 RX ORDER — ATORVASTATIN CALCIUM 20 MG/1
TABLET, FILM COATED ORAL
Qty: 75 TABLET | OUTPATIENT
Start: 2022-11-23

## 2022-11-29 ENCOUNTER — HOSPITAL ENCOUNTER (OUTPATIENT)
Dept: OCCUPATIONAL THERAPY | Age: 62
Setting detail: THERAPIES SERIES
Discharge: HOME | End: 2022-11-29
Payer: MEDICARE

## 2022-11-29 ENCOUNTER — HOSPITAL ENCOUNTER (OUTPATIENT)
Dept: PHYSICAL THERAPY | Age: 62
Setting detail: THERAPIES SERIES
Discharge: HOME | End: 2022-11-29
Payer: MEDICARE

## 2022-11-29 PROCEDURE — 97116 GAIT TRAINING THERAPY: CPT

## 2022-11-29 PROCEDURE — 97110 THERAPEUTIC EXERCISES: CPT

## 2022-11-29 PROCEDURE — 97112 NEUROMUSCULAR REEDUCATION: CPT

## 2022-11-29 NOTE — FLOWSHEET NOTE
[x] [facility]  [facility]  [address]  [phone]  F: [phone] [] [address]  [facility] [address]   [address]  P: [phone]  F: [phone] [] [place] &  Therapy  [address]  P: [phone]  F: [phone] [] [address]  P: [phone]  F: [phone] [] [address]  [facility]   [address]   [place]: [phone]  F: [phone]      Physical Therapy Daily Treatment Note    Date:  2022  Patient Name:  Lydia Sarkar    :  1960  MRN: [id number]  Physician: JACQUELIN Ochoa                              Insurance: [employer] after eval)  Medical Diagnosis: I63.9 (ICD-10-CM) - Ischemic stroke [facility])    Rehab Codes: R26.89, M26.81, R29.3  Next 's appt.: 22 PCP  Date of symptom onset: 22 (chronic onset)  Visit# / total visits: ; Progress note for Medicare patient due at visit 8     Cancels/No Shows: 0    Subjective:    Pain:  [] Yes  [x] No Location:  N/A Pain Rating: (0-10 scale) 0/10  Pain altered Tx:  [x] No  [] Yes  Action:  Comments: Pt arrives with no pain, issues this date. Objective:  LAQ on RLE:  lacking 10deg from TKE but able to hold against min resistance: 2+/5  6MWT: 530 ft (<1200ft indicates less than community level distances)      Modalities:   See below in treatment log for treatments performed. LOG IN: [id number]; PIN: [id number]    Precautions: Left hemiparesis, fal risk  Exercises: Bolded completed 2022: HELD MOST TREATMENT D/T STG ASSESSMENT, BILLED WITH GAIT AND THEREX AS APPROPRIATE, SEE BELOW:  Exercise Reps/ Time Weight/ Level Comments   6MWT   6MWT: 11 lengths x 39 ft = 429 ft  On : 530 ft         LiteGait train 2x3 min . 8mph    No additional BWS Very significantly fatigued, requires increased seated rest  - set up time billed for with gait               Xcite   Billed under neuro, including skilled set up and don/doffing time   Sit <> stand 1x15  No UE assist   RLE biased sit<>stand 2x10 4\" LLE on step, elevated mat table   LLE step taps 3x15 4\" Progressing from unilat UE assist to none  - 2 sets on 11/22   RLE modified SLS holds 2x90\" 4\" LLE on step  - concurrent tasks: 10x trunk twist, 5x reach to toe   LAQs 3x15 RLE    Prone knee flexion 3x10 RLE    Step ups 20x 4\" In inpatient gym; unilat UE assist                             Resisted rows 3x15 blue                Gait train 3x150 ft     Stair training 2x8 steps  Attempting reciprocal gait ascent/descent, too difficult descent   Gait w/ hurdles 2x200 ft  Second set with 2# ankle weight on   Gait w/ head turns 100 ft     Other: Pt requiring increased seated rest today d/t more fatigued overall        6MWT, fall prevention  - bodyweight support treadmill train  - gastroc/hip flexor stretching  - Xcite for RLE stance control with LLE OKC tasks  - progressively decreased height sit<>stands without UE assist  - single limb quad strengthening - leg press, leg extension, etc      Treatment Charges: Mins Units   []  Modalities     [x]  Ther Exercise 3 --   []  Manual Therapy     []  Ther Activities     []  Aquatics     [x]  Gait 46 3   [x]  Other: Neuro     Total Treatment time 49 3       Assessment: [x] Progressing toward goals. Mildly improved quad strength noted as evidenced by improved ability to ascend stairs reciprocally with increased RLE stance time and stability noted; however, has fall backwards onto first step when attempting to descend with RLE in CKC - able to slowly control fall with hand support and therapist at gait belt, no injuries.  Pt with no change in gait speed, will implement more high-intensity gait training as pt allows (is often self limiting, doesn't want to work on treadmill) - agreeable to increasing walking training in upcoming sessions overground. [] No change. [x] Other: Continues to require extended seated rest breaks d/t overall fatigue from [employer] training, as well as pt being somewhat self limiting with any fatigue-inducing exercise. [x] Patient would continue to benefit from skilled physical therapy services in order to:  progress R hemibody strength in RUE/RLE, increase gait speed and improve mechanics, improve functional quad/hip strength to increase stability in standing and ease of transfers, improve balance within gait tasks, and overall improve quality of life. STG: (to be met in 8 treatments) - Assessed on 11/29/22 by Rashawn Ahmadi, PT:  ? ROM: Negative for iliopsoas tightness on Bryan test on RLE for improved ability to achieve terminal hip ext - MET, no tightness noted  ? Balance:   Pt able to ambulate with intermittent head turns, stopping without slowed gait speed nor instability noted - Ongoing, slows down overall speed when attempting head turns  Pt able to step over 4\" objects while walking with no speed decrease, no instability noted  ? Strength: At least 3/5 R quads to allow improved stability with RLE SLS positions including stair climbing, etc - Ongoing, 2+/5 quads but improved stability with RLE SLS noted for stair climbing and gait, still fatigues very quickly after ~7 steps, ~200 ft starts to decrease R stance time  ? Function:   Pt able to ambulate at least .52m/s with [employer] [place] on level ground to progress towards decreased fall risk - Partially met, .43m/s self selected; .59m/s fast  Pt able to ascend/descend flight of stairs with improving speed, reciprocal ascent/descent with unilat UE assist - MET for ascent, step-to descent   Patient to be independent with home exercise program as demonstrated by performance with correct form without cues.  - MET  Demonstrate Knowledge of fall prevention -MET, reviewed 11/29 though pt notes d/t sharing home has some fall-risk objects in gait path     LTG: (to be met in 16 treatments)  ? Balance: At least 19/30 on FGA to indicate significant improvement in dynamic balance  At least 75% confidence on ABC scale to indicate increased self efficacy with balance  ? Strength: At least 3+/5 R quads to allow further improving stability with RLE SLS positions including stair climbing, etc  ? Function:   Pt able to ambulate at least .62m/s with [employer] on level ground to progress towards decreased fall risk  Pt able to ascend/descend flight of stairs with further improving speed and safety reciprocal ascent/descent with unilat UE assist  Able to complete 5x STS without UE assist in under 1 min to indicate improving functional BLE power and strength for transfers           Patient goals: \"get back to normal\"    Pt. Education:  [x] Yes  [] No  [x] Reviewed Prior HEP/Ed  Method of Education: [x] Verbal  [] Demo  [] Written  Comprehension of Education:  [x] Verbalizes understanding. [x] Demonstrates understanding. [] Needs review. [] Demonstrates/verbalizes HEP/Ed previously given. Current HEP as of 11/3: sit to stands no hands, LLE step taps w/ cane in LUE     Plan: [x] Continue current frequency toward long and short term goals.  er   [x] Specific Instructions for subsequent treatments: Xcite training, litegait training      Time In: 2:04 pm            Time Out: 2:57 pm    Electronically signed by:  Darnell Villela, PT

## 2022-11-29 NOTE — FLOWSHEET NOTE
[x] [facility]       Occupational Therapy            1st floor       [address], [address]         Phone: [phone]       Fax: [phone] [] [address] [address] Occupational Therapy  [address]  [facility], [address]  Phone: [phone]  Fax: [phone]      Occupational Therapy Daily Treatment Note    Date:  2022  Patient Name:  Doroteo Lombard    :  1960  MRN: [id number]  Referring Provider:  TOY Gonzales CNP  Insurance: [facility] - 24 visits, from 10/13/22 to 22  Medical Diagnosis: Right hemiplegia G81.91            Rehab Codes: lack of coordination R27.8,, fine motor skills loss R29.818,, or muscle weakness generalized M62.81,  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 Ron Abhay/5 - PCP  Visit# / total visits: ; Progress note for patient due at visit 8-10    Cancels/No Shows:       Subjective:    Pain:  [] Yes  [x] No Location: N/A Pain Rating: (0-10 scale) 0/10  Pain altered Tx:  [x] No  [] Yes  Action: NA  Pt Comments: Reports R hand is feeling looser. Noticing some random finger movement occasionally. Precautions: NA  Surgery procedure and date: NA    Objective: Today's Treatment:  Modalities:    Treatment this date included using Functional Electrical Stimulation via the W[employer]Par[employer] H200,  Stimulation parameters and outcomes can be viewed on [facility] [employer] with the patients' username (patient ID is generated by first initial of first name, and first initial of last name, followed by two digit month and two digit day the treatment commenced), and no patient specific password required. A check of the patients' skin prior to application of electrodes, and after the treatment concluded was completed and no adverse reactions noted. Name: Brenden Man  Patient ID: [id number]      See below in treatment log for treatments performed.     Exercises:  EXERCISE    REPS/     TIME  WEIGHT/    LEVEL COMMENTS   Bioness Training  30  minutes   Completed - neuromodulation (10 minutes), grasp and release (15 minutes) plus time for set up   Passive stretching - R shoulder, elbow, wrist, digits  15  minutes   Completed    Bicep curls - standing 15 reps, 2 sets 2lb wrist weight  Completed    Scapular exercises - elevation/depression, protraction/retraction  15 reps each   Not completed   Towel slides  10 reps, 2 sets    Not completed    External rotation  15 reps  Completed    Error augmentation - elbow extension 5 reps  Manual resistance  Not completed    Other: NA      Assessment: [x] Progressing toward goals. Continued Bioness H200 training. Started with neuromodulation mode for decreased spasticity of digits, fair extensor response of all digits, middle digit the most limited. Returned to grasp and release mode with digits observed to be extending to a further degree. Will continue to begin with neuromodulation mode to decrease spasticity. Positive extensor and flexor response noted with all digits, however middle digit extension not quite as strong as others. 1 rest breaks required with Bioness training due to forearm muscle fatigue, improved from last session. Allows pt to have a more functional grasp/release pattern to  various objects. Significant fatigue of RUE noted at end of session. Completed bicep curls in standing position this date to allow for greater elbow extension. Somewhat self-limiting, doesn't want to push self with reps/weights. Asked to decrease wrist weight to 2 pounds today. Will begin application for [employer] H200 for home use. [] No change.   [] Other     [x] Patient would continue to benefit from skilled occupational therapy services in order to: Restore  functional /grasp, ROM, Strength, and Activity tolerance in order to improve functional use of UE in ADL performance       Short Term Goals: (  8-10    Treatments)  Increase AROM  R shoulder flexion by 10 degrees to improve ability to complete functional activities   Demo R shoulder external rotation of 10 degrees to improve ability to complete functional activities   R elbow extension by 5 degrees to improve ability to complete functional activities   Demo trace active extension of all R digits   Increase strength (pounds);  R  by 2 pounds to improve functional use of R hand to complete daily activities   All R pinches by 1 pound to improve functional use of R hand to complete daily activities   Patient to be independent with home exercise program as demonstrated by performance with correct form without cues. Long Term Goals: (  24  Treatments)  Increase functional skills AEB a score of 50/80 with UEFI  Demo active extension of all R digits to release object  Increase R  strength by 5+ pounds to complete daily activities with less difficulty  Increase all R pinch strength measurements by 2+ pounds for less difficulty completing daily activities   Demo functional movements with R hand following use of Bioness/Xcite for carryover of completion of functional activities   Increase R shoulder flexion by 15+ degrees for less difficulty completing daily activities      Patient Goals: get hand functioning       Pt. Education:  [] Yes  [x] No  [] Reviewed Prior HEP/Ed   Method of Education: [] Verbal  [] Demo  [] Written  Re:   Comprehension of Education:  [] Verbalizes understanding. [] Demonstrates understanding. [] Needs review. [] Demonstrates/verbalizes HEP/Ed previously given. Plan: [x] Continue current frequency toward short and long term goals.   [x] Specific Instructions for subsequent treatments: bioness, stretching of L shoulder    [] Other:       Time In: 1304  Time Out: 1401        Treatment Charges: Mins Units   []  Modalities:      []  Ultrasound     [x]  Ther Exercise 17 1   []  Manual Therapy     []  Ther Activities     []  Orthotic fit/train     []  Orthotic recheck     [x]  Other: neuro re-ed 40 3   Total Treatment time 57 4       Electronically signed by:  MODESTO Watt/L

## 2022-12-01 ENCOUNTER — HOSPITAL ENCOUNTER (OUTPATIENT)
Dept: PHYSICAL THERAPY | Age: 62
Setting detail: THERAPIES SERIES
Discharge: HOME | End: 2022-12-01
Payer: MEDICARE

## 2022-12-01 ENCOUNTER — HOSPITAL ENCOUNTER (OUTPATIENT)
Dept: OCCUPATIONAL THERAPY | Age: 62
Setting detail: THERAPIES SERIES
Discharge: HOME | End: 2022-12-01
Payer: MEDICARE

## 2022-12-01 PROCEDURE — 97110 THERAPEUTIC EXERCISES: CPT

## 2022-12-01 PROCEDURE — 97116 GAIT TRAINING THERAPY: CPT

## 2022-12-01 PROCEDURE — 97112 NEUROMUSCULAR REEDUCATION: CPT

## 2022-12-01 PROCEDURE — 97140 MANUAL THERAPY 1/> REGIONS: CPT

## 2022-12-01 NOTE — FLOWSHEET NOTE
[x] [facility]  [facility]rapy  [address]  [phone]  F: [phone] [] [address]  [facility]   [address]  P: [phone]  F: [phone] [] [place] &  Therapy  [address]  P: [phone]  F: [phone] [] [address]  P: [phone]  F: [phone] [] [address]  [facility]   [address]   [place]: [phone]  F: [phone]      Physical Therapy Daily Treatment Note    Date:  2022  Patient Name:  Doroteo Lombard    :  1960  MRN: [id number]  Physician: JACQUELIN Go                              Insurance: [address] [place] [address] after [facility]  Medical Diagnosis: I63.9 (ICD-10-CM) - Ischemic stroke [facility])    Rehab Codes: R26.89, M26.81, R29.3  Next 's appt.: 22 PCP  Date of symptom onset: 22 (chronic onset)  Visit# / total visits: ; Progress note for Medicare patient due at visit 8     Cancels/No Shows: 0    Subjective:    Pain:  [] Yes  [x] No Location:  N/A Pain Rating: (0-10 scale) 0/10  Pain altered Tx:  [x] No  [] Yes  Action:  Comments: Pt reports no pain or difficulties this date. Objective:  LAQ on RLE:  lacking 10deg from TKE but able to hold against min resistance: 2+/5  6MWT: 530 ft (<1200ft indicates less than community level distances)      Modalities:   See below in treatment log for treatments performed. LOG IN: [id number]; PIN: [id number]    Precautions: Left hemiparesis, fal risk  Exercises: Bolded completed 2022:  Exercise Reps/ Time Weight/ Level Comments   6MWT   6MWT: 11 lengths x 39 ft = 429 ft  On 11/29: 530 ft         LiteGait train 2x3 min . 8mph    No additional BWS Very significantly fatigued, requires increased seated rest  - set up time billed for with gait               Xcite   Billed under neuro, including skilled set up and don/doffing time   Sit <> stand 1x15  No UE assist   RLE biased sit<>stand 2x10 4\" LLE on step, elevated mat table   LLE step taps 3x15 4\" Progressing from unilat UE assist to none  - 2 sets on 11/22   RLE modified SLS holds 2x90\" 4\" LLE on step  - concurrent tasks: 10x trunk twist, 5x reach to toe   LAQs 3x15 RLE    Prone knee flexion 3x10 RLE    Step ups 20x 4\" In inpatient gym; unilat UE assist                             Resisted rows 3x15 blue                Resisted gait train 2x300 ft  1 lap around OT = 150 ft   Gait down to car 600+ feet SBQC Including decline, thresholds, elevator, crowd negotiation   Stair training 2x8 steps  Attempting reciprocal gait ascent/descent, too difficult descent   Gait w/ hurdles 2x200 ft  Second set with 2# ankle weight on   Gait w/ head turns 100 ft     Other: Pt requiring increased seated rest today d/t more fatigued overall        6MWT, fall prevention  - bodyweight support treadmill train  - gastroc/hip flexor stretching  - Xcite for RLE stance control with LLE OKC tasks  - progressively decreased height sit<>stands without UE assist  - single limb quad strengthening - leg press, leg extension, etc      Treatment Charges: Mins Units   []  Modalities     []  Ther Exercise     []  Manual Therapy     []  Ther Activities     []  Aquatics     [x]  Gait 31 2   [x]  Other: Neuro 24 2   Total Treatment time 55 4       Assessment: [x] Progressing toward goals. Added resisted gait to increase intensity of gait training in session - initially at 10 on RPE scale, able to get to 15 on RPE with cues from therapist on second round, educating pt of need for higher intensity to allow increased benefit. Still requires CGA-David with RLE-biased sit to stands and any RLE SLS training for safety. [] No change.      [x] Other: Continues to require extended seated rest breaks d/t overall fatigue from [place] training, as well as pt being somewhat self limiting with any fatigue-inducing exercise. [x] Patient would continue to benefit from skilled physical therapy services in order to:  progress R hemibody strength in RUE/RLE, increase gait speed and improve mechanics, improve functional quad/hip strength to increase stability in standing and ease of transfers, improve balance within gait tasks, and overall improve quality of life. STG: (to be met in 8 treatments) - Assessed on 11/29/22 by Sandip Neves, PT:  ? ROM: Negative for iliopsoas tightness on Bryan test on RLE for improved ability to achieve terminal hip ext - MET, no tightness noted  ? Balance:   Pt able to ambulate with intermittent head turns, stopping without slowed gait speed nor instability noted - Ongoing, slows down overall speed when attempting head turns  Pt able to step over 4\" objects while walking with no speed decrease, no instability noted  ? Strength: At least 3/5 R quads to allow improved stability with RLE SLS positions including stair climbing, etc - Ongoing, 2+/5 quads but improved stability with RLE SLS noted for stair climbing and gait, still fatigues very quickly after ~7 steps, ~200 ft starts to decrease R stance time  ? Function:   Pt able to ambulate at least .52m/s with [employer] on level ground to progress towards decreased fall risk - Partially met, .43m/s self selected; .59m/s fast  Pt able to ascend/descend flight of stairs with improving speed, reciprocal ascent/descent with unilat UE assist - MET for ascent, step-to descent   Patient to be independent with home exercise program as demonstrated by performance with correct form without cues. - MET  Demonstrate Knowledge of fall prevention -MET, reviewed 11/29 though pt notes d/t sharing home has some fall-risk objects in gait path     LTG: (to be met in 16 treatments)  ? Balance:    At least 19/30 on FGA to indicate significant improvement in dynamic balance  At least 75% confidence on ABC scale to indicate increased self efficacy with balance  ? Strength: At least 3+/5 R quads to allow further improving stability with RLE SLS positions including stair climbing, etc  ? Function:   Pt able to ambulate at least .62m/s with [employer] on level ground to progress towards decreased fall risk  Pt able to ascend/descend flight of stairs with further improving speed and safety reciprocal ascent/descent with unilat UE assist  Able to complete 5x STS without UE assist in under 1 min to indicate improving functional BLE power and strength for transfers           Patient goals: \"get back to normal\"    Pt. Education:  [x] Yes  [] No  [x] Reviewed Prior HEP/Ed  Method of Education: [x] Verbal  [] Demo  [] Written  Comprehension of Education:  [x] Verbalizes understanding. [x] Demonstrates understanding. [] Needs review. [] Demonstrates/verbalizes HEP/Ed previously given. Current HEP as of 11/3: sit to stands no hands, LLE step taps w/ cane in LUE     Plan: [x] Continue current frequency toward long and short term goals.  er   [x] Specific Instructions for subsequent treatments: Xcite training, litegait training      Time In: 2:05 pm            Time Out: 3:06 pm    Electronically signed by:  Dominga Esquivel PT

## 2022-12-01 NOTE — FLOWSHEET NOTE
[x] [facility]       Occupational Therapy            1st floor       [address], [address]         Phone: [phone]       Fax: [phone] [] [address] Occupational Therapy  [address], [address]  Phone: [phone]  Fax: [phone]      Occupational Therapy Daily Treatment Note    Date:  2022  Patient Name:  Nay Farmer    :  1960  MRN: [id number]  Referring Provider:  TOY Wilcox CNP  Insurance: [employer] - 24 visits, from 10/13/22 to 22  Medical Diagnosis: Right hemiplegia G81.91            Rehab Codes: lack of coordination R27.8,, fine motor skills loss R29.818,, or muscle weakness generalized M62.81,  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 [address]:  - PCP  Visit# / total visits: ; Progress note for patient due at visit 8-10    Cancels/No Shows:       Subjective:    Pain:  [] Yes  [x] No Location: N/A Pain Rating: (0-10 scale) 0/10  Pain altered Tx:  [x] No  [] Yes  Action: NA  Pt Comments: Reports R hand is feeling loose, wants to increase R elbow extension. Precautions: NA  Surgery procedure and date: NA    Objective: Today's Treatment:  Modalities:    Not completed this date: Functional Electrical Stimulation via the [employer] H200,  Stimulation parameters and outcomes can be viewed on [facility] [employer] with the patients' username (patient ID is generated by first initial of first name, and first initial of last name, followed by two digit month and two digit day the treatment commenced), and no patient specific password required. A check of the patients' skin prior to application of electrodes, and after the treatment concluded was completed and no adverse reactions noted. Name: Kwadwo Harrington  Patient ID: [id number]      See below in treatment log for treatments performed.     Exercises:  EXERCISE    REPS/     TIME  WEIGHT/    LEVEL COMMENTS   Swoodoo Training 30  minutes   Not Completed - neuromodulation (10 minutes), grasp and release (15 minutes) plus time for set up   Passive stretching - R shoulder, elbow, wrist, digits  15  minutes   Completed    Bicep curls - standing 15 reps, 2 sets 2lb wrist weight  Completed    Scapular exercises - elevation/depression, protraction/retraction  15 reps each   Completed   Towel slides  10 reps, 2 sets    Completed    External Rotation  15 reps  Completed    Error augmentation - elbow extension 5 reps  Manual resistance  Not Completed    Other: NA      Assessment: [x] Progressing toward goals. Bioness not completed due to change in therapist for this dates treatment session. Performed PROM to RUE- shoulder, elbow, wrist and digits. Significant tightness of R shoulder noted, 1 finger width subluxation R GH joint, no complaints with passive stretching. Completed table top towel slides to promote improved L shoulder function, good external rotation with support given at R shoulder. Completed bicep curls in standing position to allow for greater elbow extension, fair control of movement. Somewhat self-limiting, doesn't want to push self with reps/weights. Asked to continue with 2 pound wrist weights. Min fatigue of RUE noted at end of session. Pt to PT session. [] No change.   [] Other     [x] Patient would continue to benefit from skilled occupational therapy services in order to: Restore  functional /grasp, ROM, Strength, and Activity tolerance in order to improve functional use of UE in ADL performance       Short Term Goals: (  8-10    Treatments)  Increase AROM  R shoulder flexion by 10 degrees to improve ability to complete functional activities   Demo R shoulder external rotation of 10 degrees to improve ability to complete functional activities   R elbow extension by 5 degrees to improve ability to complete functional activities   Demo trace active extension of all R digits   Increase strength (pounds);  R  by 2 pounds to improve functional use of R hand to complete daily activities   All R pinches by 1 pound to improve functional use of R hand to complete daily activities   Patient to be independent with home exercise program as demonstrated by performance with correct form without cues. Long Term Goals: (  24  Treatments)  Increase functional skills AEB a score of 50/80 with UEFI  Demo active extension of all R digits to release object  Increase R  strength by 5+ pounds to complete daily activities with less difficulty  Increase all R pinch strength measurements by 2+ pounds for less difficulty completing daily activities   Demo functional movements with R hand following use of Bioness/Xcite for carryover of completion of functional activities   Increase R shoulder flexion by 15+ degrees for less difficulty completing daily activities      Patient Goals: get hand functioning       Pt. Education:  [] Yes  [x] No  [] Reviewed Prior HEP/Ed   Method of Education: [] Verbal  [] Demo  [] Written  Re:   Comprehension of Education:  [] Verbalizes understanding. [] Demonstrates understanding. [] Needs review. [] Demonstrates/verbalizes HEP/Ed previously given. Plan: [x] Continue current frequency toward short and long term goals. [x] Specific Instructions for subsequent treatments: KT GH stability, progress note    [] Other:       Time In: 9378  Time Out: 3350        Treatment Charges: Mins Units   []  Modalities:      []  Ultrasound     [x]  Ther Exercise 15 1   [x]  Manual Therapy 35 2   []  Ther Activities     []  Orthotic fit/train     []  Orthotic recheck     []  Other:       Total Treatment time 50 3       Electronically signed by:  MODESTO Carroll/JANNET

## 2022-12-05 ENCOUNTER — OFFICE VISIT (OUTPATIENT)
Dept: INTERNAL MEDICINE CLINIC | Age: 62
End: 2022-12-05
Payer: MEDICARE

## 2022-12-05 ENCOUNTER — HOSPITAL ENCOUNTER (OUTPATIENT)
Age: 62
Setting detail: SPECIMEN
Discharge: HOME | End: 2022-12-05

## 2022-12-05 VITALS
HEIGHT: 71 IN | DIASTOLIC BLOOD PRESSURE: 80 MMHG | SYSTOLIC BLOOD PRESSURE: 130 MMHG | HEART RATE: 82 BPM | OXYGEN SATURATION: 97 % | TEMPERATURE: 98 F | BODY MASS INDEX: 25.62 KG/M2 | WEIGHT: 183 LBS | RESPIRATION RATE: 16 BRPM

## 2022-12-05 DIAGNOSIS — Z00.00 WELCOME TO MEDICARE PREVENTIVE VISIT: Primary | ICD-10-CM

## 2022-12-05 DIAGNOSIS — I10 ESSENTIAL HYPERTENSION: ICD-10-CM

## 2022-12-05 DIAGNOSIS — Z86.16 HISTORY OF COVID-19: ICD-10-CM

## 2022-12-05 DIAGNOSIS — F41.9 ANXIETY AND DEPRESSION: ICD-10-CM

## 2022-12-05 DIAGNOSIS — Z28.21 INFLUENZA VACCINATION DECLINED: ICD-10-CM

## 2022-12-05 DIAGNOSIS — Z28.21 PNEUMOCOCCAL VACCINATION DECLINED: ICD-10-CM

## 2022-12-05 DIAGNOSIS — E78.2 MIXED HYPERLIPIDEMIA: ICD-10-CM

## 2022-12-05 DIAGNOSIS — C91.10 CLL (CHRONIC LYMPHOCYTIC LEUKEMIA) (HCC): ICD-10-CM

## 2022-12-05 DIAGNOSIS — Z28.21 TETANUS, DIPHTHERIA, AND ACELLULAR PERTUSSIS (TDAP) VACCINATION DECLINED: ICD-10-CM

## 2022-12-05 DIAGNOSIS — E10.22 TYPE 1 DIABETES MELLITUS WITH STAGE 3A CHRONIC KIDNEY DISEASE (HCC): ICD-10-CM

## 2022-12-05 DIAGNOSIS — N18.31 TYPE 1 DIABETES MELLITUS WITH STAGE 3A CHRONIC KIDNEY DISEASE (HCC): ICD-10-CM

## 2022-12-05 DIAGNOSIS — F32.A ANXIETY AND DEPRESSION: ICD-10-CM

## 2022-12-05 DIAGNOSIS — I63.9 ISCHEMIC STROKE (HCC): ICD-10-CM

## 2022-12-05 DIAGNOSIS — G81.91 RIGHT HEMIPLEGIA (HCC): ICD-10-CM

## 2022-12-05 LAB
ALBUMIN SERPL-MCNC: 4.4 G/DL (ref 3.5–5.2)
ALBUMIN/GLOBULIN RATIO: 1.4 (ref 1–2.5)
ALP BLD-CCNC: 78 U/L (ref 40–129)
ALT SERPL-CCNC: 15 U/L (ref 5–41)
ANION GAP SERPL CALCULATED.3IONS-SCNC: 13 MMOL/L (ref 9–17)
AST SERPL-CCNC: 19 U/L
BILIRUB SERPL-MCNC: 0.3 MG/DL (ref 0.3–1.2)
BUN BLDV-MCNC: 23 MG/DL (ref 8–23)
CALCIUM SERPL-MCNC: 9.4 MG/DL (ref 8.6–10.4)
CHLORIDE BLD-SCNC: 105 MMOL/L (ref 98–107)
CHOLESTEROL/HDL RATIO: 4.7
CHOLESTEROL: 170 MG/DL
CO2: 25 MMOL/L (ref 20–31)
CREAT SERPL-MCNC: 1.28 MG/DL (ref 0.7–1.2)
ESTIMATED AVERAGE GLUCOSE: 146 MG/DL
GFR SERPL CREATININE-BSD FRML MDRD: >60 ML/MIN/1.73M2
GLUCOSE BLD-MCNC: 121 MG/DL (ref 70–99)
HBA1C MFR BLD: 6.7 % (ref 4–6)
HCT VFR BLD CALC: 43.5 % (ref 40.7–50.3)
HDLC SERPL-MCNC: 36 MG/DL
HEMOGLOBIN: 13.7 G/DL (ref 13–17)
LDL CHOLESTEROL: 114 MG/DL (ref 0–130)
MCH RBC QN AUTO: 29.7 PG (ref 25.2–33.5)
MCHC RBC AUTO-ENTMCNC: 31.5 G/DL (ref 28.4–34.8)
MCV RBC AUTO: 94.2 FL (ref 82.6–102.9)
NRBC AUTOMATED: 0.1 PER 100 WBC
PDW BLD-RTO: 13.7 % (ref 11.8–14.4)
PLATELET # BLD: 212 K/UL (ref 138–453)
PMV BLD AUTO: 10.3 FL (ref 8.1–13.5)
POTASSIUM SERPL-SCNC: 4.4 MMOL/L (ref 3.7–5.3)
PROSTATE SPECIFIC ANTIGEN: 1.07 NG/ML
RBC # BLD: 4.62 M/UL (ref 4.21–5.77)
SODIUM BLD-SCNC: 143 MMOL/L (ref 135–144)
TOTAL PROTEIN: 7.6 G/DL (ref 6.4–8.3)
TRIGL SERPL-MCNC: 102 MG/DL
TSH SERPL DL<=0.05 MIU/L-ACNC: 2.4 UIU/ML (ref 0.3–5)
WBC # BLD: 29.2 K/UL (ref 3.5–11.3)

## 2022-12-05 PROCEDURE — 3051F HG A1C>EQUAL 7.0%<8.0%: CPT | Performed by: FAMILY MEDICINE

## 2022-12-05 PROCEDURE — 3078F DIAST BP <80 MM HG: CPT | Performed by: FAMILY MEDICINE

## 2022-12-05 PROCEDURE — 3074F SYST BP LT 130 MM HG: CPT | Performed by: FAMILY MEDICINE

## 2022-12-05 PROCEDURE — G0438 PPPS, INITIAL VISIT: HCPCS | Performed by: FAMILY MEDICINE

## 2022-12-05 RX ORDER — INSULIN GLARGINE 100 [IU]/ML
INJECTION, SOLUTION SUBCUTANEOUS
Qty: 18 ML | Refills: 1 | Status: SHIPPED | OUTPATIENT
Start: 2022-12-05

## 2022-12-05 ASSESSMENT — PATIENT HEALTH QUESTIONNAIRE - PHQ9
1. LITTLE INTEREST OR PLEASURE IN DOING THINGS: 0
2. FEELING DOWN, DEPRESSED OR HOPELESS: 0
SUM OF ALL RESPONSES TO PHQ QUESTIONS 1-9: 0
SUM OF ALL RESPONSES TO PHQ9 QUESTIONS 1 & 2: 0
3. TROUBLE FALLING OR STAYING ASLEEP: 0
SUM OF ALL RESPONSES TO PHQ QUESTIONS 1-9: 0
10. IF YOU CHECKED OFF ANY PROBLEMS, HOW DIFFICULT HAVE THESE PROBLEMS MADE IT FOR YOU TO DO YOUR WORK, TAKE CARE OF THINGS AT HOME, OR GET ALONG WITH OTHER PEOPLE: 0
8. MOVING OR SPEAKING SO SLOWLY THAT OTHER PEOPLE COULD HAVE NOTICED. OR THE OPPOSITE, BEING SO FIGETY OR RESTLESS THAT YOU HAVE BEEN MOVING AROUND A LOT MORE THAN USUAL: 0
SUM OF ALL RESPONSES TO PHQ QUESTIONS 1-9: 0
6. FEELING BAD ABOUT YOURSELF - OR THAT YOU ARE A FAILURE OR HAVE LET YOURSELF OR YOUR FAMILY DOWN: 0
4. FEELING TIRED OR HAVING LITTLE ENERGY: 0
5. POOR APPETITE OR OVEREATING: 0
SUM OF ALL RESPONSES TO PHQ QUESTIONS 1-9: 0
7. TROUBLE CONCENTRATING ON THINGS, SUCH AS READING THE NEWSPAPER OR WATCHING TELEVISION: 0
9. THOUGHTS THAT YOU WOULD BE BETTER OFF DEAD, OR OF HURTING YOURSELF: 0

## 2022-12-05 ASSESSMENT — LIFESTYLE VARIABLES: HOW OFTEN DO YOU HAVE A DRINK CONTAINING ALCOHOL: NEVER

## 2022-12-05 NOTE — PATIENT INSTRUCTIONS
Personalized Preventive Plan for Lydia Sarkar - 12/5/2022  Medicare offers a range of preventive health benefits. Some of the tests and screenings are paid in full while other may be subject to a deductible, co-insurance, and/or copay. Some of these benefits include a comprehensive review of your medical history including lifestyle, illnesses that may run in your family, and various assessments and screenings as appropriate. After reviewing your medical record and screening and assessments performed today your provider may have ordered immunizations, labs, imaging, and/or referrals for you. A list of these orders (if applicable) as well as your Preventive Care list are included within your After Visit Summary for your review. Other Preventive Recommendations:    A preventive eye exam performed by an eye specialist is recommended every 1-2 years to screen for glaucoma; cataracts, macular degeneration, and other eye disorders. A preventive dental visit is recommended every 6 months. Try to get at least 150 minutes of exercise per week or 10,000 steps per day on a pedometer . Order or download the FREE \"Exercise & Physical Activity: Your Everyday Guide\" from [employer]. Call 0-640.890.6183 or search [employer] online. You need 8076-9421 mg of calcium and 0130-5466 IU of vitamin D per day. It is possible to meet your calcium requirement with diet alone, but a vitamin D supplement is usually necessary to meet this goal.  When exposed to the sun, use a sunscreen that protects against both UVA and UVB radiation with an SPF of 30 or greater. Reapply every 2 to 3 hours or after sweating, drying off with a towel, or swimming. Always wear a seat belt when traveling in a car. Always wear a helmet when riding a bicycle or motorcycle.

## 2022-12-05 NOTE — PROGRESS NOTES
Medicare Annual Wellness Visit    Dennise Dumont is here for Medicare AWV  57-year-old male is brought in by care giver requesting Medicare annual.  He is afebrile hemodynamically stable. He does not voice any new distress  HEENT unremarkable  Review of system all 12 systems reviewed per  History of present illness  Systemic exam  HEENT unremarkable  Neck unremarkable  Lungs bilateral CTA  CVS S1-S2 regular rate and rhythm  Abdomen soft discomfort benign to palpation normoactive bowel sound  CNS. History of CVA with residual right hemiplegia and right foot drop  Lower extremity no edema, good capillary refill  Skin no tenting no lesions  Assessment & Plan   Welcome to Medicare preventive visit  Type 1 diabetes mellitus with stage 3a chronic kidney disease ([facility] [address]  The following orders have not been finalized:  -     insulin glargine (LANTUS SOLOSTAR) 100 UNIT/ML injection pen        57-year-old male is presented requesting Medicare annual exam.  He denies any new distress  History of diabetes mellitus with A1c of 6.1. He is insulin-dependent. Denies hypoglycemia. Continue current regimen, ADA 1800 diet, daily moderate exercise  Hemodynamically stable with random blood pressure equal to less than 130/80. Hyperlipidemia on statin that he is tolerating well  Diabetic nephropathy. Creatinine of 1.48. Optimize diabetes and blood pressure. Continue ACE inhibitor. I have added Farxiga  Monofilament testing is unremarkable. He is counseled on diabetic foot care  Advised to update annual dilated eye exam  He appears anxious heart denies being depressed. Continue SSRI that he is tolerating well with positive response  He denies tobacco, excessive alcohol or illicit drug use  Allergies allergic rhinitis. Continue Flonase, nasal saline antihistamine  History of CVA with residual right hemiplegia. Associated foot drop for which he is on orthotic. He ambulates with a cane. Fall precaution is advised  History of CLL. He was recently seen by hematology oncology  He has been noncompliant with Cologuard. Compliance is advised. I also offered him colonoscopy that he declined. He declined all age-appropriate vaccinations  Patient as well as his caregiver does not offer any further question or concern  Med list reviewed continue with compliance  Further recommendations to follow updated labs    Recommendations for Preventive Services Due: see orders and patient instructions/AVS.  Recommended screening schedule for the next 5-10 years is provided to the patient in written form: see Patient Instructions/AVS.     Return for Medicare Annual Wellness Visit in 1 year. Subjective   The following acute and/or chronic problems were also addressed today:    Patient's complete Health Risk Assessment and screening values have been reviewed and are found in Flowsheets. The following problems were reviewed today and where indicated follow up appointments were made and/or referrals ordered. Positive Risk Factor Screenings with Interventions:     Cognitive:   Words recalled: 2 Words Recalled  Clock Drawing Test (CDT):  (refused)  Total Score Interpretation: Abnormal Mini-Cog  Did the patient refuse to take the cognition test?: No (clock)    Cognitive Impairment Interventions:              General Health and ACP:  General  In general, how would you say your health is?: Very Good  In the past 7 days, have you experienced any of the following: New or Increased Pain, New or Increased Fatigue, Loneliness, Social Isolation, Stress or Anger?: No  Do you get the social and emotional support that you need?: Yes  Do you have a Living Will?: (!) No    Advance Directives       Power of  Living Will ACP-Advance Directive ACP-Power of     Not on File Not on File Not on File Not on File          General Health Risk Interventions:       Health Habits/Nutrition:  Physical Activity: Sufficiently Active    Days of Exercise per Week: 3 days Minutes of Exercise per Session: 60 min     Have you lost any weight without trying in the past 3 months?: No  Body mass index: (!) 25.52  Have you seen the dentist within the past year?: (!) No    Health Habits/Nutrition Interventions:       Hearing/Vision:  Do you or your family notice any trouble with your hearing that hasn't been managed with hearing aids?: No  Do you have difficulty driving, watching TV, or doing any of your daily activities because of your eyesight?: No  Have you had an eye exam within the past year?: (!) No  No results found. Hearing/Vision Interventions:               Objective   Vitals:    12/05/22 0903   BP: 130/80   Site: Left Upper Arm   Position: Sitting   Cuff Size: Medium Adult   Pulse: 82   Resp: 16   Temp: 98 °F (36.7 °C)   TempSrc: Temporal   SpO2: 97%   Weight: 183 lb (83 kg)   Height: 5' 11\" (1.803 m)      Body mass index is 25.52 kg/m². Allergies   Allergen Reactions    Dairycare [Lactase-Lactobacillus]      Prior to Visit Medications    Medication Sig Taking? Authorizing Provider   hydrALAZINE (APRESOLINE) 25 MG tablet TAKE ONE TABLET BY MOUTH DAILY Yes Morley Severin, MD   LANTUS SOLOSTAR 100 UNIT/ML injection pen INJECT 20 UNITS UNDER THE SKIN ONCE NIGHTLY Yes Morley Severin, MD   potassium chloride (KLOR-CON M) 20 MEQ TBCR extended release tablet TAKE ONE TABLET BY MOUTH DAILY Yes Morley Severin, MD   amLODIPine (NORVASC) 10 MG tablet TAKE ONE TABLET BY MOUTH DAILY Yes Morley Severin, MD   ONETOUCH ULTRA strip USE ONE STRIP TO TEST THREE TIMES A DAY AND AS NEEDED FOR SYMPTOMS OF IRREGULAR BLOOD GLUCOSE Yes Morley Severin, MD   atorvastatin (LIPITOR) 40 MG tablet Take 1 tablet by mouth daily Yes TOY Dia CNP   FLUoxetine (PROZAC) 20 MG capsule Take 1 capsule by mouth daily Yes TOY Dia CNP   insulin lispro, 1 Unit Dial, (HUMALOG KWIKPEN) 100 UNIT/ML SOPN According to sliding scale.  Max dose per day 50 Yes Morley Severin, MD   fluticasone (FLONASE) 50 MCG/ACT nasal spray 1 spray by Each Nostril route daily Yes Reymundo Lux MD   Lancets (ONETOUCH DELICA PLUS NQIBHP49G) MISC USE TO TEST BLOOD SUGAR TWO TIMES A DAY Yes Reymundo Lux MD   Insulin Pen Needle (B-D ULTRAFINE III SHORT PEN) 31G X 8 MM MISC USE ONE - FOUR NEEDLE DAILY Yes Reymundo Lux MD   blood glucose monitor kit and supplies Test 3  times a day & as needed for symptoms of irregular blood glucose. Connie Palacio MD   aspirin 81 MG chewable tablet Take 81 mg by mouth daily Yes Historical Provider, MD   Insulin Pen Needle (PEN NEEDLES) 31G X 6 MM MISC Inject 1 each into the skin daily Yes Sarai Hoffman MD       [facility] (Including outside providers/suppliers regularly involved in providing care):   Patient Care Team:  Reymundo Lux MD as PCP - General (Family Medicine)  Reymundo Lux MD as PCP - [facility]  Miriam Giron MD as Surgeon (Ophthalmology)  Myesha Keating MD as Consulting Physician (Neurology)  Josh Recinos MD as Consulting Physician (Oncology)  Lucina Rojas MD as Consulting Physician (Nephrology)     Reviewed and updated this visit:  Tobacco  Allergies  Meds  Med Hx  Surg Hx  Soc Hx  Fam Hx      This note is created with a voice recognition program and while intend to generate a document that accurately reflects the content of the visit, no guarantee can be provided that every mistake has been identified and corrected by editing.

## 2022-12-06 ENCOUNTER — HOSPITAL ENCOUNTER (OUTPATIENT)
Dept: OCCUPATIONAL THERAPY | Age: 62
Setting detail: THERAPIES SERIES
Discharge: HOME | End: 2022-12-06
Payer: MEDICARE

## 2022-12-06 ENCOUNTER — HOSPITAL ENCOUNTER (OUTPATIENT)
Dept: PHYSICAL THERAPY | Age: 62
Setting detail: THERAPIES SERIES
Discharge: HOME | End: 2022-12-06
Payer: MEDICARE

## 2022-12-06 PROCEDURE — 97110 THERAPEUTIC EXERCISES: CPT

## 2022-12-06 PROCEDURE — 97112 NEUROMUSCULAR REEDUCATION: CPT

## 2022-12-06 NOTE — FLOWSHEET NOTE
[x] [facility]       Occupational Therapy            1st floor       [address], [place]         Phone: [phone]       Fax: [phone] [] [facility]  [address], [place]  Phone: [phone]  Fax: [phone]      Occupational Therapy Daily Treatment Note    Date:  2022  Patient Name:  Rocio Weiss    :  1960  MRN: [id number]  Referring Provider:  Other Odetta Lesch, APRN - CNP  Insurance: [facility] Medicaid - 24 visits, from 10/13/22 to 22  Medical Diagnosis: Right hemiplegia G81.91            Rehab Codes: lack of coordination R27.8,, fine motor skills loss R29.818,, or muscle weakness generalized M62.81,  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 Issa Hernandez:  - PCP  Visit# / total visits: ; Progress note for patient due at visit 8-10    Cancels/No Shows: 2      Subjective:    Pain:  [] Yes  [x] No Location: N/A Pain Rating: (0-10 scale) 0/10  Pain altered Tx:  [x] No  [] Yes  Action: NA  Pt Comments: Reports being able to do \"more things\" such as use RUE to assist with removing blood pressure cuff. Reports R hand continues to become looser. Precautions: NA  Surgery procedure and date: NA    Objective: Today's Treatment:  Modalities:    Not completed this date: Functional Electrical Stimulation via the [employer] H200,  Stimulation parameters and outcomes can be viewed on [facility] [employer] with the patients' username (patient ID is generated by first initial of first name, and first initial of last name, followed by two digit month and two digit day the treatment commenced), and no patient specific password required. A check of the patients' skin prior to application of electrodes, and after the treatment concluded was completed and no adverse reactions noted.     Name: May Crowley  Patient ID: [id number]      See below in treatment log for treatments performed. Exercises:  EXERCISE    REPS/     TIME  WEIGHT/    LEVEL COMMENTS   Bioness Training  30  minutes   Completed - neuromodulation (10 minutes), grasp and release (15 minutes) plus time for set up   Passive stretching - R shoulder, elbow, wrist, digits  15  minutes   Completed    Bicep curls - standing 15 reps, 2 sets 2lb wrist weight  Not Completed    Scapular exercises - elevation/depression, protraction/retraction  15 reps each   Not Completed   Towel slides  10 reps, 2 sets    Not Completed    External Rotation  15 reps  Not Completed    Error augmentation - elbow extension 5 reps  Manual resistance  Not Completed    Active supination/pronation R forearm  15 reps   Added & completed - active assist    Other: NA      Assessment: [x] Progressing toward goals. Continued with Bioness training to improve functional use of R hand. Middle digit continues to demonstrate the least response with extension - with neuromodulation and grasp/release modes. Integrated more functional task of grasp and release of household objects while utilizing Bioness this date. Pt demonstrates ability to work in sync with device for improved ability to successfully grasp and release a skinny object. Moderate tightness of R shoulder noted, 1 finger width subluxation R GH joint, no complaints with passive stretching. Able to actively supinate R forearm to neutral without assistance, Min support needed to fully supinate. Encouraged pt to be practicing active supination at home. Min fatigue of RUE noted at end of session. Pt to PT session. [] No change.   [] Other     [x] Patient would continue to benefit from skilled occupational therapy services in order to: Restore  functional /grasp, ROM, Strength, and Activity tolerance in order to improve functional use of UE in ADL performance       Short Term Goals: (  8-10    Treatments)  Increase AROM  R shoulder flexion by 10 degrees to improve ability to complete functional activities   Demo R shoulder external rotation of 10 degrees to improve ability to complete functional activities   R elbow extension by 5 degrees to improve ability to complete functional activities   Demo trace active extension of all R digits   Increase strength (pounds);  R  by 2 pounds to improve functional use of R hand to complete daily activities   All R pinches by 1 pound to improve functional use of R hand to complete daily activities   Patient to be independent with home exercise program as demonstrated by performance with correct form without cues. Long Term Goals: (  24  Treatments)  Increase functional skills AEB a score of 50/80 with UEFI  Demo active extension of all R digits to release object  Increase R  strength by 5+ pounds to complete daily activities with less difficulty  Increase all R pinch strength measurements by 2+ pounds for less difficulty completing daily activities   Demo functional movements with R hand following use of Bioness/Xcite for carryover of completion of functional activities   Increase R shoulder flexion by 15+ degrees for less difficulty completing daily activities      Patient Goals: get hand functioning       Pt. Education:  [] Yes  [x] No  [] Reviewed Prior HEP/Ed   Method of Education: [] Verbal  [] Demo  [] Written  Re:   Comprehension of Education:  [] Verbalizes understanding. [] Demonstrates understanding. [] Needs review. [] Demonstrates/verbalizes HEP/Ed previously given. Plan: [x] Continue current frequency toward short and long term goals.   [x] Specific Instructions for subsequent treatments: progress note    [] Other:       Time In: 3712  Time Out: 6834        Treatment Charges: Mins Units   []  Modalities:      []  Ultrasound     [x]  Ther Exercise 26 2   []  Manual Therapy     []  Ther Activities     []  Orthotic fit/train     []  Orthotic recheck     [x]  Other: neuro re-ed 30 2   Total Treatment time 56 4       Electronically signed by: Arabella Davenport, OTR/L

## 2022-12-06 NOTE — FLOWSHEET NOTE
[x] [facility]) [facility] &  Therapy  [address]  [phone]  F: [phone] [] [address]  [facility]   [address]  P: [phone]  F: [phone] [] [place] &  Therapy  [address]  P: [phone]  F: [phone] [] [address]  P: [phone]  F: [phone] [] [address]  [facility]   [address]   [place]: [phone]  F: [phone]      Physical Therapy Daily Treatment Note    Date:  2022  Patient Name:  Skye Bradford    :  1960  MRN: [id number]  Physician: JACQUELIN Jaffe                              Insurance: Vi Alfred after al)  Medical Diagnosis: I63.9 (ICD-10-CM) - Ischemic stroke [facility])    Rehab Codes: R26.89, M26.81, R29.3  Next 's appt.: 22 PCP  Date of symptom onset: 22 (chronic onset)  Visit# / total visits: ; Progress note for Medicare patient due at visit 8     Cancels/No Shows: 0    Subjective:    Pain:  [] Yes  [x] No Location:  N/A Pain Rating: (0-10 scale) 0/10  Pain altered Tx:  [x] No  [] Yes  Action:  Comments: Pt reports he didn't get as much sleep last night, a bit tired today. Objective:  LAQ on RLE:  lacking 10deg from TKE but able to hold against min resistance: 2+/5  6MWT: 530 ft (<1200ft indicates less than community level distances)      Modalities:   See below in treatment log for treatments performed. LOG IN: [id number]; PIN: [id number]    Precautions: Left hemiparesis, fal risk  Exercises: Bolded completed 2022:  Exercise Reps/ Time Weight/ Level Comments   6MWT   6MWT: 11 lengths x 39 ft = 429 ft  On : 530 ft         LiteGait train 2x3 min . 8mph    No additional BWS Very significantly fatigued, requires increased seated rest  - set up time billed for with gait               Xcite   Billed under neuro, including skilled set up and don/doffing time   Sit <> stand 1x15  No UE assist   RLE biased sit<>stand 2x10 4\" LLE on step, elevated mat table   LLE step taps 3x15 4\" Progressing from unilat UE assist to none  - 2 sets on 11/22   RLE modified SLS holds 2x90\" 4\" LLE on step  - concurrent tasks: 10x trunk twist, 5x reach to toe   LAQs 2x15 RLE    Prone knee flexion 3x10 RLE    Step ups 2x15 4\" In inpatient gym; unilat UE assist                             Resisted rows 3x15 blue    Seated figure four stretch 3x30\" LLE          Resisted gait train 1x300 ft purple 1 lap around OT = 150 ft   Gait down to car 600+ feet SBQC Including decline, thresholds, elevator, crowd negotiation   Stair training 2x8 steps  Attempting reciprocal gait ascent/descent, too difficult descent   Gait w/ hurdles 2x200 ft  Second set with 2# ankle weight on   Gait w/ head turns 100 ft     Other: Pt requiring increased seated rest today d/t more fatigued overall    Time spent in [place] set up, as well as increased seated rest in between sets of Xcite use d/t increased fatigue noted from increased muscle recruitment, as evidenced by muscle fasciculation. . these longer rest breaks allow optimal completion of multiple exercises as well as increased hypertrophy/strength building - billed with neuro      6MWT, fall prevention  - bodyweight support treadmill train  - gastroc/hip flexor stretching  - Xcite for RLE stance control with LLE OKC tasks  - progressively decreased height sit<>stands without UE assist  - single limb quad strengthening - leg press, leg extension, etc      Treatment Charges: Mins Units   []  Modalities     [x]  Ther Exercise 3 --   []  Manual Therapy     []  Ther Activities     []  Aquatics     [x]  Gait 6 --   [x]  Other: Neuro 39 3   Total Treatment time 48 3       Assessment: [x] Progressing toward goals.  Decreased gait practice today d/t pt reporting new onset of lateral L hip pain with progressive gait. Added piriformis/TFL stretching through figure four stretching in seated with good tolerance. Pt with less tolerance to prolonged exercise, needs increased rest breaks today, and elects to not continue with gait training down to front d/t overall fatigue levels and L hip pain, though does report feeling better after stretch. [] No change. [x] Other: Continues to require extended seated rest breaks d/t overall fatigue from [place] training, as well as pt being somewhat self limiting with any fatigue-inducing exercise. [x] Patient would continue to benefit from skilled physical therapy services in order to:  progress R hemibody strength in RUE/RLE, increase gait speed and improve mechanics, improve functional quad/hip strength to increase stability in standing and ease of transfers, improve balance within gait tasks, and overall improve quality of life. STG: (to be met in 8 treatments) - Assessed on 11/29/22 by Lon Prabhakar, PT:  ? ROM: Negative for iliopsoas tightness on Bryan test on RLE for improved ability to achieve terminal hip ext - MET, no tightness noted  ? Balance:   Pt able to ambulate with intermittent head turns, stopping without slowed gait speed nor instability noted - Ongoing, slows down overall speed when attempting head turns  Pt able to step over 4\" objects while walking with no speed decrease, no instability noted  ? Strength: At least 3/5 R quads to allow improved stability with RLE SLS positions including stair climbing, etc - Ongoing, 2+/5 quads but improved stability with RLE SLS noted for stair climbing and gait, still fatigues very quickly after ~7 steps, ~200 ft starts to decrease R stance time  ?  Function:   Pt able to ambulate at least .52m/s with [place] on level ground to progress towards decreased fall risk - Partially met, .43m/s self selected; .59m/s fast  Pt able to ascend/descend flight of stairs with improving speed, reciprocal ascent/descent with unilat UE assist - MET for ascent, step-to descent   Patient to be independent with home exercise program as demonstrated by performance with correct form without cues. - MET  Demonstrate Knowledge of fall prevention -MET, reviewed 11/29 though pt notes d/t sharing home has some fall-risk objects in gait path     LTG: (to be met in 16 treatments)  ? Balance: At least 19/30 on FGA to indicate significant improvement in dynamic balance  At least 75% confidence on ABC scale to indicate increased self efficacy with balance  ? Strength: At least 3+/5 R quads to allow further improving stability with RLE SLS positions including stair climbing, etc  ? Function:   Pt able to ambulate at least .62m/s with [employer] on level ground to progress towards decreased fall risk  Pt able to ascend/descend flight of stairs with further improving speed and safety reciprocal ascent/descent with unilat UE assist  Able to complete 5x STS without UE assist in under 1 min to indicate improving functional BLE power and strength for transfers           Patient goals: \"get back to normal\"    Pt. Education:  [x] Yes  [] No  [x] Reviewed Prior HEP/Ed  Method of Education: [x] Verbal  [] Demo  [] Written  Comprehension of Education:  [x] Verbalizes understanding. [x] Demonstrates understanding. [] Needs review. [] Demonstrates/verbalizes HEP/Ed previously given. Current HEP as of 11/3: sit to stands no hands, LLE step taps w/ cane in LUE     Plan: [x] Continue current frequency toward long and short term goals.  er   [x] Specific Instructions for subsequent treatments: Xcite training, litegait training      Time In: 2:04 pm            Time Out: 3:00 pm    Electronically signed by:  Selene Curling, PT

## 2022-12-08 ENCOUNTER — HOSPITAL ENCOUNTER (OUTPATIENT)
Dept: PHYSICAL THERAPY | Age: 62
Setting detail: THERAPIES SERIES
Discharge: HOME | End: 2022-12-08
Payer: MEDICARE

## 2022-12-08 ENCOUNTER — HOSPITAL ENCOUNTER (OUTPATIENT)
Dept: OCCUPATIONAL THERAPY | Age: 62
Setting detail: THERAPIES SERIES
Discharge: HOME | End: 2022-12-08
Payer: MEDICARE

## 2022-12-08 PROCEDURE — 97116 GAIT TRAINING THERAPY: CPT

## 2022-12-08 PROCEDURE — 97112 NEUROMUSCULAR REEDUCATION: CPT

## 2022-12-08 PROCEDURE — 97110 THERAPEUTIC EXERCISES: CPT

## 2022-12-08 NOTE — PROGRESS NOTES
[x] [facility] &  Therapy  [address]  [phone]  F: [phone] [] [facility]  [address] [place] [address]  Phone: [phone]  Fax: [phone]        Occupational Therapy Progress Note    Date: 2022      Patient: Mary Fong  : 1960  MRN: [id number]    Referring Provider:  TOY Reed CNP  Insurance: [facility] Medicaid - 24 visits, from 10/13/22 to 22  Medical Diagnosis: Right hemiplegia G81.91            Rehab Codes: lack of coordination R27.8,, fine motor skills loss R29.818,, or muscle weakness generalized M62.81,  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 Colby Car: nothing scheduled   Visit# / total visits: 10/24; Progress note for patient due at visit 8-10                         Cancels/No Shows:   Date range of services: 10/12/22 to 22    Subjective:  Pain:  [] Yes  [x] No  Location:  N/A  Pain Rating: (0-10 scale) 0/10 Pain altered Tx:  [x] No  [] Yes  Action: NA  Comments: Reports that R hand feels like it's becoming looser. Objective:  Tests/Measurements: Upper Extremity Functional Index  Current Functional Level:  31/80 (decrease 4 points) functionally impaired as measured with the Upper Extremity Functional Index Survey. 0-80 scale, with 80 = no Deficits  (The UEFI model does not provide any specific cut off points that could classify the upper limb disability degree, however, a minimal detectable change of 9 points is provided. This means that for improvement or deterioration to be considered, between two subsequent evaluations, the scores must differ by at least 9 points.)     Sensibility: Normal    Edema: None      Skin Color: Normal   Skin/Scar condition Intact     STRENGTH                   Right   (pounds) Left   (pounds)    20 (increase 10) 90   Lateral pinch 12 (increase 6)- assist for hand placement  11   2 point pinch 5 (no change) - assist for hand placement  11   3 jaw pinch 4 (no change) - assist for hand placement  14      Bilateral  strength is normally symmetrical or up to 10% stronger on the dominant extremity, depending on the individual's physically activity level. The affected extremity is 78% weaker than the unaffected extremity (affected score/unaffected score, take the total and subtract from 100)        AROM:      DIGITS: no active extension, hand resting in fisted position. Able to actively make a tight fist than resting state. WRIST/FOREARM                  Right ROM (degrees)   Extension (60-70) 30 degree Arc. Wrist rest in -65 degrees of flexion, able to actively extend to -35 degrees    Flexion (70-80) No active    Radial Deviation (20-25) No active    Ulnar Deviation (30-40) No active    Forearm Pronation (80-90) Rests in pronated position   Forearm Supination (80-90) 75 (increase 30)   (0 is used for neutral, + is used for hyperextension, - is used for extensor lag per ASHT)     ELBOW                  Right ROM (degrees) Left ROM (degrees)   Flexion (145-150) 140 (no change)  WNL   Extension  (0) -30 (increase 5 degrees) WNL            SHOULDER                   Right ROM (degrees) Right Strength (MMT) Left ROM (degrees) Left Strength  (MMT)   Flexion (180) 45 (NC) NT WNL 5/5   Extension (60) 38 (+4) NT WNL NT   Abduction (180) NT NT       Adduction           Internal Rotation (70)           External Rotation (80-90) 26 (+26)                  Problems:      ROM, Strength, Function, and Coordination             Assessment: Significant improvement with R  strength. R pinch strength measurements remain the same however pt continues to have difficulty being able to place digits on pinch meter correctly. Significant improvement of external rotation of R shoulder active extension of R wrist. Functional ROM slowly improving. Demo's trace movement of digits, improvement from evaluation.  No functional fist present. Pt continues to want Bioness device for home use. Pt has been demonstrating a good response with use of Bioness to complete grasp and release activities. Pt to continue to benefit from skilled occupational therapy treatment to continue to improve ROM and functional skills of RUE. Short Term Goals: (  8-10    Treatments)  Increase AROM  R shoulder flexion by 10 degrees to improve ability to complete functional activities - NOT MET  Demo R shoulder external rotation of 10 degrees to improve ability to complete functional activities - MET  R elbow extension by 5 degrees to improve ability to complete functional activities - MET  Demo trace active extension of all R digits - MET  Increase strength (pounds);  R  by 2 pounds to improve functional use of R hand to complete daily activities - MET  All R pinches by 1 pound to improve functional use of R hand to complete daily activities - NOT MET  Patient to be independent with home exercise program as demonstrated by performance with correct form without cues.  - MET     Long Term Goals: (  24  Treatments)  Increase functional skills AEB a score of 50/80 with UEFI  Demo active extension of all R digits to release object  Increase R  strength by 5+ pounds to complete daily activities with less difficulty  Increase all R pinch strength measurements by 2+ pounds for less difficulty completing daily activities   Demo functional movements with R hand following use of Bioness/Xcite for carryover of completion of functional activities   Increase R shoulder flexion by 15+ degrees for less difficulty completing daily activities      Patient Goals: get hand functioning       Treatment Plan:   [x]  Therapeutic Exercise   57599              []  Iontophoresis: 4 mg/mL Dexamethasone Sodium Phosphate  mAmin  55496    [x]  Therapeutic Activity  80225  []  Vasopneumatic cold with compression  92341                []  ADL training  28870  [] Ultrasound        [id number]    [x]  Neuromuscular Re-education  [id number]  []  Electrical Stimulation Attended  [id number]    []  Manual Therapy  84482  Orthotic    []  Fit  [id number]     []  Train [id number]    [x]  Instruction in HEP        Prosthetic    []  Fit [id number]      []  Train [id number]    []  Cognitive Interventions, (first 15 min 06550, subsequent 15 min 93356)  []  Cold/hotpack      []  Massage   38162             Patient Status: (requested frequency/duration)     [x] Continue per initial/current plan of care 2 times per week for 14 remaining visits. [] Additional visits necessary. Electronically signed by SANDI Dickey on 12/8/2022 at 1:11 PM      If you have any questions or concerns, please don't hesitate to call. Thank you for your referral.    Physician Signature:________________________________Date:__________________  By signing above or cosigning this note, I have reviewed this plan of care and certify a need for medically necessary rehabilitation services.      *PLEASE SIGN ABOVE AND FAX BACK ALL PAGES

## 2022-12-08 NOTE — FLOWSHEET NOTE
[x] [facility]  [facility]  [address]  [phone]  F: [phone] [] [address]  [facility]   [address]  P: [phone]  F: [phone] [] [facility] &  Therapy  [address]  P: [phone]  F: [phone] [] [address]  P: [phone]  F: [phone] [] [address]  [facility] [place]   [address]   [place]: [phone]  F: [phone]      Physical Therapy Daily Treatment Note    Date:  2022  Patient Name:  [facility]ore    :  1960  MRN: [id number]  Physician: JACQUELIN Ingram                              Insurance: [employer] after eval)  Medical Diagnosis: I63.9 (ICD-10-CM) - Ischemic stroke [facility])    Rehab Codes: R26.89, M26.81, R29.3  Next 's appt.: 22 PCP  Date of symptom onset: 22 (chronic onset)  Visit# / total visits: 10/16; Progress note for Medicare patient due at visit 8     Cancels/No Shows: 0    Subjective:    Pain:  [] Yes  [x] No Location:  N/A Pain Rating: (0-10 scale) 0/10  Pain altered Tx:  [x] No  [] Yes  Action:  Comments: Pt reports he's been stretching his L hip regularly since last visit to avoid issues with walking as occurred last visit. Objective:  LAQ on RLE:  lacking 10deg from TKE but able to hold against min resistance: 2+/5  6MWT: 530 ft (<1200ft indicates less than community level distances)      Modalities:   See below in treatment log for treatments performed. LOG IN: [id number]; PIN: [id number]    Precautions: Left hemiparesis, fal risk  Exercises: Bolded completed 2022:  Exercise Reps/ Time Weight/ Level Comments   6MWT   6MWT: 11 lengths x 39 ft = 429 ft  On : 530 ft         LiteGait train 2x3 min . 8mph    No additional BWS Very significantly fatigued, requires increased seated rest  - set up time billed for with gait               Xcite   Billed under neuro, including skilled set up and don/doffing time   Sit <> stand 1x15  No UE assist   RLE biased sit<>stand 2x10 4\" LLE on step, elevated mat table   LLE step taps 3x15 4\" Progressing from unilat UE assist to none  - 2 sets on 11/22   RLE modified SLS holds 2x90\" 4\" LLE on step  - concurrent tasks: 10x trunk twist, 5x reach to toe   LAQs 2x15 RLE    Prone knee flexion 3x10 RLE    Step ups 2x15 4\" In inpatient gym; unilat UE assist                             Resisted rows 3x15 ea purple    Seated figure four stretch 3x30\" LLE    Standing ball roll out 20x Red physioball Using RUE primarily to roll out, LUE stabilizes RUE on ball   Ball punches 3x30\" Red physioball          Gait train      Resisted gait train 1x300 ft purple 1 lap around OT = 150 ft   Gait down to car 600+ feet SBQC Including decline, thresholds, elevator, crowd negotiation   Stair training 2x8 steps  Attempting reciprocal gait ascent/descent, too difficult descent   Gait w/ hurdles 2x200 ft  Second set with 2# ankle weight on   Gait w/ head turns 100 ft     Other: Pt requiring increased seated rest today d/t more fatigued overall    Time spent in [place] set up, as well as increased seated rest in between sets of Xcite use d/t increased fatigue noted from increased muscle recruitment, as evidenced by muscle fasciculation. . these longer rest breaks allow optimal completion of multiple exercises as well as increased hypertrophy/strength building - billed with neuro      6MWT, fall prevention  - bodyweight support treadmill train  - gastroc/hip flexor stretching  - Xcite for RLE stance control with LLE OKC tasks  - progressively decreased height sit<>stands without UE assist  - single limb quad strengthening - leg press, leg extension, etc      Treatment Charges: Mins Units   []  Modalities     [x]  Ther Exercise 21 2   [] Manual Therapy     []  Ther Activities     []  Aquatics     [x]  Gait 15 1   [x]  Other: Neuro 14 --   Total Treatment time 50 3       Assessment: [x] Progressing toward goals. Able to tolerate increased gait training this date; able to progress distance of high intensity resisted training though pt very fatigued, needs longer rest break after. Also increased therex training involving RUE with standing balance. Very motivated with this practice, good balance noted here with rotational punches to ball. [] No change. [x] Other: Continues to require extended seated rest breaks d/t overall fatigue from [employer] kraig training, as well as pt being somewhat self limiting with any fatigue-inducing exercise. [x] Patient would continue to benefit from skilled physical therapy services in order to:  progress R hemibody strength in RUE/RLE, increase gait speed and improve mechanics, improve functional quad/hip strength to increase stability in standing and ease of transfers, improve balance within gait tasks, and overall improve quality of life. STG: (to be met in 8 treatments) - Assessed on 11/29/22 by Mary Ann Lombardo, PT:  ? ROM: Negative for iliopsoas tightness on Bryan test on RLE for improved ability to achieve terminal hip ext - MET, no tightness noted  ? Balance:   Pt able to ambulate with intermittent head turns, stopping without slowed gait speed nor instability noted - Ongoing, slows down overall speed when attempting head turns  Pt able to step over 4\" objects while walking with no speed decrease, no instability noted  ? Strength: At least 3/5 R quads to allow improved stability with RLE SLS positions including stair climbing, etc - Ongoing, 2+/5 quads but improved stability with RLE SLS noted for stair climbing and gait, still fatigues very quickly after ~7 steps, ~200 ft starts to decrease R stance time  ?  Function:   Pt able to ambulate at least .52m/s with [place] on level ground to progress towards decreased fall risk - Partially met, .43m/s self selected; .59m/s fast  Pt able to ascend/descend flight of stairs with improving speed, reciprocal ascent/descent with unilat UE assist - MET for ascent, step-to descent   Patient to be independent with home exercise program as demonstrated by performance with correct form without cues. - MET  Demonstrate Knowledge of fall prevention -MET, reviewed 11/29 though pt notes d/t sharing home has some fall-risk objects in gait path     LTG: (to be met in 16 treatments)  ? Balance: At least 19/30 on FGA to indicate significant improvement in dynamic balance  At least 75% confidence on ABC scale to indicate increased self efficacy with balance  ? Strength: At least 3+/5 R quads to allow further improving stability with RLE SLS positions including stair climbing, etc  ? Function:   Pt able to ambulate at least .62m/s with [employer] [place] on level ground to progress towards decreased fall risk  Pt able to ascend/descend flight of stairs with further improving speed and safety reciprocal ascent/descent with unilat UE assist  Able to complete 5x STS without UE assist in under 1 min to indicate improving functional BLE power and strength for transfers           Patient goals: \"get back to normal\"    Pt. Education:  [x] Yes  [] No  [x] Reviewed Prior HEP/Ed  Method of Education: [x] Verbal  [] Demo  [] Written  Comprehension of Education:  [x] Verbalizes understanding. [x] Demonstrates understanding. [] Needs review. [] Demonstrates/verbalizes HEP/Ed previously given. Current HEP as of 11/3: sit to stands no hands, LLE step taps w/ cane in LUE     Plan: [x] Continue current frequency toward long and short term goals.  er   [x] Specific Instructions for subsequent treatments: Xcite training, litegait training      Time In: 2:05 pm            Time Out: 3:00 pm    Electronically signed by:  Sanjay Pérez, PT

## 2022-12-08 NOTE — FLOWSHEET NOTE
[x] [facility]       Occupational Therapy            1st floor       [address] [facility]         Phone: [phone]       Fax: [phone] [] [facility]  [facility]  Phone: [phone]  Fax: [phone]      Occupational Therapy Daily Treatment Note    Date:  2022  Patient Name:  Lilliam Payne    :  1960  MRN: [id number]  Referring Provider:  TOY Moya CNP  Insurance: [facility] Medicaid - 24 visits, from 10/13/22 to 22  Medical Diagnosis: Right hemiplegia G81.91            Rehab Codes: lack of coordination R27.8,, fine motor skills loss R29.818,, or muscle weakness generalized M62.81,  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 Lloyd Catchin/5 - PCP  Visit# / total visits: 10/24; Progress note for patient due at visit 8-10    Cancels/No Shows:       Subjective:    Pain:  [] Yes  [x] No Location: N/A Pain Rating: (0-10 scale) 0/10  Pain altered Tx:  [x] No  [] Yes  Action: NA  Pt Comments: Reports being able to do \"more things\" such as use RUE to assist with removing blood pressure cuff. Reports R hand continues to become looser. Precautions: NA  Surgery procedure and date: NA    Objective: Today's Treatment:  Modalities:    Not completed this date: Functional Electrical Stimulation via the [employer] H200,  Stimulation parameters and outcomes can be viewed on [facility] [employer] with the patients' username (patient ID is generated by first initial of first name, and first initial of last name, followed by two digit month and two digit day the treatment commenced), and no patient specific password required. A check of the patients' skin prior to application of electrodes, and after the treatment concluded was completed and no adverse reactions noted.     Name: Franklyn Spence  Patient ID: [id number]      See below in treatment log for treatments performed. Exercises:  EXERCISE    REPS/     TIME  WEIGHT/    LEVEL COMMENTS   Bioness Training  15  minutes   Completed - neuromodulation (10 minutes), grasp and release (15 minutes) plus time for set up   Passive stretching - R shoulder, elbow, wrist, digits  15  minutes   Not Completed    Bicep curls - standing 15 reps, 2 sets 2lb wrist weight  Not Completed    Scapular exercises - elevation/depression, protraction/retraction  15 reps each   Not Completed   Towel slides  10 reps, 2 sets    Not Completed    External Rotation  15 reps  Not Completed    Error augmentation - elbow extension 5 reps  Manual resistance  Not Completed    Active supination/pronation R forearm  15 reps   Not completed   Other: NA      Assessment: [x] Progressing toward goals. Progress note completed this date. See progress note for details. Only completed Bioness training after time taken for progress note. Continued with Bioness training to improve functional use of R hand. Middle digit continues to demonstrate the least response with extension - with neuromodulation and grasp/release modes. Integrated more functional task of grasp and release of household objects while utilizing Bioness this date. Pt demonstrates ability to work in sync with device for improved ability to successfully grasp and release a skinny object. Assisted pt with finishing Bioness form to be submitted. [] No change.   [] Other     [x] Patient would continue to benefit from skilled occupational therapy services in order to: Restore  functional /grasp, ROM, Strength, and Activity tolerance in order to improve functional use of UE in ADL performance       Short Term Goals: (  8-10    Treatments)  Increase AROM  R shoulder flexion by 10 degrees to improve ability to complete functional activities - NOT MET  Demo R shoulder external rotation of 10 degrees to improve ability to complete functional activities - MET  R elbow extension by 5 degrees to improve ability to complete functional activities - MET  Demo trace active extension of all R digits - MET  Increase strength (pounds);  R  by 2 pounds to improve functional use of R hand to complete daily activities - MET  All R pinches by 1 pound to improve functional use of R hand to complete daily activities - NOT MET  Patient to be independent with home exercise program as demonstrated by performance with correct form without cues. - MET     Long Term Goals: (  24  Treatments)  Increase functional skills AEB a score of 50/80 with UEFI  Demo active extension of all R digits to release object  Increase R  strength by 5+ pounds to complete daily activities with less difficulty  Increase all R pinch strength measurements by 2+ pounds for less difficulty completing daily activities   Demo functional movements with R hand following use of Bioness/Xcite for carryover of completion of functional activities   Increase R shoulder flexion by 15+ degrees for less difficulty completing daily activities      Patient Goals: get hand functioning       Pt. Education:  [] Yes  [x] No  [] Reviewed Prior HEP/Ed   Method of Education: [] Verbal  [] Demo  [] Written  Re:   Comprehension of Education:  [] Verbalizes understanding. [] Demonstrates understanding. [] Needs review. [] Demonstrates/verbalizes HEP/Ed previously given. Plan: [x] Continue current frequency toward short and long term goals.   [x] Specific Instructions for subsequent treatments: Resisted shoulder flexion activities    [] Other:       Time In: 1310  Time Out: 1816        Treatment Charges: Mins Units   []  Modalities:      []  Ultrasound     [x]  Ther Exercise 39 3   []  Manual Therapy     []  Ther Activities     []  Orthotic fit/train     []  Orthotic recheck     [x]  Other: neuro re-ed 15 1   Total Treatment time 54 4       Electronically signed by:  MODESTO Desir/JANNET

## 2022-12-13 ENCOUNTER — HOSPITAL ENCOUNTER (OUTPATIENT)
Dept: PHYSICAL THERAPY | Age: 62
Setting detail: THERAPIES SERIES
Discharge: HOME | End: 2022-12-13
Payer: MEDICARE

## 2022-12-13 ENCOUNTER — HOSPITAL ENCOUNTER (OUTPATIENT)
Dept: OCCUPATIONAL THERAPY | Age: 62
Setting detail: THERAPIES SERIES
Discharge: HOME | End: 2022-12-13
Payer: MEDICARE

## 2022-12-13 PROCEDURE — 97112 NEUROMUSCULAR REEDUCATION: CPT

## 2022-12-13 PROCEDURE — 97110 THERAPEUTIC EXERCISES: CPT

## 2022-12-13 NOTE — FLOWSHEET NOTE
[x] [facility]) [facility] TWELVESTEP [facility] &  Therapy  [address]  [phone]  F: [phone] [] [address]  [address]   [address]  P: [phone]  F: [phone] [] [place] &  Therapy  [address]  P: [phone]  F: [phone] [] [address]  P: [phone]  F: [phone] [] [address]  [facility]   [address]   [place]: [phone]  F: [phone]      Physical Therapy Daily Treatment Note    Date:  2022  Patient Name:  Christine Hobson    :  1960  MRN: [id number]  Physician: TOY George-CNP                              Insurance: Ida Pretty after eval)  Medical Diagnosis: I63.9 (ICD-10-CM) - Ischemic stroke [facility])    Rehab Codes: R26.89, M26.81, R29.3  Next 's appt.: 22 PCP  Date of symptom onset: 22 (chronic onset)  Visit# / total visits: ; Progress note for Medicare patient due at visit 8     Cancels/No Shows: 0    Subjective:    Pain:  [] Yes  [x] No Location:  N/A Pain Rating: (0-10 scale) 0/10  Pain altered Tx:  [x] No  [] Yes  Action:  Comments: Pt denies any issues with L hip any longer - does note that BLEs had muscular fatigue after last session. Objective:  LAQ on RLE:  lacking 10deg from TKE but able to hold against min resistance: 2+/5  6MWT: 530 ft (<1200ft indicates less than community level distances)      Modalities:   See below in treatment log for treatments performed. LOG IN: [id number]; PIN: [id number]    Precautions: Left hemiparesis, fal risk  Exercises: Bolded completed 2022:  Exercise Reps/ Time Weight/ Level Comments   6MWT   6MWT: 11 lengths x 39 ft = 429 ft  On : 530 ft         LiteGait train 2x3 min . 8mph    No additional BWS Very significantly fatigued, requires increased seated rest  - set up time billed for with gait               Xcite   Billed under neuro, including skilled set up and don/doffing time   Sit <> stand 1x15  No UE assist   RLE biased sit<>stand 2x10 4\" LLE on step, elevated mat table   LLE step taps 3x15 4\" Progressing from unilat UE assist to none  - 2 sets on 11/22   RLE modified SLS holds 2x90\" 4\" LLE on step  - concurrent tasks: 10x trunk twist, 5x reach to toe   LAQs 2x15 RLE    Prone knee flexion 3x10 RLE    Step ups 3x15 6\" In inpatient gym; unilat UE assist                             Resisted rows 3x15 ea purple    Standing ball roll out 20x Red physioball Using RUE primarily to roll out, LUE stabilizes RUE on ball   Ball punches 3x30\" Red physioball          Gait train      Resisted gait train 1x300 ft purple 1 lap around OT = 150 ft  - RPE: 16 per pt   Gait down to car 600+ feet SBQC Including decline, thresholds, elevator, crowd negotiation   Stair training 2x8 steps  Attempting reciprocal gait ascent/descent, too difficult descent   Gait w/ hurdles 2x200 ft  Second set with 2# ankle weight on   Gait w/ head turns 100 ft     Other:     Time spent in [place] set up, as well as increased seated rest in between sets of Xcite use d/t increased fatigue noted from increased muscle recruitment, as evidenced by muscle fasciculation. . these longer rest breaks allow optimal completion of multiple exercises as well as increased hypertrophy/strength building - billed with neuro      6MWT, fall prevention  - bodyweight support treadmill train  - gastroc/hip flexor stretching  - Xcite for RLE stance control with LLE OKC tasks  - progressively decreased height sit<>stands without UE assist  - single limb quad strengthening - leg press, leg extension, etc      Treatment Charges: Mins Units   []  Modalities     [x]  Ther Exercise 21 2   []  Manual Therapy     []  Ther Activities     []  Aquatics     [x]  Gait 10 --   [x]  Other: Neuro 16 1   Total time  ? Function:   Pt able to ambulate at least .52m/s with XODIS on level ground to progress towards decreased fall risk - Partially met, .43m/s self selected; .59m/s fast  Pt able to ascend/descend flight of stairs with improving speed, reciprocal ascent/descent with unilat UE assist - MET for ascent, step-to descent   Patient to be independent with home exercise program as demonstrated by performance with correct form without cues. - MET  Demonstrate Knowledge of fall prevention -MET, reviewed 11/29 though pt notes d/t sharing home has some fall-risk objects in gait path     LTG: (to be met in 16 treatments)  ? Balance: At least 19/30 on FGA to indicate significant improvement in dynamic balance  At least 75% confidence on ABC scale to indicate increased self efficacy with balance  ? Strength: At least 3+/5 R quads to allow further improving stability with RLE SLS positions including stair climbing, etc  ? Function:   Pt able to ambulate at least .62m/s with XODIS on level ground to progress towards decreased fall risk  Pt able to ascend/descend flight of stairs with further improving speed and safety reciprocal ascent/descent with unilat UE assist  Able to complete 5x STS without UE assist in under 1 min to indicate improving functional BLE power and strength for transfers           Patient goals: \"get back to normal\"    Pt. Education:  [x] Yes  [] No  [x] Reviewed Prior HEP/Ed  Method of Education: [x] Verbal  [] Demo  [] Written  Comprehension of Education:  [x] Verbalizes understanding. [x] Demonstrates understanding. [] Needs review. [] Demonstrates/verbalizes HEP/Ed previously given. Current HEP as of 11/3: sit to stands no hands, LLE step taps w/ cane in LUE     Plan: [x] Continue current frequency toward long and short term goals.  er   [x] Specific Instructions for subsequent treatments: Xcite training, litegait training      Time In: 2:04 pm            Time Out: 3:00 pm    Electronically signed by:  Abbey Santiago, PT

## 2022-12-13 NOTE — FLOWSHEET NOTE
[x] [facility]       Occupational Therapy            1st floor       [address], [place]         Phone: [phone]       Fax: [phone] [] [place]  Occupational Therapy  [address]  [place], [place]  Phone: [phone]  Fax: [phone]      Occupational Therapy Daily Treatment Note    Date:  2022  Patient Name:  Lydia Sarkar    :  1960  MRN: [id number]  Referring Provider:  TOY Woodard CNP  Insurance: [facility] Medicaid - 24 visits, from 10/13/22 to 22  Medical Diagnosis: Right hemiplegia G81.91            Rehab Codes: lack of coordination R27.8,, fine motor skills loss R29.818,, or muscle weakness generalized M62.81,  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 Florentino [address]:  - PCP  Visit# / total visits: 10/24; Progress note for patient due at visit 8-10    Cancels/No Shows:       Subjective:    Pain:  [] Yes  [x] No Location: N/A Pain Rating: (0-10 scale) 0/10  Pain altered Tx:  [x] No  [] Yes  Action: NA  Pt Comments: No new changes or reports since last visit. Precautions: NA  Surgery procedure and date: NA    Objective: Today's Treatment:  Modalities:    Not completed this date: Functional Electrical Stimulation via the LiveRSVP H200,  Stimulation parameters and outcomes can be viewed on [facility] [employer] with the patients' username (patient ID is generated by first initial of first name, and first initial of last name, followed by two digit month and two digit day the treatment commenced), and no patient specific password required. A check of the patients' skin prior to application of electrodes, and after the treatment concluded was completed and no adverse reactions noted. Name: Sabine Anderson  Patient ID: [id number]      See below in treatment log for treatments performed.     Exercises:  EXERCISE    REPS/     TIME  WEIGHT/    LEVEL COMMENTS   Bioness Training  30  minutes   Completed - neuromodulation (10 minutes), grasp and release (15 minutes) plus time for set up   Passive stretching - R shoulder, elbow, wrist, digits  15  minutes   Not Completed    Bicep curls - standing 15 reps, 2 sets 2lb wrist weight  Not Completed    Scapular exercises - elevation/depression, protraction/retraction  15 reps each   Not Completed   Towel slides  10 reps, 2 sets    Not Completed    External Rotation  15 reps  Not Completed    Error augmentation - elbow extension 5 reps  Manual resistance  Not Completed    Active supination/pronation R forearm  15 reps   Not completed   Other: NA      Assessment: [x] Progressing toward goals. Session shortened due to staff [place] party. Only completed [employer] training. Continued with [employer] training to improve functional use of R hand. Middle digit continues to demonstrate the least response with extension, but is improving - with neuromodulation and grasp/release modes. Pt demonstrates ability to work in sync with device for improved ability to successfully grasp and release a skinny object. [employer] for submitted this date for approval.     [] No change.   [] Other     [x] Patient would continue to benefit from skilled occupational therapy services in order to: Restore  functional /grasp, ROM, Strength, and Activity tolerance in order to improve functional use of UE in ADL performance       Short Term Goals: (  8-10    Treatments)  Increase AROM  R shoulder flexion by 10 degrees to improve ability to complete functional activities - NOT MET  Demo R shoulder external rotation of 10 degrees to improve ability to complete functional activities - MET  R elbow extension by 5 degrees to improve ability to complete functional activities - MET  Demo trace active extension of all R digits - MET  Increase strength (pounds);  R  by 2 pounds to improve functional use of R hand to complete daily activities - MET  All R pinches by 1 pound to improve functional use of R hand to complete daily activities - NOT MET  Patient to be independent with home exercise program as demonstrated by performance with correct form without cues. - MET     Long Term Goals: (  24  Treatments)  Increase functional skills AEB a score of 50/80 with UEFI  Demo active extension of all R digits to release object  Increase R  strength by 5+ pounds to complete daily activities with less difficulty  Increase all R pinch strength measurements by 2+ pounds for less difficulty completing daily activities   Demo functional movements with R hand following use of Bioness/Xcite for carryover of completion of functional activities   Increase R shoulder flexion by 15+ degrees for less difficulty completing daily activities      Patient Goals: get hand functioning       Pt. Education:  [] Yes  [x] No  [] Reviewed Prior HEP/Ed   Method of Education: [] Verbal  [] Demo  [] Written  Re:   Comprehension of Education:  [] Verbalizes understanding. [] Demonstrates understanding. [] Needs review. [] Demonstrates/verbalizes HEP/Ed previously given. Plan: [x] Continue current frequency toward short and long term goals.   [x] Specific Instructions for subsequent treatments: xcite for wrist and elbow stimulation    [] Other:       Time In: 1330  Time Out: 1402        Treatment Charges: Mins Units   []  Modalities:      []  Ultrasound     []  Ther Exercise     []  Manual Therapy     []  Ther Activities     []  Orthotic fit/train     []  Orthotic recheck     [x]  Other: neuro re-ed 32 2   Total Treatment time 32 2       Electronically signed by:  MODESTO Vu/JANNET

## 2022-12-15 ENCOUNTER — HOSPITAL ENCOUNTER (OUTPATIENT)
Dept: OCCUPATIONAL THERAPY | Age: 62
Setting detail: THERAPIES SERIES
Discharge: HOME | End: 2022-12-15
Payer: MEDICARE

## 2022-12-15 ENCOUNTER — HOSPITAL ENCOUNTER (OUTPATIENT)
Dept: PHYSICAL THERAPY | Age: 62
Setting detail: THERAPIES SERIES
Discharge: HOME | End: 2022-12-15
Payer: MEDICARE

## 2022-12-15 PROCEDURE — 97112 NEUROMUSCULAR REEDUCATION: CPT

## 2022-12-15 PROCEDURE — 97530 THERAPEUTIC ACTIVITIES: CPT

## 2022-12-15 PROCEDURE — 97110 THERAPEUTIC EXERCISES: CPT

## 2022-12-15 PROCEDURE — 97116 GAIT TRAINING THERAPY: CPT

## 2022-12-15 NOTE — FLOWSHEET NOTE
[x] [facility]       Occupational Therapy            1st floor       [address], [place]         Phone: [phone]       Fax: [phone] [] [place]  Occupational Therapy  [address]  [place], [place]  Phone: [phone]  Fax: [phone]      Occupational Therapy Daily Treatment Note    Date:  12/15/2022  Patient Name:  Mars Epps    :  1960  MRN: [id number]  Referring Provider:  Sasha Galdamez, APRN - CNP  Insurance: [employer] - 24 visits, from 10/13/22 to 22  Medical Diagnosis: Right hemiplegia G81.91            Rehab Codes: lack of coordination R27.8,, fine motor skills loss R29.818,, or muscle weakness generalized M62.81,  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 Storm Pina:  - PCP  Visit# / total visits: ; Progress note for patient due at visit 8-10    Cancels/No Shows:       Subjective:    Pain:  [] Yes  [x] No Location: N/A Pain Rating: (0-10 scale) 0/10  Pain altered Tx:  [x] No  [] Yes  Action: NA  Pt Comments: Pt reports R hand is loose upon arrival.     Precautions: NA  Surgery procedure and date: NA    Objective: Today's Treatment:  Modalities:    Not completed this date: Functional Electrical Stimulation via the [employer] H200,  Stimulation parameters and outcomes can be viewed on [facility] [employer] with the patients' username (patient ID is generated by first initial of first name, and first initial of last name, followed by two digit month and two digit day the treatment commenced), and no patient specific password required. A check of the patients' skin prior to application of electrodes, and after the treatment concluded was completed and no adverse reactions noted. Name: Archie Galarza  Patient ID: [id number]      See below in treatment log for treatments performed.     Exercises:  EXERCISE    REPS/     TIME  WEIGHT/    LEVEL COMMENTS   365 [employer] Training  30  minutes   Completed - neuromodulation (10 minutes), grasp and release (15 minutes) plus time for set up   Passive stretching - R shoulder, elbow, wrist, digits  15  minutes   Not Completed    Bicep curls - standing 15 reps, 2 sets 2lb wrist weight  Not Completed    Scapular exercises - elevation/depression, protraction/retraction  15 reps each   Not Completed   Towel slides  10 reps, 2 sets    Not Completed    External Rotation  15 reps  Not Completed    Error augmentation - elbow extension 5 reps  Manual resistance  Not Completed    Active supination/pronation R forearm  15 reps   Not completed   Other: B[employer] request form submitted 12/13/22      Assessment: [x] Progressing toward goals. Treatment started late due to pt not being checked in, clinician didn't realize he was here. Pt brought in TENS unit from home, asking if it can be used for muscle stimulation of RUE. Upon investigation, appears that pt's TENS unit is only for pain relief purposes. Education on TENS unit billed with [employer]. Only completed Bioness training. Continued with [employer] training to improve functional use of R hand. Middle digit continues to demonstrate the least response with extension, but is improving - with neuromodulation and grasp/release modes. Pt demonstrates ability to work in sync with device for improved ability to successfully grasp and release a skinny object. [] No change.   [] Other     [x] Patient would continue to benefit from skilled occupational therapy services in order to: Restore  functional /grasp, ROM, Strength, and Activity tolerance in order to improve functional use of UE in ADL performance       Short Term Goals: (  8-10    Treatments)  Increase AROM  R shoulder flexion by 10 degrees to improve ability to complete functional activities - NOT MET  Demo R shoulder external rotation of 10 degrees to improve ability to complete functional activities - MET  R elbow extension by 5 degrees to improve ability to complete functional activities - MET  Demo trace active extension of all R digits - MET  Increase strength (pounds);  R  by 2 pounds to improve functional use of R hand to complete daily activities - MET  All R pinches by 1 pound to improve functional use of R hand to complete daily activities - NOT MET  Patient to be independent with home exercise program as demonstrated by performance with correct form without cues. - MET     Long Term Goals: (  24  Treatments)  Increase functional skills AEB a score of 50/80 with UEFI  Demo active extension of all R digits to release object  Increase R  strength by 5+ pounds to complete daily activities with less difficulty  Increase all R pinch strength measurements by 2+ pounds for less difficulty completing daily activities   Demo functional movements with R hand following use of Bioness/Xcite for carryover of completion of functional activities   Increase R shoulder flexion by 15+ degrees for less difficulty completing daily activities      Patient Goals: get hand functioning       Pt. Education:  [] Yes  [x] No  [] Reviewed Prior HEP/Ed   Method of Education: [] Verbal  [] Demo  [] Written  Re:   Comprehension of Education:  [] Verbalizes understanding. [] Demonstrates understanding. [] Needs review. [] Demonstrates/verbalizes HEP/Ed previously given. Plan: [x] Continue current frequency toward short and long term goals.   [x] Specific Instructions for subsequent treatments: xcite for wrist and elbow stimulation    [] Other:       Time In: 5748  Time Out: 3563        Treatment Charges: Mins Units   []  Modalities:      []  Ultrasound     []  Ther Exercise     []  Manual Therapy     [x]  Ther Activities 11 1   []  Orthotic fit/train     []  Orthotic recheck     [x]  Other: neuro re-ed 35 2   Total Treatment time 46 3       Electronically signed by:  MODESTO Castano/JANNET

## 2022-12-15 NOTE — FLOWSHEET NOTE
[x] [facility]) [facility] &  Therapy  [address]  [phone]  F: [phone] [] [address]  [facility] 36   [address]  P: [phone]  F: [phone] [] [facility] &  Therapy  [address] [facility]  P: [phone]  F: [phone] [] [address]  P: [phone]  F: [phone] [] [address]  [facility]   [address]   [place]: [phone]  F: [phone]      Physical Therapy Daily Treatment Note    Date:  12/15/2022  Patient Name:  Denice Estrada    :  1960  MRN: [id number]  Physician: JACQUELIN Santacruz                              Insurance: [address] [place] [address]  Medical Diagnosis: I63.9 (ICD-10-CM) - Ischemic stroke [facility])    Rehab Codes: R26.89, M26.81, R29.3  Next 's appt.: 22 PCP  Date of symptom onset: 22 (chronic onset)  Visit# / total visits: ; Progress note for Medicare patient due at visit 8     Cancels/No Shows: 0    Subjective:    Pain:  [] Yes  [x] No Location:  N/A Pain Rating: (0-10 scale) 0/10  Pain altered Tx:  [x] No  [] Yes  Action:  Comments: Pt reports nothing new/different to report - brings in TENS unit to see if therapist can assess for use for home muscle activation. Objective:  LAQ on RLE:  lacking 10deg from TKE but able to hold against min resistance: 2+/5  6MWT: 530 ft (<1200ft indicates less than community level distances)      Modalities:   See below in treatment log for treatments performed.  LOG IN: [id number]; PIN: [id number]    Precautions: Left hemiparesis, fal risk  Exercises: Bolded completed 12/15/2022:  Exercise Reps/ Time Weight/ Level Comments   6MWT   6MWT: 11 lengths x 39 ft = 429 ft  On : 530 ft                     Xcite   Billed under neuro, including skilled set up and don/doffing time   Sit <> stand 1x15  No UE assist   RLE biased sit<>stand 2x10 4\" LLE on step, elevated mat table   LLE step taps 3x15 4\" Progressing from unilat UE assist to none  - 2 sets on 11/22   RLE modified SLS holds 2x90\" 4\" LLE on step  - concurrent tasks: 10x trunk twist, 5x reach to toe   LAQs 2x15 RLE    Prone knee flexion 3x10 RLE    Step ups 3x15 6\" In inpatient gym; unilat UE assist                             Resisted rows 3x15 ea purple    Standing ball roll out 20x Red physioball Using RUE primarily to roll out, LUE stabilizes RUE on ball   Pushing ball w/ only RUE 10x \" \" Therapist stabilizes hand on ball - very fatiguing for patient   Ball punches 3x30\" [facility]          Knee extension machine 10x 0# Min assist by therapist with RLE extension  - completing bilat, very fatiguing, half range                     Gait train      Resisted gait train 1x300 ft purple 1 lap around OT = 150 ft  - RPE: 16 per pt   Gait down to car 600+ feet SBQC Including decline, thresholds, elevator, crowd negotiation   Stair training 2x8 steps  Attempting reciprocal gait ascent/descent, too difficult descent   Gait w/ hurdles 1x250 ft     Gait w/ head turns 100 ft     Other: Education re: Tens unit only set up for sensory stimulation vs muscle stimulation, only for pain control use        6MWT, fall prevention  - bodyweight support treadmill train  - gastroc/hip flexor stretching  - Xcite for RLE stance control with LLE OKC tasks  - progressively decreased height sit<>stands without UE assist  - single limb quad strengthening - leg press, leg extension, etc      Treatment Charges: Mins Units   []  Modalities     [x]  Ther Exercise 32 2   []  Manual Therapy     []  Ther Activities     []  Aquatics     [x]  Gait 18 1   []  Other: Neuro     Total Treatment time 50 3       Assessment: [x] Progressing toward goals.  More gait training implemented this date with focus on hurdles and maintaining consecutive stepping without breaks - able to complete initially but quickly fatigues and unable to safely negotiate without small pauses. Added knee extension machine today for further quad strengthening for increased stance stability in RLE - does need David from therapist to assist leg initially, but able to improve with repetition however overall still only able to achieve ~50% full range. Too fatigued to complete walk down to front today; therapist assisted pt down in w/c.    [] No change. [x] Other: Continues to require extended seated rest breaks d/t overall fatigue from Cural[employer] training, as well as pt being somewhat self limiting with any fatigue-inducing exercise. [x] Patient would continue to benefit from skilled physical therapy services in order to:  progress R hemibody strength in RUE/RLE, increase gait speed and improve mechanics, improve functional quad/hip strength to increase stability in standing and ease of transfers, improve balance within gait tasks, and overall improve quality of life. STG: (to be met in 8 treatments) - Assessed on 11/29/22 by Lon Prabhakar, PT:  ? ROM: Negative for iliopsoas tightness on Bryan test on RLE for improved ability to achieve terminal hip ext - MET, no tightness noted  ? Balance:   Pt able to ambulate with intermittent head turns, stopping without slowed gait speed nor instability noted - Ongoing, slows down overall speed when attempting head turns  Pt able to step over 4\" objects while walking with no speed decrease, no instability noted  ? Strength: At least 3/5 R quads to allow improved stability with RLE SLS positions including stair climbing, etc - Ongoing, 2+/5 quads but improved stability with RLE SLS noted for stair climbing and gait, still fatigues very quickly after ~7 steps, ~200 ft starts to decrease R stance time  ?  Function:   Pt able to ambulate at least .52m/s with [employer] [address] on level ground to progress towards decreased fall risk - Partially met, .43m/s self selected; .59m/s fast  Pt able to ascend/descend flight of stairs with improving speed, reciprocal ascent/descent with unilat UE assist - MET for ascent, step-to descent   Patient to be independent with home exercise program as demonstrated by performance with correct form without cues. - MET  Demonstrate Knowledge of fall prevention -MET, reviewed 11/29 though pt notes d/t sharing home has some fall-risk objects in gait path     LTG: (to be met in 16 treatments)  ? Balance: At least 19/30 on FGA to indicate significant improvement in dynamic balance  At least 75% confidence on ABC scale to indicate increased self efficacy with balance  ? Strength: At least 3+/5 R quads to allow further improving stability with RLE SLS positions including stair climbing, etc  ? Function:   Pt able to ambulate at least .62m/s with [employer] on level ground to progress towards decreased fall risk  Pt able to ascend/descend flight of stairs with further improving speed and safety reciprocal ascent/descent with unilat UE assist  Able to complete 5x STS without UE assist in under 1 min to indicate improving functional BLE power and strength for transfers           Patient goals: \"get back to normal\"    Pt. Education:  [x] Yes  [] No  [x] Reviewed Prior HEP/Ed  Method of Education: [x] Verbal  [] Demo  [] Written  Comprehension of Education:  [x] Verbalizes understanding. [x] Demonstrates understanding. [] Needs review. [] Demonstrates/verbalizes HEP/Ed previously given. Current HEP as of 12/15: sit to stands no hands, LLE step taps w/ cane in LUE, step overs within gait path     Plan: [x] Continue current frequency toward long and short term goals.  er   [x] Specific Instructions for subsequent treatments: Xcite training, litegait training      Time In: 2:05 pm            Time Out: 3:06 pm    Electronically signed by:  Ara Lujan, PT

## 2022-12-20 ENCOUNTER — HOSPITAL ENCOUNTER (OUTPATIENT)
Dept: PHYSICAL THERAPY | Age: 62
Setting detail: THERAPIES SERIES
Discharge: HOME | End: 2022-12-20
Payer: MEDICARE

## 2022-12-20 ENCOUNTER — HOSPITAL ENCOUNTER (OUTPATIENT)
Dept: OCCUPATIONAL THERAPY | Age: 62
Setting detail: THERAPIES SERIES
Discharge: HOME | End: 2022-12-20
Payer: MEDICARE

## 2022-12-20 PROCEDURE — 97110 THERAPEUTIC EXERCISES: CPT

## 2022-12-20 PROCEDURE — 97112 NEUROMUSCULAR REEDUCATION: CPT

## 2022-12-20 PROCEDURE — 97116 GAIT TRAINING THERAPY: CPT

## 2022-12-20 NOTE — FLOWSHEET NOTE
[x] [facility]       Occupational Therapy            1st floor       [address], [place]         Phone: [phone]       Fax: [phone] [] [facility] Occupational Therapy  [address], [place]  Phone: [phone]  Fax: [phone]      Occupational Therapy Daily Treatment Note    Date:  2022  Patient Name:  Christine Hobson    :  1960  MRN: [id number]  Referring Provider:  TOY Avila CNP  Insurance: [employer] - 24 visits, from 10/13/22 to 22  Medical Diagnosis: Right hemiplegia G81.91            Rehab Codes: lack of coordination R27.8,, fine motor skills loss R29.818,, or muscle weakness generalized M62.81,  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 Florent[address]:  - PCP  Visit# / total visits: ; Progress note for patient due at visit 18-20    Cancels/No Shows:       Subjective:    Pain:  [] Yes  [x] No Location: N/A Pain Rating: (0-10 scale) 0/10  Pain altered Tx:  [x] No  [] Yes  Action: NA  Pt Comments: Pt reports R hand is loose upon arrival.     Precautions: NA  Surgery procedure and date: NA    Objective: Today's Treatment:  Modalities:  Treatment this date included using Functional Electrical Stimulation via the Xcite 2. Stimulation parameters and outcomes can be viewed on [email or web address] with the patients' username (patient ID generated by [employer]al[employer] without any patient identifiers) and password (patients' pin generated by [employer]al[employer] without any patient identifiers). LOG IN: [id number]; PIN: [id number]    A check of the patients' skin prior to application of electrodes, and after the treatment concluded was completed and no adverse reactions noted. See below in treatment log for treatments performed.       Not completed this date: Functional Electrical Stimulation via the Loop App H200,  Stimulation parameters and outcomes can be viewed on [employer] with the patients' username (patient ID is generated by first initial of first name, and first initial of last name, followed by two digit month and two digit day the treatment commenced), and no patient specific password required. A check of the patients' skin prior to application of electrodes, and after the treatment concluded was completed and no adverse reactions noted. Name: Jesus Colindres  Patient ID: [id number]      See below in treatment log for treatments performed. Exercises:  EXERCISE    REPS/     TIME  WEIGHT/    LEVEL COMMENTS   Bioness Training  30  minutes   Not Completed - neuromodulation (10 minutes), grasp and release (15 minutes) plus time for set up   Passive stretching - R shoulder, elbow, wrist, digits  10  minutes    Completed    Bicep curls - standing 15 reps, 2 sets 2lb wrist weight  Not Completed    Scapular exercises - elevation/depression, protraction/retraction  15 reps each   Not Completed   Towel slides  10 reps, 2 sets    Not Completed    External Rotation  15 reps  Not Completed    Error augmentation - elbow extension 5 reps  Manual resistance  Not Completed    Active supination/pronation R forearm  15 reps   Not completed   Xcite- forward reach and grasp  35 minutes   Added & completed    Other: Bioness request form submitted 12/13/22  Time spent with xcite set-up and parameter settings. Assessment: [x] Progressing toward goals. Pt arrived late, treatment adjusted accordingly. Initiated [employer] training to promote increased muscle recruitment and to promote elbow extension, wrist extension, and digit ext/flex. Very prominent response noted with wrist extension and digit extension. Will incorporate more functional tasks with use of xcite next session. Trace carryover of active wrist extension and digit extension noted once Xcite was removed. [] No change.   [] Other     [x] Patient would continue to benefit from skilled occupational therapy services in order to: Restore  functional /grasp, ROM, Strength, and Activity tolerance in order to improve functional use of UE in ADL performance       Short Term Goals: (  8-10    Treatments)  Increase AROM  R shoulder flexion by 10 degrees to improve ability to complete functional activities - NOT MET  Demo R shoulder external rotation of 10 degrees to improve ability to complete functional activities - MET  R elbow extension by 5 degrees to improve ability to complete functional activities - MET  Demo trace active extension of all R digits - MET  Increase strength (pounds);  R  by 2 pounds to improve functional use of R hand to complete daily activities - MET  All R pinches by 1 pound to improve functional use of R hand to complete daily activities - NOT MET  Patient to be independent with home exercise program as demonstrated by performance with correct form without cues. - MET     Long Term Goals: (  24  Treatments)  Increase functional skills AEB a score of 50/80 with UEFI  Demo active extension of all R digits to release object  Increase R  strength by 5+ pounds to complete daily activities with less difficulty  Increase all R pinch strength measurements by 2+ pounds for less difficulty completing daily activities   Demo functional movements with R hand following use of Bioness/Xcite for carryover of completion of functional activities   Increase R shoulder flexion by 15+ degrees for less difficulty completing daily activities      Patient Goals: get hand functioning       Pt. Education:  [] Yes  [x] No  [] Reviewed Prior HEP/Ed   Method of Education: [] Verbal  [] Demo  [] Written  Re:   Comprehension of Education:  [] Verbalizes understanding. [] Demonstrates understanding. [] Needs review. [] Demonstrates/verbalizes HEP/Ed previously given. Plan: [x] Continue current frequency toward short and long term goals.   [x] Specific Instructions for subsequent treatments: xcite for wrist and elbow stimulation    [] Other:       Time In: 0668  Time Out: Treatment Charges: Mins Units   []  Modalities:      []  Ultrasound     [x]  Ther Exercise 10 1   []  Manual Therapy     []  Ther Activities     []  Orthotic fit/train     []  Orthotic recheck     [x]  Other: neuro re-ed 35 2   Total Treatment time 45 3       Electronically signed by:  Arabella Davenport OTR/JANNET

## 2022-12-20 NOTE — FLOWSHEET NOTE
[x] [facility]) [facility] &  Therapy  [address]  [phone]  F: [phone] [] [address]  [facility]   [address]  P: [phone]  F: [phone] [] [place] &  Therapy  [address]  P: [phone]  F: [phone] [] [address]  P: [phone]  F: [phone] [] [address]  [facility]   [address]   [place]: [phone]  F: [phone]      Physical Therapy Daily Treatment Note    Date:  2022  Patient Name:  Raisa Hayes    :  1960  MRN: [id number]  Physician: JACQUELIN Irwin                              Insurance: [address] [place] [address]  Medical Diagnosis: I63.9 (ICD-10-CM) - Ischemic stroke [facility])    Rehab Codes: R26.89, M26.81, R29.3  Next 's appt.: 22 PCP  Date of symptom onset: 22 (chronic onset)  Visit# / total visits: ; Progress note for Medicare patient due at visit 8     Cancels/No Shows: 0    Subjective:    Pain:  [] Yes  [x] No Location:  N/A Pain Rating: (0-10 scale) 0/10  Pain altered Tx:  [x] No  [] Yes  Action:  Comments: Pt notes he just finished utilizing Xcite in OT and felt good activation, even better than Bioness - notes new activation in wrist extensors. Pt reports he walked up to therapy today. Objective:  LAQ on RLE:  lacking 10deg from TKE but able to hold against min resistance: 2+/5  6MWT: 530 ft (<1200ft indicates less than community level distances)      Modalities:   See below in treatment log for treatments performed.  LOG IN: [id number]; PIN: [id number]    Precautions: Left hemiparesis, fal risk  Exercises: Bolded completed 2022:  Exercise Reps/ Time Weight/ Level Comments   6MWT   6MWT: 11 lengths x 39 ft = 429 ft  On : 530 ft Xcite   Billed under neuro, including skilled set up and don/doffing time  NO XCITE 12/20   Sit <> stand 1x15  No UE assist   RLE biased sit<>stand 2x10 4\" LLE on step, elevated mat table   LLE step taps 3x15 4\" Progressing from unilat UE assist to none  - 2 sets on 11/22   RLE modified SLS holds 2x90\" 4\" LLE on step  - concurrent tasks: 10x trunk twist, 5x reach to toe   LAQs 2x15 RLE    Prone knee flexion 3x10 RLE    Step ups 3x15 6\" In inpatient gym; unilat UE assist                             Resisted rows 3x15 ea purple    Standing ball roll out 20x [employer] Using RUE primarily to roll out, LUE stabilizes RUE on ball   Pushing ball w/ only RUE 10x \" \" Therapist stabilizes hand on ball - very fatiguing for patient   Ball punches 3x30\" [employer]          Knee extension machine 10x 0# Min assist by therapist with RLE extension  - completing bilat, very fatiguing, half range   Leg press 1x20 60#                Gait train      Resisted gait train 1x450 ft purple 1 lap around OT = 150 ft  - RPE: 16 per pt   Gait down to car 600+ feet SBQC Including decline, thresholds, elevator, crowd negotiation   Stair training 2x8 steps  Attempting reciprocal gait ascent/descent, too difficult descent   Obstacle course ~240 ft  Hurdles, foam, unlevel ground   Gait w/ head turns 100 ft     Other:         6MWT, fall prevention  - bodyweight support treadmill train  - gastroc/hip flexor stretching  - Xcite for RLE stance control with LLE OKC tasks  - progressively decreased height sit<>stands without UE assist  - single limb quad strengthening - leg press, leg extension, etc      Treatment Charges: Mins Units   []  Modalities     [x]  Ther Exercise 34 2   []  Manual Therapy     []  Ther Activities     []  Aquatics     [x]  Gait 16 1   []  Other: Neuro     Total Treatment time 50 3       Assessment: [x] Progressing toward goals. Able to progress gait distance this date, still requires cues for increased pacing. Added leg press for further BLE power/strengthening for transfers - good tolerance, fatiguing for pt.     [] No change. [x] Other: Continues to be very fatigued with exercise as evidenced by muscle activation decreasing after a few reps especially with any UE involved exercise; extended seated rest breaks needed. [x] Patient would continue to benefit from skilled physical therapy services in order to:  progress R hemibody strength in RUE/RLE, increase gait speed and improve mechanics, improve functional quad/hip strength to increase stability in standing and ease of transfers, improve balance within gait tasks, and overall improve quality of life. STG: (to be met in 8 treatments) - Assessed on 11/29/22 by Maxwell Peoples, PT:  ? ROM: Negative for iliopsoas tightness on Bryan test on RLE for improved ability to achieve terminal hip ext - MET, no tightness noted  ? Balance:   Pt able to ambulate with intermittent head turns, stopping without slowed gait speed nor instability noted - Ongoing, slows down overall speed when attempting head turns  Pt able to step over 4\" objects while walking with no speed decrease, no instability noted  ? Strength: At least 3/5 R quads to allow improved stability with RLE SLS positions including stair climbing, etc - Ongoing, 2+/5 quads but improved stability with RLE SLS noted for stair climbing and gait, still fatigues very quickly after ~7 steps, ~200 ft starts to decrease R stance time  ? Function:   Pt able to ambulate at least .52m/s with [employer] on level ground to progress towards decreased fall risk - Partially met, .43m/s self selected; .59m/s fast  Pt able to ascend/descend flight of stairs with improving speed, reciprocal ascent/descent with unilat UE assist - MET for ascent, step-to descent   Patient to be independent with home exercise program as demonstrated by performance with correct form without cues.  - MET  Demonstrate Knowledge of fall prevention -MET, reviewed 11/29 though pt notes d/t sharing home has some fall-risk objects in gait path     LTG: (to be met in 16 treatments)  ? Balance: At least 19/30 on FGA to indicate significant improvement in dynamic balance  At least 75% confidence on ABC scale to indicate increased self efficacy with balance  ? Strength: At least 3+/5 R quads to allow further improving stability with RLE SLS positions including stair climbing, etc  ? Function:   Pt able to ambulate at least .62m/s with [employer] on level ground to progress towards decreased fall risk  Pt able to ascend/descend flight of stairs with further improving speed and safety reciprocal ascent/descent with unilat UE assist  Able to complete 5x STS without UE assist in under 1 min to indicate improving functional BLE power and strength for transfers           Patient goals: \"get back to normal\"    Pt. Education:  [x] Yes  [] No  [x] Reviewed Prior HEP/Ed  Method of Education: [x] Verbal  [] Demo  [] Written  Comprehension of Education:  [x] Verbalizes understanding. [x] Demonstrates understanding. [] Needs review. [] Demonstrates/verbalizes HEP/Ed previously given. Current HEP as of 12/15: sit to stands no hands, LLE step taps w/ cane in LUE, step overs within gait path     Plan: [x] Continue current frequency toward long and short term goals.  er   [x] Specific Instructions for subsequent treatments: Xcite training, litegait training      Time In: 2:00 pm            Time Out: 3:00 pm    Electronically signed by:  Rena Candelario, PT

## 2022-12-21 DIAGNOSIS — G81.91 RIGHT HEMIPLEGIA (HCC): ICD-10-CM

## 2022-12-21 RX ORDER — POTASSIUM CHLORIDE 1500 MG/1
TABLET, FILM COATED, EXTENDED RELEASE ORAL
Qty: 90 TABLET | Refills: 1 | Status: SHIPPED | OUTPATIENT
Start: 2022-12-21

## 2022-12-21 NOTE — TELEPHONE ENCOUNTER
Aaron Olvera is calling to request a refill on the following medication(s):    Medication Request:  Requested Prescriptions     Pending Prescriptions Disp Refills    potassium chloride (KLOR-CON M) 20 MEQ TBCR extended release tablet [Pharmacy Med Name: POTASSIUM CHLORIDE ER 20 MEQ TABLET] 90 tablet 1     Sig: TAKE ONE TABLET BY MOUTH DAILY       Last Visit Date (If Applicable):  29/7/2331    Next Visit Date:    3/6/2023 Anesthesia Transfer of Care Note    Patient: Zo Villanueva    Procedure(s) Performed: * No procedures listed *    Patient location: Labor and Delivery    Anesthesia Type: epidural    Post pain: adequate analgesia    Post assessment: no apparent anesthetic complications and tolerated procedure well    Post vital signs: stable    Level of consciousness: awake, alert and oriented    Nausea/Vomiting: no nausea/vomiting    Complications: none    Transfer of care protocol was followed      Last vitals:   Visit Vitals  BP (!) 156/88   Pulse 108   Temp 36.6 °C (97.9 °F) (Oral)   Resp 18   LMP 07/30/2021 (Approximate)   SpO2 96%   Breastfeeding No

## 2022-12-22 ENCOUNTER — HOSPITAL ENCOUNTER (OUTPATIENT)
Dept: PHYSICAL THERAPY | Age: 62
Setting detail: THERAPIES SERIES
Discharge: HOME | End: 2022-12-22
Payer: MEDICARE

## 2022-12-22 ENCOUNTER — HOSPITAL ENCOUNTER (OUTPATIENT)
Dept: OCCUPATIONAL THERAPY | Age: 62
Setting detail: THERAPIES SERIES
Discharge: HOME | End: 2022-12-22
Payer: MEDICARE

## 2022-12-22 NOTE — FLOWSHEET NOTE
[x] [facility]) - [facility] &  Therapy  [address]    [phone]  F: [phone]   [] [id number]  [facility] 36   [address]  P: [phone]  F: [phone]  [] [address] &  Therapy  [address] [facility]  P: [phone]  F: [phone] [] [address]  P: [phone]  F: [phone]  [] [address]  [facility]   [address]   [place]: [phone]  F: [phone]   [] [address]  [place]: [phone]   F: [phone]     Physical Therapy Cancel/No Show note    Date: 2022  Patient: Nnamdi Covington  : 1960  MRN: [id number]    Cancels/No Shows to date: 1 cx/ 0 ns    For today's appointment patient:    [x]  Cancelled    [] Rescheduled appointment    [] No-show     Reason given by patient:    []  Patient ill    []  Conflicting appointment    [] No transportation      [] Conflict with work    [] No reason given    [x] Weather related    [] COVID-19    [] Other:      Comments:        [x] Next appointment was confirmed    Electronically signed by: Norma Lopez PT

## 2022-12-27 ENCOUNTER — HOSPITAL ENCOUNTER (OUTPATIENT)
Dept: PHYSICAL THERAPY | Age: 62
Setting detail: THERAPIES SERIES
Discharge: HOME | End: 2022-12-27
Payer: MEDICARE

## 2022-12-27 ENCOUNTER — HOSPITAL ENCOUNTER (OUTPATIENT)
Dept: OCCUPATIONAL THERAPY | Age: 62
Setting detail: THERAPIES SERIES
Discharge: HOME | End: 2022-12-27
Payer: MEDICARE

## 2022-12-27 PROCEDURE — 97116 GAIT TRAINING THERAPY: CPT

## 2022-12-27 PROCEDURE — 97140 MANUAL THERAPY 1/> REGIONS: CPT

## 2022-12-27 PROCEDURE — 97110 THERAPEUTIC EXERCISES: CPT

## 2022-12-27 NOTE — FLOWSHEET NOTE
[x] [facility]       Occupational Therapy            1st floor       [address], [place]         Phone: [phone]       Fax: [phone] [] [facility] Occup[facility] Therapy  [address]  [place], [place]  Phone: [phone]  Fax: [phone]      Occupational Therapy Daily Treatment Note    Date:  2022  Patient Name:  Liliana Meredith    :  1960  MRN: [id number]  Referring Provider:  TOY Saba CNP  Insurance: [facility] Medicaid - 24 visits, from 10/13/22 to 22  Medical Diagnosis: Right hemiplegia G81.91            Rehab Codes: lack of coordination R27.8,, fine motor skills loss R29.818,, or muscle weakness generalized M62.81,  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 Florentino [address]:  - PCP  Visit# / total visits: ; Progress note for patient due at visit 8-10    Cancels/No Shows: 3/1      Subjective:    Pain:  [] Yes  [x] No Location: N/A Pain Rating: (0-10 scale) 0/10  Pain altered Tx:  [x] No  [] Yes  Action: NA  Pt Comments: Reports RUE feels \"tight\". Precautions: NA  Surgery procedure and date: NA    Objective: Today's Treatment:  Modalities:    Not completed this date: Functional Electrical Stimulation via the [place] H200,  Stimulation parameters and outcomes can be viewed on [facility] with the patients' username (patient ID is generated by first initial of first name, and first initial of last name, followed by two digit month and two digit day the treatment commenced), and no patient specific password required. A check of the patients' skin prior to application of electrodes, and after the treatment concluded was completed and no adverse reactions noted. Name: Napolean Pain  Patient ID: [id number]      See below in treatment log for treatments performed.     Exercises:  EXERCISE    REPS/     TIME  WEIGHT/    LEVEL COMMENTS   Bioness Training  30  minutes   Not Completed - neuromodulation (10 minutes), grasp and release (15 minutes) plus time for set up   Passive stretching - R shoulder, elbow, wrist, digits  15  minutes   Completed    Bicep curls - standing 15 reps, 2 sets 2lb wrist weight  Completed    Scapular exercises - elevation/depression, protraction/retraction  15 reps each   Completed   Towel slides  10 reps, 2 sets    Completed    External Rotation  15 reps  Completed    Error augmentation - elbow extension 5 reps  Manual resistance  Not Completed    Hand Exerciser 15 reps x 2 sets 10 pounds Added, Completed    Other: NA      Assessment: [x] Progressing toward goals. Bioness not completed due to change in therapist for this dates treatment session. Performed PROM to RUE- shoulder, elbow, wrist and digits. Significant tightness of R shoulder noted, 1 finger width subluxation R GH joint, no complaints with passive stretching. Completed table top towel slides to promote improved L shoulder function, good external rotation with support given at R shoulder. Completed bicep curls in standing position to allow for greater elbow extension, fair control of movement. Hand exerciser to promote increased R  strength. Min fatigue of RUE noted at end of session. Pt to PT following OT session. [] No change.   [] Other     [x] Patient would continue to benefit from skilled occupational therapy services in order to: Restore  functional /grasp, ROM, Strength, and Activity tolerance in order to improve functional use of UE in ADL performance       Short Term Goals: (  8-10    Treatments)  Increase AROM  R shoulder flexion by 10 degrees to improve ability to complete functional activities   Demo R shoulder external rotation of 10 degrees to improve ability to complete functional activities   R elbow extension by 5 degrees to improve ability to complete functional activities   Demo trace active extension of all R digits   Increase strength (pounds);  R  by 2 pounds to improve functional use of R hand to complete daily activities   All R pinches by 1 pound to improve functional use of R hand to complete daily activities   Patient to be independent with home exercise program as demonstrated by performance with correct form without cues. Long Term Goals: (  24  Treatments)  Increase functional skills AEB a score of 50/80 with UEFI  Demo active extension of all R digits to release object  Increase R  strength by 5+ pounds to complete daily activities with less difficulty  Increase all R pinch strength measurements by 2+ pounds for less difficulty completing daily activities   Demo functional movements with R hand following use of Bioness/Xcite for carryover of completion of functional activities   Increase R shoulder flexion by 15+ degrees for less difficulty completing daily activities      Patient Goals: get hand functioning       Pt. Education:  [] Yes  [x] No  [] Reviewed Prior HEP/Ed   Method of Education: [] Verbal  [] Demo  [] Written  Re:   Comprehension of Education:  [] Verbalizes understanding. [] Demonstrates understanding. [] Needs review. [] Demonstrates/verbalizes HEP/Ed previously given. Plan: [x] Continue current frequency toward short and long term goals. [x] Specific Instructions for subsequent treatments: KT GH stability, progress note    [] Other:       Time In: 4359  Time Out: 8412        Treatment Charges: Mins Units   []  Modalities:      []  Ultrasound     [x]  Ther Exercise 15 1   [x]  Manual Therapy 30 2   []  Ther Activities     []  Orthotic fit/train     []  Orthotic recheck     []  Other:       Total Treatment time 45 3       Electronically signed by:  MODESTO Layne/JANNET

## 2022-12-27 NOTE — FLOWSHEET NOTE
[x] [facility]  [facility]  [address]  [phone]  F: [phone] [] [address]  [place] [address]   [address]  P: [phone]  F: [phone] [] [address] &  Therapy  [address]  P: [phone]  F: [phone] [] [address]  P: [phone]  F: [phone] [] [address]  [facility]   [address]   [facility]: [phone]  F: [phone]      Physical Therapy Daily Treatment Note    Date:  2022  Patient Name:  Macario Goltz    :  1960  MRN: [id number]  Physician: JACQUELIN Perales                              Insurance: [address] [place] [address] after eval  Medical Diagnosis: I63.9 (ICD-10-CM) - Ischemic stroke [facility])    Rehab Codes: R26.89, M26.81, R29.3  Next 's appt.: 22 PCP  Date of symptom onset: 22 (chronic onset)  Visit# / total visits: ; Progress note for Medicare patient due at visit 8     Cancels/No Shows: 0    Subjective:    Pain:  [] Yes  [x] No Location:  N/A Pain Rating: (0-10 scale) 0/10  Pain altered Tx:  [x] No  [] Yes  Action:  Comments: Pt notes he feels overall tired today, sluggish after holiday weekend. Objective:  LAQ on RLE:  lacking 10deg from TKE but able to hold against min resistance: 2+/5  6MWT: 530 ft (<1200ft indicates less than community level distances)      Modalities:   See below in treatment log for treatments performed.  LOG IN: [id number]; PIN: [id number]    Precautions: Left hemiparesis, fal risk  Exercises: Bolded completed 2022:  Exercise Reps/ Time Weight/ Level Comments   6MWT   6MWT: 11 lengths x 39 ft = 429 ft  On : 530 ft                     Xcite   Billed under neuro, including skilled set up and don/doffing time  NO XCITE    Sit <> stand 1x15  No UE assist   RLE biased sit<>stand 2x10 4\" LLE on step, elevated mat table   LLE step taps 3x15 4\" Progressing from unilat UE assist to none  - 2 sets on 11/22   RLE modified SLS holds 2x90\" 4\" LLE on step  - concurrent tasks: 10x trunk twist, 5x reach to toe   LAQs 2x15 RLE    Prone knee flexion 3x10 RLE    Step ups 3x15 6\" In inpatient gym; unilat UE assist                             Resisted rows 3x15 ea purple    Standing ball roll out 20x Red [employer] Using RUE primarily to roll out, LUE stabilizes RUE on ball   Pushing ball w/ only RUE 10x \" \" Therapist stabilizes hand on ball - very fatiguing for patient   Ball punches 3x30\" Red [employer]          Knee extension machine 10x 0# Min assist by therapist with RLE extension  - completing bilat, very fatiguing, half range   Leg press 1x20 60#                Gait train      Resisted gait train 1x300 ft Purple, 5# RLE 1 lap around OT = 150 ft  - RPE: 16 per pt   Gait down to car 600+ feet SBQC Including decline, thresholds, elevator, crowd negotiation   Stair training 2x8 steps  Attempting reciprocal gait ascent/descent, too difficult descent   Obstacle course ~200 ft 5# RLE Hurdles, foam,  cone weaving, unlevel ground  - RPE: 18   Gait w/ head turns 100 ft     Other:         6MWT, fall prevention  - bodyweight support treadmill train  - gastroc/hip flexor stretching  - Xcite for RLE stance control with LLE OKC tasks  - progressively decreased height sit<>stands without UE assist  - single limb quad strengthening - leg press, leg extension, etc      Treatment Charges: Mins Units   []  Modalities     [x]  Ther Exercise 24 2   []  Manual Therapy     []  Ther Activities     []  Aquatics     [x]  Gait 20 1   []  Other: Neuro     Total Treatment time 44 3       Assessment: [x] Progressing toward goals. [] No change.      [x] Other: More fatigued this date in RLE and overall functional mobility, only able to complete 2 laps of resisted walking vs three, decreased obstacle course completion. Was still able to add 5# weight to gait training to address difficulty with RLE limb advancement and speed, but this might have factored into the increased fatigue overall as well. Pt reports RPE of gait training to be at an 18 with the obstacle course today. Decreased session this date d/t overall fatigue/lightheadedness with exercise - offered OTC glucose tablets to pt when he reported feeling lightheaded with increased intensity exercise (ball punches), which pt accepted. [x] Patient would continue to benefit from skilled physical therapy services in order to:  progress R hemibody strength in RUE/RLE, increase gait speed and improve mechanics, improve functional quad/hip strength to increase stability in standing and ease of transfers, improve balance within gait tasks, and overall improve quality of life. STG: (to be met in 8 treatments) - Assessed on 11/29/22 by Sly Jiang, PT:  ? ROM: Negative for iliopsoas tightness on Bryan test on RLE for improved ability to achieve terminal hip ext - MET, no tightness noted  ? Balance:   Pt able to ambulate with intermittent head turns, stopping without slowed gait speed nor instability noted - Ongoing, slows down overall speed when attempting head turns  Pt able to step over 4\" objects while walking with no speed decrease, no instability noted  ? Strength: At least 3/5 R quads to allow improved stability with RLE SLS positions including stair climbing, etc - Ongoing, 2+/5 quads but improved stability with RLE SLS noted for stair climbing and gait, still fatigues very quickly after ~7 steps, ~200 ft starts to decrease R stance time  ?  Function:   Pt able to ambulate at least .52m/s with [employer] on level ground to progress towards decreased fall risk - Partially met, .43m/s self selected; .59m/s fast  Pt able to ascend/descend flight of stairs with improving speed, reciprocal ascent/descent with unilat UE assist - MET for ascent, step-to descent   Patient to be independent with home exercise program as demonstrated by performance with correct form without cues. - MET  Demonstrate Knowledge of fall prevention -MET, reviewed 11/29 though pt notes d/t sharing home has some fall-risk objects in gait path     LTG: (to be met in 16 treatments)  ? Balance: At least 19/30 on FGA to indicate significant improvement in dynamic balance  At least 75% confidence on ABC scale to indicate increased self efficacy with balance  ? Strength: At least 3+/5 R quads to allow further improving stability with RLE SLS positions including stair climbing, etc  ? Function:   Pt able to ambulate at least .62m/s with [employer] on level ground to progress towards decreased fall risk  Pt able to ascend/descend flight of stairs with further improving speed and safety reciprocal ascent/descent with unilat UE assist  Able to complete 5x STS without UE assist in under 1 min to indicate improving functional BLE power and strength for transfers           Patient goals: \"get back to normal\"    Pt. Education:  [x] Yes  [] No  [x] Reviewed Prior HEP/Ed  Method of Education: [x] Verbal  [] Demo  [] Written  Comprehension of Education:  [x] Verbalizes understanding. [x] Demonstrates understanding. [] Needs review. [] Demonstrates/verbalizes HEP/Ed previously given. Current HEP as of 12/15: sit to stands no hands, LLE step taps w/ cane in LUE, step overs within gait path     Plan: [x] Continue current frequency toward long and short term goals.  er   [x] Specific Instructions for subsequent treatments: Xcite training, litegait training      Time In: 2:00 pm            Time Out: 2:50 pm    Electronically signed by:  Keren Regalado, PT

## 2022-12-29 ENCOUNTER — HOSPITAL ENCOUNTER (OUTPATIENT)
Dept: OCCUPATIONAL THERAPY | Age: 62
Setting detail: THERAPIES SERIES
Discharge: HOME | End: 2022-12-29
Payer: MEDICARE

## 2022-12-29 ENCOUNTER — HOSPITAL ENCOUNTER (OUTPATIENT)
Dept: PHYSICAL THERAPY | Age: 62
Setting detail: THERAPIES SERIES
Discharge: HOME | End: 2022-12-29
Payer: MEDICARE

## 2022-12-29 PROCEDURE — 97116 GAIT TRAINING THERAPY: CPT

## 2022-12-29 PROCEDURE — 97110 THERAPEUTIC EXERCISES: CPT

## 2022-12-29 PROCEDURE — 97112 NEUROMUSCULAR REEDUCATION: CPT

## 2022-12-29 NOTE — FLOWSHEET NOTE
[x] [facility]) [facility] &  Therapy  [address]  [phone]  F: [phone] [] [address]  [facility] [address]   [address]  P: [phone]  F: [phone] [] [place] &  Therapy  [address]  P: [phone]  F: [phone] [] [address]  P: [phone]  F: [phone] [] [address]  [facility]   [address]   [place]: [phone]  F: [phone]      Physical Therapy Daily Treatment Note    Date:  2022  Patient Name:  Ninfa Bryant    :  1960  MRN: [id number]  Physician: JACQUELIN Mae                              Insurance: [employer] after eval)  Medical Diagnosis: I63.9 (ICD-10-CM) - Ischemic stroke [facility])    Rehab Codes: R26.89, M26.81, R29.3  Next 's appt.: 22 PCP  Date of symptom onset: 22 (chronic onset)  Visit# / total visits: 14; Progress note for Medicare patient due at visit 8     Cancels/No Shows: 0    Subjective:    Pain:  [] Yes  [x] No Location:  N/A Pain Rating: (0-10 scale) 0/10  Pain altered Tx:  [x] No  [] Yes  Action:  Comments: Pt reports he felt very fatigued after last session, doing better today. Notes last session was just a tough day in general for everything. Requests to do only 2# instead of 5# ankle weight with gait this date. Objective:  LAQ on RLE:  lacking 10deg from TKE but able to hold against min resistance: 2+/5  6MWT: 530 ft (<1200ft indicates less than community level distances)      Modalities:   See below in treatment log for treatments performed.  LOG IN: [id number]; PIN: [id number]    Precautions: Left hemiparesis, fal risk  Exercises: Bolded completed 2022:  Exercise Reps/ Time Weight/ Level Comments   6MWT   6MWT: 11 lengths x 39 ft = 429 ft  On 11/29: 530 ft                     Xcite   Billed under neuro, including skilled set up and don/doffing time  NO XCITE 12/27   Sit <> stand 1x15  No UE assist   RLE biased sit<>stand 2x10 4\" LLE on step, elevated mat table   LLE step taps 3x15 4\" Progressing from unilat UE assist to none  - 2 sets on 11/22   RLE modified SLS holds 2x90\" 4\" LLE on step  - concurrent tasks: 10x trunk twist, 5x reach to toe   LAQs 2x15 RLE    Prone knee flexion 3x10 RLE    Step ups 3x15 6\" In inpatient gym; unilat UE assist                             Resisted rows 3x15 ea purple    Standing ball roll out 20x Red physioball Using RUE primarily to roll out, LUE stabilizes RUE on ball   Pushing ball w/ only RUE 10x \" \" Therapist stabilizes hand on ball - very fatiguing for patient   Ball punches 4x30\" Red physioball 2x hooks  2x upper cuts         Knee extension machine 15x 0# Min assist by therapist with RLE extension  - completing bilat, very fatiguing, half range    Leg press 1x20 60#                Gait train      Resisted gait train 3x300 ft Purple, 5# RLE 1 lap around OT = 150 ft  - RPE: 18 per pt    2nd and 3rd set: with intermittent hurdles, foam mat for unsteady surface amb   Gait down to car 600+ feet SBQC Including decline, thresholds, elevator, crowd negotiation   Stair training 2x8 steps  Attempting reciprocal gait ascent/descent, too difficult descent   Obstacle course ~200 ft 5# RLE Hurdles, foam,  cone weaving, unlevel ground     Gait w/ head turns 100 ft     Other:         6MWT, fall prevention  - bodyweight support treadmill train  - gastroc/hip flexor stretching  - Xcite for RLE stance control with LLE OKC tasks  - progressively decreased height sit<>stands without UE assist  - single limb quad strengthening - leg press, leg extension, etc      Treatment Charges: Mins Units   []  Modalities     [x]  Ther Exercise 25 2   []  Manual Therapy     []  Ther Activities     []  Aquatics     [x]  Gait 30 2   []  Other: Neuro     Total Treatment time 55 4       Assessment: [x] Progressing toward goals. Improving wrist stability in neutral with ball punches this date, and increased power noted. Overall improved energy and tolerance to therapy as compared to last session, however at baseline remains easily fatigued. [] No change. [x] Other: Pt reports high RPE levels with only mild forms of outward exertion noted, thus difficult to progress high intensity gait training as pt somewhat self limits. However, able to add resisted walk and ankle weight on RLE to the obstacle course training with cues to attempt to maintain speed - still very difficult for pt especially on uneven surfaces. [x] Patient would continue to benefit from skilled physical therapy services in order to:  progress R hemibody strength in RUE/RLE, increase gait speed and improve mechanics, improve functional quad/hip strength to increase stability in standing and ease of transfers, improve balance within gait tasks, and overall improve quality of life. STG: (to be met in 8 treatments) - Assessed on 11/29/22 by Castillo Lisa, PT:  ? ROM: Negative for iliopsoas tightness on Bryan test on RLE for improved ability to achieve terminal hip ext - MET, no tightness noted  ? Balance:   Pt able to ambulate with intermittent head turns, stopping without slowed gait speed nor instability noted - Ongoing, slows down overall speed when attempting head turns  Pt able to step over 4\" objects while walking with no speed decrease, no instability noted  ? Strength: At least 3/5 R quads to allow improved stability with RLE SLS positions including stair climbing, etc - Ongoing, 2+/5 quads but improved stability with RLE SLS noted for stair climbing and gait, still fatigues very quickly after ~7 steps, ~200 ft starts to decrease R stance time  ?  Function:   Pt able to ambulate at least .52m/s with [employer] on level ground to progress towards decreased fall risk - Partially met, .43m/s self selected; .59m/s fast  Pt able to ascend/descend flight of stairs with improving speed, reciprocal ascent/descent with unilat UE assist - MET for ascent, step-to descent   Patient to be independent with home exercise program as demonstrated by performance with correct form without cues. - MET  Demonstrate Knowledge of fall prevention -MET, reviewed 11/29 though pt notes d/t sharing home has some fall-risk objects in gait path     LTG: (to be met in 16 treatments)  ? Balance: At least 19/30 on FGA to indicate significant improvement in dynamic balance  At least 75% confidence on ABC scale to indicate increased self efficacy with balance  ? Strength: At least 3+/5 R quads to allow further improving stability with RLE SLS positions including stair climbing, etc  ? Function:   Pt able to ambulate at least .62m/s with [employer] on level ground to progress towards decreased fall risk  Pt able to ascend/descend flight of stairs with further improving speed and safety reciprocal ascent/descent with unilat UE assist  Able to complete 5x STS without UE assist in under 1 min to indicate improving functional BLE power and strength for transfers           Patient goals: \"get back to normal\"    Pt. Education:  [x] Yes  [] No  [x] Reviewed Prior HEP/Ed  Method of Education: [x] Verbal  [] Demo  [] Written  Comprehension of Education:  [x] Verbalizes understanding. [x] Demonstrates understanding. [] Needs review. [] Demonstrates/verbalizes HEP/Ed previously given. Current HEP as of 12/15: sit to stands no hands, LLE step taps w/ cane in LUE, step overs within gait path     Plan: [x] Continue current frequency toward long and short term goals.  er   [x] Specific Instructions for subsequent treatments: Xcite training, litegait training      Time In: 2:05 pm            Time Out: 3:02 pm    Electronically signed by:  Norma Lopez PT

## 2023-01-03 ENCOUNTER — HOSPITAL ENCOUNTER (OUTPATIENT)
Dept: PHYSICAL THERAPY | Age: 63
Setting detail: THERAPIES SERIES
Discharge: HOME | End: 2023-01-03
Payer: MEDICARE

## 2023-01-03 ENCOUNTER — HOSPITAL ENCOUNTER (OUTPATIENT)
Dept: OCCUPATIONAL THERAPY | Age: 63
Setting detail: THERAPIES SERIES
Discharge: HOME | End: 2023-01-03
Payer: MEDICARE

## 2023-01-03 PROCEDURE — 97112 NEUROMUSCULAR REEDUCATION: CPT

## 2023-01-03 PROCEDURE — 97110 THERAPEUTIC EXERCISES: CPT

## 2023-01-03 PROCEDURE — 97116 GAIT TRAINING THERAPY: CPT

## 2023-01-03 NOTE — FLOWSHEET NOTE
[x] [facility]       Occupational Therapy            1st floor       [address], [place]         Phone: [phone]       Fax: [phone] [] [facility]  Occupational Therapy  [address], [place]  Phone: [phone]  Fax: [phone]      Occupational Therapy Daily Treatment Note    Date:  1/3/2023  Patient Name:  Le Duffy    :  1960  MRN: [id number]  Referring Provider:  Sasha Barclay, APRN - CNP  [facility] - [facility] visits, from 10/13/22 to 22  Medical Diagnosis: Right hemiplegia G81.91            Rehab Codes: lack of coordination R27.8,, fine motor skills loss R29.818,, or muscle weakness generalized M62.81,  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 Mis[address]: 23 - neuro  Visit# / total visits:  (visit count corrected 23); Progress note for patient due at visit 18-20    Cancels/No Shows: 3/1      Subjective:    Pain:  [] Yes  [x] No Location: N/A Pain Rating: (0-10 scale) 0/10  Pain altered Tx:  [x] No  [] Yes  Action: NA  Pt Comments: Reports he used his TENS unit at home, was able to get some digit and wrist extension with the unit. Precautions: NA  Surgery procedure and date: NA    Objective: Today's Treatment:  Modalities:  Treatment this date included using Functional Electrical Stimulation via the Xcite 2. Stimulation parameters and outcomes can be viewed on [email or web address] with the patients' username (patient ID generated by [employer] without any patient identifiers) and password (patients' pin generated by [employer] without any patient identifiers). LOG IN: [id number]; PIN: [id number]     A check of the patients' skin prior to application of electrodes, and after the treatment concluded was completed and no adverse reactions noted. See below in treatment log for treatments performed.     Not completed this date: Functional Electrical Stimulation via the [employer] H200, Stimulation parameters and outcomes can be viewed on [employer] with the patients' username (patient ID is generated by first initial of first name, and first initial of last name, followed by two digit month and two digit day the treatment commenced), and no patient specific password required. A check of the patients' skin prior to application of electrodes, and after the treatment concluded was completed and no adverse reactions noted. Name: Keke You  Patient ID: [id number]      See below in treatment log for treatments performed. Exercises:  EXERCISE    REPS/     TIME  WEIGHT/    LEVEL COMMENTS   Bioness Training  30  minutes   Not Completed - neuromodulation (10 minutes), grasp and release (15 minutes) plus time for set up   Passive stretching - R shoulder, elbow, wrist, digits  15  minutes   Not Completed    Bicep curls - standing 15 reps, 2 sets 2lb wrist weight  Completed    Scapular exercises - elevation/depression, protraction/retraction  15 reps each   Not Completed   Towel slides  10 reps, 2 sets    Not Completed    External Rotation  15 reps  Completed - AA, use of towel on table   top   Error augmentation - elbow extension 5 reps  Manual resistance  Not Completed    Hand Exerciser 15 reps x 2 sets 10 pounds Not Completed    Xcite training  25 mins   Completed - forward reach and grasp pattern, plus time for skilled set-up and donning/doffing    Ktape for R shoulder subluxation 10 minutes   Added & completed - 3 strip pattern    Other: NA      Assessment: [x] Progressing toward goals. Good extensor response noted with digits and wrist with use of Xcite. Weak flexor response noted, however pt only able to tolerate a low intensity to flexors. Patches also applied to triceps, to assist with greater elbow extension. Completed passive elbow extension in combination with estim, with close to complete extension achieved this date. No pain or adverse reactions with use of Xcite.  Mod difficulty completing bicep curls with wrist weight. Completed bicep curls in standing position to allow for greater elbow extension, fair control of movement. Mod fatigue of RUE noted at end of session. Pt somewhat self-limiting with therapeutic exercises, likes to only complete passive stretching and estim. Educated on importance/benefits of strength training for neuro recovery. Applied Ktape for R shoulder subluxation for first time this date - applied to see if it gives pt increased support of R GH joint. Educated on precautions/benefits/purpose of tape, pt agreed to [employer] application - voiced understanding of all. [] No change. [] Other     [x] Patient would continue to benefit from skilled occupational therapy services in order to: Restore  functional /grasp, ROM, Strength, and Activity tolerance in order to improve functional use of UE in ADL performance       Short Term Goals: (  8-10    Treatments)  Increase AROM  R shoulder flexion by 10 degrees to improve ability to complete functional activities - NOT MET  Demo R shoulder external rotation of 10 degrees to improve ability to complete functional activities - MET  R elbow extension by 5 degrees to improve ability to complete functional activities - MET  Demo trace active extension of all R digits - MET  Increase strength (pounds);  R  by 2 pounds to improve functional use of R hand to complete daily activities - MET  All R pinches by 1 pound to improve functional use of R hand to complete daily activities - NOT MET  Patient to be independent with home exercise program as demonstrated by performance with correct form without cues.  - MET     Long Term Goals: (  24  Treatments)  Increase functional skills AEB a score of 50/80 with UEFI  Demo active extension of all R digits to release object  Increase R  strength by 5+ pounds to complete daily activities with less difficulty  Increase all R pinch strength measurements by 2+ pounds for less difficulty completing daily activities   Demo functional movements with R hand following use of Bioness/Xcite for carryover of completion of functional activities   Increase R shoulder flexion by 15+ degrees for less difficulty completing daily activities      Patient Goals: get hand functioning       Pt. Education:  [x] Yes  [] No  [] Reviewed Prior HEP/Ed   Method of Education: [x] Verbal  [x] Demo  [] Written  Re: Arcelia Garrett for shoulder subluxation. Comprehension of Education:  [x] Verbalizes understanding. [] Demonstrates understanding. [] Needs review. [] Demonstrates/verbalizes HEP/Ed previously given. Plan: [x] Continue current frequency toward short and long term goals.   [x] Specific Instructions for subsequent treatments: KT GH stability    [] Other:       Time In: 1400  Time Out:1500        Treatment Charges: Mins Units   []  Modalities:      []  Ultrasound     [x]  Ther Exercise 30 2   []  Manual Therapy     []  Ther Activities     []  Orthotic fit/train     []  Orthotic recheck     [x]  Other: neuro re-ed  30 2   Total Treatment time 60 4       Electronically signed by:  MODESTO Hunter/JANNET

## 2023-01-03 NOTE — FLOWSHEET NOTE
Functional Gait Assessment (FGA)    Gait Level Surface: 1  Instructions: Walk at your normal speed from here to the next ioana (6 m/20 ft)  Grading: Julio Cesar Zamora the highest category that applies. (3) Normal - Walks 6 m (20 ft) in less than 5.5 seconds, no assistive devices, good speed, no evidence for imbalance, normal gait pattern, deviates no more than 6 in. outside of the 12        in walkway width. (2) Mild impairment - Walks 6 in (20 ft) in less than 7 seconds but greater than 5.5 seconds, uses assistive device, slower speed, mild gait deviations, or deviates 6-10 in. outside of        the 12 in walkway width. (1) Moderate impairment - Walks 6 m (20 ft) slow speed, abnormal gait pattern, evidence for imbalance, or deviates 10-15 in outside of the 12 in walkway width. Requires more than 7       seconds to ambulate 6 m (20 ft). (0) Significant impairment - Cannot walk 6 m (20 ft) without assistance, severe gait deviations or imbalance, deviates greater than 15 in. outside of the 12 in. walkway width or        reaches and touches the wall. Change in Gait Speed: 3  Instructions: Begin walking at your normal pace (for 1.5 m (5ft)). When I tell you \"go\", walk as fast as you can (for 1.5m (5 ft)). When I tell you \"slow\", walk as slowly as you can (for 1.5m (5ft)). Grading: Julio Cesar Zamora the highest category that applies. (3) Normal - Able to smoothly change walking speed without loss of balance or gait deviation. Shows a significant difference in walking speeds between normal, fast, and slow speeds. Deviates no more than 6 in. outside of the 12 in. walkway width. (2) Mild impairment - Is able to change speed but demonstrates mild gait deviations, deviates 6-10 in. outside of the 12 in. walkway width, or no gait deviations but        unable to achieve a significant change in velocity, or uses an assistive device.    (1) Moderate impairment - Makes only minor adjustments to walking speed, or accomplishes a change in speed with significant gait deviations, deviates 10-15 in outside the 12 in.              walkway width, or changes speed but loses balance but is able to recover and continue walking.   (0) Severe impairment - Cannot change speeds, deviates greater than 15 in outside 12 in walkway width, or loses balance and has to reach for wall or be caught    Gait with Horizontal Head Turns: 2  Instructions: Walk from here to the next ioana 6 m (20ft) away. Begin walking at your normal pace. Keep walking straight; after 3 steps, turn your head to the right and keep walking straight while looking to the right. After 3 more steps, turn your head to the left and keep walking straight while looking left. Continue alternating looking right and left every 3 steps until you have completed 2 repetitions in each direction. Grading: Park Mini the highest category that applies. (3) Normal - Performs head turns smoothly with no change in gait. (2) Mild impairment - Performs head turns smoothly with slight change in gait velocity (eg. Minor disruption to smooth gait path), deviates 6-10 in. outside 12 in. walkway width, or uses       an assistive device. (1) Moderate impairment - Performs head turns with moderate change in gait velocity, slows down, deviates 10-15 in. outside 12 in. walkway width but recovers, can continue to walk   (0) Severe impairment - Cannot change speeds, deviates greater than 15 in. outside 12 in. walkway width, or loses balance and has to reach for wall or be caught. Gait with Vertical Head Turns: 3  Instructions: Walk from here to the next ioana (6m, 20 ft). Begin walking at your normal pace. Keep walking straight; after three steps, tip your head up and keep walking straight while looking up. After 3 more steps, tip your head down, keep walking straight while looking down. Continue alternating looking up and down every 3 steps until you have completed 2 repetitions in each direction.   Grading: Ioana the highest category that applies. (3) Normal - Performs head turns with no change in gait. Deviates no more than 6 in. outside 12 in. walkway width. (2) Mild impairment - Performs task with slight change in gait velocity (eg. Minor disruption to smooth gait path), deviates 6-10 in. outside 12 in. walkway width, or uses assistive device. (1) Moderate impairment - Performs task with moderate change in gait velocity, slows down, deviates 10-15 in. outside 12 in. walkway width but recovers, can continue to walk. (0) Severe impairment -Performs task with severe disruption of gait (eg. Staggers 15 in outside 12 in. walkway width, loses balance, stops, reaches for wall)    Gait and Pivot Turn: 2  Instructions: Begin walking at your normal pace. When I tell you, \"turn and stop\", turn as quickly as you can to face the opposite direction and stop. Grading: Jo Knutson the highest category that applies. (3) Normal - Pivot turns safely within 3 seconds and stops quickly with no loss of balance. (2) Mild impairment - Pivot turns safely in >3 seconds and stops with no loss of balance, or pivot turns safely within 3 seconds and stops with mild imbalance, requires small steps to        catch balance. (1) Moderate impairment - Turns slowly, requires verbal cueing, or requires several small steps to catch balance following turn and stop. (0) Severe impairment - Cannot turn safely, requires assistance to turn and stop. Step Over Obstacle: 1  Instructions: Begin walking at your normal speed. When you come to the shoe box, step over it, not around it, and keep walking. Grading: Jo Andnoemi the highest category that applies. (3) Normal - Is able to step over 2 stacked shoe boxes taped together (9 in. total height) without changing gait speed; no evidence of imbalance. (2) Mild impairment - Is able to step over one shoe box (4.5 in total height) without changing gait speed; no evidence of imbalance.    (1) Moderate impairment - Is able to step over one shoe box (4.5 in. Total height) but must slow dwon and adjust steps to clear box safely. May require verbal cueing.   (0) Severe impairment - Cannot perform without assistance. Gait with Narrow Base of Support: 1  Instructions: Walk on the floor with arms folded across the chest, feet aligned heel to toe in tandem for a distance of 12 ft. The number of steps taken in a striaght line are counted for a maximum of 10 steps. Grading: Cornelius Reese the highest category that applies. (3) Normal - Is able to ambulate for 10 steps heel to toe with no staggering.   (2) Mild impairment - Ambulates 7-9 steps. (1) Moderate impairment - Ambulates 4-7 steps. (0) Severe impairment - Ambulates less than 4 steps heel to toe or cannot perform without assistance. Gait with Eyes Closed: 1  Instructions: Walk at your normal speed from here to the next ioana (6 m, 20 ft) with your eyes closed. Grading: Cornelius Reese the highest category that applies. (3) Normal - Walks 6 m (20 ft), no assistive devices, good speed, no evidence of imbalance, normal gait pattern, deviates no more than 6 in outside 12 in. walkway width. Ambulates        6 m (20ft) in less than 7 seconds. (2) Mild impairment - Walks 6 m (20 ft), uses assistive device, slower speed, mild gait deviations, deviates 6-10 in. outside 12 in. walkway width. Ambulates 6 m (20 ft) in less than 9        seconds but greater than 7 seconds. (1) Moderate impairment - Walks 6 m (20 ft), slow speed, abnormal gait pattern, evidence for imbalance, deviates 10-15 in. outside 12 in. walkway width. Requires more than 9        seconds to ambulate 6 m (20 ft). (0) Severe impairment - Cannot walk 6 m (20 ft) without assistance, severe gait deviations or imbalance, deviates greater than 15 in. outside 12 in. walkway width or will not attempt        task. Ambulating Backwards: 2  Instructions: Walk backwards until I tell you to stop.   Grading: Cornelius Reese the highest category that applies. (3) Normal - Walks 6 m (20 ft), no assistive devices, good speed, no evidence for imbalance, normal gait pattern, deviates no more than 6 in. outside 12 in. walkway width. (2) Mild impairment - Walks 6 m (20 ft), uses assistive device, slower speed, mild gait deviations, deviates 6-10 in. outside 12 in. walkway width. (1) Moderate impairment - Walks 6 m (20 ft), slow speed, abnormal gait pattern, evidence for imbalance, deviates 10-15 in. outside 12 in. walkway width. (0) Severe impairment - Cannot walk 6m (20 ft) without assistance, severe gait deviations or imbalance, deviates greater than 15 in. outside 12in. walkway width or will not attempt        task. Steps: 1  Instructions: Walk up these stairs as you would at home (ie. Using the rail if necessary). At the top turn around and walk down. Grading: Sara Neve the highest category that applies. (3) Normal - Alternating feet, no rail   (2) Mild impairment - Alternating feet, must use rail. (1) Moderate impairment - Two feet to a stair, must use rail.   (0) Severe impairment - Cannot do safely.         Total Score: 17 (maximum score = 30)      Cutoff scores:   - Non-Specific Older Adults: < or equal to 22/30 = risk of falls   - Parkinson's Disease: <15/30 = fall risk, Anson and Yahr 1-4, <18/30 = fall risk for inpatients; Hoen and Yahr 1-4

## 2023-01-03 NOTE — PROGRESS NOTES
[x] [facility]) [facility] TWELVESTEP [facility] &  Therapy  [address]  [phone]  F: [phone] [] [address]  [address]   [address]  P: [phone]  F: [phone] [] [address] &  Therapy  [address]  P: [phone]  F: [phone] [] [address]  P: [phone]  F: [phone] [] [address]  [facility]   [address]   [place] Mor: [phone]  F: [phone]      Physical Therapy Progress Note    Date: 1/3/2023      Patient: Libra November  : 1960  MRN: [id number]    Physician: JACQUELIN Arroyo                              Insurance: [employer] after eval)  Medical Diagnosis: I63.9 (ICD-10-CM) - Ischemic stroke ([facility] [address]    Rehab Codes: R26.89, M26.81, R29.3  Next 's appt.: 22 PCP  Date of symptom onset: 22 (chronic onset)  Visit# / total visits: 15; Progress note for Medicare patient due at visit 8                                    Cancels/No Shows: 0  Date range of services: 22 to 1/3/23      Subjective:  Pain:  [] Yes  [x] No   Location:  N/A Pain Rating: (0-10 scale) 0/10  Pain altered Tx:  [x] No  [] Yes  Action:  Comments: Pt arrives late to session, also accidentally sent to OT first. Denies any issues this date. Objective:  Test Measurements:see goal assessment below  Function: see goal assessment below    Assessment:  [x] Progressing toward goals. Pt has made progress since evaluation and STG in terms of self selected gait speed, fast gait speed, range of quad strength in TKE (still remains poor, 2+/5), and some increased power noted with elevated surface sit <> stands without UE assist. Progress remains slow, limited by overall fatigue and difficulty pushing intensity tolerance.  Will benefit from additional visits for further gait speed improvement, further quad/hamstring strengthening for power with sit to stands as well as stair climbing, and further increased safety with balance challenges within dynamic gait tasks. [] No change. [x] Other:   [x] Patient would continue to benefit from skilled physical therapy services in order to:  progress R hemibody strength in RUE/RLE, increase gait speed and improve mechanics, improve functional quad/hip strength to increase stability in standing and ease of transfers, improve balance within gait tasks, and overall improve quality of life. STG: (to be met in 8 treatments) - Assessed on 11/29/22 by Chayito Nicolas, PT:  ? ROM: Negative for iliopsoas tightness on Bryan test on RLE for improved ability to achieve terminal hip ext - MET, no tightness noted  ? Balance:   Pt able to ambulate with intermittent head turns, stopping without slowed gait speed nor instability noted - Ongoing, slows down overall speed when attempting head turns  Pt able to step over 4\" objects while walking with no speed decrease, no instability noted  ? Strength: At least 3/5 R quads to allow improved stability with RLE SLS positions including stair climbing, etc - Ongoing, 2+/5 quads but improved stability with RLE SLS noted for stair climbing and gait, still fatigues very quickly after ~7 steps, ~200 ft starts to decrease R stance time  ? Function:   Pt able to ambulate at least .52m/s with [employer] [place] on level ground to progress towards decreased fall risk - Partially met, .43m/s self selected; .59m/s fast  Pt able to ascend/descend flight of stairs with improving speed, reciprocal ascent/descent with unilat UE assist - MET for ascent, step-to descent   Patient to be independent with home exercise program as demonstrated by performance with correct form without cues.  - MET  Demonstrate Knowledge of fall prevention -MET, reviewed 11/29 though pt notes d/t sharing home has some fall-risk objects in gait path     LTG: (to be met in 22 treatments) - Assessed on 1/3/23 by Ayla Lerma, PT, EXTENDED d/t incomplete achievement:  ? Balance: At least 19/30 on FGA to indicate significant improvement in dynamic balance - Made progress, 17/30 (was at 13 on eval)  At least 75% confidence on ABC scale to indicate increased self efficacy with balance - MET, 82%  ? Strength: At least 3+/5 R quads to allow further improving stability with RLE SLS positions including stair climbing, etc - Making progress, still at 2+/5 however range of strength on LAQ has increased from lacking ~60deg to lacking only ~10deg  ?  Function:   Pt able to ambulate at least .62m/s with [employer] on level ground to progress towards decreased fall risk - Partially MET, .51m/s self selected; .76m/s fast  Pt able to ascend/descend flight of stairs with further improving speed and safety reciprocal ascent/descent with unilat UE assist - Making progress, slightly increased speed ascent but still slow/cautious descent - step through ascent until last 3 steps, step to descent  Able to complete 5x STS without UE assist in under 1 min to indicate improving functional BLE power and strength for transfers - NOT MET from typical seat height as he needs UE assist; however, gets 18 reps no UE assist from elevated surface           Patient goals: \"get back to normal\"    Treatment Plan:  [x] Therapeutic Exercise   70886  [] Iontophoresis: 4 mg/mL Dexamethasone Sodium Phosphate  mAmin  58379   [x] Therapeutic Activity  42107 [] Vasopneumatic cold with compression  34533    [x] Gait Training   09060 [] Ultrasound   63904   [x] Neuromuscular Re-education  30844 [] Electrical Stimulation Unattended  81781   [x] Manual Therapy  19177 [] Electrical Stimulation Attended  15543   [x] Instruction in HEP  [] Lumbar/Cervical Traction  [id number]   [] Aquatic Therapy   18588 [] Cold/hotpack    [] Massage 51637      [] Dry Needling, 1 or 2 muscles  54691   [] Biofeedback, first 15 minutes   25396  [] Biofeedback, additional 15 minutes   42513 [] Dry Needling, 3 or more muscles  12943       Patient Status:     [] Continue per initial plan of care. [x] Additional visits necessary. [] Other:     Requested Frequency/Duration: 2 times per week for 6 treatments. Electronically signed by Erickson Wu PT on 1/3/2023 at 2:51 PM      If you have any questions or concerns, please don't hesitate to call. Thank you for your referral.    Physician Signature:________________________________Date:__________________  By signing above or cosigning this note, I have reviewed this plan of care and certify a need for medically necessary rehabilitation services.      *PLEASE SIGN ABOVE AND FAX BACK ALL PAGES*

## 2023-01-03 NOTE — FLOWSHEET NOTE
[x] [facility]  [address]  [phone]  F: [phone] [] [address]  [facility]   [address]  P: [phone]  F: [phone] [] [place] &  Therapy  [address]  P: [phone]  F: [phone] [] [address]  P: [phone]  F: [phone] [] [address]  [facility]   [address]   [place]: [phone]  F: [phone]      Physical Therapy Daily Treatment Note    Date:  1/3/2023  Patient Name:  Magui Song    :  1960  MRN: [id number]  Physician: TOY iLm-KAREN                              Insurance: [employer] Roena Friadriana after eval)  Medical Diagnosis: I63.9 (ICD-10-CM) - Ischemic stroke [facility])    Rehab Codes: R26.89, M26.81, R29.3  Next 's appt.: 22 PCP  Date of symptom onset: 22 (chronic onset)  Visit# / total visits: 15; Progress note for Medicare patient due at visit 8     Cancels/No Shows: 0    Subjective:    Pain:  [] Yes  [x] No Location:  N/A Pain Rating: (0-10 scale) 0/10  Pain altered Tx:  [x] No  [] Yes  Action:  Comments: Pt arrives late to session, also accidentally sent to OT first. Denies any issues this date. Objective:  LAQ on RLE:  lacking 10deg from TKE but able to hold against min resistance: 2+/5  6MWT: 530 ft (<1200ft indicates less than community level distances)      Modalities:   See below in treatment log for treatments performed.  LOG IN: [id number]; PIN: [id number]    Precautions: Left hemiparesis, fal risk  Exercises: Bolded completed 1/3/2023: HELD treatment d/t LTG assessment, see below for details:  Exercise Reps/ Time Weight/ Level Comments   6MWT   6MWT: 11 lengths x 39 ft = 429 ft  On : 530 ft                     Xcite   Billed under neuro, including skilled set up and don/doffing time  NO XCITE 12/27   Sit <> stand 1x15  No UE assist   RLE biased sit<>stand 2x10 4\" LLE on step, elevated mat table   LLE step taps 3x15 4\" Progressing from unilat UE assist to none  - 2 sets on 11/22   RLE modified SLS holds 2x90\" 4\" LLE on step  - concurrent tasks: 10x trunk twist, 5x reach to toe   LAQs 2x15 RLE    Prone knee flexion 3x10 RLE    Step ups 3x15 6\" In inpatient gym; unilat UE assist                             Resisted rows 3x15 ea purple    Standing ball roll out 20x Red physioball Using RUE primarily to roll out, LUE stabilizes RUE on ball   Pushing ball w/ only RUE 10x \" \" Therapist stabilizes hand on ball - very fatiguing for patient   Ball punches 4x30\" Red physioball 2x hooks  2x upper cuts         Knee extension machine 15x 0# Min assist by therapist with RLE extension  - completing bilat, very fatiguing, half range    Leg press 1x20 60#                Gait train      Resisted gait train 3x300 ft Purple, 5# RLE 1 lap around OT = 150 ft  - RPE: 18 per pt    2nd and 3rd set: with intermittent hurdles, foam mat for unsteady surface amb   Gait down to car 600+ feet SBQC Including decline, thresholds, elevator, crowd negotiation   Stair training 2x8 steps  Attempting reciprocal gait ascent/descent, too difficult descent   Obstacle course ~200 ft 5# RLE Hurdles, foam,  cone weaving, unlevel ground     Gait w/ head turns 100 ft     Other:     LTG assessment: including 10MWT, FGA, stair assessment, strength assessment - billed with primarily gait and some therex as appropriate      6MWT, fall prevention  - bodyweight support treadmill train  - gastroc/hip flexor stretching  - Xcite for RLE stance control with LLE OKC tasks  - progressively decreased height sit<>stands without UE assist  - single limb quad strengthening - leg press, leg extension, etc      Treatment Charges: Mins Units   []  Modalities     [x]  Ther Exercise 6 1   []  Manual Therapy []  Ther Activities     []  Aquatics     [x]  Gait 34 2   []  Other: Neuro     Total Treatment time 40 3       Assessment: [x] Progressing toward goals. Pt has made progress since evaluation and STG in terms of self selected gait speed, fast gait speed, range of quad strength in TKE (still remains poor, 2+/5), and some increased power noted with elevated surface sit <> stands without UE assist. Progress remains slow, limited by overall fatigue and difficulty pushing intensity tolerance. Will benefit from additional visits for further gait speed improvement, further quad/hamstring strengthening for power with sit to stands as well as stair climbing, and further increased safety with balance challenges within dynamic gait tasks. [] No change. [x] Other:   [x] Patient would continue to benefit from skilled physical therapy services in order to:  progress R hemibody strength in RUE/RLE, increase gait speed and improve mechanics, improve functional quad/hip strength to increase stability in standing and ease of transfers, improve balance within gait tasks, and overall improve quality of life. STG: (to be met in 8 treatments) - Assessed on 11/29/22 by Elisabeth Villa, PT:  ? ROM: Negative for iliopsoas tightness on Bryan test on RLE for improved ability to achieve terminal hip ext - MET, no tightness noted  ? Balance:   Pt able to ambulate with intermittent head turns, stopping without slowed gait speed nor instability noted - Ongoing, slows down overall speed when attempting head turns  Pt able to step over 4\" objects while walking with no speed decrease, no instability noted  ? Strength: At least 3/5 R quads to allow improved stability with RLE SLS positions including stair climbing, etc - Ongoing, 2+/5 quads but improved stability with RLE SLS noted for stair climbing and gait, still fatigues very quickly after ~7 steps, ~200 ft starts to decrease R stance time  ?  Function:   Pt able to ambulate at least .52m/s with SBQC on level ground to progress towards decreased fall risk - Partially met, .43m/s self selected; .59m/s fast  Pt able to ascend/descend flight of stairs with improving speed, reciprocal ascent/descent with unilat UE assist - MET for ascent, step-to descent   Patient to be independent with home exercise program as demonstrated by performance with correct form without cues. - MET  Demonstrate Knowledge of fall prevention -MET, reviewed 11/29 though pt notes d/t sharing home has some fall-risk objects in gait path     LTG: (to be met in 22 treatments) - Assessed on 1/3/23 by Mark Valenzuela, PT, EXTENDED d/t incomplete achievement:  ? Balance: At least 19/30 on FGA to indicate significant improvement in dynamic balance - Made progress, 17/30 (was at 13 on eval)  At least 75% confidence on ABC scale to indicate increased self efficacy with balance - MET, 82%  ? Strength: At least 3+/5 R quads to allow further improving stability with RLE SLS positions including stair climbing, etc - Making progress, still at 2+/5 however range of strength on LAQ has increased from lacking ~60deg to lacking only ~10deg  ? Function:   Pt able to ambulate at least .62m/s with [employer] on level ground to progress towards decreased fall risk - Partially MET, .51m/s self selected; .76m/s fast  Pt able to ascend/descend flight of stairs with further improving speed and safety reciprocal ascent/descent with unilat UE assist - Making progress, slightly increased speed ascent but still slow/cautious descent - step through ascent until last 3 steps, step to descent  Able to complete 5x STS without UE assist in under 1 min to indicate improving functional BLE power and strength for transfers - NOT MET from typical seat height as he needs UE assist; however, gets 18 reps no UE assist from elevated surface           Patient goals: \"get back to normal\"    Pt.  Education:  [x] Yes  [] No  [x] Reviewed Prior HEP/Ed  Method of Education: [x] Verbal  [] Demo  [] Written  Comprehension of Education:  [x] Verbalizes understanding. [] Demonstrates understanding. [] Needs review. [] Demonstrates/verbalizes HEP/Ed previously given.     Current HEP as of 12/15: sit to stands no hands, LLE step taps w/ cane in LUE, step overs within gait path     Plan: [x] Continue current frequency toward long and short term goals - requesting additional visits, see prog note same date  [x] Specific Instructions for subsequent treatments: Xcite training, litegait training      Time In: 2:12 pm            Time Out: 2:57 pm    Electronically signed by:  Iliana Mitchell PT

## 2023-01-05 ENCOUNTER — HOSPITAL ENCOUNTER (OUTPATIENT)
Dept: OCCUPATIONAL THERAPY | Age: 63
Setting detail: THERAPIES SERIES
Discharge: HOME | End: 2023-01-05
Payer: MEDICARE

## 2023-01-05 PROCEDURE — 97110 THERAPEUTIC EXERCISES: CPT

## 2023-01-05 PROCEDURE — 97112 NEUROMUSCULAR REEDUCATION: CPT

## 2023-01-05 NOTE — FLOWSHEET NOTE
[x] [facility]       Occupational Therapy            1st floor       [address], [place]         Phone: [phone]       Fax: [phone] [] [facility]  [address], [place]  Phone: [phone]  Fax: [phone]      Occupational Therapy Daily Treatment Note    Date:  2023  Patient Name:  Chris Bates    :  1960  MRN: [id number]  Referring Provider:  TOY Blanton CNP  Insurance: [employer] - 24 visits, from 10/13/22 to 22  Medical Diagnosis: Right hemiplegia G81.91            Rehab Codes: lack of coordination R27.8,, fine motor skills loss R29.818,, or muscle weakness generalized M62.81,  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: 23 - neuro  Visit# / total visits:  (visit count corrected 23); Progress note for patient due at visit 18-20    Cancels/No Shows: 3/1      Subjective:    Pain:  [] Yes  [x] No Location: N/A Pain Rating: (0-10 scale) 0/10  Pain altered Tx:  [x] No  [] Yes  Action: NA  Pt Comments: Reports Anna Cedeño began coming off at home, doesn't feel like it gave him much support. No other new changes or reports since last visit. Precautions: NA  Surgery procedure and date: NA    Objective: Today's Treatment:  Modalities:  Treatment this date included using Functional Electrical Stimulation via the Xcite 2. Stimulation parameters and outcomes can be viewed on [email or web address] with the patients' username (patient ID generated by Ryan without any patient identifiers) and password (patients' pin generated by Ryan without any patient identifiers). LOG IN: [id number]; PIN: [id number]     A check of the patients' skin prior to application of electrodes, and after the treatment concluded was completed and no adverse reactions noted. See below in treatment log for treatments performed.     Not completed this date: Functional Electrical Stimulation via the Qustodio H200,  Stimulation parameters and outcomes can be viewed on [employer] with the patients' username (patient ID is generated by first initial of first name, and first initial of last name, followed by two digit month and two digit day the treatment commenced), and no patient specific password required. A check of the patients' skin prior to application of electrodes, and after the treatment concluded was completed and no adverse reactions noted. Name: Micheline Segovia  Patient ID: [id number]      See below in treatment log for treatments performed. Exercises:  EXERCISE    REPS/     TIME  WEIGHT/    LEVEL COMMENTS   Bioness Training  30  minutes   Not Completed - neuromodulation (10 minutes), grasp and release (15 minutes) plus time for set up   Bicep curls - standing 15 reps, 2 sets 2lb wrist weight  Not Completed    Scapular exercises - protraction/retraction  15 reps each, 2 sets  Blue tband  Completed - modified, completed rowing motion with tband to promote protraction/retraction    Towel slides  10 reps, 2 sets    Not Completed    External Rotation  15 reps  Completed - AA, use of towel on table   top   Error augmentation - elbow extension 5 reps  Manual resistance  Not Completed    Hand Exerciser 15 reps x 2 sets 10 pounds Not Completed    Xcite training  15 mins   Completed - forward reach and grasp pattern, plus time for skilled set-up and donning/doffing    Ktape for R shoulder subluxation 10 minutes   Not completed    Elbow flexion with manual resistance  15 reps, 2 sets  Blue t  band  Added & completed - completed   standing    AAROM - R shoulder on yellow therapy ball  ~2 minutes   Added & completed - hand over hand needed to keep R hand in place on ball    Tricep pushes - into yellow ball and on edge of table  15 reps, 2 sets on each surface   Added & completed - hand over hand needed to keep hand in place   Other: NA      Assessment: [x] Progressing toward goals.  Initiated different strengthening exercises to provide higher intensity treatment session to allow for increased neuroplasticity. Utilized tband and other items within clinic to elicit error augmentation for increased motor recruitment. Mod-max effort required to complete above exercise program. Carryover noted with elbow flexion following tband activity. Added specific exercises to target tricep muscles for improved elbow extension. Finished with xcite treatment of forearm and digit extensors and flexors. Good extensor response noted with digits and wrist with use of Xcite. Weak flexor response noted, however pt only able to tolerate a low intensity to flexors. No pain or adverse reactions with use of Xcite. Pt somewhat self-limiting with therapeutic exercises. Educated on importance/benefits of strength training for neuro recovery. Mod fatigue noted at end of session. [] No change. [] Other     [x] Patient would continue to benefit from skilled occupational therapy services in order to: Restore  functional /grasp, ROM, Strength, and Activity tolerance in order to improve functional use of UE in ADL performance       Short Term Goals: (  8-10    Treatments)  Increase AROM  R shoulder flexion by 10 degrees to improve ability to complete functional activities - NOT MET  Demo R shoulder external rotation of 10 degrees to improve ability to complete functional activities - MET  R elbow extension by 5 degrees to improve ability to complete functional activities - MET  Demo trace active extension of all R digits - MET  Increase strength (pounds);  R  by 2 pounds to improve functional use of R hand to complete daily activities - MET  All R pinches by 1 pound to improve functional use of R hand to complete daily activities - NOT MET  Patient to be independent with home exercise program as demonstrated by performance with correct form without cues.  - MET     Long Term Goals: (  24  Treatments)  Increase functional skills AEB a score of 50/80 with UEFI  Demo active extension of all R digits to release object  Increase R  strength by 5+ pounds to complete daily activities with less difficulty  Increase all R pinch strength measurements by 2+ pounds for less difficulty completing daily activities   Demo functional movements with R hand following use of Bioness/Xcite for carryover of completion of functional activities   Increase R shoulder flexion by 15+ degrees for less difficulty completing daily activities      Patient Goals: get hand functioning       Pt. Education:  [] Yes  [x] No  [] Reviewed Prior HEP/Ed   Method of Education: [] Verbal  [] Demo  [] Written  Re:   Comprehension of Education:  [] Verbalizes understanding. [] Demonstrates understanding. [] Needs review. [] Demonstrates/verbalizes HEP/Ed previously given. Plan: [x] Continue current frequency toward short and long term goals.   [x] Specific Instructions for subsequent treatments: high intensity UB program    [] Other:       Time In: 1753  Time CUB:0248        Treatment Charges: Mins Units   []  Modalities:      []  Ultrasound     [x]  Ther Exercise 12 1   []  Manual Therapy     []  Ther Activities     []  Orthotic fit/train     []  Orthotic recheck     [x]  Other: neuro re-ed  45 3   Total Treatment time 57 4       Electronically signed by:  MODESTO Hubbard/JANNET

## 2023-01-09 ENCOUNTER — HOSPITAL ENCOUNTER (OUTPATIENT)
Dept: OCCUPATIONAL THERAPY | Age: 63
Setting detail: THERAPIES SERIES
Discharge: HOME | End: 2023-01-09
Payer: MEDICARE

## 2023-01-09 PROCEDURE — 97112 NEUROMUSCULAR REEDUCATION: CPT

## 2023-01-09 PROCEDURE — 97110 THERAPEUTIC EXERCISES: CPT

## 2023-01-09 NOTE — FLOWSHEET NOTE
[x] [facility]  Outpatient Rehabilitation &  Therapy  [address]  [phone]  F: [phone]          Therapy Pre-certification Note      1/9/2023    Kade Jade  1960   [id number]      **to note - patient has already used 4 of the 6 currently approved visits, with 2 remaining visits**    Insurance approval was received for Physical Therapy from [employer] on 1/9/23.  Approval was received for 6 visits/24 units, from 12/20/22 to 2/28/23.  Authorization number [id number].    Patient is already scheduled       Electronically signed by Anam Manning PTA on 1/9/2023 at 3:08 PM

## 2023-01-09 NOTE — FLOWSHEET NOTE
[x] [facility]       Occupational Therapy            1st floor       [address], [place]         Phone: [phone]       Fax: [phone] [] [facility]  [address], [place]  Phone: [phone]  Fax: [phone]      Occupational Therapy Daily Treatment Note    Date:  2023  Patient Name:  Randell Quiles    :  1960  MRN: [id number]  Referring Provider:  Sasha Dos Santos, APRN - CNP  Insurance: [employer] - 24 visits, from 10/13/22 to 22  Medical Diagnosis: Right hemiplegia G81.91            Rehab Codes: lack of coordination R27.8,, fine motor skills loss R29.818,, or muscle weakness generalized M62.81,  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: 23 - neuro  Visit# / total visits:  (visit count corrected 23); Progress note for patient due at visit 18-20    Cancels/No Shows: 3/1      Subjective:    Pain:  [] Yes  [x] No Location: N/A Pain Rating: (0-10 scale) 0/10  Pain altered Tx:  [x] No  [] Yes  Action: NA  Pt Comments: Reports using a thigh master at home to work on L tricep strength and elbow extension. Precautions: NA  Surgery procedure and date: NA    Objective: Today's Treatment:  Modalities:  Treatment this date included using Functional Electrical Stimulation via the Xcite 2. Stimulation parameters and outcomes can be viewed on [email or web address] with the patients' username (patient ID generated by [employer] without any patient identifiers) and password (patients' pin generated by [employer] without any patient identifiers). LOG IN: [id number]; PIN: [id number]     A check of the patients' skin prior to application of electrodes, and after the treatment concluded was completed and no adverse reactions noted. See below in treatment log for treatments performed.     Not completed this date: Functional Electrical Stimulation via the Zorap H200,  Stimulation parameters and outcomes can be viewed on [employer] with the patients' username (patient ID is generated by first initial of first name, and first initial of last name, followed by two digit month and two digit day the treatment commenced), and no patient specific password required. A check of the patients' skin prior to application of electrodes, and after the treatment concluded was completed and no adverse reactions noted. Name: Jessica Contreras  Patient ID: [id number]      See below in treatment log for treatments performed. Exercises:  EXERCISE    REPS/     TIME  WEIGHT/    LEVEL COMMENTS   Bioness Training  30  minutes   Not Completed - neuromodulation (10 minutes), grasp and release (15 minutes) plus time for set up   Bicep curls - standing 15 reps, 2 sets 2lb wrist weight  Not Completed    Scapular exercises - protraction/retraction  15 reps each, 2 sets  Blue tband  Completed - modified, completed rowing motion with tband to promote protraction/retraction    Towel slides  10 reps, 2 sets    Not Completed    External Rotation  15 reps  Completed - AA, use of towel on table   top   Error augmentation - elbow extension 5 reps  Manual resistance  Not Completed    Hand Exerciser 15 reps x 2 sets 10 pounds Not Completed    Xcite training  15 mins   Completed - forward reach and grasp pattern, plus time for skilled set-up and donning/doffing    Ktape for R shoulder subluxation 10 minutes   Not completed    \"Boxing\" -   Elbow flexion with resistance  30 reps, 2 sets  Blue tband  Completed - completed   standing    AAROM - R shoulder on yellow therapy ball  ~6 minutes, 1 rest break   Completed - hand over hand needed to keep R hand in place on ball    Tricep pushes - on edge of table  15 reps, 2 sets on each surface   Completed - hand over hand needed to keep hand in place.  Completed just until bottom comes off table    AAROM - R elbow ext/flex and shoulder IR/ER 10 minutes  0 weight  Completed - supine for gravity eliminated, attempted with 2lb and 1lb wrist cuff however was too difficult   Standing to wipe table - scap protraction/retraction 5 minutes   Added & completed    Other: NA      Assessment: [x] Progressing toward goals. Fair tolerance of AA stretching of R shoulder with use of yellow therapy ball. Moderate compensations noted with trunk while trying to complete. Little carryover with VCs. Unable to initiate any elbow flexion while supine. Poor control of R forearm when completing elbow flexion supine. Continued higher intensity treatment interventions to allow for increased neuroplasticity. Utilized tband and other items within clinic to elicit error augmentation for increased motor recruitment. Mod-max effort required to complete above exercise program. Carryover noted with elbow flexion following tband activity. Significant fatigue noted at end of session. R shoulder and elbow remain tight, educated on keeping up with stretching program at home. [] No change.   [] Other     [x] Patient would continue to benefit from skilled occupational therapy services in order to: Restore  functional /grasp, ROM, Strength, and Activity tolerance in order to improve functional use of UE in ADL performance       Short Term Goals: (  8-10    Treatments)  Increase AROM  R shoulder flexion by 10 degrees to improve ability to complete functional activities - NOT MET  Demo R shoulder external rotation of 10 degrees to improve ability to complete functional activities - MET  R elbow extension by 5 degrees to improve ability to complete functional activities - MET  Demo trace active extension of all R digits - MET  Increase strength (pounds);  R  by 2 pounds to improve functional use of R hand to complete daily activities - MET  All R pinches by 1 pound to improve functional use of R hand to complete daily activities - NOT MET  Patient to be independent with home exercise program as demonstrated by performance with correct form without cues. - MET     Long Term Goals: (  24  Treatments)  Increase functional skills AEB a score of 50/80 with UEFI  Demo active extension of all R digits to release object  Increase R  strength by 5+ pounds to complete daily activities with less difficulty  Increase all R pinch strength measurements by 2+ pounds for less difficulty completing daily activities   Demo functional movements with R hand following use of Bioness/Xcite for carryover of completion of functional activities   Increase R shoulder flexion by 15+ degrees for less difficulty completing daily activities      Patient Goals: get hand functioning       Pt. Education:  [] Yes  [x] No  [] Reviewed Prior HEP/Ed   Method of Education: [] Verbal  [] Demo  [] Written  Re:   Comprehension of Education:  [] Verbalizes understanding. [] Demonstrates understanding. [] Needs review. [] Demonstrates/verbalizes HEP/Ed previously given. Plan: [x] Continue current frequency toward short and long term goals.   [x] Specific Instructions for subsequent treatments: high intensity UB program    [] Other:       Time In: 1406  Time Out: 1502        Treatment Charges: Mins Units   []  Modalities:      []  Ultrasound     [x]  Ther Exercise 15 1   []  Manual Therapy     []  Ther Activities     []  Orthotic fit/train     []  Orthotic recheck     [x]  Other: neuro re-ed  41 3   Total Treatment time 56 4       Electronically signed by:  MODESTO Tabares/JANNET

## 2023-01-10 ENCOUNTER — APPOINTMENT (OUTPATIENT)
Dept: OCCUPATIONAL THERAPY | Age: 63
End: 2023-01-10
Payer: MEDICARE

## 2023-01-11 ENCOUNTER — HOSPITAL ENCOUNTER (OUTPATIENT)
Dept: PHYSICAL THERAPY | Age: 63
Setting detail: THERAPIES SERIES
Discharge: HOME | End: 2023-01-11
Payer: MEDICARE

## 2023-01-11 ENCOUNTER — HOSPITAL ENCOUNTER (OUTPATIENT)
Dept: OCCUPATIONAL THERAPY | Age: 63
Setting detail: THERAPIES SERIES
Discharge: HOME | End: 2023-01-11
Payer: MEDICARE

## 2023-01-11 NOTE — FLOWSHEET NOTE
[x] [facility] ([facility]) - [facility] &  Therapy  [address]    [phone]  F: [phone]   [] [address]  [facility] [address]   [address]  P: [phone]  F: [phone]  [] [address] &  Therapy  [address]  P: [phone]  F: [phone] [] [address]  P: [phone]  F: [phone]  [] [address]  [facility]   [address]   [place]: [phone]  F: [phone]   [] [place] [address] [facility] [address]  [place]: [phone]   F: [phone]     Physical Therapy Cancel/No Show note    Date: 2023  Patient: Charline Vann  : 1960  MRN: [id number]    Cancels/No Shows to date: 1 cx/ 0 ns    For today's appointment patient:    [x]  Cancelled    [] Rescheduled appointment    [] No-show     Reason given by patient:    [x]  Patient ill    []  Conflicting appointment    [] No transportation      [] Conflict with work    [] No reason given    [] Weather related    [] COVID-19    [] Other:      Comments:        [x] Next appointment was confirmed    Electronically signed by: Florida Kehr, PT

## 2023-01-12 ENCOUNTER — APPOINTMENT (OUTPATIENT)
Dept: OCCUPATIONAL THERAPY | Age: 63
End: 2023-01-12
Payer: MEDICARE

## 2023-01-16 ENCOUNTER — HOSPITAL ENCOUNTER (OUTPATIENT)
Dept: OCCUPATIONAL THERAPY | Age: 63
Setting detail: THERAPIES SERIES
Discharge: HOME | End: 2023-01-16
Payer: MEDICARE

## 2023-01-16 ENCOUNTER — HOSPITAL ENCOUNTER (OUTPATIENT)
Dept: PHYSICAL THERAPY | Age: 63
Setting detail: THERAPIES SERIES
Discharge: HOME | End: 2023-01-16
Payer: MEDICARE

## 2023-01-16 NOTE — FLOWSHEET NOTE
[x] [facility]) [facility] &  Therapy  [address]    [phone]  F: [phone]   [] [address]  [facility] 36   [address]  P: [phone]  F: [phone]  [] [address]  [address]  P: [phone]  F: [phone] [] [address]  P: [phone]  F: [phone]  [] [address]  [facility]   [address]   [place]: [phone]  F: [phone]   [] [place] [address] [facility] [address]  [place]: [phone]   F: [phone]     Physical Therapy Cancel/No Show note    Date: 2023  Patient: Skyler Johnson  : 1960  MRN: [id number]    Cancels/No Shows to date: 2 cx/ 0 ns    For today's appointment patient:    [x]  Cancelled    [] Rescheduled appointment    [] No-show     Reason given by patient:    [x]  Patient ill    []  Conflicting appointment    [] No transportation      [] Conflict with work    [] No reason given    [] Weather related    [] COVID-19    [x] Other:      Comments:  Pt has virus, will call back to schedule when feeling better.       [] Next appointment was confirmed    Electronically signed by: Blanca Watkins PT

## 2023-01-16 NOTE — SIGNIFICANT EVENT
[x] [address]        Occupational Therapy       [address] [facility], 1st Floor       Phone: [phone]       Fax: [phone] [] [facility] Occupational  Therapy at [facility]       Phone: [phone]       Fax: [phone]          Occupational Therapy Cancel/No Show note    Date: 2023  Patient: Theron Oh  : 1960  MRN: [id number]  Cancels/No Shows to date:     For today's appointment patient:  [x]  Cancelled  []  Rescheduled appointment  []  No-show     Reason given by patient:  [x]  Patient ill  []  Conflicting appointment  []  No transportation    []  Conflict with work  []  No reason given  [x]  Other:  reports he has virus, conf next appt    Comments:      Electronically signed by: Lyndsey Kim OTR/L

## 2023-01-17 ENCOUNTER — APPOINTMENT (OUTPATIENT)
Dept: OCCUPATIONAL THERAPY | Age: 63
End: 2023-01-17
Payer: MEDICARE

## 2023-01-18 ENCOUNTER — HOSPITAL ENCOUNTER (OUTPATIENT)
Dept: OCCUPATIONAL THERAPY | Age: 63
Setting detail: THERAPIES SERIES
Discharge: HOME | End: 2023-01-18
Payer: MEDICARE

## 2023-01-18 ENCOUNTER — HOSPITAL ENCOUNTER (OUTPATIENT)
Dept: PHYSICAL THERAPY | Age: 63
Setting detail: THERAPIES SERIES
Discharge: HOME | End: 2023-01-18
Payer: MEDICARE

## 2023-01-18 PROCEDURE — 97110 THERAPEUTIC EXERCISES: CPT

## 2023-01-18 PROCEDURE — 97112 NEUROMUSCULAR REEDUCATION: CPT

## 2023-01-18 PROCEDURE — 97116 GAIT TRAINING THERAPY: CPT

## 2023-01-18 NOTE — FLOWSHEET NOTE
[x] [facility]       Occupational Therapy            1st floor       [address], [place]         Phone: [phone]       Fax: [phone] [] [facility] Occupational Therapy  [address], [place]  Phone: [phone]  Fax: [phone]      Occupational Therapy Daily Treatment Note    Date:  2023  Patient Name:  Wale Bright    :  1960  MRN: [id number]  Referring Provider:  TOY Lundberg - CNP  Insurance: [employer] - 24 visits, from 10/13/22 to 22  Medical Diagnosis: Right hemiplegia G81.91            Rehab Codes: lack of coordination R27.8,, fine motor skills loss R29.818,, or muscle weakness generalized M62.81,  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 Suzanne Ca: 23 - neuro  Visit# / total visits:  (visit count corrected 23); Progress note for patient due at visit 18-20    Cancels/No Shows:       Subjective:    Pain:  [] Yes  [x] No Location: N/A Pain Rating: (0-10 scale) 0/10  Pain altered Tx:  [x] No  [] Yes  Action: NA  Pt Comments: Reports TOÑO is a little sore since last visit. Reports he is still feeling a little weak from being sick. Reports he walked up to clinic for the first time today. Precautions: NA  Surgery procedure and date: NA    Objective: Today's Treatment:  Modalities:  Treatment this date included using Functional Electrical Stimulation via the Xcite 2. Stimulation parameters and outcomes can be viewed on [email or web address] with the patients' username (patient ID generated by [employer] without any patient identifiers) and password (patients' pin generated by [employer] without any patient identifiers). LOG IN: [id number]; PIN: [id number]     A check of the patients' skin prior to application of electrodes, and after the treatment concluded was completed and no adverse reactions noted. See below in treatment log for treatments performed.     Not completed this date: Functional Electrical Stimulation via the Kitenga H200,  Stimulation parameters and outcomes can be viewed on [employer] with the patients' username (patient ID is generated by first initial of first name, and first initial of last name, followed by two digit month and two digit day the treatment commenced), and no patient specific password required. A check of the patients' skin prior to application of electrodes, and after the treatment concluded was completed and no adverse reactions noted. Name: Paulette Escobar  Patient ID: [id number]      See below in treatment log for treatments performed. Exercises:  EXERCISE    REPS/     TIME  WEIGHT/    LEVEL COMMENTS   Bioness Training  30  minutes   Not Completed - neuromodulation (10 minutes), grasp and release (15 minutes) plus time for set up   Bicep curls - standing 15 reps, 2 sets 2lb wrist weight  Not Completed    Scapular exercises - protraction/retraction  15 reps each, 2 sets  Blue tband  Not Completed - modified, completed rowing motion with tband to promote protraction/retraction    Error augmentation - elbow extension 5 reps  Manual resistance  Not Completed    Hand Exerciser 15 reps x 2 sets 10 pounds Not Completed    Xcite training  15 mins   Not Completed - forward reach and grasp pattern, plus time for skilled set-up and donning/doffing    \"Boxing\" -   Elbow flexion with resistance  15 reps, 2 sets  Blue tband  Completed - completed   standing    AAROM - R shoulder on yellow therapy ball  ~6 minutes  Completed - hand over hand needed to keep R hand in place on ball    Tricep pushes - on edge of table  15 reps, 2 sets on each surface   Not Completed - hand over hand needed to keep hand in place.  Completed just until bottom comes off table    AAROM - R elbow ext/flex and shoulder IR/ER 10 minutes  0 weight  Completed - supine for gravity eliminated, attempted with 2lb and 1lb wrist cuff however was too difficult   Wiping table - protraction/retraction, ER/IR 2 minutes each motion, 2 sets    Completed - sitting at table   AA supination with weighted bar  20 reps 1 lb Added & completed    Active supination  20 reps  1/2 pound Completed    Other: NA      Assessment: [x] Progressing toward goals. Informed pt that Brandi from [facility] has been trying to reach him. Provided pt with Brandi's phone number, advised him to contact her if still wanting to move forward with potentially receiving Bioness device for home use. [employer] [place] needed to stabilize hand while completing table wiping activity. Compensatory movements noted with trunk, able to correct with VCs, little carryover noted. Fair tolerance of AA stretching of R shoulder with use of yellow therapy ball. Moderate compensations noted with trunk while trying to complete. Little carryover with VCs. Continued higher intensity treatment interventions to allow for increased neuroplasticity. Utilized tband and other items within clinic to elicit error augmentation for increased motor recruitment. Mod-max effort required to complete above exercise program. Significant fatigue noted at end of session. R shoulder and elbow remain tight, educated on keeping up with stretching program at home. [] No change.   [] Other     [x] Patient would continue to benefit from skilled occupational therapy services in order to: Restore  functional /grasp, ROM, Strength, and Activity tolerance in order to improve functional use of UE in ADL performance       Short Term Goals: (  8-10    Treatments)  Increase AROM  R shoulder flexion by 10 degrees to improve ability to complete functional activities - NOT MET  Demo R shoulder external rotation of 10 degrees to improve ability to complete functional activities - MET  R elbow extension by 5 degrees to improve ability to complete functional activities - MET  Demo trace active extension of all R digits - MET  Increase strength (pounds);  R  by 2 pounds to improve functional use of R hand to complete daily activities - MET  All R pinches by 1 pound to improve functional use of R hand to complete daily activities - NOT MET  Patient to be independent with home exercise program as demonstrated by performance with correct form without cues. - MET     Long Term Goals: (  24  Treatments)  Increase functional skills AEB a score of 50/80 with UEFI  Demo active extension of all R digits to release object  Increase R  strength by 5+ pounds to complete daily activities with less difficulty  Increase all R pinch strength measurements by 2+ pounds for less difficulty completing daily activities   Demo functional movements with R hand following use of Bioness/Xcite for carryover of completion of functional activities   Increase R shoulder flexion by 15+ degrees for less difficulty completing daily activities      Patient Goals: get hand functioning       Pt. Education:  [] Yes  [x] No  [] Reviewed Prior HEP/Ed   Method of Education: [] Verbal  [] Demo  [] Written  Re:   Comprehension of Education:  [] Verbalizes understanding. [] Demonstrates understanding. [] Needs review. [] Demonstrates/verbalizes HEP/Ed previously given. Plan: [x] Continue current frequency toward short and long term goals.   [x] Specific Instructions for subsequent treatments: high intensity UB program    [] Other:       Time In: 1306  Time Out: 1400        Treatment Charges: Mins Units   []  Modalities:      []  Ultrasound     [x]  Ther Exercise 14 1   []  Manual Therapy     []  Ther Activities     []  Orthotic fit/train     []  Orthotic recheck     [x]  Other: neuro re-ed  40 3   Total Treatment time 54 4       Electronically signed by:  MODESTO Jimenez/JANNET

## 2023-01-18 NOTE — FLOWSHEET NOTE
[x] [facility]) [facility] &  Therapy  [address]  [phone]  F: [phone] [] [address]  [facility]   [address]  P: [phone]  F: [phone] [] [address]  [address]  P: [phone]  F: [phone] [] [address]  P: [phone]  F: [phone] [] [address]  [facility]   [address]   [place]: [phone]  F: [phone]      Physical Therapy Daily Treatment Note    Date:  2023  Patient Name:  Nan Shukla    :  1960  MRN: [id number]  Physician: TOY Welch-KAREN                              Insurance: [employer] through )  Medical Diagnosis: I63.9 (ICD-10-CM) - Ischemic stroke [facility])    Rehab Codes: R26.89, M26.81, R29.3  Next 's appt.: 22 PCP  Date of symptom onset: 22 (chronic onset)  Visit# / total visits:  (visit count updated to reflect new auth approval); Progress note for Medicare patient due at visit 8     Cancels/No Shows: 0    Subjective:    Pain:  [] Yes  [x] No Location:  N/A Pain Rating: (0-10 scale) 0/10  Pain altered Tx:  [x] No  [] Yes  Action:  Comments: Pt arrives after a few weeks off d/t sickness - reportedly with stomach virus and is still recovering. Feeling still very tired and weak from recent illness. Objective:  LAQ on RLE:  lacking 10deg from TKE but able to hold against min resistance: 2+/5  6MWT: 530 ft (<1200ft indicates less than community level distances)      Modalities:   See below in treatment log for treatments performed.  LOG IN: [id number]; PIN: [id number]    Precautions: Left hemiparesis, fal risk  Exercises: Bolded completed 2023: HELD treatment d/t LTG assessment, see below for details:  Exercise Reps/ Time Weight/ Level Comments   6MWT   6MWT: 11 lengths x 39 ft = 429 ft  On 11/29: 530 ft                     Xcite   Billed under neuro, including skilled set up and don/doffing time  NOT TODAY   Sit <> stand 1x15  No UE assist   RLE biased sit<>stand 3x10 4\" LLE on step, elevated mat table   LLE step taps 3x15 4\" Progressing from unilat UE assist to none  - 2 sets on 11/22   RLE modified SLS holds 2x90\" 4\" LLE on step  - concurrent tasks: 10x trunk twist, 5x reach to toe   LAQs 2x15 RLE    Prone knee flexion 3x10 RLE    Step ups 3x15 6\" In inpatient gym; unilat UE assist                             Resisted rows 3x15 ea purple    Standing ball roll out 20x Red physioball Using RUE primarily to roll out, LUE stabilizes RUE on ball   Pushing ball w/ only RUE 10x \" \" Therapist stabilizes hand on ball - very fatiguing for patient   Ball punches 4x30\" Red physioball 2x hooks  2x upper cuts         Knee extension machine 1x15  1x10 0# Min assist by therapist with RLE extension  - completing bilat, very fatiguing, half range    Leg press 1x20 60#                Gait train      Resisted gait train 1x300 ft Purple, 5# RLE 1 lap around OT = 150 ft  - RPE: 13    2nd and 3rd set: with intermittent hurdles, foam mat for unsteady surface amb   Gait down to car 600+ feet SBQC Including decline, thresholds, elevator, crowd negotiation   Stair training 2x8 steps  Attempting reciprocal gait ascent/descent, too difficult descent   Obstacle course ~200 ft 5# RLE Hurdles, foam,  cone weaving, unlevel ground     Gait w/ head turns 100 ft     Other:       6MWT, fall prevention  - bodyweight support treadmill train  - gastroc/hip flexor stretching  - Xcite for RLE stance control with LLE OKC tasks  - progressively decreased height sit<>stands without UE assist  - single limb quad strengthening - leg press, leg extension, etc      Treatment Charges: Mins Units   []  Modalities     [x]  Ther Exercise 28 2   []  Manual Therapy     []  Ther Activities []  Aquatics     [x]  Gait 12 1   []  Other: Neuro     Total Treatment time 40 3       Assessment: [] Progressing toward goals. [] No change. [x] Other: Limited exercise completion this date d/t significant fatigue reported. Requires extended seated rest breaks. Able to complete gait training but at lower intensity set by pt, though able to complete same distance has to complete with slower gait speed. Will progress gait training in last upcoming session as able, and reiterate appropriate HEP. [x] Patient would continue to benefit from skilled physical therapy services in order to:  progress R hemibody strength in RUE/RLE, increase gait speed and improve mechanics, improve functional quad/hip strength to increase stability in standing and ease of transfers, improve balance within gait tasks, and overall improve quality of life. STG: (to be met in 8 treatments) - Assessed on 11/29/22 by Bret Mclaughlin, PT:  ? ROM: Negative for iliopsoas tightness on Bryan test on RLE for improved ability to achieve terminal hip ext - MET, no tightness noted  ? Balance:   Pt able to ambulate with intermittent head turns, stopping without slowed gait speed nor instability noted - Ongoing, slows down overall speed when attempting head turns  Pt able to step over 4\" objects while walking with no speed decrease, no instability noted  ? Strength: At least 3/5 R quads to allow improved stability with RLE SLS positions including stair climbing, etc - Ongoing, 2+/5 quads but improved stability with RLE SLS noted for stair climbing and gait, still fatigues very quickly after ~7 steps, ~200 ft starts to decrease R stance time  ?  Function:   Pt able to ambulate at least .52m/s with [employer] on level ground to progress towards decreased fall risk - Partially met, .43m/s self selected; .59m/s fast  Pt able to ascend/descend flight of stairs with improving speed, reciprocal ascent/descent with unilat UE assist - MET for ascent, step-to descent   Patient to be independent with home exercise program as demonstrated by performance with correct form without cues. - MET  Demonstrate Knowledge of fall prevention -MET, reviewed 11/29 though pt notes d/t sharing home has some fall-risk objects in gait path     LTG: (to be met in 22 treatments) - Assessed on 1/3/23 by Cruzito Soares, PT, EXTENDED d/t incomplete achievement:  ? Balance: At least 19/30 on FGA to indicate significant improvement in dynamic balance - Made progress, 17/30 (was at 13 on eval)  At least 75% confidence on ABC scale to indicate increased self efficacy with balance - MET, 82%  ? Strength: At least 3+/5 R quads to allow further improving stability with RLE SLS positions including stair climbing, etc - Making progress, still at 2+/5 however range of strength on LAQ has increased from lacking ~60deg to lacking only ~10deg  ? Function:   Pt able to ambulate at least .62m/s with [email or web address] on level ground to progress towards decreased fall risk - Partially MET, .51m/s self selected; .76m/s fast  Pt able to ascend/descend flight of stairs with further improving speed and safety reciprocal ascent/descent with unilat UE assist - Making progress, slightly increased speed ascent but still slow/cautious descent - step through ascent until last 3 steps, step to descent  Able to complete 5x STS without UE assist in under 1 min to indicate improving functional BLE power and strength for transfers - NOT MET from typical seat height as he needs UE assist; however, gets 18 reps no UE assist from elevated surface           Patient goals: \"get back to normal\"    Pt. Education:  [x] Yes  [] No  [x] Reviewed Prior HEP/Ed  Method of Education: [x] Verbal  [] Demo  [] Written  Comprehension of Education:  [x] Verbalizes understanding. [] Demonstrates understanding. [] Needs review. [] Demonstrates/verbalizes HEP/Ed previously given.     Current HEP as of 12/15: sit to stands no hands, LLE step taps w/ cane in LUE, step overs within gait path     Plan: [x] Continue current frequency toward long and short term goals - requesting additional visits, see prog note same date  [x] Specific Instructions for subsequent treatments: Xcite training, litegait training      Time In: 2:05 pm            Time Out: 2:50 pm    Electronically signed by:  Deedee Sun, PT

## 2023-01-19 ENCOUNTER — APPOINTMENT (OUTPATIENT)
Dept: OCCUPATIONAL THERAPY | Age: 63
End: 2023-01-19
Payer: MEDICARE

## 2023-01-23 ENCOUNTER — HOSPITAL ENCOUNTER (OUTPATIENT)
Dept: OCCUPATIONAL THERAPY | Age: 63
Setting detail: THERAPIES SERIES
Discharge: HOME | End: 2023-01-23
Payer: MEDICARE

## 2023-01-23 ENCOUNTER — APPOINTMENT (OUTPATIENT)
Dept: PHYSICAL THERAPY | Age: 63
End: 2023-01-23
Payer: MEDICARE

## 2023-01-23 PROCEDURE — 97112 NEUROMUSCULAR REEDUCATION: CPT

## 2023-01-23 PROCEDURE — 97110 THERAPEUTIC EXERCISES: CPT

## 2023-01-23 NOTE — PROGRESS NOTES
[x] formerly [facility]  [address]  [phone]  F: [phone] [] [facility]  [address] [place] [address]  Phone: [phone]  Fax: [phone]        Occupational Therapy Progress Note    Date: 2023      Patient: Libra November  : 1960  MRN: [id number]    Referring Provider:  TOY Strong CNP  Insurance: [employer] - 64 UNITS, from 1/3/23 to 3/31/23  Medical Diagnosis: Right hemiplegia G81.91            Rehab Codes: lack of coordination R27.8,, fine motor skills loss R29.818,, or muscle weakness generalized M62.81,  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 Claudeen Clarks: nothing scheduled   Visit# / total visits: ; Progress note for patient due at visit 18-20                         Cancels/No Shows:   Date range of services: 22 to 23    Subjective:  Pain:  [] Yes  [x] No  Location:  N/A  Pain Rating: (0-10 scale) 0/10 Pain altered Tx:  [x] No  [] Yes  Action: NA  Comments: Reports that he was able to get in touch with rep from [employer] since last visit. States the Neuro office is responsible for getting in touch with [employer] for next steps. Objective:  Tests/Measurements: Upper Extremity Functional Index  Current Functional Level:  45/80 (decrease 14 points) functionally impaired as measured with the Upper Extremity Functional Index Survey. 0-80 scale, with 80 = no Deficits  (The UEFI model does not provide any specific cut off points that could classify the upper limb disability degree, however, a minimal detectable change of 9 points is provided. This means that for improvement or deterioration to be considered, between two subsequent evaluations, the scores must differ by at least 9 points.)     Sensibility: Normal    Edema: None      Skin Color: Normal   Skin/Scar condition Intact     STRENGTH                   Right   (pounds) Left   (pounds)    23 (increase 3) 90   Lateral pinch 11 (decrease 1) - assist for hand placement  11   2 point pinch 5 (no change) - assist for hand placement  11   3 jaw pinch 7 (increase 3) - assist for hand placement  14      Bilateral  strength is normally symmetrical or up to 10% stronger on the dominant extremity, depending on the individual's physically activity level. The affected extremity is 78% weaker than the unaffected extremity (affected score/unaffected score, take the total and subtract from 100)        AROM:      DIGITS: Twitches noted with digits when attempting extension, hand resting in fisted position.       WRIST/FOREARM                  Right ROM (degrees)   Extension (60-70)   30 degree Arc. Wrist rest in -30 degrees of flexion, able to actively extend to 0 degrees    Flexion (70-80) 48   Radial Deviation (20-25) No active    Ulnar Deviation (30-40) No active    Forearm Pronation (80-90) Rests in pronated position   Forearm Supination (80-90) 80 (increase 5)   (0 is used for neutral, + is used for hyperextension, - is used for extensor lag per ASHT)       ELBOW                  Right ROM (degrees) Left ROM (degrees)   Flexion (145-150) 140 (no change)  WNL   Extension  (0) -35 (decrease 5 degrees) WNL            SHOULDER                   Right ROM (degrees) Right Strength (MMT) Left ROM (degrees) Left Strength  (MMT)   Flexion (180) 55 (+10) NT WNL 5/5   Extension (60) 38 (NC) NT WNL NT   Abduction (180) NT NT       Adduction           Internal Rotation (70)           External Rotation (80-90) 40 (+14)                  Problems:      ROM, Strength, Function, and Coordination             Assessment: Minimal changes with R hand strength measurements since last progress note. Pt continues to have difficulty being able to place digits on pinch meter correctly due to limited active motion. R shoulder flexion and ER slightly improved. Many compensatory movements noted with other body  parts when asking pt to complete ROM with RUE. Now able to demo consistent \"twitching\" of R digits when attempting to extend, however no functional fist present. Pt has taken next steps of contacting Brandi from [employer] and his neurology office to move towards obtaining [employer] device for home. On average, pt feels that RUE has become less tense since completing this therapy episode. Have began incorporating higher intensity activities to elicit neuroplasticity and improve motor recruitment of RUE.  Pt to continue to benefit from skilled occupational therapy treatment to continue to improve ROM and functional skills of RUE.     Short Term Goals: (  8-10    Treatments)  Increase AROM  R shoulder flexion by 10 degrees to improve ability to complete functional activities - NOT MET  Demo R shoulder external rotation of 10 degrees to improve ability to complete functional activities - MET  R elbow extension by 5 degrees to improve ability to complete functional activities - MET  Demo trace active extension of all R digits - MET  Increase strength (pounds);  R  by 2 pounds to improve functional use of R hand to complete daily activities - MET  All R pinches by 1 pound to improve functional use of R hand to complete daily activities - NOT MET  Patient to be independent with home exercise program as demonstrated by performance with correct form without cues. - MET     Long Term Goals: (  24  Treatments)  Increase functional skills AEB a score of 50/80 with UEFI  Demo active extension of all R digits to release object  Increase R  strength by 5+ pounds to complete daily activities with less difficulty  Increase all R pinch strength measurements by 2+ pounds for less difficulty completing daily activities   Demo functional movements with R hand following use of [employer] for carryover of completion of functional activities   Increase R shoulder flexion by 15+ degrees for less difficulty completing daily activities       Patient Goals: get hand functioning       Treatment Plan:   [x]  Therapeutic Exercise   09954              []  Iontophoresis: 4 mg/mL Dexamethasone Sodium Phosphate  mAmin  19485    [x]  Therapeutic Activity  26992  []  Vasopneumatic cold with compression  28443                []  ADL training  45679  []  Ultrasound        34808    [x]  Neuromuscular Re-education  43546  []  Electrical Stimulation Attended  19794    []  Manual Therapy  85536  Orthotic    []  Fit  12403     []  Train 54062    [x]  Instruction in HEP        Prosthetic    []  Fit 49222      []  Train 68107    []  Cognitive Interventions, (first 15 min 31489, subsequent 15 min 00977)  []  Cold/hotpack      []  Massage   77778             Patient Status: (requested frequency/duration)     [x] Continue per initial/current plan of care 2 times per week for 3 remaining visits.     [] Additional visits necessary.    Electronically signed by SANDI Moody on 1/23/2023 at 1:09 PM      If you have any questions or concerns, please don't hesitate to call. Thank you for your referral.    Physician Signature:________________________________Date:__________________  By signing above or cosigning this note, I have reviewed this plan of care and certify a need for medically necessary rehabilitation services.     *PLEASE SIGN ABOVE AND FAX BACK ALL PAGES

## 2023-01-23 NOTE — FLOWSHEET NOTE
[x] [facility]       Occupational Therapy            1st floor       [address], [place]         Phone: [phone]       Fax: [phone] [] [facility]  [address], [place]  Phone: [phone]  Fax: [phone]      Occupational Therapy Daily Treatment Note    Date:  2023  Patient Name:  Cherelle Conklin    :  1960  MRN: [id number]  Referring Provider:  TOY Chaudhry CNP  Insurance: [facility] Medicaid - 24 visits, from 10/13/22 to 22  Medical Diagnosis: Right hemiplegia G81.91            Rehab Codes: lack of coordination R27.8,, fine motor skills loss R29.818,, or muscle weakness generalized M62.81,  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 Mishel [address]: 23 - neuro  Visit# / total visits:  (visit count corrected 23); Progress note for patient due at visit 18-20    Cancels/No Shows:       Subjective:    Pain:  [] Yes  [x] No Location: N/A Pain Rating: (0-10 scale) 0/10  Pain altered Tx:  [x] No  [] Yes  Action: NA  Pt Comments: Reports that he was able to get in touch with rep from [employer] since last visit. States the Neuro office is responsible for getting in touch with [employer] for next steps. Precautions: NA  Surgery procedure and date: NA    Objective: Today's Treatment:  Modalities:  Treatment this date included using Functional Electrical Stimulation via the Xcite 2. Stimulation parameters and outcomes can be viewed on [email or web address] with the patients' username (patient ID generated by [employer] without any patient identifiers) and password (patients' pin generated by [employer] without any patient identifiers). LOG IN: [id number]; PIN: [id number]     A check of the patients' skin prior to application of electrodes, and after the treatment concluded was completed and no adverse reactions noted. See below in treatment log for treatments performed.     Not completed this date: Functional Electrical Stimulation via the [employer]l H200,  Stimulation parameters and outcomes can be viewed on [employer] with the patients' username (patient ID is generated by first initial of first name, and first initial of last name, followed by two digit month and two digit day the treatment commenced), and no patient specific password required. A check of the patients' skin prior to application of electrodes, and after the treatment concluded was completed and no adverse reactions noted. Name: Macie Buchanan  Patient ID: [id number]      See below in treatment log for treatments performed. Exercises:  EXERCISE    REPS/     TIME  WEIGHT/    LEVEL COMMENTS   [employer] Training  30  minutes   Not Completed - neuromodulation (10 minutes), grasp and release (15 minutes) plus time for set up   Bicep curls - standing 15 reps, 2 sets 2lb wrist weight  Not Completed    Scapular exercises - protraction/retraction  15 reps each, 2 sets  Blue tband  Not Completed - modified, completed rowing motion with tband to promote protraction/retraction    Error augmentation - elbow extension 5 reps  Manual resistance  Not Completed    Hand Exerciser 15 reps x 2 sets 10 pounds Not Completed    Xcite training  20 mins   Completed - forward reach and grasp pattern, plus time for skilled set-up and donning/doffing    \"Boxing\" -   Elbow flexion with resistance  15 reps, 2 sets  Blue tband  Not Completed - completed   standing    AAROM - R shoulder on yellow therapy ball  ~6 minutes  Not Completed - hand over hand needed to keep R hand in place on ball    Tricep pushes - on edge of table  15 reps, 2 sets on each surface   Not Completed - hand over hand needed to keep hand in place.  Completed just until bottom comes off table    AAROM - R elbow ext/flex and shoulder IR/ER 10 minutes  0 weight  Not Completed - supine for gravity eliminated, attempted with 2lb and 1lb wrist cuff however was too difficult   Wiping table - protraction/retraction, ER/IR 2 minutes each motion, 2 sets    Not Completed - sitting at table   AA supination with weighted bar  20 reps 1 lb Not completed    Active supination  20 reps  1/2 pound Not Completed    Other: NA      Assessment: [x] Progressing toward goals. Measurements completed for progress note, see progress note for details. Additional exercises not completed due to time taken for progress note. Strong extensor response noted with wrist and digits with use of xcite. [] No change. [] Other     [x] Patient would continue to benefit from skilled occupational therapy services in order to: Restore  functional /grasp, ROM, Strength, and Activity tolerance in order to improve functional use of UE in ADL performance       Short Term Goals: (  8-10    Treatments)  Increase AROM  R shoulder flexion by 10 degrees to improve ability to complete functional activities - NOT MET  Demo R shoulder external rotation of 10 degrees to improve ability to complete functional activities - MET  R elbow extension by 5 degrees to improve ability to complete functional activities - MET  Demo trace active extension of all R digits - MET  Increase strength (pounds);  R  by 2 pounds to improve functional use of R hand to complete daily activities - MET  All R pinches by 1 pound to improve functional use of R hand to complete daily activities - NOT MET  Patient to be independent with home exercise program as demonstrated by performance with correct form without cues.  - MET     Long Term Goals: (  24  Treatments)  Increase functional skills AEB a score of 50/80 with UEFI  Demo active extension of all R digits to release object  Increase R  strength by 5+ pounds to complete daily activities with less difficulty  Increase all R pinch strength measurements by 2+ pounds for less difficulty completing daily activities   Demo functional movements with R hand following use of [employer] for carryover of completion of functional activities   Increase R shoulder flexion by 15+ degrees for less difficulty completing daily activities      Patient Goals: get hand functioning       Pt. Education:  [] Yes  [x] No  [] Reviewed Prior HEP/Ed   Method of Education: [] Verbal  [] Demo  [] Written  Re:   Comprehension of Education:  [] Verbalizes understanding. [] Demonstrates understanding. [] Needs review. [] Demonstrates/verbalizes HEP/Ed previously given. Plan: [x] Continue current frequency toward short and long term goals.   [x] Specific Instructions for subsequent treatments: high intensity UB program    [] Other:       Time In: 1304  Time Out: 2138        Treatment Charges: Mins Units   []  Modalities:      []  Ultrasound     [x]  Ther Exercise 40 3   []  Manual Therapy     []  Ther Activities     []  Orthotic fit/train     []  Orthotic recheck     [x]  Other: neuro re-ed  20 1   Total Treatment time 60 4       Electronically signed by:  MODESTO Dawson/JANNET

## 2023-01-26 ENCOUNTER — HOSPITAL ENCOUNTER (OUTPATIENT)
Dept: OCCUPATIONAL THERAPY | Age: 63
Setting detail: THERAPIES SERIES
Discharge: HOME | End: 2023-01-26
Payer: MEDICARE

## 2023-01-26 ENCOUNTER — HOSPITAL ENCOUNTER (OUTPATIENT)
Dept: PHYSICAL THERAPY | Age: 63
Setting detail: THERAPIES SERIES
Discharge: HOME | End: 2023-01-26
Payer: MEDICARE

## 2023-01-26 PROCEDURE — 97110 THERAPEUTIC EXERCISES: CPT

## 2023-01-26 PROCEDURE — 97116 GAIT TRAINING THERAPY: CPT

## 2023-01-26 PROCEDURE — 97112 NEUROMUSCULAR REEDUCATION: CPT

## 2023-01-26 NOTE — FLOWSHEET NOTE
Functional Gait Assessment (FGA)    Gait Level Surface: 1  Instructions: Walk at your normal speed from here to the next ioana (6 m/20 ft)  Grading: Nicolle Muss the highest category that applies. (3) Normal - Walks 6 m (20 ft) in less than 5.5 seconds, no assistive devices, good speed, no evidence for imbalance, normal gait pattern, deviates no more than 6 in. outside of the 12        in walkway width. (2) Mild impairment - Walks 6 in (20 ft) in less than 7 seconds but greater than 5.5 seconds, uses assistive device, slower speed, mild gait deviations, or deviates 6-10 in. outside of        the 12 in walkway width. (1) Moderate impairment - Walks 6 m (20 ft) slow speed, abnormal gait pattern, evidence for imbalance, or deviates 10-15 in outside of the 12 in walkway width. Requires more than 7       seconds to ambulate 6 m (20 ft). (0) Significant impairment - Cannot walk 6 m (20 ft) without assistance, severe gait deviations or imbalance, deviates greater than 15 in. outside of the 12 in. walkway width or        reaches and touches the wall. Change in Gait Speed: 2  Instructions: Begin walking at your normal pace (for 1.5 m (5ft)). When I tell you \"go\", walk as fast as you can (for 1.5m (5 ft)). When I tell you \"slow\", walk as slowly as you can (for 1.5m (5ft)). Grading: Nicolle Muss the highest category that applies. (3) Normal - Able to smoothly change walking speed without loss of balance or gait deviation. Shows a significant difference in walking speeds between normal, fast, and slow speeds. Deviates no more than 6 in. outside of the 12 in. walkway width. (2) Mild impairment - Is able to change speed but demonstrates mild gait deviations, deviates 6-10 in. outside of the 12 in. walkway width, or no gait deviations but        unable to achieve a significant change in velocity, or uses an assistive device.    (1) Moderate impairment - Makes only minor adjustments to walking speed, or accomplishes a change in speed with significant gait deviations, deviates 10-15 in outside the 12 in.              walkway width, or changes speed but loses balance but is able to recover and continue walking.   (0) Severe impairment - Cannot change speeds, deviates greater than 15 in outside 12 in walkway width, or loses balance and has to reach for wall or be caught    Gait with Horizontal Head Turns: 2  Instructions: Walk from here to the next ioana 6 m (20ft) away. Begin walking at your normal pace. Keep walking straight; after 3 steps, turn your head to the right and keep walking straight while looking to the right. After 3 more steps, turn your head to the left and keep walking straight while looking left. Continue alternating looking right and left every 3 steps until you have completed 2 repetitions in each direction. Grading: Terry Lowe the highest category that applies. (3) Normal - Performs head turns smoothly with no change in gait. (2) Mild impairment - Performs head turns smoothly with slight change in gait velocity (eg. Minor disruption to smooth gait path), deviates 6-10 in. outside 12 in. walkway width, or uses       an assistive device. (1) Moderate impairment - Performs head turns with moderate change in gait velocity, slows down, deviates 10-15 in. outside 12 in. walkway width but recovers, can continue to walk   (0) Severe impairment - Cannot change speeds, deviates greater than 15 in. outside 12 in. walkway width, or loses balance and has to reach for wall or be caught. Gait with Vertical Head Turns: 2  Instructions: Walk from here to the next ioana (6m, 20 ft). Begin walking at your normal pace. Keep walking straight; after three steps, tip your head up and keep walking straight while looking up. After 3 more steps, tip your head down, keep walking straight while looking down. Continue alternating looking up and down every 3 steps until you have completed 2 repetitions in each direction.   Grading: Ioana the highest category that applies. (3) Normal - Performs head turns with no change in gait. Deviates no more than 6 in. outside 12 in. walkway width. (2) Mild impairment - Performs task with slight change in gait velocity (eg. Minor disruption to smooth gait path), deviates 6-10 in. outside 12 in. walkway width, or uses assistive device. (1) Moderate impairment - Performs task with moderate change in gait velocity, slows down, deviates 10-15 in. outside 12 in. walkway width but recovers, can continue to walk. (0) Severe impairment -Performs task with severe disruption of gait (eg. Staggers 15 in outside 12 in. walkway width, loses balance, stops, reaches for wall)    Gait and Pivot Turn: 1  Instructions: Begin walking at your normal pace. When I tell you, \"turn and stop\", turn as quickly as you can to face the opposite direction and stop. Grading: Nonnie Kocher the highest category that applies. (3) Normal - Pivot turns safely within 3 seconds and stops quickly with no loss of balance. (2) Mild impairment - Pivot turns safely in >3 seconds and stops with no loss of balance, or pivot turns safely within 3 seconds and stops with mild imbalance, requires small steps to        catch balance. (1) Moderate impairment - Turns slowly, requires verbal cueing, or requires several small steps to catch balance following turn and stop. (0) Severe impairment - Cannot turn safely, requires assistance to turn and stop. Step Over Obstacle: 1  Instructions: Begin walking at your normal speed. When you come to the shoe box, step over it, not around it, and keep walking. Grading: Nonnie Kocher the highest category that applies. (3) Normal - Is able to step over 2 stacked shoe boxes taped together (9 in. total height) without changing gait speed; no evidence of imbalance. (2) Mild impairment - Is able to step over one shoe box (4.5 in total height) without changing gait speed; no evidence of imbalance.    (1) Moderate impairment - Is able to step over one shoe box (4.5 in. Total height) but must slow dwon and adjust steps to clear box safely. May require verbal cueing.   (0) Severe impairment - Cannot perform without assistance. Gait with Narrow Base of Support: 0  Instructions: Walk on the floor with arms folded across the chest, feet aligned heel to toe in tandem for a distance of 12 ft. The number of steps taken in a striaght line are counted for a maximum of 10 steps. Grading: Juan Melgar the highest category that applies. (3) Normal - Is able to ambulate for 10 steps heel to toe with no staggering.   (2) Mild impairment - Ambulates 7-9 steps. (1) Moderate impairment - Ambulates 4-7 steps. (0) Severe impairment - Ambulates less than 4 steps heel to toe or cannot perform without assistance. Gait with Eyes Closed: 2  Instructions: Walk at your normal speed from here to the next ioana (6 m, 20 ft) with your eyes closed. Grading: Juan Melgar the highest category that applies. (3) Normal - Walks 6 m (20 ft), no assistive devices, good speed, no evidence of imbalance, normal gait pattern, deviates no more than 6 in outside 12 in. walkway width. Ambulates        6 m (20ft) in less than 7 seconds. (2) Mild impairment - Walks 6 m (20 ft), uses assistive device, slower speed, mild gait deviations, deviates 6-10 in. outside 12 in. walkway width. Ambulates 6 m (20 ft) in less than 9        seconds but greater than 7 seconds. (1) Moderate impairment - Walks 6 m (20 ft), slow speed, abnormal gait pattern, evidence for imbalance, deviates 10-15 in. outside 12 in. walkway width. Requires more than 9        seconds to ambulate 6 m (20 ft). (0) Severe impairment - Cannot walk 6 m (20 ft) without assistance, severe gait deviations or imbalance, deviates greater than 15 in. outside 12 in. walkway width or will not attempt        task. Ambulating Backwards: 1  Instructions: Walk backwards until I tell you to stop.   Grading: Juan Melgar the highest category that applies. (3) Normal - Walks 6 m (20 ft), no assistive devices, good speed, no evidence for imbalance, normal gait pattern, deviates no more than 6 in. outside 12 in. walkway width. (2) Mild impairment - Walks 6 m (20 ft), uses assistive device, slower speed, mild gait deviations, deviates 6-10 in. outside 12 in. walkway width. (1) Moderate impairment - Walks 6 m (20 ft), slow speed, abnormal gait pattern, evidence for imbalance, deviates 10-15 in. outside 12 in. walkway width. (0) Severe impairment - Cannot walk 6m (20 ft) without assistance, severe gait deviations or imbalance, deviates greater than 15 in. outside 12in. walkway width or will not attempt        task. Steps: 1  Instructions: Walk up these stairs as you would at home (ie. Using the rail if necessary). At the top turn around and walk down. Grading: Carlos Eduardo Villela the highest category that applies. (3) Normal - Alternating feet, no rail   (2) Mild impairment - Alternating feet, must use rail. (1) Moderate impairment - Two feet to a stair, must use rail.   (0) Severe impairment - Cannot do safely.         Total Score: 13 (maximum score = 30)      Cutoff scores:   - Non-Specific Older Adults: < or equal to 22/30 = risk of falls   - Parkinson's Disease: <15/30 = fall risk, Anson and Yahr 1-4, <18/30 = fall risk for inpatients; Hoen and Yahr 1-4

## 2023-01-26 NOTE — FLOWSHEET NOTE
[x] [facility]) - [facility]STEP [facility] &  Therapy  [address]  [phone]  F: [phone] [] [address]  [facility]   [address]  P: [phone]  F: [phone] [] [address]  [address]  P: [phone]  F: [phone] [] [address]  P: [phone]  F: [phone] [] [address]  [facility]   [address]   [place]: [phone]  F: [phone]      Physical Therapy Daily Treatment Note    Date:  2023  Patient Name:  Francis Manning    :  1960  MRN: [id number]  Physician: JACQUELIN Marcial                              Insurance: [address] [place] [address] ([facility] through )  Medical Diagnosis: I63.9 (ICD-10-CM) - Ischemic stroke [facility])    Rehab Codes: R26.89, M26.81, R29.3  Next 's appt.: 22 PCP  Date of symptom onset: 22 (chronic onset)  Visit# / total visits:  (visit count updated to reflect new auth approval); Progress note for Medicare patient due at visit 8     Cancels/No Shows: 0    Subjective:    Pain:  [] Yes  [x] No Location:  N/A Pain Rating: (0-10 scale) 0/10  Pain altered Tx:  [x] No  [] Yes  Action:  Comments: Pt arrives denying any pain or issues, \"same old, same old\". Notes he plans to work out at gym after finishing therapy today.      Objective:  22:   Zaid Springer on RLE:  lacking 5deg from TKE but able to hold against min resistance: 2+/5  - 6MWT: 650 ft (was 429 ft at eval) (<1200ft indicates less than community level distances)  - FGA:  (<22 indicates fall risk) (was  on eval, then  last progress note, then bad balance day today  again)  - 10MWT: .62m/s self selected; .84m/s fast (was at .42m/s self selected, .59m/s fast on eval)      Modalities:   See below in treatment log for treatments performed.  LOG IN: [id number]; PIN: [id number]    Precautions: Left hemiparesis, fal risk  Exercises: Bolded completed 1/26/2023:   HELD treatment d/t LTG assessment, see below for details:  Exercise Reps/ Time Weight/ Level Comments   6MWT   6MWT: 11 lengths x 39 ft = 429 ft  On 11/29: 530 ft                     Xcite   Billed under neuro, including skilled set up and don/doffing time  NOT TODAY   Sit <> stand 1x15  No UE assist   RLE biased sit<>stand 3x10 4\" LLE on step, elevated mat table   LLE step taps 3x15 4\" Progressing from unilat UE assist to none  - 2 sets on 11/22   RLE modified SLS holds 2x90\" 4\" LLE on step  - concurrent tasks: 10x trunk twist, 5x reach to toe   LAQs 2x15 RLE    Prone knee flexion 3x10 RLE    Step ups 3x15 6\" In inpatient gym; unilat UE assist                             Resisted rows 3x15 ea purple    Standing ball roll out 20x Red physioball Using RUE primarily to roll out, LUE stabilizes RUE on ball   Pushing ball w/ only RUE 10x \" \" Therapist stabilizes hand on ball - very fatiguing for patient   Ball punches 4x30\" Red physioball 2x hooks  2x upper cuts         Knee extension machine 1x15  1x10 0# Min assist by therapist with RLE extension  - completing bilat, very fatiguing, half range    Leg press 1x20 60#                Gait train      Resisted gait train 1x300 ft Purple, 5# RLE 1 lap around OT = 150 ft  - RPE: 13    2nd and 3rd set: with intermittent hurdles, foam mat for unsteady surface amb   Gait down to car 600+ feet SBQC Including decline, thresholds, elevator, crowd negotiation   Stair training 2x8 steps  Attempting reciprocal gait ascent/descent, too difficult descent   Obstacle course ~200 ft 5# RLE Hurdles, foam,  cone weaving, unlevel ground     Gait w/ head turns 100 ft     Other: Entirety of session spent completing all LTG assessments including 10MWT, 6MWT, stair climbing, FGA, strength assessment, 5xSTS - billed with gait and theract appropriately          6MWT, fall prevention  - bodyweight support treadmill train  - gastroc/hip flexor stretching  - Xcite for RLE stance control with LLE OKC tasks  - progressively decreased height sit<>stands without UE assist  - single limb quad strengthening - leg press, leg extension, etc      Treatment Charges: Mins Units   []  Modalities     []  Ther Exercise     []  Manual Therapy     [x]  Ther Activities 10 --   []  Aquatics     [x]  Gait 40 3   []  Other: Neuro     Total Treatment time 50 3       Assessment: [] Progressing toward goals. [] No change. [x] Other: LTG re-assessment this date for final visit - pt has made maximal progress at this phase in therapy and would benefit from discharge to home program. Reviewed thoroughly with patient especially regular fast walking for high intensity gait training, which seemed to make most benefit on walking speed that continued to improve. Quad strength remains weak but overall also slowly continued to improve as well over time, evidenced by less quad lag with LAQ. Encouraged continued leg press/machine strengthening since pt verbalized he'll be going to the gym regularly after therapy. [x] Discharge    STG: (to be met in 8 treatments) - Assessed on 11/29/22 by Jamel Ramirez, PT:  ? ROM: Negative for iliopsoas tightness on Bryan test on RLE for improved ability to achieve terminal hip ext - MET, no tightness noted  ? Balance:   Pt able to ambulate with intermittent head turns, stopping without slowed gait speed nor instability noted - Ongoing, slows down overall speed when attempting head turns  Pt able to step over 4\" objects while walking with no speed decrease, no instability noted  ? Strength:  At least 3/5 R quads to allow improved stability with RLE SLS positions including stair climbing, etc - Ongoing, 2+/5 quads but improved stability with RLE SLS noted for stair climbing and gait, still fatigues very quickly after ~7 steps, ~200 ft starts to decrease R stance time  ? Function:   Pt able to ambulate at least .52m/s with NovogyS on level ground to progress towards decreased fall risk - Partially met, .43m/s self selected; .59m/s fast  Pt able to ascend/descend flight of stairs with improving speed, reciprocal ascent/descent with unilat UE assist - MET for ascent, step-to descent   Patient to be independent with home exercise program as demonstrated by performance with correct form without cues. - MET  Demonstrate Knowledge of fall prevention -MET, reviewed 11/29 though pt notes d/t sharing home has some fall-risk objects in gait path     LTG: (to be met in 22 treatments) - Assessed on 1/26/23 by Kevin Sandoval, PT, EXTENDED d/t incomplete achievement:  ? Balance: At least 19/30 on FGA to indicate significant improvement in dynamic balance - NOT MET, scored 13/30 today (scored 17/30 previously, tough balance day today)  At least 75% confidence on ABC scale to indicate increased self efficacy with balance - MET, 81% on 1/26/23  ? Strength: At least 3+/5 R quads to allow further improving stability with RLE SLS positions including stair climbing, etc - Making progress, still at 2+/5 however range of strength on LAQ has increased from lacking ~60deg to lacking only ~5deg  ?  Function:   Pt able to ambulate at least .62m/s with NovogyS on level ground to progress towards decreased fall risk - MET, .62m/s self selected; .84m/s fast  Pt able to ascend/descend flight of stairs with further improving speed and safety reciprocal ascent/descent with unilat UE assist - Making progress, slightly increased speed ascent but still slow/cautious descent - step through ascent until last 3 steps, step to descent  Able to complete 5x STS without UE assist in under 1 min to indicate improving functional BLE power and strength for transfers - NOT MET from typical seat height as he needs UE assist; 5x STS with unilat UE assist: 22.18s; 11 reps in 30 sec with no UE assist when table is at 23\"           Patient goals: \"get back to normal\"    Pt. Education:  [x] Yes  [] No  [x] Reviewed Prior HEP/Ed  Method of Education: [x] Verbal  [] Demo  [] Written  Comprehension of Education:  [x] Verbalizes understanding. [] Demonstrates understanding. [] Needs review. [] Demonstrates/verbalizes HEP/Ed previously given.     Current HEP as of 12/15: sit to stands no hands, LLE step taps w/ cane in LUE, step overs within gait path     Plan: [x] Discharge      Time In: 3:07 pm            Time Out: 4:03 pm    Electronically signed by:  Florencia Canales, PT

## 2023-01-26 NOTE — DISCHARGE SUMMARY
[x] [facility]) [facility]  [facility]rapy  [address]  [phone]  F: [phone] [] [address]  [place] [address]   [address]  P: [phone]  F: [phone] [] [address] &  Therapy  [address]  P: [phone]  F: [phone] [] [address]  P: [phone]  F: [phone] [] [address]  [facility]   [address]   [place]: [phone]  F: [phone]      Physical Therapy Discharge Note    Date: 2023      Patient: Alexandria Mallory  : 1960  MRN: [id number]    Physician: JACQUELIN Mccarty                              Insurance: [employer]azmin Crandall ([employer] through )  Medical Diagnosis: I63.9 (ICD-10-CM) - Ischemic stroke [facility])    Rehab Codes: R26.89, M26.81, R29.3  Next 's appt.: 22 PCP  Date of symptom onset: 22 (chronic onset)  Visit# / total visits:  (visit count updated to reflect new auth approval); Progress note for Medicare patient due at visit 8                                   Cancels/No Shows: 0  Date of initial visit: 22                Date of final visit: 23      Subjective:  Pain:  [] Yes  [x] No   Location:  N/A Pain Rating: (0-10 scale) 0/10  Pain altered Tx:  [x] No  [] Yes  Action:  Comments: Pt arrives denying any pain or issues, \"same old, same old\". Notes he plans to work out at gym after finishing therapy today.      Objective:  Test Measurements:  - LAQ on RLE:  lacking 5deg from TKE but able to hold against min resistance: 2+/5  - 6MWT: 650 ft (was 429 ft at eval) (<1200ft indicates less than community level distances)  - FGA:  (<22 indicates fall risk) (was  on eval, then  last progress note, then bad balance day today  again)  - 10MWT: .62m/s self selected; .84m/s fast (was at .42m/s self selected, .59m/s fast on eval)    Function:  - Walking with SBQC with improving symmetry, still slow RLE swing phase  - Reciprocal stair ascent, step-to descent - unilat railing use throughout      Assessment:  [] Progressing toward goals.                          [] No change.                          [x] Other: LTG re-assessment this date for final visit - pt has made maximal progress at this phase in therapy and would benefit from discharge to home program. Reviewed thoroughly with patient especially regular fast walking for high intensity gait training, which seemed to make most benefit on walking speed that continued to improve. Quad strength remains weak but overall also slowly continued to improve as well over time, evidenced by less quad lag with LAQ. Encouraged continued leg press/machine strengthening since pt verbalized he'll be going to the gym regularly after therapy.   [x] Discharge    STG: (to be met in 8 treatments) - Assessed on 11/29/22 by Pal Ponce, PT:  ? ROM: Negative for iliopsoas tightness on Bryan test on RLE for improved ability to achieve terminal hip ext - MET, no tightness noted  ? Balance:   Pt able to ambulate with intermittent head turns, stopping without slowed gait speed nor instability noted - Ongoing, slows down overall speed when attempting head turns  Pt able to step over 4\" objects while walking with no speed decrease, no instability noted  ? Strength: At least 3/5 R quads to allow improved stability with RLE SLS positions including stair climbing, etc - Ongoing, 2+/5 quads but improved stability with RLE SLS noted for stair climbing and gait, still fatigues very quickly after ~7 steps, ~200 ft starts to decrease R stance time  ? Function:   Pt able to ambulate at least .52m/s with SBQC on level ground to progress towards decreased fall risk - Partially met, .43m/s self selected; .59m/s fast  Pt able to ascend/descend flight of  stairs with improving speed, reciprocal ascent/descent with unilat UE assist - MET for ascent, step-to descent   Patient to be independent with home exercise program as demonstrated by performance with correct form without cues. - MET  Demonstrate Knowledge of fall prevention -MET, reviewed 11/29 though pt notes d/t sharing home has some fall-risk objects in gait path     LTG: (to be met in 22 treatments) - Assessed on 1/26/23 by Elisabeth Villa, PT, EXTENDED d/t incomplete achievement:  ? Balance: At least 19/30 on FGA to indicate significant improvement in dynamic balance - NOT MET, scored 13/30 today (scored 17/30 previously, tough balance day today)  At least 75% confidence on ABC scale to indicate increased self efficacy with balance - MET, 81% on 1/26/23  ? Strength: At least 3+/5 R quads to allow further improving stability with RLE SLS positions including stair climbing, etc - Making progress, still at 2+/5 however range of strength on LAQ has increased from lacking ~60deg to lacking only ~5deg  ?  Function:   Pt able to ambulate at least .62m/s with [employer] on level ground to progress towards decreased fall risk - MET, .62m/s self selected; .84m/s fast  Pt able to ascend/descend flight of stairs with further improving speed and safety reciprocal ascent/descent with unilat UE assist - Making progress, slightly increased speed ascent but still slow/cautious descent - step through ascent until last 3 steps, step to descent  Able to complete 5x STS without UE assist in under 1 min to indicate improving functional BLE power and strength for transfers - NOT MET from typical seat height as he needs UE assist; 5x STS with unilat UE assist: 22.18s; 11 reps in 30 sec with no UE assist when table is at 23\"           Patient goals: \"get back to normal\"    Treatment to Date:  [x] Therapeutic Exercise    [] Modalities:  [x] Therapeutic Activity    [] Ultrasound  [] Electrical Stimulation  [x] Gait Training     [] Massage [] Lumbar/Cervical Traction  [x] Neuromuscular Re-education [] Cold/hotpack [] Iontophoresis: 4 mg/mL  [x] Instruction in Home Exercise Program                     Dexamethasone Sodium  [] Manual Therapy             Phosphate 40-80 mAmin  [] Aquatic Therapy                   [] Vasocompression/    [] Other:             Game Ready    Discharge Status:     [] Pt recovered from conditions. Treatment goals were met. [x] Pt received maximum benefit. No further therapy indicated at this time. [x] Pt to continue exercise/home instructions independently. [] Therapy interrupted due to:    [] Pt has 2 or more no shows/cancels, is discontinued per our policy. [x] Pt has completed prescribed number of treatment sessions. [] Other:         Electronically signed by Iliana Mitchell PT on 1/26/2023 at 4:23 PM      If you have any questions or concerns, please don't hesitate to call.   Thank you for your referral.

## 2023-01-26 NOTE — FLOWSHEET NOTE
[x] [facility]       Occupational Therapy            1st floor       [address], [place]         Phone: [phone]       Fax: [phone] [] [facility]  [address], [place]  Phone: [phone]  Fax: [phone]      Occupational Therapy Daily Treatment Note    Date:  2023  Patient Name:  Nik Soto    :  1960  MRN: [id number]  Referring Provider:  Other Alisa Foot, APRN - CNP  Insurance: [facility] Medicaid - 24 visits, from 10/13/22 to 22  Medical Diagnosis: Right hemiplegia G81.91            Rehab Codes: lack of coordination R27.8,, fine motor skills loss R29.818,, or muscle weakness generalized M62.81,  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 Prasanna Serna: 23 - neuro  Visit# / total visits:  (visit count corrected 23); Progress note for patient due at visit 18-20    Cancels/No Shows:       Subjective:    Pain:  [] Yes  [x] No Location: N/A Pain Rating: (0-10 scale) 0/10  Pain altered Tx:  [x] No  [] Yes  Action: NA  Pt Comments: Reports tightness throughout RUE upon arrival. Reports using R hand to open door knob on bathroom door for the first time. Reports R hand will be loose frequently when he is at home, but as soon as he gets in/out of the car, R hand immediately tenses up and then remains tight for awhile. Precautions: NA  Surgery procedure and date: NA    Objective: Today's Treatment:  Modalities:  Treatment this date included using Functional Electrical Stimulation via the Xcite 2. Stimulation parameters and outcomes can be viewed on [email or web address] with the patients' username (patient ID generated by [email or web address] without any patient identifiers) and password (patients' pin generated by [email or web address] without any patient identifiers).  LOG IN: [id number]; PIN: [id number]     A check of the patients' skin prior to application of electrodes, and after the treatment concluded was completed and no adverse reactions noted. See below in treatment log for treatments performed. Not completed this date: Functional Electrical Stimulation via the PushToTest H200,  Stimulation parameters and outcomes can be viewed on [employer] with the patients' username (patient ID is generated by first initial of first name, and first initial of last name, followed by two digit month and two digit day the treatment commenced), and no patient specific password required. A check of the patients' skin prior to application of electrodes, and after the treatment concluded was completed and no adverse reactions noted. Name: Gilda Perez  Patient ID: [id number]      See below in treatment log for treatments performed. Exercises:  EXERCISE    REPS/     TIME  WEIGHT/    LEVEL COMMENTS   Bioness Training  30  minutes   Not Completed - neuromodulation (10 minutes), grasp and release (15 minutes) plus time for set up   Bicep curls - standing 15 reps, 2 sets 2lb wrist weight  Not Completed    Scapular exercises - protraction/retraction  15 reps each, 2 sets  Blue tband  Not Completed - modified, completed rowing motion with tband to promote protraction/retraction    Error augmentation - elbow extension 5 reps  Manual resistance  Not Completed    Hand Exerciser 15 reps x 2 sets 10 pounds Not Completed    Xcite training  25 mins, 63 reps    Completed - forward reach and grasp pattern, plus time for skilled set-up and donning/doffing    \"Boxing\" -   Elbow flexion with resistance  15 reps, 2 sets  Blue tband  Not Completed - completed   standing    AAROM - R shoulder on yellow therapy ball  ~6 minutes  Not Completed - hand over hand needed to keep R hand in place on ball    Tricep pushes - on edge of table  15 reps, 2 sets on each surface   Not Completed - hand over hand needed to keep hand in place.  Completed just until bottom comes off table    AAROM - R elbow ext/flex and shoulder IR/ER 10 minutes  0 weight  Not Completed - supine for gravity eliminated, attempted with 2lb and 1lb wrist cuff however was too difficult   Wiping table - protraction/retraction, ER/IR 2 minutes each motion, 2 sets    Completed - sitting at table   AA supination with weighted bar  20 reps 1 lb Completed    Active supination  20 reps  1/2 pound Not Completed    Other: NA      Assessment: [x] Progressing toward goals. Strong extensor response noted with wrist and digits with use of xcite. Able to grasp release skinny cone. Fatigues quickly with table wiping activity. Demo good ability to isolate R shoulder IR/ER on table top. Provided support over R hand to complete successfully. Requires extended rest breaks to recover. No active movement observed with digits following use of xcite. [] No change. [] Other     [x] Patient would continue to benefit from skilled occupational therapy services in order to: Restore  functional /grasp, ROM, Strength, and Activity tolerance in order to improve functional use of UE in ADL performance       Short Term Goals: (  8-10    Treatments)  Increase AROM  R shoulder flexion by 10 degrees to improve ability to complete functional activities - NOT MET  Demo R shoulder external rotation of 10 degrees to improve ability to complete functional activities - MET  R elbow extension by 5 degrees to improve ability to complete functional activities - MET  Demo trace active extension of all R digits - MET  Increase strength (pounds);  R  by 2 pounds to improve functional use of R hand to complete daily activities - MET  All R pinches by 1 pound to improve functional use of R hand to complete daily activities - NOT MET  Patient to be independent with home exercise program as demonstrated by performance with correct form without cues.  - MET     Long Term Goals: (  24  Treatments)  Increase functional skills AEB a score of 50/80 with UEFI  Demo active extension of all R digits to release object  Increase R  strength by 5+ pounds to complete daily activities with less difficulty  Increase all R pinch strength measurements by 2+ pounds for less difficulty completing daily activities   Demo functional movements with R hand following use of Bioness/Xcite for carryover of completion of functional activities   Increase R shoulder flexion by 15+ degrees for less difficulty completing daily activities      Patient Goals: get hand functioning       Pt. Education:  [] Yes  [x] No  [] Reviewed Prior HEP/Ed   Method of Education: [] Verbal  [] Demo  [] Written  Re:   Comprehension of Education:  [] Verbalizes understanding. [] Demonstrates understanding. [] Needs review. [] Demonstrates/verbalizes HEP/Ed previously given. Plan: [x] Continue current frequency toward short and long term goals.   [x] Specific Instructions for subsequent treatments: high intensity UB program    [] Other:       Time In: 1401  Time Out: 7679        Treatment Charges: Mins Units   []  Modalities:      []  Ultrasound     [x]  Ther Exercise 33 2   []  Manual Therapy     []  Ther Activities     []  Orthotic fit/train     []  Orthotic recheck     [x]  Other: neuro re-ed  25 2   Total Treatment time 58 4       Electronically signed by:  MODESTO Culver/JANNTE

## 2023-01-30 ENCOUNTER — APPOINTMENT (OUTPATIENT)
Dept: PHYSICAL THERAPY | Age: 63
End: 2023-01-30
Payer: MEDICARE

## 2023-01-30 ENCOUNTER — HOSPITAL ENCOUNTER (OUTPATIENT)
Dept: OCCUPATIONAL THERAPY | Age: 63
Setting detail: THERAPIES SERIES
Discharge: HOME | End: 2023-01-30
Payer: MEDICARE

## 2023-01-30 NOTE — SIGNIFICANT EVENT
[x] [address]        Occupational Therapy       [address], 1st [address]       Phone: [phone]       Fax: [phone] [] [facility] Occupational  Therapy at [facility]       Phone: [phone]       Fax: [phone]          Occupational Therapy Cancel/No Show note    Date: 2023  Patient: Brijesh Doyle  : 1960  MRN: [id number]  Cancels/No Shows to date:     For today's appointment patient:  [x]  Cancelled  []  Rescheduled appointment  []  No-show     Reason given by patient:  [x]  Patient ill  []  Conflicting appointment  []  No transportation    []  Conflict with work  []  No reason given  []  Other:     Comments:      Electronically signed by: MODESTO Iyer/JANNET

## 2023-02-01 ENCOUNTER — APPOINTMENT (OUTPATIENT)
Dept: PHYSICAL THERAPY | Age: 63
End: 2023-02-01
Payer: MEDICARE

## 2023-02-01 ENCOUNTER — HOSPITAL ENCOUNTER (OUTPATIENT)
Dept: OCCUPATIONAL THERAPY | Age: 63
Setting detail: THERAPIES SERIES
Discharge: HOME | End: 2023-02-01
Payer: MEDICARE

## 2023-02-01 DIAGNOSIS — C91.10 CLL (CHRONIC LYMPHOCYTIC LEUKEMIA) (HCC): Primary | ICD-10-CM

## 2023-02-01 PROCEDURE — 97112 NEUROMUSCULAR REEDUCATION: CPT

## 2023-02-01 PROCEDURE — 97110 THERAPEUTIC EXERCISES: CPT

## 2023-02-01 NOTE — FLOWSHEET NOTE
[x] [facility]       Occupational Therapy            1st floor       [address], [place]         Phone: [phone]       Fax: [phone] [] [facility]  [address], [place]  Phone: [phone]  Fax: [phone]      Occupational Therapy Daily Treatment Note    Date:  2023  Patient Name:  Christine Hobson    :  1960  MRN: [id number]  Referring Provider:  TOY Avila CNP  Insurance: [employer] - 14 visits from 1/3/23 to 3/31/23 auth#[id number]  Medical Diagnosis: Right hemiplegia G81.91            Rehab Codes: lack of coordination R27.8,, fine motor skills loss R29.818,, or muscle weakness generalized M62.81,  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 [address]: 23 - neuro  Visit# / total visits:  (visit count corrected 23); Progress note for patient due at visit 18-20    Cancels/No Shows:       Subjective:    Pain:  [] Yes  [x] No Location: N/A Pain Rating: (0-10 scale) 0/10  Pain altered Tx:  [x] No  [] Yes  Action: NA  Pt Comments: Reports having a sinus infection earlier this week, but is now feeling a little better. Precautions: NA  Surgery procedure and date: NA    Objective: Today's Treatment:  Modalities:  Treatment this date included using Functional Electrical Stimulation via the Xcite 2. Stimulation parameters and outcomes can be viewed on [email or web address] with the patients' username (patient ID generated by [email or web address] without any patient identifiers) and password (patients' pin generated by [email or web address] without any patient identifiers). LOG IN: [id number]; PIN: [id number]     A check of the patients' skin prior to application of electrodes, and after the treatment concluded was completed and no adverse reactions noted. See below in treatment log for treatments performed.     Not completed this date: Functional Electrical Stimulation via the NowSpots H200, Stimulation parameters and outcomes can be viewed on [employer] with the patients' username (patient ID is generated by first initial of first name, and first initial of last name, followed by two digit month and two digit day the treatment commenced), and no patient specific password required. A check of the patients' skin prior to application of electrodes, and after the treatment concluded was completed and no adverse reactions noted. Name: Kady Bosch  Patient ID: [id number]      See below in treatment log for treatments performed. Exercises:  EXERCISE    REPS/     TIME  WEIGHT/    LEVEL COMMENTS   Bioness Training  30  minutes   Completed - neuromodulation (10 minutes), grasp and release (15 minutes) plus time for set up   Bicep curls - standing 15 reps, 2 sets 2lb wrist weight  Not Completed    Scapular exercises - protraction/retraction  15 reps each, 2 sets  Blue tband  Not Completed - modified, completed rowing motion with tband to promote protraction/retraction    Error augmentation - elbow extension 5 reps  Manual resistance  Not Completed    Hand Exerciser 15 reps x 2 sets 10 pounds Not Completed    Xcite training  25 mins, 63 reps    Not Completed - forward reach and grasp pattern, plus time for skilled set-up and donning/doffing    \"Boxing\" -   Elbow flexion with resistance  15 reps, 2 sets  Blue tband  Not Completed - completed   standing    AAROM - R shoulder on yellow therapy ball  ~6 minutes  Not Completed - hand over hand needed to keep R hand in place on ball    Tricep pushes - on edge of table  15 reps, 2 sets on each surface   Not Completed - hand over hand needed to keep hand in place.  Completed just until bottom comes off table    AAROM - R elbow ext/flex and shoulder IR/ER 10 minutes  0 weight  Not Completed - supine for gravity eliminated, attempted with 2lb and 1lb wrist cuff however was too difficult   Wiping table - protraction/retraction, ER/IR 2 minutes each motion, 2 sets Completed - sitting at table   AA supination with weighted bar  20 reps 1 lb Not Completed    Active supination  20 reps  1/2 pound Not Completed    Other: NA      Assessment: [x] Progressing toward goals. Xcite not available during session today. Utilized Bioness for David Boyd. Strong extensor response noted with digits with use of Bioness. Able to grasp release skinny cone. Fatigues quickly with table wiping activity. Demo good ability to isolate R shoulder IR/ER on table top. Provided support over R hand to complete successfully. Requires extended rest breaks to recover. No active movement observed with digits following use of xcite. [] No change. [] Other     [x] Patient would continue to benefit from skilled occupational therapy services in order to: Restore  functional /grasp, ROM, Strength, and Activity tolerance in order to improve functional use of UE in ADL performance       Short Term Goals: (  8-10    Treatments)  Increase AROM  R shoulder flexion by 10 degrees to improve ability to complete functional activities - NOT MET  Demo R shoulder external rotation of 10 degrees to improve ability to complete functional activities - MET  R elbow extension by 5 degrees to improve ability to complete functional activities - MET  Demo trace active extension of all R digits - MET  Increase strength (pounds);  R  by 2 pounds to improve functional use of R hand to complete daily activities - MET  All R pinches by 1 pound to improve functional use of R hand to complete daily activities - NOT MET  Patient to be independent with home exercise program as demonstrated by performance with correct form without cues.  - MET     Long Term Goals: (  24  Treatments)  Increase functional skills AEB a score of 50/80 with UEFI  Demo active extension of all R digits to release object  Increase R  strength by 5+ pounds to complete daily activities with less difficulty  Increase all R pinch strength measurements by 2+ pounds for less difficulty completing daily activities   Demo functional movements with R hand following use of Bioness/Xcite for carryover of completion of functional activities   Increase R shoulder flexion by 15+ degrees for less difficulty completing daily activities      Patient Goals: get hand functioning       Pt. Education:  [] Yes  [x] No  [] Reviewed Prior HEP/Ed   Method of Education: [] Verbal  [] Demo  [] Written  Re:   Comprehension of Education:  [] Verbalizes understanding. [] Demonstrates understanding. [] Needs review. [] Demonstrates/verbalizes HEP/Ed previously given. Plan: [x] Continue current frequency toward short and long term goals.   [x] Specific Instructions for subsequent treatments: discharge    [] Other:       Time In: 1309  Time Out: 3218        Treatment Charges: Mins Units   []  Modalities:      []  Ultrasound     [x]  Ther Exercise 26 2   []  Manual Therapy     []  Ther Activities     []  Orthotic fit/train     []  Orthotic recheck     [x]  Other: neuro re-ed  30 2   Total Treatment time 56 4       Electronically signed by:  MODESTO Leon/JANNET

## 2023-02-02 ENCOUNTER — TELEPHONE (OUTPATIENT)
Dept: ONCOLOGY | Age: 63
End: 2023-02-02

## 2023-02-02 ENCOUNTER — HOSPITAL ENCOUNTER (OUTPATIENT)
Age: 63
Discharge: HOME | End: 2023-02-02
Payer: MEDICARE

## 2023-02-02 ENCOUNTER — OFFICE VISIT (OUTPATIENT)
Dept: ONCOLOGY | Age: 63
End: 2023-02-02
Payer: MEDICARE

## 2023-02-02 VITALS
DIASTOLIC BLOOD PRESSURE: 85 MMHG | HEART RATE: 92 BPM | RESPIRATION RATE: 16 BRPM | WEIGHT: 176.8 LBS | SYSTOLIC BLOOD PRESSURE: 135 MMHG | BODY MASS INDEX: 24.66 KG/M2 | TEMPERATURE: 97.9 F

## 2023-02-02 DIAGNOSIS — C91.10 CLL (CHRONIC LYMPHOCYTIC LEUKEMIA) (HCC): Primary | ICD-10-CM

## 2023-02-02 DIAGNOSIS — C91.10 CLL (CHRONIC LYMPHOCYTIC LEUKEMIA) (HCC): ICD-10-CM

## 2023-02-02 LAB
ABSOLUTE EOS #: 0 K/UL (ref 0–0.4)
ABSOLUTE IMMATURE GRANULOCYTE: 0 K/UL (ref 0–0.3)
ABSOLUTE LYMPH #: 24.17 K/UL (ref 1–4.8)
ABSOLUTE MONO #: 1.27 K/UL (ref 0.2–0.8)
BASOPHILS # BLD: 0 %
BASOPHILS ABSOLUTE: 0 K/UL (ref 0–0.2)
EOSINOPHILS RELATIVE PERCENT: 0 % (ref 1–4)
HCT VFR BLD AUTO: 40 % (ref 40.7–50.3)
HGB BLD-MCNC: 13 G/DL (ref 13–17)
IMMATURE GRANULOCYTES: 0 %
LYMPHOCYTES # BLD: 76 % (ref 24–44)
MCH RBC QN AUTO: 29.7 PG (ref 25.2–33.5)
MCHC RBC AUTO-ENTMCNC: 32.5 G/DL (ref 28.4–34.8)
MCV RBC AUTO: 91.5 FL (ref 82.6–102.9)
MONOCYTES # BLD: 4 % (ref 1–7)
MORPHOLOGY: ABNORMAL
NRBC AUTOMATED: 0 PER 100 WBC
PDW BLD-RTO: 13.9 % (ref 11.8–14.4)
PLATELET # BLD AUTO: 203 K/UL (ref 138–453)
PMV BLD AUTO: 9.6 FL (ref 8.1–13.5)
RBC # BLD: 4.37 M/UL (ref 4.21–5.77)
SEG NEUTROPHILS: 20 % (ref 36–66)
SEGMENTED NEUTROPHILS ABSOLUTE COUNT: 6.36 K/UL (ref 1.8–7.7)
WBC # BLD AUTO: 31.8 K/UL (ref 3.5–11.3)

## 2023-02-02 PROCEDURE — 36415 COLL VENOUS BLD VENIPUNCTURE: CPT

## 2023-02-02 PROCEDURE — 3079F DIAST BP 80-89 MM HG: CPT | Performed by: INTERNAL MEDICINE

## 2023-02-02 PROCEDURE — 99214 OFFICE O/P EST MOD 30 MIN: CPT | Performed by: INTERNAL MEDICINE

## 2023-02-02 PROCEDURE — 85025 COMPLETE CBC W/AUTO DIFF WBC: CPT

## 2023-02-02 PROCEDURE — 99211 OFF/OP EST MAY X REQ PHY/QHP: CPT

## 2023-02-02 PROCEDURE — 3075F SYST BP GE 130 - 139MM HG: CPT | Performed by: INTERNAL MEDICINE

## 2023-02-02 RX ORDER — INSULIN LISPRO 100 [IU]/ML
INJECTION, SOLUTION INTRAVENOUS; SUBCUTANEOUS
Qty: 15 ADJUSTABLE DOSE PRE-FILLED PEN SYRINGE | Refills: 3 | Status: SHIPPED | OUTPATIENT
Start: 2023-02-02

## 2023-02-02 NOTE — TELEPHONE ENCOUNTER
Charline Vann is calling to request a refill on the following medication(s):    Medication Request:  Requested Prescriptions     Pending Prescriptions Disp Refills    insulin lispro, 1 Unit Dial, (HUMALOG KWIKPEN) 100 UNIT/ML SOPN 15 Adjustable Dose Pre-filled Pen Syringe 0     Sig: According to sliding scale.  Max dose per day 48       Last Visit Date (If Applicable):  21/9/7190    Next Visit Date:    3/6/2023

## 2023-02-02 NOTE — TELEPHONE ENCOUNTER
Almita Dowling MD VISIT  RV 3-4 months with CBC at RV  LABS CDP 6/8/23  MD VISIT 6/8/23 @ 9:15AM  AVS PRINTED W/ INSTRUCTIONS AND GIVEN TO PT ON EXIT

## 2023-02-02 NOTE — PROGRESS NOTES
Chief Complaint   Patient presents with    Follow-up    Discuss Labs     DIAGNOSIS:       CLL, Stage 1  Lymphadenopathy. Persistent leukocytosis  Persistent neutropenia  Persistent lymphocytosis  History of CVA/right-sided hemiplegia with residual weakness  History of hypertension and diabetes  CURRENT THERAPY:         Observation for CLL. No indications for treatment. BRIEF CASE HISTORY:      Mr. Sammie Crain is a very pleasant 58 y.o. male with history of multiple co morbidities as listed. The patient is referred for evaluation and management of leukocytosis. Patient had history of diabetes and hypertension. He had CVA with right-sided hemiplegia in 2019. He had residual right-sided weakness. Patient has no other major health problems. Patient was noted to have persistent elevation of white blood cells over the last few years. He had no symptoms related to that abnormality. No repeated infections. No fever or night sweats. No weight loss or decreased appetite. No enlarged lymph nodes. No history of smoking or alcohol drinking. .   INTERIM HISTORY:   Seen for follow up  CLL. Doing well clinically. No fever or night sweats. No weight loss or decreased appetite. No new enlarged lymph nodes. No repeated infections. PAST MEDICAL HISTORY: has a past medical history of Cerebral artery occlusion with cerebral infarction ([facility] [address] Diabetes mellitus ([facility] [address] High cholesterol, and Hypertension. PAST SURGICAL HISTORY: has a past surgical history that includes knee surgery (1983).      CURRENT MEDICATIONS:  has a current medication list which includes the following prescription(s): potassium chloride, lantus solostar, dapagliflozin, hydralazine, amlodipine, onetouch ultra, atorvastatin, fluoxetine, insulin lispro (1 unit dial), fluticasone, onetouch delica plus [id number], b-d ultrafine iii short pen, blood glucose monitor kit and supplies, aspirin, and pen needles. ALLERGIES:  is allergic to dairycare [lactase-lactobacillus]. FAMILY HISTORY: Negative for any hematological or oncological conditions. SOCIAL HISTORY:  reports that he has never smoked. He has never used smokeless tobacco. He reports that he does not currently use alcohol. He reports that he does not use drugs. REVIEW OF SYSTEMS:     General: No weakness or fatigue. No unanticipated weight loss or decreased appetite. No fever or chills. Eyes: No blurred vision, eye pain or double vision. Ears: No hearing problems or drainage. No tinnitus. Throat: No sore throat, problems with swallowing or dysphagia. Respiratory: No cough, sputum or hemoptysis. No shortness of breath. No pleuritic chest pain. Cardiovascular: No chest pain, orthopnea or PND. No lower extremity edema. No palpitation. Gastrointestinal: No problems with swallowing. No abdominal pain or bloating. No nausea or vomiting. No diarrhea or constipation. No GI bleeding. Genitourinary: No dysuria, hematuria, frequency or urgency. Musculoskeletal: No muscle aches or pains. No limitation of movement. No back pain. No gait disturbance, No joint complaints. Dermatologic: No skin rashes or pruritus. No skin lesions or discolorations. Psychiatric: No depression, anxiety, or stress or signs of schizophrenia. No change in mood or affect. Hematologic: No history of bleeding tendency. No bruises or ecchymosis. No history of clotting problems. Infectious disease: No fever, chills or frequent infections. Endocrine: No polydipsia or polyuria. No temperature intolerance. Neurologic: As above. .   Allergic/Immunologic: No nasal congestion or hives. No repeated infections. PHYSICAL EXAM:  The patient is not in acute distress. Vital signs: Blood pressure 135/85, pulse 92, temperature 97.9 °F (36.6 °C), temperature source Temporal, resp. rate 16, weight 176 lb 12.8 oz (80.2 kg). General appearance - well appearing, not in pain or distress  Mental status - good mood, alert and oriented  Eyes - pupils equal and reactive, extraocular eye movements intact  Ears - bilateral TM's and external ear canals normal  Nose - normal and patent, no erythema, discharge or polyps  Mouth - mucous membranes moist, pharynx normal without lesions  Neck - supple, no significant adenopathy  Lymphatics -bilateral axillary and inguinal lymphadenopathy. no hepatosplenomegaly  Chest - clear to auscultation, no wheezes, rales or rhonchi, symmetric air entry  Heart - normal rate, regular rhythm, normal S1, S2, no murmurs, rubs, clicks or gallops  Abdomen - soft, nontender, nondistended, no masses or organomegaly  Neurological - alert, oriented, residual right-sided weakness with power 3-4 over 5. Musculoskeletal - no joint tenderness, deformity or swelling  Extremities - peripheral pulses normal, no pedal edema, no clubbing or cyanosis  Skin - normal coloration and turgor, no rashes, no suspicious skin lesions noted     Review of Diagnostic data:   Lab Results   Component Value Date    WBC 31.8 (HH) 02/02/2023    HGB 13.0 02/02/2023    HCT 40.0 (L) 02/02/2023    MCV 91.5 02/02/2023     02/02/2023       Chemistry        Component Value Date/Time     12/05/2022 0927    K 4.4 12/05/2022 0927     12/05/2022 0927    CO2 25 12/05/2022 0927    BUN 23 12/05/2022 0927    CREATININE 1.28 (H) 12/05/2022 0927        Component Value Date/Time    CALCIUM 9.4 12/05/2022 0927    ALKPHOS 78 12/05/2022 0927    AST 19 12/05/2022 0927    ALT 15 12/05/2022 0927    BILITOT 0.3 12/05/2022 0927            IMPRESSION:   CLL, Stage 1  Lymphadenopathy. Persistent leukocytosis  Persistent neutropenia  Persistent lymphocytosis  History of CVA/right-sided hemiplegia with residual weakness  History of hypertension and diabetes    PLAN: I reviewed the labs as above and discussed with the patient. Flow cytometry showed CLL. C/o bulge in inguinal region. Exam showed bilateral axillary and inguinal lymphadenopathy. CT scan results were explained to the patient. Patient has axillary and inguinal lymphadenopathy. However we do not see an indication to start treatment yet. Labs are showing stable leukocytosis. I explained the nature of CLL, staging, prognosis and treatment. Discussed indications for treatment of CLL. I will see him in 3 months with repeat labs. Sooner for any problems. Patient's questions were answered to the best of his satisfaction and he verbalized full understanding and agreement.

## 2023-02-09 ENCOUNTER — HOSPITAL ENCOUNTER (OUTPATIENT)
Dept: OCCUPATIONAL THERAPY | Age: 63
Setting detail: THERAPIES SERIES
Discharge: HOME | End: 2023-02-09
Payer: MEDICARE

## 2023-02-09 PROCEDURE — 97110 THERAPEUTIC EXERCISES: CPT

## 2023-02-09 NOTE — DISCHARGE SUMMARY
[x] [facility]       Occupational Therapy             1st floor       [address], [place]         Phone: [phone]       Fax: [phone] [] [facility]  [address] [place], [place]  Phone: [phone]  Fax: [phone]         Occupational Therapy Discharge Note    Date: 2023      Patient: Griselda Cancino  : 1960  MRN: [id number]    Referring Provider:  TOY Lundberg CNP  Insurance: [employer] - 14 visits from 1/3/23 to 3/31/23 auth#[id number]  Medical Diagnosis: Right hemiplegia G81.91            Rehab Codes: lack of coordination R27.8,, fine motor skills loss R29.818,, or muscle weakness generalized M62.81,  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 [address]: 23 - neuro  Visit# / total visits:  (visit count corrected 23); Progress note for patient due at visit 18-20                     Cancels/No Shows:   Date of initial visit: 10/12/22                Date of final visit: 23      Subjective:  Pain:  [] Yes  [x] No  Location:  N/A  Pain Rating: (0-10 scale) 0/10  Pain altered Tx:  [x] No  [] Yes  Action: NA  Comments: Pt reports he is still waiting to hear from his doctor's office about them sending Bioness information. No other new reports or changes since last visit. Reports that R hand and RUE function has much improved since beginning this round of OT treatment. Objective:  Tests/Measurements: Upper Extremity Functional Index  Current Functional Level:  49/80 (increase 14 points) functionally impaired as measured with the Upper Extremity Functional Index Survey. 0-80 scale, with 80 = no Deficits  (The UEFI model does not provide any specific cut off points that could classify the upper limb disability degree, however, a minimal detectable change of 9 points is provided. This means that for improvement or deterioration to be considered, between two subsequent evaluations, the scores must differ by at least 9 points.)     Sensibility: Normal    Edema: None      Skin Color: Normal   Skin/Scar condition Intact     STRENGTH                   Right   (pounds) Left   (pounds)    23 (+13) 90   Lateral pinch 9 (+3) - assist for hand placement  11   2 point pinch 6 (+1) - assist for hand placement  11   3 jaw pinch 7 (+3) - assist for hand placement  14      Bilateral  strength is normally symmetrical or up to 10% stronger on the dominant extremity, depending on the individual's physically activity level. The affected extremity is 75% weaker than the unaffected extremity (affected score/unaffected score, take the total and subtract from 100)        AROM:      DIGITS: Twitches noted with digits when attempting extension, hand resting in fisted position. WRIST/FOREARM                  Right ROM (degrees)   Extension (60-70)    35 degree Arc. Wrist rest in -50 degrees of flexion, able to actively extend to -15 degrees    Flexion (70-80) 50   Radial Deviation (20-25) No active    Ulnar Deviation (30-40) No active    Forearm Pronation (80-90) Rests in pronated position   Forearm Supination (80-90) 65 (+20)   (0 is used for neutral, + is used for hyperextension, - is used for extensor lag per ASHT)        ELBOW                  Right ROM (degrees) Left ROM (degrees)   Flexion (145-150) 130 (-10)  WNL   Extension  (0) -35 (NC) WNL            SHOULDER                   Right ROM (degrees) Right Strength (MMT) Left ROM (degrees) Left Strength  (MMT)   Flexion (180) 55 (+10) NT WNL 5/5   Extension (60) 40 (+6) NT WNL NT   Abduction (180) NT NT       Adduction           Internal Rotation (70)           External Rotation (80-90) 30 (+30)                   Assessment: Demo improvements with RUE function, hand strength, shoulder ROM and forearm supination. Elbow ROM remained the same. Pt satisfied with improvements since beginning this therapy episode.  Still does not have a functional fist with R hand, but now has some twitching noted with digits. In a relaxed state, digits can be fully extended. Moderate spasticity remains throughout digits and wrist. Pt to be discharged from OT services at this time due to limited progress made since last progress note and being capable of transitioning to [facility] independently. Pt motivated to maintain strength and ROM exercises at home to further improve functional skills of RUE. Short Term Goals: (  8-10    Treatments)  Increase AROM  R shoulder flexion by 10 degrees to improve ability to complete functional activities - NOT MET  Demo R shoulder external rotation of 10 degrees to improve ability to complete functional activities - MET  R elbow extension by 5 degrees to improve ability to complete functional activities - MET  Demo trace active extension of all R digits - MET  Increase strength (pounds);  R  by 2 pounds to improve functional use of R hand to complete daily activities - MET  All R pinches by 1 pound to improve functional use of R hand to complete daily activities - NOT MET  Patient to be independent with home exercise program as demonstrated by performance with correct form without cues.  - MET     Long Term Goals: (  24  Treatments)  Increase functional skills AEB a score of 50/80 with UEFI - NOT MET, 49/50  Demo active extension of all R digits to release object - not consistently  Increase R  strength by 5+ pounds to complete daily activities with less difficulty - MET  Increase all R pinch strength measurements by 2+ pounds for less difficulty completing daily activities  - MET  Demo functional movements with R hand following use of Bioness/Xcite for carryover of completion of functional activities - movements of R hand not functional, but trace movement now present   Increase R shoulder flexion by 15+ degrees for less difficulty completing daily activities - NOT MET, 10 degrees      Patient Goals: get hand functioning     Treatment to Date:     [x]  Therapeutic Exercise   00001              []  Iontophoresis: 4 mg/mL Dexamethasone Sodium Phosphate  mAmin  33693    [x]  Therapeutic Activity  00823  []  Vasopneumatic cold with compression  26067                []  ADL training  63860  []  Ultrasound        65893    [x]  Neuromuscular Re-education  50634  []  Electrical Stimulation Attended  96487    []  Manual Therapy  52269  Orthotic    []  Fit  28295     []  Train 39960    [x]  Instruction in HEP        Prosthetic    []  Fit 79702      []  Train 70742    []  Cognitive Interventions, (first 15 min 73458, subsequent 15 min 64146)  []  Cold/hotpack      []  Massage   17846             Discharge Status:     [] Pt recovered from conditions. Treatment goals were met. [x] Pt received maximum benefit. No further therapy indicated at this time. [] Pt to continue exercise/home instructions independently. [] Therapy interrupted due to:    [] Pt has 2 or more no shows/cancels, is discontinued per our policy. [x] Pt has completed prescribed number of treatment sessions. [] Other:       Time In/Out: 2794-7819, TEx3  Electronically signed by: Raf Berger OTR/L    If you have any questions or concerns, please don't hesitate to call.   Thank you for your referral.

## 2023-02-23 ENCOUNTER — OFFICE VISIT (OUTPATIENT)
Dept: NEUROLOGY | Age: 63
End: 2023-02-23
Payer: MEDICARE

## 2023-02-23 VITALS
BODY MASS INDEX: 25.06 KG/M2 | HEART RATE: 87 BPM | DIASTOLIC BLOOD PRESSURE: 81 MMHG | SYSTOLIC BLOOD PRESSURE: 131 MMHG | WEIGHT: 179 LBS | HEIGHT: 71 IN

## 2023-02-23 DIAGNOSIS — E78.2 MIXED HYPERLIPIDEMIA: ICD-10-CM

## 2023-02-23 DIAGNOSIS — F41.9 ANXIETY AND DEPRESSION: ICD-10-CM

## 2023-02-23 DIAGNOSIS — F32.A ANXIETY AND DEPRESSION: ICD-10-CM

## 2023-02-23 DIAGNOSIS — Z86.73 HISTORY OF STROKE: ICD-10-CM

## 2023-02-23 DIAGNOSIS — G81.91 RIGHT HEMIPLEGIA (HCC): Primary | ICD-10-CM

## 2023-02-23 PROCEDURE — 3079F DIAST BP 80-89 MM HG: CPT | Performed by: NURSE PRACTITIONER

## 2023-02-23 PROCEDURE — 99214 OFFICE O/P EST MOD 30 MIN: CPT | Performed by: NURSE PRACTITIONER

## 2023-02-23 PROCEDURE — 3075F SYST BP GE 130 - 139MM HG: CPT | Performed by: NURSE PRACTITIONER

## 2023-02-23 NOTE — PROGRESS NOTES
[facility]            [address], [address]          [place], [place] [address]          Dept: [phone]          Dept Fax: [phone]    MD Ritika Peña MD Sarah Mufti, MD Cheryl McGarry, CNP            2/23/2023      HISTORY OF PRESENT ILLNESS:       I had the pleasure of seeing Kade Jade, who returns for continuing neurologic care.  The patient was seen last on August 23, 2022 for treatment of depression and a previous left internal capsule ischemic stroke secondary to uncontrolled hypertension and diabetes in August 2019 with residual right hemiparesis.    For management of his depression he was prescribed prozac 20 mg daily. He is accompanied by his wife who has noticed improvements in his mood with use of prozac.     He also has a previous left internal capsule ischemic stroke secondary to uncontrolled hypertension and uncontrolled diabetes in August 2019. For secondary stroke prevention he was prescribed aspirin 81 mg daily and lipitor 40 mg daily. Lipid profile completed in December 2022 with LDL of 114. He has continued having right sided weakness. He reports a mixed compliance to lipitor which he reports may be why his LDL was elevated in December 2022.     He was recently diagnosed with chronic lymphocytic leukemia.         Testing reviewed:    Lipid profile 12/5/2022  Cholesterol <200 mg/dL 170      HDL >40 mg/dL 36 Low          LDL Cholesterol 0 - 130 mg/dL 114      Lipid Panel 2/22/2022  Cholesterol <200 mg/dL 173       HDL >40 mg/dL 34 Low       LDL Cholesterol 0 - 130 mg/dL 110      Labs Completed 8/6/2021     Cholesterol <200 mg/dL 130       HDL >40 mg/dL 29 Low       LDL Cholesterol 0 - 130 mg/dL 65      Triglycerides <150 mg/dL 180 High       Hemoglobin A1C 4.0 - 6.0 % 6.9 High       ALT 5 - 41 U/L 24    AST <40 U/L 23          Labs Completed 1/6/2021  Hemoglobin A1C 6.5High        Cholesterol 167  <200 mg/dL      HDL  34Low   >40 mg/dL      LDL Cholesterol 111  0 - 130 mg/dL               8/5/2019  CTH: Hypodense abnormality in the right? basal ganglia   CTA Head and Neck : Unremarkable      8/6/2019  Brain MRI : Periventricular white matter disease of a chronic nature with a focal area of acute ischemia in the left posterior limb of the internal capsule. 2 D echo  EF 60-65%     OTHER WORKUP:  -> 42   A1c 11.6-> 8.8 (9/13/2019)-> 6.8          PAST MEDICAL HISTORY:         Diagnosis Date    Cerebral artery occlusion with cerebral infarction (HCC)     Diabetes mellitus (HCC)     High cholesterol     Hypertension         PAST SURGICAL HISTORY:         Procedure Laterality Date    KNEE SURGERY  1983        SOCIAL HISTORY:     Social History     Socioeconomic History    Marital status: Single     Spouse name: Not on file    Number of children: Not on file    Years of education: Not on file    Highest education level: Not on file   Occupational History    Not on file   Tobacco Use    Smoking status: Never    Smokeless tobacco: Never   Vaping Use    Vaping Use: Never used   Substance and Sexual Activity    Alcohol use: Not Currently    Drug use: Never    Sexual activity: Not on file   Other Topics Concern    Not on file   Social History Narrative    Not on file     Social Determinants of Health     Financial Resource Strain: Not on file   Food Insecurity: Not on file   Transportation Needs: Not on file   Physical Activity: Sufficiently Active    Days of Exercise per Week: 3 days    Minutes of Exercise per Session: 60 min   Stress: Not on file   Social Connections: Not on file   Intimate Partner Violence: Not on file   Housing Stability: Not on file       CURRENT MEDICATIONS:     Current Outpatient Medications   Medication Sig Dispense Refill    insulin lispro, 1 Unit Dial, (HUMALOG KWIKPEN) 100 UNIT/ML SOPN According to sliding scale.  Max dose per day 48 15 Adjustable Dose Pre-filled Pen Syringe 3    potassium chloride (KLOR-CON M) 20 MEQ TBCR extended release tablet TAKE ONE TABLET BY MOUTH DAILY 90 tablet 1    insulin glargine (LANTUS SOLOSTAR) 100 UNIT/ML injection pen INJECT 20 UNITS UNDER THE SKIN ONCE NIGHTLY 18 mL 1    dapagliflozin (FARXIGA) 5 MG tablet Take 1 tablet by mouth every morning 90 tablet 1    hydrALAZINE (APRESOLINE) 25 MG tablet TAKE ONE TABLET BY MOUTH DAILY 90 tablet 1    amLODIPine (NORVASC) 10 MG tablet TAKE ONE TABLET BY MOUTH DAILY 90 tablet 0    ONETOUCH ULTRA strip USE ONE STRIP TO TEST THREE TIMES A DAY AND AS NEEDED FOR SYMPTOMS OF IRREGULAR BLOOD GLUCOSE 100 strip 0    atorvastatin (LIPITOR) 40 MG tablet Take 1 tablet by mouth daily 30 tablet 11    FLUoxetine (PROZAC) 20 MG capsule Take 1 capsule by mouth daily 30 capsule 11    fluticasone (FLONASE) 50 MCG/ACT nasal spray 1 spray by Each Nostril route daily 32 g 1    Lancets (ONETOUCH DELICA PLUS [id number]) MISC USE TO TEST BLOOD SUGAR TWO TIMES A  each 3    Insulin Pen Needle (B-D ULTRAFINE III SHORT PEN) 31G X 8 MM MISC USE ONE - FOUR NEEDLE DAILY 150 each 5    blood glucose monitor kit and supplies Test 3  times a day & as needed for symptoms of irregular blood glucose. ONE TOUCH METER 1 kit 0    aspirin 81 MG chewable tablet Take 81 mg by mouth daily      Insulin Pen Needle (PEN NEEDLES) 31G X 6 MM MISC Inject 1 each into the skin daily 100 each 3     No current facility-administered medications for this visit. ALLERGIES:     Allergies   Allergen Reactions    Dairycare [Lactase-Lactobacillus]                                  REVIEW OF SYSTEMS        All items selected indicate a positive finding. Those items not selected are negative.   Constitutional [] Weight loss/gain   [] Fatigue  [] Fever/Chills   HEENT [] Hearing Loss  [] Visual Disturbance  [] Tinnitus  [] Eye pain   Respiratory [] Shortness of Breath  [] Cough  [] Snoring   Cardiovascular [] Chest Pain  [] Palpitations  [] Lightheaded   GI [] Constipation  [] Diarrhea  [] Swallowing change  [] Nausea/vomiting    [] Urinary Frequency  [] Urinary Urgency   Musculoskeletal [] Neck pain  [] Back pain  [] Muscle pain  [] Restless legs   Dermatologic [] Skin changes   Neurologic [] Memory loss/confusion  [] Seizures  [] Trouble walking or imbalance  [] Dizziness  [] Sleep disturbance  [x] Weakness  [] Numbness  [] Tremors  [] Speech Difficulty  [] Headaches  [] Light Sensitivity  [] Sound Sensitivity   Endocrinology []Excessive thirst  []Excessive hunger   Psychiatric [] Anxiety/Depression  [] Hallucination   Allergy/immunology []Hives/environmental allergies   Hematologic/lymph [] Abnormal bleeding  [] Abnormal bruising         PHYSICAL EXAMINATION:       Vitals:    02/23/23 0908   BP: 131/81   Pulse: 87                                              .                                                                                                    General Appearance:  Alert, cooperative, no signs of distress, appears stated age   Head:  Normocephalic, no signs of trauma   Eyes:  Conjunctiva/corneas clear;  eyelids intact   Ears:  Normal external ear and canals   Nose: Nares normal, mucosa normal, no drainage    Throat: Lips and tongue normal; teeth normal;  gums normal   Neck: Supple, intact flexion, extension and rotation;   trachea midline;  no adenopathy;   thyroid: not enlarged;   no carotid pulse abnormality   Back:   Symmetric, no curvature, ROM adequate   Lungs:   Respirations unlabored   Heart:  Regular rate and rhythm           Extremities: Extremities normal, no cyanosis, no edema   Pulses: Symmetric over head and neck   Skin: Skin color, texture normal, no rashes, no lesions                                     NEUROLOGIC EXAMINATION    Neurologic Exam  Mental status    Alert and oriented x 3; intact memory with no confusion, speech or language problems; no hallucinations or delusions  Fund of information appropriate for level of education    Cranial nerves    II - visual boone intact to confrontation bilaterally  III, IV, VI - extra-ocular muscles full: no pupillary defect; no SARAY, no nystagmus, no ptosis   V - normal facial sensation                                                               VII - normal facial symmetry                                                             VIII - intact hearing                                                                             IX, X - symmetrical palate                                                                  XI - symmetrical shoulder shrug                                                       XII - tongue midline without atrophy or fasciculation      Motor function  Right hemiparesis      Sensory function Intact to light touch, pinprick, vibration, proprioception on all 4 extremities      Cerebellar Intact fine motor movement. No involuntary movements or tremors. No ataxia or dysmetria on finger to nose or heel to shin testing      Reflex function DTR 2+ on bilateral UE and LE, symmetric. Down going toes bilaterally      Gait                  Hemiparetic gait                  Medical Decision Making: In summary, your patient, Rocio Weiss exhibits the following, with associated plan:    Depression. Continue prozac 20 mg daily  Previous left internal capsule ischemic stroke secondary to uncontrolled hypertension and uncontrolled diabetes in August 2019. The patient has a right hemiparetic gait with a foot drop and decreased arm extension, flexion, and  of his right hand. Continue aspirin 81 mg daily  Continue lipitor 40 mg daily  Lipid profile completed in December 2022 with LDL of 114. We will continue to monitor with target LDL less than 100. Patient stated that he is not sure he takes his Lipitor every day. We will monitor again after he faithfully takes it on a routine basis.   Prescription for a [address] was discussed today, patient will discuss this with his occupational therapist and contact the office regarding a prescription. Return for follow up visit in 6 months. Signed: Zain Perry CNP      *Please note that portions of this note were completed with a voice recognition program.  Although every effort was made to insure the accuracy of this automated transcription, some errors in transcription may have occurred, occasionally words and are mis-transcribed    Provider Attestation: The documentation recorded by the scribe accurately reflects the service I personally performed and the decisions made by myself. Portions of this note were transcribed by a scribe. I personally performed the history, physical exam, and medical decision-making and confirm the accuracy of the information in the transcribed note. Scribe Attestation:   By signing my name below, Tona Artis, attest that this documentation has been prepared under the direction and in the presence of Zain Perry CNP.

## 2023-03-06 ENCOUNTER — TELEPHONE (OUTPATIENT)
Dept: NEUROLOGY | Age: 63
End: 2023-03-06

## 2023-03-06 ENCOUNTER — OFFICE VISIT (OUTPATIENT)
Dept: INTERNAL MEDICINE CLINIC | Age: 63
End: 2023-03-06
Payer: MEDICARE

## 2023-03-06 VITALS
OXYGEN SATURATION: 99 % | RESPIRATION RATE: 16 BRPM | BODY MASS INDEX: 24.08 KG/M2 | HEART RATE: 87 BPM | TEMPERATURE: 97.2 F | DIASTOLIC BLOOD PRESSURE: 80 MMHG | HEIGHT: 71 IN | SYSTOLIC BLOOD PRESSURE: 130 MMHG | WEIGHT: 172 LBS

## 2023-03-06 DIAGNOSIS — Z86.16 HISTORY OF COVID-19: ICD-10-CM

## 2023-03-06 DIAGNOSIS — N18.31 TYPE 1 DIABETES MELLITUS WITH STAGE 3A CHRONIC KIDNEY DISEASE (HCC): Primary | ICD-10-CM

## 2023-03-06 DIAGNOSIS — G81.91 RIGHT HEMIPLEGIA (HCC): ICD-10-CM

## 2023-03-06 DIAGNOSIS — F32.A ANXIETY AND DEPRESSION: ICD-10-CM

## 2023-03-06 DIAGNOSIS — Z28.21 PNEUMOCOCCAL VACCINATION DECLINED: ICD-10-CM

## 2023-03-06 DIAGNOSIS — M21.371 FOOT DROP, RIGHT: Primary | ICD-10-CM

## 2023-03-06 DIAGNOSIS — C91.10 CLL (CHRONIC LYMPHOCYTIC LEUKEMIA) (HCC): ICD-10-CM

## 2023-03-06 DIAGNOSIS — I10 ESSENTIAL HYPERTENSION: ICD-10-CM

## 2023-03-06 DIAGNOSIS — Z28.21 INFLUENZA VACCINATION DECLINED: ICD-10-CM

## 2023-03-06 DIAGNOSIS — Z28.21 TETANUS, DIPHTHERIA, AND ACELLULAR PERTUSSIS (TDAP) VACCINATION DECLINED: ICD-10-CM

## 2023-03-06 DIAGNOSIS — E78.2 MIXED HYPERLIPIDEMIA: ICD-10-CM

## 2023-03-06 DIAGNOSIS — Z86.73 HISTORY OF STROKE: ICD-10-CM

## 2023-03-06 DIAGNOSIS — F41.9 ANXIETY AND DEPRESSION: ICD-10-CM

## 2023-03-06 DIAGNOSIS — E10.22 TYPE 1 DIABETES MELLITUS WITH STAGE 3A CHRONIC KIDNEY DISEASE (HCC): Primary | ICD-10-CM

## 2023-03-06 PROBLEM — R59.1 LYMPHADENOPATHY: Status: RESOLVED | Noted: 2022-05-07 | Resolved: 2023-03-06

## 2023-03-06 LAB — HBA1C MFR BLD: 6.8 %

## 2023-03-06 PROCEDURE — 3079F DIAST BP 80-89 MM HG: CPT | Performed by: FAMILY MEDICINE

## 2023-03-06 PROCEDURE — 3044F HG A1C LEVEL LT 7.0%: CPT | Performed by: FAMILY MEDICINE

## 2023-03-06 PROCEDURE — 99214 OFFICE O/P EST MOD 30 MIN: CPT | Performed by: FAMILY MEDICINE

## 2023-03-06 PROCEDURE — 83036 HEMOGLOBIN GLYCOSYLATED A1C: CPT | Performed by: FAMILY MEDICINE

## 2023-03-06 PROCEDURE — 3075F SYST BP GE 130 - 139MM HG: CPT | Performed by: FAMILY MEDICINE

## 2023-03-06 RX ORDER — AMLODIPINE BESYLATE 10 MG/1
TABLET ORAL
Qty: 90 TABLET | Refills: 1 | Status: SHIPPED | OUTPATIENT
Start: 2023-03-06

## 2023-03-06 RX ORDER — INSULIN GLARGINE 100 [IU]/ML
INJECTION, SOLUTION SUBCUTANEOUS
Qty: 18 ML | Refills: 1 | Status: SHIPPED | OUTPATIENT
Start: 2023-03-06

## 2023-03-06 ASSESSMENT — PATIENT HEALTH QUESTIONNAIRE - PHQ9
7. TROUBLE CONCENTRATING ON THINGS, SUCH AS READING THE NEWSPAPER OR WATCHING TELEVISION: 0
SUM OF ALL RESPONSES TO PHQ9 QUESTIONS 1 & 2: 0
2. FEELING DOWN, DEPRESSED OR HOPELESS: 0
4. FEELING TIRED OR HAVING LITTLE ENERGY: 0
3. TROUBLE FALLING OR STAYING ASLEEP: 0
SUM OF ALL RESPONSES TO PHQ QUESTIONS 1-9: 0
8. MOVING OR SPEAKING SO SLOWLY THAT OTHER PEOPLE COULD HAVE NOTICED. OR THE OPPOSITE, BEING SO FIGETY OR RESTLESS THAT YOU HAVE BEEN MOVING AROUND A LOT MORE THAN USUAL: 0
9. THOUGHTS THAT YOU WOULD BE BETTER OFF DEAD, OR OF HURTING YOURSELF: 0
5. POOR APPETITE OR OVEREATING: 0
SUM OF ALL RESPONSES TO PHQ QUESTIONS 1-9: 0
6. FEELING BAD ABOUT YOURSELF - OR THAT YOU ARE A FAILURE OR HAVE LET YOURSELF OR YOUR FAMILY DOWN: 0
SUM OF ALL RESPONSES TO PHQ QUESTIONS 1-9: 0
SUM OF ALL RESPONSES TO PHQ QUESTIONS 1-9: 0
1. LITTLE INTEREST OR PLEASURE IN DOING THINGS: 0
10. IF YOU CHECKED OFF ANY PROBLEMS, HOW DIFFICULT HAVE THESE PROBLEMS MADE IT FOR YOU TO DO YOUR WORK, TAKE CARE OF THINGS AT HOME, OR GET ALONG WITH OTHER PEOPLE: 0

## 2023-03-06 ASSESSMENT — ENCOUNTER SYMPTOMS
RESPIRATORY NEGATIVE: 1
EYES NEGATIVE: 1
ALLERGIC/IMMUNOLOGIC NEGATIVE: 1
GASTROINTESTINAL NEGATIVE: 1

## 2023-03-06 NOTE — TELEPHONE ENCOUNTER
[employer] sent a fax for a [employer] device. Completed and signed by Antonina Pyle and faxed back.

## 2023-03-06 NOTE — PROGRESS NOTES
Subjective:      Patient ID: Cristo Dela Cruz is a 58 y.o. male. Diabetes  He presents for his follow-up diabetic visit. He has type 1 diabetes mellitus. The initial diagnosis of diabetes was made 9 years ago. His disease course has been fluctuating. There are no hypoglycemic associated symptoms. There are no diabetic associated symptoms. There are no hypoglycemic complications. Symptoms are stable. Diabetic complications include a CVA and nephropathy. Risk factors for coronary artery disease include diabetes mellitus, dyslipidemia, male sex and hypertension. Current diabetic treatment includes insulin injections. He is compliant with treatment all of the time. His weight is stable. He is following a generally healthy diet. Meal planning includes ADA exchanges, avoidance of concentrated sweets, calorie counting and carbohydrate counting. He has had a previous visit with a dietitian. He participates in exercise three times a week. His home blood glucose trend is fluctuating minimally. An ACE inhibitor/angiotensin II receptor blocker is being taken. Review of Systems   Constitutional: Negative. HENT: Negative. Eyes: Negative. Respiratory: Negative. Cardiovascular: Negative. Gastrointestinal: Negative. Endocrine: Negative. Musculoskeletal:  Positive for arthralgias. Skin: Negative. Allergic/Immunologic: Negative. Neurological: Negative. Hematological: Negative. Psychiatric/Behavioral: Negative. Past family and social history unremarkable. Diagnosis Orders   1. Type 1 diabetes mellitus with stage 3a chronic kidney disease (HCC)  POCT glycosylated hemoglobin (Hb A1C)    insulin glargine (LANTUS SOLOSTAR) 100 UNIT/ML injection pen    External Referral To Nephrology      2. CLL (chronic lymphocytic leukemia) ([facility] [address]        3. Essential hypertension        4. Mixed hyperlipidemia        5. Right hemiplegia ([address]        6. History of stroke        7.  Anxiety and depression   8. Influenza vaccination declined        9. Pneumococcal vaccination declined        10. Tetanus, diphtheria, and acellular pertussis (Tdap) vaccination declined        11. History of COVID-19            Objective:   Physical Exam  Vitals and nursing note reviewed.   Constitutional:       Appearance: He is well-developed.   HENT:      Head: Normocephalic and atraumatic.      Right Ear: External ear normal.      Left Ear: External ear normal.      Nose: Nose normal.   Eyes:      Conjunctiva/sclera: Conjunctivae normal.      Pupils: Pupils are equal, round, and reactive to light.   Cardiovascular:      Rate and Rhythm: Normal rate and regular rhythm.      Heart sounds: Normal heart sounds.      Comments: Insulin-dependent diabetes mellitus  Hypertension  Hyperlipidemia  Pulmonary:      Effort: Pulmonary effort is normal.      Breath sounds: Normal breath sounds.   Abdominal:      General: Bowel sounds are normal.      Palpations: Abdomen is soft.   Genitourinary:     Comments: Diabetic nephropathy-CKD 3  Musculoskeletal:         General: Normal range of motion.      Cervical back: Normal range of motion and neck supple.   Skin:     General: Skin is warm and dry.   Neurological:      Mental Status: He is alert and oriented to person, place, and time.      Deep Tendon Reflexes: Reflexes are normal and symmetric.      Comments: History of CVA with residual hemiplegia   Psychiatric:         Thought Content: Thought content normal.      Comments: Declined all age-appropriate vaccinations  Anxiety/depression       Assessment:       Diagnosis Orders   1. Type 1 diabetes mellitus with stage 3a chronic kidney disease (HCC)  POCT glycosylated hemoglobin (Hb A1C)    insulin glargine (LANTUS SOLOSTAR) 100 UNIT/ML injection pen    External Referral To Nephrology      2. CLL (chronic lymphocytic leukemia) ([facility])        3. Essential hypertension        4. Mixed hyperlipidemia        5. Right hemiplegia (HCC)        6. History of  stroke        7. Anxiety and depression        8. Influenza vaccination declined        9. Pneumococcal vaccination declined        10. Tetanus, diphtheria, and acellular pertussis (Tdap) vaccination declined        6. History of COVID-19                Plan:      70-year-old -American male came along with his wife. He is pleasant communicative and does not appear to be in any obvious distress  History of insulin-dependent diabetes mellitus on 20 units of Lantus at night. Last A1c as of December was 6.7. He states that since he started taking 10 units, he has been more stable. We will continue to observe. Keep Accu-Chek log. Hypoglycemia protocol explained  Diabetic nephropathy with creatinine of 1.28. On 4/2022 he was scheduled to be seen by nephrology however for some unknown reason he did not follow through. Compliance is advised. Optimize diabetes and blood pressure, avoidance of nephrotoxic drugs, anti-inflammatory radiocontrast  Hemodynamically stable with random blood pressure equal less than 130/80. Consume less than 2 g of salt a day  History of CVA with residual hemiplegia. Continue aspirin Plavix, secondary risk factor stratification  Anxiety/depression. He denies suicidality, delusion or hallucination. Continue SSRI  He declined all age-appropriate vaccination. Once again he is advised to reconsider  History of CLL. He is established with hematology oncology  Allergies allergic rhinitis. Continue Flonase, nasal saline and antihistamine as needed  He denies tobacco, excessive alcohol or illicit drug use  Med list and available labs reviewed, discussed with patient, questions answered.   Patient as well as his wife does not offer any further question or concern  Follow-up in 3 months  Call for any concern  This note is created with a voice recognition program and while intend to generate a document that accurately reflects the content of the visit, no guarantee can be provided that every mistake has been identified and corrected by editing.           Beau Fuentes MD

## 2023-05-02 RX ORDER — HYDRALAZINE HYDROCHLORIDE 25 MG/1
25 TABLET, FILM COATED ORAL DAILY
Qty: 90 TABLET | Refills: 1 | Status: SHIPPED | OUTPATIENT
Start: 2023-05-02

## 2023-05-02 NOTE — TELEPHONE ENCOUNTER
Jordon Diego is calling to request a refill on the following medication(s):    Medication Request:  Requested Prescriptions     Pending Prescriptions Disp Refills    hydrALAZINE (APRESOLINE) 25 MG tablet 90 tablet 1     Sig: Take 1 tablet by mouth daily       Last Visit Date (If Applicable):  5/7/5759    Next Visit Date:    6/6/2023

## 2023-06-02 RX ORDER — INSULIN LISPRO 100 [IU]/ML
INJECTION, SOLUTION INTRAVENOUS; SUBCUTANEOUS
Qty: 15 ML | Refills: 5 | Status: SHIPPED | OUTPATIENT
Start: 2023-06-02

## 2023-06-02 NOTE — TELEPHONE ENCOUNTER
Jayla Andino is calling to request a refill on the following medication(s):    Medication Request:  Requested Prescriptions     Pending Prescriptions Disp Refills    insulin lispro, 1 Unit Dial, (HUMALOG KWIKPEN) 100 UNIT/ML SOPN [Pharmacy Med Name: HUMALOG 100 U/ML KWIK PEN INJ 3ML] 15 mL 5     Sig: INJECT UNDER THE SKIN PER SLIDING SCALE WITH A MAX DOSE OF 48 UNITS PER DAY       Last Visit Date (If Applicable):  5/5/8523    Next Visit Date:    6/6/2023      Last refill 2/2/23. Prescription pending.

## 2023-06-06 ENCOUNTER — OFFICE VISIT (OUTPATIENT)
Dept: INTERNAL MEDICINE CLINIC | Age: 63
End: 2023-06-06
Payer: COMMERCIAL

## 2023-06-06 VITALS
TEMPERATURE: 98.1 F | BODY MASS INDEX: 23.32 KG/M2 | OXYGEN SATURATION: 96 % | SYSTOLIC BLOOD PRESSURE: 120 MMHG | HEIGHT: 71 IN | DIASTOLIC BLOOD PRESSURE: 76 MMHG | HEART RATE: 88 BPM | RESPIRATION RATE: 16 BRPM | WEIGHT: 166.6 LBS

## 2023-06-06 DIAGNOSIS — E10.22 TYPE 1 DIABETES MELLITUS WITH STAGE 3A CHRONIC KIDNEY DISEASE (HCC): ICD-10-CM

## 2023-06-06 DIAGNOSIS — C91.10 CLL (CHRONIC LYMPHOCYTIC LEUKEMIA) (HCC): Primary | ICD-10-CM

## 2023-06-06 DIAGNOSIS — F32.A ANXIETY AND DEPRESSION: ICD-10-CM

## 2023-06-06 DIAGNOSIS — Z86.73 HISTORY OF STROKE: ICD-10-CM

## 2023-06-06 DIAGNOSIS — E78.2 MIXED HYPERLIPIDEMIA: ICD-10-CM

## 2023-06-06 DIAGNOSIS — Z86.16 HISTORY OF COVID-19: ICD-10-CM

## 2023-06-06 DIAGNOSIS — Z28.21 TETANUS, DIPHTHERIA, AND ACELLULAR PERTUSSIS (TDAP) VACCINATION DECLINED: ICD-10-CM

## 2023-06-06 DIAGNOSIS — F41.9 ANXIETY AND DEPRESSION: ICD-10-CM

## 2023-06-06 DIAGNOSIS — N18.31 TYPE 1 DIABETES MELLITUS WITH STAGE 3A CHRONIC KIDNEY DISEASE (HCC): ICD-10-CM

## 2023-06-06 DIAGNOSIS — Z28.21 PNEUMOCOCCAL VACCINATION DECLINED: ICD-10-CM

## 2023-06-06 DIAGNOSIS — Z28.21 INFLUENZA VACCINATION DECLINED: ICD-10-CM

## 2023-06-06 DIAGNOSIS — G81.91 RIGHT HEMIPLEGIA (HCC): ICD-10-CM

## 2023-06-06 DIAGNOSIS — I10 ESSENTIAL HYPERTENSION: ICD-10-CM

## 2023-06-06 PROCEDURE — 3078F DIAST BP <80 MM HG: CPT | Performed by: FAMILY MEDICINE

## 2023-06-06 PROCEDURE — 3074F SYST BP LT 130 MM HG: CPT | Performed by: FAMILY MEDICINE

## 2023-06-06 PROCEDURE — 1036F TOBACCO NON-USER: CPT | Performed by: FAMILY MEDICINE

## 2023-06-06 PROCEDURE — 3044F HG A1C LEVEL LT 7.0%: CPT | Performed by: FAMILY MEDICINE

## 2023-06-06 PROCEDURE — 2022F DILAT RTA XM EVC RTNOPTHY: CPT | Performed by: FAMILY MEDICINE

## 2023-06-06 PROCEDURE — G8420 CALC BMI NORM PARAMETERS: HCPCS | Performed by: FAMILY MEDICINE

## 2023-06-06 PROCEDURE — 99214 OFFICE O/P EST MOD 30 MIN: CPT | Performed by: FAMILY MEDICINE

## 2023-06-06 PROCEDURE — 3017F COLORECTAL CA SCREEN DOC REV: CPT | Performed by: FAMILY MEDICINE

## 2023-06-06 PROCEDURE — G8427 DOCREV CUR MEDS BY ELIG CLIN: HCPCS | Performed by: FAMILY MEDICINE

## 2023-06-06 SDOH — ECONOMIC STABILITY: HOUSING INSECURITY
IN THE LAST 12 MONTHS, WAS THERE A TIME WHEN YOU DID NOT HAVE A STEADY PLACE TO SLEEP OR SLEPT IN A SHELTER (INCLUDING NOW)?: NO

## 2023-06-06 SDOH — ECONOMIC STABILITY: FOOD INSECURITY: WITHIN THE PAST 12 MONTHS, THE FOOD YOU BOUGHT JUST DIDN'T LAST AND YOU DIDN'T HAVE MONEY TO GET MORE.: NEVER TRUE

## 2023-06-06 SDOH — ECONOMIC STABILITY: INCOME INSECURITY: HOW HARD IS IT FOR YOU TO PAY FOR THE VERY BASICS LIKE FOOD, HOUSING, MEDICAL CARE, AND HEATING?: NOT HARD AT ALL

## 2023-06-06 SDOH — ECONOMIC STABILITY: FOOD INSECURITY: WITHIN THE PAST 12 MONTHS, YOU WORRIED THAT YOUR FOOD WOULD RUN OUT BEFORE YOU GOT MONEY TO BUY MORE.: NEVER TRUE

## 2023-06-06 NOTE — PROGRESS NOTES
3a chronic kidney disease ([facility] [address]                Plan:      70-year-old male is brought in by his wife for routine follow-up. He is pleasant communicative and does not appear to be in any obvious distress  History of CVA with residual right hemiplegia. He ambulates with a cane however does have access to a walker. Fall precaution is advised. Continue aspirin and Plavix. He follows up with neurology  Insulin-dependent diabetes mellitus with A1c of 6.8. He denies hypoglycemia. Continue current regimen, ADA 1800 diet  Hemodynamically stable. Antihypertensive titrated by nephrology. We aim to keep his blood pressure equal less than 130/80  Hyperlipidemia on statin that he is tolerating well  CKD 3 with creatinine of 1.28. He was recently seen by nephrology. Avoid nephrotoxic drugs, anti-inflammatory radiocontrast.  He is counseled diabetic foot care  He is advised to update annual dilated eye exam  Anxiety/depression. Stable on SSRI that he is tolerating well. He is comfortable with the care provided by his wife  He denies tobacco, excessive alcohol or illicit drug use  Once again he declined all age-appropriate vaccinations  Right foot drop secondary to CVA. He is wearing orthotics. He does not offer any further question or concern  Continue current regimen of medication  This note is created with a voice recognition program and while intend to generate a document that accurately reflects the content of the visit, no guarantee can be provided that every mistake has been identified and corrected by editing.           Ria Ochoa MD

## 2023-06-08 ENCOUNTER — OFFICE VISIT (OUTPATIENT)
Dept: ONCOLOGY | Age: 63
End: 2023-06-08
Payer: COMMERCIAL

## 2023-06-08 ENCOUNTER — HOSPITAL ENCOUNTER (OUTPATIENT)
Age: 63
Setting detail: SPECIMEN
Discharge: HOME | End: 2023-06-08
Payer: COMMERCIAL

## 2023-06-08 ENCOUNTER — TELEPHONE (OUTPATIENT)
Dept: ONCOLOGY | Age: 63
End: 2023-06-08

## 2023-06-08 VITALS
HEART RATE: 82 BPM | SYSTOLIC BLOOD PRESSURE: 120 MMHG | RESPIRATION RATE: 16 BRPM | DIASTOLIC BLOOD PRESSURE: 77 MMHG | TEMPERATURE: 97.8 F | BODY MASS INDEX: 23.15 KG/M2 | WEIGHT: 166 LBS

## 2023-06-08 DIAGNOSIS — C91.10 CLL (CHRONIC LYMPHOCYTIC LEUKEMIA) (HCC): Primary | ICD-10-CM

## 2023-06-08 DIAGNOSIS — C91.10 CLL (CHRONIC LYMPHOCYTIC LEUKEMIA) (HCC): ICD-10-CM

## 2023-06-08 LAB
BASOPHILS # BLD: 0 K/UL (ref 0–0.2)
BASOPHILS NFR BLD: 0 % (ref 0–2)
EOSINOPHIL # BLD: 0 K/UL (ref 0–0.44)
EOSINOPHILS RELATIVE PERCENT: 0 % (ref 1–4)
ERYTHROCYTE [DISTWIDTH] IN BLOOD BY AUTOMATED COUNT: 13.7 % (ref 11.8–14.4)
HCT VFR BLD AUTO: 40.1 % (ref 40.7–50.3)
HGB BLD-MCNC: 13.2 G/DL (ref 13–17)
IMM GRANULOCYTES # BLD AUTO: 0 K/UL (ref 0–0.3)
IMM GRANULOCYTES NFR BLD: 0 %
LYMPHOCYTES # BLD: 77 % (ref 24–43)
LYMPHOCYTES NFR BLD: 24.48 K/UL (ref 1.1–3.7)
MCH RBC QN AUTO: 30.1 PG (ref 25.2–33.5)
MCHC RBC AUTO-ENTMCNC: 32.9 G/DL (ref 28.4–34.8)
MCV RBC AUTO: 91.3 FL (ref 82.6–102.9)
MONOCYTES NFR BLD: 1.91 K/UL (ref 0.1–1.2)
MONOCYTES NFR BLD: 6 % (ref 3–12)
MORPHOLOGY: ABNORMAL
NEUTROPHILS NFR BLD: 17 % (ref 36–65)
NEUTS SEG NFR BLD: 5.41 K/UL (ref 1.5–8.1)
NRBC AUTOMATED: 0 PER 100 WBC
PLATELET # BLD AUTO: 180 K/UL (ref 138–453)
PMV BLD AUTO: 10.1 FL (ref 8.1–13.5)
RBC # BLD AUTO: 4.39 M/UL (ref 4.21–5.77)
WBC OTHER # BLD: 31.8 K/UL (ref 3.5–11.3)

## 2023-06-08 PROCEDURE — G8427 DOCREV CUR MEDS BY ELIG CLIN: HCPCS | Performed by: INTERNAL MEDICINE

## 2023-06-08 PROCEDURE — 3017F COLORECTAL CA SCREEN DOC REV: CPT | Performed by: INTERNAL MEDICINE

## 2023-06-08 PROCEDURE — 3078F DIAST BP <80 MM HG: CPT | Performed by: INTERNAL MEDICINE

## 2023-06-08 PROCEDURE — 3074F SYST BP LT 130 MM HG: CPT | Performed by: INTERNAL MEDICINE

## 2023-06-08 PROCEDURE — 1036F TOBACCO NON-USER: CPT | Performed by: INTERNAL MEDICINE

## 2023-06-08 PROCEDURE — 99214 OFFICE O/P EST MOD 30 MIN: CPT | Performed by: INTERNAL MEDICINE

## 2023-06-08 PROCEDURE — G8420 CALC BMI NORM PARAMETERS: HCPCS | Performed by: INTERNAL MEDICINE

## 2023-06-08 PROCEDURE — 36415 COLL VENOUS BLD VENIPUNCTURE: CPT

## 2023-06-08 PROCEDURE — 99211 OFF/OP EST MAY X REQ PHY/QHP: CPT | Performed by: INTERNAL MEDICINE

## 2023-06-08 PROCEDURE — 85027 COMPLETE CBC AUTOMATED: CPT

## 2023-06-08 NOTE — TELEPHONE ENCOUNTER
Raven Waters MD VISIT  RV 3-4 months with CBC at RV  LABS CDP 10/5/23  MD VISIT 10/5/23 @ 10:15AM  AVS PRINTED W/ INSTRUCTIONS AND GIVEN TO PT ON EXIT

## 2023-06-08 NOTE — PROGRESS NOTES
Chief Complaint   Patient presents with    Follow-up    Discuss Labs     DIAGNOSIS:       CLL, Stage 1  Lymphadenopathy. Persistent leukocytosis  Persistent neutropenia  Persistent lymphocytosis  History of CVA/right-sided hemiplegia with residual weakness  History of hypertension and diabetes  CURRENT THERAPY:         Observation for CLL. No indications for treatment. BRIEF CASE HISTORY:      Mr. Brijesh Doyle is a very pleasant 61 y.o. male with history of multiple co morbidities as listed. The patient is referred for evaluation and management of leukocytosis. Patient had history of diabetes and hypertension. He had CVA with right-sided hemiplegia in 2019. He had residual right-sided weakness. Patient has no other major health problems. Patient was noted to have persistent elevation of white blood cells over the last few years. He had no symptoms related to that abnormality. No repeated infections. No fever or night sweats. No weight loss or decreased appetite. No enlarged lymph nodes. No history of smoking or alcohol drinking. .   INTERIM HISTORY:   Seen for follow up  CLL. Doing well clinically. No fever or night sweats. No weight loss or decreased appetite. No new enlarged lymph nodes. No repeated infections. PAST MEDICAL HISTORY: has a past medical history of Cerebral artery occlusion with cerebral infarction ([facility] [address] Diabetes mellitus ([facility] [address] High cholesterol, and Hypertension. PAST SURGICAL HISTORY: has a past surgical history that includes knee surgery (1983).      CURRENT MEDICATIONS:  has a current medication list which includes the following prescription(s): insulin lispro (1 unit dial), hydralazine, amlodipine, lantus solostar, potassium chloride, dapagliflozin, onetouch ultra, atorvastatin, fluoxetine, fluticasone, onetouch delica plus [id number], b-d ultrafine iii short pen, blood glucose

## 2023-06-16 NOTE — TELEPHONE ENCOUNTER
Discussed with patient   Orders placed
Left vm on 2/24/22 twice, and on Friday  Left vm today
Lm tcb
Lm tcb
[facility] calling with a critical lab    WBC is 34.2
Please order CBC with differential  Chest x-rays 2 views  Urinalysis with reflex culture and sensitivity  Levofloxacin 500 mg p.o. daily for 10 days  Consult hematology  If he is having fever chills headache abdominal pain back pain to go to the ER
Patient Needs Assistance to Leave Residence...

## 2023-08-23 ENCOUNTER — OFFICE VISIT (OUTPATIENT)
Dept: NEUROLOGY | Age: 63
End: 2023-08-23
Payer: COMMERCIAL

## 2023-08-23 VITALS
HEIGHT: 71 IN | SYSTOLIC BLOOD PRESSURE: 122 MMHG | WEIGHT: 166 LBS | HEART RATE: 88 BPM | BODY MASS INDEX: 23.24 KG/M2 | DIASTOLIC BLOOD PRESSURE: 77 MMHG

## 2023-08-23 DIAGNOSIS — F32.A ANXIETY AND DEPRESSION: ICD-10-CM

## 2023-08-23 DIAGNOSIS — F41.9 ANXIETY AND DEPRESSION: ICD-10-CM

## 2023-08-23 DIAGNOSIS — I63.9 ISCHEMIC STROKE (HCC): ICD-10-CM

## 2023-08-23 DIAGNOSIS — G81.91 RIGHT HEMIPLEGIA (HCC): Primary | ICD-10-CM

## 2023-08-23 PROCEDURE — 3017F COLORECTAL CA SCREEN DOC REV: CPT | Performed by: PSYCHIATRY & NEUROLOGY

## 2023-08-23 PROCEDURE — 99214 OFFICE O/P EST MOD 30 MIN: CPT | Performed by: PSYCHIATRY & NEUROLOGY

## 2023-08-23 PROCEDURE — G8427 DOCREV CUR MEDS BY ELIG CLIN: HCPCS | Performed by: PSYCHIATRY & NEUROLOGY

## 2023-08-23 PROCEDURE — 3078F DIAST BP <80 MM HG: CPT | Performed by: PSYCHIATRY & NEUROLOGY

## 2023-08-23 PROCEDURE — 1036F TOBACCO NON-USER: CPT | Performed by: PSYCHIATRY & NEUROLOGY

## 2023-08-23 PROCEDURE — G8420 CALC BMI NORM PARAMETERS: HCPCS | Performed by: PSYCHIATRY & NEUROLOGY

## 2023-08-23 PROCEDURE — 3074F SYST BP LT 130 MM HG: CPT | Performed by: PSYCHIATRY & NEUROLOGY

## 2023-08-23 RX ORDER — FLUOXETINE HYDROCHLORIDE 20 MG/1
20 CAPSULE ORAL DAILY
Qty: 30 CAPSULE | Refills: 5 | Status: SHIPPED | OUTPATIENT
Start: 2023-08-23

## 2023-08-23 RX ORDER — MELATONIN
1 DAILY
COMMUNITY
Start: 2023-08-02

## 2023-08-23 NOTE — PROGRESS NOTES
normal, no cyanosis, no edema   Pulses: Symmetric over head and neck   Skin: Skin color, texture normal, no rashes, no lesions                                     NEUROLOGIC EXAMINATION    Neurologic Exam  Mental status    Alert and oriented x 3; intact memory with no confusion, speech or language problems; no hallucinations or delusions  Fund of information appropriate for level of education    Cranial nerves    II - visual fields intact to confrontation bilaterally  III, IV, VI - extra-ocular muscles full: no pupillary defect; no SARAY, no nystagmus, no ptosis   V - normal facial sensation                                                               VII - normal facial symmetry                                                             VIII - intact hearing                                                                             IX, X - symmetrical palate                                                                  XI - symmetrical shoulder shrug                                                       XII - tongue midline without atrophy or fasciculation      Motor function  Right hemiparesis      Sensory function Intact to light touch, pinprick, vibration, proprioception on all 4 extremities      Cerebellar Intact fine motor movement. No involuntary movements or tremors. No ataxia or dysmetria on finger to nose or heel to shin testing      Reflex function 3 + brisk reflex on the  R otherwise DTR 2+ on bilateral UE and LE, symmetric. Down going toes bilaterally      Gait                  Hemiparetic gait        Medical Decision Making: In summary, your patient, Yang Herzog 61 RHM with HTN, DM has  depression and a previous left internal capsule ischemic stroke secondary to uncontrolled hypertension and diabetes in August 2019 with residual right hemiparesis.     1. Right hemiplegia ([address]) right hemiparetic gait with a foot drop (UMN pattern)   -     [facility] - [facility]  - uses an AFO for R

## 2023-09-01 RX ORDER — AMLODIPINE BESYLATE 10 MG/1
TABLET ORAL
Qty: 90 TABLET | Refills: 1 | Status: SHIPPED | OUTPATIENT
Start: 2023-09-01

## 2023-09-01 NOTE — TELEPHONE ENCOUNTER
Tabby Mayfield is calling to request a refill on the following medication(s):    Medication Request:  Requested Prescriptions     Pending Prescriptions Disp Refills    amLODIPine (NORVASC) 10 MG tablet [Pharmacy Med Name: AMLODIPINE BESYLATE 10MG TABLETS] 90 tablet 1     Sig: TAKE 1 TABLET BY MOUTH DAILY       Last Visit Date (If Applicable):  4/6/9155    Next Visit Date:    10/6/2023        Last refill 3/6/23. Prescription pending.

## 2023-09-07 ENCOUNTER — HOSPITAL ENCOUNTER (OUTPATIENT)
Dept: PHYSICAL THERAPY | Age: 63
Setting detail: THERAPIES SERIES
Discharge: HOME | End: 2023-09-07
Payer: COMMERCIAL

## 2023-09-07 PROCEDURE — 97162 PT EVAL MOD COMPLEX 30 MIN: CPT

## 2023-09-07 NOTE — FLOWSHEET NOTE
Functional Gait Assessment (FGA)    Gait Level Surface: 1  Instructions: Walk at your normal speed from here to the next ioana (6 m/20 ft)  Grading: Justin Sees the highest category that applies. (3) Normal - Walks 6 m (20 ft) in less than 5.5 seconds, no assistive devices, good speed, no evidence for imbalance, normal gait pattern, deviates no more than 6 in. outside of the 12        in walkway width. (2) Mild impairment - Walks 6 in (20 ft) in less than 7 seconds but greater than 5.5 seconds, uses assistive device, slower speed, mild gait deviations, or deviates 6-10 in. outside of        the 12 in walkway width. (1) Moderate impairment - Walks 6 m (20 ft) slow speed, abnormal gait pattern, evidence for imbalance, or deviates 10-15 in outside of the 12 in walkway width. Requires more than 7       seconds to ambulate 6 m (20 ft). (0) Significant impairment - Cannot walk 6 m (20 ft) without assistance, severe gait deviations or imbalance, deviates greater than 15 in. outside of the 12 in. walkway width or        reaches and touches the wall. Change in Gait Speed: 2  Instructions: Begin walking at your normal pace (for 1.5 m (5ft)). When I tell you \"go\", walk as fast as you can (for 1.5m (5 ft)). When I tell you \"slow\", walk as slowly as you can (for 1.5m (5ft)). Grading: Justin Sees the highest category that applies. (3) Normal - Able to smoothly change walking speed without loss of balance or gait deviation. Shows a significant difference in walking speeds between normal, fast, and slow speeds. Deviates no more than 6 in. outside of the 12 in. walkway width. (2) Mild impairment - Is able to change speed but demonstrates mild gait deviations, deviates 6-10 in. outside of the 12 in. walkway width, or no gait deviations but        unable to achieve a significant change in velocity, or uses an assistive device.    (1) Moderate impairment - Makes only minor adjustments to walking speed, or accomplishes a change in

## 2023-09-07 NOTE — CONSULTS
shop/meal prep [] Independent  [x] Assist [] Independent  [x] Assist      Gait Prior level of function Current level of function    [x] Independent  [] Assist [x] Independent  [] Assist   Device: [] Independent [] Independent    [] Straight Cane [x] Quad cane [] Straight Cane [x] Quad cane    [] Standard walker [] Rolling walker   [] 4 wheeled walker [] Standard walker [] Rolling walker   [] 4 wheeled walker    [] Wheelchair [] Wheelchair       Pain:  [] Yes  [x] No Location: N/A Pain Rating: (0-10 scale) 0/10  Pain altered Tx:  [] Yes  [x] No  Action:             Objective:    VITALS at rest: HELD d/t time constraints of eval, will assess in upcoming session        POSTURE No deficit Deficit Not Tested Comments   Scapular position [] [] []    [address] subluxation [] [] []    Forward Head [] [x] []    Rounded Shldrs [] [x] []    Slumped Sitting [] [x] []    Skin Integrity [] [] [x]                  NEUROLOGICAL       Cognitive [x] [] [] Deficits with:  [] Executive function  [] Memory  [] Attention  [] Information processing  [] Anosagnosia   [] Mood/agitation  [] Perseveration  [] Impulsivity  [] Emotional lability  [] Disinhibition   Reflexes [] [] [x] [] 0: absent  [] 1+: diminished  [] 2+: brisk, normal  [] 3+: brisker than average, slight hyperactive  [] 4+: very brisk, hyperactive, clonus    Special reflexes:   Babinski:   [] negative [] positive  Hoffmans: []negative  [] positive   Cranial Nerves [x] [] []    Sensation [x] [] []    Bladder/Bowel [x] [] []    Coordination [] [x] []  [] Dysdiadochokinesia:        [] Dysmetria:     [] Ataxia:         - Motor incoordination noted in RLE/RUE d/t lack of strength, maintains more flaccid positioning of RUE                 Tone [] [x] [] [] 0: no increase in muscle tone  [] 1: slight increase in muscle tone, manifested by a catch and release or by minimal resistance at end ROM  [x] 1+: slight increase in muscle tone, manifested by a catch and release or by minimal

## 2023-09-25 ENCOUNTER — HOSPITAL ENCOUNTER (OUTPATIENT)
Dept: PHYSICAL THERAPY | Age: 63
Setting detail: THERAPIES SERIES
Discharge: HOME | End: 2023-09-25
Payer: COMMERCIAL

## 2023-09-25 PROCEDURE — 97110 THERAPEUTIC EXERCISES: CPT

## 2023-09-25 PROCEDURE — 97116 GAIT TRAINING THERAPY: CPT

## 2023-09-25 RX ORDER — DAPAGLIFLOZIN 5 MG/1
5 TABLET, FILM COATED ORAL EVERY MORNING
Qty: 30 TABLET | Refills: 0 | Status: SHIPPED | OUTPATIENT
Start: 2023-09-25

## 2023-09-25 NOTE — FLOWSHEET NOTE
function as close to PLOF as possible. STG: (to be met in 8 treatments)     ? ROM: Pt to demonstrate improved upright posture in sitting with only min cues from therapist to avoid improve postural control, reduce tightness in anterior chest musculature, engage upper thoracic musculature more for improved RUE positioning  ? Strength:   No more than 15deg lacking TKE on RLE to indicate improving gravity-resisted quad activation for improved gross strength for RLE SLS tasks  At least 3-/5 R hip abd for improved lumbopelvic stability in standing on RLE  ? Balance: FGA to at least 14/30 to indicate improving dynamic balance within gait  ? Function:   10MWT to at least .69m/s with CardiOx to indicate more efficient gait, increasing gait speed  6mWT to at least 650ft to indicate slowly improving gait endurance, progression towards community-level distances  Patient to be independent with home exercise program as demonstrated by performance with correct form without cues. Demonstrate Knowledge of fall prevention     LTG: (to be met in 16 treatments)  ? Strength:   No more than 5deg lacking TKE on RLE to indicate further improving gravity-resisted quad activation for improved gross strength for RLE SLS tasks  At least 3+/5 R hip abd, 4/5 R hip extfor improved lumbopelvic stability in standing on RLE  ? Balance: FGA to at least 16/30 to indicate further improving dynamic balance within gait  ? Function:   10MWT to at least .73m/s with CardiOx to indicate even more efficient gait, increasing gait speed progressing towards community-level speeds  6mWT to at least 750ft to indicate further improving gait endurance, progression towards community-level distances  SIS-16 to at least 90% function to indicate improving subjective function        Patient goals: \"get right leg stronger\"    Pt.  Education:  [x] Yes  [] No  [] Reviewed Prior HEP/Ed  Method of Education: [x] Verbal  [x] Demo  [] Written  Comprehension of Education:  [x]

## 2023-09-25 NOTE — TELEPHONE ENCOUNTER
Venessa Dent is calling to request a refill on the following medication(s):    Medication Request:  Requested Prescriptions     Pending Prescriptions Disp Refills    FARXIGA 5 MG tablet [Pharmacy Med Name: FARXIGA 5MG TABLETS] 90 tablet 1     Sig: TAKE 1 TABLET BY MOUTH EVERY MORNING       Last Visit Date (If Applicable):  3/5/0233    Next Visit Date:    10/6/2023

## 2023-09-28 ENCOUNTER — HOSPITAL ENCOUNTER (OUTPATIENT)
Dept: PHYSICAL THERAPY | Age: 63
Setting detail: THERAPIES SERIES
Discharge: HOME | End: 2023-09-28
Payer: COMMERCIAL

## 2023-09-28 PROCEDURE — 97116 GAIT TRAINING THERAPY: CPT

## 2023-09-28 PROCEDURE — 97110 THERAPEUTIC EXERCISES: CPT

## 2023-09-28 NOTE — FLOWSHEET NOTE
[x] [address]  [address] [facility].  [phone]  F: [phone] [] [address] [facility]  [address]   [address]  P: [phone]  F: [phone] [] [address]  [address] [facility]  P: [phone]  F: [phone] [] [place]: [phone]  F: [phone] [] [address] [facility]  [address] [facility]   [address]   P: [phone]  F: [phone]  [] [address] [facility].   P: [phone]  F: [phone] [] [address] [facility]  [address] [facility] [address]  P: [phone]  F: [phone] [] 224 [facility]  [address] [facility]  [place]: [phone]  F: [phone] [] 1 [facility] [address]  [place]: [phone]  F: [phone]      Physical Therapy Daily Treatment Note    Date:  2023  Patient Name:  Silver Valderrama    :  1960  MRN: [id number]  Physician: Lizbeth Jorge MD                                  Insurance: [facility] (44 vs approved from 23 to 23)  Medical Diagnosis: G81.91 (ICD-10-CM) - Right hemiplegia ([address] [facility])  Rehab Codes: R26.89, M62.81, G81.91  Next 's appt.: 10/6/23  Date of symptom onset: 23  (chronic onset so used date of referral)     Visit# / total visits: 3/16    Cancels/No Shows: 0    Subjective:    Pain:  [] Yes  [x] No Location:  N/A Pain Rating: (0-10 scale) 0/10  Pain altered Tx:  [x] No  [] Yes  Action:  Comments: Pt denies any issues with fatigue or soreness after last visit. Doing okay today.        Objective:  Modalities:

## 2023-09-29 RX ORDER — INSULIN LISPRO 100 [IU]/ML
INJECTION, SOLUTION INTRAVENOUS; SUBCUTANEOUS
Qty: 15 ML | Refills: 5 | OUTPATIENT
Start: 2023-09-29

## 2023-09-29 NOTE — TELEPHONE ENCOUNTER
Carlos Chen is calling to request a refill on the following medication(s):    Medication Request:  Requested Prescriptions     Pending Prescriptions Disp Refills    insulin lispro, 1 Unit Dial, (HUMALOG KWIKPEN) 100 UNIT/ML SOPN [Pharmacy Med Name: HUMALOG 100 U/ML KWIK PEN INJ 3ML] 15 mL 5     Sig: INJECT UNDER THE SKIN PER SLIDING SCALE WITH MAX DOSE OF 48 UNITS PER DAY       Last Visit Date (If Applicable):  4/0/0473    Next Visit Date:    10/6/2023

## 2023-10-02 ENCOUNTER — HOSPITAL ENCOUNTER (OUTPATIENT)
Dept: PHYSICAL THERAPY | Age: 63
Setting detail: THERAPIES SERIES
Discharge: HOME | End: 2023-10-02
Payer: COMMERCIAL

## 2023-10-02 PROCEDURE — 97116 GAIT TRAINING THERAPY: CPT

## 2023-10-02 PROCEDURE — 97110 THERAPEUTIC EXERCISES: CPT

## 2023-10-02 NOTE — FLOWSHEET NOTE
[x] [address]  [address] [facility].  [phone]  F: [phone] [] 204 [facility]  [address]   [address]  P: [phone]  F: [phone] [] [address]  [address] [facility]  P: [phone]  F: [phone] [] [place]: [phone]  F: [phone] [] 224 [facility]  [address] [facility]   [address]   P: [phone]  F: [phone]  [] [address] [facility].   P: [phone]  F: [phone] [] 205 [facility]  [address] [place]  [address]  P: [phone]  F: [phone] [] 224 [facility]  [address] [facility]  [place]: [phone]  F: [phone] [] 1 [facility] [address]  [place]: [phone]  F: [phone]      Physical Therapy Daily Treatment Note    Date:  10/2/2023  Patient Name:  Nnado Kelly    :  1960  MRN: [id number]  Physician: Obed Arteaga MD                                  Insurance: [facility] (64 units/16 vs approved from 23 to 23)  Medical Diagnosis: G81.91 (ICD-10-CM) - Right hemiplegia ([address] [facility])  Rehab Codes: R26.89, M62.81, G81.91  Next 's appt.: 10/6/23  Date of symptom onset: 23  (chronic onset so used date of referral)     Visit# / total visits:     Cancels/No Shows: 0    Subjective:    Pain:  [] Yes  [x] No Location:  N/A Pain Rating: (0-10 scale) 0/10  Pain altered Tx:  [x] No  [] Yes  Action:  Comments: Pt denies any issues with fatigue or soreness after last visit. Doing okay today.

## 2023-10-04 ENCOUNTER — HOSPITAL ENCOUNTER (OUTPATIENT)
Dept: PHYSICAL THERAPY | Age: 63
Setting detail: THERAPIES SERIES
Discharge: HOME | End: 2023-10-04
Payer: COMMERCIAL

## 2023-10-04 PROCEDURE — 97116 GAIT TRAINING THERAPY: CPT

## 2023-10-04 PROCEDURE — 97110 THERAPEUTIC EXERCISES: CPT

## 2023-10-04 NOTE — FLOWSHEET NOTE
[x] [address]  46[address] [facility].  [phone]  F: [phone] [] 204 [facility]  [address]   [address]  P: [phone]  F: [phone] [] [address]  151 [facility]  P: [phone]  F: [phone] [] [place]: [phone]  F: [phone] [] 224 [facility]  [address] [facility]   [address]   P: [phone]  F: [phone]  [] [address] [facility].   P: [phone]  F: [phone] [] 205 [facility]  [address] [place]  [address]  P: [phone]  F: [phone] [] [place]  [address] [place] [address]  [place]: [phone]  F: [phone] [] 1 [facility] [address]  [place]: [phone]  F: [phone]      Physical Therapy Daily Treatment Note    Date:  10/4/2023  Patient Name:  Laurence Vance    :  1960  MRN: [id number]  Physician: Josselin Prieto MD                                  Insurance: [facility] (64 units/16 vs approved from 23 to 23)  Medical Diagnosis: G81.91 (ICD-10-CM) - Right hemiplegia ([address] )  Rehab Codes: R26.89, M62.81, G81.91  Next 's appt.: 10/6/23  Date of symptom onset: 23  (chronic onset so used date of referral)     Visit# / total visits:     Cancels/No Shows: 0    Subjective:    Pain:  [x] Yes  [] No Location: L knee pain   Pain Rating: (0-10 scale) 5/10  Pain altered Tx:  [x] No  [] Yes  Action:  Comments: Pt notes he's having increased L knee pain, unsure of reason.        Objective:  Modalities:

## 2023-10-05 ENCOUNTER — HOSPITAL ENCOUNTER (OUTPATIENT)
Age: 63
Discharge: HOME | End: 2023-10-05
Payer: COMMERCIAL

## 2023-10-05 ENCOUNTER — OFFICE VISIT (OUTPATIENT)
Dept: ONCOLOGY | Age: 63
End: 2023-10-05
Payer: COMMERCIAL

## 2023-10-05 ENCOUNTER — TELEPHONE (OUTPATIENT)
Dept: ONCOLOGY | Age: 63
End: 2023-10-05

## 2023-10-05 ENCOUNTER — HOSPITAL ENCOUNTER (OUTPATIENT)
Age: 63
Setting detail: SPECIMEN
Discharge: HOME | End: 2023-10-05
Payer: COMMERCIAL

## 2023-10-05 VITALS
RESPIRATION RATE: 16 BRPM | SYSTOLIC BLOOD PRESSURE: 120 MMHG | DIASTOLIC BLOOD PRESSURE: 73 MMHG | TEMPERATURE: 98.6 F | HEART RATE: 78 BPM

## 2023-10-05 DIAGNOSIS — C91.10 CLL (CHRONIC LYMPHOCYTIC LEUKEMIA) (HCC): Primary | ICD-10-CM

## 2023-10-05 DIAGNOSIS — I63.9 ISCHEMIC STROKE (HCC): ICD-10-CM

## 2023-10-05 DIAGNOSIS — C91.10 CLL (CHRONIC LYMPHOCYTIC LEUKEMIA) (HCC): ICD-10-CM

## 2023-10-05 LAB
BASOPHILS # BLD: 0 K/UL (ref 0–0.2)
BASOPHILS NFR BLD: 0 % (ref 0–2)
CHOLEST SERPL-MCNC: 209 MG/DL
CHOLESTEROL/HDL RATIO: 6.3
EOSINOPHIL # BLD: 0 K/UL (ref 0–0.44)
EOSINOPHILS RELATIVE PERCENT: 0 % (ref 1–4)
ERYTHROCYTE [DISTWIDTH] IN BLOOD BY AUTOMATED COUNT: 13.6 % (ref 11.8–14.4)
EST. AVERAGE GLUCOSE BLD GHB EST-MCNC: 137 MG/DL
HBA1C MFR BLD: 6.4 % (ref 4–6)
HCT VFR BLD AUTO: 41.4 % (ref 40.7–50.3)
HDLC SERPL-MCNC: 33 MG/DL
HGB BLD-MCNC: 13.5 G/DL (ref 13–17)
IMM GRANULOCYTES # BLD AUTO: 0 K/UL (ref 0–0.3)
IMM GRANULOCYTES NFR BLD: 0 %
LDLC SERPL CALC-MCNC: 146 MG/DL (ref 0–130)
LYMPHOCYTES NFR BLD: 27.25 K/UL (ref 1.1–3.7)
LYMPHOCYTES RELATIVE PERCENT: 79 % (ref 24–43)
MCH RBC QN AUTO: 30.2 PG (ref 25.2–33.5)
MCHC RBC AUTO-ENTMCNC: 32.6 G/DL (ref 28.4–34.8)
MCV RBC AUTO: 92.6 FL (ref 82.6–102.9)
MONOCYTES NFR BLD: 1.73 K/UL (ref 0.1–1.2)
MONOCYTES NFR BLD: 5 % (ref 3–12)
MORPHOLOGY: ABNORMAL
NEUTROPHILS NFR BLD: 16 % (ref 36–65)
NEUTS SEG NFR BLD: 5.52 K/UL (ref 1.5–8.1)
NRBC BLD-RTO: 0 PER 100 WBC
PLATELET # BLD AUTO: 189 K/UL (ref 138–453)
PMV BLD AUTO: 10.2 FL (ref 8.1–13.5)
RBC # BLD AUTO: 4.47 M/UL (ref 4.21–5.77)
TRIGL SERPL-MCNC: 149 MG/DL
WBC OTHER # BLD: 34.5 K/UL (ref 3.5–11.3)

## 2023-10-05 PROCEDURE — 99214 OFFICE O/P EST MOD 30 MIN: CPT | Performed by: INTERNAL MEDICINE

## 2023-10-05 PROCEDURE — 83036 HEMOGLOBIN GLYCOSYLATED A1C: CPT

## 2023-10-05 PROCEDURE — G8427 DOCREV CUR MEDS BY ELIG CLIN: HCPCS | Performed by: INTERNAL MEDICINE

## 2023-10-05 PROCEDURE — G8420 CALC BMI NORM PARAMETERS: HCPCS | Performed by: INTERNAL MEDICINE

## 2023-10-05 PROCEDURE — 99211 OFF/OP EST MAY X REQ PHY/QHP: CPT

## 2023-10-05 PROCEDURE — 1036F TOBACCO NON-USER: CPT | Performed by: INTERNAL MEDICINE

## 2023-10-05 PROCEDURE — 85025 COMPLETE CBC W/AUTO DIFF WBC: CPT

## 2023-10-05 PROCEDURE — 3074F SYST BP LT 130 MM HG: CPT | Performed by: INTERNAL MEDICINE

## 2023-10-05 PROCEDURE — 3078F DIAST BP <80 MM HG: CPT | Performed by: INTERNAL MEDICINE

## 2023-10-05 PROCEDURE — 80061 LIPID PANEL: CPT

## 2023-10-05 PROCEDURE — G8484 FLU IMMUNIZE NO ADMIN: HCPCS | Performed by: INTERNAL MEDICINE

## 2023-10-05 PROCEDURE — 36415 COLL VENOUS BLD VENIPUNCTURE: CPT

## 2023-10-05 PROCEDURE — 3017F COLORECTAL CA SCREEN DOC REV: CPT | Performed by: INTERNAL MEDICINE

## 2023-10-05 NOTE — PROGRESS NOTES
Chief Complaint   Patient presents with    Follow-up    Discuss Labs     DIAGNOSIS:       CLL, Stage 1  Lymphadenopathy. Persistent leukocytosis  Persistent neutropenia  Persistent lymphocytosis  History of CVA/right-sided hemiplegia with residual weakness  History of hypertension and diabetes  CURRENT THERAPY:         Observation for CLL. No indications for treatment. BRIEF CASE HISTORY:      Mr. Laurence Vance is a very pleasant 61 y.o. male with history of multiple co morbidities as listed. The patient is referred for evaluation and management of leukocytosis. Patient had history of diabetes and hypertension. He had CVA with right-sided hemiplegia in 2019. He had residual right-sided weakness. Patient has no other major health problems. Patient was noted to have persistent elevation of white blood cells over the last few years. He had no symptoms related to that abnormality. No repeated infections. No fever or night sweats. No weight loss or decreased appetite. No enlarged lymph nodes. No history of smoking or alcohol drinking. .   INTERIM HISTORY:   Seen for follow up  CLL. Doing well clinically. No fever or night sweats. No weight loss or decreased appetite. No new enlarged lymph nodes. No repeated infections. PAST MEDICAL HISTORY: has a past medical history of Cerebral artery occlusion with cerebral infarction ([address]), Diabetes mellitus ([address]), High cholesterol, and Hypertension. PAST SURGICAL HISTORY: has a past surgical history that includes knee surgery (1983).      CURRENT MEDICATIONS:  has a current medication list which includes the following prescription(s): farxiga, amlodipine, vitamin d3, fluoxetine, insulin lispro (1 unit dial), hydralazine, lantus solostar, potassium chloride, onetouch ultra, atorvastatin, fluticasone, onetouch delica plus [id number], b-d ultrafine iii short pen, blood

## 2023-10-05 NOTE — TELEPHONE ENCOUNTER
Artur Gilbert MD VISIT  RV 3-4 months with CBC at RV  LABS CDP 1/18/24  MD VISIT 1/18/24 @ 9AM  AVS PRINTED W/ INSTRUCTIONS AND GIVEN TO PT ON EXIT

## 2023-10-06 ENCOUNTER — OFFICE VISIT (OUTPATIENT)
Dept: INTERNAL MEDICINE CLINIC | Age: 63
End: 2023-10-06
Payer: COMMERCIAL

## 2023-10-06 ENCOUNTER — TELEPHONE (OUTPATIENT)
Dept: INTERNAL MEDICINE CLINIC | Age: 63
End: 2023-10-06

## 2023-10-06 VITALS
HEART RATE: 84 BPM | OXYGEN SATURATION: 94 % | BODY MASS INDEX: 23.1 KG/M2 | HEIGHT: 71 IN | TEMPERATURE: 97.9 F | SYSTOLIC BLOOD PRESSURE: 138 MMHG | WEIGHT: 165 LBS | DIASTOLIC BLOOD PRESSURE: 80 MMHG | RESPIRATION RATE: 16 BRPM

## 2023-10-06 DIAGNOSIS — F32.A ANXIETY AND DEPRESSION: ICD-10-CM

## 2023-10-06 DIAGNOSIS — Z86.16 HISTORY OF COVID-19: ICD-10-CM

## 2023-10-06 DIAGNOSIS — G81.91 RIGHT HEMIPLEGIA (HCC): ICD-10-CM

## 2023-10-06 DIAGNOSIS — I10 ESSENTIAL HYPERTENSION: ICD-10-CM

## 2023-10-06 DIAGNOSIS — Z12.11 ENCOUNTER FOR COLORECTAL CANCER SCREENING: Primary | ICD-10-CM

## 2023-10-06 DIAGNOSIS — Z28.21 TETANUS, DIPHTHERIA, AND ACELLULAR PERTUSSIS (TDAP) VACCINATION DECLINED: ICD-10-CM

## 2023-10-06 DIAGNOSIS — Z12.11 COLON CANCER SCREENING: ICD-10-CM

## 2023-10-06 DIAGNOSIS — Z12.12 ENCOUNTER FOR COLORECTAL CANCER SCREENING: Primary | ICD-10-CM

## 2023-10-06 DIAGNOSIS — F41.9 ANXIETY AND DEPRESSION: ICD-10-CM

## 2023-10-06 DIAGNOSIS — Z28.21 INFLUENZA VACCINATION DECLINED: ICD-10-CM

## 2023-10-06 DIAGNOSIS — Z28.21 PNEUMOCOCCAL VACCINATION DECLINED: ICD-10-CM

## 2023-10-06 DIAGNOSIS — Z86.73 HISTORY OF STROKE: ICD-10-CM

## 2023-10-06 DIAGNOSIS — C91.10 CLL (CHRONIC LYMPHOCYTIC LEUKEMIA) (HCC): ICD-10-CM

## 2023-10-06 DIAGNOSIS — E10.22 TYPE 1 DIABETES MELLITUS WITH STAGE 3A CHRONIC KIDNEY DISEASE (HCC): Primary | ICD-10-CM

## 2023-10-06 DIAGNOSIS — E78.2 MIXED HYPERLIPIDEMIA: ICD-10-CM

## 2023-10-06 DIAGNOSIS — N18.31 TYPE 1 DIABETES MELLITUS WITH STAGE 3A CHRONIC KIDNEY DISEASE (HCC): Primary | ICD-10-CM

## 2023-10-06 PROCEDURE — G8420 CALC BMI NORM PARAMETERS: HCPCS | Performed by: FAMILY MEDICINE

## 2023-10-06 PROCEDURE — 2022F DILAT RTA XM EVC RTNOPTHY: CPT | Performed by: FAMILY MEDICINE

## 2023-10-06 PROCEDURE — 3079F DIAST BP 80-89 MM HG: CPT | Performed by: FAMILY MEDICINE

## 2023-10-06 PROCEDURE — 3017F COLORECTAL CA SCREEN DOC REV: CPT | Performed by: FAMILY MEDICINE

## 2023-10-06 PROCEDURE — 3044F HG A1C LEVEL LT 7.0%: CPT | Performed by: FAMILY MEDICINE

## 2023-10-06 PROCEDURE — G8484 FLU IMMUNIZE NO ADMIN: HCPCS | Performed by: FAMILY MEDICINE

## 2023-10-06 PROCEDURE — G8427 DOCREV CUR MEDS BY ELIG CLIN: HCPCS | Performed by: FAMILY MEDICINE

## 2023-10-06 PROCEDURE — 1036F TOBACCO NON-USER: CPT | Performed by: FAMILY MEDICINE

## 2023-10-06 PROCEDURE — 99214 OFFICE O/P EST MOD 30 MIN: CPT | Performed by: FAMILY MEDICINE

## 2023-10-06 PROCEDURE — 3075F SYST BP GE 130 - 139MM HG: CPT | Performed by: FAMILY MEDICINE

## 2023-10-06 ASSESSMENT — ENCOUNTER SYMPTOMS
ALLERGIC/IMMUNOLOGIC NEGATIVE: 1
RESPIRATORY NEGATIVE: 1
GASTROINTESTINAL NEGATIVE: 1
EYES NEGATIVE: 1

## 2023-10-06 NOTE — TELEPHONE ENCOUNTER
Patient states he was supposed to get the colougaurd kit sent to his home and not the colonoscopy procedure.

## 2023-10-06 NOTE — PROGRESS NOTES
(Tdap) vaccination declined        11. History of COVID-19        12. Colon cancer screening  Fecal DNA Colorectal cancer screening (Cologuard)            Objective:   Physical Exam  Vitals and nursing note reviewed. Constitutional:       Appearance: He is well-developed. HENT:      Head: Normocephalic and atraumatic. Right Ear: External ear normal.      Left Ear: External ear normal.      Nose: Nose normal.   Eyes:      Conjunctiva/sclera: Conjunctivae normal.      Pupils: Pupils are equal, round, and reactive to light. Cardiovascular:      Rate and Rhythm: Normal rate and regular rhythm. Heart sounds: Normal heart sounds. Comments: Diabetes  Hypertension  Hyperlipidemia  Pulmonary:      Effort: Pulmonary effort is normal.      Breath sounds: Normal breath sounds. Abdominal:      General: Bowel sounds are normal.      Palpations: Abdomen is soft. Genitourinary:     Comments: CKD  Musculoskeletal:         General: Normal range of motion. Cervical back: Normal range of motion and neck supple. Comments: Degenerative polyarthralgia   Skin:     General: Skin is warm and dry. Neurological:      Mental Status: He is alert and oriented to person, place, and time. Deep Tendon Reflexes: Reflexes are normal and symmetric. Comments: History of CVA with left hemiplegia   Psychiatric:         Thought Content: Thought content normal.      Comments: Noncompliance with medications as directed  Noncompliance with vaccinations         Assessment:       Diagnosis Orders   1. Type 1 diabetes mellitus with stage 3a chronic kidney disease ([address])        2. CLL (chronic lymphocytic leukemia) ([address])        3. Essential hypertension        4. Mixed hyperlipidemia        5. Right hemiplegia ([address])        6. History of stroke        7. Anxiety and depression        8. Influenza vaccination declined        9. Pneumococcal vaccination declined        10.  Tetanus, diphtheria, and acellular pertussis

## 2023-10-09 ENCOUNTER — APPOINTMENT (OUTPATIENT)
Dept: PHYSICAL THERAPY | Age: 63
End: 2023-10-09
Payer: COMMERCIAL

## 2023-10-11 ENCOUNTER — HOSPITAL ENCOUNTER (OUTPATIENT)
Dept: PHYSICAL THERAPY | Age: 63
Setting detail: THERAPIES SERIES
Discharge: HOME | End: 2023-10-11
Payer: COMMERCIAL

## 2023-10-11 PROCEDURE — 97116 GAIT TRAINING THERAPY: CPT

## 2023-10-11 PROCEDURE — 97110 THERAPEUTIC EXERCISES: CPT

## 2023-10-11 NOTE — FLOWSHEET NOTE
[x] [address]  46[address] [facility] [place] [facility].  [phone]  F: [phone] [] 204 [facility]  [address]   [address]  P: [phone]  F: [phone] [] [address]  [address] [facility]  P: [phone]  F: [phone] [] [facility] [place]: [phone]  F: [phone] [] [address] [facility]  [address] [facility]   [address]   P: [phone]  F: [phone]  [] [address] [facility].   P: [phone]  F: [phone] [] [address] [facility]  [address] [place]  [address]  P: [phone]  F: [phone] [] [address] [facility]  [address] [facility]  [place]: [phone]  F: [phone] [] 1 Medical [facility] [address]  [place]: [phone]  F: [phone]      Physical Therapy Daily Treatment Note    Date:  10/11/2023  Patient Name:  Nando Kelly    :  1960  MRN: [id number]  Physician: Obed Arteaga MD                                  Insurance: [facility] (64 units/16 vs approved from 23 to 23)  Medical Diagnosis: G81.91 (ICD-10-CM) - Right hemiplegia ([address] [facility])  Rehab Codes: R26.89, M62.81, G81.91  Next 's appt.: 10/6/23  Date of symptom onset: 23  (chronic onset so used date of referral)     Visit# / total visits:     Cancels/No Shows: 0    Subjective:    Pain:  [] Yes  [x] No Location: L knee pain   Pain Rating: (0-10 scale) 0/10  Pain altered Tx:  [x] No  [] Yes  Action:  Comments: Pt notes his L knee felt good after last session, denies pain today. Denies any fatigue.

## 2023-10-11 NOTE — TELEPHONE ENCOUNTER
Monitor Summary     Rhythm: A-fib  Heart Rate: 91-93  Ectopy: R PVC  Measurement: --/.09/--                                         Switch to Lantus or glargine 20 units subcutaneous at night

## 2023-10-12 ENCOUNTER — HOSPITAL ENCOUNTER (OUTPATIENT)
Dept: PHYSICAL THERAPY | Age: 63
Setting detail: THERAPIES SERIES
Discharge: HOME | End: 2023-10-12
Payer: COMMERCIAL

## 2023-10-12 NOTE — FLOWSHEET NOTE
[x] [address]  [address] [facility] [place] [facility].  [phone]  F: [phone] [] [address] [facility]  [address]   [address]  P: [phone]  F: [phone] [] [address]  [address] [facility]  P: [phone]  F: [phone] [] [place]: [phone]  F: [phone] [] 224 [facility]  [address] [facility]   [address]   P: [phone]  F: [phone]  [] [address] [facility].   P: [phone]  F: [phone] [] 205 [facility]  [address]  P: [phone]  F: [phone] [] 224 [facility]  [address] [facility]  [place]: [phone]  F: [phone] [] 1 Medical [place]  [address]  [place]: [phone]  F: [phone]      Therapy Cancel/No Show note    Date: 10/12/2023  Patient: Anette Pizano  : 1960  MRN: [id number]    Cancels/No Shows to date: 1 cx/ 0 ns    For today's appointment patient:    [x]  Cancelled    [] Rescheduled appointment    [] No-show     Reason given by patient:    [x]  Patient ill    []  Conflicting appointment    [] No transportation      [] Conflict with work    [] No reason given    [] Weather related    [] COVID-19    [] Other:      Comments:        [x] Next appointment was confirmed    Electronically signed by: Ramirez Pena PT

## 2023-10-16 ENCOUNTER — HOSPITAL ENCOUNTER (OUTPATIENT)
Dept: PHYSICAL THERAPY | Age: 63
Setting detail: THERAPIES SERIES
Discharge: HOME | End: 2023-10-16
Payer: COMMERCIAL

## 2023-10-16 PROCEDURE — 97110 THERAPEUTIC EXERCISES: CPT

## 2023-10-16 PROCEDURE — 97116 GAIT TRAINING THERAPY: CPT

## 2023-10-16 NOTE — FLOWSHEET NOTE
improving gravity-resisted quad activation for improved gross strength for RLE SLS tasks  At least 3+/5 R hip abd, 4/5 R hip extfor improved lumbopelvic stability in standing on RLE  ? Balance: FGA to at least 16/30 to indicate further improving dynamic balance within gait  ? Function:   10MWT to at least .73m/s with CLO [employer] [place] to indicate even more efficient gait, increasing gait speed progressing towards community-level speeds  6mWT to at least 750ft to indicate further improving gait endurance, progression towards community-level distances  SIS-16 to at least 90% function to indicate improving subjective function        Patient goals: \"get right leg stronger\"    Pt. Education:  [x] Yes  [] No  [] Reviewed Prior HEP/Ed  Method of Education: [x] Verbal  [x] Demo  [] Written  Comprehension of Education:  [x] Verbalizes understanding. [x] Demonstrates understanding. [] Needs review. [] Demonstrates/verbalizes HEP/Ed previously given. Current HEP as of 9/25: sit to stands with LLE fwd slightly, modified SLS w/ LLE on step     Plan: [x] Continue current frequency toward long and short term goals.     [x] Specific Instructions for subsequent treatments: RLE hip ext/abd strengthening, quad strengthening ; Bioness for prolonged gait training - resisted trunk/RLE gait; RLE SLS neuromuscular control      Time In: 2:00 pm            Time Out: 3:03 pm    Electronically signed by:  Teresa Moreno, PT

## 2023-10-18 ENCOUNTER — HOSPITAL ENCOUNTER (OUTPATIENT)
Dept: PHYSICAL THERAPY | Age: 63
Setting detail: THERAPIES SERIES
Discharge: HOME | End: 2023-10-18
Payer: COMMERCIAL

## 2023-10-18 PROCEDURE — 97116 GAIT TRAINING THERAPY: CPT

## 2023-10-18 PROCEDURE — 97110 THERAPEUTIC EXERCISES: CPT

## 2023-10-18 NOTE — FLOWSHEET NOTE
session. Objective:  Modalities:   Precautions: R hemiplegia  Exercises: Exercises completed marked with \"x\": no exercises completed this date d/t reassessment:  Exercise Reps/ Time Weight/ Level Comments    Nu-step 5 min L4 No RUE on nustep arm           Standing        Resisted box sit<>stand 3x10 purple at hips Reduced to 20\" box with foam pad 10/11           Modified SLS 2x5, 10\" LLE on 6\" box CGA - attempted on dynadisc and 8\" box but too difficult    Step up 3x10 8\", RLE Progressed height 10/11           Gait       Gait w/ ankle weight 1x500 ft 2nd set: down to front entrance   - held weight per pt request 10/16    Lateral band walk 1x 80 ft purple With direction changes per therapist    Retro band walk 1x80 ft purple     Obstacle course 1x30 ft          1x300 ft Purple band at knees      No band Added 10/16:   Fwd/lateral directions with foam, hurdles/shoe box step overs      Fwd over obstacles listed above, fast walk remainder of loop around clinic           Seated       Mid row 3x15 purple During seated rest    Overhead press 2x15 5# LUE only    Bicep curl RUE 10x 4# Therapist stabilized wrist into supination and neutral flex/ext    Supination RUE 2x10 AROM                   Leg press 1x20 50# Regressed weight 10/16    Other:     1) STG assessment including 10MWT, FGA, 6MWT, MMT assess, etc - billed with gait and therex as appropriate    2) Pt education provided re: PT POC to address remaining impairments including dynamic balance, gait speed, hip strength - billed with therex          Treatment Charges: Mins Units   []  Modalities     [x]  Ther Exercise 10 1   []  Manual Therapy     []  Ther Activities     []  Neuro Re-ed     []  Vasocompression     [x] Gait 45 3   []  Other     Total Treatment time 55 4       Assessment: [x] Assessed patient goals today.  Pt's sitting posture revealed forward head and bilaterally rounded shoulders - pt could attain upright posture upon VC from therapist. Pt is lacking

## 2023-10-23 ENCOUNTER — HOSPITAL ENCOUNTER (OUTPATIENT)
Dept: PHYSICAL THERAPY | Age: 63
Setting detail: THERAPIES SERIES
Discharge: HOME | End: 2023-10-23
Payer: COMMERCIAL

## 2023-10-23 NOTE — FLOWSHEET NOTE
[x] [address]  4604 [facility].  [phone]  F: [phone] [] 204 [facility]  [address]  P: [phone]  F: [phone] [] [address]  151 [facility]  P: [phone]  F: [phone] [] [facility] [place]: [phone]  F: [phone] [] 224 [facility]  [address] [facility]   [address]   P: [phone]  F: [phone]  [] 7170 [facility].   P: [phone]  F: [phone] [] 205 [facility]  2000 [facility]  P: [phone]  F: [phone] [] 224 [facility]  7[address] [facility]  [place]: [phone]  F: [phone] [] [address]  [place]: [phone]  F: [phone]      Therapy Cancel/No Show note    Date: 10/23/2023  Patient: Brenden Kapoor  : 1960  MRN: [id number]    Cancels/No Shows to date: 2 cx, 0 ns    For today's appointment patient:    [x]  Cancelled    [] Rescheduled appointment    [] No-show     Reason given by patient:    [x]  Patient ill    []  Conflicting appointment    [] No transportation      [] Conflict with work    [] No reason given    [] Weather related    [] COVID-19    [] Other:      Comments:  Pt not feeling well but stated he will be at next visit. [x] Next appointment was confirmed    Electronically signed by: Alexander Abraham, ITZEL Entirety of treatment supervised by, and documentation under the review of Marcos Casey, PT, who agrees with all treatment decisions and plan.

## 2023-10-25 ENCOUNTER — HOSPITAL ENCOUNTER (OUTPATIENT)
Dept: PHYSICAL THERAPY | Age: 63
Setting detail: THERAPIES SERIES
Discharge: HOME | End: 2023-10-25
Payer: COMMERCIAL

## 2023-10-25 NOTE — FLOWSHEET NOTE
[x] [address]  4600 [facility] [place] [facility].  [phone]  F: [phone] [] 204 [facility]  [address]  P: [phone]  F: [phone] [] [address]  151 [facility]  P: [phone]  F: [phone] [] [place]: [phone]  F: [phone] [] 224 [facility]  One [facility]   [address]   P: [phone]  F: [phone]  [] 4695 [facility].   P: [phone]  F: [phone] [] 205 [facility]  [address] [facility] [address]  P: [phone]  F: [phone] [] 224 [facility]  7[address] [facility]  [place]: [phone]  F: [phone] [] [address] [facility] [address]  [place]: [phone]  F: [phone]      Therapy Cancel/No Show note    Date: 10/25/2023  Patient: Marie Jose  : 1960  MRN: [id number]    Cancels/No Shows to date: 3 cx/ 0 ns    For today's appointment patient:    [x]  Cancelled    [] Rescheduled appointment    [] No-show     Reason given by patient:    [x]  Patient ill    []  Conflicting appointment    [] No transportation      [] Conflict with work    [] No reason given    [] Weather related    [] COVID-19    [x] Other:      Comments:  Pt arrived for physical therapy appointment today noting he is not feeling well but wanted to attempt exercise. After ~5 min of exercise, pt noted too much issues with equilibrium and fatigue/energy levels and overall not feeling well, requested to end session.        [x] Next appointment was

## 2023-10-30 ENCOUNTER — HOSPITAL ENCOUNTER (OUTPATIENT)
Dept: PHYSICAL THERAPY | Age: 63
Setting detail: THERAPIES SERIES
Discharge: HOME | End: 2023-10-30
Payer: COMMERCIAL

## 2023-10-30 PROCEDURE — 97116 GAIT TRAINING THERAPY: CPT

## 2023-10-30 PROCEDURE — 97110 THERAPEUTIC EXERCISES: CPT

## 2023-10-30 NOTE — FLOWSHEET NOTE
gait speed: NOT MET, 0.56 m/s for self selected speed, 0.75 m/s for fast paced ambulation  6mWT to at least 650ft to indicate slowly improving gait endurance, progression towards community-level distances: MET, 785 ft  Patient to be independent with home exercise program as demonstrated by performance with correct form without cues:  MET,Pt reports compliance of HEP. Demonstrate Knowledge of fall prevention: MET, Pt demonstrates correct use of SBQC during ambulation and is aware of potential fall risks including stairs. LTG: (to be met in 16 treatments)  ? Strength:   No more than 5deg lacking TKE on RLE to indicate further improving gravity-resisted quad activation for improved gross strength for RLE SLS tasks  At least 3+/5 R hip abd, 4/5 R hip extfor improved lumbopelvic stability in standing on RLE  ? Balance: FGA to at least 16/30 to indicate further improving dynamic balance within gait  ? Function:   10MWT to at least .73m/s with [place] to indicate even more efficient gait, increasing gait speed progressing towards community-level speeds  6mWT to at least 750ft to indicate further improving gait endurance, progression towards community-level distances  SIS-16 to at least 90% function to indicate improving subjective function        Patient goals: \"get right leg stronger\"    Pt. Education:  [x] Yes  [] No  [] Reviewed Prior HEP/Ed  Method of Education: [x] Verbal  [x] Demo  [] Written  Comprehension of Education:  [x] Verbalizes understanding. [x] Demonstrates understanding. [] Needs review. [] Demonstrates/verbalizes HEP/Ed previously given. Current HEP as of 9/25: sit to stands with LLE fwd slightly, modified SLS w/ LLE on step     Plan: [x] Continue current frequency toward long and short term goals.     [x] Specific Instructions for subsequent treatments: RLE hip ext/abd strengthening, quad strengthening ; Bioness for prolonged gait training - resisted trunk/RLE gait; RLE SLS neuromuscular control;

## 2023-11-01 ENCOUNTER — HOSPITAL ENCOUNTER (OUTPATIENT)
Dept: PHYSICAL THERAPY | Age: 63
Setting detail: THERAPIES SERIES
Discharge: HOME | End: 2023-11-01
Payer: COMMERCIAL

## 2023-11-01 PROCEDURE — 97116 GAIT TRAINING THERAPY: CPT

## 2023-11-01 PROCEDURE — 97110 THERAPEUTIC EXERCISES: CPT

## 2023-11-01 NOTE — FLOWSHEET NOTE
[x] [address]  4600 [facility] [place] [facility].  [phone]  F: [phone] [] [address]  [address]   [address]  P: [phone]  F: [phone] [] [address]  [address]  P: [phone]  F: [phone] [] [place]: [phone]  F: [phone] [] [address]  [address] [facility]   [address]   P: [phone]  F: [phone]  [] [address].   P: [phone]  F: [phone] [] 205 [facility]  [address]  P: [phone]  F: [phone] [] 224 [facility]  [address]  [place]: [phone]  F: [phone] [] [address]  [place]: [phone]  F: [phone]      Physical Therapy Daily Treatment Note    Date:  2023  Patient Name:  Sherley Espinal    :  1960  MRN: [id number]  Physician: Hilda Martino MD                                  Insurance: [facility] (64 units/16 vs approved from 23 to 23)  Medical Diagnosis: G81.91 (ICD-10-CM) - Right hemiplegia ([address] )  Rehab Codes: R26.89, M62.81, G81.91  Next 's appt.: 10/6/23  Date of symptom onset: 23  (chronic onset so used date of referral)     Visit# / total visits: 10/16    Cancels/No Shows: 0    Subjective:    Pain:  [] Yes  [x] No Location: L knee pain   Pain Rating: (0-10 scale) 0/10  Pain altered Tx:  [x] No  [] Yes  Action:  Comments: Pt notes he is feeling better since sickness last weekend.  Pt states he is not having any pain

## 2023-11-03 RX ORDER — HYDRALAZINE HYDROCHLORIDE 25 MG/1
25 TABLET, FILM COATED ORAL DAILY
Qty: 90 TABLET | Refills: 1 | Status: SHIPPED | OUTPATIENT
Start: 2023-11-03

## 2023-11-03 NOTE — TELEPHONE ENCOUNTER
Jennifer Hough is calling to request a refill on the following medication(s):    Medication Request:  Requested Prescriptions     Pending Prescriptions Disp Refills    hydrALAZINE (APRESOLINE) 25 MG tablet 90 tablet 1     Sig: Take 1 tablet by mouth daily       Last Visit Date (If Applicable):  81/4/0697    Next Visit Date:    4/5/2024

## 2023-11-06 ENCOUNTER — HOSPITAL ENCOUNTER (OUTPATIENT)
Dept: PHYSICAL THERAPY | Age: 63
Setting detail: THERAPIES SERIES
Discharge: HOME | End: 2023-11-06
Payer: COMMERCIAL

## 2023-11-06 PROCEDURE — 97116 GAIT TRAINING THERAPY: CPT

## 2023-11-06 PROCEDURE — 97110 THERAPEUTIC EXERCISES: CPT

## 2023-11-06 NOTE — FLOWSHEET NOTE
least 14/30 to indicate improving dynamic balance within gait: NOT MET, 9/30 on 10/18  ? Function:   10MWT to at least .69m/s with [place] to indicate more efficient gait, increasing gait speed: NOT MET, 0.56 m/s for self selected speed, 0.75 m/s for fast paced ambulation  6mWT to at least 650ft to indicate slowly improving gait endurance, progression towards community-level distances: MET, 785 ft  Patient to be independent with home exercise program as demonstrated by performance with correct form without cues:  MET,Pt reports compliance of HEP. Demonstrate Knowledge of fall prevention: MET, Pt demonstrates correct use of SBQC during ambulation and is aware of potential fall risks including stairs. LTG: (to be met in 16 treatments)  ? Strength:   No more than 5deg lacking TKE on RLE to indicate further improving gravity-resisted quad activation for improved gross strength for RLE SLS tasks  At least 3+/5 R hip abd, 4/5 R hip extfor improved lumbopelvic stability in standing on RLE  ? Balance: FGA to at least 16/30 to indicate further improving dynamic balance within gait  ? Function:   10MWT to at least .73m/s with [place] to indicate even more efficient gait, increasing gait speed progressing towards community-level speeds  6mWT to at least 750ft to indicate further improving gait endurance, progression towards community-level distances  SIS-16 to at least 90% function to indicate improving subjective function        Patient goals: \"get right leg stronger\"    Pt. Education:  [x] Yes  [] No  [] Reviewed Prior HEP/Ed  Method of Education: [x] Verbal  [x] Demo  [] Written  Comprehension of Education:  [x] Verbalizes understanding. [x] Demonstrates understanding. [] Needs review. [] Demonstrates/verbalizes HEP/Ed previously given. Current HEP as of 9/25: sit to stands with LLE fwd slightly, modified SLS w/ LLE on step     Plan: [x] Continue current frequency toward long and short term goals.     [x] Specific

## 2023-11-08 ENCOUNTER — HOSPITAL ENCOUNTER (OUTPATIENT)
Dept: PHYSICAL THERAPY | Age: 63
Setting detail: THERAPIES SERIES
Discharge: HOME | End: 2023-11-08
Payer: COMMERCIAL

## 2023-11-08 PROCEDURE — 97116 GAIT TRAINING THERAPY: CPT

## 2023-11-08 PROCEDURE — 97110 THERAPEUTIC EXERCISES: CPT

## 2023-11-08 NOTE — FLOWSHEET NOTE
understanding. [x] Demonstrates understanding. [] Needs review. [] Demonstrates/verbalizes HEP/Ed previously given. Current HEP as of 9/25: sit to stands with LLE fwd slightly, modified SLS w/ LLE on step     Plan: [x] Continue current frequency toward long and short term goals. [x] Specific Instructions for subsequent treatments: RLE hip ext/abd strengthening, quad strengthening ; Bioness for prolonged gait training - resisted trunk/RLE gait; RLE SLS neuromuscular control; lateral banded walking, retro walking      Time In: 2:03 pm            Time Out: 2:59 pm    Electronically signed by:  ITZEL Zabala  Entirety of treatment supervised by, and documentation under the review of Marcos Casey PT, who agrees with all treatment decisions and plan.

## 2023-11-10 DIAGNOSIS — E78.5 HYPERLIPIDEMIA, UNSPECIFIED HYPERLIPIDEMIA TYPE: ICD-10-CM

## 2023-11-10 DIAGNOSIS — I63.9 ISCHEMIC STROKE (HCC): ICD-10-CM

## 2023-11-10 NOTE — TELEPHONE ENCOUNTER
----- Message from Marci Lakeshia sent at 11/10/2023  3:43 PM EST -----  Subject: Refill Request    QUESTIONS  Name of Medication? atorvastatin (LIPITOR) 40 MG tablet  Patient-reported dosage and instructions? 1 time daily   How many days do you have left? 0  Preferred Pharmacy? 0 [id number]  Pharmacy phone number (if available)? [phone]  Additional Information for Provider? Care source agent phone to inquire   about the status of this refill request. was electronically sent earlier   this week. has the request been received and if so will it be resubmitted   for refill?   ---------------------------------------------------------------------------  --------------  CALL BACK INFO  What is the best way for the office to contact you? OK to leave message on   voicemail  Preferred Call Back Phone Number? [phone]  ---------------------------------------------------------------------------  --------------  SCRIPT ANSWERS  Relationship to Patient? Covered Entity  Covered Entity Type? Health Insurance?   Representative Name? Lamona Primrose Roseanne Passe [phone]

## 2023-11-10 NOTE — TELEPHONE ENCOUNTER
Indra Herbert is calling to request a refill on the following medication(s):    Medication Request:  Requested Prescriptions      No prescriptions requested or ordered in this encounter       Last Visit Date (If Applicable):  66/0/8950    Next Visit Date:    4/5/2024      Last refill 8/23/22. Prescription pending.

## 2023-11-10 NOTE — TELEPHONE ENCOUNTER
Brenda Hackett is calling to request a refill on the following medication(s):    Medication Request:  Requested Prescriptions     Pending Prescriptions Disp Refills    atorvastatin (LIPITOR) 40 MG tablet 30 tablet 11     Sig: Take 1 tablet by mouth daily       Last Visit Date (If Applicable):  55/6/6717    Next Visit Date:    4/5/2024      Last refill 8/23/22. Prescription pending.

## 2023-11-13 ENCOUNTER — HOSPITAL ENCOUNTER (OUTPATIENT)
Age: 63
Setting detail: SPECIMEN
Discharge: HOME | End: 2023-11-13

## 2023-11-13 ENCOUNTER — HOSPITAL ENCOUNTER (OUTPATIENT)
Dept: PHYSICAL THERAPY | Age: 63
Setting detail: THERAPIES SERIES
Discharge: HOME | End: 2023-11-13
Payer: COMMERCIAL

## 2023-11-13 LAB
25(OH)D3 SERPL-MCNC: 31.2 NG/ML (ref 30–100)
ALBUMIN SERPL-MCNC: 4.5 G/DL (ref 3.5–5.2)
ANION GAP SERPL CALCULATED.3IONS-SCNC: 10 MMOL/L (ref 9–16)
BACTERIA URNS QL MICRO: NORMAL
BASOPHILS # BLD: 0 K/UL (ref 0–0.2)
BASOPHILS NFR BLD: 0 % (ref 0–2)
BILIRUB UR QL STRIP: NEGATIVE
BUN SERPL-MCNC: 18 MG/DL (ref 8–23)
CALCIUM SERPL-MCNC: 9.2 MG/DL (ref 8.6–10.4)
CASTS #/AREA URNS LPF: NORMAL /LPF (ref 0–8)
CHLORIDE SERPL-SCNC: 107 MMOL/L (ref 98–107)
CLARITY UR: CLEAR
CO2 SERPL-SCNC: 27 MMOL/L (ref 20–31)
COLOR UR: YELLOW
CREAT SERPL-MCNC: 1.2 MG/DL (ref 0.7–1.2)
CREAT UR-MCNC: 108.7 MG/DL (ref 39–259)
EOSINOPHIL # BLD: 0.36 K/UL (ref 0–0.4)
EOSINOPHILS RELATIVE PERCENT: 1 % (ref 1–4)
EPI CELLS #/AREA URNS HPF: NORMAL /HPF (ref 0–5)
ERYTHROCYTE [DISTWIDTH] IN BLOOD BY AUTOMATED COUNT: 13.6 % (ref 11.8–14.4)
GFR SERPL CREATININE-BSD FRML MDRD: >60 ML/MIN/1.73M2
GLUCOSE SERPL-MCNC: 139 MG/DL (ref 74–99)
GLUCOSE UR STRIP-MCNC: ABNORMAL MG/DL
HCT VFR BLD AUTO: 44.1 % (ref 40.7–50.3)
HGB BLD-MCNC: 14.1 G/DL (ref 13–17)
HGB UR QL STRIP.AUTO: NEGATIVE
IMM GRANULOCYTES # BLD AUTO: 0 K/UL (ref 0–0.3)
IMM GRANULOCYTES NFR BLD: 0 %
KETONES UR STRIP-MCNC: NEGATIVE MG/DL
LEUKOCYTE ESTERASE UR QL STRIP: NEGATIVE
LYMPHOCYTES NFR BLD: 29.1 K/UL (ref 1–4.8)
LYMPHOCYTES RELATIVE PERCENT: 82 % (ref 24–44)
MAGNESIUM SERPL-MCNC: 2.4 MG/DL (ref 1.6–2.4)
MCH RBC QN AUTO: 29.9 PG (ref 25.2–33.5)
MCHC RBC AUTO-ENTMCNC: 32 G/DL (ref 28.4–34.8)
MCV RBC AUTO: 93.4 FL (ref 82.6–102.9)
MICROALBUMIN UR-MCNC: 37 MG/L
MICROALBUMIN/CREAT UR-RTO: 34 MCG/MG CREAT
MONOCYTES NFR BLD: 1.07 K/UL (ref 0.1–0.8)
MONOCYTES NFR BLD: 3 % (ref 1–7)
MORPHOLOGY: ABNORMAL
NEUTROPHILS NFR BLD: 14 % (ref 36–66)
NEUTS SEG NFR BLD: 4.97 K/UL (ref 1.8–7.7)
NITRITE UR QL STRIP: NEGATIVE
NRBC BLD-RTO: 0 PER 100 WBC
PH UR STRIP: 5.5 [PH] (ref 5–8)
PHOSPHATE SERPL-MCNC: 3.8 MG/DL (ref 2.5–4.5)
PLATELET # BLD AUTO: 215 K/UL (ref 138–453)
PLATELET, FLUORESCENCE: 215 K/UL (ref 138–453)
PMV BLD AUTO: 10.2 FL (ref 8.1–13.5)
POTASSIUM SERPL-SCNC: 4.2 MMOL/L (ref 3.7–5.3)
PROT UR STRIP-MCNC: ABNORMAL MG/DL
PTH-INTACT SERPL-MCNC: 90 PG/ML (ref 15–65)
RBC # BLD AUTO: 4.72 M/UL (ref 4.21–5.77)
RBC #/AREA URNS HPF: NORMAL /HPF (ref 0–4)
SODIUM SERPL-SCNC: 144 MMOL/L (ref 136–145)
SP GR UR STRIP: 1.03 (ref 1–1.03)
UROBILINOGEN UR STRIP-ACNC: NORMAL EU/DL (ref 0–1)
WBC #/AREA URNS HPF: NORMAL /HPF (ref 0–5)
WBC OTHER # BLD: 35.5 K/UL (ref 3.5–11.3)

## 2023-11-13 PROCEDURE — 97110 THERAPEUTIC EXERCISES: CPT

## 2023-11-13 PROCEDURE — 97116 GAIT TRAINING THERAPY: CPT

## 2023-11-13 RX ORDER — ATORVASTATIN CALCIUM 40 MG/1
40 TABLET, FILM COATED ORAL DAILY
Qty: 90 TABLET | Refills: 0 | Status: SHIPPED | OUTPATIENT
Start: 2023-11-13

## 2023-11-13 NOTE — FLOWSHEET NOTE
speed progressing towards community-level speeds  6mWT to at least 750ft to indicate further improving gait endurance, progression towards community-level distances  SIS-16 to at least 90% function to indicate improving subjective function        Patient goals: \"get right leg stronger\"    Pt. Education:  [x] Yes  [] No  [] Reviewed Prior HEP/Ed  Method of Education: [x] Verbal  [x] Demo  [] Written  Comprehension of Education:  [x] Verbalizes understanding. [x] Demonstrates understanding. [] Needs review. [] Demonstrates/verbalizes HEP/Ed previously given. Current HEP as of 9/25: sit to stands with LLE fwd slightly, modified SLS w/ LLE on step     Plan: [x] Continue current frequency toward long and short term goals. [x] Specific Instructions for subsequent treatments: RLE hip ext/abd strengthening, quad strengthening ; Bioness for prolonged gait training - resisted trunk/RLE gait; RLE SLS neuromuscular control; lateral banded walking, retro walking      Time In: 2:01 pm            Time Out: 2:54 pm    Electronically signed by:  ITZEL Haynes  Entirety of treatment supervised by, and documentation under the review of Brenna Cruz PT, who agrees with all treatment decisions and plan.

## 2023-11-15 ENCOUNTER — HOSPITAL ENCOUNTER (OUTPATIENT)
Dept: PHYSICAL THERAPY | Age: 63
Setting detail: THERAPIES SERIES
Discharge: HOME | End: 2023-11-15
Payer: COMMERCIAL

## 2023-11-15 PROCEDURE — 97110 THERAPEUTIC EXERCISES: CPT

## 2023-11-15 PROCEDURE — 97116 GAIT TRAINING THERAPY: CPT

## 2023-11-15 NOTE — FLOWSHEET NOTE
[x] [address]  4600 [facility].  [phone]  F: [phone] [] 204 [facility]  [address]   [address]  P: [phone]  F: [phone] [] [address]  151 [facility]  P: [phone]  F: [phone] [] [place]: [phone]  F: [phone] [] [facility]  [address] [facility]   [address]   P: [phone]  F: [phone]  [] 0170 [facility].   P: [phone]  F: [phone] [] 205 [facility]  [address] [place]    [address]  P: [phone]  F: [phone] [] 224 [facility]  7[address] [facility]  [place]: [phone]  F: [phone] [] [address] [facility] [address]  [place]: [phone]  F: [phone]      Physical Therapy Daily Treatment Note    Date:  11/15/2023  Patient Name:  Jesu Buckner    :  1960  MRN: [id number]  Physician: Keshawn Galdamez MD                                  Insurance: [facility] (64 units/16 vs approved from 23 to 23)  Medical Diagnosis: G81.91 (ICD-10-CM) - Right hemiplegia ([address] )  Rehab Codes: R26.89, M62.81, G81.91  Next 's appt.: 10/6/23  Date of symptom onset: 23  (chronic onset so used date of referral)     Visit# / total visits:     Cancels/No Shows: 0    Subjective:    Pain:  [] Yes  [x] No Location: L knee pain   Pain Rating: (0-10 scale) 0/10  Pain altered Tx:  [x] No  [] Yes  Action:  Comments: Pt states arm is still sore from blood draw on Monday but no other issues

## 2023-11-20 ENCOUNTER — HOSPITAL ENCOUNTER (OUTPATIENT)
Dept: PHYSICAL THERAPY | Age: 63
Setting detail: THERAPIES SERIES
Discharge: HOME | End: 2023-11-20
Payer: COMMERCIAL

## 2023-11-20 PROCEDURE — 97110 THERAPEUTIC EXERCISES: CPT

## 2023-11-20 PROCEDURE — 97116 GAIT TRAINING THERAPY: CPT

## 2023-11-20 NOTE — FLOWSHEET NOTE
[x] [address]  4600 [facility].  [phone]  F: [phone] [] 204 [facility]  [address]   [address]  P: [phone]  F: [phone] [] [address]  151 [facility]  P: [phone]  F: [phone] [] [place]: [phone]  F: [phone] [] 224 [facility]  [address] [facility]   [address]   P: [phone]  F: [phone]  [] [address] [facility].   P: [phone]  F: [phone] [] 205 [facility]  [address] [place]  [address]  P: [phone]  F: [phone] [] 224 [facility]  795 [facility]  [place]: [phone]  F: [phone] [] [address] [facility] [address]  [place]: [phone]  F: [phone]      Physical Therapy Daily Treatment Note    Date:  2023  Patient Name:  Venessa Dent    :  1960  MRN: [id number]  Physician: Yamilex Vann MD                                  Insurance: [facility] (64 units/16 vs approved from 23 to 23)  Medical Diagnosis: G81.91 (ICD-10-CM) - Right hemiplegia ([address] )  Rehab Codes: R26.89, M62.81, G81.91  Next 's appt.: 10/6/23  Date of symptom onset: 23  (chronic onset so used date of referral)     Visit# / total visits: 15/16    Cancels/No Shows: 0    Subjective:    Pain:  [] Yes  [x] No Location: L knee pain   Pain Rating: (0-10 scale) 0/10  Pain altered Tx:  [x] No  [] Yes  Action:  Comments: Pt states no lasting soreness after last session.  Pt states no pain again

## 2023-11-22 ENCOUNTER — HOSPITAL ENCOUNTER (OUTPATIENT)
Dept: PHYSICAL THERAPY | Age: 63
Setting detail: THERAPIES SERIES
Discharge: HOME | End: 2023-11-22
Payer: COMMERCIAL

## 2023-11-22 PROCEDURE — 97116 GAIT TRAINING THERAPY: CPT

## 2023-11-22 PROCEDURE — 97110 THERAPEUTIC EXERCISES: CPT

## 2023-11-22 NOTE — FLOWSHEET NOTE
[x] [address]  4600 [facility] [place] [facility].  [phone]  F: [phone] [] 204 [facility]  [address]   [address]  P: [phone]  F: [phone] [] [address]  151 [facility]  P: [phone]  F: [phone] [] [facility]: [phone]  F: [phone] [] 224 [facility]  [address]   [address]   P: [phone]  F: [phone]  [] 7170 [facility].   P: [phone]  F: [phone] [] 205 [facility]  [address] [facility] [address]  P: [phone]  F: [phone] [] 224 [facility]  795 [facility]  [place]cca Lofts: [phone]  F: [phone] [] [address]  [place]: [phone]  F: [phone]      Physical Therapy Daily Treatment Note    Date:  2023  Patient Name:  Katerin Yang    :  1960  MRN: [id number]  Physician: Brian Piedra MD                                  Insurance: [facility] (64 units/16 vs approved from 23 to 23)  Medical Diagnosis: G81.91 (ICD-10-CM) - Right hemiplegia ([address] )  Rehab Codes: R26.89, M62.81, G81.91  Next 's appt.: 10/6/23  Date of symptom onset: 23  (chronic onset so used date of referral)     Visit# / total visits:     Cancels/No Shows: 0    Subjective:    Pain:  [] Yes  [x] No Location: L knee pain   Pain Rating: (0-10 scale) 0/10  Pain altered Tx:  [x] No  [] Yes  Action:  Comments: Pt presents today with no pain and states he is doing well.       Objective:  Modalities:
[x] [address]  4600 [facility].  [phone]  F: [phone] [] 204 [facility]  [address]   [address]  P: [phone]  F: [phone] [] [address]  151 [facility]  P: [phone]  F: [phone] [] [place]: [phone]  F: [phone] [] 224 [facility]  [address] [facility]   [address]   P: [phone]  F: [phone]  [] 7[address] [facility].   P: [phone]  F: [phone] [] 205 [facility]  [address] [place]    [address]  P: [phone]  F: [phone] [] 224 [facility]  7[address] [facility]  [place]: [phone]  F: [phone] [] [address]  [place]: [phone]  F: [phone]      Physical Therapy Discharge Note    Date: 2023      Patient: Gabe Ridley  : 1960  MRN: [id number]    Physician: Makayla Campbell MD                                  Insurance: [facility] (93 units/16 vs approved from 23 to 23)  Medical Diagnosis: G81.91 (ICD-10-CM) - Right hemiplegia ([address] [facility])  Rehab Codes: R26.89, M62.81, G81.91  Next 's appt.: 10/6/23  Date of symptom onset: 23  (chronic onset so used date of referral)     Visit# / total visits:                     Cancels/No Shows: 0  Date of initial visit: 23*9                Date of final visit: 23      Subjective:    Pain:  [] Yes  [x] No   Location: L knee pain             Pain Rating: (0-10 scale) 0/10  Pain altered Tx:  [x] No  [] Yes
applies. (3) Normal - Walks 6 m (20 ft), no assistive devices, good speed, no evidence for imbalance, normal gait pattern, deviates no more than 6 in. outside 12 in. walkway width. (2) Mild impairment - Walks 6 m (20 ft), uses assistive device, slower speed, mild gait deviations, deviates 6-10 in. outside 12 in. walkway width. (1) Moderate impairment - Walks 6 m (20 ft), slow speed, abnormal gait pattern, evidence for imbalance, deviates 10-15 in. outside 12 in. walkway width. (0) Severe impairment - Cannot walk 6m (20 ft) without assistance, severe gait deviations or imbalance, deviates greater than 15 in. outside 12in. walkway width or will not attempt        task. Steps: 1  Instructions: Walk up these stairs as you would at home (ie. Using the rail if necessary). At the top turn around and walk down. Grading: Karlo Lay the highest category that applies. (3) Normal - Alternating feet, no rail   (2) Mild impairment - Alternating feet, must use rail. (1) Moderate impairment - Two feet to a stair, must use rail.   (0) Severe impairment - Cannot do safely.         Total Score: 17 (maximum score = 30)      Cutoff scores:   - Non-Specific Older Adults: < or equal to 22/30 = risk of falls   - Parkinson's Disease: <15/30 = fall risk, Anson and Yahr 1-4, <18/30 = fall risk for inpatients; Hoen and Yahr 1-4

## 2023-12-08 DIAGNOSIS — I63.9 ISCHEMIC STROKE (HCC): ICD-10-CM

## 2023-12-08 DIAGNOSIS — E78.5 HYPERLIPIDEMIA, UNSPECIFIED HYPERLIPIDEMIA TYPE: ICD-10-CM

## 2023-12-08 RX ORDER — ATORVASTATIN CALCIUM 80 MG/1
80 TABLET, FILM COATED ORAL DAILY
Qty: 90 TABLET | OUTPATIENT
Start: 2023-12-08 | End: 2024-03-07

## 2023-12-08 RX ORDER — ATORVASTATIN CALCIUM 80 MG/1
80 TABLET, FILM COATED ORAL DAILY
Qty: 30 TABLET | Refills: 2 | Status: SHIPPED | OUTPATIENT
Start: 2023-12-08 | End: 2024-03-07

## 2023-12-08 NOTE — TELEPHONE ENCOUNTER
I called pt and informed him of the results of his labs. I asked him if he has been taking the Atorvastatin 40 mg daily as prescribed? He said yes he has. I stated that we are going to increase the dose to 80 mg daily to keep his stroke risk down. He stated his understanding. I also informed him of the elevated Hgb A1c and that he should contact his PCP to have that managed. He again stated his understanding.

## 2023-12-08 NOTE — TELEPHONE ENCOUNTER
----- Message from Mary Tang MD sent at 12/6/2023 10:55 PM EST -----  Please let the patient know that his LDL levels are high with current dose of atorvastatin 40 mg. If he has not been compliant he has to be compliant and we have to check it later. If he has been compliant with the 40 mg then it needs to be increased to a full dose of 80 mg. His hemoglobin A1c is also slightly elevated to 6.4 with increased risk of stroke again I would advise him to have his diabetes checked and his medications checked with his primary care physician. Good control of his risk factors will prevent him from having another stroke and also decrease the cardiovascular risk.   Thank you

## 2023-12-29 NOTE — TELEPHONE ENCOUNTER
Bladder scan results = 730ml   Myriam Morris is calling to request a refill on the following medication(s):    Medication Request:  Requested Prescriptions     Pending Prescriptions Disp Refills    dapagliflozin (FARXIGA) 5 MG tablet 30 tablet 0     Sig: Take 1 tablet by mouth every morning       Last Visit Date (If Applicable):  47/7/5295    Next Visit Date:    4/5/2024 Spoke to patient.  NC

## 2024-01-25 ENCOUNTER — TELEPHONE (OUTPATIENT)
Dept: ONCOLOGY | Age: 64
End: 2024-01-25

## 2024-01-25 ENCOUNTER — HOSPITAL ENCOUNTER (OUTPATIENT)
Age: 64
Setting detail: SPECIMEN
Discharge: HOME | End: 2024-01-25
Payer: COMMERCIAL

## 2024-01-25 ENCOUNTER — OFFICE VISIT (OUTPATIENT)
Dept: ONCOLOGY | Age: 64
End: 2024-01-25
Payer: COMMERCIAL

## 2024-01-25 VITALS
RESPIRATION RATE: 16 BRPM | BODY MASS INDEX: 22.78 KG/M2 | WEIGHT: 163.3 LBS | DIASTOLIC BLOOD PRESSURE: 77 MMHG | HEART RATE: 88 BPM | SYSTOLIC BLOOD PRESSURE: 120 MMHG | TEMPERATURE: 97.7 F

## 2024-01-25 DIAGNOSIS — C91.10 CLL (CHRONIC LYMPHOCYTIC LEUKEMIA) (HCC): ICD-10-CM

## 2024-01-25 DIAGNOSIS — C91.10 CLL (CHRONIC LYMPHOCYTIC LEUKEMIA) (HCC): Primary | ICD-10-CM

## 2024-01-25 LAB
BASOPHILS # BLD: 0 K/UL (ref 0–0.2)
BASOPHILS NFR BLD: 0 %
EOSINOPHIL # BLD: 0 K/UL (ref 0–0.4)
EOSINOPHILS RELATIVE PERCENT: 0 % (ref 1–4)
ERYTHROCYTE [DISTWIDTH] IN BLOOD BY AUTOMATED COUNT: 13.6 % (ref 11.8–14.4)
HCT VFR BLD AUTO: 40 % (ref 40.7–50.3)
HGB BLD-MCNC: 13.5 G/DL (ref 13–17)
IMM GRANULOCYTES # BLD AUTO: 0 K/UL (ref 0–0.3)
IMM GRANULOCYTES NFR BLD: 0 %
LYMPHOCYTES NFR BLD: 26.96 K/UL (ref 1–4.8)
LYMPHOCYTES RELATIVE PERCENT: 80 % (ref 24–44)
MCH RBC QN AUTO: 31 PG (ref 25.2–33.5)
MCHC RBC AUTO-ENTMCNC: 33.8 G/DL (ref 28.4–34.8)
MCV RBC AUTO: 91.7 FL (ref 82.6–102.9)
MONOCYTES NFR BLD: 1.35 K/UL (ref 0.2–0.8)
MONOCYTES NFR BLD: 4 % (ref 1–7)
MORPHOLOGY: ABNORMAL
NEUTROPHILS NFR BLD: 16 % (ref 36–66)
NEUTS SEG NFR BLD: 5.39 K/UL (ref 1.8–7.7)
NRBC BLD-RTO: 0 PER 100 WBC
PLATELET # BLD AUTO: 169 K/UL (ref 138–453)
PMV BLD AUTO: 10 FL (ref 8.1–13.5)
RBC # BLD AUTO: 4.36 M/UL (ref 4.21–5.77)
WBC OTHER # BLD: 33.7 K/UL (ref 3.5–11.3)

## 2024-01-25 PROCEDURE — 85025 COMPLETE CBC W/AUTO DIFF WBC: CPT

## 2024-01-25 PROCEDURE — 3074F SYST BP LT 130 MM HG: CPT | Performed by: INTERNAL MEDICINE

## 2024-01-25 PROCEDURE — 3078F DIAST BP <80 MM HG: CPT | Performed by: INTERNAL MEDICINE

## 2024-01-25 PROCEDURE — G8427 DOCREV CUR MEDS BY ELIG CLIN: HCPCS | Performed by: INTERNAL MEDICINE

## 2024-01-25 PROCEDURE — G8484 FLU IMMUNIZE NO ADMIN: HCPCS | Performed by: INTERNAL MEDICINE

## 2024-01-25 PROCEDURE — 99211 OFF/OP EST MAY X REQ PHY/QHP: CPT

## 2024-01-25 PROCEDURE — G8420 CALC BMI NORM PARAMETERS: HCPCS | Performed by: INTERNAL MEDICINE

## 2024-01-25 PROCEDURE — 3017F COLORECTAL CA SCREEN DOC REV: CPT | Performed by: INTERNAL MEDICINE

## 2024-01-25 PROCEDURE — 36415 COLL VENOUS BLD VENIPUNCTURE: CPT

## 2024-01-25 PROCEDURE — 99214 OFFICE O/P EST MOD 30 MIN: CPT | Performed by: INTERNAL MEDICINE

## 2024-01-25 PROCEDURE — 1036F TOBACCO NON-USER: CPT | Performed by: INTERNAL MEDICINE

## 2024-01-25 NOTE — TELEPHONE ENCOUNTER
"Requested Prescriptions   Pending Prescriptions Disp Refills     atorvastatin (LIPITOR) 10 MG tablet 90 tablet 1     Sig: Take 1 tablet (10 mg) by mouth daily       Statins Protocol Passed - 6/6/2019 12:02 PM        Passed - LDL on file in past 12 months     Recent Labs   Lab Test 09/19/18  0743   *             Passed - No abnormal creatine kinase in past 12 months     No lab results found.             Passed - Recent (12 mo) or future (30 days) visit within the authorizing provider's specialty     Patient had office visit in the last 12 months or has a visit in the next 30 days with authorizing provider or within the authorizing provider's specialty.  See \"Patient Info\" tab in inbasket, or \"Choose Columns\" in Meds & Orders section of the refill encounter.              Passed - Medication is active on med list        Passed - Patient is age 18 or older        citalopram (CELEXA) 20 MG tablet 90 tablet 2     Sig: Take 1 tablet (20 mg) by mouth daily       SSRIs Protocol Passed - 6/6/2019 12:02 PM        Passed - Recent (12 mo) or future (30 days) visit within the authorizing provider's specialty     Patient had office visit in the last 12 months or has a visit in the next 30 days with authorizing provider or within the authorizing provider's specialty.  See \"Patient Info\" tab in inbasket, or \"Choose Columns\" in Meds & Orders section of the refill encounter.              Passed - Medication is active on med list        Passed - Patient is age 18 or older        levothyroxine (SYNTHROID/LEVOTHROID) 137 MCG tablet 90 tablet 3     Sig: Take 1 tablet (137 mcg) by mouth daily       Thyroid Protocol Failed - 6/6/2019 12:02 PM        Failed - Normal TSH on file in past 12 months     Recent Labs   Lab Test 09/19/18  0743   TSH 5.03*              Passed - Patient is 12 years or older        Passed - Recent (12 mo) or future (30 days) visit within the authorizing provider's specialty     Patient had office visit in the " [facility] HERE FOR MD VISIT  RV 3-4 months with CBC at RV  LABS CDP 5/30/24  MD VISIT 5/30/24 @ 9AM  AVS PRINTED W/ INSTRUCTIONS AND GIVEN  TO PT ON EXIT   "last 12 months or has a visit in the next 30 days with authorizing provider or within the authorizing provider's specialty.  See \"Patient Info\" tab in inbasket, or \"Choose Columns\" in Meds & Orders section of the refill encounter.              Passed - Medication is active on med list        Routing refill request to provider for review/approval because:  Labs out of range:  tsh        "

## 2024-01-25 NOTE — PROGRESS NOTES
Chief Complaint   Patient presents with    Follow-up    Discuss Labs     DIAGNOSIS:       CLL, Stage 1  Lymphadenopathy.   Persistent leukocytosis  Persistent neutropenia  Persistent lymphocytosis  History of CVA/right-sided hemiplegia with residual weakness  History of hypertension and diabetes  CURRENT THERAPY:         Observation for CLL.  No indications for treatment.  BRIEF CASE HISTORY:      Mr. Kade Jade is a very pleasant 63 y.o. male with history of multiple co morbidities as listed.  The patient is referred for evaluation and management of leukocytosis.  Patient had history of diabetes and hypertension.  He had CVA with right-sided hemiplegia in 2019.  He had residual right-sided weakness.  Patient has no other major health problems.  Patient was noted to have persistent elevation of white blood cells over the last few years.  He had no symptoms related to that abnormality.  No repeated infections.  No fever or night sweats.  No weight loss or decreased appetite.  No enlarged lymph nodes.  No history of smoking or alcohol drinking..   INTERIM HISTORY:   Seen for follow up  CLL.  Doing well clinically.  No fever or night sweats.  No weight loss or decreased appetite.  No new enlarged lymph nodes.  No repeated infections.      PAST MEDICAL HISTORY: has a past medical history of Cerebral artery occlusion with cerebral infarction (HCC), Diabetes mellitus (HCC), High cholesterol, and Hypertension.    PAST SURGICAL HISTORY: has a past surgical history that includes knee surgery (1983).     CURRENT MEDICATIONS:  has a current medication list which includes the following prescription(s): dapagliflozin, atorvastatin, hydralazine, amlodipine, vitamin d3, fluoxetine, insulin lispro (1 unit dial), lantus solostar, potassium chloride, onetouch ultra, onetouch delica plus [id number], b-d ultrafine iii short pen, blood glucose

## 2024-02-10 DIAGNOSIS — I63.9 ISCHEMIC STROKE (HCC): ICD-10-CM

## 2024-02-10 DIAGNOSIS — E78.5 HYPERLIPIDEMIA, UNSPECIFIED HYPERLIPIDEMIA TYPE: ICD-10-CM

## 2024-02-12 RX ORDER — ATORVASTATIN CALCIUM 40 MG/1
40 TABLET, FILM COATED ORAL DAILY
Qty: 90 TABLET | Refills: 0 | Status: SHIPPED | OUTPATIENT
Start: 2024-02-12

## 2024-02-12 NOTE — TELEPHONE ENCOUNTER
Kade Jade is calling to request a refill on the following medication(s):    Medication Request:  Requested Prescriptions     Pending Prescriptions Disp Refills    atorvastatin (LIPITOR) 40 MG tablet [Pharmacy Med Name: ATORVASTATIN 40MG TABLETS] 90 tablet 0     Sig: TAKE 1 TABLET BY MOUTH DAILY       Last Visit Date (If Applicable):  10/6/2023    Next Visit Date:    4/5/2024        Last refill 12/8/23. Prescription pending.

## 2024-02-20 ENCOUNTER — OFFICE VISIT (OUTPATIENT)
Dept: NEUROLOGY | Age: 64
End: 2024-02-20
Payer: MEDICARE

## 2024-02-20 VITALS
BODY MASS INDEX: 23.38 KG/M2 | SYSTOLIC BLOOD PRESSURE: 132 MMHG | WEIGHT: 167 LBS | DIASTOLIC BLOOD PRESSURE: 77 MMHG | HEIGHT: 71 IN | HEART RATE: 86 BPM

## 2024-02-20 DIAGNOSIS — I63.9 ISCHEMIC STROKE (HCC): Primary | ICD-10-CM

## 2024-02-20 DIAGNOSIS — G81.91 RIGHT HEMIPLEGIA (HCC): ICD-10-CM

## 2024-02-20 DIAGNOSIS — E78.2 MIXED HYPERLIPIDEMIA: ICD-10-CM

## 2024-02-20 DIAGNOSIS — I63.9 ISCHEMIC STROKE (HCC): ICD-10-CM

## 2024-02-20 DIAGNOSIS — F32.A ANXIETY AND DEPRESSION: ICD-10-CM

## 2024-02-20 DIAGNOSIS — F41.9 ANXIETY AND DEPRESSION: ICD-10-CM

## 2024-02-20 DIAGNOSIS — M21.371 FOOT DROP, RIGHT: ICD-10-CM

## 2024-02-20 PROCEDURE — 3078F DIAST BP <80 MM HG: CPT | Performed by: PSYCHIATRY & NEUROLOGY

## 2024-02-20 PROCEDURE — 3075F SYST BP GE 130 - 139MM HG: CPT | Performed by: PSYCHIATRY & NEUROLOGY

## 2024-02-20 PROCEDURE — 99214 OFFICE O/P EST MOD 30 MIN: CPT | Performed by: PSYCHIATRY & NEUROLOGY

## 2024-02-20 RX ORDER — ATORVASTATIN CALCIUM 80 MG/1
80 TABLET, FILM COATED ORAL DAILY
Qty: 30 TABLET | Refills: 5 | Status: SHIPPED | OUTPATIENT
Start: 2024-02-20 | End: 2024-05-20

## 2024-02-20 RX ORDER — HYDROCHLOROTHIAZIDE 25 MG/1
1 TABLET ORAL EVERY MORNING
COMMUNITY

## 2024-02-20 RX ORDER — ATORVASTATIN CALCIUM 80 MG/1
80 TABLET, FILM COATED ORAL DAILY
Qty: 90 TABLET | OUTPATIENT
Start: 2024-02-20 | End: 2024-05-20

## 2024-02-20 NOTE — PROGRESS NOTES
[facility]            [address], [address]          [place], [place] [address]          Dept: [phone]          Dept Fax: [phone]      2/20/2024    HISTORY OF PRESENT ILLNESS:       I had the pleasure of seeing Kade Jade, 63 RHM with HTN, DM has  depression and chronic lymphocytic leukemia ( stage 1 - untreated now ),  previous left internal capsule ischemic stroke secondary to uncontrolled hypertension and diabetes in August 2019 with residual right hemiparesis.    For depression he was prescribed prozac 20 mg daily. He is accompanied by his sister who has noticed improvements in his mood with use of prozac.     Prior left internal capsule ischemic stroke secondary to uncontrolled hypertension and uncontrolled diabetes in August 2019. For secondary stroke prevention he is on aspirin 81 mg daily and lipitor 40 mg daily. Lipid profile completed in December 2022 with LDL of 114. He has continued having right sided weakness. He reports a mixed compliance to lipitor which he reports may be why his A1c was 6.4 and  was elevated in 10/23.     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, did payroll    Testing reviewed:    Lipid profile 12/5/2022  Cholesterol <200 mg/dL 170      HDL >40 mg/dL 36 Low          LDL Cholesterol 0 - 130 mg/dL 114      Lipid Panel 2/22/2022  Cholesterol <200 mg/dL 173       HDL >40 mg/dL 34 Low       LDL Cholesterol 0 - 130 mg/dL 110      Labs Completed 8/6/2021     Cholesterol <200 mg/dL 130       HDL >40 mg/dL 29 Low       LDL Cholesterol 0 - 130 mg/dL 65      Triglycerides <150 mg/dL 180 High       Hemoglobin A1C 4.0 - 6.0 % 6.9 High       ALT 5 - 41 U/L 24    AST <40 U/L 23          Labs Completed 1/6/2021  Hemoglobin A1C 6.5High        Cholesterol 167  <200 mg/dL      HDL 34Low   >40 mg/dL      LDL Cholesterol 111  0 - 130 mg/dL

## 2024-03-01 DIAGNOSIS — F41.9 ANXIETY AND DEPRESSION: ICD-10-CM

## 2024-03-01 DIAGNOSIS — F32.A ANXIETY AND DEPRESSION: ICD-10-CM

## 2024-03-01 RX ORDER — AMLODIPINE BESYLATE 10 MG/1
TABLET ORAL
Qty: 90 TABLET | OUTPATIENT
Start: 2024-03-01

## 2024-03-01 RX ORDER — FLUOXETINE HYDROCHLORIDE 20 MG/1
20 CAPSULE ORAL DAILY
Qty: 30 CAPSULE | Refills: 5 | Status: SHIPPED | OUTPATIENT
Start: 2024-03-01

## 2024-03-01 RX ORDER — AMLODIPINE BESYLATE 10 MG/1
TABLET ORAL
Qty: 30 TABLET | Refills: 0 | Status: SHIPPED | OUTPATIENT
Start: 2024-03-01

## 2024-03-01 NOTE — TELEPHONE ENCOUNTER
Kade Jade is calling to request a refill on the following medication(s):    Medication Request:  Requested Prescriptions     Pending Prescriptions Disp Refills    amLODIPine (NORVASC) 10 MG tablet [Pharmacy Med Name: AMLODIPINE BESYLATE 10MG TABLETS] 90 tablet 1     Sig: TAKE 1 TABLET BY MOUTH DAILY       Last Visit Date (If Applicable):  10/6/2023    Next Visit Date:    4/5/2024        Last refill 9/1/23. Prescription pending.

## 2024-03-01 NOTE — TELEPHONE ENCOUNTER
Pharmacy requesting refill of FLUoxetine (PROZAC) 20 MG capsule      Medication active on med list yes      Date of last Rx: 08/23/2023 with 5 refills          verified by BRAYAN JAMES      Date of last appointment 2/20/2024    Next Visit Date:  Visit date not found

## 2024-04-01 RX ORDER — DAPAGLIFLOZIN 5 MG/1
5 TABLET, FILM COATED ORAL EVERY MORNING
Qty: 30 TABLET | Refills: 0 | Status: SHIPPED | OUTPATIENT
Start: 2024-04-01

## 2024-04-01 NOTE — TELEPHONE ENCOUNTER
Kade Jade is calling to request a refill on the following medication(s):    Medication Request:  Requested Prescriptions     Pending Prescriptions Disp Refills    FARXIGA 5 MG tablet [Pharmacy Med Name: FARXIGA 5MG TABLETS] 90 tablet 0     Sig: TAKE 1 TABLET BY MOUTH EVERY MORNING       Last Visit Date (If Applicable):  10/6/2023    Next Visit Date:    4/5/2024        Last refill 12/29/23. Prescription pending.

## 2024-04-05 ENCOUNTER — OFFICE VISIT (OUTPATIENT)
Dept: INTERNAL MEDICINE CLINIC | Age: 64
End: 2024-04-05
Payer: MEDICARE

## 2024-04-05 VITALS
HEART RATE: 87 BPM | SYSTOLIC BLOOD PRESSURE: 118 MMHG | RESPIRATION RATE: 12 BRPM | DIASTOLIC BLOOD PRESSURE: 74 MMHG | WEIGHT: 161 LBS | OXYGEN SATURATION: 97 % | BODY MASS INDEX: 22.54 KG/M2 | TEMPERATURE: 96.9 F | HEIGHT: 71 IN

## 2024-04-05 DIAGNOSIS — Z00.00 MEDICARE ANNUAL WELLNESS VISIT, SUBSEQUENT: Primary | ICD-10-CM

## 2024-04-05 DIAGNOSIS — Z28.21 INFLUENZA VACCINATION DECLINED: ICD-10-CM

## 2024-04-05 DIAGNOSIS — C91.10 CLL (CHRONIC LYMPHOCYTIC LEUKEMIA) (HCC): ICD-10-CM

## 2024-04-05 DIAGNOSIS — Z12.11 COLON CANCER SCREENING: ICD-10-CM

## 2024-04-05 DIAGNOSIS — F32.A ANXIETY AND DEPRESSION: ICD-10-CM

## 2024-04-05 DIAGNOSIS — F41.9 ANXIETY AND DEPRESSION: ICD-10-CM

## 2024-04-05 DIAGNOSIS — I10 ESSENTIAL HYPERTENSION: ICD-10-CM

## 2024-04-05 DIAGNOSIS — G81.91 RIGHT HEMIPLEGIA (HCC): ICD-10-CM

## 2024-04-05 DIAGNOSIS — Z28.21 PNEUMOCOCCAL VACCINATION DECLINED: ICD-10-CM

## 2024-04-05 DIAGNOSIS — N18.31 TYPE 1 DIABETES MELLITUS WITH STAGE 3A CHRONIC KIDNEY DISEASE (HCC): ICD-10-CM

## 2024-04-05 DIAGNOSIS — Z28.21 TETANUS, DIPHTHERIA, AND ACELLULAR PERTUSSIS (TDAP) VACCINATION DECLINED: ICD-10-CM

## 2024-04-05 DIAGNOSIS — E78.2 MIXED HYPERLIPIDEMIA: ICD-10-CM

## 2024-04-05 DIAGNOSIS — E10.22 TYPE 1 DIABETES MELLITUS WITH STAGE 3A CHRONIC KIDNEY DISEASE (HCC): ICD-10-CM

## 2024-04-05 DIAGNOSIS — Z86.16 HISTORY OF COVID-19: ICD-10-CM

## 2024-04-05 PROBLEM — Z86.73 HISTORY OF STROKE: Status: RESOLVED | Noted: 2019-09-13 | Resolved: 2024-04-05

## 2024-04-05 PROCEDURE — G0439 PPPS, SUBSEQ VISIT: HCPCS | Performed by: FAMILY MEDICINE

## 2024-04-05 PROCEDURE — 3078F DIAST BP <80 MM HG: CPT | Performed by: FAMILY MEDICINE

## 2024-04-05 PROCEDURE — 3074F SYST BP LT 130 MM HG: CPT | Performed by: FAMILY MEDICINE

## 2024-04-05 RX ORDER — INSULIN GLARGINE 100 [IU]/ML
INJECTION, SOLUTION SUBCUTANEOUS
Qty: 18 ML | Refills: 1 | Status: SHIPPED | OUTPATIENT
Start: 2024-04-05

## 2024-04-05 ASSESSMENT — PATIENT HEALTH QUESTIONNAIRE - PHQ9
9. THOUGHTS THAT YOU WOULD BE BETTER OFF DEAD, OR OF HURTING YOURSELF: NOT AT ALL
SUM OF ALL RESPONSES TO PHQ9 QUESTIONS 1 & 2: 0
SUM OF ALL RESPONSES TO PHQ QUESTIONS 1-9: 0
5. POOR APPETITE OR OVEREATING: NOT AT ALL
SUM OF ALL RESPONSES TO PHQ QUESTIONS 1-9: 0
2. FEELING DOWN, DEPRESSED OR HOPELESS: NOT AT ALL
6. FEELING BAD ABOUT YOURSELF - OR THAT YOU ARE A FAILURE OR HAVE LET YOURSELF OR YOUR FAMILY DOWN: NOT AT ALL
8. MOVING OR SPEAKING SO SLOWLY THAT OTHER PEOPLE COULD HAVE NOTICED. OR THE OPPOSITE, BEING SO FIGETY OR RESTLESS THAT YOU HAVE BEEN MOVING AROUND A LOT MORE THAN USUAL: NOT AT ALL
SUM OF ALL RESPONSES TO PHQ QUESTIONS 1-9: 0
4. FEELING TIRED OR HAVING LITTLE ENERGY: NOT AT ALL
7. TROUBLE CONCENTRATING ON THINGS, SUCH AS READING THE NEWSPAPER OR WATCHING TELEVISION: NOT AT ALL
3. TROUBLE FALLING OR STAYING ASLEEP: NOT AT ALL
SUM OF ALL RESPONSES TO PHQ QUESTIONS 1-9: 0
10. IF YOU CHECKED OFF ANY PROBLEMS, HOW DIFFICULT HAVE THESE PROBLEMS MADE IT FOR YOU TO DO YOUR WORK, TAKE CARE OF THINGS AT HOME, OR GET ALONG WITH OTHER PEOPLE: NOT DIFFICULT AT ALL
1. LITTLE INTEREST OR PLEASURE IN DOING THINGS: NOT AT ALL

## 2024-04-05 ASSESSMENT — LIFESTYLE VARIABLES
HOW OFTEN DO YOU HAVE A DRINK CONTAINING ALCOHOL: NEVER
HOW MANY STANDARD DRINKS CONTAINING ALCOHOL DO YOU HAVE ON A TYPICAL DAY: PATIENT DOES NOT DRINK

## 2024-04-05 NOTE — PROGRESS NOTES
Medicare Annual Wellness Visit    Kade Jade is here for Medicare AWV (Pt voices no concerns)  Vitals  Afebrile hemodynamically stable  Systemic exam  HEENT unremarkable  Neck unremarkable  Lungs bilateral CTA  CVS S1-S2 regular rate and rhythm  Abdomen soft discomfort benign to palpation normoactive bowel sound  CNS.  History of left CVA with right hemiplegia-spastic  Lower extremity no pedal edema good capillary refill  Assessment & Plan   Medicare annual wellness visit, subsequent  Type 1 diabetes mellitus with stage 3a chronic kidney disease (HCC)  The following orders have not been finalized:  -     insulin glargine (LANTUS SOLOSTAR) 100 UNIT/ML injection pen     Diagnosis Orders   1. Medicare annual wellness visit, subsequent        2. Type 1 diabetes mellitus with stage 3a chronic kidney disease (HCC)  insulin glargine (LANTUS SOLOSTAR) 100 UNIT/ML injection pen      3. CLL (chronic lymphocytic leukemia) ([facility])        4. Essential hypertension        5. Mixed hyperlipidemia        6. Right hemiplegia (HCC)        7. Anxiety and depression        8. Influenza vaccination declined        9. Pneumococcal vaccination declined        10. Tetanus, diphtheria, and acellular pertussis (Tdap) vaccination declined        11. History of COVID-19        12. Colon cancer screening  Fecal DNA Colorectal cancer screening ([facility])        63-year-old -American male is brought in by family member requesting Medicare annual exam.  He denies any new distress, afebrile hemodynamically stable  History of left CVA with residual right spastic hemiplegia.  He ambulates with a cane.  He is established with neurology  Hyperlipidemia.  His Lipitor has been increased from 40 to 80 mg by neurology.  He is advised low-fat high-fiber diet, daily moderate exercise  Hemodynamically stable in response to amlodipine, hydralazine and coordination with nephrology  CKD 3 with creatinine of 1.2.  Avoid nephrotoxic drugs,

## 2024-04-16 RX ORDER — AMLODIPINE BESYLATE 10 MG/1
10 TABLET ORAL DAILY
Qty: 90 TABLET | Refills: 0 | Status: SHIPPED | OUTPATIENT
Start: 2024-04-16

## 2024-04-17 NOTE — FLOWSHEET NOTE
[x] [facility]       Occupational Therapy            1st floor       [address], [place]         Phone: [phone]       Fax: [phone] [] [facility]  [address], [place]  Phone: [phone]  Fax: [phone]      Occupational Therapy Discharge Note    Date:  8/3/2021  Patient Name:  Silver Coffman    :  1960  MRN: [id number]  Visit# / total visits:     Patient: Silver Coffman                      : 1960                      MRN: [id number]  Referring Provider:  Charlotta Alcon Eloisa Bence  Insurance: [employer]   visits left  Medical Diagnosis: History of stroke Z86.73             Rehab Codes: stiffness in shoulder M25.61,, stiffness in elbow M25.62,, stiffness in hand M25.64,, stiffness in wrist M25.63,, lack of coordination R27.8,, fine motor skills loss R29.818, or muscle weakness generalized M62.81,  Onset Date:    3-          Next Dr[address]: 21   Cancels/No Shows: 0/0    Subjective:    Pain:  [] Yes  [x] No Location: shoulder with range Pain Rating: (0-10 scale) 0/10 at rest  Pain altered Tx:  [x] No  [] Yes  Action:  Pt Comments: Pt reports \"I have been having a lot of weird dreams still\"    Objective:  Modalities:  Modality Flow Sheet:  START STOP Tx Modality   2021 Not  completed Electrical Stim: [place] cycle  1. Shoulder subluxation:  Time: 20 minutes   Cycle: 10/10  Duty: 50%  Patch location: anterior and posterior deltoid, middle deltoid and supraspinatus   Up to ~64Hz intensity    Completed Electrical Stim: [place] cycle  1. Elbow extension  Time: 20 minutes   Cycle: 10/10  Duty: 50%  Patch location:Triceps belly and tricepts instertion  Up to ~45Hz intensity       Completed Electrical Stim: [place] cycle  1. Wrist extension  Time: 20 minutes   Cycle: 10/10  Duty: 50%  Patch location:Electrode placement for isolated wrist extension stimulation.  The lateral condyle is marked with the X, and the negative electrode is placed just distal to that marking. The positive electrode is placed over the tendinous area of the forearm. The stimulated contraction can be seen to be dominantly wrist extension with minimal finger extension and would be graded three out of five muscle grade. Up to ~45Hz intensity        Cold Pack:     Precautions: NA  Exercises:  EXERCISE    REPS/     TIME  WEIGHT/    LEVEL COMMENTS   PROM  Shoulder internal/external  Flex/ext  Elbow flex/ext  Wrist flex/ext  Digit extension 10 mins  Completed for elbow this date, pt demo mod tightness   Wooden pegs with R hand (place in)   Not Completed    Cone stacking      Flexbar   strength  Wrist Flexion/Ext  Wrist pronation  Wrist supination  Wrist ulnar deviation  Wrist radial dev  Shoulder Adb  Shoulder Add 10 yellow  NOT completed this date  Wrist flexion and radial deviation   Elbow extension with Therapist holding shoulder to prevent trunk movement. completed                 Other: Pt completing AROM with E Stim to promote increased strength of triceps for elbow extension with good return. Pt completed E stim to wrist extensors for promotion of wrist extension with moderate improvement. Pt reports fatigue with AROM during E Stim for both locations. Pt demo increased I with muscle movements with E Stim assist. Pt currently has met all of 30 visits for the year of 2021 and has been advised to continue with exercises to promote improved movement to get back to normal movement patterns. Treatment Charges: Mins Units   [x]  Modalities:   E-stim   58   1   []  Ultrasound     [x]  Ther Exercise 56 3   []  Manual Therapy     []  Ther Activities     []  Orthotic fit/train     []  Orthotic recheck     []  Other     Total Treatment time 58      Assessment: [x] Progressing toward goals. Good muscle contraction noted with e-stim treatment to R elbow extension and R wrist extension.         [x] Patient has marybel benefit from skilled occupational therapy services to: Improve  Rehab Potential, Motor control/Tone in UE, ROM, Strength and Coordination deficit in order to ensure good follow through and decrease pain in UE for safe completion of ADLs     Short Term Goals: ( 6  Treatments)  1. Increase AROM (degrees) (extension/flexion)  a. R shoulder to 60/40 ONGOING IMPROVED  b. R elbow -20/130 MET  c. R wrist -30/50 MET  d. R radial/ulnar dev 15/10 mET  2. Increase strength (pounds);  a. R  strength to 22 pounds ONGOING  b. R lateral pinch to 10 pounds ONGOING  c. R 2 point pinch to 12 pounds ONGOING  3. Increase function:UE Functional Index Score 40 or more points to promote increased functional abilities MET  4. Patient to be independent with home exercise program as demonstrated by performance with correct form without cues. MET     Long Term Goals: (  12 Treatments)  1. Move digits in R hand independently with trace movement for promotion of future use for guitar ONGOING  2. Demo R hand extension to half of [facility] for increased I with  on R side ONGOING (trace movements)  3. Increase R UE Shoulder ROM to lift arm up for I with adl tasks at home. 40/75 MET     Pt. Education:  [] Yes  [x] No  [] Reviewed Prior HEP/Ed  Method of Education: [] Verbal  [] Demo  [] Written  Re:   Comprehension of Education:   [x] Verbalizes understanding. [] Demonstrates understanding. [] Needs review. [] Demonstrates/verbalizes HEP/Ed previously given. Plan: [x] Continue current frequency toward short and long term goals.   [x] Specific Instructions for subsequent treatments: estim for shoulder subluxation and or elbow extension [] Other:    Time In: 7981-8155  Electronically signed by:  Alycia Rodney OTR/L         Medicare Cap   [] Physical Therapy  [] Speech Therapy  [x] Occupational therapy  *PT and Speech caps combine      $2100 Limit for PT and Speech combined  $2100 Limit for OT individually  At the beginning of the month where you expect to go over $2100, please add the [address] modifier      Patient Name: Deepak Godoy  YOB: 1960    Note:  This is an estimate of charges billed.      Date of [address] Name # units/ charge $$$ charge Daily Total Charge Ongoing Total $$$   7-8-21 EStim 1 13.03      TE 3 29.21+21.34+21.34 84.92    7-13-21 EStim  TE 1  3 29.21+21.34+21.34 84.92 1816.01   7-15-21 EStim  TE 1  3 29.21+21.34+21.34 84.92 1900.93   7-20-21 EStim  TE 1  3 29.21+21.34+21.34 84.92 1985.85   7-22-21 E Stim  TE 1  3 29.21+21.34+21.34 84.92 2070.77   7-29-21 E Stim  Te 1  3 29.21+21.34+21.34 84.92 2155.69   8-3-21 E Stim  TE 1  3 29.21+21.34+21.34 84.92 2240.61 normal...

## 2024-04-29 RX ORDER — DAPAGLIFLOZIN 5 MG/1
5 TABLET, FILM COATED ORAL EVERY MORNING
Qty: 90 TABLET | Refills: 1 | Status: SHIPPED | OUTPATIENT
Start: 2024-04-29

## 2024-04-29 NOTE — PROGRESS NOTES
Tianna from [facility] pharmacy called to request refill Farxiga for 90 days instead of 30.      Kade Jade is calling to request a refill on the following medication(s):    Medication Request:  Requested Prescriptions     Pending Prescriptions Disp Refills    dapagliflozin (FARXIGA) 5 MG tablet 90 tablet 0     Sig: Take 1 tablet by mouth every morning       Last Visit Date (If Applicable):  Visit date not found    Next Visit Date:    Visit date not found

## 2024-05-01 RX ORDER — HYDRALAZINE HYDROCHLORIDE 25 MG/1
25 TABLET, FILM COATED ORAL DAILY
Qty: 90 TABLET | Refills: 1 | Status: SHIPPED | OUTPATIENT
Start: 2024-05-01

## 2024-05-01 NOTE — TELEPHONE ENCOUNTER
Kade Jade is calling to request a refill on the following medication(s):    Medication Request:  Requested Prescriptions     Pending Prescriptions Disp Refills    hydrALAZINE (APRESOLINE) 25 MG tablet 90 tablet 1     Sig: Take 1 tablet by mouth daily       Last Visit Date (If Applicable):  4/5/2024    Next Visit Date:    10/4/2024

## 2024-05-08 RX ORDER — DAPAGLIFLOZIN 5 MG/1
5 TABLET, FILM COATED ORAL EVERY MORNING
Qty: 90 TABLET | Refills: 0 | Status: SHIPPED | OUTPATIENT
Start: 2024-05-08

## 2024-05-09 ENCOUNTER — HOSPITAL ENCOUNTER (OUTPATIENT)
Age: 64
Setting detail: SPECIMEN
Discharge: HOME | End: 2024-05-09

## 2024-05-09 LAB
25(OH)D3 SERPL-MCNC: 15.1 NG/ML (ref 30–100)
ALBUMIN SERPL-MCNC: 4.3 G/DL (ref 3.5–5.2)
ANION GAP SERPL CALCULATED.3IONS-SCNC: 12 MMOL/L (ref 9–16)
BASOPHILS # BLD: 0 K/UL (ref 0–0.2)
BASOPHILS NFR BLD: 0 % (ref 0–2)
BILIRUB UR QL STRIP: NEGATIVE
BUN SERPL-MCNC: 17 MG/DL (ref 8–23)
CALCIUM SERPL-MCNC: 9.1 MG/DL (ref 8.6–10.4)
CHLORIDE SERPL-SCNC: 105 MMOL/L (ref 98–107)
CLARITY UR: CLEAR
CO2 SERPL-SCNC: 25 MMOL/L (ref 20–31)
COLOR UR: YELLOW
COMMENT: ABNORMAL
CREAT SERPL-MCNC: 1.2 MG/DL (ref 0.7–1.2)
CREAT UR-MCNC: 85.5 MG/DL (ref 39–259)
EOSINOPHIL # BLD: 0 K/UL (ref 0–0.4)
EOSINOPHILS RELATIVE PERCENT: 0 % (ref 1–4)
ERYTHROCYTE [DISTWIDTH] IN BLOOD BY AUTOMATED COUNT: 13.8 % (ref 11.8–14.4)
GFR, ESTIMATED: 70 ML/MIN/1.73M2
GLUCOSE SERPL-MCNC: 123 MG/DL (ref 74–99)
GLUCOSE UR STRIP-MCNC: ABNORMAL MG/DL
HCT VFR BLD AUTO: 39.1 % (ref 40.7–50.3)
HGB BLD-MCNC: 13.1 G/DL (ref 13–17)
HGB UR QL STRIP.AUTO: NEGATIVE
IMM GRANULOCYTES # BLD AUTO: 0 K/UL (ref 0–0.3)
IMM GRANULOCYTES NFR BLD: 0 %
KETONES UR STRIP-MCNC: NEGATIVE MG/DL
LEUKOCYTE ESTERASE UR QL STRIP: NEGATIVE
LYMPHOCYTES NFR BLD: 29.88 K/UL (ref 1–4.8)
LYMPHOCYTES RELATIVE PERCENT: 83 % (ref 24–44)
MAGNESIUM SERPL-MCNC: 2.1 MG/DL (ref 1.6–2.4)
MCH RBC QN AUTO: 30.2 PG (ref 25.2–33.5)
MCHC RBC AUTO-ENTMCNC: 33.5 G/DL (ref 28.4–34.8)
MCV RBC AUTO: 90.1 FL (ref 82.6–102.9)
MICROALBUMIN UR-MCNC: 21 MG/L (ref 0–20)
MICROALBUMIN/CREAT UR-RTO: 25 MCG/MG CREAT (ref 0–17)
MONOCYTES NFR BLD: 2.52 K/UL (ref 0.1–0.8)
MONOCYTES NFR BLD: 7 % (ref 1–7)
MORPHOLOGY: ABNORMAL
NEUTROPHILS NFR BLD: 10 % (ref 36–66)
NEUTS SEG NFR BLD: 3.6 K/UL (ref 1.8–7.7)
NITRITE UR QL STRIP: NEGATIVE
NRBC BLD-RTO: 0 PER 100 WBC
PH UR STRIP: 5.5 [PH] (ref 5–8)
PHOSPHATE SERPL-MCNC: 3.9 MG/DL (ref 2.5–4.5)
PLATELET # BLD AUTO: 182 K/UL (ref 138–453)
PLATELET, FLUORESCENCE: 182 K/UL (ref 138–453)
PMV BLD AUTO: 10.6 FL (ref 8.1–13.5)
POTASSIUM SERPL-SCNC: 4.2 MMOL/L (ref 3.7–5.3)
PROT UR STRIP-MCNC: NEGATIVE MG/DL
PTH-INTACT SERPL-MCNC: 100 PG/ML (ref 15–65)
RBC # BLD AUTO: 4.34 M/UL (ref 4.21–5.77)
SODIUM SERPL-SCNC: 142 MMOL/L (ref 136–145)
SP GR UR STRIP: 1.02 (ref 1–1.03)
UROBILINOGEN UR STRIP-ACNC: NORMAL EU/DL (ref 0–1)
WBC OTHER # BLD: 36 K/UL (ref 3.5–11.3)

## 2024-05-19 NOTE — FLOWSHEET NOTE
knee hyperextension   Step down 2x15 2\"    Heel tap to tall box  8\" ADD NEXT, unilateral completion         Gait train 100 ft x2  - Cues for 3 point gait with cane; fair to poor carryover, will continue to address   Lateral stepping      Hurdles  1 in    Other:   - Still with slow progression through exercises d/t rapid fatigue; rest break after each set of standing therex  - Time spent in goal assessment this date - billed with therex    Specific Instructions for next treatment: standing hip 3-way, progressive tolerance to more and more standing therex with gait/stair training as well       Treatment Charges: Mins Units   []  Modalities: manual e-stim     [x]  Ther Exercise 51 4   []  Manual Therapy     []  Ther Activities     []  Aquatics     []  Vasocompression     [x]  Other: Gait 5 --   []  Other: Neuro     Total Treatment time  56 4       Assessment: [x] Progressing toward goals. Reassessment completed this date: gait speed slowly though minimally improving (.33m/s to .37m/s); TUG test much better, improved by 19s which is significant difference. Still considered a fall risk by both tests and will continue to benefit from skilled physical therapy. RLE DF strength also significantly improving, however PF strength still very weak and demos no active push-off in gait cycle. Starting to improve gait speed with ability to move cane and RLE at same time, progressing to three point gait pattern with cuing. [] No change. [x] Other: Casted for AFO yesterday, will be 2-3wks until receiving. STG: (to be met in 10 treatments) - Assessed 2/20/20  1. ? ROM: Improve R shoulder PROM to at least 150deg flex, 120 abd to improve mobility for reaching overhead. - DISCONTINUE, UE being treated by OT at this time  2. ? Strength:   a.  At least 3/5 global strength in RUE to improve lifting grasping - DISCONTINUE, UE being treated by OT at this time  b. 5-/5 R hip ext/abd strength for improved squatting/stair climbing ability - NOT MET  3. ? Balance: Able to bend down to ground to  object with only 1UE assist with no balance impairment. - Making progress, still SBA  4. Function:   a. Improve gait speed to at least .6m/s to progress safety with home/community ambulation. - Making progress, still focusing more on gait quality vs speed d/t excessive knee hyperextension  b. Able to begin to integrate RUE into UE dressing with minor difficulty - Making progress, discontinue this goal d/t OT treating RUE at this time  5. Independent with Home Exercise Programs - MET  6. Demonstrate Knowledge of fall prevention - MET     LTG: (to be met in 24 treatments): - Assessed on 6/24/20:  1. ROM:   a. Improve R hip flexor AROM to at least 100deg in standing for improved ease of stair climbing. - Making progress, ~80deg  2. Balance:  a. Pt will be able to complete 360 turn without AD with no balance impairment. - Making progress, no AD and goes slowly and hyperextends R knee; CGA required  b. Pt will be able to complete dynamic reaching/stooping tasks while standing in narrow MED - MET  3. Function:    a. Improve gait speed to at least .8m/s to improve safety with community ambulation. - NOT MET, .38m/s on 6/24  b. Able to climb 4 stairs with unilateral UE assist and minimal balance impairment. - NOT MET  4. Strength:   a. At least 5/5 R hip ext/abd strength for improved stability with prolonged walking and stair climbing. - Making progress, 5-/5 ea in sitting  b. At least 5-/5 R dorsiflexors and plantarflexors for improved gait mechanics - MET for DF, still weak PFs (4-/5)          Patient goals: \"use my right arm again like normal\" - Making progress with OT, now able to extend fingers occasionally    Pt. Education:  [x] Yes  [] No  [x] Reviewed Prior HEP/Ed  Method of Education: [x] Verbal  [x] Demo  [x] Written  Comprehension of Education:  [x] Verbalizes understanding. [x] Demonstrates understanding. [] Needs review.    [] Home

## 2024-05-30 ENCOUNTER — OFFICE VISIT (OUTPATIENT)
Dept: ONCOLOGY | Age: 64
End: 2024-05-30
Payer: MEDICARE

## 2024-05-30 ENCOUNTER — HOSPITAL ENCOUNTER (OUTPATIENT)
Age: 64
Setting detail: SPECIMEN
Discharge: HOME | End: 2024-05-30
Payer: MEDICARE

## 2024-05-30 ENCOUNTER — TELEPHONE (OUTPATIENT)
Dept: ONCOLOGY | Age: 64
End: 2024-05-30

## 2024-05-30 VITALS
DIASTOLIC BLOOD PRESSURE: 78 MMHG | HEART RATE: 87 BPM | TEMPERATURE: 98.4 F | SYSTOLIC BLOOD PRESSURE: 122 MMHG | RESPIRATION RATE: 16 BRPM

## 2024-05-30 DIAGNOSIS — C91.10 CLL (CHRONIC LYMPHOCYTIC LEUKEMIA) (HCC): Primary | ICD-10-CM

## 2024-05-30 DIAGNOSIS — C91.10 CLL (CHRONIC LYMPHOCYTIC LEUKEMIA) (HCC): ICD-10-CM

## 2024-05-30 LAB
BASOPHILS # BLD: 0 K/UL (ref 0–0.2)
BASOPHILS NFR BLD: 0 %
EOSINOPHIL # BLD: 0 K/UL (ref 0–0.4)
EOSINOPHILS RELATIVE PERCENT: 0 % (ref 1–4)
ERYTHROCYTE [DISTWIDTH] IN BLOOD BY AUTOMATED COUNT: 13.9 % (ref 11.8–14.4)
HCT VFR BLD AUTO: 41 % (ref 40.7–50.3)
HGB BLD-MCNC: 13.5 G/DL (ref 13–17)
IMM GRANULOCYTES # BLD AUTO: 0 K/UL (ref 0–0.3)
IMM GRANULOCYTES NFR BLD: 0 %
LYMPHOCYTES NFR BLD: 32.34 K/UL (ref 1–4.8)
LYMPHOCYTES RELATIVE PERCENT: 84 % (ref 24–44)
MCH RBC QN AUTO: 30.5 PG (ref 25.2–33.5)
MCHC RBC AUTO-ENTMCNC: 32.9 G/DL (ref 28.4–34.8)
MCV RBC AUTO: 92.8 FL (ref 82.6–102.9)
MONOCYTES NFR BLD: 1.54 K/UL (ref 0.2–0.8)
MONOCYTES NFR BLD: 4 % (ref 1–7)
MORPHOLOGY: ABNORMAL
NEUTROPHILS NFR BLD: 12 % (ref 36–66)
NEUTS SEG NFR BLD: 4.62 K/UL (ref 1.8–7.7)
NRBC BLD-RTO: 0 PER 100 WBC
PLATELET # BLD AUTO: 179 K/UL (ref 138–453)
PMV BLD AUTO: 9.9 FL (ref 8.1–13.5)
RBC # BLD AUTO: 4.42 M/UL (ref 4.21–5.77)
WBC OTHER # BLD: 38.5 K/UL (ref 3.5–11.3)

## 2024-05-30 PROCEDURE — 99211 OFF/OP EST MAY X REQ PHY/QHP: CPT

## 2024-05-30 PROCEDURE — 99214 OFFICE O/P EST MOD 30 MIN: CPT | Performed by: INTERNAL MEDICINE

## 2024-05-30 PROCEDURE — 3078F DIAST BP <80 MM HG: CPT | Performed by: INTERNAL MEDICINE

## 2024-05-30 PROCEDURE — G8420 CALC BMI NORM PARAMETERS: HCPCS | Performed by: INTERNAL MEDICINE

## 2024-05-30 PROCEDURE — 36415 COLL VENOUS BLD VENIPUNCTURE: CPT

## 2024-05-30 PROCEDURE — 3074F SYST BP LT 130 MM HG: CPT | Performed by: INTERNAL MEDICINE

## 2024-05-30 PROCEDURE — 1036F TOBACCO NON-USER: CPT | Performed by: INTERNAL MEDICINE

## 2024-05-30 PROCEDURE — 85025 COMPLETE CBC W/AUTO DIFF WBC: CPT

## 2024-05-30 PROCEDURE — G8427 DOCREV CUR MEDS BY ELIG CLIN: HCPCS | Performed by: INTERNAL MEDICINE

## 2024-05-30 PROCEDURE — 3017F COLORECTAL CA SCREEN DOC REV: CPT | Performed by: INTERNAL MEDICINE

## 2024-05-30 NOTE — TELEPHONE ENCOUNTER
[facility] HERE FOR FOLLOW UP   RV 3-4 months with CBC at RV  MD VISIT: 9/12/24 @ 8:15AM   PT WILL STOP AT LAB 30 MIN BEFORE MD VISIT   AVS PRINTED AND GIVEN ON EXIT

## 2024-05-30 NOTE — PROGRESS NOTES
iii short pen, blood glucose monitor kit and supplies, aspirin, pen needles, and fluticasone.    ALLERGIES:  is allergic to dairycare [lactase-lactobacillus] and lisinopril.    FAMILY HISTORY: Negative for any hematological or oncological conditions.     SOCIAL HISTORY:  reports that he has never smoked. He has never used smokeless tobacco. He reports that he does not currently use alcohol. He reports that he does not use drugs.    REVIEW OF SYSTEMS:     General: No weakness or fatigue. No unanticipated weight loss or decreased appetite. No fever or chills.   Eyes: No blurred vision, eye pain or double vision.   Ears: No hearing problems or drainage. No tinnitus.   Throat: No sore throat, problems with swallowing or dysphagia.   Respiratory: No cough, sputum or hemoptysis. No shortness of breath. No pleuritic chest pain.     Cardiovascular: No chest pain, orthopnea or PND. No lower extremity edema. No palpitation.   Gastrointestinal: No problems with swallowing. No abdominal pain or bloating. No nausea or vomiting. No diarrhea or constipation. No GI bleeding.   Genitourinary: No dysuria, hematuria, frequency or urgency.     Musculoskeletal: No muscle aches or pains. No limitation of movement. No back pain. No gait disturbance, No joint complaints.  Dermatologic: No skin rashes or pruritus. No skin lesions or discolorations.   Psychiatric: No depression, anxiety, or stress or signs of schizophrenia. No change in mood or affect.    Hematologic: No history of bleeding tendency. No bruises or ecchymosis. No history of clotting problems.  Infectious disease: No fever, chills or frequent infections.   Endocrine: No polydipsia or polyuria. No temperature intolerance.  Neurologic: As above..   Allergic/Immunologic: No nasal congestion or hives. No repeated infections.       PHYSICAL EXAM:  The patient is not in acute distress.  Vital signs: Blood pressure 122/78, pulse 87, temperature 98.4 °F (36.9 °C), temperature source

## 2024-06-18 DIAGNOSIS — F41.9 ANXIETY AND DEPRESSION: ICD-10-CM

## 2024-06-18 DIAGNOSIS — F32.A ANXIETY AND DEPRESSION: ICD-10-CM

## 2024-06-18 NOTE — TELEPHONE ENCOUNTER
Pharmacy requesting refill of Fluoxetine 20 Mg .      Medication active on med list yes      Date of last Rx: 03/01/2024 with 5 refills          verified by LE BOJORQUEZ      Date of last appointment 02/20/2024    Next Visit Date:  8/20/2024

## 2024-06-19 RX ORDER — FLUOXETINE HYDROCHLORIDE 20 MG/1
20 CAPSULE ORAL DAILY
Qty: 30 CAPSULE | Refills: 5 | Status: SHIPPED | OUTPATIENT
Start: 2024-06-19

## 2024-07-15 RX ORDER — AMLODIPINE BESYLATE 10 MG/1
10 TABLET ORAL DAILY
Qty: 90 TABLET | Refills: 0 | Status: SHIPPED | OUTPATIENT
Start: 2024-07-15

## 2024-07-15 NOTE — TELEPHONE ENCOUNTER
Kade Jade is calling to request a refill on the following medication(s):    Medication Request:  Requested Prescriptions     Pending Prescriptions Disp Refills    amLODIPine (NORVASC) 10 MG tablet [Pharmacy Med Name: AMLODIPINE BESYLATE 10MG TABLETS] 90 tablet 0     Sig: TAKE 1 TABLET BY MOUTH DAILY       Last Visit Date (If Applicable):  4/5/2024    Next Visit Date:    10/4/2024    Last refill 4/16/24. Prescription pending.

## 2024-08-20 ENCOUNTER — OFFICE VISIT (OUTPATIENT)
Dept: NEUROLOGY | Age: 64
End: 2024-08-20
Payer: MEDICARE

## 2024-08-20 VITALS
WEIGHT: 160 LBS | HEART RATE: 94 BPM | DIASTOLIC BLOOD PRESSURE: 79 MMHG | BODY MASS INDEX: 22.4 KG/M2 | SYSTOLIC BLOOD PRESSURE: 130 MMHG | HEIGHT: 71 IN

## 2024-08-20 DIAGNOSIS — F32.A ANXIETY AND DEPRESSION: Primary | ICD-10-CM

## 2024-08-20 DIAGNOSIS — I63.9 ISCHEMIC STROKE (HCC): ICD-10-CM

## 2024-08-20 DIAGNOSIS — G81.91 RIGHT HEMIPLEGIA (HCC): ICD-10-CM

## 2024-08-20 DIAGNOSIS — F41.9 ANXIETY AND DEPRESSION: Primary | ICD-10-CM

## 2024-08-20 PROCEDURE — 3075F SYST BP GE 130 - 139MM HG: CPT | Performed by: PSYCHIATRY & NEUROLOGY

## 2024-08-20 PROCEDURE — 99214 OFFICE O/P EST MOD 30 MIN: CPT | Performed by: PSYCHIATRY & NEUROLOGY

## 2024-08-20 PROCEDURE — 3078F DIAST BP <80 MM HG: CPT | Performed by: PSYCHIATRY & NEUROLOGY

## 2024-08-20 RX ORDER — ROSUVASTATIN CALCIUM 40 MG/1
40 TABLET, COATED ORAL DAILY
Qty: 90 TABLET | Refills: 3 | Status: SHIPPED | OUTPATIENT
Start: 2024-08-20

## 2024-08-20 RX ORDER — LANOLIN ALCOHOL/MO/W.PET/CERES
1000 CREAM (GRAM) TOPICAL DAILY
COMMUNITY

## 2024-08-20 NOTE — PROGRESS NOTES
Kade Jade 63 RHM with HTN, DM has  depression and a previous left internal capsule ischemic stroke secondary to uncontrolled hypertension and diabetes in August 2019 with residual right hemiparesis.    1. Right hemiplegia (HCC) right hemiparetic gait with a foot drop (UMN pattern)   -     [facility] - [facility]  - uses an AFO for R foot  - he is scared of Botox injections     2. Ischemic stroke (HCC) Previous left internal capsule ischemic stroke secondary to uncontrolled hypertension and uncontrolled diabetes in August 2019  -     Hemoglobin A1C; Future  -     Lipid Panel; Future  Continue aspirin 81 mg daily  stopped lipitor 80 mg daily as she mentions it upsets his stomach and we started him on Crestor 40 mg daily  A1c was 6.4 and  was elevated in 10/23.  Patient stated that he is not sure he takes his Lipitor 80 mg every day.  We will monitor again after he faithfully takes it on a routine basis.   Uses the [facility]  2ce a day,  patient will discuss this with his occupational therapist and contact the office regarding a prescription.     3. Anxiety and depression  Continue prozac 20 mg daily    R eye floaters- opathlmolgy referral     Electronically signed by Omer Pacheco MD     *Please note that portions of this note were completed with a voice recognition program.  Although every effort was made to insure the accuracy of this automated transcription, some errors in transcription may have occurred, occasionally words and are mis-transcribed

## 2024-08-22 RX ORDER — AMLODIPINE BESYLATE 10 MG/1
10 TABLET ORAL DAILY
Qty: 90 TABLET | Refills: 0 | Status: SHIPPED | OUTPATIENT
Start: 2024-08-22

## 2024-08-22 NOTE — TELEPHONE ENCOUNTER
Kade Jade is calling to request a refill on the following medication(s):    Medication Request:  Requested Prescriptions     Pending Prescriptions Disp Refills    amLODIPine (NORVASC) 10 MG tablet [Pharmacy Med Name: AMLODIPINE BESYLATE 10MG TABLETS] 90 tablet 0     Sig: TAKE 1 TABLET BY MOUTH DAILY       Last Visit Date (If Applicable):  4/5/2024    Next Visit Date:    10/4/2024      Last refill 7/15/24. Prescription pending.

## 2024-09-12 ENCOUNTER — TELEPHONE (OUTPATIENT)
Dept: ONCOLOGY | Age: 64
End: 2024-09-12

## 2024-09-12 ENCOUNTER — HOSPITAL ENCOUNTER (OUTPATIENT)
Age: 64
Setting detail: SPECIMEN
Discharge: HOME | End: 2024-09-12
Payer: MEDICARE

## 2024-09-12 ENCOUNTER — OFFICE VISIT (OUTPATIENT)
Dept: ONCOLOGY | Age: 64
End: 2024-09-12
Payer: MEDICARE

## 2024-09-12 VITALS
WEIGHT: 159 LBS | HEART RATE: 83 BPM | BODY MASS INDEX: 22.18 KG/M2 | SYSTOLIC BLOOD PRESSURE: 130 MMHG | DIASTOLIC BLOOD PRESSURE: 79 MMHG | RESPIRATION RATE: 16 BRPM | TEMPERATURE: 98.8 F

## 2024-09-12 DIAGNOSIS — C91.10 CLL (CHRONIC LYMPHOCYTIC LEUKEMIA) (HCC): Primary | ICD-10-CM

## 2024-09-12 LAB
BASOPHILS # BLD: 0 K/UL (ref 0–0.2)
BASOPHILS NFR BLD: 0 % (ref 0–2)
EOSINOPHIL # BLD: 0 K/UL (ref 0–0.44)
EOSINOPHILS RELATIVE PERCENT: 0 % (ref 1–4)
ERYTHROCYTE [DISTWIDTH] IN BLOOD BY AUTOMATED COUNT: 13.4 % (ref 11.8–14.4)
HCT VFR BLD AUTO: 43.1 % (ref 40.7–50.3)
HGB BLD-MCNC: 14.4 G/DL (ref 13–17)
IMM GRANULOCYTES # BLD AUTO: 0 K/UL (ref 0–0.3)
IMM GRANULOCYTES NFR BLD: 0 %
LYMPHOCYTES NFR BLD: 35.32 K/UL (ref 1.1–3.7)
LYMPHOCYTES RELATIVE PERCENT: 81 % (ref 24–43)
MCH RBC QN AUTO: 31.2 PG (ref 25.2–33.5)
MCHC RBC AUTO-ENTMCNC: 33.4 G/DL (ref 28.4–34.8)
MCV RBC AUTO: 93.3 FL (ref 82.6–102.9)
MONOCYTES NFR BLD: 2.18 K/UL (ref 0.1–1.2)
MONOCYTES NFR BLD: 5 % (ref 3–12)
NEUTROPHILS NFR BLD: 14 % (ref 36–65)
NEUTS SEG NFR BLD: 6.1 K/UL (ref 1.5–8.1)
NRBC BLD-RTO: 0 PER 100 WBC
PLATELET # BLD AUTO: 173 K/UL (ref 138–453)
PMV BLD AUTO: 10 FL (ref 8.1–13.5)
RBC # BLD AUTO: 4.62 M/UL (ref 4.21–5.77)
WBC OTHER # BLD: 43.6 K/UL (ref 3.5–11.3)

## 2024-09-12 PROCEDURE — 99214 OFFICE O/P EST MOD 30 MIN: CPT | Performed by: INTERNAL MEDICINE

## 2024-09-12 PROCEDURE — 99211 OFF/OP EST MAY X REQ PHY/QHP: CPT | Performed by: INTERNAL MEDICINE

## 2024-09-12 PROCEDURE — 3075F SYST BP GE 130 - 139MM HG: CPT | Performed by: INTERNAL MEDICINE

## 2024-09-12 PROCEDURE — 85025 COMPLETE CBC W/AUTO DIFF WBC: CPT

## 2024-09-12 PROCEDURE — 36415 COLL VENOUS BLD VENIPUNCTURE: CPT

## 2024-09-12 PROCEDURE — 3078F DIAST BP <80 MM HG: CPT | Performed by: INTERNAL MEDICINE

## 2024-09-25 DIAGNOSIS — F32.A ANXIETY AND DEPRESSION: ICD-10-CM

## 2024-09-25 DIAGNOSIS — F41.9 ANXIETY AND DEPRESSION: ICD-10-CM

## 2024-10-04 ENCOUNTER — HOSPITAL ENCOUNTER (OUTPATIENT)
Age: 64
Setting detail: SPECIMEN
Discharge: HOME | End: 2024-10-04

## 2024-10-04 ENCOUNTER — OFFICE VISIT (OUTPATIENT)
Dept: FAMILY MEDICINE CLINIC | Age: 64
End: 2024-10-04
Payer: MEDICARE

## 2024-10-04 VITALS
OXYGEN SATURATION: 99 % | HEIGHT: 71 IN | RESPIRATION RATE: 12 BRPM | WEIGHT: 162.2 LBS | TEMPERATURE: 97.7 F | BODY MASS INDEX: 22.71 KG/M2 | SYSTOLIC BLOOD PRESSURE: 106 MMHG | HEART RATE: 79 BPM | DIASTOLIC BLOOD PRESSURE: 58 MMHG

## 2024-10-04 DIAGNOSIS — I10 ESSENTIAL HYPERTENSION: Primary | ICD-10-CM

## 2024-10-04 DIAGNOSIS — C91.10 CLL (CHRONIC LYMPHOCYTIC LEUKEMIA) (HCC): ICD-10-CM

## 2024-10-04 DIAGNOSIS — Z28.21 TETANUS, DIPHTHERIA, AND ACELLULAR PERTUSSIS (TDAP) VACCINATION DECLINED: ICD-10-CM

## 2024-10-04 DIAGNOSIS — N18.31 TYPE 1 DIABETES MELLITUS WITH STAGE 3A CHRONIC KIDNEY DISEASE (HCC): ICD-10-CM

## 2024-10-04 DIAGNOSIS — F41.9 ANXIETY AND DEPRESSION: ICD-10-CM

## 2024-10-04 DIAGNOSIS — G81.91 RIGHT HEMIPLEGIA (HCC): ICD-10-CM

## 2024-10-04 DIAGNOSIS — Z86.16 HISTORY OF COVID-19: ICD-10-CM

## 2024-10-04 DIAGNOSIS — E78.2 MIXED HYPERLIPIDEMIA: ICD-10-CM

## 2024-10-04 DIAGNOSIS — Z28.21 INFLUENZA VACCINATION DECLINED: ICD-10-CM

## 2024-10-04 DIAGNOSIS — E10.22 TYPE 1 DIABETES MELLITUS WITH STAGE 3A CHRONIC KIDNEY DISEASE (HCC): ICD-10-CM

## 2024-10-04 DIAGNOSIS — Z28.21 PNEUMOCOCCAL VACCINATION DECLINED: ICD-10-CM

## 2024-10-04 DIAGNOSIS — F32.A ANXIETY AND DEPRESSION: ICD-10-CM

## 2024-10-04 LAB
ALBUMIN SERPL-MCNC: 4.5 G/DL (ref 3.5–5.2)
ALBUMIN/GLOB SERPL: 2 {RATIO} (ref 1–2.5)
ALP SERPL-CCNC: 86 U/L (ref 40–129)
ALT SERPL-CCNC: 32 U/L (ref 10–50)
ANION GAP SERPL CALCULATED.3IONS-SCNC: 12 MMOL/L (ref 9–16)
AST SERPL-CCNC: 26 U/L (ref 10–50)
BILIRUB SERPL-MCNC: 0.5 MG/DL (ref 0–1.2)
BUN SERPL-MCNC: 18 MG/DL (ref 8–23)
CALCIUM SERPL-MCNC: 9.6 MG/DL (ref 8.6–10.4)
CHLORIDE SERPL-SCNC: 105 MMOL/L (ref 98–107)
CHOLEST SERPL-MCNC: 133 MG/DL (ref 0–199)
CHOLESTEROL/HDL RATIO: 4
CO2 SERPL-SCNC: 27 MMOL/L (ref 20–31)
CREAT SERPL-MCNC: 1.3 MG/DL (ref 0.7–1.2)
EST. AVERAGE GLUCOSE BLD GHB EST-MCNC: 154 MG/DL
GFR, ESTIMATED: 60 ML/MIN/1.73M2
GLUCOSE SERPL-MCNC: 142 MG/DL (ref 74–99)
HBA1C MFR BLD: 7 % (ref 4–6)
HDLC SERPL-MCNC: 36 MG/DL
LDLC SERPL CALC-MCNC: 75 MG/DL (ref 0–100)
POTASSIUM SERPL-SCNC: 3.6 MMOL/L (ref 3.7–5.3)
PROT SERPL-MCNC: 7.2 G/DL (ref 6.6–8.7)
PSA SERPL-MCNC: 0.9 NG/ML (ref 0–4)
SODIUM SERPL-SCNC: 144 MMOL/L (ref 136–145)
TRIGL SERPL-MCNC: 112 MG/DL
TSH SERPL DL<=0.05 MIU/L-ACNC: 1.8 UIU/ML (ref 0.27–4.2)
VLDLC SERPL CALC-MCNC: 22 MG/DL

## 2024-10-04 PROCEDURE — 99214 OFFICE O/P EST MOD 30 MIN: CPT | Performed by: FAMILY MEDICINE

## 2024-10-04 PROCEDURE — 3074F SYST BP LT 130 MM HG: CPT | Performed by: FAMILY MEDICINE

## 2024-10-04 PROCEDURE — 3078F DIAST BP <80 MM HG: CPT | Performed by: FAMILY MEDICINE

## 2024-10-04 SDOH — ECONOMIC STABILITY: FOOD INSECURITY: WITHIN THE PAST 12 MONTHS, YOU WORRIED THAT YOUR FOOD WOULD RUN OUT BEFORE YOU GOT MONEY TO BUY MORE.: NEVER TRUE

## 2024-10-04 SDOH — ECONOMIC STABILITY: FOOD INSECURITY: WITHIN THE PAST 12 MONTHS, THE FOOD YOU BOUGHT JUST DIDN'T LAST AND YOU DIDN'T HAVE MONEY TO GET MORE.: NEVER TRUE

## 2024-10-04 SDOH — ECONOMIC STABILITY: INCOME INSECURITY: HOW HARD IS IT FOR YOU TO PAY FOR THE VERY BASICS LIKE FOOD, HOUSING, MEDICAL CARE, AND HEATING?: NOT HARD AT ALL

## 2024-10-04 ASSESSMENT — ENCOUNTER SYMPTOMS
EYES NEGATIVE: 1
ALLERGIC/IMMUNOLOGIC NEGATIVE: 1
RESPIRATORY NEGATIVE: 1
GASTROINTESTINAL NEGATIVE: 1

## 2024-10-04 NOTE — PROGRESS NOTES
communicative and does not appear to be in any obvious distress.  History of CVA with residual right hemiplegia.  He does have access to a walker however he preferred to use his quad cane.  He is wearing orthotic for right foot drop.  He denies any pain  Fall precaution is advised  Insulin-dependent diabetes mellitus with A1c of 6.4.  No apparent hypoglycemia.  Continue current regimen  Hemodynamically stable relative hypotension.  He is advised to decrease amlodipine from 10 mg to 5 mg p.o. daily  Hyperlipidemia on statin that he is tolerating well  CKD with creatinine of 1.2.  He is established with nephrology on Farxiga.  Avoid nephrotoxic drugs, anti-inflammatory radiocontrast.  Avoid hypotension and hypoglycemia  Low vitamin D continue replete  Allergies allergic rhinitis.  Continue Flonase, nasal saline.  No apparent recent decompensation  Once again, he declined all age-appropriate vaccination  History of CLL.  Anxiety/depression on Prozac that he is tolerating well with positive response  Med list reviewed advised to continue  Further recommendations to follow updated labs  Follow-up in 4 months  This note is created with a voice recognition program and while intend to generate a document that accurately reflects the content of the visit, no guarantee can be provided that every mistake has been identified and corrected by editing.          Mackenzie Sanchez MD

## 2024-10-28 RX ORDER — HYDRALAZINE HYDROCHLORIDE 25 MG/1
25 TABLET, FILM COATED ORAL DAILY
Qty: 90 TABLET | Refills: 1 | Status: SHIPPED | OUTPATIENT
Start: 2024-10-28

## 2024-10-28 RX ORDER — DAPAGLIFLOZIN 5 MG/1
5 TABLET, FILM COATED ORAL EVERY MORNING
Qty: 90 TABLET | Refills: 1 | Status: SHIPPED | OUTPATIENT
Start: 2024-10-28

## 2024-10-28 NOTE — TELEPHONE ENCOUNTER
Kade Jade is calling to request a refill on the following medication(s):    Medication Request:  Requested Prescriptions     Pending Prescriptions Disp Refills    hydrALAZINE (APRESOLINE) 25 MG tablet [Pharmacy Med Name: HYDRALAZINE  25MG TABLETS(ORANGE)] 90 tablet 1     Sig: TAKE 1 TABLET BY MOUTH DAILY    FARXIGA 5 MG tablet [Pharmacy Med Name: FARXIGA 5MG TABLETS] 90 tablet 1     Sig: TAKE 1 TABLET BY MOUTH EVERY MORNING       Last Visit Date (If Applicable):  10/4/2024    Next Visit Date:    2/4/2025    Last refilled on 4/29 & 5/1/24. Prescription pending.

## 2024-11-13 ENCOUNTER — APPOINTMENT (OUTPATIENT)
Dept: GENERAL RADIOLOGY | Age: 64
DRG: 661 | End: 2024-11-13
Payer: MEDICARE

## 2024-11-13 ENCOUNTER — APPOINTMENT (OUTPATIENT)
Dept: CT IMAGING | Age: 64
DRG: 661 | End: 2024-11-13
Payer: MEDICARE

## 2024-11-13 ENCOUNTER — ANESTHESIA (OUTPATIENT)
Dept: OPERATING ROOM | Age: 64
DRG: 661 | End: 2024-11-13
Payer: MEDICARE

## 2024-11-13 ENCOUNTER — ANESTHESIA EVENT (OUTPATIENT)
Dept: OPERATING ROOM | Age: 64
DRG: 661 | End: 2024-11-13
Payer: MEDICARE

## 2024-11-13 ENCOUNTER — HOSPITAL ENCOUNTER (INPATIENT)
Age: 64
LOS: 1 days | Discharge: HOME | DRG: 661 | End: 2024-11-14
Attending: EMERGENCY MEDICINE | Admitting: FAMILY MEDICINE
Payer: MEDICARE

## 2024-11-13 DIAGNOSIS — R10.9 ABDOMINAL PAIN, UNSPECIFIED ABDOMINAL LOCATION: ICD-10-CM

## 2024-11-13 DIAGNOSIS — N17.9 AKI (ACUTE KIDNEY INJURY) (HCC): ICD-10-CM

## 2024-11-13 DIAGNOSIS — N20.0 KIDNEY STONE: Primary | ICD-10-CM

## 2024-11-13 DIAGNOSIS — C91.10 CLL (CHRONIC LYMPHOCYTIC LEUKEMIA) (HCC): ICD-10-CM

## 2024-11-13 PROBLEM — E10.10 DKA, TYPE 1, NOT AT GOAL (HCC): Status: ACTIVE | Noted: 2024-11-13

## 2024-11-13 LAB
ALBUMIN SERPL-MCNC: 4.5 G/DL (ref 3.5–5.2)
ALBUMIN/GLOB SERPL: 1.5 {RATIO} (ref 1–2.5)
ALP SERPL-CCNC: 87 U/L (ref 40–129)
ALT SERPL-CCNC: 26 U/L (ref 10–50)
ANION GAP SERPL CALCULATED.3IONS-SCNC: 17 MMOL/L (ref 9–16)
AST SERPL-CCNC: 29 U/L (ref 10–50)
ATYPICAL LYMPHOCYTE ABSOLUTE COUNT: 2.79 K/UL
ATYPICAL LYMPHOCYTES: 5 %
BACTERIA URNS QL MICRO: ABNORMAL
BASOPHILS # BLD: 0 K/UL (ref 0–0.2)
BASOPHILS NFR BLD: 0 % (ref 0–2)
BILIRUB SERPL-MCNC: 0.9 MG/DL (ref 0–1.2)
BILIRUB UR QL STRIP: NEGATIVE
BUN SERPL-MCNC: 33 MG/DL (ref 8–23)
CALCIUM SERPL-MCNC: 9.6 MG/DL (ref 8.8–10.2)
CHLORIDE SERPL-SCNC: 96 MMOL/L (ref 98–107)
CLARITY UR: CLEAR
CO2 SERPL-SCNC: 26 MMOL/L (ref 20–31)
COLOR UR: YELLOW
CREAT SERPL-MCNC: 2.2 MG/DL (ref 0.7–1.2)
EOSINOPHIL # BLD: 0 K/UL (ref 0–0.44)
EOSINOPHILS RELATIVE PERCENT: 0 % (ref 1–4)
EPI CELLS #/AREA URNS HPF: ABNORMAL /HPF (ref 0–5)
ERYTHROCYTE [DISTWIDTH] IN BLOOD BY AUTOMATED COUNT: 12.9 % (ref 11.8–14.4)
GFR, ESTIMATED: 33 ML/MIN/1.73M2
GLUCOSE BLD-MCNC: 120 MG/DL (ref 75–110)
GLUCOSE BLD-MCNC: 160 MG/DL (ref 75–110)
GLUCOSE BLD-MCNC: 186 MG/DL (ref 75–110)
GLUCOSE SERPL-MCNC: 166 MG/DL (ref 82–115)
GLUCOSE UR STRIP-MCNC: ABNORMAL MG/DL
HCT VFR BLD AUTO: 44.2 % (ref 40.7–50.3)
HGB BLD-MCNC: 14.8 G/DL (ref 13–17)
HGB UR QL STRIP.AUTO: ABNORMAL
IMM GRANULOCYTES # BLD AUTO: 0 K/UL (ref 0–0.3)
IMM GRANULOCYTES NFR BLD: 0 %
KETONES UR STRIP-MCNC: ABNORMAL MG/DL
LEUKOCYTE ESTERASE UR QL STRIP: NEGATIVE
LIPASE SERPL-CCNC: 11 U/L (ref 13–60)
LYMPHOCYTES NFR BLD: 40.65 K/UL (ref 1.1–3.7)
LYMPHOCYTES RELATIVE PERCENT: 73 % (ref 24–43)
MCH RBC QN AUTO: 30.6 PG (ref 25.2–33.5)
MCHC RBC AUTO-ENTMCNC: 33.5 G/DL (ref 28.4–34.8)
MCV RBC AUTO: 91.5 FL (ref 82.6–102.9)
MONOCYTES NFR BLD: 2.23 K/UL (ref 0.1–1.2)
MONOCYTES NFR BLD: 4 % (ref 3–12)
MORPHOLOGY: ABNORMAL
NEUTROPHILS NFR BLD: 18 % (ref 36–65)
NEUTS SEG NFR BLD: 10.03 K/UL (ref 1.5–8.1)
NITRITE UR QL STRIP: NEGATIVE
NRBC BLD-RTO: 0 PER 100 WBC
PH UR STRIP: 6.5 [PH] (ref 5–8)
PLATELET # BLD AUTO: 169 K/UL (ref 138–453)
PMV BLD AUTO: 10 FL (ref 8.1–13.5)
POTASSIUM SERPL-SCNC: 4.1 MMOL/L (ref 3.7–5.3)
PROT SERPL-MCNC: 7.4 G/DL (ref 6.6–8.7)
PROT UR STRIP-MCNC: ABNORMAL MG/DL
RBC # BLD AUTO: 4.83 M/UL (ref 4.21–5.77)
RBC #/AREA URNS HPF: ABNORMAL /HPF (ref 0–2)
SODIUM SERPL-SCNC: 138 MMOL/L (ref 136–145)
SP GR UR STRIP: 1.01 (ref 1–1.03)
UROBILINOGEN UR STRIP-ACNC: NORMAL EU/DL (ref 0–1)
WBC #/AREA URNS HPF: ABNORMAL /HPF (ref 0–5)
WBC OTHER # BLD: 55.7 K/UL (ref 3.5–11.3)

## 2024-11-13 PROCEDURE — 85025 COMPLETE CBC W/AUTO DIFF WBC: CPT

## 2024-11-13 PROCEDURE — G0378 HOSPITAL OBSERVATION PER HR: HCPCS

## 2024-11-13 PROCEDURE — 99285 EMERGENCY DEPT VISIT HI MDM: CPT

## 2024-11-13 PROCEDURE — 6360000002 HC RX W HCPCS: Performed by: EMERGENCY MEDICINE

## 2024-11-13 PROCEDURE — 96374 THER/PROPH/DIAG INJ IV PUSH: CPT

## 2024-11-13 PROCEDURE — 2580000003 HC RX 258: Performed by: EMERGENCY MEDICINE

## 2024-11-13 PROCEDURE — 7100000001 HC PACU RECOVERY - ADDTL 15 MIN: Performed by: UROLOGY

## 2024-11-13 PROCEDURE — 3600000002 HC SURGERY LEVEL 2 BASE: Performed by: UROLOGY

## 2024-11-13 PROCEDURE — 96361 HYDRATE IV INFUSION ADD-ON: CPT

## 2024-11-13 PROCEDURE — 81001 URINALYSIS AUTO W/SCOPE: CPT

## 2024-11-13 PROCEDURE — 3600000012 HC SURGERY LEVEL 2 ADDTL 15MIN: Performed by: UROLOGY

## 2024-11-13 PROCEDURE — 7100000000 HC PACU RECOVERY - FIRST 15 MIN: Performed by: UROLOGY

## 2024-11-13 PROCEDURE — C2617 STENT, NON-COR, TEM W/O DEL: HCPCS | Performed by: UROLOGY

## 2024-11-13 PROCEDURE — 3700000000 HC ANESTHESIA ATTENDED CARE: Performed by: UROLOGY

## 2024-11-13 PROCEDURE — 6370000000 HC RX 637 (ALT 250 FOR IP): Performed by: FAMILY MEDICINE

## 2024-11-13 PROCEDURE — 2709999900 HC NON-CHARGEABLE SUPPLY: Performed by: UROLOGY

## 2024-11-13 PROCEDURE — 2580000003 HC RX 258: Performed by: FAMILY MEDICINE

## 2024-11-13 PROCEDURE — 2580000003 HC RX 258: Performed by: ANESTHESIOLOGY

## 2024-11-13 PROCEDURE — 2500000003 HC RX 250 WO HCPCS: Performed by: ANESTHESIOLOGY

## 2024-11-13 PROCEDURE — C1769 GUIDE WIRE: HCPCS | Performed by: UROLOGY

## 2024-11-13 PROCEDURE — 6360000004 HC RX CONTRAST MEDICATION: Performed by: EMERGENCY MEDICINE

## 2024-11-13 PROCEDURE — 74177 CT ABD & PELVIS W/CONTRAST: CPT

## 2024-11-13 PROCEDURE — 96376 TX/PRO/DX INJ SAME DRUG ADON: CPT

## 2024-11-13 PROCEDURE — 3700000001 HC ADD 15 MINUTES (ANESTHESIA): Performed by: UROLOGY

## 2024-11-13 PROCEDURE — 80053 COMPREHEN METABOLIC PANEL: CPT

## 2024-11-13 PROCEDURE — 71045 X-RAY EXAM CHEST 1 VIEW: CPT

## 2024-11-13 PROCEDURE — 6360000002 HC RX W HCPCS: Performed by: UROLOGY

## 2024-11-13 PROCEDURE — 6370000000 HC RX 637 (ALT 250 FOR IP): Performed by: UROLOGY

## 2024-11-13 PROCEDURE — 83690 ASSAY OF LIPASE: CPT

## 2024-11-13 PROCEDURE — 6360000004 HC RX CONTRAST MEDICATION: Performed by: UROLOGY

## 2024-11-13 PROCEDURE — 0T778DZ DILATION OF LEFT URETER WITH INTRALUMINAL DEVICE, VIA NATURAL OR ARTIFICIAL OPENING ENDOSCOPIC: ICD-10-PCS | Performed by: UROLOGY

## 2024-11-13 PROCEDURE — 6360000002 HC RX W HCPCS: Performed by: ANESTHESIOLOGY

## 2024-11-13 PROCEDURE — C1758 CATHETER, URETERAL: HCPCS | Performed by: UROLOGY

## 2024-11-13 PROCEDURE — 82947 ASSAY GLUCOSE BLOOD QUANT: CPT

## 2024-11-13 PROCEDURE — 96375 TX/PRO/DX INJ NEW DRUG ADDON: CPT

## 2024-11-13 PROCEDURE — 1200000000 HC SEMI PRIVATE

## 2024-11-13 DEVICE — URETERAL STENT
Type: IMPLANTABLE DEVICE | Site: URETER | Status: FUNCTIONAL
Brand: POLARIS™ ULTRA

## 2024-11-13 RX ORDER — MIDAZOLAM HYDROCHLORIDE 1 MG/ML
INJECTION, SOLUTION INTRAMUSCULAR; INTRAVENOUS
Status: DISCONTINUED | OUTPATIENT
Start: 2024-11-13 | End: 2024-11-13 | Stop reason: SDUPTHER

## 2024-11-13 RX ORDER — SODIUM CHLORIDE 9 MG/ML
INJECTION, SOLUTION INTRAVENOUS PRN
Status: DISCONTINUED | OUTPATIENT
Start: 2024-11-13 | End: 2024-11-13 | Stop reason: HOSPADM

## 2024-11-13 RX ORDER — SODIUM CHLORIDE 9 MG/ML
INJECTION, SOLUTION INTRAVENOUS PRN
Status: DISCONTINUED | OUTPATIENT
Start: 2024-11-13 | End: 2024-11-14 | Stop reason: HOSPADM

## 2024-11-13 RX ORDER — INSULIN GLARGINE 100 [IU]/ML
10 INJECTION, SOLUTION SUBCUTANEOUS NIGHTLY
Status: DISCONTINUED | OUTPATIENT
Start: 2024-11-13 | End: 2024-11-14 | Stop reason: HOSPADM

## 2024-11-13 RX ORDER — DEXTROSE MONOHYDRATE 100 MG/ML
INJECTION, SOLUTION INTRAVENOUS CONTINUOUS PRN
Status: DISCONTINUED | OUTPATIENT
Start: 2024-11-13 | End: 2024-11-14 | Stop reason: HOSPADM

## 2024-11-13 RX ORDER — NALOXONE HYDROCHLORIDE 0.4 MG/ML
INJECTION, SOLUTION INTRAMUSCULAR; INTRAVENOUS; SUBCUTANEOUS PRN
Status: DISCONTINUED | OUTPATIENT
Start: 2024-11-13 | End: 2024-11-13 | Stop reason: HOSPADM

## 2024-11-13 RX ORDER — MORPHINE SULFATE 4 MG/ML
4 INJECTION, SOLUTION INTRAMUSCULAR; INTRAVENOUS ONCE
Status: COMPLETED | OUTPATIENT
Start: 2024-11-13 | End: 2024-11-13

## 2024-11-13 RX ORDER — ROSUVASTATIN CALCIUM 40 MG/1
40 TABLET, COATED ORAL DAILY
Status: DISCONTINUED | OUTPATIENT
Start: 2024-11-13 | End: 2024-11-14 | Stop reason: HOSPADM

## 2024-11-13 RX ORDER — ACETAMINOPHEN 325 MG/1
650 TABLET ORAL EVERY 4 HOURS PRN
Status: DISCONTINUED | OUTPATIENT
Start: 2024-11-13 | End: 2024-11-14 | Stop reason: HOSPADM

## 2024-11-13 RX ORDER — MORPHINE SULFATE 2 MG/ML
2 INJECTION, SOLUTION INTRAMUSCULAR; INTRAVENOUS EVERY 4 HOURS PRN
Status: DISCONTINUED | OUTPATIENT
Start: 2024-11-13 | End: 2024-11-14 | Stop reason: HOSPADM

## 2024-11-13 RX ORDER — SODIUM CHLORIDE 0.9 % (FLUSH) 0.9 %
10 SYRINGE (ML) INJECTION ONCE
Status: COMPLETED | OUTPATIENT
Start: 2024-11-13 | End: 2024-11-13

## 2024-11-13 RX ORDER — FLUTICASONE PROPIONATE 50 MCG
1 SPRAY, SUSPENSION (ML) NASAL DAILY
Status: DISCONTINUED | OUTPATIENT
Start: 2024-11-13 | End: 2024-11-14 | Stop reason: HOSPADM

## 2024-11-13 RX ORDER — SODIUM CHLORIDE 0.9 % (FLUSH) 0.9 %
5-40 SYRINGE (ML) INJECTION EVERY 12 HOURS SCHEDULED
Status: DISCONTINUED | OUTPATIENT
Start: 2024-11-13 | End: 2024-11-14 | Stop reason: HOSPADM

## 2024-11-13 RX ORDER — AMLODIPINE BESYLATE 10 MG/1
10 TABLET ORAL DAILY
Status: DISCONTINUED | OUTPATIENT
Start: 2024-11-13 | End: 2024-11-14 | Stop reason: HOSPADM

## 2024-11-13 RX ORDER — SODIUM CHLORIDE, SODIUM LACTATE, POTASSIUM CHLORIDE, CALCIUM CHLORIDE 600; 310; 30; 20 MG/100ML; MG/100ML; MG/100ML; MG/100ML
INJECTION, SOLUTION INTRAVENOUS
Status: DISCONTINUED | OUTPATIENT
Start: 2024-11-13 | End: 2024-11-13 | Stop reason: SDUPTHER

## 2024-11-13 RX ORDER — 0.9 % SODIUM CHLORIDE 0.9 %
1000 INTRAVENOUS SOLUTION INTRAVENOUS ONCE
Status: COMPLETED | OUTPATIENT
Start: 2024-11-13 | End: 2024-11-14

## 2024-11-13 RX ORDER — PROCHLORPERAZINE EDISYLATE 5 MG/ML
10 INJECTION INTRAMUSCULAR; INTRAVENOUS
Status: DISCONTINUED | OUTPATIENT
Start: 2024-11-13 | End: 2024-11-13 | Stop reason: HOSPADM

## 2024-11-13 RX ORDER — 0.9 % SODIUM CHLORIDE 0.9 %
80 INTRAVENOUS SOLUTION INTRAVENOUS ONCE
Status: DISCONTINUED | OUTPATIENT
Start: 2024-11-13 | End: 2024-11-14 | Stop reason: HOSPADM

## 2024-11-13 RX ORDER — DIPHENHYDRAMINE HYDROCHLORIDE 50 MG/ML
12.5 INJECTION INTRAMUSCULAR; INTRAVENOUS
Status: DISCONTINUED | OUTPATIENT
Start: 2024-11-13 | End: 2024-11-13 | Stop reason: HOSPADM

## 2024-11-13 RX ORDER — ONDANSETRON 4 MG/1
4 TABLET, ORALLY DISINTEGRATING ORAL EVERY 8 HOURS PRN
Status: DISCONTINUED | OUTPATIENT
Start: 2024-11-13 | End: 2024-11-14 | Stop reason: HOSPADM

## 2024-11-13 RX ORDER — METOCLOPRAMIDE HYDROCHLORIDE 5 MG/ML
10 INJECTION INTRAMUSCULAR; INTRAVENOUS
Status: DISCONTINUED | OUTPATIENT
Start: 2024-11-13 | End: 2024-11-13 | Stop reason: HOSPADM

## 2024-11-13 RX ORDER — PROPOFOL 10 MG/ML
INJECTION, EMULSION INTRAVENOUS
Status: DISCONTINUED | OUTPATIENT
Start: 2024-11-13 | End: 2024-11-13 | Stop reason: SDUPTHER

## 2024-11-13 RX ORDER — OXYCODONE HYDROCHLORIDE 5 MG/1
5 TABLET ORAL
Status: DISCONTINUED | OUTPATIENT
Start: 2024-11-13 | End: 2024-11-13 | Stop reason: HOSPADM

## 2024-11-13 RX ORDER — LIDOCAINE HYDROCHLORIDE 20 MG/ML
INJECTION, SOLUTION EPIDURAL; INFILTRATION; INTRACAUDAL; PERINEURAL
Status: DISCONTINUED | OUTPATIENT
Start: 2024-11-13 | End: 2024-11-13 | Stop reason: SDUPTHER

## 2024-11-13 RX ORDER — SODIUM CHLORIDE 0.9 % (FLUSH) 0.9 %
5-40 SYRINGE (ML) INJECTION EVERY 12 HOURS SCHEDULED
Status: DISCONTINUED | OUTPATIENT
Start: 2024-11-13 | End: 2024-11-13 | Stop reason: HOSPADM

## 2024-11-13 RX ORDER — SODIUM CHLORIDE 9 MG/ML
INJECTION, SOLUTION INTRAVENOUS CONTINUOUS
Status: DISCONTINUED | OUTPATIENT
Start: 2024-11-13 | End: 2024-11-13

## 2024-11-13 RX ORDER — FENTANYL CITRATE 50 UG/ML
25 INJECTION, SOLUTION INTRAMUSCULAR; INTRAVENOUS EVERY 5 MIN PRN
Status: DISCONTINUED | OUTPATIENT
Start: 2024-11-13 | End: 2024-11-13 | Stop reason: HOSPADM

## 2024-11-13 RX ORDER — HYDROMORPHONE HYDROCHLORIDE 1 MG/ML
0.5 INJECTION, SOLUTION INTRAMUSCULAR; INTRAVENOUS; SUBCUTANEOUS EVERY 5 MIN PRN
Status: DISCONTINUED | OUTPATIENT
Start: 2024-11-13 | End: 2024-11-13 | Stop reason: HOSPADM

## 2024-11-13 RX ORDER — SODIUM CHLORIDE 0.9 % (FLUSH) 0.9 %
5-40 SYRINGE (ML) INJECTION PRN
Status: DISCONTINUED | OUTPATIENT
Start: 2024-11-13 | End: 2024-11-13 | Stop reason: HOSPADM

## 2024-11-13 RX ORDER — HYDRALAZINE HYDROCHLORIDE 25 MG/1
25 TABLET, FILM COATED ORAL DAILY
Status: DISCONTINUED | OUTPATIENT
Start: 2024-11-13 | End: 2024-11-14 | Stop reason: HOSPADM

## 2024-11-13 RX ORDER — FENTANYL CITRATE 50 UG/ML
INJECTION, SOLUTION INTRAMUSCULAR; INTRAVENOUS
Status: DISCONTINUED | OUTPATIENT
Start: 2024-11-13 | End: 2024-11-13 | Stop reason: SDUPTHER

## 2024-11-13 RX ORDER — LIDOCAINE HYDROCHLORIDE 20 MG/ML
JELLY TOPICAL PRN
Status: DISCONTINUED | OUTPATIENT
Start: 2024-11-13 | End: 2024-11-13 | Stop reason: ALTCHOICE

## 2024-11-13 RX ORDER — ONDANSETRON 2 MG/ML
4 INJECTION INTRAMUSCULAR; INTRAVENOUS ONCE
Status: COMPLETED | OUTPATIENT
Start: 2024-11-13 | End: 2024-11-13

## 2024-11-13 RX ORDER — ONDANSETRON 2 MG/ML
4 INJECTION INTRAMUSCULAR; INTRAVENOUS EVERY 6 HOURS PRN
Status: DISCONTINUED | OUTPATIENT
Start: 2024-11-13 | End: 2024-11-14 | Stop reason: HOSPADM

## 2024-11-13 RX ORDER — IOPAMIDOL 755 MG/ML
75 INJECTION, SOLUTION INTRAVASCULAR
Status: COMPLETED | OUTPATIENT
Start: 2024-11-13 | End: 2024-11-13

## 2024-11-13 RX ORDER — INSULIN LISPRO 100 [IU]/ML
0-8 INJECTION, SOLUTION INTRAVENOUS; SUBCUTANEOUS
Status: DISCONTINUED | OUTPATIENT
Start: 2024-11-13 | End: 2024-11-14 | Stop reason: HOSPADM

## 2024-11-13 RX ORDER — HYDROCODONE BITARTRATE AND ACETAMINOPHEN 5; 325 MG/1; MG/1
1 TABLET ORAL EVERY 6 HOURS PRN
Status: DISCONTINUED | OUTPATIENT
Start: 2024-11-13 | End: 2024-11-14 | Stop reason: HOSPADM

## 2024-11-13 RX ORDER — SODIUM CHLORIDE 0.9 % (FLUSH) 0.9 %
5-40 SYRINGE (ML) INJECTION PRN
Status: DISCONTINUED | OUTPATIENT
Start: 2024-11-13 | End: 2024-11-14 | Stop reason: HOSPADM

## 2024-11-13 RX ADMIN — AMLODIPINE BESYLATE 10 MG: 10 TABLET ORAL at 22:05

## 2024-11-13 RX ADMIN — SODIUM CHLORIDE 1000 ML: 9 INJECTION, SOLUTION INTRAVENOUS at 12:21

## 2024-11-13 RX ADMIN — INSULIN LISPRO 2 UNITS: 100 INJECTION, SOLUTION INTRAVENOUS; SUBCUTANEOUS at 22:06

## 2024-11-13 RX ADMIN — SODIUM CHLORIDE 1000 ML: 9 INJECTION, SOLUTION INTRAVENOUS at 07:36

## 2024-11-13 RX ADMIN — SODIUM CHLORIDE: 9 INJECTION, SOLUTION INTRAVENOUS at 15:01

## 2024-11-13 RX ADMIN — FLUOXETINE HYDROCHLORIDE 20 MG: 20 CAPSULE ORAL at 22:05

## 2024-11-13 RX ADMIN — PROPOFOL 150 MCG/KG/MIN: 10 INJECTION, EMULSION INTRAVENOUS at 18:38

## 2024-11-13 RX ADMIN — CEFTRIAXONE SODIUM 1000 MG: 1 INJECTION, POWDER, FOR SOLUTION INTRAMUSCULAR; INTRAVENOUS at 12:21

## 2024-11-13 RX ADMIN — FENTANYL CITRATE 100 MCG: 50 INJECTION INTRAMUSCULAR; INTRAVENOUS at 18:37

## 2024-11-13 RX ADMIN — LIDOCAINE HYDROCHLORIDE 60 MG: 20 INJECTION, SOLUTION EPIDURAL; INFILTRATION; INTRACAUDAL; PERINEURAL at 18:37

## 2024-11-13 RX ADMIN — MIDAZOLAM 2 MG: 1 INJECTION INTRAMUSCULAR; INTRAVENOUS at 18:37

## 2024-11-13 RX ADMIN — SODIUM CHLORIDE, PRESERVATIVE FREE 10 ML: 5 INJECTION INTRAVENOUS at 22:06

## 2024-11-13 RX ADMIN — MORPHINE SULFATE 4 MG: 4 INJECTION, SOLUTION INTRAMUSCULAR; INTRAVENOUS at 12:21

## 2024-11-13 RX ADMIN — ONDANSETRON 4 MG: 2 INJECTION, SOLUTION INTRAMUSCULAR; INTRAVENOUS at 07:35

## 2024-11-13 RX ADMIN — ROSUVASTATIN CALCIUM 40 MG: 40 TABLET, FILM COATED ORAL at 22:06

## 2024-11-13 RX ADMIN — SODIUM CHLORIDE, POTASSIUM CHLORIDE, SODIUM LACTATE AND CALCIUM CHLORIDE: 600; 310; 30; 20 INJECTION, SOLUTION INTRAVENOUS at 18:30

## 2024-11-13 RX ADMIN — EMPAGLIFLOZIN 10 MG: 10 TABLET, FILM COATED ORAL at 22:05

## 2024-11-13 RX ADMIN — IOPAMIDOL 75 ML: 755 INJECTION, SOLUTION INTRAVENOUS at 08:32

## 2024-11-13 RX ADMIN — SODIUM CHLORIDE, PRESERVATIVE FREE 10 ML: 5 INJECTION INTRAVENOUS at 08:46

## 2024-11-13 RX ADMIN — Medication 80 ML: at 08:46

## 2024-11-13 RX ADMIN — MORPHINE SULFATE 4 MG: 4 INJECTION, SOLUTION INTRAMUSCULAR; INTRAVENOUS at 07:35

## 2024-11-13 RX ADMIN — HYDRALAZINE HYDROCHLORIDE 25 MG: 25 TABLET ORAL at 22:05

## 2024-11-13 ASSESSMENT — PAIN SCALES - GENERAL
PAINLEVEL_OUTOF10: 6
PAINLEVEL_OUTOF10: 9
PAINLEVEL_OUTOF10: 9
PAINLEVEL_OUTOF10: 5
PAINLEVEL_OUTOF10: 9

## 2024-11-13 ASSESSMENT — PAIN DESCRIPTION - ORIENTATION: ORIENTATION: LEFT

## 2024-11-13 ASSESSMENT — PAIN - FUNCTIONAL ASSESSMENT
PAIN_FUNCTIONAL_ASSESSMENT: 0-10
PAIN_FUNCTIONAL_ASSESSMENT: FACE, LEGS, ACTIVITY, CRY, AND CONSOLABILITY (FLACC)

## 2024-11-13 ASSESSMENT — PAIN DESCRIPTION - LOCATION: LOCATION: ABDOMEN

## 2024-11-13 ASSESSMENT — PAIN DESCRIPTION - DESCRIPTORS: DESCRIPTORS: SHARP;ACHING

## 2024-11-13 NOTE — ANESTHESIA PRE PROCEDURE
ARF          Endo/Other:    (+) Diabetesusing insulin, malignancy/cancer (CLL).                 Abdominal:             Vascular:          Other Findings:         Anesthesia Plan      MAC     ASA 3       Induction: intravenous.    MIPS: Postoperative opioids intended and Prophylactic antiemetics administered.  Anesthetic plan and risks discussed with patient and sibling.                    SARBJIT DOWNEY MD   11/13/2024

## 2024-11-13 NOTE — ED NOTES
ED to inpatient nurses report    Chief Complaint   Patient presents with    Abdominal Pain     Pt reports left sided abdominal pain with nausea/vomiting/constipation since Sunday.      Present to ED from Home  LOC: alert and orientated to name, place, date  Vital signs   Vitals:    11/13/24 1300 11/13/24 1400 11/13/24 1431 11/13/24 1600   BP: (!) 166/92 139/89 (!) 175/89 (!) 145/83   Pulse: 81 82 89 82   Resp: 16 16 18    Temp:       TempSrc:       SpO2: 98% 96% 95% 97%   Weight:       Height:          Oxygen Baseline no home O2    Current needs required 2L via nasal canula   LDAs:   Peripheral IV 11/13/24 Left Antecubital (Active)   Site Assessment Clean, dry & intact 11/13/24 0735   Line Status Blood return noted 11/13/24 0735     Mobility: Independent  Pending ED orders:   Present condition: Stable  Code Status: [unfilled]   Consults:  []  Hospitalist  Completed  [] yes [] no  []  Medicine  Completed  [] yes [] No  []  Cardiology  Completed  [] yes [] No  []  GI   Completed  [] yes [] No  []  Neurology  Completed  [] yes [] No  []  Nephrology Completed  [] yes [] No  []  Vascular  Completed  [] yes [] No   []  Surgery  Completed  [] yes [] No   [x]  Urology  Completed  [x] yes [] No   []  Plastics  Completed  [] yes [] No   []  ENT  Completed  [] yes [] No   []  Other   Completed  [] yes [] No  Pertinent event(s)   Pertinent event(s)   Electronically signed by Sheryl Dumont RN on 11/13/2024 at 4:21 PM

## 2024-11-13 NOTE — PROGRESS NOTES
Transitions of Care Pharmacy Service   Medication Review    The patient's list of current home medications has been reviewed.     Source(s) of information: spoke to patient and sure scripts     Based on information provided by the above source(s), I have updated the patient's home med list as described below.       I changed or updated the following medications on the patient's home medication list:  Removed potassium chloride (KLOR-CON M) 20 MEQ TBCR extended release tablet  hydroCHLOROthiazide (HYDRODIURIL) 25 MG tablet     Added None      Adjusted   None      Other Notes Patient stated thaat he hasn't had to take insulin lispro, 1 Unit Dial, (HUMALOG KWIKPEN) 100 UNIT/ML SOPN very often because his blood sugars have been under control.            Please feel free to call me with any questions about this encounter. Thank you.    This note will be reviewed and co-signed by the Transitions of Care Pharmacist. The pharmacist will review inpatient orders and contact the physician about any discrepancies.    Mindi Capellan, pharmacy technician  Transitions of [facility] Pharmacy Service  Phone:  [phone]  Fax: [phone]      Electronically signed by Mindi Capellan on 11/13/2024 at 3:42 PM       Prior to Admission medications    Medication Sig   hydrALAZINE (APRESOLINE) 25 MG tablet TAKE 1 TABLET BY MOUTH DAILY   FARXIGA 5 MG tablet TAKE 1 TABLET BY MOUTH EVERY MORNING     FLUoxetine (PROZAC) 20 MG capsule Take 1 capsule by mouth daily   amLODIPine (NORVASC) 10 MG tablet TAKE 1 TABLET BY MOUTH DAILY   vitamin B-12 (CYANOCOBALAMIN) 1000 MCG tablet Take 1 tablet by mouth daily   rosuvastatin (CRESTOR) 40 MG tablet Take 1 tablet by mouth daily   insulin glargine (LANTUS SOLOSTAR) 100 UNIT/ML injection pen INJECT 10 UNITS UNDER THE SKIN ONCE NIGHTLY   vitamin D3 (CHOLECALCIFEROL) 25 MCG (1000 UT) TABS tablet Take 1 tablet by mouth daily   insulin lispro, 1 Unit Dial, (HUMALOG KWIKPEN) 100 UNIT/ML SOPN Inject into the

## 2024-11-13 NOTE — ED PROVIDER NOTES
lab and radiology tests and orders.    3)  Treatment and Disposition    Patient repeat assessment: Patient is hemodynamically stable.  Pain improved with morphine.  Labs significant for 2.2 elevated from baseline, glucose 166, WBC 55.7 elevated from patient's baseline, UA with bacteria without nitrites or leukocytes.  CT abdomen pelvis demonstrating obstructing stone proximal left ureter.  Started on Rocephin.  Admitting for STEVEN and nephrolithiasis.  Discussed case with Dr. Cruz who advises n.p.o. and will likely take patient for procedure today  Discussed case with Dr. Sanchez, who accepts patient for admission to hospital.  Disposition discussion with patient/family: Patient aware and agrees with disposition plan.    MIPS:  N/A    Social determinants of health impacting treatment or disposition:  None    Shared Decision Making completed with patient regarding risks and benefits of admission versus discharge.  Patient decides to be admitted to the hospital.    Code Status Discussion:  Not discussed    \"ED Course\" Notes From Epic Narrator:         CRITICAL CARE:   N/A    PROCEDURES:  Procedures      DATA FOR LAB AND RADIOLOGY TESTS ORDERED BELOW ARE REVIEWED BY THE ED CLINICIAN:    RADIOLOGY: All x-rays, CT, MRI, and formal ultrasound images (except ED bedside ultrasound) are read by the radiologist, see reports below, unless otherwise noted in MDM or here.  Reports below are reviewed by myself.  CT ABDOMEN PELVIS W IV CONTRAST Additional Contrast? None   Final Result   1. Obstructing stones in the proximal left ureter.         XR CHEST PORTABLE   Final Result   No acute cardiopulmonary process.             LABS: Lab orders shown below, the results are reviewed by myself, and all abnormals are listed below.  Labs Reviewed   CBC WITH AUTO DIFFERENTIAL - Abnormal; Notable for the following components:       Result Value    WBC 55.7 (*)     Neutrophils % 18 (*)     Lymphocytes % 73 (*)     Eosinophils % 0 (*)

## 2024-11-13 NOTE — CONSULTS
mm.     Additional nonobstructing punctate left-sided calculus.  Contrast in the  right collecting system limits evaluation for underlying stone.  Subcentimeter right-sided renal cyst.     Adrenal glands: Unremarkable     Retroperitoneal structures:  Unremarkable     Small bowel and colon: Descending and sigmoid diverticulosis.  No bowel  obstruction.     Appendix: Go to lymph nodes mildly enlarged bilateral inguinal lymph nodes  with the left measuring 1.8 cm on the right measuring 1.7 cm.  Additional  visible pelvic lymph nodes although not enlarged by CT criteria.     Urinary bladder: Unremarkable     Free fluid/air: None     Lymph nodes: No enlarged lymph nodes     Osseus structures: No destructive lesion     Vasculature: No aneurysm     Other: Small fat containing left inguinal hernia.     IMPRESSION:  1. Obstructing stones in the proximal left ureter.  Assessment and Plan   Impression:    Patient Active Problem List   Diagnosis    Essential hypertension    Mixed hyperlipidemia    Right hemiplegia (HCC)    Anxiety and depression    Influenza vaccination declined    Pneumococcal vaccination declined    Tetanus, diphtheria, and acellular pertussis (Tdap) vaccination declined    History of COVID-19    Type 1 diabetes mellitus with stage 3a chronic kidney disease (HCC)    CLL (chronic lymphocytic leukemia) (HCC)    DKA, type 1, not at goal (HCC)       Plan: Discussed with patient and family we will proceed with cystoscopy pyelogram and stent placement    Paulo Guillen MD  6:46 PM 11/13/2024

## 2024-11-14 VITALS
HEIGHT: 71 IN | BODY MASS INDEX: 22.4 KG/M2 | TEMPERATURE: 99.5 F | HEART RATE: 94 BPM | SYSTOLIC BLOOD PRESSURE: 124 MMHG | WEIGHT: 160 LBS | RESPIRATION RATE: 18 BRPM | DIASTOLIC BLOOD PRESSURE: 75 MMHG | OXYGEN SATURATION: 91 %

## 2024-11-14 LAB
ALBUMIN SERPL-MCNC: 4.2 G/DL (ref 3.5–5.2)
ALBUMIN/GLOB SERPL: 1.5 {RATIO} (ref 1–2.5)
ALP SERPL-CCNC: 76 U/L (ref 40–129)
ALT SERPL-CCNC: 22 U/L (ref 10–50)
ANION GAP SERPL CALCULATED.3IONS-SCNC: 17 MMOL/L (ref 9–16)
AST SERPL-CCNC: 25 U/L (ref 10–50)
BILIRUB SERPL-MCNC: 0.6 MG/DL (ref 0–1.2)
BUN SERPL-MCNC: 24 MG/DL (ref 8–23)
CALCIUM SERPL-MCNC: 9.1 MG/DL (ref 8.8–10.2)
CHLORIDE SERPL-SCNC: 101 MMOL/L (ref 98–107)
CO2 SERPL-SCNC: 23 MMOL/L (ref 20–31)
CREAT SERPL-MCNC: 1.5 MG/DL (ref 0.7–1.2)
ERYTHROCYTE [DISTWIDTH] IN BLOOD BY AUTOMATED COUNT: 12.8 % (ref 11.8–14.4)
GFR, ESTIMATED: 54 ML/MIN/1.73M2
GLUCOSE BLD-MCNC: 99 MG/DL (ref 75–110)
GLUCOSE SERPL-MCNC: 101 MG/DL (ref 82–115)
HCT VFR BLD AUTO: 42.2 % (ref 40.7–50.3)
HGB BLD-MCNC: 13.9 G/DL (ref 13–17)
MCH RBC QN AUTO: 30.7 PG (ref 25.2–33.5)
MCHC RBC AUTO-ENTMCNC: 32.9 G/DL (ref 28.4–34.8)
MCV RBC AUTO: 93.2 FL (ref 82.6–102.9)
NRBC BLD-RTO: 0 PER 100 WBC
PLATELET # BLD AUTO: 154 K/UL (ref 138–453)
PMV BLD AUTO: 10.1 FL (ref 8.1–13.5)
POTASSIUM SERPL-SCNC: 4.3 MMOL/L (ref 3.7–5.3)
PROT SERPL-MCNC: 6.9 G/DL (ref 6.6–8.7)
RBC # BLD AUTO: 4.53 M/UL (ref 4.21–5.77)
SODIUM SERPL-SCNC: 141 MMOL/L (ref 136–145)
WBC OTHER # BLD: 52.6 K/UL (ref 3.5–11.3)

## 2024-11-14 PROCEDURE — 36415 COLL VENOUS BLD VENIPUNCTURE: CPT

## 2024-11-14 PROCEDURE — 99235 HOSP IP/OBS SAME DATE MOD 70: CPT | Performed by: FAMILY MEDICINE

## 2024-11-14 PROCEDURE — G0378 HOSPITAL OBSERVATION PER HR: HCPCS

## 2024-11-14 PROCEDURE — 85027 COMPLETE CBC AUTOMATED: CPT

## 2024-11-14 PROCEDURE — 6370000000 HC RX 637 (ALT 250 FOR IP): Performed by: FAMILY MEDICINE

## 2024-11-14 PROCEDURE — 82947 ASSAY GLUCOSE BLOOD QUANT: CPT

## 2024-11-14 PROCEDURE — 80053 COMPREHEN METABOLIC PANEL: CPT

## 2024-11-14 RX ADMIN — HYDRALAZINE HYDROCHLORIDE 25 MG: 25 TABLET ORAL at 09:49

## 2024-11-14 RX ADMIN — AMLODIPINE BESYLATE 10 MG: 10 TABLET ORAL at 09:50

## 2024-11-14 RX ADMIN — FLUOXETINE HYDROCHLORIDE 20 MG: 20 CAPSULE ORAL at 09:51

## 2024-11-14 RX ADMIN — EMPAGLIFLOZIN 10 MG: 10 TABLET, FILM COATED ORAL at 09:50

## 2024-11-14 RX ADMIN — ROSUVASTATIN CALCIUM 40 MG: 40 TABLET, FILM COATED ORAL at 09:49

## 2024-11-14 NOTE — OP NOTE
followed by insertion of a #7 double-J stent this was positioned in the renal pelvis with the distal end in the bladder with immediate decompression of the upper urinary tract.    At the completion the bladder was emptied the scope removed the patient returned to PACU in stable condition.    Recommendations: Continue antibiotic therapy, discharge home when cleared by medicine    Follow-up visit at the office to discuss laser lithotripsy    Electronically signed by Paulo Guillen MD on 11/13/2024 at 7:07 PM

## 2024-11-14 NOTE — PROGRESS NOTES
Patient discharged to home in good condition. Discharge instructions given and patient denies any further questions. All belongings in patient possession at time of discharge. Patient escorted to vehicle by wheelchair.

## 2024-11-14 NOTE — PROGRESS NOTES
Pt admitted to room 2009. Oriented to room, call light and bed mechanics in place. Side rails up x2. Call light within reach. Orders reviewed.

## 2024-11-14 NOTE — PLAN OF CARE
Problem: Chronic Conditions and Co-morbidities  Goal: Patient's chronic conditions and co-morbidity symptoms are monitored and maintained or improved  11/14/2024 1004 by Corinne Gamez RN  Outcome: Progressing  11/14/2024 0514 by David Betancourt RN  Outcome: Progressing  Flowsheets (Taken 11/13/2024 2023)  Care Plan - Patient's Chronic Conditions and Co-Morbidity Symptoms are Monitored and Maintained or Improved: Monitor and assess patient's chronic conditions and comorbid symptoms for stability, deterioration, or improvement     Problem: Neurosensory - Adult  Goal: Achieves stable or improved neurological status  11/14/2024 1004 by Corinne Gamez RN  Outcome: Progressing  11/14/2024 0514 by David Betancourt RN  Outcome: Progressing  Flowsheets (Taken 11/13/2024 2023)  Achieves stable or improved neurological status:   Assess for and report changes in neurological status   Maintain blood pressure and fluid volume within ordered parameters to optimize cerebral perfusion and minimize risk of hemorrhage     Problem: Gastrointestinal - Adult  Goal: Maintains or returns to baseline bowel function  11/14/2024 0514 by David Betancourt RN  Outcome: Progressing  Flowsheets (Taken 11/13/2024 2023)  Maintains or returns to baseline bowel function:   Assess bowel function   Encourage oral fluids to ensure adequate hydration   Administer IV fluids as ordered to ensure adequate hydration   Administer ordered medications as needed   Encourage mobilization and activity     Problem: Genitourinary - Adult  Goal: Absence of urinary retention  11/14/2024 1004 by Corinne Gamez RN  Outcome: Progressing  11/14/2024 0514 by David Betancourt RN  Outcome: Progressing  Flowsheets (Taken 11/13/2024 2023)  Absence of urinary retention:   Assess patient’s ability to void and empty bladder   Monitor intake/output and perform bladder scan as needed     Problem: Discharge Planning  Goal: Discharge

## 2024-11-14 NOTE — DISCHARGE SUMMARY
1. Obstructing stones in the proximal left ureter.  Assessment and plan  Patient is presented with obstructive uropathy with STEVEN.  He was seen by urology taken to the operating room underwent cystoscopy and stent placement that he tolerated well.  Preop he was provided with antibiotic and IV fluid and pain control.  History of CLL.  He is established with hematology oncology.  CT scan shows bilateral inguinal node measuring 1.8 cm on the left and 1.7 cm on the right  Leukocytosis that appears to be because of primary admitting etiology.  No apparent sign of sepsis next  STEVEN.  Will improve with IV fluid and resolution of ureteral obstruction  History of insulin-dependent diabetes mellitus.  He will resume his Lantus cover with a sliding scale  Hemodynamically stable  History of CVA  Inpatient course  Patient underwent uneventful cystoscopy with stent placement.  He was deemed fit to be discharged by urology.  He has been tolerating p.o. well with good urine output and ambulating without any event  Discharge condition improved  Disposition Home  Medications per reconciliation.  Possibility of outpatient antibiotic per urology  Follow-up with urology per plan  Med list reviewed, discharge order placed  More than 30 minutes were spent organizing plan of care and discharge  This note is created with a voice recognition program and while intend to generate a document that accurately reflects the content of the visit, no guarantee can be provided that every mistake has been identified and corrected by editing.

## 2024-11-14 NOTE — H&P
Flank pain  History of present illness  64-year-old male was presented with flank pain without fever chills dysuria or hematuria.  CT scan was positive for obstructive uropathy.  History of bilateral renal stones with STEVEN  Past medical history  CLL  CVA  Diabetes  Hypertension  Hyperlipidemia  Past surgical history noncontributory  Medications per list reviewed  Allergies per list reviewed  Social history negative for tobacco alcohol or illicit drug use  Family history  Review of system all 12 system reviewed pertinent pain history of present illness  Vitals  Afebrile hemodynamically stable  Systemic exam  HEENT unremarkable  Unremarkable  Lungs bilateral CTA  CVS S1-S2 regular rate and rhythm abdomen  Status post cystoscopy and stent placement.  Postop day 0  Lower extremity no edema no calf tenderness  Labs   Latest Reference Range & Units 11/14/24 05:12   Sodium 136 - 145 mmol/L 141   Potassium 3.7 - 5.3 mmol/L 4.3   Chloride 98 - 107 mmol/L 101   CARBON DIOXIDE 20 - 31 mmol/L 23   BUN,BUNPL 8 - 23 mg/dL 24 (H)   Creatinine 0.70 - 1.20 mg/dL 1.5 (H)   Anion Gap 9 - 16 mmol/L 17 (H)   Est, Glom Filt Rate >60 mL/min/1.73m2 54 (L)   Glucose 82 - 115 mg/dL 101   Calcium 8.8 - 10.2 mg/dL 9.1   Albumin/Globulin Ratio 1.0 - 2.5  1.5   Total Protein 6.6 - 8.7 g/dL 6.9   (H): Data is abnormally high  (L): Data is abnormally low     Latest Reference Range & Units 11/14/24 05:12   Albumin 3.5 - 5.2 g/dL 4.2   Albumin/Globulin Ratio 1.0 - 2.5  1.5   Alkaline Phosphatase 40 - 129 U/L 76   ALT 10 - 50 U/L 22   AST 10 - 50 U/L 25   Total Bilirubin 0.00 - 1.20 mg/dL 0.6   Total Protein 6.6 - 8.7 g/dL 6.9   Glucose 82 - 115 mg/dL 101      Latest Reference Range & Units 11/14/24 05:12   WBC 3.5 - 11.3 k/uL 52.6 (HH)   RBC 4.21 - 5.77 m/uL 4.53   Hemoglobin Quant 13.0 - 17.0 g/dL 13.9   Hematocrit 40.7 - 50.3 % 42.2   MCV 82.6 - 102.9 fL 93.2   MCH 25.2 - 33.5 pg 30.7   MCHC 28.4 - 34.8 g/dL 32.9   MPV 8.1 - 13.5 fL 10.1   RDW 11.8

## 2024-11-14 NOTE — PLAN OF CARE
Problem: Discharge Planning  Goal: Discharge to home or other facility with appropriate resources  Outcome: Progressing  Flowsheets (Taken 11/13/2024 2023)  Discharge to home or other facility with appropriate resources:   Identify barriers to discharge with patient and caregiver   Arrange for needed discharge resources and transportation as appropriate   Identify discharge learning needs (meds, wound care, etc)   Refer to discharge planning if patient needs post-hospital services based on physician order or complex needs related to functional status, cognitive ability or social support system     Problem: Pain  Goal: Verbalizes/displays adequate comfort level or baseline comfort level  Outcome: Progressing     Problem: Chronic Conditions and Co-morbidities  Goal: Patient's chronic conditions and co-morbidity symptoms are monitored and maintained or improved  Outcome: Progressing  Flowsheets (Taken 11/13/2024 2023)  Care Plan - Patient's Chronic Conditions and Co-Morbidity Symptoms are Monitored and Maintained or Improved: Monitor and assess patient's chronic conditions and comorbid symptoms for stability, deterioration, or improvement     Problem: Neurosensory - Adult  Goal: Achieves stable or improved neurological status  Outcome: Progressing  Flowsheets (Taken 11/13/2024 2023)  Achieves stable or improved neurological status:   Assess for and report changes in neurological status   Maintain blood pressure and fluid volume within ordered parameters to optimize cerebral perfusion and minimize risk of hemorrhage     Problem: Respiratory - Adult  Goal: Achieves optimal ventilation and oxygenation  Outcome: Progressing  Flowsheets (Taken 11/13/2024 2023)  Achieves optimal ventilation and oxygenation:   Assess for changes in respiratory status   Assess for changes in mentation and behavior     Problem: Skin/Tissue Integrity - Adult  Goal: Skin integrity remains intact  Outcome: Progressing  Flowsheets (Taken  What Is The Reason For Today's Visit?: Preventative Skin Check

## 2024-11-15 ENCOUNTER — TELEPHONE (OUTPATIENT)
Dept: FAMILY MEDICINE CLINIC | Age: 64
End: 2024-11-15

## 2024-11-15 NOTE — TELEPHONE ENCOUNTER
Care Transitions Initial Follow Up Call    Outreach made within 2 business days of discharge: Yes    Patient: Kade Jade Patient : 1960   MRN: [id number]  Reason for Admission: DKA, type 1, not at goal   Discharge Date: 24       Spoke with: left message to see if patient scheduled hospital follow up with specialist.    Discharge department/facility: [facility]      Scheduled appointment with PCP within 7-14 days    Follow Up  Future Appointments   Date Time Provider Department Center   2025  9:00 AM Julissa Serrano MD [facility]   2025  9:00 AM Mackenzie Sanchez MD  [facility]   2025  9:00 AM Omer Pacheco MD [facility] -       [facility] [place]

## 2024-11-20 ENCOUNTER — HOSPITAL ENCOUNTER (OUTPATIENT)
Age: 64
Setting detail: SPECIMEN
Discharge: HOME | End: 2024-11-20

## 2024-11-20 LAB
25(OH)D3 SERPL-MCNC: 35.6 NG/ML (ref 30–100)
ALBUMIN SERPL-MCNC: 4.3 G/DL (ref 3.5–5.2)
ANION GAP SERPL CALCULATED.3IONS-SCNC: 13 MMOL/L (ref 9–16)
BASOPHILS # BLD: 0 K/UL (ref 0–0.2)
BASOPHILS NFR BLD: 0 % (ref 0–2)
BILIRUB UR QL STRIP: NEGATIVE
BUN SERPL-MCNC: 15 MG/DL (ref 8–23)
CALCIUM SERPL-MCNC: 9.3 MG/DL (ref 8.6–10.4)
CHLORIDE SERPL-SCNC: 102 MMOL/L (ref 98–107)
CLARITY UR: CLEAR
CO2 SERPL-SCNC: 29 MMOL/L (ref 20–31)
COLOR UR: YELLOW
CREAT SERPL-MCNC: 1.2 MG/DL (ref 0.7–1.2)
CREAT UR-MCNC: 103 MG/DL (ref 39–259)
EOSINOPHIL # BLD: 0 K/UL (ref 0–0.4)
EOSINOPHILS RELATIVE PERCENT: 0 % (ref 1–4)
EPI CELLS #/AREA URNS HPF: NORMAL /HPF (ref 0–5)
ERYTHROCYTE [DISTWIDTH] IN BLOOD BY AUTOMATED COUNT: 12.8 % (ref 11.8–14.4)
GFR, ESTIMATED: 68 ML/MIN/1.73M2
GLUCOSE SERPL-MCNC: 123 MG/DL (ref 74–99)
GLUCOSE UR STRIP-MCNC: ABNORMAL MG/DL
HCT VFR BLD AUTO: 43.6 % (ref 40.7–50.3)
HGB BLD-MCNC: 14.1 G/DL (ref 13–17)
HGB UR QL STRIP.AUTO: ABNORMAL
IMM GRANULOCYTES # BLD AUTO: 0.47 K/UL (ref 0–0.3)
IMM GRANULOCYTES NFR BLD: 1 %
KETONES UR STRIP-MCNC: ABNORMAL MG/DL
LEUKOCYTE ESTERASE UR QL STRIP: ABNORMAL
LYMPHOCYTES NFR BLD: 35.72 K/UL (ref 1–4.8)
LYMPHOCYTES RELATIVE PERCENT: 76 % (ref 24–44)
MAGNESIUM SERPL-MCNC: 2.1 MG/DL (ref 1.6–2.4)
MCH RBC QN AUTO: 29.9 PG (ref 25.2–33.5)
MCHC RBC AUTO-ENTMCNC: 32.3 G/DL (ref 28.4–34.8)
MCV RBC AUTO: 92.6 FL (ref 82.6–102.9)
MICROALBUMIN UR-MCNC: 147 MG/L (ref 0–20)
MICROALBUMIN/CREAT UR-RTO: 143 MCG/MG CREAT (ref 0–17)
MONOCYTES NFR BLD: 1.88 K/UL (ref 0.1–0.8)
MONOCYTES NFR BLD: 4 % (ref 1–7)
MORPHOLOGY: NORMAL
MUCOUS THREADS URNS QL MICRO: NORMAL
NEUTROPHILS NFR BLD: 19 % (ref 36–66)
NEUTS SEG NFR BLD: 8.93 K/UL (ref 1.8–7.7)
NITRITE UR QL STRIP: NEGATIVE
NRBC BLD-RTO: 0 PER 100 WBC
PH UR STRIP: 6.5 [PH] (ref 5–8)
PLATELET # BLD AUTO: 220 K/UL (ref 138–453)
PLATELET, FLUORESCENCE: 220 K/UL (ref 138–453)
PMV BLD AUTO: 10.4 FL (ref 8.1–13.5)
POTASSIUM SERPL-SCNC: 3.5 MMOL/L (ref 3.7–5.3)
PROT UR STRIP-MCNC: ABNORMAL MG/DL
PTH-INTACT SERPL-MCNC: 85 PG/ML (ref 15–65)
RBC # BLD AUTO: 4.71 M/UL (ref 4.21–5.77)
RBC #/AREA URNS HPF: NORMAL /HPF (ref 0–2)
SODIUM SERPL-SCNC: 144 MMOL/L (ref 136–145)
SP GR UR STRIP: 1.02 (ref 1–1.03)
UROBILINOGEN UR STRIP-ACNC: NORMAL EU/DL (ref 0–1)
WBC #/AREA URNS HPF: NORMAL /HPF (ref 0–5)
WBC OTHER # BLD: 47 K/UL (ref 3.5–11.3)

## 2024-12-13 ENCOUNTER — ANESTHESIA (OUTPATIENT)
Dept: OPERATING ROOM | Age: 64
End: 2024-12-13
Payer: MEDICARE

## 2024-12-13 ENCOUNTER — HOSPITAL ENCOUNTER (OUTPATIENT)
Age: 64
Setting detail: OUTPATIENT SURGERY
Discharge: HOME | End: 2024-12-13
Attending: UROLOGY | Admitting: UROLOGY
Payer: MEDICARE

## 2024-12-13 ENCOUNTER — ANESTHESIA EVENT (OUTPATIENT)
Dept: OPERATING ROOM | Age: 64
End: 2024-12-13
Payer: MEDICARE

## 2024-12-13 ENCOUNTER — APPOINTMENT (OUTPATIENT)
Dept: GENERAL RADIOLOGY | Age: 64
End: 2024-12-13
Attending: UROLOGY
Payer: MEDICARE

## 2024-12-13 VITALS
HEIGHT: 71 IN | TEMPERATURE: 97.3 F | SYSTOLIC BLOOD PRESSURE: 138 MMHG | DIASTOLIC BLOOD PRESSURE: 86 MMHG | RESPIRATION RATE: 15 BRPM | HEART RATE: 83 BPM | WEIGHT: 160 LBS | OXYGEN SATURATION: 97 % | BODY MASS INDEX: 22.4 KG/M2

## 2024-12-13 DIAGNOSIS — N20.0 KIDNEY STONE: Primary | ICD-10-CM

## 2024-12-13 LAB
BILIRUB UR QL STRIP: NEGATIVE
CLARITY UR: CLEAR
COLOR UR: YELLOW
EPI CELLS #/AREA URNS HPF: ABNORMAL /HPF (ref 0–5)
GLUCOSE BLD-MCNC: 129 MG/DL (ref 75–110)
GLUCOSE BLD-MCNC: 137 MG/DL (ref 75–110)
GLUCOSE UR STRIP-MCNC: ABNORMAL MG/DL
HGB UR QL STRIP.AUTO: ABNORMAL
KETONES UR STRIP-MCNC: NEGATIVE MG/DL
LEUKOCYTE ESTERASE UR QL STRIP: ABNORMAL
MUCOUS THREADS URNS QL MICRO: ABNORMAL
NITRITE UR QL STRIP: NEGATIVE
PH UR STRIP: 5.5 [PH] (ref 5–8)
PROT UR STRIP-MCNC: ABNORMAL MG/DL
RBC #/AREA URNS HPF: ABNORMAL /HPF (ref 0–2)
SP GR UR STRIP: 1.02 (ref 1–1.03)
UROBILINOGEN UR STRIP-ACNC: NORMAL EU/DL (ref 0–1)
WBC #/AREA URNS HPF: ABNORMAL /HPF (ref 0–5)

## 2024-12-13 PROCEDURE — 3700000000 HC ANESTHESIA ATTENDED CARE: Performed by: UROLOGY

## 2024-12-13 PROCEDURE — 6360000002 HC RX W HCPCS

## 2024-12-13 PROCEDURE — 81001 URINALYSIS AUTO W/SCOPE: CPT

## 2024-12-13 PROCEDURE — 7100000000 HC PACU RECOVERY - FIRST 15 MIN: Performed by: UROLOGY

## 2024-12-13 PROCEDURE — 7100000010 HC PHASE II RECOVERY - FIRST 15 MIN: Performed by: UROLOGY

## 2024-12-13 PROCEDURE — C1769 GUIDE WIRE: HCPCS | Performed by: UROLOGY

## 2024-12-13 PROCEDURE — 7100000001 HC PACU RECOVERY - ADDTL 15 MIN: Performed by: UROLOGY

## 2024-12-13 PROCEDURE — 3700000001 HC ADD 15 MINUTES (ANESTHESIA): Performed by: UROLOGY

## 2024-12-13 PROCEDURE — 82947 ASSAY GLUCOSE BLOOD QUANT: CPT

## 2024-12-13 PROCEDURE — 6360000002 HC RX W HCPCS: Performed by: UROLOGY

## 2024-12-13 PROCEDURE — 2709999900 HC NON-CHARGEABLE SUPPLY: Performed by: UROLOGY

## 2024-12-13 PROCEDURE — C1758 CATHETER, URETERAL: HCPCS | Performed by: UROLOGY

## 2024-12-13 PROCEDURE — 6370000000 HC RX 637 (ALT 250 FOR IP): Performed by: UROLOGY

## 2024-12-13 PROCEDURE — 2580000003 HC RX 258: Performed by: ANESTHESIOLOGY

## 2024-12-13 PROCEDURE — 74018 RADEX ABDOMEN 1 VIEW: CPT

## 2024-12-13 PROCEDURE — 3600000002 HC SURGERY LEVEL 2 BASE: Performed by: UROLOGY

## 2024-12-13 PROCEDURE — C1747 HC ENDOSCOPE, SINGLE, URINARY TRACT: HCPCS | Performed by: UROLOGY

## 2024-12-13 PROCEDURE — 2720000010 HC SURG SUPPLY STERILE: Performed by: UROLOGY

## 2024-12-13 PROCEDURE — 3600000012 HC SURGERY LEVEL 2 ADDTL 15MIN: Performed by: UROLOGY

## 2024-12-13 PROCEDURE — 7100000011 HC PHASE II RECOVERY - ADDTL 15 MIN: Performed by: UROLOGY

## 2024-12-13 PROCEDURE — C2617 STENT, NON-COR, TEM W/O DEL: HCPCS | Performed by: UROLOGY

## 2024-12-13 DEVICE — URETERAL STENT
Type: IMPLANTABLE DEVICE | Site: URETER | Status: FUNCTIONAL
Brand: POLARIS™ ULTRA

## 2024-12-13 RX ORDER — ONDANSETRON 2 MG/ML
INJECTION INTRAMUSCULAR; INTRAVENOUS
Status: DISCONTINUED | OUTPATIENT
Start: 2024-12-13 | End: 2024-12-13 | Stop reason: SDUPTHER

## 2024-12-13 RX ORDER — SODIUM CHLORIDE 0.9 % (FLUSH) 0.9 %
5-40 SYRINGE (ML) INJECTION EVERY 12 HOURS SCHEDULED
Status: DISCONTINUED | OUTPATIENT
Start: 2024-12-13 | End: 2024-12-13 | Stop reason: HOSPADM

## 2024-12-13 RX ORDER — SODIUM CHLORIDE 9 MG/ML
INJECTION, SOLUTION INTRAVENOUS CONTINUOUS
Status: DISCONTINUED | OUTPATIENT
Start: 2024-12-13 | End: 2024-12-13 | Stop reason: HOSPADM

## 2024-12-13 RX ORDER — SODIUM CHLORIDE 9 MG/ML
INJECTION, SOLUTION INTRAVENOUS PRN
Status: DISCONTINUED | OUTPATIENT
Start: 2024-12-13 | End: 2024-12-13 | Stop reason: HOSPADM

## 2024-12-13 RX ORDER — SODIUM CHLORIDE, SODIUM LACTATE, POTASSIUM CHLORIDE, CALCIUM CHLORIDE 600; 310; 30; 20 MG/100ML; MG/100ML; MG/100ML; MG/100ML
INJECTION, SOLUTION INTRAVENOUS CONTINUOUS
Status: DISCONTINUED | OUTPATIENT
Start: 2024-12-13 | End: 2024-12-13 | Stop reason: HOSPADM

## 2024-12-13 RX ORDER — CIPROFLOXACIN 500 MG/1
500 TABLET, FILM COATED ORAL 2 TIMES DAILY
Qty: 20 TABLET | Refills: 0 | Status: SHIPPED | OUTPATIENT
Start: 2024-12-13 | End: 2024-12-23

## 2024-12-13 RX ORDER — HYDROCODONE BITARTRATE AND ACETAMINOPHEN 5; 325 MG/1; MG/1
1 TABLET ORAL EVERY 6 HOURS PRN
Qty: 12 TABLET | Refills: 0 | Status: SHIPPED | OUTPATIENT
Start: 2024-12-13 | End: 2024-12-16

## 2024-12-13 RX ORDER — PROPOFOL 10 MG/ML
INJECTION, EMULSION INTRAVENOUS
Status: DISCONTINUED | OUTPATIENT
Start: 2024-12-13 | End: 2024-12-13 | Stop reason: SDUPTHER

## 2024-12-13 RX ORDER — SODIUM CHLORIDE 0.9 % (FLUSH) 0.9 %
5-40 SYRINGE (ML) INJECTION PRN
Status: DISCONTINUED | OUTPATIENT
Start: 2024-12-13 | End: 2024-12-13 | Stop reason: HOSPADM

## 2024-12-13 RX ORDER — PHENAZOPYRIDINE HYDROCHLORIDE 200 MG/1
200 TABLET, FILM COATED ORAL 3 TIMES DAILY PRN
Qty: 15 TABLET | Refills: 0 | Status: SHIPPED | OUTPATIENT
Start: 2024-12-13 | End: 2024-12-18

## 2024-12-13 RX ORDER — LIDOCAINE HYDROCHLORIDE 20 MG/ML
JELLY TOPICAL PRN
Status: DISCONTINUED | OUTPATIENT
Start: 2024-12-13 | End: 2024-12-13 | Stop reason: ALTCHOICE

## 2024-12-13 RX ORDER — LIDOCAINE HYDROCHLORIDE 10 MG/ML
1 INJECTION, SOLUTION EPIDURAL; INFILTRATION; INTRACAUDAL; PERINEURAL
Status: DISCONTINUED | OUTPATIENT
Start: 2024-12-14 | End: 2024-12-13 | Stop reason: HOSPADM

## 2024-12-13 RX ORDER — NALOXONE HYDROCHLORIDE 0.4 MG/ML
INJECTION, SOLUTION INTRAMUSCULAR; INTRAVENOUS; SUBCUTANEOUS PRN
Status: DISCONTINUED | OUTPATIENT
Start: 2024-12-13 | End: 2024-12-13 | Stop reason: HOSPADM

## 2024-12-13 RX ORDER — DEXAMETHASONE SODIUM PHOSPHATE 10 MG/ML
INJECTION, SOLUTION INTRAMUSCULAR; INTRAVENOUS
Status: DISCONTINUED | OUTPATIENT
Start: 2024-12-13 | End: 2024-12-13 | Stop reason: SDUPTHER

## 2024-12-13 RX ORDER — METOCLOPRAMIDE HYDROCHLORIDE 5 MG/ML
10 INJECTION INTRAMUSCULAR; INTRAVENOUS
Status: DISCONTINUED | OUTPATIENT
Start: 2024-12-13 | End: 2024-12-13 | Stop reason: HOSPADM

## 2024-12-13 RX ORDER — FENTANYL CITRATE 50 UG/ML
25 INJECTION, SOLUTION INTRAMUSCULAR; INTRAVENOUS EVERY 5 MIN PRN
Status: DISCONTINUED | OUTPATIENT
Start: 2024-12-13 | End: 2024-12-13 | Stop reason: HOSPADM

## 2024-12-13 RX ORDER — HALOPERIDOL 5 MG/ML
1 INJECTION INTRAMUSCULAR
Status: DISCONTINUED | OUTPATIENT
Start: 2024-12-13 | End: 2024-12-13 | Stop reason: HOSPADM

## 2024-12-13 RX ORDER — CEFAZOLIN SODIUM/WATER 2 G/20 ML
2000 SYRINGE (ML) INTRAVENOUS ONCE
Status: COMPLETED | OUTPATIENT
Start: 2024-12-13 | End: 2024-12-13

## 2024-12-13 RX ORDER — LIDOCAINE HYDROCHLORIDE 20 MG/ML
INJECTION, SOLUTION EPIDURAL; INFILTRATION; INTRACAUDAL; PERINEURAL
Status: DISCONTINUED | OUTPATIENT
Start: 2024-12-13 | End: 2024-12-13 | Stop reason: SDUPTHER

## 2024-12-13 RX ORDER — FENTANYL CITRATE 50 UG/ML
INJECTION, SOLUTION INTRAMUSCULAR; INTRAVENOUS
Status: DISCONTINUED | OUTPATIENT
Start: 2024-12-13 | End: 2024-12-13 | Stop reason: SDUPTHER

## 2024-12-13 RX ORDER — PHENYLEPHRINE HCL IN 0.9% NACL 1 MG/10 ML
SYRINGE (ML) INTRAVENOUS
Status: DISCONTINUED | OUTPATIENT
Start: 2024-12-13 | End: 2024-12-13 | Stop reason: SDUPTHER

## 2024-12-13 RX ORDER — OXYCODONE HYDROCHLORIDE 5 MG/1
5 TABLET ORAL
Status: DISCONTINUED | OUTPATIENT
Start: 2024-12-13 | End: 2024-12-13 | Stop reason: HOSPADM

## 2024-12-13 RX ORDER — HYDROMORPHONE HYDROCHLORIDE 1 MG/ML
0.5 INJECTION, SOLUTION INTRAMUSCULAR; INTRAVENOUS; SUBCUTANEOUS EVERY 5 MIN PRN
Status: DISCONTINUED | OUTPATIENT
Start: 2024-12-13 | End: 2024-12-13 | Stop reason: HOSPADM

## 2024-12-13 RX ADMIN — PROPOFOL 200 MG: 10 INJECTION, EMULSION INTRAVENOUS at 08:30

## 2024-12-13 RX ADMIN — DEXAMETHASONE SODIUM PHOSPHATE 10 MG: 10 INJECTION INTRAMUSCULAR; INTRAVENOUS at 08:30

## 2024-12-13 RX ADMIN — Medication 100 MCG: at 09:02

## 2024-12-13 RX ADMIN — Medication 100 MCG: at 08:36

## 2024-12-13 RX ADMIN — LIDOCAINE HYDROCHLORIDE 60 MG: 20 INJECTION, SOLUTION EPIDURAL; INFILTRATION; INTRACAUDAL; PERINEURAL at 08:30

## 2024-12-13 RX ADMIN — SODIUM CHLORIDE: 9 INJECTION, SOLUTION INTRAVENOUS at 07:06

## 2024-12-13 RX ADMIN — Medication 2000 MG: at 08:30

## 2024-12-13 RX ADMIN — FENTANYL CITRATE 50 MCG: 50 INJECTION INTRAMUSCULAR; INTRAVENOUS at 08:47

## 2024-12-13 RX ADMIN — ONDANSETRON 4 MG: 2 INJECTION, SOLUTION INTRAMUSCULAR; INTRAVENOUS at 09:10

## 2024-12-13 RX ADMIN — Medication 100 MCG: at 08:46

## 2024-12-13 RX ADMIN — Medication 100 MCG: at 08:38

## 2024-12-13 RX ADMIN — FENTANYL CITRATE 50 MCG: 50 INJECTION INTRAMUSCULAR; INTRAVENOUS at 08:30

## 2024-12-13 RX ADMIN — Medication 100 MCG: at 08:52

## 2024-12-13 NOTE — H&P
Interval H&P Note    Pt Name: Kade Jade  MRN: [id number]  YOB: 1960  Date of evaluation: 12/13/2024      [x] I have reviewed in Epic the urology consult note by Dr. Guillen dated 11/13/2024, attached below for an Interval History and Physical note.     [x] I have examined  Kade Jade, a 64 y.o. male.There are no changes to the patient who is scheduled for CYSTO  LEFT STENT EXCHANGE   URETEROSCOPY  HOLMIUM LASER LITHOTRIPSY by Paulo Guillen MD for Kidney stone. The patient denies new health changes, fever, chills, wheezing, cough, increased SOB, chest pain, open sores or wounds. Known diabetes, POC . Last aspirin 12/07/2024.     Vital signs: /75   Pulse 88   Temp 97.1 °F (36.2 °C) (Infrared)   Resp 18   Ht 1.803 m (5' 11\")   Wt 72.6 kg (160 lb)   SpO2 98%   BMI 22.32 kg/m²     Allergies:  Dairycare [bacid] and Lisinopril    Medications:    Prior to Admission medications    Medication Sig Start Date End Date Taking? Authorizing Provider   hydrALAZINE (APRESOLINE) 25 MG tablet TAKE 1 TABLET BY MOUTH DAILY 10/28/24  Yes Mackenzie Sanchez MD   FARXIGA 5 MG tablet TAKE 1 TABLET BY MOUTH EVERY MORNING 10/28/24  Yes Mackenzie Sanchez MD   FLUoxetine (PROZAC) 20 MG capsule Take 1 capsule by mouth daily 9/25/24  Yes Omer Pacheco MD   amLODIPine (NORVASC) 10 MG tablet TAKE 1 TABLET BY MOUTH DAILY 8/22/24  Yes Mackenzie Sanchez MD   rosuvastatin (CRESTOR) 40 MG tablet Take 1 tablet by mouth daily 8/20/24  Yes Omer Pacheco MD   insulin glargine (LANTUS SOLOSTAR) 100 UNIT/ML injection pen INJECT 10 UNITS UNDER THE SKIN ONCE NIGHTLY 4/5/24  Yes Mackenzie Sanchez MD   vitamin D3 (CHOLECALCIFEROL) 25 MCG (1000 UT) TABS tablet Take 1 tablet by mouth daily 8/2/23  Yes ProviderMagui MD   insulin lispro, 1 Unit Dial, (HUMALOG KWIKPEN) 100 UNIT/ML SOPN INJECT UNDER THE SKIN PER SLIDING SCALE WITH A MAX DOSE OF 48 UNITS PER DAY  Patient taking differently: Inject into the skin 3 times daily (before  stomach and duodenal sweep: Small sliding-type hiatal hernia  with mild thickening of distal esophagus.     Liver: Unremarkable     Gallbladder: Unremarkable     Biliary tree: Unremarkable     Pancreas: Unremarkable for patient's age.     Spleen: Unremarkable     Kidneys and ureters: Asymmetric enhancement the left kidney with left-sided  hydronephrosis and hydroureter secondary to a large stone seen in the  proximal left ureter measuring 7 x 5 mm.     Additional nonobstructing punctate left-sided calculus.  Contrast in the  right collecting system limits evaluation for underlying stone.  Subcentimeter right-sided renal cyst.     Adrenal glands: Unremarkable     Retroperitoneal structures:  Unremarkable     Small bowel and colon: Descending and sigmoid diverticulosis.  No bowel  obstruction.     Appendix: Go to lymph nodes mildly enlarged bilateral inguinal lymph nodes  with the left measuring 1.8 cm on the right measuring 1.7 cm.  Additional  visible pelvic lymph nodes although not enlarged by CT criteria.     Urinary bladder: Unremarkable     Free fluid/air: None     Lymph nodes: No enlarged lymph nodes     Osseus structures: No destructive lesion     Vasculature: No aneurysm     Other: Small fat containing left inguinal hernia.     IMPRESSION:  1. Obstructing stones in the proximal left ureter.  Assessment and Plan   Impression:    Problem List       Patient Active Problem List   Diagnosis    Essential hypertension    Mixed hyperlipidemia    Right hemiplegia (HCC)    Anxiety and depression    Influenza vaccination declined    Pneumococcal vaccination declined    Tetanus, diphtheria, and acellular pertussis (Tdap) vaccination declined    History of COVID-19    Type 1 diabetes mellitus with stage 3a chronic kidney disease (HCC)    CLL (chronic lymphocytic leukemia) (HCC)    DKA, type 1, not at goal (HCC)            Plan: Discussed with patient and family we will proceed with cystoscopy pyelogram and stent

## 2024-12-13 NOTE — OP NOTE
Operative Note      Patient: Kade Jade  YOB: 1960  MRN: [id number]    Date of Procedure: 12/13/2024    Pre-Op Diagnosis Codes:      * Kidney stone [N20.0]    Post-Op Diagnosis: {MH OR SAME:535327303}       Procedure(s):  CYSTO  LEFT STENT EXCHANGE   URETEROSCOPY  HOLMIUM LASER LITHOTRIPSY    Surgeon(s):  Paulo Guillen MD    Assistant:   * No surgical staff found *    Anesthesia: General    Estimated Blood Loss (mL): {NUMBERS; EBL:91083}    Complications: {Symptoms; Intra-op complications:51918}    Specimens:   ID Type Source Tests Collected by Time Destination   1 : Urine Urine Urine, Cystoscopic URINALYSIS WITH REFLEX TO CULTURE Paulo Guillen MD 12/13/2024 0847        Implants:  Implant Name Type Inv. Item Serial No.  Lot No. LRB No. Used Action   STENT URET 7FR L26CM PERCFLX HYDR+ DBL PGTL THRD 2 - [id number]  STENT URET 7FR L26CM PERCFLX HYDR+ DBL PGTL THRD 2  [facility]- [id number] Left 1 Implanted         Drains: * No LDAs found *    Findings:  Infection Present At Time Of Surgery (PATOS) (choose all levels that have infection present):  {PATOS LEVELS:126002460}  Other Findings: ***    Detailed Description of Procedure:   ***    Electronically signed by Paulo Guillen MD on 12/13/2024 at 9:28 AM

## 2024-12-13 NOTE — ANESTHESIA PRE PROCEDURE
Cardiovascular:    (+) hypertension:, hyperlipidemia    (-)  angina    ECG reviewed  Rhythm: regular  Rate: normal  Echocardiogram reviewed               ROS comment: EKG 2020:  Normal sinus rhythm / artifacts  Normal ECG  When compared with ECG of 25-APR-1996 07:17,  No significant change was found    Echo 2019:  ·  Left Ventricle: Systolic function is normal with an ejection fraction   of 60-65%.   ·  Left Ventricle: There is  mild increased wall thickness/hypertrophy.        Neuro/Psych:   (+) CVA (Right hemiplegia):            GI/Hepatic/Renal:   (+) renal disease: kidney stones and ARF          Endo/Other:    (+) Diabetesusing insulin, malignancy/cancer (CLL).                 Abdominal:             Vascular:          Other Findings:             Anesthesia Plan      general     ASA 3       Induction: intravenous.    MIPS: Postoperative opioids intended and Prophylactic antiemetics administered.  Anesthetic plan and risks discussed with patient and sibling.                      MARIOLA HO MD   12/13/2024

## 2024-12-13 NOTE — ANESTHESIA POSTPROCEDURE EVALUATION
Department of Anesthesiology  Postprocedure Note    Patient: Kade Jade  MRN: [id number]  YOB: 1960  Date of evaluation: 12/13/2024    Procedure Summary       Date: 12/13/24 Room / Location: [facility]    Anesthesia Start: 0826 Anesthesia Stop: 0926    Procedure: CYSTO  LEFT STENT EXCHANGE   URETEROSCOPY  HOLMIUM LASER LITHOTRIPSY (Left) Diagnosis:       Kidney stone      (Kidney stone [N20.0])    Surgeons: Paulo Guillen MD Responsible Provider: Arnulfo Munoz MD    Anesthesia Type: general ASA Status: 3            Anesthesia Type: No value filed.    Jackelyn Phase I: Jackelyn Score: 10    Jackelyn Phase II: Jackelyn Score: 10    Anesthesia Post Evaluation    Patient location during evaluation: PACU  Patient participation: complete - patient participated  Level of consciousness: awake  Airway patency: patent  Nausea & Vomiting: no nausea  Cardiovascular status: blood pressure returned to baseline  Respiratory status: acceptable  Hydration status: euvolemic  Comments: Multimodal analgesia pain management as indicated by procedure  Multimodal analgesia pain management approach  Pain management: adequate    No notable events documented.

## 2025-01-09 ENCOUNTER — TELEPHONE (OUTPATIENT)
Dept: ONCOLOGY | Age: 65
End: 2025-01-09

## 2025-01-09 ENCOUNTER — OFFICE VISIT (OUTPATIENT)
Dept: ONCOLOGY | Age: 65
End: 2025-01-09
Payer: MEDICARE

## 2025-01-09 ENCOUNTER — HOSPITAL ENCOUNTER (OUTPATIENT)
Age: 65
Setting detail: SPECIMEN
Discharge: HOME | End: 2025-01-09
Payer: MEDICARE

## 2025-01-09 VITALS
WEIGHT: 163.4 LBS | BODY MASS INDEX: 22.79 KG/M2 | DIASTOLIC BLOOD PRESSURE: 72 MMHG | TEMPERATURE: 99 F | HEART RATE: 93 BPM | SYSTOLIC BLOOD PRESSURE: 112 MMHG | RESPIRATION RATE: 16 BRPM

## 2025-01-09 DIAGNOSIS — C91.10 CLL (CHRONIC LYMPHOCYTIC LEUKEMIA) (HCC): ICD-10-CM

## 2025-01-09 DIAGNOSIS — C91.10 CLL (CHRONIC LYMPHOCYTIC LEUKEMIA) (HCC): Primary | ICD-10-CM

## 2025-01-09 LAB
BASOPHILS # BLD: 0 K/UL (ref 0–0.2)
BASOPHILS NFR BLD: 0 % (ref 0–2)
EOSINOPHIL # BLD: 0 K/UL (ref 0–0.44)
EOSINOPHILS RELATIVE PERCENT: 0 % (ref 1–4)
ERYTHROCYTE [DISTWIDTH] IN BLOOD BY AUTOMATED COUNT: 13.5 % (ref 11.8–14.4)
HCT VFR BLD AUTO: 38.6 % (ref 40.7–50.3)
HGB BLD-MCNC: 12.8 G/DL (ref 13–17)
IMM GRANULOCYTES # BLD AUTO: 0 K/UL (ref 0–0.3)
IMM GRANULOCYTES NFR BLD: 0 %
LYMPHOCYTES NFR BLD: 29.44 K/UL (ref 1.1–3.7)
LYMPHOCYTES RELATIVE PERCENT: 80 % (ref 24–43)
MCH RBC QN AUTO: 31.4 PG (ref 25.2–33.5)
MCHC RBC AUTO-ENTMCNC: 33.2 G/DL (ref 28.4–34.8)
MCV RBC AUTO: 94.6 FL (ref 82.6–102.9)
MONOCYTES NFR BLD: 2.21 K/UL (ref 0.1–1.2)
MONOCYTES NFR BLD: 6 % (ref 3–12)
MORPHOLOGY: ABNORMAL
NEUTROPHILS NFR BLD: 14 % (ref 36–65)
NEUTS SEG NFR BLD: 5.15 K/UL (ref 1.5–8.1)
NRBC BLD-RTO: 0 PER 100 WBC
PLATELET # BLD AUTO: 149 K/UL (ref 138–453)
PMV BLD AUTO: 10.2 FL (ref 8.1–13.5)
RBC # BLD AUTO: 4.08 M/UL (ref 4.21–5.77)
WBC OTHER # BLD: 36.8 K/UL (ref 3.5–11.3)

## 2025-01-09 PROCEDURE — 99211 OFF/OP EST MAY X REQ PHY/QHP: CPT | Performed by: INTERNAL MEDICINE

## 2025-01-09 PROCEDURE — 3078F DIAST BP <80 MM HG: CPT | Performed by: INTERNAL MEDICINE

## 2025-01-09 PROCEDURE — 3074F SYST BP LT 130 MM HG: CPT | Performed by: INTERNAL MEDICINE

## 2025-01-09 PROCEDURE — 99214 OFFICE O/P EST MOD 30 MIN: CPT | Performed by: INTERNAL MEDICINE

## 2025-01-09 PROCEDURE — 36415 COLL VENOUS BLD VENIPUNCTURE: CPT

## 2025-01-09 PROCEDURE — 85025 COMPLETE CBC W/AUTO DIFF WBC: CPT

## 2025-01-09 NOTE — TELEPHONE ENCOUNTER
LANI HERE FOR MD VISIT  RV 3-4 months with CBC at RV  LAB CBC 5/15/25  MD VISIT 5/15/25 @ 8:15AM  AVS PRINTED W/ INSTRUCTIONS AND GIVEN TO PT ON EXIT

## 2025-01-09 NOTE — PROGRESS NOTES
Chief Complaint   Patient presents with    Follow-up     Kidney stone /  ED visits     Discuss Labs     DIAGNOSIS:       CLL, Stage 1  Lymphadenopathy.   Persistent leukocytosis  Persistent neutropenia  Persistent lymphocytosis  History of CVA/right-sided hemiplegia with residual weakness  History of hypertension and diabetes  CURRENT THERAPY:         Observation for CLL.  No indications for treatment.  BRIEF CASE HISTORY:      Mr. Kade Jade is a very pleasant 64 y.o. male with history of multiple co morbidities as listed.  The patient is referred for evaluation and management of leukocytosis.  Patient had history of diabetes and hypertension.  He had CVA with right-sided hemiplegia in 2019.  He had residual right-sided weakness.  Patient has no other major health problems.  Patient was noted to have persistent elevation of white blood cells over the last few years.  He had no symptoms related to that abnormality.  No repeated infections.  No fever or night sweats.  No weight loss or decreased appetite.  No enlarged lymph nodes.  No history of smoking or alcohol drinking..   INTERIM HISTORY:   Seen for follow up  CLL.  Doing well clinically.  No fever or night sweats.  No weight loss or decreased appetite.  No new enlarged lymph nodes.  No repeated infections.  He was hospitalized due to kidney stones back in November 2024.  He had lithotripsy and stent and stent later removed.  Doing well otherwise.      PAST MEDICAL HISTORY: has a past medical history of Cancer (HCC), Cerebral artery occlusion with cerebral infarction (HCC), CLL (chronic lymphocytic leukemia) (HCC), Diabetes mellitus (HCC), High cholesterol, Hypertension, and Kidney stone.    PAST SURGICAL HISTORY: has a past surgical history that includes knee surgery; Cystoscopy (N/A, 11/13/2024); and Lithotripsy (Left, 12/13/2024).     CURRENT MEDICATIONS:  has a

## 2025-02-04 ENCOUNTER — OFFICE VISIT (OUTPATIENT)
Dept: FAMILY MEDICINE CLINIC | Age: 65
End: 2025-02-04
Payer: MEDICARE

## 2025-02-04 VITALS
TEMPERATURE: 98.2 F | OXYGEN SATURATION: 97 % | SYSTOLIC BLOOD PRESSURE: 120 MMHG | DIASTOLIC BLOOD PRESSURE: 60 MMHG | BODY MASS INDEX: 21.73 KG/M2 | WEIGHT: 155.2 LBS | RESPIRATION RATE: 12 BRPM | HEART RATE: 85 BPM | HEIGHT: 71 IN

## 2025-02-04 DIAGNOSIS — I10 ESSENTIAL HYPERTENSION: ICD-10-CM

## 2025-02-04 DIAGNOSIS — F32.A ANXIETY AND DEPRESSION: ICD-10-CM

## 2025-02-04 DIAGNOSIS — E78.2 MIXED HYPERLIPIDEMIA: ICD-10-CM

## 2025-02-04 DIAGNOSIS — E10.22 TYPE 1 DIABETES MELLITUS WITH STAGE 3A CHRONIC KIDNEY DISEASE (HCC): ICD-10-CM

## 2025-02-04 DIAGNOSIS — Z28.21 VACCINATION DECLINED: ICD-10-CM

## 2025-02-04 DIAGNOSIS — E10.10 DKA, TYPE 1, NOT AT GOAL (HCC): ICD-10-CM

## 2025-02-04 DIAGNOSIS — H93.8X3 EAR FULLNESS, BILATERAL: Primary | ICD-10-CM

## 2025-02-04 DIAGNOSIS — G81.91 RIGHT HEMIPLEGIA (HCC): ICD-10-CM

## 2025-02-04 DIAGNOSIS — F41.9 ANXIETY AND DEPRESSION: ICD-10-CM

## 2025-02-04 DIAGNOSIS — C91.10 CLL (CHRONIC LYMPHOCYTIC LEUKEMIA) (HCC): ICD-10-CM

## 2025-02-04 DIAGNOSIS — N18.31 TYPE 1 DIABETES MELLITUS WITH STAGE 3A CHRONIC KIDNEY DISEASE (HCC): ICD-10-CM

## 2025-02-04 PROBLEM — Z86.16 HISTORY OF COVID-19: Status: RESOLVED | Noted: 2021-01-05 | Resolved: 2025-02-04

## 2025-02-04 PROCEDURE — 3074F SYST BP LT 130 MM HG: CPT | Performed by: FAMILY MEDICINE

## 2025-02-04 PROCEDURE — 99214 OFFICE O/P EST MOD 30 MIN: CPT | Performed by: FAMILY MEDICINE

## 2025-02-04 PROCEDURE — 3078F DIAST BP <80 MM HG: CPT | Performed by: FAMILY MEDICINE

## 2025-02-04 ASSESSMENT — ENCOUNTER SYMPTOMS
RESPIRATORY NEGATIVE: 1
ALLERGIC/IMMUNOLOGIC NEGATIVE: 1
BACK PAIN: 1
GASTROINTESTINAL NEGATIVE: 1
EYES NEGATIVE: 1

## 2025-02-04 ASSESSMENT — PATIENT HEALTH QUESTIONNAIRE - PHQ9
1. LITTLE INTEREST OR PLEASURE IN DOING THINGS: NOT AT ALL
SUM OF ALL RESPONSES TO PHQ QUESTIONS 1-9: 0
SUM OF ALL RESPONSES TO PHQ QUESTIONS 1-9: 0
9. THOUGHTS THAT YOU WOULD BE BETTER OFF DEAD, OR OF HURTING YOURSELF: NOT AT ALL
6. FEELING BAD ABOUT YOURSELF - OR THAT YOU ARE A FAILURE OR HAVE LET YOURSELF OR YOUR FAMILY DOWN: NOT AT ALL
7. TROUBLE CONCENTRATING ON THINGS, SUCH AS READING THE NEWSPAPER OR WATCHING TELEVISION: NOT AT ALL
4. FEELING TIRED OR HAVING LITTLE ENERGY: NOT AT ALL
3. TROUBLE FALLING OR STAYING ASLEEP: NOT AT ALL
SUM OF ALL RESPONSES TO PHQ QUESTIONS 1-9: 0
10. IF YOU CHECKED OFF ANY PROBLEMS, HOW DIFFICULT HAVE THESE PROBLEMS MADE IT FOR YOU TO DO YOUR WORK, TAKE CARE OF THINGS AT HOME, OR GET ALONG WITH OTHER PEOPLE: NOT DIFFICULT AT ALL
8. MOVING OR SPEAKING SO SLOWLY THAT OTHER PEOPLE COULD HAVE NOTICED. OR THE OPPOSITE, BEING SO FIGETY OR RESTLESS THAT YOU HAVE BEEN MOVING AROUND A LOT MORE THAN USUAL: NOT AT ALL
2. FEELING DOWN, DEPRESSED OR HOPELESS: NOT AT ALL
SUM OF ALL RESPONSES TO PHQ QUESTIONS 1-9: 0
5. POOR APPETITE OR OVEREATING: NOT AT ALL
SUM OF ALL RESPONSES TO PHQ9 QUESTIONS 1 & 2: 0

## 2025-02-04 NOTE — PROGRESS NOTES
Subjective:      Patient ID: Kade Jade is a 64 y.o. male.    Ear Fullness   There is pain in both ears. This is a new problem. The current episode started 1 to 4 weeks ago. The problem occurs constantly. The problem has been unchanged. There has been no fever. The pain is at a severity of 0/10. The patient is experiencing no pain. Associated symptoms include hearing loss. He has tried nothing for the symptoms. The treatment provided no relief.       Review of Systems   Constitutional: Negative.    HENT:  Positive for hearing loss.    Eyes: Negative.    Respiratory: Negative.     Cardiovascular: Negative.    Gastrointestinal: Negative.    Endocrine: Negative.    Musculoskeletal:  Positive for arthralgias, back pain and gait problem.   Skin: Negative.    Allergic/Immunologic: Negative.    Neurological:  Positive for weakness.   Hematological: Negative.    Psychiatric/Behavioral:  The patient is nervous/anxious.    Past family and social history unremarkable.  .iag      Objective:   Physical Exam  Vitals and nursing note reviewed.   Constitutional:       Appearance: He is well-developed.   HENT:      Head: Normocephalic and atraumatic.      Right Ear: External ear normal.      Left Ear: External ear normal.      Ears:      Comments: Bilateral moderate to severe cerumen impaction     Nose: Nose normal.   Eyes:      Conjunctiva/sclera: Conjunctivae normal.      Pupils: Pupils are equal, round, and reactive to light.   Cardiovascular:      Rate and Rhythm: Normal rate and regular rhythm.      Heart sounds: Normal heart sounds.      Comments: Diabetes  Hypertension  Hyperlipidemia  Pulmonary:      Effort: Pulmonary effort is normal.      Breath sounds: Normal breath sounds.   Abdominal:      General: Bowel sounds are normal.      Palpations: Abdomen is soft.   Genitourinary:     Comments: Kidney stone status post cystoscopy and stent  Musculoskeletal:         General: Normal range of motion.      Cervical back: Normal

## 2025-02-11 ENCOUNTER — OFFICE VISIT (OUTPATIENT)
Dept: NEUROLOGY | Age: 65
End: 2025-02-11
Payer: MEDICARE

## 2025-02-11 VITALS
BODY MASS INDEX: 21.73 KG/M2 | HEIGHT: 71 IN | SYSTOLIC BLOOD PRESSURE: 124 MMHG | DIASTOLIC BLOOD PRESSURE: 75 MMHG | WEIGHT: 155.2 LBS | HEART RATE: 92 BPM

## 2025-02-11 DIAGNOSIS — M21.371 FOOT DROP, RIGHT: ICD-10-CM

## 2025-02-11 DIAGNOSIS — F32.A ANXIETY AND DEPRESSION: Primary | ICD-10-CM

## 2025-02-11 DIAGNOSIS — G81.91 RIGHT HEMIPLEGIA (HCC): ICD-10-CM

## 2025-02-11 DIAGNOSIS — Z86.73 HISTORY OF STROKE: ICD-10-CM

## 2025-02-11 DIAGNOSIS — F41.9 ANXIETY AND DEPRESSION: Primary | ICD-10-CM

## 2025-02-11 PROCEDURE — 99214 OFFICE O/P EST MOD 30 MIN: CPT | Performed by: PSYCHIATRY & NEUROLOGY

## 2025-02-11 PROCEDURE — 3074F SYST BP LT 130 MM HG: CPT | Performed by: PSYCHIATRY & NEUROLOGY

## 2025-02-11 PROCEDURE — 3078F DIAST BP <80 MM HG: CPT | Performed by: PSYCHIATRY & NEUROLOGY

## 2025-02-11 NOTE — PROGRESS NOTES
[facility]            [address], [address]          [place], [place] [address]          Dept: [phone]          Dept Fax: [phone]      2/20/2024    HISTORY OF PRESENT ILLNESS:       I had the pleasure of seeing Kade Jade, 63 RHM with HTN, DM has  depression and chronic lymphocytic leukemia ( stage 1 - untreated now ),  previous left internal capsule ischemic stroke secondary to uncontrolled hypertension and diabetes in August 2019 with residual right hemiparesis.    For depression he was prescribed prozac 20 mg daily. He is accompanied by his sister who has noticed improvements in his mood with use of prozac.     Prior left internal capsule ischemic stroke secondary to uncontrolled hypertension and uncontrolled diabetes in August 2019. For secondary stroke prevention he is on aspirin 81 mg daily and lipitor 40 mg daily. Lipid profile completed in December 2022 with LDL of 114. He has continued having right sided weakness. He reports a mixed compliance to lipitor which he reports may be why his A1c was 6.4 and  was elevated in 10/23.     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, did payroll    Testing reviewed:    Lipid profile 12/5/2022  Cholesterol <200 mg/dL 170      HDL >40 mg/dL 36 Low          LDL Cholesterol 0 - 130 mg/dL 114      Lipid Panel 2/22/2022  Cholesterol <200 mg/dL 173       HDL >40 mg/dL 34 Low       LDL Cholesterol 0 - 130 mg/dL 110      Labs Completed 8/6/2021     Cholesterol <200 mg/dL 130       HDL >40 mg/dL 29 Low       LDL Cholesterol 0 - 130 mg/dL 65      Triglycerides <150 mg/dL 180 High       Hemoglobin A1C 4.0 - 6.0 % 6.9 High       ALT 5 - 41 U/L 24    AST <40 U/L 23          Labs Completed 1/6/2021  Hemoglobin A1C 6.5High        Cholesterol 167  <200 mg/dL      HDL 34Low   >40 mg/dL      LDL Cholesterol 111  0 - 130 mg/dL

## 2025-02-26 RX ORDER — AMLODIPINE BESYLATE 10 MG/1
10 TABLET ORAL DAILY
Qty: 90 TABLET | Refills: 0 | Status: SHIPPED | OUTPATIENT
Start: 2025-02-26

## 2025-02-26 NOTE — TELEPHONE ENCOUNTER
Kade Jade is calling to request a refill on the following medication(s):    Medication Request:  Requested Prescriptions     Pending Prescriptions Disp Refills    amLODIPine (NORVASC) 10 MG tablet 90 tablet 0     Sig: Take 1 tablet by mouth daily       Last Visit Date (If Applicable):  2/4/2025    Next Visit Date:    5/6/2025                
Home

## 2025-04-03 DIAGNOSIS — F32.A ANXIETY AND DEPRESSION: ICD-10-CM

## 2025-04-03 DIAGNOSIS — F41.9 ANXIETY AND DEPRESSION: ICD-10-CM

## 2025-04-03 NOTE — TELEPHONE ENCOUNTER
[facility] sent a fax requesting a new fluoxetine Rx.      Medication active on med list: yes      Date of last fill: 9/25/24 for #30 and 5 refills  verified on 4/3/2025        verified by Lisa THOMASON LPN      Date of last appointment: 2/11/2025    Next Visit Date: 1 yr f/u - schedule currently unavailable

## 2025-04-10 RX ORDER — HYDRALAZINE HYDROCHLORIDE 25 MG/1
25 TABLET, FILM COATED ORAL DAILY
Qty: 90 TABLET | Refills: 0 | Status: SHIPPED | OUTPATIENT
Start: 2025-04-10

## 2025-04-10 RX ORDER — AMLODIPINE BESYLATE 10 MG/1
10 TABLET ORAL DAILY
Qty: 90 TABLET | Refills: 0 | Status: SHIPPED | OUTPATIENT
Start: 2025-04-10

## 2025-04-10 RX ORDER — DAPAGLIFLOZIN 5 MG/1
5 TABLET, FILM COATED ORAL EVERY MORNING
Qty: 90 TABLET | Refills: 0 | Status: SHIPPED | OUTPATIENT
Start: 2025-04-10

## 2025-04-10 RX ORDER — INSULIN LISPRO 100 [IU]/ML
INJECTION, SOLUTION INTRAVENOUS; SUBCUTANEOUS
Qty: 6 ADJUSTABLE DOSE PRE-FILLED PEN SYRINGE | Refills: 5 | Status: SHIPPED | OUTPATIENT
Start: 2025-04-10

## 2025-04-10 NOTE — TELEPHONE ENCOUNTER
Kade Jade is calling to request a refill on the following medication(s):    Medication Request:  Requested Prescriptions     Pending Prescriptions Disp Refills    amLODIPine (NORVASC) 10 MG tablet 90 tablet 0     Sig: Take 1 tablet by mouth daily    dapagliflozin (FARXIGA) 5 MG tablet 90 tablet 1     Sig: Take 1 tablet by mouth every morning    hydrALAZINE (APRESOLINE) 25 MG tablet 90 tablet 1     Sig: Take 1 tablet by mouth daily    insulin lispro, 1 Unit Dial, (HUMALOG KWIKPEN) 100 UNIT/ML SOPN 15 mL 5     Sig: INJECT UNDER THE SKIN PER SLIDING SCALE WITH A MAX DOSE OF 48 UNITS PER DAY       Last Visit Date (If Applicable):  2/4/2025    Next Visit Date:    5/6/2025    Last refilled on 6/2/23, 10/28/24, and 2/26/25. Prescriptions pending.

## 2025-04-10 NOTE — TELEPHONE ENCOUNTER
----- Message from Zach ROGERS sent at 4/10/2025 11:22 AM EDT -----  Regarding: ECC Message to Provider  ECC Message to Provider    Relationship to Patient: Covered Entity :   pharmacy     Additional Information : caller wanted to follow up the request to all active prescription of the patient to be sent to fax number [phone]  --------------------------------------------------------------------------------------------------------------------------    Call Back Information: OK to leave message on voicemail  Preferred Call Back Number: Phone : [phone]

## 2025-04-14 DIAGNOSIS — N18.31 TYPE 1 DIABETES MELLITUS WITH STAGE 3A CHRONIC KIDNEY DISEASE (HCC): ICD-10-CM

## 2025-04-14 DIAGNOSIS — E10.22 TYPE 1 DIABETES MELLITUS WITH STAGE 3A CHRONIC KIDNEY DISEASE (HCC): ICD-10-CM

## 2025-04-14 RX ORDER — INSULIN GLARGINE 100 [IU]/ML
INJECTION, SOLUTION SUBCUTANEOUS
Qty: 18 ML | Refills: 1 | Status: SHIPPED | OUTPATIENT
Start: 2025-04-14

## 2025-04-14 NOTE — TELEPHONE ENCOUNTER
Kade Jade is calling to request a refill on the following medication(s):    Last Visit Date (If Applicable):  2/4/2025    Next Visit Date:    5/6/2025    Medication Request:  Requested Prescriptions     Pending Prescriptions Disp Refills    insulin glargine (LANTUS SOLOSTAR) 100 UNIT/ML injection pen 18 mL 1     Sig: INJECT 10 UNITS UNDER THE SKIN ONCE NIGHTLY

## 2025-04-21 ENCOUNTER — TELEPHONE (OUTPATIENT)
Dept: FAMILY MEDICINE CLINIC | Age: 65
End: 2025-04-21

## 2025-04-21 NOTE — TELEPHONE ENCOUNTER
----- Message from Eddie CRANE sent at 4/21/2025  1:29 PM EDT -----  Regarding: ECC Message to Provider  ECC Message to Provider    Relationship to Patient: Self     Additional Information Pt says he's now changing pharmacy from [facility] into [facility]  --------------------------------------------------------------------------------------------------------------------------    Call Back Information: OK to leave message on voicemail  Preferred Call Back Number: Phone [phone]

## 2025-04-23 ENCOUNTER — PATIENT MESSAGE (OUTPATIENT)
Dept: FAMILY MEDICINE CLINIC | Age: 65
End: 2025-04-23

## 2025-04-28 DIAGNOSIS — N18.31 TYPE 1 DIABETES MELLITUS WITH STAGE 3A CHRONIC KIDNEY DISEASE (HCC): ICD-10-CM

## 2025-04-28 DIAGNOSIS — E10.22 TYPE 1 DIABETES MELLITUS WITH STAGE 3A CHRONIC KIDNEY DISEASE (HCC): ICD-10-CM

## 2025-04-28 RX ORDER — INSULIN LISPRO 100 [IU]/ML
INJECTION, SOLUTION INTRAVENOUS; SUBCUTANEOUS
Qty: 6 ADJUSTABLE DOSE PRE-FILLED PEN SYRINGE | Refills: 5 | Status: SHIPPED | OUTPATIENT
Start: 2025-04-28

## 2025-04-28 RX ORDER — INSULIN GLARGINE 100 [IU]/ML
INJECTION, SOLUTION SUBCUTANEOUS
Qty: 18 ML | Refills: 1 | Status: SHIPPED | OUTPATIENT
Start: 2025-04-28

## 2025-04-28 NOTE — TELEPHONE ENCOUNTER
Kade Jade is calling to request a refill on the following medication(s):    Medication Request:  Requested Prescriptions     Pending Prescriptions Disp Refills    insulin glargine (LANTUS SOLOSTAR) 100 UNIT/ML injection pen 18 mL 1     Sig: INJECT 10 UNITS UNDER THE SKIN ONCE NIGHTLY    insulin lispro, 1 Unit Dial, (HUMALOG KWIKPEN) 100 UNIT/ML SOPN 6 Adjustable Dose Pre-filled Pen Syringe 5     Sig: Random blood sugar between 130-150-2 units  150-200-4 units  201-250-6 units  251-300-8 units  301-350- -10 units-  301-400-12 units  More than 400 mg per DL-call physician       Last Visit Date (If Applicable):  2/4/2025    Next Visit Date:    5/6/2025    Last refilled on 4/10/25 to a different pharmacy. Prescriptions pending.

## 2025-05-06 ENCOUNTER — OFFICE VISIT (OUTPATIENT)
Dept: FAMILY MEDICINE CLINIC | Age: 65
End: 2025-05-06
Payer: MEDICARE

## 2025-05-06 ENCOUNTER — HOSPITAL ENCOUNTER (OUTPATIENT)
Age: 65
Setting detail: SPECIMEN
Discharge: HOME | End: 2025-05-06

## 2025-05-06 VITALS
BODY MASS INDEX: 23.02 KG/M2 | RESPIRATION RATE: 12 BRPM | SYSTOLIC BLOOD PRESSURE: 120 MMHG | WEIGHT: 164.4 LBS | HEIGHT: 71 IN | TEMPERATURE: 98.2 F | HEART RATE: 77 BPM | DIASTOLIC BLOOD PRESSURE: 66 MMHG | OXYGEN SATURATION: 97 %

## 2025-05-06 DIAGNOSIS — E78.2 MIXED HYPERLIPIDEMIA: ICD-10-CM

## 2025-05-06 DIAGNOSIS — E10.22 TYPE 1 DIABETES MELLITUS WITH STAGE 3A CHRONIC KIDNEY DISEASE (HCC): ICD-10-CM

## 2025-05-06 DIAGNOSIS — C91.10 CLL (CHRONIC LYMPHOCYTIC LEUKEMIA) (HCC): ICD-10-CM

## 2025-05-06 DIAGNOSIS — G81.91 RIGHT HEMIPLEGIA (HCC): ICD-10-CM

## 2025-05-06 DIAGNOSIS — N18.31 TYPE 1 DIABETES MELLITUS WITH STAGE 3A CHRONIC KIDNEY DISEASE (HCC): ICD-10-CM

## 2025-05-06 DIAGNOSIS — F41.9 ANXIETY AND DEPRESSION: ICD-10-CM

## 2025-05-06 DIAGNOSIS — Z28.21 VACCINATION DECLINED: ICD-10-CM

## 2025-05-06 DIAGNOSIS — Z12.11 COLON CANCER SCREENING: ICD-10-CM

## 2025-05-06 DIAGNOSIS — H61.22 HEARING LOSS OF LEFT EAR DUE TO CERUMEN IMPACTION: ICD-10-CM

## 2025-05-06 DIAGNOSIS — I10 ESSENTIAL HYPERTENSION: Primary | ICD-10-CM

## 2025-05-06 DIAGNOSIS — F32.A ANXIETY AND DEPRESSION: ICD-10-CM

## 2025-05-06 PROBLEM — E10.10 DKA, TYPE 1, NOT AT GOAL (HCC): Status: RESOLVED | Noted: 2024-11-13 | Resolved: 2025-05-06

## 2025-05-06 LAB
ALBUMIN SERPL-MCNC: 4.8 G/DL (ref 3.5–5.2)
ALBUMIN/GLOB SERPL: 2 {RATIO} (ref 1–2.5)
ALP SERPL-CCNC: 82 U/L (ref 40–129)
ALT SERPL-CCNC: 40 U/L (ref 10–50)
ANION GAP SERPL CALCULATED.3IONS-SCNC: 11 MMOL/L (ref 9–16)
AST SERPL-CCNC: 31 U/L (ref 10–50)
BILIRUB SERPL-MCNC: 0.5 MG/DL (ref 0–1.2)
BUN SERPL-MCNC: 19 MG/DL (ref 8–23)
CALCIUM SERPL-MCNC: 9.7 MG/DL (ref 8.6–10.4)
CHLORIDE SERPL-SCNC: 104 MMOL/L (ref 98–107)
CHOLEST SERPL-MCNC: 117 MG/DL (ref 0–199)
CHOLESTEROL/HDL RATIO: 3
CO2 SERPL-SCNC: 28 MMOL/L (ref 20–31)
CREAT SERPL-MCNC: 1.4 MG/DL (ref 0.7–1.2)
ERYTHROCYTE [DISTWIDTH] IN BLOOD BY AUTOMATED COUNT: 13.1 % (ref 11.8–14.4)
EST. AVERAGE GLUCOSE BLD GHB EST-MCNC: 194 MG/DL
GFR, ESTIMATED: 56 ML/MIN/1.73M2
GLUCOSE SERPL-MCNC: 120 MG/DL (ref 74–99)
HBA1C MFR BLD: 8.4 % (ref 4–6)
HCT VFR BLD AUTO: 44.9 % (ref 40.7–50.3)
HDLC SERPL-MCNC: 39 MG/DL
HGB BLD-MCNC: 15 G/DL (ref 13–17)
LDLC SERPL CALC-MCNC: 60 MG/DL (ref 0–100)
MCH RBC QN AUTO: 30.1 PG (ref 25.2–33.5)
MCHC RBC AUTO-ENTMCNC: 33.4 G/DL (ref 28.4–34.8)
MCV RBC AUTO: 90.2 FL (ref 82.6–102.9)
NRBC BLD-RTO: 0 PER 100 WBC
PLATELET # BLD AUTO: 158 K/UL (ref 138–453)
PMV BLD AUTO: 10.9 FL (ref 8.1–13.5)
POTASSIUM SERPL-SCNC: 4 MMOL/L (ref 3.7–5.3)
PROT SERPL-MCNC: 7.2 G/DL (ref 6.6–8.7)
RBC # BLD AUTO: 4.98 M/UL (ref 4.21–5.77)
SODIUM SERPL-SCNC: 143 MMOL/L (ref 136–145)
TRIGL SERPL-MCNC: 90 MG/DL
TSH SERPL DL<=0.05 MIU/L-ACNC: 2.96 UIU/ML (ref 0.27–4.2)
VLDLC SERPL CALC-MCNC: 18 MG/DL (ref 1–30)
WBC OTHER # BLD: 37.7 K/UL (ref 3.5–11.3)

## 2025-05-06 PROCEDURE — 3017F COLORECTAL CA SCREEN DOC REV: CPT | Performed by: FAMILY MEDICINE

## 2025-05-06 PROCEDURE — G8420 CALC BMI NORM PARAMETERS: HCPCS | Performed by: FAMILY MEDICINE

## 2025-05-06 PROCEDURE — 2022F DILAT RTA XM EVC RTNOPTHY: CPT | Performed by: FAMILY MEDICINE

## 2025-05-06 PROCEDURE — 3046F HEMOGLOBIN A1C LEVEL >9.0%: CPT | Performed by: FAMILY MEDICINE

## 2025-05-06 PROCEDURE — 3074F SYST BP LT 130 MM HG: CPT | Performed by: FAMILY MEDICINE

## 2025-05-06 PROCEDURE — 3078F DIAST BP <80 MM HG: CPT | Performed by: FAMILY MEDICINE

## 2025-05-06 PROCEDURE — G8427 DOCREV CUR MEDS BY ELIG CLIN: HCPCS | Performed by: FAMILY MEDICINE

## 2025-05-06 PROCEDURE — 99214 OFFICE O/P EST MOD 30 MIN: CPT | Performed by: FAMILY MEDICINE

## 2025-05-06 PROCEDURE — 1036F TOBACCO NON-USER: CPT | Performed by: FAMILY MEDICINE

## 2025-05-06 RX ORDER — INSULIN LISPRO 100 [IU]/ML
INJECTION, SOLUTION INTRAVENOUS; SUBCUTANEOUS
Qty: 6 ADJUSTABLE DOSE PRE-FILLED PEN SYRINGE | Refills: 5 | Status: SHIPPED | OUTPATIENT
Start: 2025-05-06

## 2025-05-06 ASSESSMENT — ENCOUNTER SYMPTOMS
GASTROINTESTINAL NEGATIVE: 1
BACK PAIN: 1
ALLERGIC/IMMUNOLOGIC NEGATIVE: 1
EYES NEGATIVE: 1
RESPIRATORY NEGATIVE: 1

## 2025-05-06 NOTE — PROGRESS NOTES
Subjective:      Patient ID: Kade Jade is a 64 y.o. male.    Ear Fullness   There is pain in the left ear. This is a new problem. The current episode started 1 to 4 weeks ago. The problem occurs constantly. The problem has been unchanged. There has been no fever. The pain is at a severity of 0/10. The patient is experiencing no pain. He has tried nothing for the symptoms. The treatment provided no relief.       Review of Systems   Constitutional: Negative.    HENT: Negative.     Eyes: Negative.    Respiratory: Negative.     Cardiovascular: Negative.    Gastrointestinal: Negative.    Endocrine: Negative.    Musculoskeletal:  Positive for arthralgias and back pain.   Skin: Negative.    Allergic/Immunologic: Negative.    Neurological: Negative.    Hematological: Negative.    Psychiatric/Behavioral: Negative.     Past family and social history unremarkable.   Diagnosis Orders   1. Essential hypertension        2. Mixed hyperlipidemia  CBC    Comprehensive Metabolic Panel    Hemoglobin A1C    Lipid Panel    Urinalysis    TSH    Albumin/Creatinine Ratio, Urine      3. Right hemiplegia (HCC)        4. Anxiety and depression        5. Type 1 diabetes mellitus with stage 3a chronic kidney disease ([facility])  CBC    Comprehensive Metabolic Panel    Hemoglobin A1C    Lipid Panel    Urinalysis    TSH    Albumin/Creatinine Ratio, Urine      6. CLL (chronic lymphocytic leukemia) ([facility])        7. Vaccination declined        8. Colon cancer screening  Fecal DNA Colorectal cancer screening (Cologuard)      9. Hearing loss of left ear due to cerumen impaction              Objective:   Physical Exam  Vitals and nursing note reviewed.   Constitutional:       Appearance: He is well-developed.   HENT:      Head: Normocephalic and atraumatic.      Right Ear: External ear normal.      Left Ear: External ear normal.      Ears:      Comments: Heavy cerumen impaction left ear with nonvisualization of tympanic membrane     Nose: Nose normal.

## 2025-05-06 NOTE — TELEPHONE ENCOUNTER
Kade Jade is calling to request a refill on the following medication(s):    Medication Request:  Requested Prescriptions     Pending Prescriptions Disp Refills    insulin lispro, 1 Unit Dial, (HUMALOG KWIKPEN) 100 UNIT/ML SOPN 6 Adjustable Dose Pre-filled Pen Syringe 5     Sig: Random blood sugar between 130-150-2 units  150-200-4 units  201-250-6 units  251-300-8 units  301-350- -10 units-  301-400-12 units  More than 400 mg per DL-call physician       Last Visit Date (If Applicable):  5/6/2025    Next Visit Date:    9/8/2025      Pharmacy did not receive

## 2025-05-20 ENCOUNTER — TELEPHONE (OUTPATIENT)
Dept: FAMILY MEDICINE CLINIC | Age: 65
End: 2025-05-20

## 2025-05-20 NOTE — TELEPHONE ENCOUNTER
Please clarify if patient is taking the medication 3 or 4 times a day as this will determine the max dose. .

## 2025-05-21 NOTE — TELEPHONE ENCOUNTER
I called the patient and left message in regards to his insulin usage ( need to know how many times he uses insulin lispro)

## 2025-05-21 NOTE — TELEPHONE ENCOUNTER
I spoke with Pharmacist Alix Campo and per the way the patient is testing , 3 times a day, the max units would be 36.     Pharmacist notified and they will supply 15 mls which equals 5 pens and that equals 41 day supply.

## 2025-05-21 NOTE — TELEPHONE ENCOUNTER
Patient is on Lantus fixed dose  NovoLog is a sliding scale  Patient is advised to consider filling this prescription into the pharmacy for more clarification of delivery

## 2025-05-21 NOTE — TELEPHONE ENCOUNTER
I called the patient back and he stated he only needs to take the sliding scale injection once a day.

## 2025-05-28 ENCOUNTER — TELEPHONE (OUTPATIENT)
Dept: FAMILY MEDICINE CLINIC | Age: 65
End: 2025-05-28

## 2025-05-29 ENCOUNTER — HOSPITAL ENCOUNTER (OUTPATIENT)
Age: 65
Setting detail: SPECIMEN
Discharge: HOME | End: 2025-05-29
Payer: MEDICARE

## 2025-05-29 ENCOUNTER — TELEPHONE (OUTPATIENT)
Age: 65
End: 2025-05-29

## 2025-05-29 ENCOUNTER — OFFICE VISIT (OUTPATIENT)
Age: 65
End: 2025-05-29
Payer: MEDICARE

## 2025-05-29 VITALS
DIASTOLIC BLOOD PRESSURE: 75 MMHG | RESPIRATION RATE: 16 BRPM | HEART RATE: 86 BPM | SYSTOLIC BLOOD PRESSURE: 118 MMHG | TEMPERATURE: 97.5 F

## 2025-05-29 DIAGNOSIS — C91.10 CLL (CHRONIC LYMPHOCYTIC LEUKEMIA) (HCC): Primary | ICD-10-CM

## 2025-05-29 LAB
BASOPHILS # BLD: 0 K/UL (ref 0–0.2)
BASOPHILS NFR BLD: 0 % (ref 0–2)
EOSINOPHIL # BLD: 0 K/UL (ref 0–0.44)
EOSINOPHILS RELATIVE PERCENT: 0 % (ref 1–4)
ERYTHROCYTE [DISTWIDTH] IN BLOOD BY AUTOMATED COUNT: 13 % (ref 11.8–14.4)
HCT VFR BLD AUTO: 41.9 % (ref 40.7–50.3)
HGB BLD-MCNC: 14.6 G/DL (ref 13–17)
IMM GRANULOCYTES # BLD AUTO: 0 K/UL (ref 0–0.3)
IMM GRANULOCYTES NFR BLD: 0 %
LYMPHOCYTES NFR BLD: 29.77 K/UL (ref 1.1–3.7)
LYMPHOCYTES RELATIVE PERCENT: 75 % (ref 24–43)
MCH RBC QN AUTO: 31.1 PG (ref 25.2–33.5)
MCHC RBC AUTO-ENTMCNC: 34.8 G/DL (ref 28.4–34.8)
MCV RBC AUTO: 89.1 FL (ref 82.6–102.9)
MONOCYTES NFR BLD: 1.59 K/UL (ref 0.1–1.2)
MONOCYTES NFR BLD: 4 % (ref 3–12)
MORPHOLOGY: ABNORMAL
NEUTROPHILS NFR BLD: 21 % (ref 36–65)
NEUTS SEG NFR BLD: 8.34 K/UL (ref 1.5–8.1)
NRBC BLD-RTO: 0 PER 100 WBC
PLATELET # BLD AUTO: 169 K/UL (ref 138–453)
PMV BLD AUTO: 10.3 FL (ref 8.1–13.5)
RBC # BLD AUTO: 4.7 M/UL (ref 4.21–5.77)
WBC OTHER # BLD: 39.7 K/UL (ref 3.5–11.3)

## 2025-05-29 PROCEDURE — G8420 CALC BMI NORM PARAMETERS: HCPCS | Performed by: INTERNAL MEDICINE

## 2025-05-29 PROCEDURE — 3078F DIAST BP <80 MM HG: CPT | Performed by: INTERNAL MEDICINE

## 2025-05-29 PROCEDURE — 1123F ACP DISCUSS/DSCN MKR DOCD: CPT | Performed by: INTERNAL MEDICINE

## 2025-05-29 PROCEDURE — 1036F TOBACCO NON-USER: CPT | Performed by: INTERNAL MEDICINE

## 2025-05-29 PROCEDURE — 3074F SYST BP LT 130 MM HG: CPT | Performed by: INTERNAL MEDICINE

## 2025-05-29 PROCEDURE — G8427 DOCREV CUR MEDS BY ELIG CLIN: HCPCS | Performed by: INTERNAL MEDICINE

## 2025-05-29 PROCEDURE — 3017F COLORECTAL CA SCREEN DOC REV: CPT | Performed by: INTERNAL MEDICINE

## 2025-05-29 PROCEDURE — 85025 COMPLETE CBC W/AUTO DIFF WBC: CPT

## 2025-05-29 PROCEDURE — 99214 OFFICE O/P EST MOD 30 MIN: CPT | Performed by: INTERNAL MEDICINE

## 2025-05-29 PROCEDURE — 36415 COLL VENOUS BLD VENIPUNCTURE: CPT

## 2025-05-29 NOTE — PROGRESS NOTES
Chief Complaint   Patient presents with    Follow-up    Discuss Labs     DIAGNOSIS:       CLL, Stage 1  Lymphadenopathy.   Persistent leukocytosis  Persistent neutropenia  Persistent lymphocytosis  History of CVA/right-sided hemiplegia with residual weakness  History of hypertension and diabetes  CURRENT THERAPY:         Observation for CLL.  No indications for treatment.  BRIEF CASE HISTORY:      Mr. Kade Jade is a very pleasant 65 y.o. male with history of multiple co morbidities as listed.  The patient is referred for evaluation and management of leukocytosis.  Patient had history of diabetes and hypertension.  He had CVA with right-sided hemiplegia in 2019.  He had residual right-sided weakness.  Patient has no other major health problems.  Patient was noted to have persistent elevation of white blood cells over the last few years.  He had no symptoms related to that abnormality.  No repeated infections.  No fever or night sweats.  No weight loss or decreased appetite.  No enlarged lymph nodes.  No history of smoking or alcohol drinking..   INTERIM HISTORY:   Seen for follow up  CLL.  Doing well clinically.  No fever or night sweats.  No weight loss or decreased appetite.  No new enlarged lymph nodes.  No repeated infections.  He was hospitalized due to kidney stones back in November 2024.  He had lithotripsy and stent and stent later removed.  Doing well otherwise.      PAST MEDICAL HISTORY: has a past medical history of Cancer (HCC), Cerebral artery occlusion with cerebral infarction (HCC), CLL (chronic lymphocytic leukemia) (HCC), Diabetes mellitus (HCC), High cholesterol, Hypertension, and Kidney stone.    PAST SURGICAL HISTORY: has a past surgical history that includes knee surgery; Cystoscopy (N/A, 11/13/2024); and Lithotripsy (Left, 12/13/2024).     CURRENT MEDICATIONS:  has a current medication list which

## 2025-05-29 NOTE — TELEPHONE ENCOUNTER
LANI HERE FOR FOLLOW UP   RV 3-4 months with CBC at RV  MD VISIT: 9/11/25 @ 9AM   AVS PRINTED AND GIVEN ON EXIT

## 2025-07-02 RX ORDER — HYDRALAZINE HYDROCHLORIDE 25 MG/1
TABLET, FILM COATED ORAL
Qty: 90 TABLET | Refills: 0 | Status: SHIPPED | OUTPATIENT
Start: 2025-07-02

## 2025-07-02 RX ORDER — ROSUVASTATIN CALCIUM 40 MG/1
40 TABLET, COATED ORAL DAILY
Qty: 90 TABLET | Refills: 1 | Status: SHIPPED | OUTPATIENT
Start: 2025-07-02 | End: 2025-12-29

## 2025-07-02 NOTE — TELEPHONE ENCOUNTER
Kade Jade is calling to request a refill on the following medication(s):    Medication Request:  Requested Prescriptions     Pending Prescriptions Disp Refills    hydrALAZINE (APRESOLINE) 25 MG tablet [Pharmacy Med Name: hydralazine 25 mg tablet] 90 tablet 11     Sig: TAKE ONE TABLET BY MOUTH DAILY AT 9AM       Last Visit Date (If Applicable):  5/6/2025    Next Visit Date:    7/8/2025    Last refilled on 4/10/25. Prescription pending.

## 2025-07-02 NOTE — TELEPHONE ENCOUNTER
Pharmacy requesting refill of Crestor 40 mg.    Medication active on med list yes    Date of last Rx: 8/20/2024 #90 with 3 refills   verified by FRANSISCO Stevenson    Date of last appointment 2/11/2025    Next Visit Date:  Visit date not found

## 2025-07-08 ENCOUNTER — OFFICE VISIT (OUTPATIENT)
Dept: FAMILY MEDICINE CLINIC | Age: 65
End: 2025-07-08

## 2025-07-08 VITALS
HEART RATE: 74 BPM | WEIGHT: 163 LBS | OXYGEN SATURATION: 98 % | DIASTOLIC BLOOD PRESSURE: 68 MMHG | HEIGHT: 71 IN | SYSTOLIC BLOOD PRESSURE: 110 MMHG | RESPIRATION RATE: 12 BRPM | TEMPERATURE: 97.7 F | BODY MASS INDEX: 22.82 KG/M2

## 2025-07-08 DIAGNOSIS — Z00.00 MEDICARE ANNUAL WELLNESS VISIT, SUBSEQUENT: Primary | ICD-10-CM

## 2025-07-08 DIAGNOSIS — Z91.199 NON-COMPLIANCE: ICD-10-CM

## 2025-07-08 DIAGNOSIS — I10 ESSENTIAL HYPERTENSION: ICD-10-CM

## 2025-07-08 DIAGNOSIS — N18.31 TYPE 1 DIABETES MELLITUS WITH STAGE 3A CHRONIC KIDNEY DISEASE (HCC): ICD-10-CM

## 2025-07-08 DIAGNOSIS — C91.10 CLL (CHRONIC LYMPHOCYTIC LEUKEMIA) (HCC): ICD-10-CM

## 2025-07-08 DIAGNOSIS — E78.2 MIXED HYPERLIPIDEMIA: ICD-10-CM

## 2025-07-08 DIAGNOSIS — F41.9 ANXIETY AND DEPRESSION: ICD-10-CM

## 2025-07-08 DIAGNOSIS — G81.91 RIGHT HEMIPLEGIA (HCC): ICD-10-CM

## 2025-07-08 DIAGNOSIS — F32.A ANXIETY AND DEPRESSION: ICD-10-CM

## 2025-07-08 DIAGNOSIS — Z28.21 VACCINATION DECLINED: ICD-10-CM

## 2025-07-08 DIAGNOSIS — E10.22 TYPE 1 DIABETES MELLITUS WITH STAGE 3A CHRONIC KIDNEY DISEASE (HCC): ICD-10-CM

## 2025-07-08 SDOH — ECONOMIC STABILITY: FOOD INSECURITY: WITHIN THE PAST 12 MONTHS, THE FOOD YOU BOUGHT JUST DIDN'T LAST AND YOU DIDN'T HAVE MONEY TO GET MORE.: NEVER TRUE

## 2025-07-08 SDOH — ECONOMIC STABILITY: FOOD INSECURITY: WITHIN THE PAST 12 MONTHS, YOU WORRIED THAT YOUR FOOD WOULD RUN OUT BEFORE YOU GOT MONEY TO BUY MORE.: NEVER TRUE

## 2025-07-08 ASSESSMENT — PATIENT HEALTH QUESTIONNAIRE - PHQ9
SUM OF ALL RESPONSES TO PHQ QUESTIONS 1-9: 0
2. FEELING DOWN, DEPRESSED OR HOPELESS: NOT AT ALL
4. FEELING TIRED OR HAVING LITTLE ENERGY: NOT AT ALL
3. TROUBLE FALLING OR STAYING ASLEEP: NOT AT ALL
8. MOVING OR SPEAKING SO SLOWLY THAT OTHER PEOPLE COULD HAVE NOTICED. OR THE OPPOSITE, BEING SO FIGETY OR RESTLESS THAT YOU HAVE BEEN MOVING AROUND A LOT MORE THAN USUAL: NOT AT ALL
6. FEELING BAD ABOUT YOURSELF - OR THAT YOU ARE A FAILURE OR HAVE LET YOURSELF OR YOUR FAMILY DOWN: NOT AT ALL
SUM OF ALL RESPONSES TO PHQ QUESTIONS 1-9: 0
SUM OF ALL RESPONSES TO PHQ QUESTIONS 1-9: 0
10. IF YOU CHECKED OFF ANY PROBLEMS, HOW DIFFICULT HAVE THESE PROBLEMS MADE IT FOR YOU TO DO YOUR WORK, TAKE CARE OF THINGS AT HOME, OR GET ALONG WITH OTHER PEOPLE: NOT DIFFICULT AT ALL
9. THOUGHTS THAT YOU WOULD BE BETTER OFF DEAD, OR OF HURTING YOURSELF: NOT AT ALL
1. LITTLE INTEREST OR PLEASURE IN DOING THINGS: NOT AT ALL
5. POOR APPETITE OR OVEREATING: NOT AT ALL
SUM OF ALL RESPONSES TO PHQ QUESTIONS 1-9: 0
7. TROUBLE CONCENTRATING ON THINGS, SUCH AS READING THE NEWSPAPER OR WATCHING TELEVISION: NOT AT ALL

## 2025-07-08 ASSESSMENT — LIFESTYLE VARIABLES
HOW MANY STANDARD DRINKS CONTAINING ALCOHOL DO YOU HAVE ON A TYPICAL DAY: PATIENT DOES NOT DRINK
HOW OFTEN DO YOU HAVE A DRINK CONTAINING ALCOHOL: NEVER

## 2025-07-08 NOTE — PROGRESS NOTES
Medicare Annual Wellness Visit    Kade Jade is here for Medicare AWV and Diabetes (Patient is due for Diabetic retinal and foot exams. )    Assessment & Plan   Medicare annual wellness visit, subsequent  Essential hypertension  Mixed hyperlipidemia  Right hemiplegia (HCC)  Anxiety and depression  Type 1 diabetes mellitus with stage 3a chronic kidney disease (HCC)  CLL (chronic lymphocytic leukemia) (HCC)  Vaccination declined  Non-compliance       No follow-ups on file.     Subjective       Patient's complete Health Risk Assessment and screening values have been reviewed and are found in Flowsheets. The following problems were reviewed today and where indicated follow up appointments were made and/or referrals ordered.    Positive Risk Factor Screenings with Interventions:                 Dentist Screen:  Have you seen the dentist within the past year?: (!) No    Intervention:       Vision Screen:  Do you have difficulty driving, watching TV, or doing any of your daily activities because of your eyesight?: No  Have you had an eye exam within the past year?: (!) No    Interventions:       Safety:  Do you have non-slip mats or non-slip surfaces or shower bars or grab bars in your shower or bathtub?: (!) No    Interventions:       Advanced Directives:  Do you have a Living Will?: (!) No    Intervention:                       Objective   Vitals:    07/08/25 0829   BP: 110/68   BP Site: Right Upper Arm   Patient Position: Sitting   BP Cuff Size: Large Adult   Pulse: 74   Resp: 12   Temp: 97.7 °F (36.5 °C)   TempSrc: Skin   SpO2: 98%   Weight: 73.9 kg (163 lb)   Height: 1.803 m (5' 10.98\")      Body mass index is 22.74 kg/m².        Overall noncompliance with diet lifestyle change.  He continues to have A1c of 8.4.  He is advised to bring Accu-Chek log for each visit and compliance with ADA 1800 diet  He has been noncompliant chronic back pain Cologuard  Once again he declined age-appropriate vaccinations  Heavy

## 2025-07-08 NOTE — PATIENT INSTRUCTIONS
attack. These may include:    Chest pain or pressure, or a strange feeling in the chest.     Sweating.     Shortness of breath.     Pain, pressure, or a strange feeling in the back, neck, jaw, or upper belly or in one or both shoulders or arms.     Lightheadedness or sudden weakness.     A fast or irregular heartbeat.   After you call 911, the  may tell you to chew 1 adult-strength or 2 to 4 low-dose aspirin. Wait for an ambulance. Do not try to drive yourself.  Watch closely for changes in your health, and be sure to contact your doctor if you have any problems.  Where can you learn more?  Go to https://www.[employer].net/patientEd and enter F075 to learn more about \"A Healthy Heart: Care Instructions.\"  Current as of: July 31, 2024  Content Version: 14.5  © 7750-6504 [employer].   Care instructions adapted under license by [employer]. If you have questions about a medical condition or this instruction, always ask your healthcare professional. [employer], disclaims any warranty or liability for your use of this information.    Personalized Preventive Plan for Kade Jade - 7/8/2025  Medicare offers a range of preventive health benefits. Some of the tests and screenings are paid in full while other may be subject to a deductible, co-insurance, and/or copay.  Some of these benefits include a comprehensive review of your medical history including lifestyle, illnesses that may run in your family, and various assessments and screenings as appropriate.  After reviewing your medical record and screening and assessments performed today your provider may have ordered immunizations, labs, imaging, and/or referrals for you.  A list of these orders (if applicable) as well as your Preventive Care list are included within your After Visit Summary for your review.

## 2025-08-04 DIAGNOSIS — F41.9 ANXIETY AND DEPRESSION: ICD-10-CM

## 2025-08-04 DIAGNOSIS — F32.A ANXIETY AND DEPRESSION: ICD-10-CM

## 2025-08-04 RX ORDER — DAPAGLIFLOZIN 5 MG/1
TABLET, FILM COATED ORAL
Qty: 90 TABLET | Refills: 1 | Status: SHIPPED | OUTPATIENT
Start: 2025-08-04

## 2025-08-04 RX ORDER — AMLODIPINE BESYLATE 10 MG/1
TABLET ORAL
Qty: 90 TABLET | Refills: 1 | Status: SHIPPED | OUTPATIENT
Start: 2025-08-04

## 2025-08-28 ENCOUNTER — HOSPITAL ENCOUNTER (INPATIENT)
Age: 65
LOS: 1 days | Discharge: HOME | DRG: 660 | End: 2025-08-29
Attending: EMERGENCY MEDICINE | Admitting: STUDENT IN AN ORGANIZED HEALTH CARE EDUCATION/TRAINING PROGRAM
Payer: MEDICARE

## 2025-08-28 ENCOUNTER — APPOINTMENT (OUTPATIENT)
Dept: CT IMAGING | Age: 65
DRG: 660 | End: 2025-08-28
Payer: MEDICARE

## 2025-08-28 DIAGNOSIS — N18.9 ACUTE KIDNEY INJURY SUPERIMPOSED ON CKD: ICD-10-CM

## 2025-08-28 DIAGNOSIS — N17.9 ACUTE KIDNEY INJURY SUPERIMPOSED ON CKD: ICD-10-CM

## 2025-08-28 DIAGNOSIS — Z96.0 S/P CYSTOSCOPY WITH URETERAL STENT PLACEMENT: ICD-10-CM

## 2025-08-28 DIAGNOSIS — N20.0 RENAL CALCULI: ICD-10-CM

## 2025-08-28 DIAGNOSIS — N20.0 KIDNEY STONE: Primary | ICD-10-CM

## 2025-08-28 LAB
ALBUMIN SERPL-MCNC: 4.4 G/DL (ref 3.5–5.2)
ALBUMIN/GLOB SERPL: 1.6 {RATIO} (ref 1–2.5)
ALP SERPL-CCNC: 77 U/L (ref 40–129)
ALT SERPL-CCNC: 29 U/L (ref 10–50)
ANION GAP SERPL CALCULATED.3IONS-SCNC: 16 MMOL/L (ref 9–16)
AST SERPL-CCNC: 34 U/L (ref 10–50)
BASOPHILS # BLD: 0 K/UL (ref 0–0.2)
BASOPHILS NFR BLD: 0 %
BILIRUB SERPL-MCNC: 0.9 MG/DL (ref 0–1.2)
BILIRUB UR QL STRIP: NEGATIVE
BUN SERPL-MCNC: 30 MG/DL (ref 8–23)
CALCIUM SERPL-MCNC: 9.5 MG/DL (ref 8.8–10.2)
CHLORIDE SERPL-SCNC: 102 MMOL/L (ref 98–107)
CLARITY UR: CLEAR
CO2 SERPL-SCNC: 25 MMOL/L (ref 20–31)
COLOR UR: YELLOW
CREAT SERPL-MCNC: 2.3 MG/DL (ref 0.7–1.2)
EOSINOPHIL # BLD: 0 K/UL (ref 0–0.4)
EOSINOPHILS RELATIVE PERCENT: 0 % (ref 1–4)
EPI CELLS #/AREA URNS HPF: ABNORMAL /HPF (ref 0–5)
ERYTHROCYTE [DISTWIDTH] IN BLOOD BY AUTOMATED COUNT: 13.5 % (ref 11.8–14.4)
GFR, ESTIMATED: 32 ML/MIN/1.73M2
GLUCOSE BLD-MCNC: 151 MG/DL (ref 75–110)
GLUCOSE BLD-MCNC: 169 MG/DL (ref 75–110)
GLUCOSE SERPL-MCNC: 189 MG/DL (ref 82–115)
GLUCOSE UR STRIP-MCNC: ABNORMAL MG/DL
HCT VFR BLD AUTO: 42.8 % (ref 40.7–50.3)
HGB BLD-MCNC: 14.7 G/DL (ref 13–17)
HGB UR QL STRIP.AUTO: ABNORMAL
IMM GRANULOCYTES # BLD AUTO: 0 K/UL (ref 0–0.3)
IMM GRANULOCYTES NFR BLD: 0 %
KETONES UR STRIP-MCNC: ABNORMAL MG/DL
LEUKOCYTE ESTERASE UR QL STRIP: NEGATIVE
LIPASE SERPL-CCNC: 14 U/L (ref 13–60)
LYMPHOCYTES NFR BLD: 45.65 K/UL (ref 1–4.8)
LYMPHOCYTES RELATIVE PERCENT: 85 % (ref 24–44)
MCH RBC QN AUTO: 31.6 PG (ref 25.2–33.5)
MCHC RBC AUTO-ENTMCNC: 34.3 G/DL (ref 28.4–34.8)
MCV RBC AUTO: 92 FL (ref 82.6–102.9)
MONOCYTES NFR BLD: 1.07 K/UL (ref 0.2–0.8)
MONOCYTES NFR BLD: 2 % (ref 1–7)
MORPHOLOGY: ABNORMAL
NEUTROPHILS NFR BLD: 13 % (ref 36–66)
NEUTS SEG NFR BLD: 6.98 K/UL (ref 1.8–7.7)
NITRITE UR QL STRIP: NEGATIVE
NRBC BLD-RTO: 0 PER 100 WBC
OSMOLALITY UR: 592 MOSM/KG (ref 80–1300)
PH UR STRIP: 6 [PH] (ref 5–8)
PLATELET # BLD AUTO: 168 K/UL (ref 138–453)
PMV BLD AUTO: 10.5 FL (ref 8.1–13.5)
POTASSIUM SERPL-SCNC: 4 MMOL/L (ref 3.7–5.3)
PROCALCITONIN SERPL-MCNC: 0.16 NG/ML (ref 0–0.09)
PROT SERPL-MCNC: 7.3 G/DL (ref 6.6–8.7)
PROT UR STRIP-MCNC: ABNORMAL MG/DL
RBC # BLD AUTO: 4.65 M/UL (ref 4.21–5.77)
RBC #/AREA URNS HPF: ABNORMAL /HPF (ref 0–2)
SODIUM SERPL-SCNC: 142 MMOL/L (ref 136–145)
SODIUM UR-SCNC: 89 MMOL/L
SP GR UR STRIP: 1.01 (ref 1–1.03)
TOTAL PROTEIN, URINE: 37 MG/DL
UROBILINOGEN UR STRIP-ACNC: NORMAL EU/DL (ref 0–1)
WBC #/AREA URNS HPF: ABNORMAL /HPF (ref 0–5)
WBC OTHER # BLD: 53.7 K/UL (ref 3.5–11.3)

## 2025-08-28 PROCEDURE — 6370000000 HC RX 637 (ALT 250 FOR IP): Performed by: NURSE PRACTITIONER

## 2025-08-28 PROCEDURE — 74176 CT ABD & PELVIS W/O CONTRAST: CPT

## 2025-08-28 PROCEDURE — 99222 1ST HOSP IP/OBS MODERATE 55: CPT | Performed by: NURSE PRACTITIONER

## 2025-08-28 PROCEDURE — 83935 ASSAY OF URINE OSMOLALITY: CPT

## 2025-08-28 PROCEDURE — 80053 COMPREHEN METABOLIC PANEL: CPT

## 2025-08-28 PROCEDURE — 96375 TX/PRO/DX INJ NEW DRUG ADDON: CPT

## 2025-08-28 PROCEDURE — 96376 TX/PRO/DX INJ SAME DRUG ADON: CPT

## 2025-08-28 PROCEDURE — 85025 COMPLETE CBC W/AUTO DIFF WBC: CPT

## 2025-08-28 PROCEDURE — 96361 HYDRATE IV INFUSION ADD-ON: CPT

## 2025-08-28 PROCEDURE — 6360000002 HC RX W HCPCS: Performed by: NURSE PRACTITIONER

## 2025-08-28 PROCEDURE — 84300 ASSAY OF URINE SODIUM: CPT

## 2025-08-28 PROCEDURE — 87086 URINE CULTURE/COLONY COUNT: CPT

## 2025-08-28 PROCEDURE — 36415 COLL VENOUS BLD VENIPUNCTURE: CPT

## 2025-08-28 PROCEDURE — 84145 PROCALCITONIN (PCT): CPT

## 2025-08-28 PROCEDURE — 2580000003 HC RX 258: Performed by: NURSE PRACTITIONER

## 2025-08-28 PROCEDURE — 84156 ASSAY OF PROTEIN URINE: CPT

## 2025-08-28 PROCEDURE — 82947 ASSAY GLUCOSE BLOOD QUANT: CPT

## 2025-08-28 PROCEDURE — G0378 HOSPITAL OBSERVATION PER HR: HCPCS

## 2025-08-28 PROCEDURE — 6360000002 HC RX W HCPCS: Performed by: EMERGENCY MEDICINE

## 2025-08-28 PROCEDURE — 81001 URINALYSIS AUTO W/SCOPE: CPT

## 2025-08-28 PROCEDURE — 83690 ASSAY OF LIPASE: CPT

## 2025-08-28 PROCEDURE — 96374 THER/PROPH/DIAG INJ IV PUSH: CPT

## 2025-08-28 PROCEDURE — 6370000000 HC RX 637 (ALT 250 FOR IP): Performed by: UROLOGY

## 2025-08-28 PROCEDURE — 2500000003 HC RX 250 WO HCPCS: Performed by: NURSE PRACTITIONER

## 2025-08-28 PROCEDURE — 1200000000 HC SEMI PRIVATE

## 2025-08-28 PROCEDURE — 2580000003 HC RX 258: Performed by: EMERGENCY MEDICINE

## 2025-08-28 PROCEDURE — 99285 EMERGENCY DEPT VISIT HI MDM: CPT

## 2025-08-28 RX ORDER — SODIUM CHLORIDE 9 MG/ML
INJECTION, SOLUTION INTRAVENOUS CONTINUOUS
Status: DISCONTINUED | OUTPATIENT
Start: 2025-08-28 | End: 2025-08-29 | Stop reason: HOSPADM

## 2025-08-28 RX ORDER — POLYETHYLENE GLYCOL 3350 17 G/17G
17 POWDER, FOR SOLUTION ORAL DAILY PRN
Status: DISCONTINUED | OUTPATIENT
Start: 2025-08-28 | End: 2025-08-29 | Stop reason: HOSPADM

## 2025-08-28 RX ORDER — PANTOPRAZOLE SODIUM 40 MG/1
40 TABLET, DELAYED RELEASE ORAL
Status: DISCONTINUED | OUTPATIENT
Start: 2025-08-29 | End: 2025-08-29 | Stop reason: HOSPADM

## 2025-08-28 RX ORDER — INSULIN LISPRO 100 [IU]/ML
0-8 INJECTION, SOLUTION INTRAVENOUS; SUBCUTANEOUS
Status: DISCONTINUED | OUTPATIENT
Start: 2025-08-28 | End: 2025-08-29 | Stop reason: HOSPADM

## 2025-08-28 RX ORDER — ONDANSETRON 2 MG/ML
4 INJECTION INTRAMUSCULAR; INTRAVENOUS ONCE
Status: COMPLETED | OUTPATIENT
Start: 2025-08-28 | End: 2025-08-28

## 2025-08-28 RX ORDER — VITAMIN B COMPLEX
1000 TABLET ORAL DAILY
Status: DISCONTINUED | OUTPATIENT
Start: 2025-08-28 | End: 2025-08-29 | Stop reason: HOSPADM

## 2025-08-28 RX ORDER — INSULIN GLARGINE 100 [IU]/ML
10 INJECTION, SOLUTION SUBCUTANEOUS NIGHTLY
Status: DISCONTINUED | OUTPATIENT
Start: 2025-08-28 | End: 2025-08-29 | Stop reason: HOSPADM

## 2025-08-28 RX ORDER — ONDANSETRON 2 MG/ML
4 INJECTION INTRAMUSCULAR; INTRAVENOUS EVERY 6 HOURS PRN
Status: DISCONTINUED | OUTPATIENT
Start: 2025-08-28 | End: 2025-08-29 | Stop reason: HOSPADM

## 2025-08-28 RX ORDER — OXYCODONE AND ACETAMINOPHEN 5; 325 MG/1; MG/1
1 TABLET ORAL EVERY 4 HOURS PRN
Refills: 0 | Status: DISCONTINUED | OUTPATIENT
Start: 2025-08-28 | End: 2025-08-29 | Stop reason: HOSPADM

## 2025-08-28 RX ORDER — ONDANSETRON 2 MG/ML
4 INJECTION INTRAMUSCULAR; INTRAVENOUS EVERY 6 HOURS PRN
Status: DISCONTINUED | OUTPATIENT
Start: 2025-08-28 | End: 2025-08-28 | Stop reason: SDUPTHER

## 2025-08-28 RX ORDER — 0.9 % SODIUM CHLORIDE 0.9 %
1000 INTRAVENOUS SOLUTION INTRAVENOUS ONCE
Status: DISCONTINUED | OUTPATIENT
Start: 2025-08-28 | End: 2025-08-28

## 2025-08-28 RX ORDER — ONDANSETRON 4 MG/1
4 TABLET, ORALLY DISINTEGRATING ORAL EVERY 8 HOURS PRN
Status: DISCONTINUED | OUTPATIENT
Start: 2025-08-28 | End: 2025-08-28 | Stop reason: SDUPTHER

## 2025-08-28 RX ORDER — MORPHINE SULFATE 4 MG/ML
4 INJECTION, SOLUTION INTRAMUSCULAR; INTRAVENOUS ONCE
Refills: 0 | Status: COMPLETED | OUTPATIENT
Start: 2025-08-28 | End: 2025-08-28

## 2025-08-28 RX ORDER — TAMSULOSIN HYDROCHLORIDE 0.4 MG/1
0.4 CAPSULE ORAL DAILY
Status: DISCONTINUED | OUTPATIENT
Start: 2025-08-28 | End: 2025-08-29 | Stop reason: HOSPADM

## 2025-08-28 RX ORDER — ONDANSETRON 2 MG/ML
4 INJECTION INTRAMUSCULAR; INTRAVENOUS ONCE
Status: DISCONTINUED | OUTPATIENT
Start: 2025-08-28 | End: 2025-08-28

## 2025-08-28 RX ORDER — ONDANSETRON 4 MG/1
4 TABLET, ORALLY DISINTEGRATING ORAL EVERY 8 HOURS PRN
Status: DISCONTINUED | OUTPATIENT
Start: 2025-08-28 | End: 2025-08-29 | Stop reason: HOSPADM

## 2025-08-28 RX ORDER — HYDRALAZINE HYDROCHLORIDE 25 MG/1
25 TABLET, FILM COATED ORAL DAILY
Status: DISCONTINUED | OUTPATIENT
Start: 2025-08-28 | End: 2025-08-29 | Stop reason: HOSPADM

## 2025-08-28 RX ORDER — OXYCODONE AND ACETAMINOPHEN 5; 325 MG/1; MG/1
2 TABLET ORAL EVERY 4 HOURS PRN
Refills: 0 | Status: DISCONTINUED | OUTPATIENT
Start: 2025-08-28 | End: 2025-08-29 | Stop reason: HOSPADM

## 2025-08-28 RX ORDER — LANOLIN ALCOHOL/MO/W.PET/CERES
1000 CREAM (GRAM) TOPICAL DAILY
Status: DISCONTINUED | OUTPATIENT
Start: 2025-08-28 | End: 2025-08-29 | Stop reason: HOSPADM

## 2025-08-28 RX ORDER — AMLODIPINE BESYLATE 10 MG/1
10 TABLET ORAL DAILY
Status: DISCONTINUED | OUTPATIENT
Start: 2025-08-28 | End: 2025-08-29 | Stop reason: HOSPADM

## 2025-08-28 RX ORDER — ROSUVASTATIN CALCIUM 40 MG/1
40 TABLET, COATED ORAL NIGHTLY
Status: DISCONTINUED | OUTPATIENT
Start: 2025-08-28 | End: 2025-08-29 | Stop reason: HOSPADM

## 2025-08-28 RX ORDER — DEXTROSE MONOHYDRATE 100 MG/ML
INJECTION, SOLUTION INTRAVENOUS CONTINUOUS PRN
Status: DISCONTINUED | OUTPATIENT
Start: 2025-08-28 | End: 2025-08-29 | Stop reason: HOSPADM

## 2025-08-28 RX ADMIN — AMLODIPINE BESYLATE 10 MG: 10 TABLET ORAL at 14:46

## 2025-08-28 RX ADMIN — WATER 1000 MG: 1 INJECTION INTRAMUSCULAR; INTRAVENOUS; SUBCUTANEOUS at 12:12

## 2025-08-28 RX ADMIN — ROSUVASTATIN CALCIUM 40 MG: 40 TABLET, FILM COATED ORAL at 21:24

## 2025-08-28 RX ADMIN — ONDANSETRON 4 MG: 2 INJECTION, SOLUTION INTRAMUSCULAR; INTRAVENOUS at 10:10

## 2025-08-28 RX ADMIN — SODIUM CHLORIDE: 0.9 INJECTION, SOLUTION INTRAVENOUS at 14:34

## 2025-08-28 RX ADMIN — ONDANSETRON 4 MG: 2 INJECTION, SOLUTION INTRAMUSCULAR; INTRAVENOUS at 12:12

## 2025-08-28 RX ADMIN — SODIUM CHLORIDE 1000 ML: 0.9 INJECTION, SOLUTION INTRAVENOUS at 12:20

## 2025-08-28 RX ADMIN — FLUOXETINE HYDROCHLORIDE 20 MG: 20 CAPSULE ORAL at 14:45

## 2025-08-28 RX ADMIN — MORPHINE SULFATE 4 MG: 4 INJECTION, SOLUTION INTRAMUSCULAR; INTRAVENOUS at 10:10

## 2025-08-28 RX ADMIN — TAMSULOSIN HYDROCHLORIDE 0.4 MG: 0.4 CAPSULE ORAL at 14:45

## 2025-08-28 RX ADMIN — OXYCODONE AND ACETAMINOPHEN 1 TABLET: 5; 325 TABLET ORAL at 17:22

## 2025-08-28 RX ADMIN — Medication 1000 UNITS: at 14:46

## 2025-08-28 RX ADMIN — INSULIN GLARGINE 10 UNITS: 100 INJECTION, SOLUTION SUBCUTANEOUS at 21:24

## 2025-08-28 RX ADMIN — HYDRALAZINE HYDROCHLORIDE 25 MG: 25 TABLET ORAL at 14:45

## 2025-08-28 RX ADMIN — CYANOCOBALAMIN TAB 1000 MCG 1000 MCG: 1000 TAB at 14:45

## 2025-08-28 RX ADMIN — MORPHINE SULFATE 4 MG: 4 INJECTION, SOLUTION INTRAMUSCULAR; INTRAVENOUS at 12:12

## 2025-08-28 ASSESSMENT — PAIN DESCRIPTION - DESCRIPTORS: DESCRIPTORS: ACHING;THROBBING

## 2025-08-28 ASSESSMENT — ENCOUNTER SYMPTOMS
EYE DISCHARGE: 0
ABDOMINAL DISTENTION: 0
NAUSEA: 0
VOMITING: 1
SHORTNESS OF BREATH: 0
BACK PAIN: 0
ABDOMINAL PAIN: 1
BLOOD IN STOOL: 0
FACIAL SWELLING: 0
DIARRHEA: 0
ABDOMINAL PAIN: 0
BACK PAIN: 1
EYES NEGATIVE: 1
RESPIRATORY NEGATIVE: 1
NAUSEA: 1
EYE PAIN: 0
CHEST TIGHTNESS: 0

## 2025-08-28 ASSESSMENT — PAIN SCALES - GENERAL
PAINLEVEL_OUTOF10: 8
PAINLEVEL_OUTOF10: 9
PAINLEVEL_OUTOF10: 2
PAINLEVEL_OUTOF10: 9
PAINLEVEL_OUTOF10: 9

## 2025-08-28 ASSESSMENT — PAIN - FUNCTIONAL ASSESSMENT
PAIN_FUNCTIONAL_ASSESSMENT: 0-10
PAIN_FUNCTIONAL_ASSESSMENT: PREVENTS OR INTERFERES SOME ACTIVE ACTIVITIES AND ADLS
PAIN_FUNCTIONAL_ASSESSMENT: 0-10
PAIN_FUNCTIONAL_ASSESSMENT: 0-10

## 2025-08-28 ASSESSMENT — PAIN DESCRIPTION - LOCATION: LOCATION: ABDOMEN;BACK

## 2025-08-28 ASSESSMENT — PAIN DESCRIPTION - PAIN TYPE: TYPE: ACUTE PAIN

## 2025-08-28 ASSESSMENT — PAIN DESCRIPTION - FREQUENCY: FREQUENCY: CONTINUOUS

## 2025-08-28 ASSESSMENT — PAIN DESCRIPTION - ONSET: ONSET: ON-GOING

## 2025-08-28 ASSESSMENT — PAIN DESCRIPTION - ORIENTATION: ORIENTATION: RIGHT

## 2025-08-29 ENCOUNTER — ANESTHESIA (OUTPATIENT)
Dept: OPERATING ROOM | Age: 65
End: 2025-08-29
Payer: MEDICARE

## 2025-08-29 ENCOUNTER — APPOINTMENT (OUTPATIENT)
Dept: GENERAL RADIOLOGY | Age: 65
DRG: 660 | End: 2025-08-29
Payer: MEDICARE

## 2025-08-29 ENCOUNTER — ANESTHESIA EVENT (OUTPATIENT)
Dept: OPERATING ROOM | Age: 65
End: 2025-08-29
Payer: MEDICARE

## 2025-08-29 VITALS
HEART RATE: 98 BPM | WEIGHT: 163.14 LBS | RESPIRATION RATE: 16 BRPM | BODY MASS INDEX: 22.84 KG/M2 | SYSTOLIC BLOOD PRESSURE: 159 MMHG | DIASTOLIC BLOOD PRESSURE: 86 MMHG | OXYGEN SATURATION: 95 % | HEIGHT: 71 IN | TEMPERATURE: 98.2 F

## 2025-08-29 PROBLEM — N20.0 KIDNEY STONE: Status: RESOLVED | Noted: 2025-08-28 | Resolved: 2025-08-29

## 2025-08-29 LAB
ALBUMIN SERPL-MCNC: 4 G/DL (ref 3.5–5.2)
ALBUMIN/GLOB SERPL: 1.6 {RATIO} (ref 1–2.5)
ALP SERPL-CCNC: 68 U/L (ref 40–129)
ALT SERPL-CCNC: 23 U/L (ref 10–50)
ANION GAP SERPL CALCULATED.3IONS-SCNC: 14 MMOL/L (ref 9–16)
AST SERPL-CCNC: 23 U/L (ref 10–50)
BACTERIA URNS QL MICRO: ABNORMAL
BASOPHILS # BLD: 0 K/UL (ref 0–0.2)
BASOPHILS NFR BLD: 0 %
BILIRUB SERPL-MCNC: 0.7 MG/DL (ref 0–1.2)
BILIRUB UR QL STRIP: NEGATIVE
BUN SERPL-MCNC: 25 MG/DL (ref 8–23)
CALCIUM SERPL-MCNC: 8.8 MG/DL (ref 8.8–10.2)
CHLORIDE SERPL-SCNC: 105 MMOL/L (ref 98–107)
CLARITY UR: CLEAR
CO2 SERPL-SCNC: 24 MMOL/L (ref 20–31)
COLOR UR: YELLOW
CREAT SERPL-MCNC: 2.1 MG/DL (ref 0.7–1.2)
EOSINOPHIL # BLD: 0 K/UL (ref 0–0.4)
EOSINOPHILS RELATIVE PERCENT: 0 % (ref 1–4)
EPI CELLS #/AREA URNS HPF: ABNORMAL /HPF (ref 0–5)
ERYTHROCYTE [DISTWIDTH] IN BLOOD BY AUTOMATED COUNT: 13.4 % (ref 11.8–14.4)
GFR, ESTIMATED: 34 ML/MIN/1.73M2
GLUCOSE BLD-MCNC: 119 MG/DL (ref 75–110)
GLUCOSE BLD-MCNC: 119 MG/DL (ref 75–110)
GLUCOSE BLD-MCNC: 151 MG/DL (ref 75–110)
GLUCOSE BLD-MCNC: 175 MG/DL (ref 75–110)
GLUCOSE SERPL-MCNC: 125 MG/DL (ref 82–115)
GLUCOSE UR STRIP-MCNC: ABNORMAL MG/DL
HCT VFR BLD AUTO: 38.7 % (ref 40.7–50.3)
HGB BLD-MCNC: 13.3 G/DL (ref 13–17)
HGB UR QL STRIP.AUTO: ABNORMAL
IMM GRANULOCYTES # BLD AUTO: 0 K/UL (ref 0–0.3)
IMM GRANULOCYTES NFR BLD: 0 %
KETONES UR STRIP-MCNC: ABNORMAL MG/DL
LEUKOCYTE ESTERASE UR QL STRIP: NEGATIVE
LYMPHOCYTES NFR BLD: 32.25 K/UL (ref 1–4.8)
LYMPHOCYTES RELATIVE PERCENT: 75 % (ref 24–44)
MAGNESIUM SERPL-MCNC: 2 MG/DL (ref 1.6–2.4)
MCH RBC QN AUTO: 31.9 PG (ref 25.2–33.5)
MCHC RBC AUTO-ENTMCNC: 34.4 G/DL (ref 28.4–34.8)
MCV RBC AUTO: 92.8 FL (ref 82.6–102.9)
MICROORGANISM SPEC CULT: NO GROWTH
MONOCYTES NFR BLD: 1.72 K/UL (ref 0.2–0.8)
MONOCYTES NFR BLD: 4 % (ref 1–7)
MORPHOLOGY: ABNORMAL
NEUTROPHILS NFR BLD: 21 % (ref 36–66)
NEUTS SEG NFR BLD: 9.03 K/UL (ref 1.8–7.7)
NITRITE UR QL STRIP: NEGATIVE
NRBC BLD-RTO: 0.1 PER 100 WBC
PH UR STRIP: 5.5 [PH] (ref 5–8)
PLATELET # BLD AUTO: 134 K/UL (ref 138–453)
PMV BLD AUTO: 9.9 FL (ref 8.1–13.5)
POTASSIUM SERPL-SCNC: 4.4 MMOL/L (ref 3.7–5.3)
PROT SERPL-MCNC: 6.5 G/DL (ref 6.6–8.7)
PROT UR STRIP-MCNC: ABNORMAL MG/DL
RBC # BLD AUTO: 4.17 M/UL (ref 4.21–5.77)
RBC #/AREA URNS HPF: ABNORMAL /HPF (ref 0–2)
SERVICE CMNT-IMP: NORMAL
SODIUM SERPL-SCNC: 143 MMOL/L (ref 136–145)
SP GR UR STRIP: 1.01 (ref 1–1.03)
SPECIMEN DESCRIPTION: NORMAL
UROBILINOGEN UR STRIP-ACNC: NORMAL EU/DL (ref 0–1)
WBC #/AREA URNS HPF: ABNORMAL /HPF (ref 0–5)
WBC OTHER # BLD: 43 K/UL (ref 3.5–11.3)

## 2025-08-29 PROCEDURE — 6360000002 HC RX W HCPCS: Performed by: UROLOGY

## 2025-08-29 PROCEDURE — C1758 CATHETER, URETERAL: HCPCS | Performed by: UROLOGY

## 2025-08-29 PROCEDURE — 81001 URINALYSIS AUTO W/SCOPE: CPT

## 2025-08-29 PROCEDURE — 99232 SBSQ HOSP IP/OBS MODERATE 35: CPT | Performed by: NURSE PRACTITIONER

## 2025-08-29 PROCEDURE — 2720000010 HC SURG SUPPLY STERILE: Performed by: UROLOGY

## 2025-08-29 PROCEDURE — 6370000000 HC RX 637 (ALT 250 FOR IP): Performed by: UROLOGY

## 2025-08-29 PROCEDURE — 2580000003 HC RX 258: Performed by: NURSE ANESTHETIST, CERTIFIED REGISTERED

## 2025-08-29 PROCEDURE — 2500000003 HC RX 250 WO HCPCS: Performed by: UROLOGY

## 2025-08-29 PROCEDURE — C2617 STENT, NON-COR, TEM W/O DEL: HCPCS | Performed by: UROLOGY

## 2025-08-29 PROCEDURE — 0T768DZ DILATION OF RIGHT URETER WITH INTRALUMINAL DEVICE, VIA NATURAL OR ARTIFICIAL OPENING ENDOSCOPIC: ICD-10-PCS | Performed by: UROLOGY

## 2025-08-29 PROCEDURE — 6360000002 HC RX W HCPCS: Performed by: NURSE ANESTHETIST, CERTIFIED REGISTERED

## 2025-08-29 PROCEDURE — 3600000002 HC SURGERY LEVEL 2 BASE: Performed by: UROLOGY

## 2025-08-29 PROCEDURE — 80053 COMPREHEN METABOLIC PANEL: CPT

## 2025-08-29 PROCEDURE — C1769 GUIDE WIRE: HCPCS | Performed by: UROLOGY

## 2025-08-29 PROCEDURE — 0TC68ZZ EXTIRPATION OF MATTER FROM RIGHT URETER, VIA NATURAL OR ARTIFICIAL OPENING ENDOSCOPIC: ICD-10-PCS | Performed by: UROLOGY

## 2025-08-29 PROCEDURE — 7100000001 HC PACU RECOVERY - ADDTL 15 MIN: Performed by: UROLOGY

## 2025-08-29 PROCEDURE — 36415 COLL VENOUS BLD VENIPUNCTURE: CPT

## 2025-08-29 PROCEDURE — 96375 TX/PRO/DX INJ NEW DRUG ADDON: CPT

## 2025-08-29 PROCEDURE — 7100000000 HC PACU RECOVERY - FIRST 15 MIN: Performed by: UROLOGY

## 2025-08-29 PROCEDURE — G0378 HOSPITAL OBSERVATION PER HR: HCPCS

## 2025-08-29 PROCEDURE — C1747 HC ENDOSCOPE, SINGLE, URINARY TRACT: HCPCS | Performed by: UROLOGY

## 2025-08-29 PROCEDURE — 6360000002 HC RX W HCPCS: Performed by: NURSE PRACTITIONER

## 2025-08-29 PROCEDURE — 85025 COMPLETE CBC W/AUTO DIFF WBC: CPT

## 2025-08-29 PROCEDURE — 83735 ASSAY OF MAGNESIUM: CPT

## 2025-08-29 PROCEDURE — 2709999900 HC NON-CHARGEABLE SUPPLY: Performed by: UROLOGY

## 2025-08-29 PROCEDURE — 82947 ASSAY GLUCOSE BLOOD QUANT: CPT

## 2025-08-29 PROCEDURE — 6360000004 HC RX CONTRAST MEDICATION: Performed by: UROLOGY

## 2025-08-29 PROCEDURE — BT1D1ZZ FLUOROSCOPY OF RIGHT KIDNEY, URETER AND BLADDER USING LOW OSMOLAR CONTRAST: ICD-10-PCS | Performed by: UROLOGY

## 2025-08-29 PROCEDURE — 3700000001 HC ADD 15 MINUTES (ANESTHESIA): Performed by: UROLOGY

## 2025-08-29 PROCEDURE — 3600000012 HC SURGERY LEVEL 2 ADDTL 15MIN: Performed by: UROLOGY

## 2025-08-29 PROCEDURE — 3700000000 HC ANESTHESIA ATTENDED CARE: Performed by: UROLOGY

## 2025-08-29 PROCEDURE — 96376 TX/PRO/DX INJ SAME DRUG ADON: CPT

## 2025-08-29 DEVICE — IMPLANTABLE DEVICE: Type: IMPLANTABLE DEVICE | Site: URETER | Status: FUNCTIONAL

## 2025-08-29 RX ORDER — DIPHENHYDRAMINE HYDROCHLORIDE 50 MG/ML
12.5 INJECTION, SOLUTION INTRAMUSCULAR; INTRAVENOUS
Status: DISCONTINUED | OUTPATIENT
Start: 2025-08-29 | End: 2025-08-29

## 2025-08-29 RX ORDER — ONDANSETRON 2 MG/ML
INJECTION INTRAMUSCULAR; INTRAVENOUS
Status: DISCONTINUED | OUTPATIENT
Start: 2025-08-29 | End: 2025-08-29 | Stop reason: SDUPTHER

## 2025-08-29 RX ORDER — PHENYLEPHRINE HCL IN 0.9% NACL 1 MG/10 ML
SYRINGE (ML) INTRAVENOUS
Status: DISCONTINUED | OUTPATIENT
Start: 2025-08-29 | End: 2025-08-29 | Stop reason: SDUPTHER

## 2025-08-29 RX ORDER — CEFAZOLIN SODIUM 1 G/3ML
INJECTION, POWDER, FOR SOLUTION INTRAMUSCULAR; INTRAVENOUS
Status: DISCONTINUED | OUTPATIENT
Start: 2025-08-29 | End: 2025-08-29 | Stop reason: SDUPTHER

## 2025-08-29 RX ORDER — LIDOCAINE HYDROCHLORIDE 20 MG/ML
JELLY TOPICAL PRN
Status: DISCONTINUED | OUTPATIENT
Start: 2025-08-29 | End: 2025-08-29 | Stop reason: ALTCHOICE

## 2025-08-29 RX ORDER — PROCHLORPERAZINE EDISYLATE 5 MG/ML
5 INJECTION INTRAMUSCULAR; INTRAVENOUS
Status: DISCONTINUED | OUTPATIENT
Start: 2025-08-29 | End: 2025-08-29

## 2025-08-29 RX ORDER — ONDANSETRON 2 MG/ML
4 INJECTION INTRAMUSCULAR; INTRAVENOUS
Status: DISCONTINUED | OUTPATIENT
Start: 2025-08-29 | End: 2025-08-29

## 2025-08-29 RX ORDER — PROPOFOL 10 MG/ML
INJECTION, EMULSION INTRAVENOUS
Status: DISCONTINUED | OUTPATIENT
Start: 2025-08-29 | End: 2025-08-29 | Stop reason: SDUPTHER

## 2025-08-29 RX ORDER — DEXAMETHASONE SODIUM PHOSPHATE 10 MG/ML
INJECTION, SOLUTION INTRAMUSCULAR; INTRAVENOUS
Status: DISCONTINUED | OUTPATIENT
Start: 2025-08-29 | End: 2025-08-29 | Stop reason: SDUPTHER

## 2025-08-29 RX ORDER — FENTANYL CITRATE 50 UG/ML
25 INJECTION, SOLUTION INTRAMUSCULAR; INTRAVENOUS EVERY 5 MIN PRN
Status: DISCONTINUED | OUTPATIENT
Start: 2025-08-29 | End: 2025-08-29

## 2025-08-29 RX ORDER — CEPHALEXIN 500 MG/1
500 CAPSULE ORAL 2 TIMES DAILY
Qty: 12 CAPSULE | Refills: 0 | Status: SHIPPED | OUTPATIENT
Start: 2025-08-29 | End: 2025-09-04

## 2025-08-29 RX ORDER — FENTANYL CITRATE 50 UG/ML
INJECTION, SOLUTION INTRAMUSCULAR; INTRAVENOUS
Status: DISCONTINUED | OUTPATIENT
Start: 2025-08-29 | End: 2025-08-29 | Stop reason: SDUPTHER

## 2025-08-29 RX ORDER — TAMSULOSIN HYDROCHLORIDE 0.4 MG/1
0.4 CAPSULE ORAL DAILY
Qty: 30 CAPSULE | Refills: 3 | Status: SHIPPED | OUTPATIENT
Start: 2025-08-30 | End: 2025-08-29

## 2025-08-29 RX ORDER — SODIUM CHLORIDE, SODIUM LACTATE, POTASSIUM CHLORIDE, CALCIUM CHLORIDE 600; 310; 30; 20 MG/100ML; MG/100ML; MG/100ML; MG/100ML
INJECTION, SOLUTION INTRAVENOUS
Status: DISCONTINUED | OUTPATIENT
Start: 2025-08-29 | End: 2025-08-29 | Stop reason: SDUPTHER

## 2025-08-29 RX ORDER — TOBRAMYCIN 40 MG/ML
INJECTION INTRAMUSCULAR; INTRAVENOUS PRN
Status: DISCONTINUED | OUTPATIENT
Start: 2025-08-29 | End: 2025-08-29 | Stop reason: ALTCHOICE

## 2025-08-29 RX ORDER — OXYCODONE AND ACETAMINOPHEN 5; 325 MG/1; MG/1
1 TABLET ORAL EVERY 8 HOURS PRN
Qty: 6 TABLET | Refills: 0 | Status: SHIPPED | OUTPATIENT
Start: 2025-08-29 | End: 2025-08-31

## 2025-08-29 RX ORDER — TAMSULOSIN HYDROCHLORIDE 0.4 MG/1
0.4 CAPSULE ORAL DAILY
Qty: 30 CAPSULE | Refills: 3 | Status: SHIPPED | OUTPATIENT
Start: 2025-08-30

## 2025-08-29 RX ORDER — LIDOCAINE HYDROCHLORIDE 20 MG/ML
INJECTION, SOLUTION EPIDURAL; INFILTRATION; INTRACAUDAL; PERINEURAL
Status: DISCONTINUED | OUTPATIENT
Start: 2025-08-29 | End: 2025-08-29 | Stop reason: SDUPTHER

## 2025-08-29 RX ADMIN — ONDANSETRON 4 MG: 2 INJECTION, SOLUTION INTRAMUSCULAR; INTRAVENOUS at 09:35

## 2025-08-29 RX ADMIN — Medication 100 MCG: at 13:36

## 2025-08-29 RX ADMIN — HYDROMORPHONE HYDROCHLORIDE 0.5 MG: 1 INJECTION, SOLUTION INTRAMUSCULAR; INTRAVENOUS; SUBCUTANEOUS at 09:56

## 2025-08-29 RX ADMIN — CEFAZOLIN 2 G: 1 INJECTION, POWDER, FOR SOLUTION INTRAMUSCULAR; INTRAVENOUS at 13:25

## 2025-08-29 RX ADMIN — ONDANSETRON 4 MG: 2 INJECTION, SOLUTION INTRAMUSCULAR; INTRAVENOUS at 13:42

## 2025-08-29 RX ADMIN — FENTANYL CITRATE 50 MCG: 50 INJECTION INTRAMUSCULAR; INTRAVENOUS at 13:19

## 2025-08-29 RX ADMIN — FENTANYL CITRATE 50 MCG: 50 INJECTION INTRAMUSCULAR; INTRAVENOUS at 13:16

## 2025-08-29 RX ADMIN — Medication 100 MCG: at 13:45

## 2025-08-29 RX ADMIN — AMLODIPINE BESYLATE 10 MG: 10 TABLET ORAL at 15:04

## 2025-08-29 RX ADMIN — CYANOCOBALAMIN TAB 1000 MCG 1000 MCG: 1000 TAB at 15:04

## 2025-08-29 RX ADMIN — WATER 1000 MG: 1 INJECTION INTRAMUSCULAR; INTRAVENOUS; SUBCUTANEOUS at 15:04

## 2025-08-29 RX ADMIN — Medication 100 MCG: at 13:34

## 2025-08-29 RX ADMIN — HYDROMORPHONE HYDROCHLORIDE 0.5 MG: 1 INJECTION, SOLUTION INTRAMUSCULAR; INTRAVENOUS; SUBCUTANEOUS at 06:15

## 2025-08-29 RX ADMIN — Medication 200 MCG: at 13:32

## 2025-08-29 RX ADMIN — FLUOXETINE HYDROCHLORIDE 20 MG: 20 CAPSULE ORAL at 15:04

## 2025-08-29 RX ADMIN — PROPOFOL 150 MG: 10 INJECTION, EMULSION INTRAVENOUS at 13:16

## 2025-08-29 RX ADMIN — PROPOFOL 50 MG: 10 INJECTION, EMULSION INTRAVENOUS at 13:28

## 2025-08-29 RX ADMIN — DEXAMETHASONE SODIUM PHOSPHATE 10 MG: 10 INJECTION, SOLUTION INTRAMUSCULAR; INTRAVENOUS at 13:16

## 2025-08-29 RX ADMIN — Medication 100 MCG: at 13:38

## 2025-08-29 RX ADMIN — Medication 1000 UNITS: at 15:04

## 2025-08-29 RX ADMIN — LIDOCAINE HYDROCHLORIDE 60 MG: 20 INJECTION, SOLUTION EPIDURAL; INFILTRATION; INTRACAUDAL; PERINEURAL at 13:16

## 2025-08-29 RX ADMIN — TAMSULOSIN HYDROCHLORIDE 0.4 MG: 0.4 CAPSULE ORAL at 15:07

## 2025-08-29 RX ADMIN — Medication 100 MCG: at 13:42

## 2025-08-29 RX ADMIN — SODIUM CHLORIDE, POTASSIUM CHLORIDE, SODIUM LACTATE AND CALCIUM CHLORIDE: 600; 310; 30; 20 INJECTION, SOLUTION INTRAVENOUS at 13:09

## 2025-08-29 RX ADMIN — HYDRALAZINE HYDROCHLORIDE 25 MG: 25 TABLET ORAL at 15:04

## 2025-08-29 RX ADMIN — Medication 100 MCG: at 13:30

## 2025-08-29 ASSESSMENT — ENCOUNTER SYMPTOMS
ABDOMINAL PAIN: 0
CONSTIPATION: 0
DIARRHEA: 0
NAUSEA: 1
BACK PAIN: 1
EYES NEGATIVE: 1
VOMITING: 0
RESPIRATORY NEGATIVE: 1
SHORTNESS OF BREATH: 0

## 2025-08-29 ASSESSMENT — PAIN DESCRIPTION - ORIENTATION
ORIENTATION: RIGHT
ORIENTATION: RIGHT;LEFT

## 2025-08-29 ASSESSMENT — PAIN DESCRIPTION - LOCATION
LOCATION: ABDOMEN
LOCATION: FLANK

## 2025-08-29 ASSESSMENT — PAIN SCALES - GENERAL
PAINLEVEL_OUTOF10: 0
PAINLEVEL_OUTOF10: 2
PAINLEVEL_OUTOF10: 0
PAINLEVEL_OUTOF10: 4
PAINLEVEL_OUTOF10: 0
PAINLEVEL_OUTOF10: 8
PAINLEVEL_OUTOF10: 7

## 2025-08-29 ASSESSMENT — PAIN - FUNCTIONAL ASSESSMENT
PAIN_FUNCTIONAL_ASSESSMENT: 0-10
PAIN_FUNCTIONAL_ASSESSMENT: FACE, LEGS, ACTIVITY, CRY, AND CONSOLABILITY (FLACC)
PAIN_FUNCTIONAL_ASSESSMENT: 0-10

## 2025-08-29 ASSESSMENT — PAIN DESCRIPTION - DESCRIPTORS: DESCRIPTORS: SHARP

## 2025-09-02 ENCOUNTER — CARE COORDINATION (OUTPATIENT)
Dept: CARE COORDINATION | Age: 65
End: 2025-09-02

## 2025-09-02 DIAGNOSIS — N18.9 ACUTE KIDNEY INJURY SUPERIMPOSED ON CKD: Primary | ICD-10-CM

## 2025-09-02 DIAGNOSIS — N17.9 ACUTE KIDNEY INJURY SUPERIMPOSED ON CKD: Primary | ICD-10-CM

## 2025-09-02 PROCEDURE — 1111F DSCHRG MED/CURRENT MED MERGE: CPT | Performed by: FAMILY MEDICINE

## 2025-09-03 ENCOUNTER — HOSPITAL ENCOUNTER (OUTPATIENT)
Age: 65
Setting detail: SPECIMEN
Discharge: HOME | End: 2025-09-03

## 2025-09-03 DIAGNOSIS — N18.9 ACUTE KIDNEY INJURY SUPERIMPOSED ON CKD: ICD-10-CM

## 2025-09-03 DIAGNOSIS — N17.9 ACUTE KIDNEY INJURY SUPERIMPOSED ON CKD: ICD-10-CM

## 2025-09-03 LAB
ANION GAP SERPL CALCULATED.3IONS-SCNC: 11 MMOL/L (ref 9–16)
BUN SERPL-MCNC: 17 MG/DL (ref 8–23)
CALCIUM SERPL-MCNC: 9.3 MG/DL (ref 8.6–10.4)
CHLORIDE SERPL-SCNC: 104 MMOL/L (ref 98–107)
CO2 SERPL-SCNC: 29 MMOL/L (ref 20–31)
CREAT SERPL-MCNC: 1.3 MG/DL (ref 0.7–1.2)
GFR, ESTIMATED: 61 ML/MIN/1.73M2
GLUCOSE SERPL-MCNC: 140 MG/DL (ref 74–99)
POTASSIUM SERPL-SCNC: 3.6 MMOL/L (ref 3.7–5.3)
SODIUM SERPL-SCNC: 144 MMOL/L (ref 136–145)

## (undated) DEVICE — Device

## (undated) DEVICE — GLOVE SURG SZ 7 CRM LTX FREE POLYISOPRENE POLYMER BEAD ANTI

## (undated) DEVICE — SOLUTION IV 1000ML 0.9% SOD CHL PH 5 INJ USP VIAFLX PLAS

## (undated) DEVICE — SINGLE-USE DIGITAL FLEXIBLE URETEROSCOPE: Brand: LITHOVUE

## (undated) DEVICE — CONE TIP URETERAL CATHETER WITH OPEN-END: Brand: CONE TIP

## (undated) DEVICE — SYRINGE MED 20ML STD CLR PLAS LUERLOCK TIP N CTRL DISP

## (undated) DEVICE — CONE TIP URETERAL CATHETER: Brand: URETERAL CATHETER

## (undated) DEVICE — MASTISOL ADHESIVE LIQ 2/3ML

## (undated) DEVICE — MARKER,SKIN,WI/RULER AND LABELS: Brand: MEDLINE

## (undated) DEVICE — STRIP,CLOSURE,WOUND,MEDI-STRIP,1/2X4: Brand: MEDLINE

## (undated) DEVICE — SOL IRR SOD CHL 0.9% TITAN XL CNTNR 3000ML

## (undated) DEVICE — CONTAINER SPEC 4OZ POLYPR GRAD SCR LID LEAK RESIST ID LBL

## (undated) DEVICE — SYRINGE MED 30ML STD CLR PLAS LUERLOCK TIP N CTRL DISP

## (undated) DEVICE — RADIFOCUS GLIDEWIRE: Brand: GLIDEWIRE

## (undated) DEVICE — STAZ CYSTO: Brand: MEDLINE INDUSTRIES, INC.

## (undated) DEVICE — GUIDEWIRE VASC L150CM DIA0.035IN L3CM STIFF NIT HYDRPHLC

## (undated) DEVICE — CLOSURE SKIN ADH 3X0.5 IN N WOVEN POLYESTER

## (undated) DEVICE — DUAL LUMEN URETERAL CATHETER

## (undated) DEVICE — SINGLE ACTION PUMPING SYSTEM

## (undated) DEVICE — URETEROSCOPE DGT FLX SHTH GLOB STD MOB CART CMSO IMAGER

## (undated) DEVICE — CHECK-FLO HEMOSTASIS ASSEMBLY: Brand: CHECK-FLO

## (undated) DEVICE — SOLUTION IRRIG 3000ML 0.9% SOD CHL USP UROMATIC PLAS CONT

## (undated) DEVICE — FIBER ENT HOLM LSR 200 MIC STRL LTX FRE

## (undated) DEVICE — DUAL LUMEN URETERAL ACCESS CATHETER: Brand: COOK

## (undated) DEVICE — CONTAINER,SPECIMEN,OR STERILE,4OZ: Brand: MEDLINE